# Patient Record
Sex: MALE | Race: WHITE | NOT HISPANIC OR LATINO | Employment: OTHER | URBAN - METROPOLITAN AREA
[De-identification: names, ages, dates, MRNs, and addresses within clinical notes are randomized per-mention and may not be internally consistent; named-entity substitution may affect disease eponyms.]

---

## 2017-01-12 ENCOUNTER — OFFICE VISIT (OUTPATIENT)
Dept: URGENT CARE | Facility: MEDICAL CENTER | Age: 32
End: 2017-01-12
Payer: MEDICARE

## 2017-01-12 PROCEDURE — 99213 OFFICE O/P EST LOW 20 MIN: CPT

## 2017-01-12 PROCEDURE — G0463 HOSPITAL OUTPT CLINIC VISIT: HCPCS

## 2017-01-19 ENCOUNTER — ALLSCRIPTS OFFICE VISIT (OUTPATIENT)
Dept: OTHER | Facility: OTHER | Age: 32
End: 2017-01-19

## 2017-03-17 ENCOUNTER — APPOINTMENT (OUTPATIENT)
Dept: LAB | Facility: HOSPITAL | Age: 32
End: 2017-03-17
Payer: MEDICARE

## 2017-03-17 ENCOUNTER — TRANSCRIBE ORDERS (OUTPATIENT)
Dept: ADMINISTRATIVE | Facility: HOSPITAL | Age: 32
End: 2017-03-17

## 2017-03-17 ENCOUNTER — HOSPITAL ENCOUNTER (OUTPATIENT)
Dept: NON INVASIVE DIAGNOSTICS | Facility: HOSPITAL | Age: 32
Discharge: HOME | End: 2017-03-17
Payer: MEDICARE

## 2017-03-17 DIAGNOSIS — Z79.899 ENCOUNTER FOR LONG-TERM (CURRENT) USE OF OTHER MEDICATIONS: ICD-10-CM

## 2017-03-17 DIAGNOSIS — Z79.899 ENCOUNTER FOR LONG-TERM (CURRENT) USE OF OTHER MEDICATIONS: Primary | ICD-10-CM

## 2017-03-17 LAB
ALBUMIN SERPL BCP-MCNC: 3.4 G/DL (ref 3.5–5)
ALP SERPL-CCNC: 72 U/L (ref 46–116)
ALT SERPL W P-5'-P-CCNC: 23 U/L (ref 12–78)
ANION GAP SERPL CALCULATED.3IONS-SCNC: 6 MMOL/L (ref 4–13)
AST SERPL W P-5'-P-CCNC: 13 U/L (ref 5–45)
ATRIAL RATE: 119 BPM
BASOPHILS # BLD AUTO: 0.03 THOUSANDS/ΜL (ref 0–0.1)
BASOPHILS NFR BLD AUTO: 0 % (ref 0–1)
BILIRUB SERPL-MCNC: 0.35 MG/DL (ref 0.2–1)
BUN SERPL-MCNC: 13 MG/DL (ref 5–25)
CALCIUM SERPL-MCNC: 8.8 MG/DL (ref 8.3–10.1)
CHLORIDE SERPL-SCNC: 103 MMOL/L (ref 100–108)
CHOLEST SERPL-MCNC: 115 MG/DL (ref 50–200)
CO2 SERPL-SCNC: 29 MMOL/L (ref 21–32)
CREAT SERPL-MCNC: 1.17 MG/DL (ref 0.6–1.3)
EOSINOPHIL # BLD AUTO: 0.18 THOUSAND/ΜL (ref 0–0.61)
EOSINOPHIL NFR BLD AUTO: 3 % (ref 0–6)
ERYTHROCYTE [DISTWIDTH] IN BLOOD BY AUTOMATED COUNT: 13.3 % (ref 11.6–15.1)
GFR SERPL CREATININE-BSD FRML MDRD: >60 ML/MIN/1.73SQ M
GLUCOSE P FAST SERPL-MCNC: 93 MG/DL (ref 65–99)
HCT VFR BLD AUTO: 42.3 % (ref 36.5–49.3)
HDLC SERPL-MCNC: 36 MG/DL (ref 40–60)
HGB BLD-MCNC: 14.1 G/DL (ref 12–17)
LDLC SERPL CALC-MCNC: 64 MG/DL (ref 0–100)
LYMPHOCYTES # BLD AUTO: 1.93 THOUSANDS/ΜL (ref 0.6–4.47)
LYMPHOCYTES NFR BLD AUTO: 26 % (ref 14–44)
MCH RBC QN AUTO: 29.4 PG (ref 26.8–34.3)
MCHC RBC AUTO-ENTMCNC: 33.3 G/DL (ref 31.4–37.4)
MCV RBC AUTO: 88 FL (ref 82–98)
MONOCYTES # BLD AUTO: 0.55 THOUSAND/ΜL (ref 0.17–1.22)
MONOCYTES NFR BLD AUTO: 8 % (ref 4–12)
NEUTROPHILS # BLD AUTO: 4.61 THOUSANDS/ΜL (ref 1.85–7.62)
NEUTS SEG NFR BLD AUTO: 63 % (ref 43–75)
P AXIS: 43 DEGREES
PLATELET # BLD AUTO: 267 THOUSANDS/UL (ref 149–390)
PMV BLD AUTO: 9.8 FL (ref 8.9–12.7)
POTASSIUM SERPL-SCNC: 3.6 MMOL/L (ref 3.5–5.3)
PR INTERVAL: 150 MS
PROT SERPL-MCNC: 6.4 G/DL (ref 6.4–8.2)
QRS AXIS: 84 DEGREES
QRSD INTERVAL: 98 MS
QT INTERVAL: 320 MS
QTC INTERVAL: 450 MS
RBC # BLD AUTO: 4.8 MILLION/UL (ref 3.88–5.62)
SODIUM SERPL-SCNC: 138 MMOL/L (ref 136–145)
T WAVE AXIS: 61 DEGREES
TRIGL SERPL-MCNC: 76 MG/DL
TSH SERPL DL<=0.05 MIU/L-ACNC: 1.57 UIU/ML (ref 0.36–3.74)
VENTRICULAR RATE: 119 BPM
WBC # BLD AUTO: 7.3 THOUSAND/UL (ref 4.31–10.16)

## 2017-03-17 PROCEDURE — 93005 ELECTROCARDIOGRAM TRACING: CPT

## 2017-03-17 PROCEDURE — 85025 COMPLETE CBC W/AUTO DIFF WBC: CPT

## 2017-03-17 PROCEDURE — 80061 LIPID PANEL: CPT

## 2017-03-17 PROCEDURE — 36415 COLL VENOUS BLD VENIPUNCTURE: CPT

## 2017-03-17 PROCEDURE — 84443 ASSAY THYROID STIM HORMONE: CPT

## 2017-03-17 PROCEDURE — 80053 COMPREHEN METABOLIC PANEL: CPT

## 2017-09-06 ENCOUNTER — HOSPITAL ENCOUNTER (INPATIENT)
Facility: HOSPITAL | Age: 32
LOS: 1 YEAR days | Discharge: HOME | DRG: 885 | End: 2018-12-31
Attending: PSYCHIATRY & NEUROLOGY
Payer: MEDICARE

## 2018-01-14 VITALS
OXYGEN SATURATION: 99 % | BODY MASS INDEX: 22.55 KG/M2 | HEART RATE: 91 BPM | HEIGHT: 69 IN | SYSTOLIC BLOOD PRESSURE: 110 MMHG | RESPIRATION RATE: 18 BRPM | WEIGHT: 152.25 LBS | DIASTOLIC BLOOD PRESSURE: 68 MMHG

## 2018-03-24 LAB
AMPHETAMINE URINE (HISTORICAL): NEGATIVE
BARBITURATE URINE (HISTORICAL): NEGATIVE
BENZODIAZEPINE URINE (HISTORICAL): NEGATIVE
COCAINE (METAB.), URINE (HISTORICAL): NEGATIVE
DRUG COMMENT (HISTORICAL): NORMAL
MDMA (GC/MS) (HISTORICAL): NEGATIVE
METHADONE URINE (HISTORICAL): NEGATIVE
METHAMPHETAMINE URINE (HISTORICAL): NEGATIVE
OPIATES (HISTORICAL): NEGATIVE
OXYCODONE (HISTORICAL): NEGATIVE
PHENCYCLIDINE URINE (HISTORICAL): NEGATIVE
THC URINE (HISTORICAL): NEGATIVE
TRICYCLICS URINE (HISTORICAL): NEGATIVE

## 2018-04-03 LAB
AMPHETAMINE URINE (HISTORICAL): POSITIVE
BARBITURATE URINE (HISTORICAL): NEGATIVE
BENZODIAZEPINE URINE (HISTORICAL): NEGATIVE
COCAINE (METAB.), URINE (HISTORICAL): NEGATIVE
DRUG COMMENT (HISTORICAL): ABNORMAL
MDMA (GC/MS) (HISTORICAL): NEGATIVE
METHADONE URINE (HISTORICAL): NEGATIVE
METHAMPHETAMINE URINE (HISTORICAL): NEGATIVE
OPIATES (HISTORICAL): NEGATIVE
OXYCODONE (HISTORICAL): NEGATIVE
PHENCYCLIDINE URINE (HISTORICAL): NEGATIVE
THC URINE (HISTORICAL): NEGATIVE
TRICYCLICS URINE (HISTORICAL): NEGATIVE

## 2018-04-17 LAB
ABSOL LYMPHOCYTES (HISTORICAL): 2.6 K/UL (ref 0.5–4)
BASOPHILS # BLD AUTO: 0 K/UL (ref 0–0.1)
BASOPHILS # BLD AUTO: 1 % (ref 0–1)
DEPRECATED RDW RBC AUTO: 13.3 %
EOSINOPHIL # BLD AUTO: 0.4 K/UL (ref 0–0.4)
EOSINOPHIL NFR BLD AUTO: 6 % (ref 0–6)
HCT VFR BLD AUTO: 39.7 % (ref 41–53)
HGB BLD-MCNC: 13.6 G/DL (ref 13.5–17.5)
LYMPHOCYTES NFR BLD AUTO: 41 % (ref 25–45)
MCH RBC QN AUTO: 28.8 PG (ref 26–34)
MCHC RBC AUTO-ENTMCNC: 34.2 % (ref 31–36)
MCV RBC AUTO: 84 FL (ref 80–100)
MONOCYTES # BLD AUTO: 0.6 K/UL (ref 0.2–0.9)
MONOCYTES NFR BLD AUTO: 10 % (ref 1–10)
NEUTROPHILS ABS COUNT (HISTORICAL): 2.7 K/UL (ref 1.8–7.8)
NEUTS SEG NFR BLD AUTO: 42 % (ref 45–65)
PLATELET # BLD AUTO: 197 K/MCL (ref 150–450)
RBC # BLD AUTO: 4.7 M/MCL (ref 4.5–5.9)
WBC # BLD AUTO: 6.2 K/MCL (ref 4.5–11)

## 2018-05-02 LAB
T3FREE SERPL-MCNC: 3.25 PG/ML (ref 2.77–5.27)
T4 FREE SERPL-MCNC: 1.3 NG/DL (ref 0.78–2.19)
TSH SERPL DL<=0.05 MIU/L-ACNC: 2.56 UIU/ML (ref 0.47–4.68)

## 2018-05-11 LAB — LITHIUM LEVEL (HISTORICAL): 0.5 MEQ/L (ref 0.6–1.2)

## 2018-05-15 LAB
ABSOL LYMPHOCYTES (HISTORICAL): 2.8 K/UL (ref 0.5–4)
BASOPHILS # BLD AUTO: 0 K/UL (ref 0–0.1)
BASOPHILS # BLD AUTO: 1 % (ref 0–1)
DEPRECATED RDW RBC AUTO: 13.3 %
EOSINOPHIL # BLD AUTO: 0.5 K/UL (ref 0–0.4)
EOSINOPHIL NFR BLD AUTO: 6 % (ref 0–6)
HCT VFR BLD AUTO: 42.5 % (ref 41–53)
HGB BLD-MCNC: 13.9 G/DL (ref 13.5–17.5)
LYMPHOCYTES NFR BLD AUTO: 35 % (ref 25–45)
MCH RBC QN AUTO: 27.9 PG (ref 26–34)
MCHC RBC AUTO-ENTMCNC: 32.6 % (ref 31–36)
MCV RBC AUTO: 86 FL (ref 80–100)
MONOCYTES # BLD AUTO: 0.6 K/UL (ref 0.2–0.9)
MONOCYTES NFR BLD AUTO: 8 % (ref 1–10)
NEUTROPHILS ABS COUNT (HISTORICAL): 4.1 K/UL (ref 1.8–7.8)
NEUTS SEG NFR BLD AUTO: 50 % (ref 45–65)
PLATELET # BLD AUTO: 230 K/MCL (ref 150–450)
RBC # BLD AUTO: 4.96 M/MCL (ref 4.5–5.9)
WBC # BLD AUTO: 8 K/MCL (ref 4.5–11)

## 2018-05-16 LAB — LITHIUM LEVEL (HISTORICAL): 1 MEQ/L (ref 0.6–1.2)

## 2018-06-01 PROCEDURE — 9990 LEVOTHYROXINE SOD 75 MCG TAB (3108354)

## 2018-06-01 PROCEDURE — 9990 LITHIUM CARBONATE 300 MG CAP (3103215)

## 2018-06-01 PROCEDURE — 9990 CSR EAC (7599997)

## 2018-06-01 PROCEDURE — 9990 DEPAKOTE 500MG ER TABLET (3125069)

## 2018-06-01 PROCEDURE — 9990 WELLBUTRIN XL 300MG TABLET (3127016)

## 2018-06-01 PROCEDURE — 9990 MIRALAX POWDER 17GM DOSE (3125150)

## 2018-06-01 PROCEDURE — 9990 EAC SEMI-PRIVATE (20271)

## 2018-06-01 PROCEDURE — 9990 PROMATINE 2.5MG TAB (3125846)

## 2018-06-01 PROCEDURE — 9990 CLOZAPINE 200MG TABLET (3135589)

## 2018-06-01 PROCEDURE — 9990 FLUPHENAZINE 10MG TABLET (3135373)

## 2018-06-02 PROCEDURE — 9990 FLUPHENAZINE 10MG TABLET (3135373)

## 2018-06-02 PROCEDURE — 9990 EAC SEMI-PRIVATE (20271)

## 2018-06-02 PROCEDURE — 9990 CSR EAC (7599997)

## 2018-06-02 PROCEDURE — 9990 LEVOTHYROXINE SOD 75 MCG TAB (3108354)

## 2018-06-02 PROCEDURE — 9990 MIRALAX POWDER 17GM DOSE (3125150)

## 2018-06-02 PROCEDURE — 9990 WELLBUTRIN XL 300MG TABLET (3127016)

## 2018-06-02 PROCEDURE — 9990 DEPAKOTE 500MG ER TABLET (3125069)

## 2018-06-02 PROCEDURE — 9990 CLOZAPINE 200MG TABLET (3135589)

## 2018-06-02 PROCEDURE — 9990 LITHIUM CARBONATE 300 MG CAP (3103215)

## 2018-06-02 PROCEDURE — 9990 PROMATINE 2.5MG TAB (3125846)

## 2018-06-03 PROCEDURE — 9990 PROMATINE 2.5MG TAB (3125846)

## 2018-06-03 PROCEDURE — 9990 LEVOTHYROXINE SOD 75 MCG TAB (3108354)

## 2018-06-03 PROCEDURE — 9990 WELLBUTRIN XL 300MG TABLET (3127016)

## 2018-06-03 PROCEDURE — 9990 MIRALAX POWDER 17GM DOSE (3125150)

## 2018-06-03 PROCEDURE — 80305 DRUG TEST PRSMV DIR OPT OBS: CPT

## 2018-06-03 PROCEDURE — 9990 DEPAKOTE 500MG ER TABLET (3125069)

## 2018-06-03 PROCEDURE — 9990 CSR EAC (7599997)

## 2018-06-03 PROCEDURE — 9990 LITHIUM CARBONATE 300 MG CAP (3103215)

## 2018-06-03 PROCEDURE — 9990 FLUPHENAZINE 10MG TABLET (3135373)

## 2018-06-03 PROCEDURE — 9990 CLOZAPINE 200MG TABLET (3135589)

## 2018-06-03 PROCEDURE — 9990 EAC SEMI-PRIVATE (20271)

## 2018-06-03 PROCEDURE — 9990 DRUG TEST PRESUMP OPTICAL (229568)

## 2018-06-04 PROCEDURE — 9990 CSR EAC (7599997)

## 2018-06-04 PROCEDURE — 9990 LITHIUM CARBONATE 300 MG CAP (3103215)

## 2018-06-04 PROCEDURE — 9990 FLUPHENAZINE 10MG TABLET (3135373)

## 2018-06-04 PROCEDURE — 9990 LEVOTHYROXINE SOD 75 MCG TAB (3108354)

## 2018-06-04 PROCEDURE — 9990 CLOZAPINE 200MG TABLET (3135589)

## 2018-06-04 PROCEDURE — 9990 DEPAKOTE 500MG ER TABLET (3125069)

## 2018-06-04 PROCEDURE — 9990 PROMATINE 2.5MG TAB (3125846)

## 2018-06-04 PROCEDURE — 9990 MIRALAX POWDER 17GM DOSE (3125150)

## 2018-06-04 PROCEDURE — 9990 WELLBUTRIN XL 300MG TABLET (3127016)

## 2018-06-04 PROCEDURE — 9990 EAC SEMI-PRIVATE (20271)

## 2018-06-05 PROCEDURE — 9990 DEPAKOTE 500MG ER TABLET (3125069)

## 2018-06-05 PROCEDURE — 9990 LITHIUM CARBONATE 300 MG CAP (3103215)

## 2018-06-05 PROCEDURE — 9990 FLUPHENAZINE 10MG TABLET (3135373)

## 2018-06-05 PROCEDURE — 9990 PROMATINE 2.5MG TAB (3125846)

## 2018-06-05 PROCEDURE — 9990 MIRALAX POWDER 17GM DOSE (3125150)

## 2018-06-05 PROCEDURE — 9990 LEVOTHYROXINE SOD 75 MCG TAB (3108354)

## 2018-06-05 PROCEDURE — 9990 CLOZAPINE 200MG TABLET (3135589)

## 2018-06-05 PROCEDURE — 9990 WELLBUTRIN XL 300MG TABLET (3127016)

## 2018-06-05 PROCEDURE — 9990 EAC SEMI-PRIVATE (20271)

## 2018-06-05 PROCEDURE — 9990 CSR EAC (7599997)

## 2018-06-06 PROCEDURE — 9990 LEVOTHYROXINE SOD 75 MCG TAB (3108354)

## 2018-06-06 PROCEDURE — 9990 EAC SEMI-PRIVATE (20271)

## 2018-06-06 PROCEDURE — 9990 LITHIUM CARBONATE 300 MG CAP (3103215)

## 2018-06-06 PROCEDURE — 9990 PROMATINE 2.5MG TAB (3125846)

## 2018-06-06 PROCEDURE — 9990 FLUPHENAZINE 10MG TABLET (3135373)

## 2018-06-06 PROCEDURE — 9990 CLOZAPINE 200MG TABLET (3135589)

## 2018-06-06 PROCEDURE — 9990 CSR EAC (7599997)

## 2018-06-06 PROCEDURE — 9990 MIRALAX POWDER 17GM DOSE (3125150)

## 2018-06-06 PROCEDURE — 9990 WELLBUTRIN XL 300MG TABLET (3127016)

## 2018-06-06 PROCEDURE — 9990 DEPAKOTE 500MG ER TABLET (3125069)

## 2018-06-07 PROCEDURE — 9990 EAC SEMI-PRIVATE (20271)

## 2018-06-07 PROCEDURE — 9990 LITHIUM CARBONATE 300 MG CAP (3103215)

## 2018-06-07 PROCEDURE — 9990 DEPAKOTE 500MG ER TABLET (3125069)

## 2018-06-07 PROCEDURE — 9990 PROMATINE 2.5MG TAB (3125846)

## 2018-06-07 PROCEDURE — 9990 FLUPHENAZINE 10MG TABLET (3135373)

## 2018-06-07 PROCEDURE — 9990 CLOZAPINE 200MG TABLET (3135589)

## 2018-06-07 PROCEDURE — 9990 LEVOTHYROXINE SOD 75 MCG TAB (3108354)

## 2018-06-07 PROCEDURE — 9990 CSR EAC (7599997)

## 2018-06-07 PROCEDURE — 9990 WELLBUTRIN XL 300MG TABLET (3127016)

## 2018-06-07 PROCEDURE — 9990 MIRALAX POWDER 17GM DOSE (3125150)

## 2018-06-08 PROCEDURE — 9990 EAC SEMI-PRIVATE (20271)

## 2018-06-08 PROCEDURE — 9990 DEPAKOTE 500MG ER TABLET (3125069)

## 2018-06-08 PROCEDURE — 9990 FLUPHENAZINE 10MG TABLET (3135373)

## 2018-06-08 PROCEDURE — 9990 MIRALAX POWDER 17GM DOSE (3125150)

## 2018-06-08 PROCEDURE — 9990 PROMATINE 2.5MG TAB (3125846)

## 2018-06-08 PROCEDURE — 9990 CLOZAPINE 200MG TABLET (3135589)

## 2018-06-08 PROCEDURE — 9990 LITHIUM CARBONATE 300 MG CAP (3103215)

## 2018-06-08 PROCEDURE — 9990 CSR EAC (7599997)

## 2018-06-08 PROCEDURE — 9990 WELLBUTRIN XL 300MG TABLET (3127016)

## 2018-06-08 PROCEDURE — 9990 LEVOTHYROXINE SOD 75 MCG TAB (3108354)

## 2018-06-09 PROCEDURE — 9990 FLUPHENAZINE 10MG TABLET (3135373)

## 2018-06-09 PROCEDURE — 9990 CSR EAC (7599997)

## 2018-06-09 PROCEDURE — 9990 EAC SEMI-PRIVATE (20271)

## 2018-06-09 PROCEDURE — 9990 WELLBUTRIN XL 300MG TABLET (3127016)

## 2018-06-09 PROCEDURE — 9990 LEVOTHYROXINE SOD 75 MCG TAB (3108354)

## 2018-06-09 PROCEDURE — 9990 CLOZAPINE 200MG TABLET (3135589)

## 2018-06-09 PROCEDURE — 9990 DEPAKOTE 500MG ER TABLET (3125069)

## 2018-06-09 PROCEDURE — 9990 LITHIUM CARBONATE 300 MG CAP (3103215)

## 2018-06-09 PROCEDURE — 9990 PROMATINE 2.5MG TAB (3125846)

## 2018-06-10 PROCEDURE — 9990 DRUG TEST PRESUMP OPTICAL (229568)

## 2018-06-10 PROCEDURE — 9990 CSR EAC (7599997)

## 2018-06-10 PROCEDURE — 9990 LITHIUM CARBONATE 300 MG CAP (3103215)

## 2018-06-10 PROCEDURE — 9990 WELLBUTRIN XL 300MG TABLET (3127016)

## 2018-06-10 PROCEDURE — 9990 DEPAKOTE 500MG ER TABLET (3125069)

## 2018-06-10 PROCEDURE — 9990 FLUPHENAZINE 10MG TABLET (3135373)

## 2018-06-10 PROCEDURE — 9990 EAC SEMI-PRIVATE (20271)

## 2018-06-10 PROCEDURE — 80305 DRUG TEST PRSMV DIR OPT OBS: CPT

## 2018-06-10 PROCEDURE — 9990 CLOZAPINE 200MG TABLET (3135589)

## 2018-06-10 PROCEDURE — 9990 LEVOTHYROXINE SOD 75 MCG TAB (3108354)

## 2018-06-10 PROCEDURE — 9990 PROMATINE 2.5MG TAB (3125846)

## 2018-06-11 PROCEDURE — 9990 MIRALAX POWDER 17GM DOSE (3125150)

## 2018-06-11 PROCEDURE — 9990 FLUPHENAZINE 10MG TABLET (3135373)

## 2018-06-11 PROCEDURE — 9990 WELLBUTRIN XL 300MG TABLET (3127016)

## 2018-06-11 PROCEDURE — 9990 LEVOTHYROXINE SOD 75 MCG TAB (3108354)

## 2018-06-11 PROCEDURE — 9990 EAC SEMI-PRIVATE (20271)

## 2018-06-11 PROCEDURE — 9990 CLOZAPINE 200MG TABLET (3135589)

## 2018-06-11 PROCEDURE — 9990 DEPAKOTE 500MG ER TABLET (3125069)

## 2018-06-11 PROCEDURE — 9990 CSR EAC (7599997)

## 2018-06-11 PROCEDURE — 9990 LITHIUM CARBONATE 300 MG CAP (3103215)

## 2018-06-11 PROCEDURE — 9990 PROMATINE 2.5MG TAB (3125846)

## 2018-06-12 LAB
ABSOL LYMPHOCYTES (HISTORICAL): 2.3 K/UL (ref 0.5–4)
ALBUMIN SERPL BCP-MCNC: 3.9 G/DL (ref 3–5.2)
ALP SERPL-CCNC: 69 U/L (ref 43–122)
ALT SERPL W P-5'-P-CCNC: 20 U/L (ref 9–52)
ANION GAP SERPL CALCULATED.3IONS-SCNC: 8 MMOL/L (ref 5–14)
AST SERPL W P-5'-P-CCNC: 12 U/L (ref 17–59)
BASOPHILS # BLD AUTO: 0 K/UL (ref 0–0.1)
BASOPHILS # BLD AUTO: 1 % (ref 0–1)
BILIRUB SERPL-MCNC: 0.3 MG/DL
BUN SERPL-MCNC: 11 MG/DL (ref 5–25)
CALCIUM SERPL-MCNC: 9.4 MG/DL (ref 8.4–10.2)
CHLORIDE SERPL-SCNC: 102 MEQ/L (ref 97–108)
CHOLEST SERPL-MCNC: 162 MG/DL
CHOLEST/HDLC SERPL: 3.6 {RATIO}
CO2 SERPL-SCNC: 30 MMOL/L (ref 22–30)
CREATINE, SERUM (HISTORICAL): 1.14 MG/DL (ref 0.7–1.5)
DEPRECATED RDW RBC AUTO: 13.1 %
EGFR (HISTORICAL): >60 ML/MIN/1.73 M2
EOSINOPHIL # BLD AUTO: 0.4 K/UL (ref 0–0.4)
EOSINOPHIL NFR BLD AUTO: 6 % (ref 0–6)
GLUCOSE SERPL-MCNC: 91 MG/DL (ref 70–99)
HCT VFR BLD AUTO: 44.4 % (ref 41–53)
HDLC SERPL-MCNC: 45 MG/DL
HGB BLD-MCNC: 14.7 G/DL (ref 13.5–17.5)
LDL/HDL RATIO (HISTORICAL): 1.9
LDLC SERPL CALC-MCNC: 84 MG/DL
LITHIUM LEVEL (HISTORICAL): 0.8 MEQ/L (ref 0.6–1.2)
LYMPHOCYTES NFR BLD AUTO: 33 % (ref 25–45)
MCH RBC QN AUTO: 29 PG (ref 26–34)
MCHC RBC AUTO-ENTMCNC: 33.1 % (ref 31–36)
MCV RBC AUTO: 88 FL (ref 80–100)
MONOCYTES # BLD AUTO: 0.5 K/UL (ref 0.2–0.9)
MONOCYTES NFR BLD AUTO: 7 % (ref 1–10)
NEUTROPHILS ABS COUNT (HISTORICAL): 3.7 K/UL (ref 1.8–7.8)
NEUTS SEG NFR BLD AUTO: 53 % (ref 45–65)
PLATELET # BLD AUTO: 243 K/MCL (ref 150–450)
POTASSIUM SERPL-SCNC: 4.2 MEQ/L (ref 3.6–5)
RBC # BLD AUTO: 5.07 M/MCL (ref 4.5–5.9)
SODIUM SERPL-SCNC: 140 MEQ/L (ref 137–147)
TOTAL PROTEIN (HISTORICAL): 6.7 G/DL (ref 5.9–8.4)
TRIGL SERPL-MCNC: 166 MG/DL
VALPROIC ACID TOTAL (HISTORICAL): 47.1 UG/ML (ref 50–120)
VLDLC SERPL CALC-MCNC: 33 MG/DL (ref 0–40)
WBC # BLD AUTO: 6.9 K/MCL (ref 4.5–11)

## 2018-06-12 PROCEDURE — 9990

## 2018-06-12 PROCEDURE — 80061 LIPID PANEL: CPT

## 2018-06-12 PROCEDURE — 80178 ASSAY OF LITHIUM: CPT

## 2018-06-12 PROCEDURE — 9990 CLOZAPINE 200MG TABLET (3135589)

## 2018-06-12 PROCEDURE — 9990 CBC WITH AUTOMATED DIF (230011)

## 2018-06-12 PROCEDURE — 9990 LIPID PANEL (227124)

## 2018-06-12 PROCEDURE — 9990 BISACODYL 5 MG ECTAB (3104627)

## 2018-06-12 PROCEDURE — 9990 VALPROIC ACID (223248)

## 2018-06-12 PROCEDURE — 9990 COMPREHENSIVE METABOLIC PANEL (227983)

## 2018-06-12 PROCEDURE — 9990 MIRALAX POWDER 17GM DOSE (3125150)

## 2018-06-12 PROCEDURE — 9990 LEVOTHYROXINE SOD 75 MCG TAB (3108354)

## 2018-06-12 PROCEDURE — 9990 EAC SEMI-PRIVATE (20271)

## 2018-06-12 PROCEDURE — 80053 COMPREHEN METABOLIC PANEL: CPT

## 2018-06-12 PROCEDURE — 9990 FLUPHENAZINE 10MG TABLET (3135373)

## 2018-06-12 PROCEDURE — 9990 PROMATINE 2.5MG TAB (3125846)

## 2018-06-12 PROCEDURE — 80164 ASSAY DIPROPYLACETIC ACD TOT: CPT

## 2018-06-12 PROCEDURE — 9990 WELLBUTRIN XL 300MG TABLET (3127016)

## 2018-06-12 PROCEDURE — 9990 LITHIUM CARBONATE 300 MG CAP (3103215)

## 2018-06-12 PROCEDURE — 9990 DEPAKOTE 500MG ER TABLET (3125069)

## 2018-06-12 PROCEDURE — 85025 COMPLETE CBC W/AUTO DIFF WBC: CPT

## 2018-06-12 PROCEDURE — 9990 CSR EAC (7599997)

## 2018-06-13 PROCEDURE — 9990 WELLBUTRIN XL 300MG TABLET (3127016)

## 2018-06-13 PROCEDURE — 9990 EAC SEMI-PRIVATE (20271)

## 2018-06-13 PROCEDURE — 9990 MIRALAX POWDER 17GM DOSE (3125150)

## 2018-06-13 PROCEDURE — 9990 PROMATINE 2.5MG TAB (3125846)

## 2018-06-13 PROCEDURE — 9990

## 2018-06-13 PROCEDURE — 9990 CLOZAPINE 200MG TABLET (3135589)

## 2018-06-13 PROCEDURE — 9990 FLUPHENAZINE 10MG TABLET (3135373)

## 2018-06-13 PROCEDURE — 9990 DEPAKOTE 500MG ER TABLET (3125069)

## 2018-06-13 PROCEDURE — 9990 LITHIUM CARBONATE 300 MG CAP (3103215)

## 2018-06-13 PROCEDURE — 9990 CSR EAC (7599997)

## 2018-06-13 PROCEDURE — 9990 LEVOTHYROXINE SOD 75 MCG TAB (3108354)

## 2018-06-14 PROCEDURE — 9990 CSR EAC (7599997)

## 2018-06-14 PROCEDURE — 9990 MIRALAX POWDER 17GM DOSE (3125150)

## 2018-06-14 PROCEDURE — 9990 PROMATINE 2.5MG TAB (3125846)

## 2018-06-14 PROCEDURE — 9990 EAC SEMI-PRIVATE (20271)

## 2018-06-14 PROCEDURE — 9990 CLOZAPINE 200MG TABLET (3135589)

## 2018-06-14 PROCEDURE — 9990 LEVOTHYROXINE SOD 75 MCG TAB (3108354)

## 2018-06-14 PROCEDURE — 9990 DEPAKOTE 500MG ER TABLET (3125069)

## 2018-06-14 PROCEDURE — 9990

## 2018-06-14 PROCEDURE — 9990 FLUPHENAZINE 10MG TABLET (3135373)

## 2018-06-14 PROCEDURE — 9990 WELLBUTRIN XL 300MG TABLET (3127016)

## 2018-06-14 PROCEDURE — 9990 LITHIUM CARBONATE 300 MG CAP (3103215)

## 2018-06-15 PROCEDURE — 9990 EAC SEMI-PRIVATE (20271)

## 2018-06-15 PROCEDURE — 9990 LEVOTHYROXINE SOD 75 MCG TAB (3108354)

## 2018-06-15 PROCEDURE — 9990 LITHIUM CARBONATE 300 MG CAP (3103215)

## 2018-06-15 PROCEDURE — 9990 WELLBUTRIN XL 300MG TABLET (3127016)

## 2018-06-15 PROCEDURE — 9990

## 2018-06-15 PROCEDURE — 9990 DEPAKOTE 500MG ER TABLET (3125069)

## 2018-06-15 PROCEDURE — 9990 MIRALAX POWDER 17GM DOSE (3125150)

## 2018-06-15 PROCEDURE — 9990 CLOZAPINE 200MG TABLET (3135589)

## 2018-06-15 PROCEDURE — 9990 CSR EAC (7599997)

## 2018-06-15 PROCEDURE — 9990 FLUPHENAZINE 10MG TABLET (3135373)

## 2018-06-15 PROCEDURE — 9990 PROMATINE 2.5MG TAB (3125846)

## 2018-06-16 PROCEDURE — 9990 LEVOTHYROXINE SOD 75 MCG TAB (3108354)

## 2018-06-16 PROCEDURE — 9990 MIRALAX POWDER 17GM DOSE (3125150)

## 2018-06-16 PROCEDURE — 9990 CLOZAPINE 200MG TABLET (3135589)

## 2018-06-16 PROCEDURE — 9990 EAC SEMI-PRIVATE (20271)

## 2018-06-16 PROCEDURE — 9990 FLUPHENAZINE 10MG TABLET (3135373)

## 2018-06-16 PROCEDURE — 9990 PROMATINE 2.5MG TAB (3125846)

## 2018-06-16 PROCEDURE — 9990

## 2018-06-16 PROCEDURE — 9990 CSR EAC (7599997)

## 2018-06-16 PROCEDURE — 9990 LITHIUM CARBONATE 300 MG CAP (3103215)

## 2018-06-16 PROCEDURE — 9990 DEPAKOTE 500MG ER TABLET (3125069)

## 2018-06-16 PROCEDURE — 9990 WELLBUTRIN XL 300MG TABLET (3127016)

## 2018-06-17 PROCEDURE — 9990 DEPAKOTE 500MG ER TABLET (3125069)

## 2018-06-17 PROCEDURE — 9990 MIRALAX POWDER 17GM DOSE (3125150)

## 2018-06-17 PROCEDURE — 9990 LEVOTHYROXINE SOD 75 MCG TAB (3108354)

## 2018-06-17 PROCEDURE — 9990 EAC SEMI-PRIVATE (20271)

## 2018-06-17 PROCEDURE — 9990 BISACODYL 5 MG ECTAB (3104627)

## 2018-06-17 PROCEDURE — 80305 DRUG TEST PRSMV DIR OPT OBS: CPT

## 2018-06-17 PROCEDURE — 9990

## 2018-06-17 PROCEDURE — 9990 CLOZAPINE 200MG TABLET (3135589)

## 2018-06-17 PROCEDURE — 9990 PROMATINE 2.5MG TAB (3125846)

## 2018-06-17 PROCEDURE — 9990 WELLBUTRIN XL 300MG TABLET (3127016)

## 2018-06-17 PROCEDURE — 9990 FLUPHENAZINE 10MG TABLET (3135373)

## 2018-06-17 PROCEDURE — 9990 DRUG TEST PRESUMP OPTICAL (229568)

## 2018-06-17 PROCEDURE — 9990 CSR EAC (7599997)

## 2018-06-18 PROCEDURE — 9990 FLUPHENAZINE 10MG TABLET (3135373)

## 2018-06-18 PROCEDURE — 9990

## 2018-06-18 PROCEDURE — 9990 WELLBUTRIN XL 300MG TABLET (3127016)

## 2018-06-18 PROCEDURE — 9990 LEVOTHYROXINE SOD 75 MCG TAB (3108354)

## 2018-06-18 PROCEDURE — 9990 MIRALAX POWDER 17GM DOSE (3125150)

## 2018-06-18 PROCEDURE — 9990 DEPAKOTE 500MG ER TABLET (3125069)

## 2018-06-18 PROCEDURE — 9990 CSR EAC (7599997)

## 2018-06-18 PROCEDURE — 9990 PROMATINE 2.5MG TAB (3125846)

## 2018-06-18 PROCEDURE — 9990 CLOZAPINE 200MG TABLET (3135589)

## 2018-06-18 PROCEDURE — 9990 EAC SEMI-PRIVATE (20271)

## 2018-06-19 PROCEDURE — 9990 WELLBUTRIN XL 300MG TABLET (3127016)

## 2018-06-19 PROCEDURE — 9990 CLOZAPINE 200MG TABLET (3135589)

## 2018-06-19 PROCEDURE — 9990 EAC SEMI-PRIVATE (20271)

## 2018-06-19 PROCEDURE — 9990 MIRALAX POWDER 17GM DOSE (3125150)

## 2018-06-19 PROCEDURE — 9990

## 2018-06-19 PROCEDURE — 9990 FLUPHENAZINE 10MG TABLET (3135373)

## 2018-06-19 PROCEDURE — 9990 DEPAKOTE 500MG ER TABLET (3125069)

## 2018-06-19 PROCEDURE — 9990 PROMATINE 2.5MG TAB (3125846)

## 2018-06-19 PROCEDURE — 9990 CSR EAC (7599997)

## 2018-06-19 PROCEDURE — 9990 LEVOTHYROXINE SOD 75 MCG TAB (3108354)

## 2018-06-20 PROCEDURE — 9990

## 2018-06-20 PROCEDURE — 9990 FLUPHENAZINE 10MG TABLET (3135373)

## 2018-06-20 PROCEDURE — 9990 PROMATINE 2.5MG TAB (3125846)

## 2018-06-20 PROCEDURE — 9990 CLOZAPINE 200MG TABLET (3135589)

## 2018-06-20 PROCEDURE — 9990 MIRALAX POWDER 17GM DOSE (3125150)

## 2018-06-20 PROCEDURE — 9990 LEVOTHYROXINE SOD 75 MCG TAB (3108354)

## 2018-06-20 PROCEDURE — 9990 CSR EAC (7599997)

## 2018-06-20 PROCEDURE — 9990 DEPAKOTE 500MG ER TABLET (3125069)

## 2018-06-20 PROCEDURE — 9990 EAC SEMI-PRIVATE (20271)

## 2018-06-20 PROCEDURE — 9990 WELLBUTRIN XL 300MG TABLET (3127016)

## 2018-06-21 PROCEDURE — 9990 CLOZAPINE 200MG TABLET (3135589)

## 2018-06-21 PROCEDURE — 9990

## 2018-06-21 PROCEDURE — 9990 PROMATINE 2.5MG TAB (3125846)

## 2018-06-21 PROCEDURE — 9990 LEVOTHYROXINE SOD 75 MCG TAB (3108354)

## 2018-06-21 PROCEDURE — 9990 DEPAKOTE 500MG ER TABLET (3125069)

## 2018-06-21 PROCEDURE — 9990 WELLBUTRIN XL 300MG TABLET (3127016)

## 2018-06-21 PROCEDURE — 9990 FLUPHENAZINE 10MG TABLET (3135373)

## 2018-06-21 PROCEDURE — 9990 EAC SEMI-PRIVATE (20271)

## 2018-06-21 PROCEDURE — 9990 CSR EAC (7599997)

## 2018-06-21 PROCEDURE — 9990 MIRALAX POWDER 17GM DOSE (3125150)

## 2018-06-22 DIAGNOSIS — F20.0 CHRONIC PARANOID SCHIZOPHRENIA (HCC): Primary | Chronic | ICD-10-CM

## 2018-06-22 PROCEDURE — 9990 DEPAKOTE 500MG ER TABLET (3125069)

## 2018-06-22 PROCEDURE — 9990

## 2018-06-22 PROCEDURE — 9990 LEVOTHYROXINE SOD 75 MCG TAB (3108354)

## 2018-06-22 PROCEDURE — 9990 EAC SEMI-PRIVATE (20271)

## 2018-06-22 PROCEDURE — 9990 PROMATINE 2.5MG TAB (3125846)

## 2018-06-22 PROCEDURE — 9990 CLOZAPINE 200MG TABLET (3135589)

## 2018-06-22 PROCEDURE — 9990 FLUPHENAZINE 10MG TABLET (3135373)

## 2018-06-22 PROCEDURE — 9990 CSR EAC (7599997)

## 2018-06-22 PROCEDURE — 9990 MIRALAX POWDER 17GM DOSE (3125150)

## 2018-06-22 PROCEDURE — 9990 WELLBUTRIN XL 300MG TABLET (3127016)

## 2018-06-22 RX ORDER — BUPROPION HYDROCHLORIDE 150 MG/1
150 TABLET ORAL DAILY
Status: DISCONTINUED | OUTPATIENT
Start: 2018-06-23 | End: 2018-07-05

## 2018-06-22 RX ORDER — POLYETHYLENE GLYCOL 3350 17 G/17G
17 POWDER, FOR SOLUTION ORAL DAILY
Status: DISCONTINUED | OUTPATIENT
Start: 2018-06-23 | End: 2018-12-04

## 2018-06-22 RX ORDER — FLUPHENAZINE HYDROCHLORIDE 10 MG/1
10 TABLET ORAL
Status: DISCONTINUED | OUTPATIENT
Start: 2018-06-22 | End: 2018-07-12

## 2018-06-22 RX ORDER — LEVOTHYROXINE SODIUM 0.07 MG/1
75 TABLET ORAL
Status: DISCONTINUED | OUTPATIENT
Start: 2018-06-23 | End: 2018-12-20 | Stop reason: HOSPADM

## 2018-06-22 RX ORDER — CLOZAPINE 200 MG/1
400 TABLET ORAL
Status: DISCONTINUED | OUTPATIENT
Start: 2018-06-22 | End: 2018-07-05

## 2018-06-22 RX ORDER — DIVALPROEX SODIUM 500 MG/1
1000 TABLET, EXTENDED RELEASE ORAL
Status: DISCONTINUED | OUTPATIENT
Start: 2018-06-22 | End: 2018-07-12

## 2018-06-22 RX ORDER — MINOCYCLINE HYDROCHLORIDE 100 MG/1
100 CAPSULE ORAL DAILY
Status: DISCONTINUED | OUTPATIENT
Start: 2018-06-23 | End: 2018-08-14

## 2018-06-22 RX ORDER — BUPROPION HYDROCHLORIDE 300 MG/1
300 TABLET ORAL DAILY
Status: DISCONTINUED | OUTPATIENT
Start: 2018-06-23 | End: 2018-12-20 | Stop reason: HOSPADM

## 2018-06-22 RX ORDER — MIDODRINE HYDROCHLORIDE 2.5 MG/1
10 TABLET ORAL 3 TIMES DAILY
Status: DISCONTINUED | OUTPATIENT
Start: 2018-06-22 | End: 2018-06-23

## 2018-06-23 PROBLEM — F20.0 CHRONIC PARANOID SCHIZOPHRENIA (HCC): Chronic | Status: ACTIVE | Noted: 2018-06-23

## 2018-06-23 RX ORDER — MIDODRINE HYDROCHLORIDE 2.5 MG/1
10 TABLET ORAL 3 TIMES DAILY
Status: DISCONTINUED | OUTPATIENT
Start: 2018-06-23 | End: 2018-12-20 | Stop reason: HOSPADM

## 2018-06-23 RX ORDER — LITHIUM CARBONATE 450 MG
450 TABLET, EXTENDED RELEASE ORAL
COMMUNITY
End: 2018-12-20 | Stop reason: HOSPADM

## 2018-06-23 RX ORDER — ACETAMINOPHEN 325 MG/1
650 TABLET ORAL EVERY 4 HOURS PRN
COMMUNITY
End: 2018-12-20 | Stop reason: HOSPADM

## 2018-06-23 RX ORDER — DIVALPROEX SODIUM 500 MG/1
250 TABLET, DELAYED RELEASE ORAL EVERY 8 HOURS SCHEDULED
COMMUNITY
End: 2018-12-20 | Stop reason: HOSPADM

## 2018-06-23 RX ORDER — DIVALPROEX SODIUM 500 MG/1
1250 TABLET, DELAYED RELEASE ORAL
COMMUNITY
End: 2018-12-20 | Stop reason: HOSPADM

## 2018-06-23 RX ORDER — PAROXETINE HYDROCHLORIDE 20 MG/1
20 TABLET, FILM COATED ORAL DAILY
COMMUNITY
End: 2018-12-20 | Stop reason: HOSPADM

## 2018-06-23 RX ORDER — CLOZAPINE 200 MG/1
600 TABLET ORAL
Status: ON HOLD | COMMUNITY
End: 2018-12-18

## 2018-06-23 RX ADMIN — FLUPHENAZINE HYDROCHLORIDE 10 MG: 10 TABLET, FILM COATED ORAL at 21:57

## 2018-06-23 RX ADMIN — CLOZAPINE 400 MG: 100 TABLET ORAL at 21:57

## 2018-06-23 RX ADMIN — LEVOTHYROXINE SODIUM 75 MCG: 75 TABLET ORAL at 06:12

## 2018-06-23 RX ADMIN — MIDODRINE HYDROCHLORIDE 10 MG: 2.5 TABLET ORAL at 06:35

## 2018-06-23 RX ADMIN — MIDODRINE HYDROCHLORIDE 10 MG: 2.5 TABLET ORAL at 17:00

## 2018-06-23 RX ADMIN — BUPROPION HYDROCHLORIDE 300 MG: 300 TABLET, FILM COATED, EXTENDED RELEASE ORAL at 09:29

## 2018-06-23 RX ADMIN — BUPROPION HYDROCHLORIDE 150 MG: 300 TABLET, FILM COATED, EXTENDED RELEASE ORAL at 09:25

## 2018-06-23 RX ADMIN — POLYETHYLENE GLYCOL 3350 17 G: 17 POWDER, FOR SOLUTION ORAL at 09:26

## 2018-06-23 RX ADMIN — DIVALPROEX SODIUM 1000 MG: 500 TABLET, EXTENDED RELEASE ORAL at 21:57

## 2018-06-23 RX ADMIN — MINOCYCLINE HYDROCHLORIDE 100 MG: 100 CAPSULE ORAL at 09:28

## 2018-06-24 LAB
AMPHETAMINES SERPL QL SCN: NEGATIVE
BARBITURATES UR QL: NEGATIVE
BENZODIAZ UR QL: NEGATIVE
COCAINE UR QL: NEGATIVE
METHADONE UR QL: NEGATIVE
OPIATES UR QL SCN: NEGATIVE
PCP UR QL: NEGATIVE
THC UR QL: NEGATIVE

## 2018-06-24 PROCEDURE — 80307 DRUG TEST PRSMV CHEM ANLYZR: CPT

## 2018-06-24 RX ADMIN — BUPROPION HYDROCHLORIDE 150 MG: 300 TABLET, FILM COATED, EXTENDED RELEASE ORAL at 08:12

## 2018-06-24 RX ADMIN — POLYETHYLENE GLYCOL 3350 17 G: 17 POWDER, FOR SOLUTION ORAL at 08:52

## 2018-06-24 RX ADMIN — CLOZAPINE 400 MG: 100 TABLET ORAL at 21:21

## 2018-06-24 RX ADMIN — DIVALPROEX SODIUM 1000 MG: 500 TABLET, EXTENDED RELEASE ORAL at 21:21

## 2018-06-24 RX ADMIN — MINOCYCLINE HYDROCHLORIDE 100 MG: 100 CAPSULE ORAL at 08:08

## 2018-06-24 RX ADMIN — FLUPHENAZINE HYDROCHLORIDE 10 MG: 10 TABLET, FILM COATED ORAL at 21:21

## 2018-06-24 RX ADMIN — BUPROPION HYDROCHLORIDE 300 MG: 300 TABLET, FILM COATED, EXTENDED RELEASE ORAL at 08:10

## 2018-06-24 RX ADMIN — LEVOTHYROXINE SODIUM 75 MCG: 75 TABLET ORAL at 06:13

## 2018-06-24 NOTE — NURSING NOTE
The patient slept through the night with no behaviors or issues noted  Fall precautions maintained  Med compliant, midodrine was held per hold parameters  Continue to monitor

## 2018-06-24 NOTE — PROGRESS NOTES
Rin Rubio was compliant with his treatment plan today  He attended goals group and life skills group  He was compliant with his routine medications, fluids were encouraged, remains on Fall precautions  His mother visited today

## 2018-06-24 NOTE — PROGRESS NOTES
Psychiatry Progress Note    Subjective: Interval History     Patient currently on leave of absence  Per the report there are no new issues or behaviors to be reported        Current medications:    Current Facility-Administered Medications:     bisacodyl (DULCOLAX) EC tablet 10 mg, 10 mg, Oral, Daily PRN, Provider Cutover    buPROPion (WELLBUTRIN XL) 24 hr tablet 150 mg, 150 mg, Oral, Daily, Provider Cutover, 150 mg at 06/24/18 7982    buPROPion (WELLBUTRIN XL) 24 hr tablet 300 mg, 300 mg, Oral, Daily, Provider Cutover, 300 mg at 06/24/18 0810    cloZAPine (CLOZARIL) tablet 400 mg, 400 mg, Oral, HS, Provider Cutover, 400 mg at 06/23/18 2157    divalproex sodium (DEPAKOTE ER) 24 hr tablet 1,000 mg, 1,000 mg, Oral, HS, Provider Cutover, 1,000 mg at 06/23/18 2157    fluPHENAZine (PROLIXIN) tablet 10 mg, 10 mg, Oral, HS, Provider Cutover, 10 mg at 06/23/18 2157    levothyroxine tablet 75 mcg, 75 mcg, Oral, Early Morning, Provider Cutover, 75 mcg at 06/24/18 4319    midodrine (PROAMATINE) tablet 10 mg, 10 mg, Oral, TID, Wally Lynn MD, Stopped at 06/24/18 0615    minocycline (MINOCIN) capsule 100 mg, 100 mg, Oral, Daily, Provider Cutover, 100 mg at 06/24/18 0808    polyethylene glycol (MIRALAX) packet 17 g, 17 g, Oral, Daily, Provider Cutover, 17 g at 06/24/18 0852      Objective:     Vital Signs:  Vitals:    06/23/18 1604 06/24/18 0614 06/24/18 0736 06/24/18 0737   BP: 107/84 125/62 118/80 120/78   BP Location: Left arm Left arm Left arm Left arm   Pulse: (!) 112 (!) 112 96 (!) 107   Resp:   19 19   Temp:   (!) 97 1 °F (36 2 °C)    TempSrc:   Temporal    Weight:   88 9 kg (196 lb)    Height:             Appearance:  age appropriate and casually dressed   Behavior:  normal   Speech:  normal volume   Mood:  depressed   Affect:  constricted   Thought Process:  normal   Thought Content:  normal   Perceptual Disturbances: None   Risk Potential: none   Sensorium:  person, place, situation and time   Cognition: intact   Consciousness:  alert and awake    Attention: attention span and concentration were age appropriate   Intellect: average   Insight:  good   Judgment: good      Motor Activity: no abnormal movements         Recent Labs:  Results Reviewed     None          I/O Past 24 hours:  No intake/output data recorded  I/O this shift:  In: -   Out: 1 [Stool:1]        Assessment / Plan:     [facility]    Recommended Treatment:      Medication changes:  1) continue current medications    Non-pharmacological treatments  1) Continue with group therapy, milieu therapy and occupational therapy  Safety  1) Safety/communication plan established targeting dynamic risk factors above  Counseling / Coordination of Care    Total floor / unit time spent today 20 minutes  Greater than 50% of total time was spent with the patient and / or family counseling and / or coordination of care  A description of the counseling / coordination of care  Patient's Rights, confidentiality and exceptions to confidentiality, use of automated medical record, [facility] staff access to medical record, and consent to treatment reviewed      Emani Gomez PA-C

## 2018-06-24 NOTE — NURSING NOTE
The patient was mostly isolative to his room, but did come out to visit with his mother and sister and attended evening group  No behaviors or issues noted  Med compliant without prompting  Fall precautions maintained  Continue to monitor

## 2018-06-24 NOTE — PROGRESS NOTES
Psychiatry Progress Note    Subjective: Interval History     No new issues or behaviors to report in the past 24 hr   Patient has no complaints to offer to me at this time  He has been cooperative and compliant with medications and meals  Patient slept well last night        Current medications:    Current Facility-Administered Medications:     bisacodyl (DULCOLAX) EC tablet 10 mg, 10 mg, Oral, Daily PRN, Provider Cutover    buPROPion (WELLBUTRIN XL) 24 hr tablet 150 mg, 150 mg, Oral, Daily, Provider Cutover, 150 mg at 06/23/18 0925    buPROPion (WELLBUTRIN XL) 24 hr tablet 300 mg, 300 mg, Oral, Daily, Provider Cutover, 300 mg at 06/23/18 9152    cloZAPine (CLOZARIL) tablet 400 mg, 400 mg, Oral, HS, Provider Cutover    divalproex sodium (DEPAKOTE ER) 24 hr tablet 1,000 mg, 1,000 mg, Oral, HS, Provider Cutover    fluPHENAZine (PROLIXIN) tablet 10 mg, 10 mg, Oral, HS, Provider Cutover    levothyroxine tablet 75 mcg, 75 mcg, Oral, Early Morning, Provider Cutover, 75 mcg at 06/23/18 0612    midodrine (PROAMATINE) tablet 10 mg, 10 mg, Oral, TID, Ather MD Shane, 10 mg at 06/23/18 1700    minocycline (MINOCIN) capsule 100 mg, 100 mg, Oral, Daily, Provider Cutover, 100 mg at 06/23/18 4142    polyethylene glycol (MIRALAX) packet 17 g, 17 g, Oral, Daily, Provider Cutover, 17 g at 06/23/18 0926      Objective:     Vital Signs:  Vitals:    06/23/18 0807 06/23/18 1600 06/23/18 1602 06/23/18 1604   BP: 112/70 111/73 120/90 107/84   BP Location: Left arm Left arm Left arm Left arm   Pulse: 105 88 93 (!) 112   Resp:       Temp:       TempSrc:       Weight: 85 3 kg (188 lb 0 1 oz)      Height: 6' 1" (1 854 m)            Appearance:  age appropriate and casually dressed   Behavior:  normal   Speech:  normal volume   Mood:  depressed   Affect:  constricted   Thought Process:  normal   Thought Content:  normal   Perceptual Disturbances: None   Risk Potential: none   Sensorium:  person, place, situation and time Cognition:  intact   Consciousness:  alert and awake    Attention: attention span and concentration were age appropriate   Intellect: average   Insight:  good   Judgment: good      Motor Activity: no abnormal movements         Recent Labs:  Results Reviewed     None          I/O Past 24 hours:  No intake/output data recorded  No intake/output data recorded  Assessment / Plan:     [facility]    Recommended Treatment:      Medication changes:  1) continue current medications    Non-pharmacological treatments  1) Continue with group therapy, milieu therapy and occupational therapy  Safety  1) Safety/communication plan established targeting dynamic risk factors above  Counseling / Coordination of Care    Total floor / unit time spent today 20 minutes  Greater than 50% of total time was spent with the patient and / or family counseling and / or coordination of care  A description of the counseling / coordination of care  Patient's Rights, confidentiality and exceptions to confidentiality, use of automated medical record, [facility] staff access to medical record, and consent to treatment reviewed      Pat Nuñez PA-C

## 2018-06-24 NOTE — PROGRESS NOTES
Christoph Camacho left with his mom for a day pass at this time    He is due to call in at 4pm and return at 8pm

## 2018-06-25 RX ADMIN — CLOZAPINE 400 MG: 100 TABLET ORAL at 21:43

## 2018-06-25 RX ADMIN — MINOCYCLINE HYDROCHLORIDE 100 MG: 100 CAPSULE ORAL at 09:19

## 2018-06-25 RX ADMIN — FLUPHENAZINE HYDROCHLORIDE 10 MG: 10 TABLET, FILM COATED ORAL at 21:43

## 2018-06-25 RX ADMIN — POLYETHYLENE GLYCOL 3350 17 G: 17 POWDER, FOR SOLUTION ORAL at 09:18

## 2018-06-25 RX ADMIN — BUPROPION HYDROCHLORIDE 150 MG: 300 TABLET, FILM COATED, EXTENDED RELEASE ORAL at 09:19

## 2018-06-25 RX ADMIN — DIVALPROEX SODIUM 1000 MG: 500 TABLET, EXTENDED RELEASE ORAL at 21:43

## 2018-06-25 RX ADMIN — MIDODRINE HYDROCHLORIDE 10 MG: 2.5 TABLET ORAL at 06:30

## 2018-06-25 RX ADMIN — LEVOTHYROXINE SODIUM 75 MCG: 75 TABLET ORAL at 06:30

## 2018-06-25 RX ADMIN — BUPROPION HYDROCHLORIDE 300 MG: 300 TABLET, FILM COATED, EXTENDED RELEASE ORAL at 09:20

## 2018-06-25 RX ADMIN — MIDODRINE HYDROCHLORIDE 10 MG: 2.5 TABLET ORAL at 21:44

## 2018-06-25 NOTE — NURSING NOTE
Patient asleep in his bed at shift onset, no distress noted  He has remained asleep thus far without issue  Fall and safety precautions maintained  Continue to monitor

## 2018-06-25 NOTE — PLAN OF CARE
Problem: Alteration in Thoughts and Perception  Goal: Treatment Goal: Gain control of psychotic behaviors/thinking, reduce/eliminate presenting symptoms and demonstrate improved reality functioning upon discharge  Outcome: Progressing    Goal: Verbalize thoughts and feelings  Interventions:  - Promote a nonjudgmental and trusting relationship with the patient through active listening and therapeutic communication  - Assess patient's level of functioning, behavior and potential for risk  - Engage patient in 1 on 1 interactions for a minimum of 15 minutes each session  - Encourage patient to express fears, feelings, frustrations, and discuss symptoms    - [place] patient to reality, help patient recognize reality-based thinking   - Administer medications as ordered and assess for potential side effects  - Provide the patient education related to the signs and symptoms of the illness and desired effects of prescribed medications  Outcome: Not Progressing    Goal: Refrain from acting on delusional thinking/internal stimuli  Interventions:  - Monitor patient closely, per order   - Utilize least restrictive measures   - Set reasonable limits, give positive feedback for acceptable   - Administer medications as ordered and monitor of potential side effects  Outcome: Progressing    Goal: Agree to be compliant with medication regime, as prescribed and report medication side effects  Interventions:  - Offer appropriate PRN medication and supervise ingestion; conduct aims, as needed   Outcome: Progressing    Goal: Attend and participate in unit activities, including therapeutic, recreational, and educational groups  Interventions:  - Provide therapeutic and educational activities daily, encourage attendance and participation, and document same in the medical record   Outcome: Progressing  6/25 Has Behavior plan to help him stay motivated, focused in his Recovery efforts  Goal: Recognize dysfunctional thoughts, communicate reality-based thoughts at the time of discharge  Interventions:  - Provide medication and psycho-education to assist patient in compliance and developing insight into his/her illness   Outcome: Progressing    Goal: Complete daily ADLs, including personal hygiene independently, as able  Interventions:  - Observe, teach, and assist patient with ADLS  - Monitor and promote a balance of rest/activity, with adequate nutrition and elimination   Outcome: Progressing      Problem: Ineffective Coping  Goal: Identifies ineffective coping skills  Outcome: Not Progressing    Goal: Identifies healthy coping skills  Outcome: Not Progressing    Goal: Demonstrates healthy coping skills  Outcome: Not Progressing    Goal: Patient/Family participate in treatment and DC plans  Interventions:  - Provide therapeutic environment  Outcome: Progressing      Problem: Depression  Goal: Treatment Goal: Demonstrate behavioral control of depressive symptoms, verbalize feelings of improved mood/affect, and adopt new coping skills prior to discharge  Outcome: Progressing    Goal: Refrain from isolation  Interventions:  - Develop a trusting relationship   - Encourage socialization   Outcome: Not Progressing    Goal: Attend and participate in unit activities, including therapeutic, recreational, and educational groups  Interventions:  - Provide therapeutic and educational activities daily, encourage attendance and participation, and document same in the medical record   Outcome: Progressing

## 2018-06-25 NOTE — PROGRESS NOTES
Psychiatry Progress Note    Subjective:   Interval History     Uneventful pass home yesterday; quiet and isolated, may be hallucinating but denies same; no new complaints      Current medications:    Current Facility-Administered Medications:     bisacodyl (DULCOLAX) EC tablet 10 mg, 10 mg, Oral, Daily PRN, Provider Cutover    buPROPion (WELLBUTRIN XL) 24 hr tablet 150 mg, 150 mg, Oral, Daily, Provider Cutover, 150 mg at 06/24/18 1404    buPROPion (WELLBUTRIN XL) 24 hr tablet 300 mg, 300 mg, Oral, Daily, Provider Cutover, 300 mg at 06/24/18 0810    cloZAPine (CLOZARIL) tablet 400 mg, 400 mg, Oral, HS, Provider Cutover, 400 mg at 06/24/18 2121    divalproex sodium (DEPAKOTE ER) 24 hr tablet 1,000 mg, 1,000 mg, Oral, HS, Provider Cutover, 1,000 mg at 06/24/18 2121    fluPHENAZine (PROLIXIN) tablet 10 mg, 10 mg, Oral, HS, Provider Cutover, 10 mg at 06/24/18 2121    levothyroxine tablet 75 mcg, 75 mcg, Oral, Early Morning, Provider Cutover, 75 mcg at 06/25/18 0630    midodrine (PROAMATINE) tablet 10 mg, 10 mg, Oral, TID, Wally Lynn MD, 10 mg at 06/25/18 0630    minocycline (MINOCIN) capsule 100 mg, 100 mg, Oral, Daily, Provider Cutover, 100 mg at 06/24/18 0808    polyethylene glycol (MIRALAX) packet 17 g, 17 g, Oral, Daily, Provider Cutover, 17 g at 06/24/18 0852      Objective:     Vital Signs:  Vitals:    06/24/18 0614 06/24/18 0736 06/24/18 0737 06/25/18 0600   BP: 125/62 118/80 120/78 105/68   BP Location: Left arm Left arm Left arm Left arm   Pulse: (!) 112 96 (!) 107 (!) 108   Resp:  19 19    Temp:  (!) 97 1 °F (36 2 °C)     TempSrc:  Temporal     Weight:  88 9 kg (196 lb)     Height:             Appearance:  age appropriate and casually dressed   Behavior:  normal   Speech:  normal volume   Mood:  depressed   Affect:  constricted   Thought Process:  normal   Thought Content:  normal   Perceptual Disturbances: None   Risk Potential: none   Sensorium:  person, place, situation and time   Cognition: intact   Consciousness:  alert and awake    Attention: attention span and concentration were age appropriate   Intellect: average   Insight:  good   Judgment: good      Motor Activity: no abnormal movements           Recent Labs:  Results Reviewed     None          I/O Past 24 hours:  I/O last 3 completed shifts:  In: -   Out: 1 [Stool:1]  No intake/output data recorded  Assessment / Plan:     [facility]    Recommended Treatment:      Medication changes:  1) none    Non-pharmacological treatments  1) Continue with group therapy, milieu therapy and occupational therapy  Safety  1) Safety/communication plan established targeting dynamic risk factors above  Counseling / Coordination of Care    Total floor / unit time spent today 20 minutes  Greater than 50% of total time was spent with the patient and / or family counseling and / or coordination of care  A description of the counseling / coordination of care  Patient's Rights, confidentiality and exceptions to confidentiality, use of automated medical record, [facility] staff access to medical record, and consent to treatment reviewed      Adelso Siegel MD

## 2018-06-25 NOTE — NURSING NOTE
The patient remained asleep through the rest of the night with no behaviors or issues noted  Med compliant  Continue to monitor

## 2018-06-25 NOTE — CASE MANAGEMENT
CM informed by Alejandrina with [facility] that she met with patient this morning to do a level of care assessment and reports she will sign him on for services once a CSP meeting has been scheduled  Patient still currently on the list for [facility]  RAYNA will continue to follow up

## 2018-06-25 NOTE — NURSING NOTE
Judy Zheng called in at [phone] and stated that everything was going well  He returned from pass with his Mom at 5  They both stated that pass went well  Body assessment and UDS were done  UDS was negative  He ate supper while he was out  Did not attend group due to pass  Shower and BM prior to leaving on pass today  Blunted, flat affect  No interaction with peers  Cooperative upon approach by staff  Fall and SAFE precautions maintained without incident

## 2018-06-26 RX ADMIN — DIVALPROEX SODIUM 1000 MG: 500 TABLET, EXTENDED RELEASE ORAL at 21:02

## 2018-06-26 RX ADMIN — POLYETHYLENE GLYCOL 3350 17 G: 17 POWDER, FOR SOLUTION ORAL at 08:19

## 2018-06-26 RX ADMIN — BUPROPION HYDROCHLORIDE 300 MG: 300 TABLET, FILM COATED, EXTENDED RELEASE ORAL at 08:04

## 2018-06-26 RX ADMIN — FLUPHENAZINE HYDROCHLORIDE 10 MG: 10 TABLET, FILM COATED ORAL at 21:01

## 2018-06-26 RX ADMIN — BUPROPION HYDROCHLORIDE 150 MG: 300 TABLET, FILM COATED, EXTENDED RELEASE ORAL at 08:04

## 2018-06-26 RX ADMIN — MIDODRINE HYDROCHLORIDE 10 MG: 2.5 TABLET ORAL at 20:59

## 2018-06-26 RX ADMIN — MINOCYCLINE HYDROCHLORIDE 100 MG: 100 CAPSULE ORAL at 08:06

## 2018-06-26 RX ADMIN — MIDODRINE HYDROCHLORIDE 10 MG: 2.5 TABLET ORAL at 06:21

## 2018-06-26 RX ADMIN — LEVOTHYROXINE SODIUM 75 MCG: 75 TABLET ORAL at 06:21

## 2018-06-26 RX ADMIN — CLOZAPINE 400 MG: 100 TABLET ORAL at 21:01

## 2018-06-26 RX ADMIN — MIDODRINE HYDROCHLORIDE 10 MG: 2.5 TABLET ORAL at 17:16

## 2018-06-26 NOTE — PROGRESS NOTES
Erick Baumann has been awake, alert, and visible minimally out in the milieu  Encouraged fluids tid  Medication and meal compliant without prompting  No behavioral issues  Pt remains isolative to self  Walks around unit at intervals and returns to his room  Pt remains difficult to engage in conversation  Attended and participated in evening groups and had snack  Resting quietly in bed at present, arouses easily  Continue to monitor/assess for any changes

## 2018-06-26 NOTE — PROGRESS NOTES
Liam Jeanna was compliant with medications and meals today  He did stay out of bed for those activities, including vital signs, minimal prompting needed, but he refused to go to groups and was isolative spending most of the day in his room, fluids encouraged, remains on Fall precautions

## 2018-06-26 NOTE — PROGRESS NOTES
Patient sleeping in bed comfortably  His chest is rising and falling, no distress noted  Continue to monitor

## 2018-06-27 RX ADMIN — LEVOTHYROXINE SODIUM 75 MCG: 75 TABLET ORAL at 06:23

## 2018-06-27 RX ADMIN — BUPROPION HYDROCHLORIDE 150 MG: 300 TABLET, FILM COATED, EXTENDED RELEASE ORAL at 08:07

## 2018-06-27 RX ADMIN — FLUPHENAZINE HYDROCHLORIDE 10 MG: 10 TABLET, FILM COATED ORAL at 21:22

## 2018-06-27 RX ADMIN — MIDODRINE HYDROCHLORIDE 10 MG: 2.5 TABLET ORAL at 21:22

## 2018-06-27 RX ADMIN — MINOCYCLINE HYDROCHLORIDE 100 MG: 100 CAPSULE ORAL at 08:07

## 2018-06-27 RX ADMIN — POLYETHYLENE GLYCOL 3350 17 G: 17 POWDER, FOR SOLUTION ORAL at 08:08

## 2018-06-27 RX ADMIN — MIDODRINE HYDROCHLORIDE 10 MG: 2.5 TABLET ORAL at 16:42

## 2018-06-27 RX ADMIN — MIDODRINE HYDROCHLORIDE 10 MG: 2.5 TABLET ORAL at 06:23

## 2018-06-27 RX ADMIN — CLOZAPINE 400 MG: 100 TABLET ORAL at 21:22

## 2018-06-27 RX ADMIN — BUPROPION HYDROCHLORIDE 300 MG: 300 TABLET, FILM COATED, EXTENDED RELEASE ORAL at 08:07

## 2018-06-27 RX ADMIN — DIVALPROEX SODIUM 1000 MG: 500 TABLET, EXTENDED RELEASE ORAL at 21:22

## 2018-06-27 NOTE — NURSING NOTE
The patient slept though the night with no behaviors or issues noted  Fall and safety precautions maintained  Med compliant  Continue to monitor

## 2018-06-27 NOTE — PROGRESS NOTES
Alberto Petit has been awake, alert, and mostly isolative to self in room  Walks around unit at times  No interaction noted with peers  Med and meal compliant without prompting  Attended and participated in evening groups and then had snack  Remains flat, blunted and difficult to engage in conversation  Resting quietly in bed at present, arouses easily  Will continue to monitor/assess for any changes

## 2018-06-27 NOTE — PROGRESS NOTES
Psychiatry Progress Note    Subjective:   Interval History     Pt is cooperating with groups, meds and meals and then retreats into his own isolated world with little or no meaningful contact with others; ? hallucinating      Current medications:    Current Facility-Administered Medications:     bisacodyl (DULCOLAX) EC tablet 10 mg, 10 mg, Oral, Daily PRN, Provider Cutover    buPROPion (WELLBUTRIN XL) 24 hr tablet 150 mg, 150 mg, Oral, Daily, Provider Cutover, 150 mg at 06/27/18 0807    buPROPion (WELLBUTRIN XL) 24 hr tablet 300 mg, 300 mg, Oral, Daily, Provider Cutover, 300 mg at 06/27/18 0807    cloZAPine (CLOZARIL) tablet 400 mg, 400 mg, Oral, HS, Provider Cutover, 400 mg at 06/26/18 2101    divalproex sodium (DEPAKOTE ER) 24 hr tablet 1,000 mg, 1,000 mg, Oral, HS, Provider Cutover, 1,000 mg at 06/26/18 2102    fluPHENAZine (PROLIXIN) tablet 10 mg, 10 mg, Oral, HS, Provider Cutover, 10 mg at 06/26/18 2101    levothyroxine tablet 75 mcg, 75 mcg, Oral, Early Morning, Provider Cutover, 75 mcg at 06/27/18 5949    midodrine (PROAMATINE) tablet 10 mg, 10 mg, Oral, TID, Wally Lynn MD, 10 mg at 06/27/18 3088    minocycline (MINOCIN) capsule 100 mg, 100 mg, Oral, Daily, Provider Cutover, 100 mg at 06/27/18 0807    polyethylene glycol (MIRALAX) packet 17 g, 17 g, Oral, Daily, Provider Cutover, 17 g at 06/27/18 0808      Objective:     Vital Signs:  Vitals:    06/26/18 1900 06/27/18 0550 06/27/18 0730 06/27/18 0735   BP: 113/72 105/76 103/70 102/76   BP Location: Left arm Left arm Left arm Left arm   Pulse: (!) 108 97 97 101   Resp:   20 20   Temp:   (!) 97 3 °F (36 3 °C)    TempSrc:   Temporal    Weight:       Height:             Appearance:  age appropriate and casually dressed   Behavior:  normal   Speech:  normal volume   Mood:  depressed   Affect:  constricted   Thought Process:  normal   Thought Content:  normal   Perceptual Disturbances: None   Risk Potential: none   Sensorium:  person, place, situation and time   Cognition:  intact   Consciousness:  alert and awake    Attention: attention span and concentration were age appropriate   Intellect: average   Insight:  good   Judgment: good      Motor Activity: no abnormal movements           Recent Labs:  Results Reviewed     None          I/O Past 24 hours:  No intake/output data recorded  No intake/output data recorded  Assessment / Plan:     [facility]    Recommended Treatment:      Medication changes:  1) none    Non-pharmacological treatments  1) Continue with group therapy, milieu therapy and occupational therapy  Safety  1) Safety/communication plan established targeting dynamic risk factors above  Counseling / Coordination of Care    Total floor / unit time spent today 20 minutes  Greater than 50% of total time was spent with the patient and / or family counseling and / or coordination of care  A description of the counseling / coordination of care  Patient's Rights, confidentiality and exceptions to confidentiality, use of automated medical record, [facility] staff access to medical record, and consent to treatment reviewed      Yomaira Ocampo MD

## 2018-06-27 NOTE — PROGRESS NOTES
Kiran Reaper remains blunted and isolative to himself  He is compliant with his behavioral plan this shift  Attended 2/2 groups however, he was unable to stay awake during IMR group therefore will not get credit for it  No issues or behaviors noted  Continue to monitor

## 2018-06-28 RX ADMIN — FLUPHENAZINE HYDROCHLORIDE 10 MG: 10 TABLET, FILM COATED ORAL at 21:09

## 2018-06-28 RX ADMIN — BUPROPION HYDROCHLORIDE 300 MG: 300 TABLET, FILM COATED, EXTENDED RELEASE ORAL at 08:32

## 2018-06-28 RX ADMIN — POLYETHYLENE GLYCOL 3350 17 G: 17 POWDER, FOR SOLUTION ORAL at 08:33

## 2018-06-28 RX ADMIN — LEVOTHYROXINE SODIUM 75 MCG: 75 TABLET ORAL at 06:13

## 2018-06-28 RX ADMIN — DIVALPROEX SODIUM 1000 MG: 500 TABLET, EXTENDED RELEASE ORAL at 21:09

## 2018-06-28 RX ADMIN — MIDODRINE HYDROCHLORIDE 10 MG: 2.5 TABLET ORAL at 06:13

## 2018-06-28 RX ADMIN — CLOZAPINE 400 MG: 100 TABLET ORAL at 21:09

## 2018-06-28 RX ADMIN — BUPROPION HYDROCHLORIDE 150 MG: 300 TABLET, FILM COATED, EXTENDED RELEASE ORAL at 08:31

## 2018-06-28 RX ADMIN — MINOCYCLINE HYDROCHLORIDE 100 MG: 100 CAPSULE ORAL at 08:32

## 2018-06-28 NOTE — PROGRESS NOTES
Individual maintained on ongoing SAFE and fall precaution without any incident on this shift  He is laying in bed, eyes closed, breath evenly and unlabored  Checked every 15 minutes during rounds  In no apparent distress    Will continue to monitor behavior and sleep patter

## 2018-06-28 NOTE — PROGRESS NOTES
Individual slept well thru the might  He continues to be compliant with taking morning meds without any issues for midodrine BP99/67   P116 standing  Midodrine given for low BP parameter  Denies any dizziness or blurred vision  Will continue to monitor

## 2018-06-28 NOTE — ESCALATED TEAM TX
Patient attended treatment team this morning, prepared with his self-assessment  Patient was bright in affect with good eye contact and spontaneous conversation  This shows a social improvement for patient  Patient reports that he has seen a worker from [facility] two more times since his initial assessment   to follow-up with group home  He also reports to have met with Alejandrina from [facility] and both he and Alejandrina stated the meeting went well  Patient will be notified when a bed becomes available  Patient granted a pass for Sunday, 7/1/18, 10-8p with mom, pending continuation of morning routine

## 2018-06-28 NOTE — PROGRESS NOTES
Psychiatry Progress Note    Subjective:   Interval History     Limited conversation but is getting himself to groups and meds; no new issues; concerned about getting into group home      Current medications:    Current Facility-Administered Medications:     bisacodyl (DULCOLAX) EC tablet 10 mg, 10 mg, Oral, Daily PRN, Provider Cutover    buPROPion (WELLBUTRIN XL) 24 hr tablet 150 mg, 150 mg, Oral, Daily, Provider Cutover, 150 mg at 06/28/18 0831    buPROPion (WELLBUTRIN XL) 24 hr tablet 300 mg, 300 mg, Oral, Daily, Provider Cutover, 300 mg at 06/28/18 7115    cloZAPine (CLOZARIL) tablet 400 mg, 400 mg, Oral, HS, Provider Cutover, 400 mg at 06/27/18 2122    divalproex sodium (DEPAKOTE ER) 24 hr tablet 1,000 mg, 1,000 mg, Oral, HS, Provider Cutover, 1,000 mg at 06/27/18 2122    fluPHENAZine (PROLIXIN) tablet 10 mg, 10 mg, Oral, HS, Provider Cutover, 10 mg at 06/27/18 2122    levothyroxine tablet 75 mcg, 75 mcg, Oral, Early Morning, Provider Cutover, 75 mcg at 06/28/18 1715    midodrine (PROAMATINE) tablet 10 mg, 10 mg, Oral, TID, Wally Lynn MD, 10 mg at 06/28/18 6126    minocycline (MINOCIN) capsule 100 mg, 100 mg, Oral, Daily, Provider Cutover, 100 mg at 06/28/18 5315    polyethylene glycol (MIRALAX) packet 17 g, 17 g, Oral, Daily, Provider Cutover, 17 g at 06/28/18 0068      Objective:     Vital Signs:  Vitals:    06/27/18 1926 06/28/18 0624 06/28/18 0730 06/28/18 0735   BP: 111/69 99/67 121/78 109/75   BP Location: Left arm Left arm Left arm Left arm   Pulse: (!) 112 (!) 116 (!) 113 (!) 116   Resp:   19    Temp:   (!) 97 3 °F (36 3 °C)    TempSrc:   Temporal    Weight:       Height:             Appearance:  age appropriate and casually dressed   Behavior:  normal   Speech:  normal volume   Mood:  depressed   Affect:  constricted   Thought Process:  normal   Thought Content:  normal   Perceptual Disturbances: None   Risk Potential: none   Sensorium:  person, place, situation and time   Cognition: intact   Consciousness:  alert and awake    Attention: attention span and concentration were age appropriate   Intellect: average   Insight:  good   Judgment: good      Motor Activity: no abnormal movements           Recent Labs:  Results Reviewed     None          I/O Past 24 hours:  No intake/output data recorded  No intake/output data recorded  Assessment / Plan:     [facility]    Recommended Treatment:      Medication changes:  1) no med changes    Non-pharmacological treatments  1) Continue with group therapy, milieu therapy and occupational therapy  Safety  1) Safety/communication plan established targeting dynamic risk factors above  Counseling / Coordination of Care    Total floor / unit time spent today 20 minutes  Greater than 50% of total time was spent with the patient and / or family counseling and / or coordination of care  A description of the counseling / coordination of care  Patient's Rights, confidentiality and exceptions to confidentiality, use of automated medical record, [facility] staff access to medical record, and consent to treatment reviewed      Seymour Serna MD

## 2018-06-28 NOTE — SOCIAL WORK
Psychotherapy note:  Therapist met with patient for individual session  Patient was with blunted affect, but much brighter than during previous sessions  Patient was with good eye contact and spontaneous conversation (minimal conversation, but improvement shown)  Discussed patient's progress and his diagnosis  Spoke about the negative symptoms of schizophrenia which patient acknowledges he has  Therapist will explore different exercises for patient to complete, as he does very well with written assignments

## 2018-06-28 NOTE — PROGRESS NOTES
Priyanka Calhoun has been awake, alert, and mostly isolative to self his room  Compliant with scheduled meds without prompting  No interaction noted with peers  Encouraged fluids TID  Affect flat, blunted and difficult to engage in conversation with minimal eye contact  Pt lacks insight into his illness  MHT reported that pt has not been honest about when he showers  Attended and participated in evening groups and then had snack  Resting quietly in bed at present, arouses easily  Will continue to monitor/assess for any changes

## 2018-06-28 NOTE — PROGRESS NOTES
Pt isolative to self and room majority of shift  Med and meal compliant with one prompt necessary  Compliant with behavioral plan  No interaction with staff or peers noted  Appears very flat and blunted  Noted walking the unit by self for short time in afternoon  Denies any depression, anxiety, or SI at this time  No current medical issues, able to make needs known  Continue to encourage the use of positive and effective coping skills and interaction with others in order to express self in a safe and effective manner  Maintain safe and fall precautions as ordered

## 2018-06-29 RX ADMIN — DIVALPROEX SODIUM 1000 MG: 500 TABLET, EXTENDED RELEASE ORAL at 21:35

## 2018-06-29 RX ADMIN — BUPROPION HYDROCHLORIDE 300 MG: 300 TABLET, FILM COATED, EXTENDED RELEASE ORAL at 08:24

## 2018-06-29 RX ADMIN — FLUPHENAZINE HYDROCHLORIDE 10 MG: 10 TABLET, FILM COATED ORAL at 21:35

## 2018-06-29 RX ADMIN — MIDODRINE HYDROCHLORIDE 10 MG: 2.5 TABLET ORAL at 16:59

## 2018-06-29 RX ADMIN — BUPROPION HYDROCHLORIDE 150 MG: 300 TABLET, FILM COATED, EXTENDED RELEASE ORAL at 08:23

## 2018-06-29 RX ADMIN — MIDODRINE HYDROCHLORIDE 10 MG: 2.5 TABLET ORAL at 21:35

## 2018-06-29 RX ADMIN — MINOCYCLINE HYDROCHLORIDE 100 MG: 100 CAPSULE ORAL at 08:24

## 2018-06-29 RX ADMIN — POLYETHYLENE GLYCOL 3350 17 G: 17 POWDER, FOR SOLUTION ORAL at 08:24

## 2018-06-29 RX ADMIN — CLOZAPINE 400 MG: 100 TABLET ORAL at 21:35

## 2018-06-29 RX ADMIN — LEVOTHYROXINE SODIUM 75 MCG: 75 TABLET ORAL at 06:00

## 2018-06-29 RX ADMIN — MIDODRINE HYDROCHLORIDE 10 MG: 2.5 TABLET ORAL at 06:01

## 2018-06-29 NOTE — PROGRESS NOTES
Stevie Loya has been awake, alert, and visible intermittently out in the milieu  Compliant with scheduled meds as ordered  No interaction noted with peers  Pt remains blunted, flat in affect and offers minimal conversation with staff  Pt ate 50% supper  Mother in to visit and brought food in for pt which pt ate  Pt compliant with Behavioral Plan  Walks out on unit at times and then returns to his room  No behavioral issues  Attended and participated in evening groups and had snack  Resting quietly in bed at present, arouses easily  Will continue to monitor/assess for any changes

## 2018-06-29 NOTE — PROGRESS NOTES
Individual has been in bed majority of the shift  He did not comply with Behavioral plan, no groups were attended  RN reminded him of pass that is scheduled for Sunday and the need to continue complying with the plan to be able to go  He reported understanding of what was be discussed  Compliant with meds without prompting  Affect is blunted and eye contact is poor  Struggles to express needs, availability of staff made known  Will continue to monitor

## 2018-06-29 NOTE — CASE MANAGEMENT
Patient continues to do well with following his behavioral plan  Patient has been more visible in the milieu and appeared brighter in treatment team yesterday  Patient requested pass with his mother for Sunday, which was approved  Referral is in with [facility] for placement at [address] [place] [address]  No beds available at either location yet at this time  CM will continue to follow patient's progress and assist with discharge planning needs

## 2018-06-29 NOTE — PROGRESS NOTES
Psychiatry Progress Note    Subjective: Interval History     Pt is out of his room more but has virtually no interaction with other residents; walks around the unit by himself; has no interest in talking to other residents        Current medications:    Current Facility-Administered Medications:     bisacodyl (DULCOLAX) EC tablet 10 mg, 10 mg, Oral, Daily PRN, Provider Cutover    buPROPion (WELLBUTRIN XL) 24 hr tablet 150 mg, 150 mg, Oral, Daily, Provider Cutover, 150 mg at 06/29/18 0823    buPROPion (WELLBUTRIN XL) 24 hr tablet 300 mg, 300 mg, Oral, Daily, Provider Cutover, 300 mg at 06/29/18 0824    cloZAPine (CLOZARIL) tablet 400 mg, 400 mg, Oral, HS, Provider Cutover, 400 mg at 06/28/18 2109    divalproex sodium (DEPAKOTE ER) 24 hr tablet 1,000 mg, 1,000 mg, Oral, HS, Provider Cutover, 1,000 mg at 06/28/18 2109    fluPHENAZine (PROLIXIN) tablet 10 mg, 10 mg, Oral, HS, Provider Cutover, 10 mg at 06/28/18 2109    levothyroxine tablet 75 mcg, 75 mcg, Oral, Early Morning, Provider Cutover, 75 mcg at 06/29/18 0600    midodrine (PROAMATINE) tablet 10 mg, 10 mg, Oral, TID, Wally Lynn MD, 10 mg at 06/29/18 0601    minocycline (MINOCIN) capsule 100 mg, 100 mg, Oral, Daily, Provider Cutover, 100 mg at 06/29/18 0824    polyethylene glycol (MIRALAX) packet 17 g, 17 g, Oral, Daily, Provider Cutover, 17 g at 06/29/18 0824      Objective:     Vital Signs:  Vitals:    06/28/18 1533 06/28/18 1536 06/28/18 1930 06/29/18 0632   BP: 119/85 121/83 153/75 105/73   BP Location: Left arm Left arm Left arm Left arm   Pulse: 99 103 (!) 138 (!) 114   Resp:    20   Temp:    97 6 °F (36 4 °C)   TempSrc:    Temporal   SpO2:    97%   Weight:       Height:             Appearance:  age appropriate and casually dressed   Behavior:  normal   Speech:  normal volume   Mood:  depressed   Affect:  constricted   Thought Process:  normal   Thought Content:  normal   Perceptual Disturbances: None   Risk Potential: none   Sensorium: person, place, situation and time   Cognition:  intact   Consciousness:  alert and awake    Attention: attention span and concentration were age appropriate   Intellect: average   Insight:  good   Judgment: good      Motor Activity: no abnormal movements           Recent Labs:  Results Reviewed     None          I/O Past 24 hours:  I/O last 3 completed shifts:  In: -   Out: 2 [Urine:1; Stool:1]  No intake/output data recorded  Assessment / Plan:     [facility]    Recommended Treatment:      Medication changes:  1) none    Non-pharmacological treatments  1) Continue with group therapy, milieu therapy and occupational therapy  Safety  1) Safety/communication plan established targeting dynamic risk factors above  Counseling / Coordination of Care    Total floor / unit time spent today 20 minutes  Greater than 50% of total time was spent with the patient and / or family counseling and / or coordination of care  A description of the counseling / coordination of care  Patient's Rights, confidentiality and exceptions to confidentiality, use of automated medical record, [facility] staff access to medical record, and consent to treatment reviewed      Jackie Rogers MD

## 2018-06-29 NOTE — PROGRESS NOTES
Individual maintained on ongoing SAFE and fall precaution without any incident on this shift  He is laying in bed, eyes closed, breath evenly and unlabored  Checked every 15 minutes during rounds  In no apparent distress  Will continue to monitor behavior and sleep pattern

## 2018-06-30 RX ADMIN — MINOCYCLINE HYDROCHLORIDE 100 MG: 100 CAPSULE ORAL at 08:26

## 2018-06-30 RX ADMIN — DIVALPROEX SODIUM 1000 MG: 500 TABLET, EXTENDED RELEASE ORAL at 21:39

## 2018-06-30 RX ADMIN — MIDODRINE HYDROCHLORIDE 10 MG: 2.5 TABLET ORAL at 16:48

## 2018-06-30 RX ADMIN — POLYETHYLENE GLYCOL 3350 17 G: 17 POWDER, FOR SOLUTION ORAL at 08:30

## 2018-06-30 RX ADMIN — FLUPHENAZINE HYDROCHLORIDE 10 MG: 10 TABLET, FILM COATED ORAL at 21:40

## 2018-06-30 RX ADMIN — LEVOTHYROXINE SODIUM 75 MCG: 75 TABLET ORAL at 06:30

## 2018-06-30 RX ADMIN — BUPROPION HYDROCHLORIDE 150 MG: 300 TABLET, FILM COATED, EXTENDED RELEASE ORAL at 08:29

## 2018-06-30 RX ADMIN — MIDODRINE HYDROCHLORIDE 10 MG: 2.5 TABLET ORAL at 21:40

## 2018-06-30 RX ADMIN — CLOZAPINE 400 MG: 100 TABLET ORAL at 21:39

## 2018-06-30 RX ADMIN — BUPROPION HYDROCHLORIDE 450 MG: 300 TABLET, FILM COATED, EXTENDED RELEASE ORAL at 08:26

## 2018-06-30 RX ADMIN — MIDODRINE HYDROCHLORIDE 10 MG: 2.5 TABLET ORAL at 06:30

## 2018-06-30 NOTE — PROGRESS NOTES
Evans Zheng has been visible and ambulating at times throughout the shift  Poor eye contact  Denies anxiety, depression and voices  Isolative to self  No interaction with peers and little with staff  Cooperative with staff upon approach  Only ate 50% of his supper because Mom was bringing food during visitation  Took medication without difficulty  Mouth and beard checks when giving pills  Midodrine given as per parameters  No shower or BM this shift  Mom was here to visit tonight  Attended the movie tonight and participated in wrap up group  Ate snack and took medication prior to going to bed  Fall and safe precautions maintained without incident

## 2018-06-30 NOTE — NURSING NOTE
Received this individual quietly resting eyes closed, chest noted to be rising, audible breath sounds will monitor for change in behavior/assessment q 15 min thru the night

## 2018-06-30 NOTE — PROGRESS NOTES
Individual has been keeping to self in room  He has had limited interactions with others  Struggles to express self to others  Affect is flat and eye contact is poor  He has been selctively compliant with behavioral plan  He has participated selectively in programming, attended 1/2 groups  He did need promtping for both medications and goal group  He had been sitting in the dining room with his head on the table, unaware he was called for meds and that goals group had been announced  He did the comply with meds  Looking forward to pass with mother tomorrow  Aware of expectations to be able to go on pass  Availability of staff made known  Will continue to monitor

## 2018-06-30 NOTE — PROGRESS NOTES
Psychiatry Progress Note    Subjective: Interval History     The patient keeps to himself  The patient denies all symptoms including depression, anxiety, auditory hallucinations, and visual hallucinations  The patient denies suicidal ideation  The patient has a blunted affect  The patient is medication compliant  The patient offers no concerns      Current medications:    Current Facility-Administered Medications:     bisacodyl (DULCOLAX) EC tablet 10 mg, 10 mg, Oral, Daily PRN, Provider Cutover    buPROPion (WELLBUTRIN XL) 24 hr tablet 150 mg, 150 mg, Oral, Daily, Provider Cutover, 150 mg at 06/30/18 0829    buPROPion (WELLBUTRIN XL) 24 hr tablet 300 mg, 300 mg, Oral, Daily, Provider Cutover, 450 mg at 06/30/18 0826    cloZAPine (CLOZARIL) tablet 400 mg, 400 mg, Oral, HS, Provider Cutover, 400 mg at 06/29/18 2135    divalproex sodium (DEPAKOTE ER) 24 hr tablet 1,000 mg, 1,000 mg, Oral, HS, Provider Cutover, 1,000 mg at 06/29/18 2135    fluPHENAZine (PROLIXIN) tablet 10 mg, 10 mg, Oral, HS, Provider Cutover, 10 mg at 06/29/18 2135    levothyroxine tablet 75 mcg, 75 mcg, Oral, Early Morning, Provider Cutover, 75 mcg at 06/30/18 0630    midodrine (PROAMATINE) tablet 10 mg, 10 mg, Oral, TID, Wally Lynn MD, 10 mg at 06/30/18 0630    minocycline (MINOCIN) capsule 100 mg, 100 mg, Oral, Daily, Provider Cutover, 100 mg at 06/30/18 0826    polyethylene glycol (MIRALAX) packet 17 g, 17 g, Oral, Daily, Provider Cutover, 17 g at 06/30/18 0830      Objective:     Vital Signs:  Vitals:    06/29/18 2000 06/30/18 0600 06/30/18 0730 06/30/18 0735   BP: 120/76 99/72 123/76 118/74   BP Location:   Left arm Left arm   Pulse: (!) 129 (!) 113 93 104   Resp:   20    Temp:   (!) 96 7 °F (35 9 °C)    TempSrc:   Temporal    SpO2:       Weight:       Height:             Appearance:  age appropriate and casually dressed   Behavior:  normal   Speech:  soft   Mood:  depressed   Affect:  constricted   Thought Process:  normal Thought Content:  normal   Perceptual Disturbances: None   Risk Potential: none   Sensorium:  person, place, situation and time   Cognition:  intact   Consciousness:  alert and awake    Attention: attention span and concentration were age appropriate   Intellect: average   Insight:  fair   Judgment: fair      Motor Activity: no abnormal movements           Recent Labs:  Results Reviewed     None          I/O Past 24 hours:  I/O last 3 completed shifts:  In: -   Out: 1 [Urine:1]  No intake/output data recorded  Assessment / Plan:     [facility]    Recommended Treatment:      Medication changes:  1) continue current medication regimen  Non-pharmacological treatments  1) Continue with group therapy, milieu therapy and occupational therapy  Safety  1) Safety/communication plan established targeting dynamic risk factors above  Counseling / Coordination of Care    Total floor / unit time spent today 20 minutes  Greater than 50% of total time was spent with the patient and / or family counseling and / or coordination of care  A description of the counseling / coordination of care  Patient's Rights, confidentiality and exceptions to confidentiality, use of automated medical record, [facility] staff access to medical record, and consent to treatment reviewed      Paola Galaviz PA-C

## 2018-07-01 PROCEDURE — 80307 DRUG TEST PRSMV CHEM ANLYZR: CPT | Performed by: PSYCHIATRY & NEUROLOGY

## 2018-07-01 RX ADMIN — BUPROPION HYDROCHLORIDE 300 MG: 300 TABLET, FILM COATED, EXTENDED RELEASE ORAL at 08:15

## 2018-07-01 RX ADMIN — FLUPHENAZINE HYDROCHLORIDE 10 MG: 10 TABLET, FILM COATED ORAL at 21:35

## 2018-07-01 RX ADMIN — DIVALPROEX SODIUM 1000 MG: 500 TABLET, EXTENDED RELEASE ORAL at 21:33

## 2018-07-01 RX ADMIN — MIDODRINE HYDROCHLORIDE 10 MG: 2.5 TABLET ORAL at 21:31

## 2018-07-01 RX ADMIN — POLYETHYLENE GLYCOL 3350 17 G: 17 POWDER, FOR SOLUTION ORAL at 08:16

## 2018-07-01 RX ADMIN — MIDODRINE HYDROCHLORIDE 10 MG: 2.5 TABLET ORAL at 06:22

## 2018-07-01 RX ADMIN — LEVOTHYROXINE SODIUM 75 MCG: 75 TABLET ORAL at 06:22

## 2018-07-01 RX ADMIN — MINOCYCLINE HYDROCHLORIDE 100 MG: 100 CAPSULE ORAL at 08:16

## 2018-07-01 RX ADMIN — CLOZAPINE 400 MG: 100 TABLET ORAL at 21:33

## 2018-07-01 RX ADMIN — BUPROPION HYDROCHLORIDE 150 MG: 300 TABLET, FILM COATED, EXTENDED RELEASE ORAL at 08:15

## 2018-07-01 NOTE — PROGRESS NOTES
Emy Pritchard has been awake, alert, and visible intermittently out in the milieu  Compliant with scheduled 1700 meds without prompting but needed prompting x 1 for 2100 meds  Remains flat, blunted in affect and difficult to engage in conversation  No interaction noted with peers  Behavior is mostly quiet and isolative to self but comes out of room and walks around unit at times  Attended and participated in 3/3 evening groups and then had snack  Pt looking forward to pass tomorrow with mother  Resting quietly in bed at present, arouses easily  Will continue to monitor/assess for any changes

## 2018-07-01 NOTE — PROGRESS NOTES
Psychiatry Progress Note    Subjective: Interval History     The patient is unable to be assessed today because he is out on pass  Staff states he has a blunted affect and does not interact much with peers  He is med and meal compliant  No concerns per staff       Current medications:    Current Facility-Administered Medications:     [MAR Hold - Suspended Admission] bisacodyl (DULCOLAX) EC tablet 10 mg, 10 mg, Oral, Daily PRN, Provider Cutover    [MAR Hold - Suspended Admission] buPROPion (WELLBUTRIN XL) 24 hr tablet 150 mg, 150 mg, Oral, Daily, Provider Cutover, 150 mg at 07/01/18 0815    [MAR Hold - Suspended Admission] buPROPion (WELLBUTRIN XL) 24 hr tablet 300 mg, 300 mg, Oral, Daily, Provider Cutover, 300 mg at 07/01/18 0815    [MAR Hold - Suspended Admission] cloZAPine (CLOZARIL) tablet 400 mg, 400 mg, Oral, HS, Provider Cutover, 400 mg at 06/30/18 2139    [MAR Hold - Suspended Admission] divalproex sodium (DEPAKOTE ER) 24 hr tablet 1,000 mg, 1,000 mg, Oral, HS, Provider Cutover, 1,000 mg at 06/30/18 2139    [MAR Hold - Suspended Admission] fluPHENAZine (PROLIXIN) tablet 10 mg, 10 mg, Oral, HS, Provider Cutover, 10 mg at 06/30/18 2140    [MAR Hold - Suspended Admission] levothyroxine tablet 75 mcg, 75 mcg, Oral, Early Morning, Provider Cutover, 75 mcg at 07/01/18 0622    [MAR Hold - Suspended Admission] midodrine (PROAMATINE) tablet 10 mg, 10 mg, Oral, TID, Wally Lynn MD, 10 mg at 07/01/18 0622    [MAR Hold - Suspended Admission] minocycline (MINOCIN) capsule 100 mg, 100 mg, Oral, Daily, Provider Cutover, 100 mg at 07/01/18 0816    [MAR Hold - Suspended Admission] polyethylene glycol (MIRALAX) packet 17 g, 17 g, Oral, Daily, Provider Cutover, 17 g at 07/01/18 9112    Current Outpatient Prescriptions:     acetaminophen (TYLENOL) 100 mg/mL solution, Take 10 mg/kg by mouth every 4 (four) hours as needed for fever, Disp: , Rfl:     acetaminophen (TYLENOL) 325 mg tablet, Take 650 mg by mouth every 4 (four) hours as needed for mild pain, Disp: , Rfl:     clozapine (CLOZARIL) 200 MG tablet, Take 600 mg by mouth daily at bedtime, Disp: , Rfl:     divalproex sodium (DEPAKOTE) 500 mg EC tablet, Take 250 mg by mouth every 8 (eight) hours, Disp: , Rfl:     divalproex sodium (DEPAKOTE) 500 mg EC tablet, Take 1,250 mg by mouth daily at bedtime, Disp: , Rfl:     lithium carbonate (LITHOBID) 450 mg CR tablet, Take 450 mg by mouth daily at bedtime, Disp: , Rfl:     PARoxetine (PAXIL) 20 mg tablet, Take 20 mg by mouth daily, Disp: , Rfl:       Objective:     Vital Signs:  Vitals:    06/30/18 1612 06/30/18 1614 06/30/18 1926 07/01/18 0600   BP: 117/80 102/74 107/87 91/52   BP Location: Left arm Left arm Left arm Left arm   Pulse: 87 (!) 114 90 (!) 113   Resp:       Temp:       TempSrc:       SpO2:       Weight:       Height:             Appearance:  age appropriate and casually dressed   Behavior:  normal   Speech:  soft   Mood:  depressed   Affect:  constricted   Thought Process:  normal   Thought Content:  normal   Perceptual Disturbances: None   Risk Potential: none   Sensorium:  person, place, situation and time   Cognition:  intact   Consciousness:  alert and awake    Attention: attention span and concentration were age appropriate   Intellect: average   Insight:  fair   Judgment: fair      Motor Activity: no abnormal movements           Recent Labs:  Results Reviewed     None          I/O Past 24 hours:  No intake/output data recorded  No intake/output data recorded  Assessment / Plan:     [facility]    Recommended Treatment:      Medication changes:  1) continue current medication regimen  Non-pharmacological treatments  1) Continue with group therapy, milieu therapy and occupational therapy  Safety  1) Safety/communication plan established targeting dynamic risk factors above  Counseling / Coordination of Care    Total floor / unit time spent today 20 minutes   Greater than 50% of total time was spent with the patient and / or family counseling and / or coordination of care  A description of the counseling / coordination of care  Patient's Rights, confidentiality and exceptions to confidentiality, use of automated medical record, [facility] staff access to medical record, and consent to treatment reviewed      Priscila Curtis PA-C

## 2018-07-01 NOTE — PROGRESS NOTES
Patient in bed, asleep at shift onset  Continued to sleep throughout the night without difficulty  Standing BP 91/52,   Midodrine given  No behavioral issues noted

## 2018-07-01 NOTE — PROGRESS NOTES
RN called related to no one checking in at 1500 as requested  RN spoke to Aury Morrow, individual's mother, who reported that they forgot yo call in but that the pass is going well, no issues or concerns  Due to return at 2000

## 2018-07-01 NOTE — PROGRESS NOTES
Individual has been visible at intervals this morning  He had a good appetite at breakfast   Compliant with morning medications  Hygiene not completed this morning  Attended goal group  Affect remains blunted and eye contact is poor  Minimal interactions with others  Left unit at 1005 on pass, accompanied by mother  No meds were sent along on pass  To check in around 1500, due to return by 2000

## 2018-07-02 RX ADMIN — DIVALPROEX SODIUM 1000 MG: 500 TABLET, EXTENDED RELEASE ORAL at 21:28

## 2018-07-02 RX ADMIN — BUPROPION HYDROCHLORIDE 300 MG: 300 TABLET, FILM COATED, EXTENDED RELEASE ORAL at 08:21

## 2018-07-02 RX ADMIN — LEVOTHYROXINE SODIUM 75 MCG: 75 TABLET ORAL at 06:05

## 2018-07-02 RX ADMIN — BUPROPION HYDROCHLORIDE 450 MG: 300 TABLET, FILM COATED, EXTENDED RELEASE ORAL at 08:20

## 2018-07-02 RX ADMIN — MIDODRINE HYDROCHLORIDE 10 MG: 2.5 TABLET ORAL at 16:45

## 2018-07-02 RX ADMIN — MINOCYCLINE HYDROCHLORIDE 100 MG: 100 CAPSULE ORAL at 08:20

## 2018-07-02 RX ADMIN — FLUPHENAZINE HYDROCHLORIDE 10 MG: 10 TABLET, FILM COATED ORAL at 21:29

## 2018-07-02 RX ADMIN — CLOZAPINE 400 MG: 100 TABLET ORAL at 21:27

## 2018-07-02 RX ADMIN — MIDODRINE HYDROCHLORIDE 10 MG: 2.5 TABLET ORAL at 21:26

## 2018-07-02 RX ADMIN — BISACODYL 10 MG: 5 TABLET, COATED ORAL at 06:58

## 2018-07-02 RX ADMIN — POLYETHYLENE GLYCOL 3350 17 G: 17 POWDER, FOR SOLUTION ORAL at 08:20

## 2018-07-02 NOTE — PROGRESS NOTES
Mayra Garnett returned from pass with his mother  Pt and mother stated that the pass went well  Pt stated that [facility] and then at home and listened to music  Remains flat, blunted in affect  Body assessment completed and UDS obtained and to lab per EAC protocol  Will continue to monitor/assess for any changes

## 2018-07-02 NOTE — PROGRESS NOTES
Liam Meeks has been keeping to self in room  He has had limited interactions with others  Struggles with peer interaction  Affect is flat and eye contact is poor  He has been compliant with behavioral plan but was asleep during IMR group therefore, did not get any credit  He did need promtping for both medications  Availability of staff made known  Will continue to monitor

## 2018-07-02 NOTE — PROGRESS NOTES
Individual spelt well thru the night  Continues to be compliant with taking morning meds without any issues    Midodrine held for /64   [id number]

## 2018-07-02 NOTE — PROGRESS NOTES
Bib Enriquez was compliant with scheduled 2100 meds with prompting x 1  Affect remains flat, blunted  No interaction noted with peers  Pt remains isolative to self  Resting quietly in bed at present, arouses easily  Will continue to monitor/assess for any changes

## 2018-07-03 RX ADMIN — POLYETHYLENE GLYCOL 3350 17 G: 17 POWDER, FOR SOLUTION ORAL at 08:18

## 2018-07-03 RX ADMIN — FLUPHENAZINE HYDROCHLORIDE 10 MG: 10 TABLET, FILM COATED ORAL at 21:32

## 2018-07-03 RX ADMIN — MIDODRINE HYDROCHLORIDE 10 MG: 2.5 TABLET ORAL at 06:29

## 2018-07-03 RX ADMIN — LEVOTHYROXINE SODIUM 75 MCG: 75 TABLET ORAL at 06:29

## 2018-07-03 RX ADMIN — BUPROPION HYDROCHLORIDE 150 MG: 300 TABLET, FILM COATED, EXTENDED RELEASE ORAL at 08:20

## 2018-07-03 RX ADMIN — MIDODRINE HYDROCHLORIDE 10 MG: 2.5 TABLET ORAL at 17:08

## 2018-07-03 RX ADMIN — MINOCYCLINE HYDROCHLORIDE 100 MG: 100 CAPSULE ORAL at 08:19

## 2018-07-03 RX ADMIN — DIVALPROEX SODIUM 1000 MG: 500 TABLET, EXTENDED RELEASE ORAL at 21:32

## 2018-07-03 RX ADMIN — BUPROPION HYDROCHLORIDE 300 MG: 300 TABLET, FILM COATED, EXTENDED RELEASE ORAL at 08:19

## 2018-07-03 RX ADMIN — CLOZAPINE 400 MG: 100 TABLET ORAL at 21:32

## 2018-07-03 NOTE — SOCIAL WORK
Pt attended IMR group and completed exercise on Celaya Depression Inventory assessment and Depression exercise  Pt disclosed his feelings about suicide and Dr Villalobos/nursing/therapist were advised of result  Nursing reviewed indiv with patient to clarify answers

## 2018-07-03 NOTE — PROGRESS NOTES
Individual has been up and about the unit at intervals  He  Did participate in programming, attended 2/2 groups  Appears to be complying with behavioral plan  Following IMR, group facilitator approached RN with concerns with how he responded to a question o the serna scale related to suicidality and thoughts of self harm  Hr answered that he wants to kill himself  Upon further assessment, he did indicated that he has these thoughts, but would not act on same related to not wanting to inflict pain on himself  It appears he has passive SI at times related to frustration of needing to reside in a group home and mother not wanting him at home  He denies thoughts of self harm presently  He agrees to come to staff if not feeling in control of feelings  RN spoke with therapist and made aware of the interaction  He is compliant with meds  No others issues or concerns expressed  Will continue to monitor

## 2018-07-03 NOTE — PROGRESS NOTES
Individual has been up and about the unit, visible  He did not attend goal groups this morning, but he did complete hygiene  Compliant with meds without prompting  No issues or concerns expressed  Left unit around 0930 to attend omi, escorted by [facility] staff  No meds sent on the pass, none scheduled  Due to return around 1430

## 2018-07-03 NOTE — PROGRESS NOTES
Jessica Strickland has been awake, alert, and visible minimally out in the milieu  Pt has been mostly isolative to self in room but comes out to walk around unit at intervals  Compliant with scheduled 1600 meds with prompting x 1  Ate 75% supper  No interaction noted with peers  Pt remains with flat, blunted affect and difficult to engage in conversation  Pt lacks insight into his illness and motivation  Will continue to monitor/assess for any changes

## 2018-07-03 NOTE — PROGRESS NOTES
The patient slept for long intervals through the night awaking once to the sound of the fire alarm, but was able to go back to sleep shortly after without further issue  Safety and fall precautions maintained  Med compliant  Continue to monitor

## 2018-07-03 NOTE — PROGRESS NOTES
Psychiatry Progress Note    Subjective:   Interval History     Same  Clinical presentation: flat affect, avoidant but will engage in brief conversation; cooperative with the proagram; no new issues      Current medications:    Current Facility-Administered Medications:     bisacodyl (DULCOLAX) EC tablet 10 mg, 10 mg, Oral, Daily PRN, Provider Cutover, 10 mg at 07/02/18 0658    buPROPion (WELLBUTRIN XL) 24 hr tablet 150 mg, 150 mg, Oral, Daily, Provider Cutover, 150 mg at 07/03/18 0820    buPROPion (WELLBUTRIN XL) 24 hr tablet 300 mg, 300 mg, Oral, Daily, Provider Cutover, 300 mg at 07/03/18 0819    cloZAPine (CLOZARIL) tablet 400 mg, 400 mg, Oral, HS, Provider Cutover, 400 mg at 07/02/18 2127    divalproex sodium (DEPAKOTE ER) 24 hr tablet 1,000 mg, 1,000 mg, Oral, HS, Provider Cutover, 1,000 mg at 07/02/18 2128    fluPHENAZine (PROLIXIN) tablet 10 mg, 10 mg, Oral, HS, Provider Cutover, 10 mg at 07/02/18 2129    levothyroxine tablet 75 mcg, 75 mcg, Oral, Early Morning, Provider Cutover, 75 mcg at 07/03/18 0629    midodrine (PROAMATINE) tablet 10 mg, 10 mg, Oral, TID, Ather MD Shane, 10 mg at 07/03/18 0629    minocycline (MINOCIN) capsule 100 mg, 100 mg, Oral, Daily, Provider Cutover, 100 mg at 07/03/18 0819    polyethylene glycol (MIRALAX) packet 17 g, 17 g, Oral, Daily, Provider Cutover, 17 g at 07/03/18 0818      Objective:     Vital Signs:  Vitals:    07/02/18 1920 07/03/18 0631 07/03/18 0730 07/03/18 0735   BP: 101/65 105/62 115/81 121/64   BP Location: Left arm  Left arm Left arm   Pulse: (!) 117 (!) 114 104 (!) 114   Resp:   20 20   Temp:   (!) 97 2 °F (36 2 °C)    TempSrc:   Temporal    SpO2:       Weight:       Height:             Appearance:  age appropriate and casually dressed   Behavior:  normal   Speech:  normal volume   Mood:  depressed   Affect:  constricted   Thought Process:  normal   Thought Content:  normal   Perceptual Disturbances: None   Risk Potential: none   Sensorium:  person, place, situation and time   Cognition:  intact   Consciousness:  alert and awake    Attention: attention span and concentration were age appropriate   Intellect: average   Insight:  good   Judgment: good      Motor Activity: no abnormal movements           Recent Labs:  Results Reviewed     None          I/O Past 24 hours:  No intake/output data recorded  No intake/output data recorded  Assessment / Plan:     [facility] ([facility]    Recommended Treatment:      Medication changes:  1) none    Non-pharmacological treatments  1) Continue with group therapy, milieu therapy and occupational therapy  Safety  1) Safety/communication plan established targeting dynamic risk factors above  Counseling / Coordination of Care    Total floor / unit time spent today 20 minutes  Greater than 50% of total time was spent with the patient and / or family counseling and / or coordination of care  A description of the counseling / coordination of care  Patient's Rights, confidentiality and exceptions to confidentiality, use of automated medical record, [facility] staff access to medical record, and consent to treatment reviewed      Mario Barahona MD

## 2018-07-03 NOTE — PROGRESS NOTES
Julia Irwin attended and participated in 2/2 evening groups and then had snack  Compliant with scheduled 2100 meds with prompting x 1  Resting quietly in bed at present, arouses easily  Will continue to monitor/assess for any changes

## 2018-07-04 RX ADMIN — BUPROPION HYDROCHLORIDE 300 MG: 300 TABLET, FILM COATED, EXTENDED RELEASE ORAL at 08:24

## 2018-07-04 RX ADMIN — FLUPHENAZINE HYDROCHLORIDE 10 MG: 10 TABLET, FILM COATED ORAL at 21:00

## 2018-07-04 RX ADMIN — DIVALPROEX SODIUM 1000 MG: 500 TABLET, EXTENDED RELEASE ORAL at 21:00

## 2018-07-04 RX ADMIN — LEVOTHYROXINE SODIUM 75 MCG: 75 TABLET ORAL at 06:32

## 2018-07-04 RX ADMIN — MIDODRINE HYDROCHLORIDE 10 MG: 2.5 TABLET ORAL at 06:33

## 2018-07-04 RX ADMIN — MINOCYCLINE HYDROCHLORIDE 100 MG: 100 CAPSULE ORAL at 08:24

## 2018-07-04 RX ADMIN — CLOZAPINE 400 MG: 100 TABLET ORAL at 21:00

## 2018-07-04 RX ADMIN — POLYETHYLENE GLYCOL 3350 17 G: 17 POWDER, FOR SOLUTION ORAL at 08:24

## 2018-07-04 RX ADMIN — BUPROPION HYDROCHLORIDE 150 MG: 300 TABLET, FILM COATED, EXTENDED RELEASE ORAL at 08:24

## 2018-07-04 NOTE — PROGRESS NOTES
Individual completed hygiene, showered this morning  He has been visible at times throughout the shift, but there is no interactions with others  He appears uncomfortable when staff approaches for interaction  He denies suicidal thoughts, agrees to come to staff if not feeling in control of thoughts  Complaint with meds  He participated in programming, attended Goal group, bingo, and enjoyed a staff supervised walk to the caf for coffee  No issues or concerns expressed  Will continue to monitor

## 2018-07-04 NOTE — PROGRESS NOTES
2130 Mayra Garnett took part in PM Groups, needed prompts to come up for HS medicine  Had HS snack, retired now to bed

## 2018-07-04 NOTE — PROGRESS NOTES
Psychiatry Progress Note    Subjective: Interval History     Patient resting in chair in the back of the unit today  Patient reporting that he decided to just sleep somewhere else  Patient's hygiene remains poor  Patient with flat affect  Patient guarded offers minimal during assessment  Patient continues to minimize all psychiatric symptoms  Attending some groups and continues to require encouragement to comply with his medication regimen        Current medications:    Current Facility-Administered Medications:     bisacodyl (DULCOLAX) EC tablet 10 mg, 10 mg, Oral, Daily PRN, Provider Cutover, 10 mg at 07/02/18 0658    buPROPion (WELLBUTRIN XL) 24 hr tablet 150 mg, 150 mg, Oral, Daily, Provider Cutover, 150 mg at 07/04/18 0824    buPROPion (WELLBUTRIN XL) 24 hr tablet 300 mg, 300 mg, Oral, Daily, Provider Cutover, 300 mg at 07/04/18 0824    cloZAPine (CLOZARIL) tablet 400 mg, 400 mg, Oral, HS, Provider Cutover, 400 mg at 07/03/18 2132    divalproex sodium (DEPAKOTE ER) 24 hr tablet 1,000 mg, 1,000 mg, Oral, HS, Provider Cutover, 1,000 mg at 07/03/18 2132    fluPHENAZine (PROLIXIN) tablet 10 mg, 10 mg, Oral, HS, Provider Cutover, 10 mg at 07/03/18 2132    levothyroxine tablet 75 mcg, 75 mcg, Oral, Early Morning, Provider Cutover, 75 mcg at 07/04/18 2250    midodrine (PROAMATINE) tablet 10 mg, 10 mg, Oral, TID, Wally Lynn MD, 10 mg at 07/04/18 2297    minocycline (MINOCIN) capsule 100 mg, 100 mg, Oral, Daily, Provider Cutover, 100 mg at 07/04/18 0824    polyethylene glycol (MIRALAX) packet 17 g, 17 g, Oral, Daily, Provider Cutover, 17 g at 07/04/18 0824      Objective:     Vital Signs:  Vitals:    07/03/18 2006 07/04/18 0600 07/04/18 0730 07/04/18 0732   BP: 123/71 106/57 114/69 116/57   BP Location: Left arm Left arm Left arm Left arm   Pulse: (!) 115 (!) 106 104 (!) 110   Resp:  18 20 20   Temp:   (!) 97 2 °F (36 2 °C)    TempSrc:   Temporal    SpO2:       Weight:       Height: Appearance:  age appropriate, casually dressed and disheveled   Behavior:  normal   Speech:  soft   Mood:  depressed   Affect:  constricted   Thought Process:  normal   Thought Content:  normal   Perceptual Disturbances: None   Risk Potential: none   Sensorium:  person, place, situation and time   Cognition:  intact   Consciousness:  alert and awake    Attention: attention span and concentration were age appropriate   Intellect: average   Insight:  good   Judgment: good      Motor Activity: no abnormal movements           Recent Labs:  Results Reviewed     None          I/O Past 24 hours:  No intake/output data recorded  No intake/output data recorded  Assessment / Plan:     [facility]    Recommended Treatment:      Medication changes:  1) continue current medication regimen  Patient encouraged to participate in the milieu  Non-pharmacological treatments  1) Continue with group therapy, milieu therapy and occupational therapy  Safety  1) Safety/communication plan established targeting dynamic risk factors above  Counseling / Coordination of Care    Total floor / unit time spent today 20 minutes  Greater than 50% of total time was spent with the patient and / or family counseling and / or coordination of care  A description of the counseling / coordination of care  Patient's Rights, confidentiality and exceptions to confidentiality, use of automated medical record, [facility] will[facility] staff access to medical record, and consent to treatment reviewed      Jt Meyers PA-C

## 2018-07-04 NOTE — PROGRESS NOTES
Rosemary Benz has been visible thus far this shift, though, is silent, socially withdrawn  He used his Electronic game during [place] hour, came up on own for supper medicine, had meal, initiated & carried out on his own doing load of laundry on his appointed day  Took part in Men's Group  Does some laps in [place] in free time

## 2018-07-05 RX ADMIN — MIDODRINE HYDROCHLORIDE 10 MG: 2.5 TABLET ORAL at 21:22

## 2018-07-05 RX ADMIN — CLOZAPINE 350 MG: 100 TABLET ORAL at 21:23

## 2018-07-05 RX ADMIN — FLUPHENAZINE HYDROCHLORIDE 10 MG: 10 TABLET, FILM COATED ORAL at 21:23

## 2018-07-05 RX ADMIN — BUPROPION HYDROCHLORIDE 300 MG: 300 TABLET, FILM COATED, EXTENDED RELEASE ORAL at 08:47

## 2018-07-05 RX ADMIN — MINOCYCLINE HYDROCHLORIDE 100 MG: 100 CAPSULE ORAL at 08:47

## 2018-07-05 RX ADMIN — BUPROPION HYDROCHLORIDE 150 MG: 300 TABLET, FILM COATED, EXTENDED RELEASE ORAL at 08:47

## 2018-07-05 RX ADMIN — LEVOTHYROXINE SODIUM 75 MCG: 75 TABLET ORAL at 06:55

## 2018-07-05 RX ADMIN — MIDODRINE HYDROCHLORIDE 10 MG: 2.5 TABLET ORAL at 16:58

## 2018-07-05 RX ADMIN — DIVALPROEX SODIUM 1000 MG: 500 TABLET, EXTENDED RELEASE ORAL at 21:23

## 2018-07-05 RX ADMIN — MIDODRINE HYDROCHLORIDE 10 MG: 2.5 TABLET ORAL at 06:55

## 2018-07-05 RX ADMIN — POLYETHYLENE GLYCOL 3350 17 G: 17 POWDER, FOR SOLUTION ORAL at 08:47

## 2018-07-05 NOTE — PLAN OF CARE
Problem: DISCHARGE PLANNING - CARE MANAGEMENT  Goal: Discharge to post-acute care or home with appropriate resources  INTERVENTIONS:  - Conduct assessment to determine patient/family and health care team treatment goals, and need for post-acute services based on payer coverage, community resources, and patient preferences, and barriers to discharge  - Address psychosocial, clinical, and financial barriers to discharge as identified in assessment in conjunction with the patient/family and health care team  - Arrange appropriate level of post-acute services according to patients   needs and preference and payer coverage in collaboration with the physician and health care team  - Communicate with and update the patient/family, physician, and health care team regarding progress on the discharge plan  - Arrange appropriate transportation to post-acute venues  Outcome: Progressing  Patient doing well by following his behavioral plan  Still very lethargic throughout the day but making an effort to be visible on the unit  Has been going on passes with his mother on the weekends  Passes go well  Reported he gets tired of being in the hospital or group homes all the time and wishes he could live with his mom  Mom is not able to accept him living at home at this time and encouraged patient to try out a group home  [facility]  Waiting for bed to become available at either location  CM will continue to follow patient's progress and assist with discharge planning needs

## 2018-07-05 NOTE — PROGRESS NOTES
Pt received @ 0700  Remains blunted & disheveled  IN DR early this morning for VS & breakfast  Prompted for meds, poor eye contact  Refused group this morning, isolative to room/self   No behavioral issues, will continue to monitor

## 2018-07-05 NOTE — ESCALATED TEAM TX
Patient attended treatment team this morning with flat affect, but otherwise appropriate  Patient still with improved eye contact  Treatment team addressed patient's group activity answer in which he circled an answer that indicated he wanted to kill himself  Patient does not deny wanting to die, but states that he does not want to kill himself, nor does he have a plan  He reports that his main trigger is not wanting to go to a group home  Patient is in agreement to alert staff if he is experiencing thoughts of death or suicide  Patient to go on pass with his mother on 7/8 from 10-8p  Next team meeting will be 7/12

## 2018-07-05 NOTE — PROGRESS NOTES
Individual attended treatment team meeting  Discussed his reported negative thoughts he identified earlier this week, expressed having passive SI related to not returning to his mother's home  He spoke with Dr Holland Adan, verbalized frustration about being hospitalized and having to go to group homes  Encouraged him to talk to staff ablut feeling, adreed to do same  Will continue to monitor

## 2018-07-05 NOTE — PROGRESS NOTES
Psychiatry Progress Note    Subjective:   Interval History     Team mtg today; pt continues with same behaviors; when filling out one of the forms during a groups session , he reported he had suicidal ideas; when discussed later he says he has  no intent or plans to suicide but is disappointed he cannot return home to live with his mother after discharge    Current medications:    Current Facility-Administered Medications:     bisacodyl (DULCOLAX) EC tablet 10 mg, 10 mg, Oral, Daily PRN, Provider Cutover, 10 mg at 07/02/18 0658    buPROPion (WELLBUTRIN XL) 24 hr tablet 150 mg, 150 mg, Oral, Daily, Provider Cutover, 150 mg at 07/05/18 0847    buPROPion (WELLBUTRIN XL) 24 hr tablet 300 mg, 300 mg, Oral, Daily, Provider Cutover, 300 mg at 07/05/18 0847    cloZAPine (CLOZARIL) tablet 400 mg, 400 mg, Oral, HS, Provider Cutover, 400 mg at 07/04/18 2100    divalproex sodium (DEPAKOTE ER) 24 hr tablet 1,000 mg, 1,000 mg, Oral, HS, Provider Cutover, 1,000 mg at 07/04/18 2100    fluPHENAZine (PROLIXIN) tablet 10 mg, 10 mg, Oral, HS, Provider Cutover, 10 mg at 07/04/18 2100    levothyroxine tablet 75 mcg, 75 mcg, Oral, Early Morning, Provider Cutover, 75 mcg at 07/05/18 0655    midodrine (PROAMATINE) tablet 10 mg, 10 mg, Oral, TID, Wally Lynn MD, 10 mg at 07/05/18 0655    minocycline (MINOCIN) capsule 100 mg, 100 mg, Oral, Daily, Provider Cutover, 100 mg at 07/05/18 0847    polyethylene glycol (MIRALAX) packet 17 g, 17 g, Oral, Daily, Provider Cutover, 17 g at 07/05/18 0847      Objective:     Vital Signs:  Vitals:    07/04/18 1501 07/04/18 1502 07/04/18 1945 07/05/18 0650   BP: 131/67 123/60 128/81 113/58   BP Location: Left arm Left arm  Right arm   Pulse: (!) 106 (!) 114 (!) 115    Resp:       Temp:       TempSrc:       SpO2:       Weight:       Height:             Appearance:  age appropriate and casually dressed   Behavior:  normal   Speech:  normal volume   Mood:  depressed   Affect:  constricted   Thought Process:  normal   Thought Content:  normal   Perceptual Disturbances: None   Risk Potential: Suicidal Ideations without plan and none   Sensorium:  person, place, situation and time   Cognition:  intact   Consciousness:  alert and awake    Attention: attention span and concentration were age appropriate   Intellect: average   Insight:  good   Judgment: good      Motor Activity: no abnormal movements           Recent Labs:  Results Reviewed     None          I/O Past 24 hours:  I/O last 3 completed shifts:  In: -   Out: 1 [Stool:1]  No intake/output data recorded  Assessment / Plan:     [facility]    Recommended Treatment:      Medication changes:  1) reduce clozapine to 350 mg  Non-pharmacological treatments  1) Continue with group therapy, milieu therapy and occupational therapy  Safety  1) Safety/communication plan established targeting dynamic risk factors above  Counseling / Coordination of Care    Total floor / unit time spent today 20 minutes  Greater than 50% of total time was spent with the patient and / or family counseling and / or coordination of care  A description of the counseling / coordination of care  Patient's Rights, confidentiality and exceptions to confidentiality, use of automated medical record, [address] [place] [address] staff access to medical record, and consent to treatment reviewed      Brian Zapata MD

## 2018-07-05 NOTE — CASE MANAGEMENT
Patient reported to a  via worksheet that he would like to kill himself  [id number] [facility] staff was notified immediately and staff sat with him to get a better understanding of why he answered his question that way  In treatment team this morning, patient reported he doesn't really think about actually killing himself, but he does think about dying  He reports he is tired of being in the hospital and group homes and would prefer to live with his mom  However, patient is aware that mom does not want him home with her at this time and would like him to live in a group home  Treatment team provided support to patient and encouraged him to communicate with staff more, especially when feeling suicidal  CM will continue to follow patient's progress and assist with discharge planning needs

## 2018-07-05 NOTE — PROGRESS NOTES
The patient slept through the night with no behaviors or issues noted  Safety and fall precautions maintained  Med compliant  Continue to monitor

## 2018-07-05 NOTE — PROGRESS NOTES
2030 [facility] President is flat of affect, socially withdrawn, isolative to room in free time  No expression of any SI  Came out for meal, then returned to room  Did shower & groom  Was visited by mother & sister who brought fast food treats which he enjoyed  Is present now in [facility] group outside on dec

## 2018-07-06 RX ADMIN — MIDODRINE HYDROCHLORIDE 10 MG: 2.5 TABLET ORAL at 21:05

## 2018-07-06 RX ADMIN — MINOCYCLINE HYDROCHLORIDE 100 MG: 100 CAPSULE ORAL at 09:31

## 2018-07-06 RX ADMIN — MIDODRINE HYDROCHLORIDE 10 MG: 2.5 TABLET ORAL at 06:13

## 2018-07-06 RX ADMIN — POLYETHYLENE GLYCOL 3350 17 G: 17 POWDER, FOR SOLUTION ORAL at 09:31

## 2018-07-06 RX ADMIN — MIDODRINE HYDROCHLORIDE 10 MG: 2.5 TABLET ORAL at 16:44

## 2018-07-06 RX ADMIN — BUPROPION HYDROCHLORIDE 300 MG: 300 TABLET, FILM COATED, EXTENDED RELEASE ORAL at 09:31

## 2018-07-06 RX ADMIN — FLUPHENAZINE HYDROCHLORIDE 10 MG: 10 TABLET, FILM COATED ORAL at 21:06

## 2018-07-06 RX ADMIN — DIVALPROEX SODIUM 1000 MG: 500 TABLET, EXTENDED RELEASE ORAL at 21:06

## 2018-07-06 RX ADMIN — LEVOTHYROXINE SODIUM 75 MCG: 75 TABLET ORAL at 06:13

## 2018-07-06 RX ADMIN — CLOZAPINE 350 MG: 100 TABLET ORAL at 21:06

## 2018-07-06 NOTE — PROGRESS NOTES
Stevie Vincenzo had evening snack  Compliant with scheduled 2100 meds with prompting x 1  Resting quietly in bed at present, arouses easily  Will continue to monitor/assess for any changes

## 2018-07-06 NOTE — PROGRESS NOTES
Patient on fall precautions  Patient refused breakfast, did not go to goals group, attended IMR group  Patient needed to be prompted for meds before coming to the med window  Patient presented with a flat affect, cooperative with nurse  Patient denied pain, no complaints of Sis, His, audio or visual hallucinations, anxiety or depression  Will continue to monitor for changes in current status

## 2018-07-06 NOTE — PROGRESS NOTES
Amol Love has been awake, alert, and visible minimally out in the milieu  Compliant with scheduled 1600 meds with prompting x 1  Affect remains flat, blunted  Pt continues to be difficult to engage in conversation  Pt denies any depression, anxiety, or a/v hallucinations  Pt has not verbalized any passive suicidal thoughts  Behavior has been quiet and mostly isolative to self in room  Pt comes out at intervals and walks around unit  Attended and participated in evening groups out on deck with staff and peers  No interaction noted with peers

## 2018-07-06 NOTE — PROGRESS NOTES
The patient slept through the night with no behaviors or issues noted  Fall and safety precautions maintained  Med compliant  Continue to monitor

## 2018-07-06 NOTE — PROGRESS NOTES
Psychiatry Progress Note    Subjective:   Interval History     Clinical status the same; pt has been noted to be increasingly lethargic in the am and hs clozapine was reduced      Current medications:    Current Facility-Administered Medications:     bisacodyl (DULCOLAX) EC tablet 10 mg, 10 mg, Oral, Daily PRN, Provider Cutover, 10 mg at 07/02/18 0658    buPROPion (WELLBUTRIN XL) 24 hr tablet 300 mg, 300 mg, Oral, Daily, Provider Cutover, 300 mg at 07/05/18 0847    cloZAPine (CLOZARIL) tablet 350 mg, 350 mg, Oral, HS, Mario Barahona MD, 350 mg at 07/05/18 2123    divalproex sodium (DEPAKOTE ER) 24 hr tablet 1,000 mg, 1,000 mg, Oral, HS, Provider Cutover, 1,000 mg at 07/05/18 2123    fluPHENAZine (PROLIXIN) tablet 10 mg, 10 mg, Oral, HS, Provider Cutover, 10 mg at 07/05/18 2123    levothyroxine tablet 75 mcg, 75 mcg, Oral, Early Morning, Provider Cutover, 75 mcg at 07/06/18 7778    midodrine (PROAMATINE) tablet 10 mg, 10 mg, Oral, TID, Wally Lynn MD, 10 mg at 07/06/18 4998    minocycline (MINOCIN) capsule 100 mg, 100 mg, Oral, Daily, Provider Cutover, 100 mg at 07/05/18 0847    polyethylene glycol (MIRALAX) packet 17 g, 17 g, Oral, Daily, Provider Cutover, 17 g at 07/05/18 0847      Objective:     Vital Signs:  Vitals:    07/05/18 1600 07/05/18 1601 07/05/18 1602 07/06/18 0610   BP: 119/78 119/84 116/79 118/85   BP Location: Left arm Left arm Left arm Left arm   Pulse: 99 (!) 116 (!) 122    Resp: 20      Temp: 97 6 °F (36 4 °C)      TempSrc: Temporal      SpO2:       Weight:       Height:             Appearance:  age appropriate and casually dressed   Behavior:  guarded   Speech:  soft   Mood:  depressed   Affect:  constricted   Thought Process:  normal   Thought Content:  normal   Perceptual Disturbances: None   Risk Potential: none   Sensorium:  person, place, situation and time   Cognition:  intact   Consciousness:  alert and awake    Attention: attention span and concentration are reduced   Intellect: average   Insight:  poor   Judgment: good and limited      Motor Activity: no abnormal movements           Recent Labs:  Results Reviewed     None          I/O Past 24 hours:  No intake/output data recorded  No intake/output data recorded  Assessment / Plan:     [facility]    Recommended Treatment:      Medication changes:  1) none, monitoring med changes from yesterday; reduced clozapine dose    Non-pharmacological treatments  1) Continue with group therapy, milieu therapy and occupational therapy  Safety  1) Safety/communication plan established targeting dynamic risk factors above  Counseling / Coordination of Care    Total floor / unit time spent today 20 minutes  Greater than 50% of total time was spent with the patient and / or family counseling and / or coordination of care  A description of the counseling / coordination of care  Patient's Rights, confidentiality and exceptions to confidentiality, use of automated medical record, [facility] to [facility] record, and consent to treatment reviewed      Alan Avila MD

## 2018-07-07 PROCEDURE — 80307 DRUG TEST PRSMV CHEM ANLYZR: CPT | Performed by: PSYCHIATRY & NEUROLOGY

## 2018-07-07 RX ADMIN — BUPROPION HYDROCHLORIDE 300 MG: 300 TABLET, FILM COATED, EXTENDED RELEASE ORAL at 08:50

## 2018-07-07 RX ADMIN — CLOZAPINE 350 MG: 100 TABLET ORAL at 21:20

## 2018-07-07 RX ADMIN — LEVOTHYROXINE SODIUM 75 MCG: 75 TABLET ORAL at 06:08

## 2018-07-07 RX ADMIN — MINOCYCLINE HYDROCHLORIDE 100 MG: 100 CAPSULE ORAL at 08:50

## 2018-07-07 RX ADMIN — DIVALPROEX SODIUM 1000 MG: 500 TABLET, EXTENDED RELEASE ORAL at 21:20

## 2018-07-07 RX ADMIN — FLUPHENAZINE HYDROCHLORIDE 10 MG: 10 TABLET, FILM COATED ORAL at 21:20

## 2018-07-07 RX ADMIN — MIDODRINE HYDROCHLORIDE 10 MG: 2.5 TABLET ORAL at 06:08

## 2018-07-07 RX ADMIN — POLYETHYLENE GLYCOL 3350 17 G: 17 POWDER, FOR SOLUTION ORAL at 08:50

## 2018-07-07 NOTE — PROGRESS NOTES
Stevie Loya attended and participated in evening groups and then had snack  Compliant with scheduled 2100 meds without prompting  Resting quietly in bed at present, arouses easily  Will continue to monitor/assess for any changes

## 2018-07-07 NOTE — PROGRESS NOTES
Individual continues on fall precautions, is laying in bed,  eyes closed, breaths are even and unlabored  Q15 minute check  Patient in not in any apparent distress  Seth Must continue to monitor behavior and sleep pattern

## 2018-07-08 RX ADMIN — MIDODRINE HYDROCHLORIDE 10 MG: 2.5 TABLET ORAL at 06:31

## 2018-07-08 RX ADMIN — MIDODRINE HYDROCHLORIDE 10 MG: 2.5 TABLET ORAL at 21:06

## 2018-07-08 RX ADMIN — MINOCYCLINE HYDROCHLORIDE 100 MG: 100 CAPSULE ORAL at 09:13

## 2018-07-08 RX ADMIN — MIDODRINE HYDROCHLORIDE 10 MG: 2.5 TABLET ORAL at 17:03

## 2018-07-08 RX ADMIN — DIVALPROEX SODIUM 1000 MG: 500 TABLET, EXTENDED RELEASE ORAL at 21:07

## 2018-07-08 RX ADMIN — CLOZAPINE 350 MG: 100 TABLET ORAL at 21:07

## 2018-07-08 RX ADMIN — POLYETHYLENE GLYCOL 3350 17 G: 17 POWDER, FOR SOLUTION ORAL at 09:14

## 2018-07-08 RX ADMIN — LEVOTHYROXINE SODIUM 75 MCG: 75 TABLET ORAL at 06:31

## 2018-07-08 RX ADMIN — FLUPHENAZINE HYDROCHLORIDE 10 MG: 10 TABLET, FILM COATED ORAL at 21:07

## 2018-07-08 RX ADMIN — BUPROPION HYDROCHLORIDE 300 MG: 300 TABLET, FILM COATED, EXTENDED RELEASE ORAL at 09:13

## 2018-07-08 NOTE — PROGRESS NOTES
Anshuljessica President returned to [facility] with his mom  They ate, smoked and [facility], overall they both said the pass went well

## 2018-07-08 NOTE — PROGRESS NOTES
Patient out for VS, attended goals  ate 50% breakfast and 50% lunch  Patient presents with a flat blunted affect, pleasant upon approach, talks soft, and mumbles, difficult to understand sometimes  Patient attended goals and journaling groups  No interactions with peers  Patient spent most of the shift in his room  Patient has no complaints of Sis, His, audio or visual hallucinations, anxiety or depression  Will continue to monitor for changes in current status

## 2018-07-08 NOTE — PROGRESS NOTES
Psychiatry Progress Note    Subjective: Interval History     Patient went on pass yesterday, which he indicates went well  He smoked cigarettes  Went to goals group today  Isolative and withdrawn, minimal interaction with his peers   Denies HI SI     Current medications:    Current Facility-Administered Medications:     bisacodyl (DULCOLAX) EC tablet 10 mg, 10 mg, Oral, Daily PRN, Provider Cutover, 10 mg at 07/02/18 0658    buPROPion (WELLBUTRIN XL) 24 hr tablet 300 mg, 300 mg, Oral, Daily, Provider Cutover, 300 mg at 07/08/18 0913    cloZAPine (CLOZARIL) tablet 350 mg, 350 mg, Oral, HS, Graeme Pepper MD, 350 mg at 07/07/18 2120    divalproex sodium (DEPAKOTE ER) 24 hr tablet 1,000 mg, 1,000 mg, Oral, HS, Provider Cutover, 1,000 mg at 07/07/18 2120    fluPHENAZine (PROLIXIN) tablet 10 mg, 10 mg, Oral, HS, Provider Cutover, 10 mg at 07/07/18 2120    levothyroxine tablet 75 mcg, 75 mcg, Oral, Early Morning, Provider Cutover, 75 mcg at 07/08/18 0631    midodrine (PROAMATINE) tablet 10 mg, 10 mg, Oral, TID, Wally Lynn MD, 10 mg at 07/08/18 1703    minocycline (MINOCIN) capsule 100 mg, 100 mg, Oral, Daily, Provider Cutover, 100 mg at 07/08/18 0913    polyethylene glycol (MIRALAX) packet 17 g, 17 g, Oral, Daily, Provider Cutover, 17 g at 07/08/18 0914      Objective:     Vital Signs:  Vitals:    07/08/18 0734 07/08/18 1500 07/08/18 1501 07/08/18 1502   BP: 100/74 120/75 119/80 111/60   BP Location: Left arm Left arm Left arm Left arm   Pulse: 101 83 91 (!) 111   Resp: 20 18     Temp:  (!) 97 4 °F (36 3 °C)     TempSrc:  Temporal     SpO2:       Weight:       Height:             Appearance:  age appropriate and casually dressed   Behavior:  guarded   Speech:  soft   Mood:  depressed   Affect:  constricted   Thought Process:  normal   Thought Content:  normal   Perceptual Disturbances: None   Risk Potential: none   Sensorium:  person, place, situation and time   Cognition:  intact   Consciousness:  alert and awake    Attention: attention span and concentration are reduced   Intellect: average   Insight:  poor   Judgment: good and limited      Motor Activity: no abnormal movements           Recent Labs:  Results Reviewed     None          I/O Past 24 hours:  No intake/output data recorded  I/O this shift:  In: -   Out: 1 [Stool:1]        Assessment / Plan:     Ch[facility] ([facility]    Recommended Treatment:      Medication changes:  1) none, monitoring med changes from yesterday; reduced clozapine dose    Non-pharmacological treatments  1) Continue with group therapy, milieu therapy and occupational therapy  Safety  1) Safety/communication plan established targeting dynamic risk factors above  Counseling / Coordination of Care    Total floor / unit time spent today 20 minutes  Greater than 50% of total time was spent with the patient and / or family counseling and / or coordination of care  A description of the counseling / coordination of care  Patient's Rights, confidentiality and exceptions to confidentiality, use of automated medical record, [facility] staff access to medical record, and consent to treatment reviewed      LIZETH Berry

## 2018-07-09 RX ADMIN — MINOCYCLINE HYDROCHLORIDE 100 MG: 100 CAPSULE ORAL at 08:08

## 2018-07-09 RX ADMIN — FLUPHENAZINE HYDROCHLORIDE 10 MG: 10 TABLET, FILM COATED ORAL at 21:32

## 2018-07-09 RX ADMIN — MIDODRINE HYDROCHLORIDE 10 MG: 2.5 TABLET ORAL at 06:29

## 2018-07-09 RX ADMIN — POLYETHYLENE GLYCOL 3350 17 G: 17 POWDER, FOR SOLUTION ORAL at 08:08

## 2018-07-09 RX ADMIN — CLOZAPINE 350 MG: 100 TABLET ORAL at 21:33

## 2018-07-09 RX ADMIN — DIVALPROEX SODIUM 1000 MG: 500 TABLET, EXTENDED RELEASE ORAL at 21:33

## 2018-07-09 RX ADMIN — LEVOTHYROXINE SODIUM 75 MCG: 75 TABLET ORAL at 06:29

## 2018-07-09 RX ADMIN — BUPROPION HYDROCHLORIDE 300 MG: 300 TABLET, FILM COATED, EXTENDED RELEASE ORAL at 08:08

## 2018-07-09 RX ADMIN — MIDODRINE HYDROCHLORIDE 10 MG: 2.5 TABLET ORAL at 16:59

## 2018-07-09 RX ADMIN — MIDODRINE HYDROCHLORIDE 10 MG: 2.5 TABLET ORAL at 21:32

## 2018-07-09 NOTE — PROGRESS NOTES
Psychiatry Progress Note    Subjective:   Interval History     Had a good pass with his mother this weekend; mood and behavior unchanged; will answer questions but little spontaneous  Conversations; tolerating med rreductions well      Current medications:    Current Facility-Administered Medications:     bisacodyl (DULCOLAX) EC tablet 10 mg, 10 mg, Oral, Daily PRN, Provider Cutover, 10 mg at 07/02/18 0658    buPROPion (WELLBUTRIN XL) 24 hr tablet 300 mg, 300 mg, Oral, Daily, Provider Cutover, 300 mg at 07/09/18 0808    cloZAPine (CLOZARIL) tablet 350 mg, 350 mg, Oral, HS, Dc Carlos MD, 350 mg at 07/08/18 2107    divalproex sodium (DEPAKOTE ER) 24 hr tablet 1,000 mg, 1,000 mg, Oral, HS, Provider Cutover, 1,000 mg at 07/08/18 2107    fluPHENAZine (PROLIXIN) tablet 10 mg, 10 mg, Oral, HS, Provider Cutover, 10 mg at 07/08/18 2107    levothyroxine tablet 75 mcg, 75 mcg, Oral, Early Morning, Provider Cutover, 75 mcg at 07/09/18 0629    midodrine (PROAMATINE) tablet 10 mg, 10 mg, Oral, TID, Wally Lynn MD, 10 mg at 07/09/18 0629    minocycline (MINOCIN) capsule 100 mg, 100 mg, Oral, Daily, Provider Cutover, 100 mg at 07/09/18 0808    polyethylene glycol (MIRALAX) packet 17 g, 17 g, Oral, Daily, Provider Cutover, 17 g at 07/09/18 0808      Objective:     Vital Signs:  Vitals:    07/08/18 1502 07/09/18 0628 07/09/18 0730 07/09/18 0735   BP: 111/60 98/53 119/76 115/66   BP Location: Left arm Left arm Left arm Left arm   Pulse: (!) 111 (!) 114 103 104   Resp:   18    Temp:   97 7 °F (36 5 °C)    TempSrc:   Temporal    SpO2:       Weight:       Height:             Appearance:  age appropriate and casually dressed   Behavior:  guarded   Speech:  soft   Mood:  depressed   Affect:  constricted   Thought Process:  normal   Thought Content:  normal   Perceptual Disturbances: None   Risk Potential: none   Sensorium:  person, place, situation and time   Cognition:  intact   Consciousness:  alert and awake    Attention: attention span and concentration were impaired   Intellect: average   Insight:  poor   Judgment: poor      Motor Activity: no abnormal movements           Recent Labs:  Results Reviewed     None          I/O Past 24 hours:  I/O last 3 completed shifts:  In: -   Out: 1 [Stool:1]  No intake/output data recorded  Assessment / Plan:     [facility] ([facility]    Recommended Treatment:      Medication changes:  1) none    Non-pharmacological treatments  1) Continue with group therapy, milieu therapy and occupational therapy  Safety  1) Safety/communication plan established targeting dynamic risk factors above  Counseling / Coordination of Care    Total floor / unit time spent today 20 minutes  Greater than 50% of total time was spent with the patient and / or family counseling and / or coordination of care  A description of the counseling / coordination of care  Patient's Rights, confidentiality and exceptions to confidentiality, use of automated medical record, [facility] willa staff access to medical record, and consent to treatment reviewed      All Huffman MD

## 2018-07-09 NOTE — PROGRESS NOTES
Individual has been keeping to self, no interactions with others  Affect is blunted and eye contact is poor  He participated in programming selectively, attended 2/3 groups  He has been compliant with behavioral plan selectively  He has not been completing hygiene  Compliant with meds  Has difficulty expressing needs to staff  Availability of staff made known  Will continue to monitor

## 2018-07-09 NOTE — PROGRESS NOTES
Patient in bed, asleep at shift onset  Continued to sleep throughout the night without difficulty  +drooling noted to linens  No behavioral issues overnight  Standing BP 98/53  Midodrine given

## 2018-07-09 NOTE — PROGRESS NOTES
Patient isolative to room most of the shift, ate 50% of dinner, attended evening group  Will continue to monitor for changes in current status

## 2018-07-10 LAB
BASOPHILS # BLD AUTO: 0 THOUSANDS/ΜL (ref 0–0.1)
BASOPHILS NFR BLD AUTO: 1 % (ref 0–1)
EOSINOPHIL # BLD AUTO: 0.3 THOUSAND/ΜL (ref 0–0.4)
EOSINOPHIL NFR BLD AUTO: 5 % (ref 0–6)
ERYTHROCYTE [DISTWIDTH] IN BLOOD BY AUTOMATED COUNT: 13.4 %
HCT VFR BLD AUTO: 44 % (ref 41–53)
HGB BLD-MCNC: 14.4 G/DL (ref 13.5–17.5)
LYMPHOCYTES # BLD AUTO: 2.6 THOUSANDS/ΜL (ref 0.5–4)
LYMPHOCYTES NFR BLD AUTO: 44 % (ref 20–50)
MCH RBC QN AUTO: 29 PG (ref 26–34)
MCHC RBC AUTO-ENTMCNC: 32.7 G/DL (ref 31–36)
MCV RBC AUTO: 89 FL (ref 80–100)
MONOCYTES # BLD AUTO: 0.5 THOUSAND/ΜL (ref 0.2–0.9)
MONOCYTES NFR BLD AUTO: 9 % (ref 1–10)
NEUTROPHILS # BLD AUTO: 2.4 THOUSANDS/ΜL (ref 1.8–7.8)
NEUTS SEG NFR BLD AUTO: 41 % (ref 45–65)
PLATELET # BLD AUTO: 198 THOUSANDS/UL (ref 150–450)
PMV BLD AUTO: 8 FL (ref 8.9–12.7)
RBC # BLD AUTO: 4.96 MILLION/UL (ref 4.5–5.9)
WBC # BLD AUTO: 5.9 THOUSAND/UL (ref 4.5–11)

## 2018-07-10 PROCEDURE — 85025 COMPLETE CBC W/AUTO DIFF WBC: CPT

## 2018-07-10 RX ADMIN — CLOZAPINE 350 MG: 100 TABLET ORAL at 21:23

## 2018-07-10 RX ADMIN — POLYETHYLENE GLYCOL 3350 17 G: 17 POWDER, FOR SOLUTION ORAL at 08:06

## 2018-07-10 RX ADMIN — MINOCYCLINE HYDROCHLORIDE 100 MG: 100 CAPSULE ORAL at 08:06

## 2018-07-10 RX ADMIN — DIVALPROEX SODIUM 1000 MG: 500 TABLET, EXTENDED RELEASE ORAL at 21:23

## 2018-07-10 RX ADMIN — BUPROPION HYDROCHLORIDE 300 MG: 300 TABLET, FILM COATED, EXTENDED RELEASE ORAL at 08:06

## 2018-07-10 RX ADMIN — MIDODRINE HYDROCHLORIDE 10 MG: 2.5 TABLET ORAL at 16:55

## 2018-07-10 RX ADMIN — FLUPHENAZINE HYDROCHLORIDE 10 MG: 10 TABLET, FILM COATED ORAL at 21:23

## 2018-07-10 RX ADMIN — LEVOTHYROXINE SODIUM 75 MCG: 75 TABLET ORAL at 06:27

## 2018-07-10 RX ADMIN — MIDODRINE HYDROCHLORIDE 10 MG: 2.5 TABLET ORAL at 06:27

## 2018-07-10 NOTE — PROGRESS NOTES
Rene Mendez attended and participated in 2/2 evening groups and then had snack  Compliant with scheduled 2100 meds without prompting  No behavioral issues  Resting quietly in bed at present, arouses easily  Will continue to monitor/assess for any changes

## 2018-07-10 NOTE — PROGRESS NOTES
Stevie Loya has been awake, alert, and visible intermittently out in the milieu  No interaction noted with peers  Affect flat, blunted, with minimal eye contact  Pt remains difficult to engage in conversation and does not initiate any unless to make his needs known  Compliant with scheduled 1600 meds with prompting x 1  Ate 100% supper  Mother in to visit with fair interaction noted and brought food in for pt which pt ate  Will continue to monitor/assess for any changes

## 2018-07-10 NOTE — PROGRESS NOTES
Sleeping at this time, no s/s of distress noted, safe preacautions maintained   Monitoring continues

## 2018-07-10 NOTE — PROGRESS NOTES
Psychiatry Progress Note    Subjective:   Interval History   varied attendance to groups; self hygiene poor; limited conveersation  No new issues      Current medications:    Current Facility-Administered Medications:     bisacodyl (DULCOLAX) EC tablet 10 mg, 10 mg, Oral, Daily PRN, Provider Cutover, 10 mg at 07/02/18 0658    buPROPion (WELLBUTRIN XL) 24 hr tablet 300 mg, 300 mg, Oral, Daily, Provider Cutover, 300 mg at 07/10/18 0806    cloZAPine (CLOZARIL) tablet 350 mg, 350 mg, Oral, HS, Lizbeth Benoit MD, 350 mg at 07/09/18 2133    divalproex sodium (DEPAKOTE ER) 24 hr tablet 1,000 mg, 1,000 mg, Oral, HS, Provider Cutover, 1,000 mg at 07/09/18 2133    fluPHENAZine (PROLIXIN) tablet 10 mg, 10 mg, Oral, HS, Provider Cutover, 10 mg at 07/09/18 2132    levothyroxine tablet 75 mcg, 75 mcg, Oral, Early Morning, Provider Cutover, 75 mcg at 07/10/18 6940    midodrine (PROAMATINE) tablet 10 mg, 10 mg, Oral, TID, Wally Lynn MD, 10 mg at 07/10/18 8959    minocycline (MINOCIN) capsule 100 mg, 100 mg, Oral, Daily, Provider Cutover, 100 mg at 07/10/18 0806    polyethylene glycol (MIRALAX) packet 17 g, 17 g, Oral, Daily, Provider Cutover, 17 g at 07/10/18 0806      Objective:     Vital Signs:  Vitals:    07/09/18 1622 07/09/18 1625 07/09/18 1924 07/10/18 0628   BP: 111/80 114/79 113/75 117/69   BP Location: Left arm Left arm Left arm    Pulse: 87 (!) 109 (!) 115 103   Resp:       Temp:       TempSrc:       SpO2:       Weight:       Height:             Appearance:  age appropriate and casually dressed   Behavior:  guarded   Speech:  soft   Mood:  depressed   Affect:  constricted   Thought Process:  normal   Thought Content:  normal   Perceptual Disturbances: None   Risk Potential: none   Sensorium:  person, place, situation and time   Cognition:  intact   Consciousness:  alert and awake    Attention: attention span and concentration were diminished   Intellect: average   Insight:  poor   Judgment: poor      Motor Activity: no abnormal movements           Recent Labs:  Results Reviewed     None          I/O Past 24 hours:  No intake/output data recorded  No intake/output data recorded  Assessment / Plan:     Chronic paranoid [facility] ([facility] )    Recommended Treatment:      Medication changes:  1) none    Non-pharmacological treatments  1) Continue with group therapy, milieu therapy and occupational therapy  Safety  1) Safety/communication plan established targeting dynamic risk factors above  Counseling / Coordination of Care    Total floor / unit time spent today 20 minutes  Greater than 50% of total time was spent with the patient and / or family counseling and / or coordination of care  A description of the counseling / coordination of care  Patient's Rights, confidentiality and exceptions to confidentiality, use of automated medical record, [address] [place] [address] staff access to medical record, and consent to treatment reviewed      Rico Byrne MD

## 2018-07-10 NOTE — PROGRESS NOTES
Priyanka Calhoun has been keeping to himself, no interactions with others  Affect is blunted and eye contact is poor  He participated in programming selectively, attended 1/2 groups  Not compliant with behavioral plan today  He has not been completing hygiene  Compliant with meds  Has difficulty expressing needs to staff  Will continue to monitor

## 2018-07-11 RX ADMIN — MIDODRINE HYDROCHLORIDE 10 MG: 2.5 TABLET ORAL at 16:44

## 2018-07-11 RX ADMIN — DIVALPROEX SODIUM 1000 MG: 500 TABLET, EXTENDED RELEASE ORAL at 21:23

## 2018-07-11 RX ADMIN — POLYETHYLENE GLYCOL 3350 17 G: 17 POWDER, FOR SOLUTION ORAL at 08:34

## 2018-07-11 RX ADMIN — LEVOTHYROXINE SODIUM 75 MCG: 75 TABLET ORAL at 06:37

## 2018-07-11 RX ADMIN — MIDODRINE HYDROCHLORIDE 10 MG: 2.5 TABLET ORAL at 21:22

## 2018-07-11 RX ADMIN — MINOCYCLINE HYDROCHLORIDE 100 MG: 100 CAPSULE ORAL at 08:34

## 2018-07-11 RX ADMIN — FLUPHENAZINE HYDROCHLORIDE 10 MG: 10 TABLET, FILM COATED ORAL at 21:23

## 2018-07-11 RX ADMIN — BUPROPION HYDROCHLORIDE 300 MG: 300 TABLET, FILM COATED, EXTENDED RELEASE ORAL at 08:34

## 2018-07-11 RX ADMIN — CLOZAPINE 350 MG: 100 TABLET ORAL at 21:23

## 2018-07-11 RX ADMIN — MIDODRINE HYDROCHLORIDE 10 MG: 2.5 TABLET ORAL at 06:35

## 2018-07-11 NOTE — PROGRESS NOTES
Chandrika Liang went to RA with [facility] staff  He left at 1400 and is due to return at 1500

## 2018-07-11 NOTE — PROGRESS NOTES
Patient is isolative to room, out of room for group on deck  Patient presents with a flat affect, pleasant upon approach  Will continue to monitor for changes in current status

## 2018-07-11 NOTE — PROGRESS NOTES
Individual received midodrine for /73 with Pulse 114 this morning with 8 oz water  Denies any vertigo or headache  Will continue to monitor

## 2018-07-11 NOTE — PROGRESS NOTES
Patient was standing in line waiting for medications and was laughing and giggling while waiting for his medication

## 2018-07-11 NOTE — PROGRESS NOTES
Kiran Reaper has been keeping to himself, no interactions with others   Affect is blunted and eye contact is poor   He participated in programming, attended 2/2 groups  More compliant with his behavioral plan today  Mack Hernandez did  complete his hygiene   Compliant with meds  Has difficulty expressing needs to staff  Will continue to monitor

## 2018-07-11 NOTE — PROGRESS NOTES
Psychiatry Progress Note    Subjective: Interval History     Same clinical presentation, isolative non conversational, but cooperative is going to meals and meds and some groups on his own which is a +for him  However he knows that he will not go to passes if he does not accomplish these goals  There are no reports of hallucinations and his thoughts are clear if not brief and not delusional   He is tolerating the medication changes and reductions made last week without incident        Current medications:    Current Facility-Administered Medications:     bisacodyl (DULCOLAX) EC tablet 10 mg, 10 mg, Oral, Daily PRN, Provider Cutover, 10 mg at 07/02/18 0658    buPROPion (WELLBUTRIN XL) 24 hr tablet 300 mg, 300 mg, Oral, Daily, Provider Cutover, 300 mg at 07/11/18 0834    cloZAPine (CLOZARIL) tablet 350 mg, 350 mg, Oral, HS, Graeme Pepper MD, 350 mg at 07/10/18 2123    divalproex sodium (DEPAKOTE ER) 24 hr tablet 1,000 mg, 1,000 mg, Oral, HS, Provider Cutover, 1,000 mg at 07/10/18 2123    fluPHENAZine (PROLIXIN) tablet 10 mg, 10 mg, Oral, HS, Provider Cutover, 10 mg at 07/10/18 2123    levothyroxine tablet 75 mcg, 75 mcg, Oral, Early Morning, Provider Cutover, 75 mcg at 07/11/18 0637    midodrine (PROAMATINE) tablet 10 mg, 10 mg, Oral, TID, Wally Lynn MD, 10 mg at 07/11/18 0635    minocycline (MINOCIN) capsule 100 mg, 100 mg, Oral, Daily, Provider Cutover, 100 mg at 07/11/18 0834    polyethylene glycol (MIRALAX) packet 17 g, 17 g, Oral, Daily, Provider Cutover, 17 g at 07/11/18 0834      Objective:     Vital Signs:  Vitals:    07/10/18 1902 07/11/18 0536 07/11/18 0730 07/11/18 0735   BP: 125/82 103/73 104/69 109/77   BP Location: Right arm Left arm Left arm Left arm   Pulse: (!) 109 (!) 114 104 (!) 111   Resp:   18 18   Temp:   (!) 97 °F (36 1 °C)    TempSrc:   Temporal    SpO2:       Weight:       Height:             Appearance:  age appropriate and casually dressed   Behavior:  normal   Speech:  normal volume   Mood:  depressed   Affect:  constricted   Thought Process:  blocked   Thought Content:  normal   Perceptual Disturbances: None   Risk Potential: none   Sensorium:  person, place, situation and time   Cognition:  intact   Consciousness:  alert and awake    Attention: Attention span and concentration are both impaired  Intellect: average   Insight:  fair   Judgment: fair      Motor Activity: no abnormal movements           Recent Labs:  Results Reviewed     None          I/O Past 24 hours:  No intake/output data recorded  No intake/output data recorded  Assessment / Plan:     [facility]    Recommended Treatment:      Medication changes:  1) no change    Non-pharmacological treatments  1) Continue with group therapy, milieu therapy and occupational therapy  Safety  1) Safety/communication plan established targeting dynamic risk factors above  Counseling / Coordination of Care    Total floor / unit time spent today 20 minutes  Greater than 50% of total time was spent with the patient and / or family counseling and / or coordination of care  A description of the counseling / coordination of care  Patient's Rights, confidentiality and exceptions to confidentiality, use of automated medical record, [facility] staff access to medical record, and consent to treatment reviewed      Ld Warren MD

## 2018-07-12 RX ORDER — CLOZAPINE 200 MG/1
400 TABLET ORAL
Status: DISCONTINUED | OUTPATIENT
Start: 2018-07-12 | End: 2018-07-19

## 2018-07-12 RX ORDER — FLUPHENAZINE HYDROCHLORIDE 10 MG/1
10 TABLET ORAL
Status: DISCONTINUED | OUTPATIENT
Start: 2018-07-12 | End: 2018-09-14

## 2018-07-12 RX ORDER — METHYLPHENIDATE HYDROCHLORIDE 10 MG/1
20 TABLET ORAL
Status: DISCONTINUED | OUTPATIENT
Start: 2018-07-12 | End: 2018-07-22

## 2018-07-12 RX ORDER — DIVALPROEX SODIUM 500 MG/1
1000 TABLET, EXTENDED RELEASE ORAL
Status: DISCONTINUED | OUTPATIENT
Start: 2018-07-12 | End: 2018-12-20 | Stop reason: HOSPADM

## 2018-07-12 RX ORDER — CLOZAPINE 200 MG/1
400 TABLET ORAL
Status: DISCONTINUED | OUTPATIENT
Start: 2018-07-12 | End: 2018-07-12

## 2018-07-12 RX ADMIN — MINOCYCLINE HYDROCHLORIDE 100 MG: 100 CAPSULE ORAL at 08:27

## 2018-07-12 RX ADMIN — METHYLPHENIDATE HYDROCHLORIDE 20 MG: 10 TABLET ORAL at 11:52

## 2018-07-12 RX ADMIN — DIVALPROEX SODIUM 1000 MG: 500 TABLET, FILM COATED, EXTENDED RELEASE ORAL at 21:16

## 2018-07-12 RX ADMIN — CLOZAPINE 400 MG: 200 TABLET ORAL at 21:16

## 2018-07-12 RX ADMIN — MIDODRINE HYDROCHLORIDE 10 MG: 2.5 TABLET ORAL at 06:51

## 2018-07-12 RX ADMIN — POLYETHYLENE GLYCOL 3350 17 G: 17 POWDER, FOR SOLUTION ORAL at 08:27

## 2018-07-12 RX ADMIN — LEVOTHYROXINE SODIUM 75 MCG: 75 TABLET ORAL at 06:51

## 2018-07-12 RX ADMIN — MIDODRINE HYDROCHLORIDE 10 MG: 2.5 TABLET ORAL at 21:15

## 2018-07-12 RX ADMIN — BUPROPION HYDROCHLORIDE 300 MG: 300 TABLET, FILM COATED, EXTENDED RELEASE ORAL at 08:27

## 2018-07-12 RX ADMIN — FLUPHENAZINE HYDROCHLORIDE 10 MG: 10 TABLET, FILM COATED ORAL at 21:16

## 2018-07-12 NOTE — PROGRESS NOTES
Amol Love has been awake, alert, and visible intermittently out in the milieu  Pt used his Gameboy during electronics hour  Compliant with scheduled meds without prompting  Affect remains flat, blunted  Pt remains difficult to engage in conversation and offers minimal information about self  Behavior has been quiet and isolative to self  No interaction noted with peers  Attended and participated in 2/2 evening groups out on the deck with staff and peers and then had snack  No behavioral issues  Resting quietly in bed at present, arouses easily  Will continue to monitor/assess for any changes

## 2018-07-12 NOTE — ESCALATED TEAM TX
Patient attended treatment team this morning prepared with self-assessment  Patient's group attendance was 80%  Patient has been observed to be responding more this week  When asked about his hallucinations, patient did admit to hearing more voices than usual "telling him funny things "  Patient states that he does not enjoy the voices, but is not particularly bothered by them either  Patient agreed to medication changes  Patient had not been compliant with his behavioral plan for two days leading up to his treatment team meeting, and was therefore denied a pass request this weekend  Patient expressed understanding for the denial and handled the bad news appropriately   is awaiting news from [facility] for when bed becomes available  Patient expressed no further questions or concerns at the conclusion of the meeting  Next meeting is scheduled for 7/19

## 2018-07-12 NOTE — PROGRESS NOTES
Psychiatry Progress Note    Subjective: Interval History     Pt and staff report more auditory hallucinations in the last week and has refused select activities; falls asleep during groups      Current medications:    Current Facility-Administered Medications:     bisacodyl (DULCOLAX) EC tablet 10 mg, 10 mg, Oral, Daily PRN, Provider Cutover, 10 mg at 07/02/18 0658    buPROPion (WELLBUTRIN XL) 24 hr tablet 300 mg, 300 mg, Oral, Daily, Provider Cutover, 300 mg at 07/12/18 0827    cloZAPine (CLOZARIL) tablet 350 mg, 350 mg, Oral, HS, Saeid Albarran MD, 350 mg at 07/11/18 2123    divalproex sodium (DEPAKOTE ER) 24 hr tablet 1,000 mg, 1,000 mg, Oral, HS, Provider Cutover, 1,000 mg at 07/11/18 2123    fluPHENAZine (PROLIXIN) tablet 10 mg, 10 mg, Oral, HS, Provider Cutover, 10 mg at 07/11/18 2123    levothyroxine tablet 75 mcg, 75 mcg, Oral, Early Morning, Provider Cutover, 75 mcg at 07/12/18 0651    midodrine (PROAMATINE) tablet 10 mg, 10 mg, Oral, TID, Wally Lynn MD, 10 mg at 07/12/18 0651    minocycline (MINOCIN) capsule 100 mg, 100 mg, Oral, Daily, Provider Cutover, 100 mg at 07/12/18 0827    polyethylene glycol (MIRALAX) packet 17 g, 17 g, Oral, Daily, Provider Cutover, 17 g at 07/12/18 0827      Objective:     Vital Signs:  Vitals:    07/11/18 1923 07/12/18 0600 07/12/18 0730 07/12/18 0732   BP: 109/80 119/75 119/69 121/79   BP Location: Left arm  Left arm Left arm   Pulse: (!) 114 77 99 99   Resp:   20 20   Temp:   97 9 °F (36 6 °C)    TempSrc:   Temporal    SpO2:       Weight:       Height:             Appearance:  age appropriate and casually dressed   Behavior:  normal   Speech:  normal volume   Mood:  depressed   Affect:  constricted   Thought Process:  normal   Thought Content:  normal   Perceptual Disturbances:  Auditory hallucinations without commands   Risk Potential: none   Sensorium:  person, place, situation and time   Cognition:  intact   Consciousness:  alert and awake    Attention: attention span and concentration were age appropriate   Intellect: average   Insight:  poor   Judgment: good      Motor Activity: no abnormal movements           Recent Labs:  Results Reviewed     None          I/O Past 24 hours:  No intake/output data recorded  No intake/output data recorded  Assessment / Plan:     Chronic paranoid schizophrenia ([facility] )    Recommended Treatment:      Medication changes:  1) increase clozapine dose    Non-pharmacological treatments  1) Continue with group therapy, milieu therapy and occupational therapy  Safety  1) Safety/communication plan established targeting dynamic risk factors above  Counseling / Coordination of Care    Total floor / unit time spent today 20 minutes  Greater than 50% of total time was spent with the patient and / or family counseling and / or coordination of care  A description of the counseling / coordination of care  Patient's Rights, confidentiality and exceptions to confidentiality, use of automated medical record, [facility] willa staff access to medical record, and consent to treatment reviewed      All Huffman MD

## 2018-07-12 NOTE — PROGRESS NOTES
Pt isolative to self throughout shift  Visible on unit sitting in [facility] 19 sleeping  Attends groups with minimal participation  Denies any depression, anxiety, or SI at this time  No current medical issues  Med and meal compliant with minimal prompting, compliant with behavioral plan  Will continue to encourage the use of positive and effective coping skills along with interaction with staff and peers in order to express self safely  Maintain safe and fall precautions as ordered

## 2018-07-13 RX ADMIN — MIDODRINE HYDROCHLORIDE 10 MG: 2.5 TABLET ORAL at 17:00

## 2018-07-13 RX ADMIN — MIDODRINE HYDROCHLORIDE 10 MG: 2.5 TABLET ORAL at 20:51

## 2018-07-13 RX ADMIN — CLOZAPINE 400 MG: 200 TABLET ORAL at 20:51

## 2018-07-13 RX ADMIN — METHYLPHENIDATE HYDROCHLORIDE 20 MG: 10 TABLET ORAL at 13:50

## 2018-07-13 RX ADMIN — LEVOTHYROXINE SODIUM 75 MCG: 75 TABLET ORAL at 06:20

## 2018-07-13 RX ADMIN — MINOCYCLINE HYDROCHLORIDE 100 MG: 100 CAPSULE ORAL at 08:46

## 2018-07-13 RX ADMIN — MIDODRINE HYDROCHLORIDE 10 MG: 2.5 TABLET ORAL at 06:20

## 2018-07-13 RX ADMIN — POLYETHYLENE GLYCOL 3350 17 G: 17 POWDER, FOR SOLUTION ORAL at 08:47

## 2018-07-13 RX ADMIN — METHYLPHENIDATE HYDROCHLORIDE 20 MG: 10 TABLET ORAL at 06:20

## 2018-07-13 RX ADMIN — FLUPHENAZINE HYDROCHLORIDE 10 MG: 10 TABLET, FILM COATED ORAL at 20:51

## 2018-07-13 RX ADMIN — BUPROPION HYDROCHLORIDE 300 MG: 300 TABLET, FILM COATED, EXTENDED RELEASE ORAL at 08:46

## 2018-07-13 RX ADMIN — DIVALPROEX SODIUM 1000 MG: 500 TABLET, FILM COATED, EXTENDED RELEASE ORAL at 20:52

## 2018-07-13 NOTE — PROGRESS NOTES
Psychiatry Progress Note    Subjective: Interval History     Patient does well when his family visits in the evening, nurses note that he interacts well with both of them    However on the unit he remained sluggish withdrawn poor eye contact and limited conversation;  in team meeting yesterday he reported that he has been hearing things and that the voices tell me funny things      Current medications:    Current Facility-Administered Medications:     bisacodyl (DULCOLAX) EC tablet 10 mg, 10 mg, Oral, Daily PRN, Provider Cutover, 10 mg at 07/02/18 0658    buPROPion (WELLBUTRIN XL) 24 hr tablet 300 mg, 300 mg, Oral, Daily, Provider Cutover, 300 mg at 07/12/18 0827    clozapine (CLOZARIL) tablet 400 mg, 400 mg, Oral, HS, Geovanna Crisostomo MD, 400 mg at 07/12/18 2116    divalproex sodium (DEPAKOTE ER) 24 hr tablet 1,000 mg, 1,000 mg, Oral, HS, Geovanna Crisostomo MD, 1,000 mg at 07/12/18 2116    fluPHENAZine (PROLIXIN) tablet 10 mg, 10 mg, Oral, HS, Geovanna Crisostomo MD, 10 mg at 07/12/18 2116    levothyroxine tablet 75 mcg, 75 mcg, Oral, Early Morning, Provider Cutover, 75 mcg at 07/13/18 0620    methylphenidate (RITALIN) tablet 20 mg, 20 mg, Oral, BID before breakfast/lunch, Alan Avila MD, 20 mg at 07/13/18 0620    midodrine (PROAMATINE) tablet 10 mg, 10 mg, Oral, TID, Wally Lynn MD, 10 mg at 07/13/18 0620    minocycline (MINOCIN) capsule 100 mg, 100 mg, Oral, Daily, Provider Cutover, 100 mg at 07/12/18 0827    polyethylene glycol (MIRALAX) packet 17 g, 17 g, Oral, Daily, Provider Cutover, 17 g at 07/12/18 0827      Objective:     Vital Signs:  Vitals:    07/12/18 1555 07/12/18 1558 07/12/18 1600 07/12/18 1930   BP: 123/77 123/78 124/78 112/74   BP Location: Left arm Left arm Left arm Left arm   Pulse: 95 98 (!) 116 101   Resp:       Temp:       TempSrc:       SpO2:       Weight:       Height:             Appearance:  age appropriate and casually dressed   Behavior:  normal   Speech:  normal volume   Mood:  depressed Affect:  constricted   Thought Process:  normal   Thought Content:  normal   Perceptual Disturbances: None and Auditory hallucinations without commands   Risk Potential: none   Sensorium:  person, place, situation and time   Cognition:  intact   Consciousness:  alert and awake    Attention: attention span and concentration were poor   Intellect: average   Insight:  good   Judgment: good      Motor Activity: no abnormal movements           Recent Labs:  Results Reviewed     None          I/O Past 24 hours:  No intake/output data recorded  No intake/output data recorded  Assessment / Plan:     [facility]    Recommended Treatment:      Medication changes:  1) monitored increase in clozapine  Ritalin was added to his regimen to help with the lethargy    Non-pharmacological treatments  1) Continue with group therapy, milieu therapy and occupational therapy  Safety  1) Safety/communication plan established targeting dynamic risk factors above  Counseling / Coordination of Care    Total floor / unit time spent today 20 minutes  Greater than 50% of total time was spent with the patient and / or family counseling and / or coordination of care  A description of the counseling / coordination of care  Patient's Rights, confidentiality and exceptions to confidentiality, use of automated medical record, [facility] staff access to medical record, and consent to treatment reviewed      Yudelka Willett MD

## 2018-07-13 NOTE — SOCIAL WORK
Patient attended therapeutic activity "[place] in the [place]" today with staff  Patient was socially and behaviorally appropriate and stated he had a good time  Patient took initiative to look after his belongings and another peers trash which is an improvement from previous trips  Patient was quiet, but no issues

## 2018-07-13 NOTE — PROGRESS NOTES
Pt intermittently visible on unit throughout shift  Requires prompting for medications and breakfast  [place], states it was 'good' appears in no distress upon return and sitting/relaxing in [employer] area  Denies any depression, anxiety, or SI at this time  No current medical issues  Attended three groups with minimal but appropriate participation  Will continue to encourage pt to interact with staff and peers in order to express self safely  Maintain safe and fall precautions as ordered

## 2018-07-13 NOTE — CASE MANAGEMENT
Patient has been noticed on the unit with increased responding to internal stimuli on the unit  Nrsg staff reports they have observed patient laughing to himself at times on the unit  Patient admitted to hearing more voices this past week and reports he would prefer they be gone  Patient has also been struggling to comply with his behavioral plan  Patient reported it was not related to hearing voices, but did state he is still feeling exhausted  However, because he has been reporting feeling tired, medications were decreased and now he is responding to more internal stimuli  Dr Valle Grief reported to patient that he did not feel comfortable decreasing any more medications and that he will need to do his best to push through the sedation  CM will continue to follow patient's progress and assist with discharge planning needs

## 2018-07-13 NOTE — PROGRESS NOTES
Liam Meeks has been awake, alert, and visible intermittently out in the milieu  Pt comes out of room and walks around unit at times  1600 scheduled Midodrine held per MD parameters  Ate 75% supper  No interaction noted with peers  Pt denies any depression, anxiety, or a/v hallucinations  Continue to monitor/assess for any changes

## 2018-07-13 NOTE — PROGRESS NOTES
Stacia Hylton was visited by his mother and sister who brought in food which pt ate  Good interaction noted  Stacia Hylton attended and participated in 2/2 evening groups  Pt declined evening snack  Compliant with scheduled 2100 meds without prompting  Resting quietly in bed at present, arouses easily  Will continue to monitor/assess for any changes

## 2018-07-14 RX ADMIN — MIDODRINE HYDROCHLORIDE 10 MG: 2.5 TABLET ORAL at 16:29

## 2018-07-14 RX ADMIN — METHYLPHENIDATE HYDROCHLORIDE 20 MG: 10 TABLET ORAL at 12:10

## 2018-07-14 RX ADMIN — CLOZAPINE 400 MG: 200 TABLET ORAL at 20:59

## 2018-07-14 RX ADMIN — MIDODRINE HYDROCHLORIDE 10 MG: 2.5 TABLET ORAL at 06:44

## 2018-07-14 RX ADMIN — LEVOTHYROXINE SODIUM 75 MCG: 75 TABLET ORAL at 06:44

## 2018-07-14 RX ADMIN — DIVALPROEX SODIUM 1000 MG: 500 TABLET, FILM COATED, EXTENDED RELEASE ORAL at 20:59

## 2018-07-14 RX ADMIN — POLYETHYLENE GLYCOL 3350 17 G: 17 POWDER, FOR SOLUTION ORAL at 08:32

## 2018-07-14 RX ADMIN — MINOCYCLINE HYDROCHLORIDE 100 MG: 100 CAPSULE ORAL at 08:32

## 2018-07-14 RX ADMIN — METHYLPHENIDATE HYDROCHLORIDE 20 MG: 10 TABLET ORAL at 06:44

## 2018-07-14 RX ADMIN — BUPROPION HYDROCHLORIDE 300 MG: 300 TABLET, FILM COATED, EXTENDED RELEASE ORAL at 08:32

## 2018-07-14 RX ADMIN — FLUPHENAZINE HYDROCHLORIDE 10 MG: 10 TABLET, FILM COATED ORAL at 20:59

## 2018-07-14 RX ADMIN — MIDODRINE HYDROCHLORIDE 10 MG: 2.5 TABLET ORAL at 20:58

## 2018-07-14 NOTE — PROGRESS NOTES
Psychiatry Progress Note    Subjective: Interval History     Patient is still isolative to his room and withdrawn to himself  Patient guarded and evasive during assessment  Patient with minimal interaction during assessment  Patient continues to minimize all psychiatric symptoms  Patient is still minimizing psychosis and unwilling to elaborate on the symptoms he is experiencing  Patient has been compliant with medications and meals  Patient has been partaking in unit activities        Current medications:    Current Facility-Administered Medications:     bisacodyl (DULCOLAX) EC tablet 10 mg, 10 mg, Oral, Daily PRN, Provider Cutover, 10 mg at 07/02/18 0658    buPROPion (WELLBUTRIN XL) 24 hr tablet 300 mg, 300 mg, Oral, Daily, Provider Cutover, 300 mg at 07/14/18 1540    clozapine (CLOZARIL) tablet 400 mg, 400 mg, Oral, HS, Kamlesh Azul MD, 400 mg at 07/13/18 2051    divalproex sodium (DEPAKOTE ER) 24 hr tablet 1,000 mg, 1,000 mg, Oral, , Kamlesh Azul MD, 1,000 mg at 07/13/18 2052    fluPHENAZine (PROLIXIN) tablet 10 mg, 10 mg, Oral, HS, Kamlesh Azul MD, 10 mg at 07/13/18 2051    levothyroxine tablet 75 mcg, 75 mcg, Oral, Early Morning, Provider Cutover, 75 mcg at 07/14/18 0644    methylphenidate (RITALIN) tablet 20 mg, 20 mg, Oral, BID before breakfast/lunch, Re Casillas MD, 20 mg at 07/14/18 0644    midodrine (PROAMATINE) tablet 10 mg, 10 mg, Oral, TID, Wally Lynn MD, 10 mg at 07/14/18 0644    minocycline (MINOCIN) capsule 100 mg, 100 mg, Oral, Daily, Provider Cutover, 100 mg at 07/14/18 0832    polyethylene glycol (MIRALAX) packet 17 g, 17 g, Oral, Daily, Provider Cutover, 17 g at 07/14/18 0832      Objective:     Vital Signs:  Vitals:    07/13/18 1535 07/13/18 1540 07/13/18 1930 07/14/18 0534   BP: 119/78 99/72 115/82 110/76   BP Location: Left arm Left arm Left arm Left arm   Pulse: (!) 113 (!) 127 (!) 137    Resp:       Temp:       TempSrc:       SpO2:       Weight:       Height: Appearance:  age appropriate, casually dressed and disheveled   Behavior:  normal   Speech:  normal volume   Mood:  depressed   Affect:  blunted and flat   Thought Process:  normal   Thought Content:  normal   Perceptual Disturbances: None   Risk Potential: none   Sensorium:  person, place, situation and time   Cognition:  intact   Consciousness:  alert and awake    Attention: attention span and concentration were age appropriate   Intellect: average   Insight:  limited   Judgment: limited      Motor Activity: no abnormal movements           Recent Labs:  Results Reviewed     None          I/O Past 24 hours:  No intake/output data recorded  No intake/output data recorded  Assessment / Plan:     Chronic [facility]    Recommended Treatment:      Medication changes:  1) continue current psychotropic medications  Non-pharmacological treatments  1) Continue with group therapy, milieu therapy and occupational therapy  Safety  1) Safety/communication plan established targeting dynamic risk factors above  2) Risks, benefits, and possible side effects of medications explained to patient and patient verbalizes understanding  Counseling / Coordination of Care    Total floor / unit time spent today 20 minutes  Greater than 50% of total time was spent with the patient and / or family counseling and / or coordination of care  A description of the counseling / coordination of care  Patient's Rights, confidentiality and exceptions to confidentiality, use of automated medical record, [address] [place] [address] staff access to medical record, and consent to treatment reviewed      Chong Lane PA-C

## 2018-07-14 NOTE — PROGRESS NOTES
Veronika Anderson has been isolative to his room and self most of the shift  No interaction with peers  Cooperative upon approach by staff  Intermittent eye contact  Blunted, flat affect  Denies anxiety, depression and hearing voices  Ate 100% of his meal  Took medications at 1700 without prompting  Mom and sister were here for a visit  Visit went well  He did not watch the movie, but did attend/participate in wrap up group  No shower or BM tonight  He did not eat snack  Midodrine was given as ordered  SAFE and fall precautions maintained without incident

## 2018-07-14 NOTE — PROGRESS NOTES
Viktor Hale is intermittently visible on unit throughout shift  Requires prompting for any structured activity this shift  Denies any depression, anxiety, or SI at this time  Continues to be preoccupied, when asking about his hallucinations he states that they are there sometimes but do not bother him  Attended only 1/2 groups with minimal but participation noted  Maintain safe and fall precautions as ordered  Continue to monitor

## 2018-07-15 RX ADMIN — METHYLPHENIDATE HYDROCHLORIDE 20 MG: 10 TABLET ORAL at 07:13

## 2018-07-15 RX ADMIN — LEVOTHYROXINE SODIUM 75 MCG: 75 TABLET ORAL at 06:19

## 2018-07-15 RX ADMIN — MINOCYCLINE HYDROCHLORIDE 100 MG: 100 CAPSULE ORAL at 08:50

## 2018-07-15 RX ADMIN — POLYETHYLENE GLYCOL 3350 17 G: 17 POWDER, FOR SOLUTION ORAL at 08:50

## 2018-07-15 RX ADMIN — MIDODRINE HYDROCHLORIDE 10 MG: 2.5 TABLET ORAL at 21:18

## 2018-07-15 RX ADMIN — MIDODRINE HYDROCHLORIDE 10 MG: 2.5 TABLET ORAL at 16:42

## 2018-07-15 RX ADMIN — FLUPHENAZINE HYDROCHLORIDE 10 MG: 10 TABLET, FILM COATED ORAL at 21:19

## 2018-07-15 RX ADMIN — DIVALPROEX SODIUM 1000 MG: 500 TABLET, FILM COATED, EXTENDED RELEASE ORAL at 21:18

## 2018-07-15 RX ADMIN — BUPROPION HYDROCHLORIDE 300 MG: 300 TABLET, FILM COATED, EXTENDED RELEASE ORAL at 08:50

## 2018-07-15 RX ADMIN — METHYLPHENIDATE HYDROCHLORIDE 20 MG: 10 TABLET ORAL at 12:15

## 2018-07-15 RX ADMIN — CLOZAPINE 400 MG: 200 TABLET ORAL at 21:18

## 2018-07-15 NOTE — PROGRESS NOTES
Psychiatry Progress Note    Subjective: Interval History     Patient isolative to his room  Patient with a flat affect  Patient with disheveled appearance  Patient with intermittent eye contact during assessment  Patient with minimal participation during assessment again today  Patient continues to deny all psychiatric symptoms and offers no complaints or concerns  Patient continues to lack insight into his symptoms  Patient has been compliant with medications        Current medications:    Current Facility-Administered Medications:     bisacodyl (DULCOLAX) EC tablet 10 mg, 10 mg, Oral, Daily PRN, Provider Cutover, 10 mg at 07/02/18 0658    buPROPion (WELLBUTRIN XL) 24 hr tablet 300 mg, 300 mg, Oral, Daily, Provider Cutover, 300 mg at 07/15/18 0850    clozapine (CLOZARIL) tablet 400 mg, 400 mg, Oral, HS, Varsha Croft MD, 400 mg at 07/14/18 2059    divalproex sodium (DEPAKOTE ER) 24 hr tablet 1,000 mg, 1,000 mg, Oral, , Varsha Croft MD, 1,000 mg at 07/14/18 2059    fluPHENAZine (PROLIXIN) tablet 10 mg, 10 mg, Oral, HS, Varsha Croft MD, 10 mg at 07/14/18 2059    levothyroxine tablet 75 mcg, 75 mcg, Oral, Early Morning, Provider Cutover, 75 mcg at 07/15/18 2597    methylphenidate (RITALIN) tablet 20 mg, 20 mg, Oral, BID before breakfast/lunch, Shira Salvador MD, 20 mg at 07/15/18 0713    midodrine (PROAMATINE) tablet 10 mg, 10 mg, Oral, TID, Wally Lynn MD, 10 mg at 07/14/18 2058    minocycline (MINOCIN) capsule 100 mg, 100 mg, Oral, Daily, Provider Cutover, 100 mg at 07/15/18 0850    polyethylene glycol (MIRALAX) packet 17 g, 17 g, Oral, Daily, Provider Cutover, 17 g at 07/15/18 0850      Objective:     Vital Signs:  Vitals:    07/14/18 1604 07/14/18 1610 07/14/18 1956 07/15/18 0633   BP: 112/70 98/68 118/76 122/71   BP Location: Left arm Left arm Left arm Left arm   Pulse: 104 (!) 112 98 (!) 119   Resp:       Temp:       TempSrc:       SpO2:       Weight:       Height:             Appearance:  age appropriate, casually dressed and disheveled   Behavior:  normal   Speech:  normal volume   Mood:  depressed   Affect:  blunted and flat   Thought Process:  normal   Thought Content:  normal   Perceptual Disturbances: None   Risk Potential: none   Sensorium:  person, place, situation and time   Cognition:  intact   Consciousness:  alert and awake    Attention: attention span and concentration were age appropriate   Intellect: average   Insight:  limited   Judgment: limited      Motor Activity: no abnormal movements           Recent Labs:  Results Reviewed     None          I/O Past 24 hours:  No intake/output data recorded  No intake/output data recorded  Assessment / Plan:     Chronic paranoid [facility] ([facility]    Recommended Treatment:      Medication changes:  1) continue current psychotropic medications  Non-pharmacological treatments  1) Continue with group therapy, milieu therapy and occupational therapy  Safety  1) Safety/communication plan established targeting dynamic risk factors above  2) Risks, benefits, and possible side effects of medications explained to patient and patient verbalizes understanding  Counseling / Coordination of Care    Total floor / unit time spent today 20 minutes  Greater than 50% of total time was spent with the patient and / or family counseling and / or coordination of care  A description of the counseling / coordination of care  Patient's Rights, confidentiality and exceptions to confidentiality, use of automated medical record, [facility] staff access to medical record, and consent to treatment reviewed      Yousuf Sweeney PA-C

## 2018-07-15 NOTE — PROGRESS NOTES
Vinnyedmund Petit is mostly isolative to his room throughout the shift  Requires prompting for medications, meals and activities today  Denies any depression, anxiety, or SI at this time  Continues to be preoccupied and noted to be responding to internal stimuli  Attended only goals group today  Maintained on safe and fall precautions as ordered  Continue to monitor

## 2018-07-15 NOTE — PROGRESS NOTES
Liam Meeks has been awake, alert, and visible minimally out in the milieu  Compliant with scheduled 1600 meds without prompting  Pt denies any depression, anxiety, or a/v hallucinations but remains preoccupied  Ate 100% supper  Maintained on Safe/Fall Precautions without incident  No interaction noted with peers  Affect remains flat, blunted  Pt offers minimal conversation with staff but able to make needs known  Isolative to self  Walks around unit at times  Will continue to monitor/assess for any changes

## 2018-07-15 NOTE — PROGRESS NOTES
Vaughn's mother and sister visited this shift with good interaction noted  Attended and participated in evening groups out on deck with staff and peers  Compliant with scheduled 2100 meds without prompting  Resting quietly in bed at present, arouses easily  Will continue to monitor/assess for any changes

## 2018-07-15 NOTE — NURSING NOTE
Received pt in bed at change of shift with eyes closed; chest movement noted  Continues the same thus this far as per q 15 min room checks  Maintained on safe for fall precaution  Will continue to monitor

## 2018-07-16 RX ADMIN — MINOCYCLINE HYDROCHLORIDE 100 MG: 100 CAPSULE ORAL at 08:16

## 2018-07-16 RX ADMIN — BISACODYL 10 MG: 5 TABLET, COATED ORAL at 16:47

## 2018-07-16 RX ADMIN — MIDODRINE HYDROCHLORIDE 10 MG: 2.5 TABLET ORAL at 16:47

## 2018-07-16 RX ADMIN — FLUPHENAZINE HYDROCHLORIDE 10 MG: 10 TABLET, FILM COATED ORAL at 20:47

## 2018-07-16 RX ADMIN — MIDODRINE HYDROCHLORIDE 10 MG: 2.5 TABLET ORAL at 06:05

## 2018-07-16 RX ADMIN — DIVALPROEX SODIUM 1000 MG: 500 TABLET, FILM COATED, EXTENDED RELEASE ORAL at 20:47

## 2018-07-16 RX ADMIN — CLOZAPINE 400 MG: 200 TABLET ORAL at 20:47

## 2018-07-16 RX ADMIN — METHYLPHENIDATE HYDROCHLORIDE 20 MG: 10 TABLET ORAL at 12:09

## 2018-07-16 RX ADMIN — BUPROPION HYDROCHLORIDE 300 MG: 300 TABLET, FILM COATED, EXTENDED RELEASE ORAL at 08:16

## 2018-07-16 RX ADMIN — MIDODRINE HYDROCHLORIDE 10 MG: 2.5 TABLET ORAL at 20:47

## 2018-07-16 RX ADMIN — POLYETHYLENE GLYCOL 3350 17 G: 17 POWDER, FOR SOLUTION ORAL at 08:16

## 2018-07-16 RX ADMIN — LEVOTHYROXINE SODIUM 75 MCG: 75 TABLET ORAL at 06:05

## 2018-07-16 RX ADMIN — METHYLPHENIDATE HYDROCHLORIDE 20 MG: 10 TABLET ORAL at 06:05

## 2018-07-16 NOTE — PROGRESS NOTES
Sleeping at this time, no distress noted, fall and safe precautions maintained   Monitoring continues

## 2018-07-16 NOTE — PROGRESS NOTES
Individual initially was not compliant with behavioral plan  He did not get up for vital signs, and did not get up for breakfast until prompted by RN for medications and reminded that he will not have a pass again this week end if not completing expectations  He did not completed hygiene this shift, last recorded shower 7/14  He did participate in programming, attended 3/3 groups  He required prompting at both medication passes this shift  Appetite is at 25% for both meals, no issues with not feeling well, not happy with the choices, did not fill out menu yesterday  Will continue to monitor

## 2018-07-16 NOTE — PROGRESS NOTES
Keily Lisa has been awake, alert, and visible minimally out in the milieu  Compliant with scheduled 1600 meds without prompting  Ate 100% supper  Affect remains flat, blunted, and difficult to engage in conversation  Pt offers little information about self  Remains mostly isolative to self in room  Pt denies any depression, anxiety, or a/v hallucinations but continues to be preoccupied  Mother in to visit with fair interaction noted and brought food in for pt which pt ate  No interaction noted with peers  Will continue to monitor/assess for any changes

## 2018-07-16 NOTE — PROGRESS NOTES
Psychiatry Progress Note    Subjective:   Interval History     More in bed and little motivation to complete his behavioral plan with meals and hygiene      Current medications:    Current Facility-Administered Medications:     bisacodyl (DULCOLAX) EC tablet 10 mg, 10 mg, Oral, Daily PRN, Provider Cutover, 10 mg at 07/16/18 1647    buPROPion (WELLBUTRIN XL) 24 hr tablet 300 mg, 300 mg, Oral, Daily, Provider Cutover, 300 mg at 07/16/18 0816    clozapine (CLOZARIL) tablet 400 mg, 400 mg, Oral, HS, Ada Phillips MD, 400 mg at 07/15/18 2118    divalproex sodium (DEPAKOTE ER) 24 hr tablet 1,000 mg, 1,000 mg, Oral, , Ada Phillips MD, 1,000 mg at 07/15/18 2118    fluPHENAZine (PROLIXIN) tablet 10 mg, 10 mg, Oral, , Ada Phillips MD, 10 mg at 07/15/18 2119    levothyroxine tablet 75 mcg, 75 mcg, Oral, Early Morning, Provider Cutover, 75 mcg at 07/16/18 0605    methylphenidate (RITALIN) tablet 20 mg, 20 mg, Oral, BID before breakfast/lunch, Rush Huff MD, 20 mg at 07/16/18 1209    midodrine (PROAMATINE) tablet 10 mg, 10 mg, Oral, TID, Wally Lynn MD, 10 mg at 07/16/18 1647    minocycline (MINOCIN) capsule 100 mg, 100 mg, Oral, Daily, Provider Cutover, 100 mg at 07/16/18 0816    polyethylene glycol (MIRALAX) packet 17 g, 17 g, Oral, Daily, Provider Cutover, 17 g at 07/16/18 0816      Objective:     Vital Signs:  Vitals:    07/16/18 0733 07/16/18 1600 07/16/18 1601 07/16/18 1602   BP: 113/82 121/75 123/67 112/77   BP Location: Left arm Left arm Left arm Left arm   Pulse: (!) 109 103 105 99   Resp: 18 18     Temp:  (!) 97 °F (36 1 °C)     TempSrc:  Temporal     SpO2:       Weight:       Height:             Appearance:  age appropriate and casually dressed   Behavior:  evasive   Speech:  soft   Mood:  depressed   Affect:  constricted   Thought Process:  normal   Thought Content:  normal   Perceptual Disturbances: None   Risk Potential: none   Sensorium:  person, place, situation and time   Cognition:  intact Consciousness:  alert and awake    Attention: attention span and concentration were not age appropriate   Intellect: average   Insight:  poor   Judgment: poor      Motor Activity: no abnormal movements           Recent Labs:  Results Reviewed     None          I/O Past 24 hours:  No intake/output data recorded  No intake/output data recorded  Assessment / Plan:     [facility]    Recommended Treatment:      Medication changes:  1) none    Non-pharmacological treatments  1) Continue with group therapy, milieu therapy and occupational therapy  Safety  1) Safety/communication plan established targeting dynamic risk factors above  2) Risks, benefits, and possible side effects of medications explained to patient and patient verbalizes understanding  Counseling / Coordination of Care    Total floor / unit time spent today 20 minutes  Greater than 50% of total time was spent with the patient and / or family counseling and / or coordination of care  A description of the counseling / coordination of care  Patient's Rights, confidentiality and exceptions to confidentiality, use of automated medical record, [address] [place] [address] staff access to medical record, and consent to treatment reviewed      Arnie Contreras MD

## 2018-07-16 NOTE — PROGRESS NOTES
Stevie Loya attended and participated in 2/2 evening groups out on deck with staff and peers  Compliant with scheduled 2100 meds without prompting  Had evening snack  Resting quietly in bed at present, arouses easily  Will continue to monitor/assess for any changes

## 2018-07-17 RX ADMIN — POLYETHYLENE GLYCOL 3350 17 G: 17 POWDER, FOR SOLUTION ORAL at 08:10

## 2018-07-17 RX ADMIN — MIDODRINE HYDROCHLORIDE 10 MG: 2.5 TABLET ORAL at 06:12

## 2018-07-17 RX ADMIN — FLUPHENAZINE HYDROCHLORIDE 10 MG: 10 TABLET, FILM COATED ORAL at 20:37

## 2018-07-17 RX ADMIN — METHYLPHENIDATE HYDROCHLORIDE 20 MG: 10 TABLET ORAL at 12:54

## 2018-07-17 RX ADMIN — MIDODRINE HYDROCHLORIDE 10 MG: 2.5 TABLET ORAL at 20:37

## 2018-07-17 RX ADMIN — METHYLPHENIDATE HYDROCHLORIDE 20 MG: 10 TABLET ORAL at 06:12

## 2018-07-17 RX ADMIN — MIDODRINE HYDROCHLORIDE 10 MG: 2.5 TABLET ORAL at 16:58

## 2018-07-17 RX ADMIN — BUPROPION HYDROCHLORIDE 300 MG: 300 TABLET, FILM COATED, EXTENDED RELEASE ORAL at 08:10

## 2018-07-17 RX ADMIN — MINOCYCLINE HYDROCHLORIDE 100 MG: 100 CAPSULE ORAL at 08:10

## 2018-07-17 RX ADMIN — CLOZAPINE 400 MG: 200 TABLET ORAL at 20:37

## 2018-07-17 RX ADMIN — LEVOTHYROXINE SODIUM 75 MCG: 75 TABLET ORAL at 06:12

## 2018-07-17 RX ADMIN — DIVALPROEX SODIUM 1000 MG: 500 TABLET, FILM COATED, EXTENDED RELEASE ORAL at 20:37

## 2018-07-17 NOTE — PROGRESS NOTES
Amol Love has been awake, alert, and visible intermittently out in the milieu  Pt remains flat, blunted, and isolative to self  Compliant with scheduled 1600 meds without prompting  Dulcolax 10 mg po prn dose given at [phone] for constipation  Ate 100% supper  Remains difficult to engage in conversation  Pt denies depression, anxiety, or a/v hallucinations  No behavioral issues but continues to be preoccupied  Encourage pt to utilize safe/effective coping skills and to work toward achievement of his Recovery Goals

## 2018-07-17 NOTE — PROGRESS NOTES
Liam Meeks attended and participated in 2/2 evening groups  Compliant with scheduled 2100 meds without prompting and then had snack  Resting quietly in bed at present, arouses easily  Will continue to monitor/assess for any changes

## 2018-07-17 NOTE — PROGRESS NOTES
Stacia Hylton is mostly isolative to his room throughout the shift  Requires multiple  Prompts in person for medications today  Denies any depression, anxiety, or SI at this time   Continues to be preoccupied and noted to be responding to internal stimuli  Attended only goals group today  Maintained on safe and fall precautions as ordered  He has not been compliant with his behavioral plan today  Continue to monitor

## 2018-07-17 NOTE — PROGRESS NOTES
Psychiatry Progress Note    Subjective: Interval History     Had a better day yesterday when he needed less prompting due for meals and meds and activities although he does isolate as essentially no contacts with other residents and will engage in a conversation with brief 1 or 2 word sentences  Affect remains flat  No other new issues he tolerates medication well        Current medications:    Current Facility-Administered Medications:     bisacodyl (DULCOLAX) EC tablet 10 mg, 10 mg, Oral, Daily PRN, Provider Cutover, 10 mg at 07/16/18 1647    buPROPion (WELLBUTRIN XL) 24 hr tablet 300 mg, 300 mg, Oral, Daily, Provider Cutover, 300 mg at 07/17/18 0810    clozapine (CLOZARIL) tablet 400 mg, 400 mg, Oral, HS, Trinity Goldsmith MD, 400 mg at 07/16/18 2047    divalproex sodium (DEPAKOTE ER) 24 hr tablet 1,000 mg, 1,000 mg, Oral, HS, Trinity Goldsmith MD, 1,000 mg at 07/16/18 2047    fluPHENAZine (PROLIXIN) tablet 10 mg, 10 mg, Oral, HS, Trinity Goldsmith MD, 10 mg at 07/16/18 2047    levothyroxine tablet 75 mcg, 75 mcg, Oral, Early Morning, Provider Cutover, 75 mcg at 07/17/18 0612    methylphenidate (RITALIN) tablet 20 mg, 20 mg, Oral, BID before breakfast/lunch, Matt Wright MD, 20 mg at 07/17/18 0612    midodrine (PROAMATINE) tablet 10 mg, 10 mg, Oral, TID, Wally Lynn MD, 10 mg at 07/17/18 0612    minocycline (MINOCIN) capsule 100 mg, 100 mg, Oral, Daily, Provider Cutover, 100 mg at 07/17/18 0810    polyethylene glycol (MIRALAX) packet 17 g, 17 g, Oral, Daily, Provider Cutover, 17 g at 07/17/18 0810      Objective:     Vital Signs:  Vitals:    07/16/18 1602 07/16/18 1930 07/17/18 0730 07/17/18 0735   BP: 112/77 102/65 109/71 106/74   BP Location: Left arm Left arm Left arm Left arm   Pulse: 99 98 (!) 110 (!) 113   Resp:   19 19   Temp:   97 8 °F (36 6 °C)    TempSrc:   Temporal    SpO2:       Weight:       Height:             Appearance:  age appropriate and casually dressed   Behavior:  normal   Speech:  normal volume   Mood:  depressed   Affect:  constricted   Thought Process:  normal   Thought Content:  normal   Perceptual Disturbances: None   Risk Potential: none   Sensorium:  person, place, situation and time   Cognition:  intact   Consciousness:  alert and awake    Attention: attention span and concentration were age appropriate   Intellect: average   Insight:  good   Judgment: good      Motor Activity: no abnormal movements           Recent Labs:  Results Reviewed     None          I/O Past 24 hours:  No intake/output data recorded  No intake/output data recorded  Assessment / Plan:     [facility]    Recommended Treatment:      Medication changes:  1) no change    Non-pharmacological treatments  1) Continue with group therapy, milieu therapy and occupational therapy  Safety  1) Safety/communication plan established targeting dynamic risk factors above  2) Risks, benefits, and possible side effects of medications explained to patient and patient verbalizes understanding  Counseling / Coordination of Care    Total floor / unit time spent today 20 minutes  Greater than 50% of total time was spent with the patient and / or family counseling and / or coordination of care  A description of the counseling / coordination of care  Patient's Rights, confidentiality and exceptions to confidentiality, use of automated medical record, [address] [place] [address] staff access to medical record, and consent to treatment reviewed      Audry Nyhan, MD

## 2018-07-18 RX ADMIN — DIVALPROEX SODIUM 1000 MG: 500 TABLET, FILM COATED, EXTENDED RELEASE ORAL at 21:13

## 2018-07-18 RX ADMIN — MIDODRINE HYDROCHLORIDE 10 MG: 2.5 TABLET ORAL at 21:13

## 2018-07-18 RX ADMIN — FLUPHENAZINE HYDROCHLORIDE 10 MG: 10 TABLET, FILM COATED ORAL at 21:14

## 2018-07-18 RX ADMIN — METHYLPHENIDATE HYDROCHLORIDE 20 MG: 10 TABLET ORAL at 06:12

## 2018-07-18 RX ADMIN — METHYLPHENIDATE HYDROCHLORIDE 20 MG: 10 TABLET ORAL at 12:30

## 2018-07-18 RX ADMIN — MIDODRINE HYDROCHLORIDE 10 MG: 2.5 TABLET ORAL at 06:12

## 2018-07-18 RX ADMIN — LEVOTHYROXINE SODIUM 75 MCG: 75 TABLET ORAL at 06:12

## 2018-07-18 RX ADMIN — BUPROPION HYDROCHLORIDE 300 MG: 300 TABLET, FILM COATED, EXTENDED RELEASE ORAL at 08:03

## 2018-07-18 RX ADMIN — MIDODRINE HYDROCHLORIDE 10 MG: 2.5 TABLET ORAL at 16:50

## 2018-07-18 RX ADMIN — CLOZAPINE 400 MG: 200 TABLET ORAL at 21:13

## 2018-07-18 RX ADMIN — POLYETHYLENE GLYCOL 3350 17 G: 17 POWDER, FOR SOLUTION ORAL at 08:04

## 2018-07-18 RX ADMIN — MINOCYCLINE HYDROCHLORIDE 100 MG: 100 CAPSULE ORAL at 08:03

## 2018-07-18 NOTE — PROGRESS NOTES
Patient slept throughout the night with no behaviors or issues noted  Standing /73,   Parameters met, Midodrine given

## 2018-07-18 NOTE — PROGRESS NOTES
Abdias Gamez was isolative to self most of the shift  No interaction with peers and very little with staff  Used his game system during electronic hour  Ate 100% of his supper and took medication without difficulty  He met the stipulations for Midodrine, which was given twice this shift  Denies dizziness, anxiety, depression and hearing voices  Attended and participated in 2/2 evening groups  Took HS medication without prompting  Ate snack prior to going to bed  Fall and SAFE precautions maintained without incident

## 2018-07-19 RX ADMIN — POLYETHYLENE GLYCOL 3350 17 G: 17 POWDER, FOR SOLUTION ORAL at 09:26

## 2018-07-19 RX ADMIN — CLOZAPINE 450 MG: 25 TABLET ORAL at 20:50

## 2018-07-19 RX ADMIN — DIVALPROEX SODIUM 1000 MG: 500 TABLET, FILM COATED, EXTENDED RELEASE ORAL at 20:51

## 2018-07-19 RX ADMIN — MINOCYCLINE HYDROCHLORIDE 100 MG: 100 CAPSULE ORAL at 09:25

## 2018-07-19 RX ADMIN — BUPROPION HYDROCHLORIDE 300 MG: 300 TABLET, FILM COATED, EXTENDED RELEASE ORAL at 09:25

## 2018-07-19 RX ADMIN — LEVOTHYROXINE SODIUM 75 MCG: 75 TABLET ORAL at 06:19

## 2018-07-19 RX ADMIN — METHYLPHENIDATE HYDROCHLORIDE 20 MG: 10 TABLET ORAL at 12:48

## 2018-07-19 RX ADMIN — FLUPHENAZINE HYDROCHLORIDE 10 MG: 10 TABLET, FILM COATED ORAL at 20:50

## 2018-07-19 RX ADMIN — METHYLPHENIDATE HYDROCHLORIDE 20 MG: 10 TABLET ORAL at 06:19

## 2018-07-19 NOTE — PROGRESS NOTES
Individual has been compliant with behavioral plan, attended 3/3 groups and showered last evening  He participated in treatment team meeting  He reported that mother is in [facility]) for a few week so he will not be requesting passes  He admitted to hearing voices, but non descript about same  When team members encouraged him to interact, eye contact worsened and affect was blunt  He struggles to express self and needs  There has been changes to medication regimen, Clozaril has been increased  Will continue to monitor

## 2018-07-19 NOTE — PROGRESS NOTES
Stacia Hylton has been awake, alert, and visible intermittently out in the milieu  Behavior remains quiet and isolative to self and with flat/blunted affect  and little eye contact  Pt offers minimal conversation with staff and difficult to engage in conversation  Writer offered pt prn Dulcolax for constipation but pt refused and stated that he had BM last night  Compliant with scheduled 1600 meds without prompting  Ate 100% supper  Pt lacks insight into illness  No behavioral issues  Will continue to encourage pt to utilize effective/safe coping skill and to work toward achievement of Recovery Goals and be in compliance with Behavioral Plan

## 2018-07-19 NOTE — PROGRESS NOTES
Vaughn's mother visited this shift with good interaction noted  Attended and participated in 2/2 evening groups  Compliant with scheduled 2100 meds without prompting  Had evening snack  Resting quietly in bed at present, arouses easily  Will continue to monitor/assess for any changes

## 2018-07-19 NOTE — ESCALATED TEAM TX
Patient attended treatment team prepared with self-assessment  Patient's group attendance is currently unavailable  Patient reports that his mother will be in [place] for the next couple weeks and therefore he will not be requesting passes  Team asked patient if his inability to go on passes correlates with his following his behavioral plan  Patient stated it does not  Team pointed out patient's tendency to not be as compliant on the weeks that is not requesting a pass and patient expressed understanding  Patient continues to report auditory hallucinations, but states that the voices do not bother him because he is used to them  He states they are not violent or mean and sometimes he talks back to them  Patient's honesty about his symptoms shows overall improvement, as patient is usually very guarded about his symptoms  Patient was encouraged to speak with staff if the voices ever become overwhelming  Patient expressed no further questions or concerns at the conclusion of the meeting  Next meeting is scheduled for 7/26

## 2018-07-19 NOTE — PROGRESS NOTES
Psychiatry Progress Note    Subjective: Interval History     Pt reports voices that tell him to stop what he is doing but says that they don't bother him and he talks to them back      Current medications:    Current Facility-Administered Medications:     bisacodyl (DULCOLAX) EC tablet 10 mg, 10 mg, Oral, Daily PRN, Provider Cutover, 10 mg at 07/16/18 1647    buPROPion (WELLBUTRIN XL) 24 hr tablet 300 mg, 300 mg, Oral, Daily, Provider Cutover, 300 mg at 07/19/18 0925    clozapine (CLOZARIL) tablet 400 mg, 400 mg, Oral, HS, Trinity Goldsmith MD, 400 mg at 07/18/18 2113    divalproex sodium (DEPAKOTE ER) 24 hr tablet 1,000 mg, 1,000 mg, Oral, HS, Trinity Goldsmith MD, 1,000 mg at 07/18/18 2113    fluPHENAZine (PROLIXIN) tablet 10 mg, 10 mg, Oral, HS, Trinity Goldsmith MD, 10 mg at 07/18/18 2114    levothyroxine tablet 75 mcg, 75 mcg, Oral, Early Morning, Provider Cutover, 75 mcg at 07/19/18 5152    methylphenidate (RITALIN) tablet 20 mg, 20 mg, Oral, BID before breakfast/lunch, Matt Wright MD, 20 mg at 07/19/18 0619    midodrine (PROAMATINE) tablet 10 mg, 10 mg, Oral, TID, Wally Lynn MD, Stopped at 07/19/18 0619    minocycline (MINOCIN) capsule 100 mg, 100 mg, Oral, Daily, Provider Cutover, 100 mg at 07/19/18 0925    polyethylene glycol (MIRALAX) packet 17 g, 17 g, Oral, Daily, Provider Cutover, 17 g at 07/19/18 0926      Objective:     Vital Signs:  Vitals:    07/18/18 1930 07/19/18 0600 07/19/18 0730 07/19/18 0733   BP: 102/68 122/86 137/73 110/65   BP Location: Left arm Left arm Left arm Left arm   Pulse: 102 104 104 (!) 113   Resp:   21 21   Temp:   (!) 97 °F (36 1 °C)    TempSrc:   Temporal    SpO2:       Weight:       Height:             Appearance:  age appropriate and casually dressed   Behavior:  normal   Speech:  normal volume   Mood:  depressed   Affect:  constricted   Thought Process:  normal   Thought Content:  normal   Perceptual Disturbances:  Auditory hallucinations without commands and Visual hallucinations   Risk Potential: none   Sensorium:  person, place, situation and time   Cognition:  intact   Consciousness:  alert and awake    Attention: attention span and concentration were age appropriate   Intellect: average   Insight:  good   Judgment: good      Motor Activity: no abnormal movements           Recent Labs:  Results Reviewed     None          I/O Past 24 hours:  No intake/output data recorded  No intake/output data recorded  Assessment / Plan:     Chronic paranoid schizophrenia ([facility] )    Recommended Treatment:      Medication changes:  1) increase clozapine dose    Non-pharmacological treatments  1) Continue with group therapy, milieu therapy and occupational therapy  Safety  1) Safety/communication plan established targeting dynamic risk factors above  2) Risks, benefits, and possible side effects of medications explained to patient and patient verbalizes understanding  Counseling / Coordination of Care    Total floor / unit time spent today 20 minutes  Greater than 50% of total time was spent with the patient and / or family counseling and / or coordination of care  A description of the counseling / coordination of care  Patient's Rights, confidentiality and exceptions to confidentiality, use of automated medical record, [facility] staff access to medical record, and consent to treatment reviewed      Mehnaz Rosales MD

## 2018-07-19 NOTE — PROGRESS NOTES
Patient sleeping in long intervals throughout the night  Standing /86  Midodrine held  Offers no complaints

## 2018-07-20 RX ADMIN — MINOCYCLINE HYDROCHLORIDE 100 MG: 100 CAPSULE ORAL at 08:35

## 2018-07-20 RX ADMIN — CLOZAPINE 450 MG: 25 TABLET ORAL at 20:57

## 2018-07-20 RX ADMIN — MIDODRINE HYDROCHLORIDE 10 MG: 2.5 TABLET ORAL at 06:24

## 2018-07-20 RX ADMIN — METHYLPHENIDATE HYDROCHLORIDE 20 MG: 10 TABLET ORAL at 06:24

## 2018-07-20 RX ADMIN — METHYLPHENIDATE HYDROCHLORIDE 20 MG: 10 TABLET ORAL at 12:26

## 2018-07-20 RX ADMIN — FLUPHENAZINE HYDROCHLORIDE 10 MG: 10 TABLET, FILM COATED ORAL at 20:58

## 2018-07-20 RX ADMIN — BUPROPION HYDROCHLORIDE 300 MG: 300 TABLET, FILM COATED, EXTENDED RELEASE ORAL at 08:35

## 2018-07-20 RX ADMIN — DIVALPROEX SODIUM 1000 MG: 500 TABLET, FILM COATED, EXTENDED RELEASE ORAL at 20:58

## 2018-07-20 RX ADMIN — POLYETHYLENE GLYCOL 3350 17 G: 17 POWDER, FOR SOLUTION ORAL at 08:35

## 2018-07-20 RX ADMIN — LEVOTHYROXINE SODIUM 75 MCG: 75 TABLET ORAL at 06:23

## 2018-07-20 NOTE — PROGRESS NOTES
Patient awake and visible on unit  Med and meal compliant with minimal prompting  Patient presents with a flat, blunted affect, no interaction with peers noted, patient not noted to be responding to internal stimuli  Patient attended goals, journaling and IMR  Will continue to monitor for changes in current status

## 2018-07-20 NOTE — PROGRESS NOTES
Psychiatry Progress Note    Subjective: Interval History     Patient reported in team meeting yesterday that his auditory hallucinations have lessened but not gone  They sometimes tell him what to say or what not to say  He says that he is not bothered too much by the presence of his hallucinations  His behavior is essentially unchanged  He will to speak and answer questions briefly but for the most part he stays to himself has little or no interaction with the other residents  Affect remains flat        Current medications:    Current Facility-Administered Medications:     bisacodyl (DULCOLAX) EC tablet 10 mg, 10 mg, Oral, Daily PRN, Provider Cutover, 10 mg at 07/16/18 1647    buPROPion (WELLBUTRIN XL) 24 hr tablet 300 mg, 300 mg, Oral, Daily, Provider Cutover, 300 mg at 07/20/18 0835    cloZAPine (CLOZARIL) tablet 450 mg, 450 mg, Oral, HS, Alan Avila MD, 450 mg at 07/19/18 2050    divalproex sodium (DEPAKOTE ER) 24 hr tablet 1,000 mg, 1,000 mg, Oral, HS, Geovanna Crisostomo MD, 1,000 mg at 07/19/18 2051    fluPHENAZine (PROLIXIN) tablet 10 mg, 10 mg, Oral, HS, Geovanna Crisostomo MD, 10 mg at 07/19/18 2050    levothyroxine tablet 75 mcg, 75 mcg, Oral, Early Morning, Provider Cutover, 75 mcg at 07/20/18 9695    methylphenidate (RITALIN) tablet 20 mg, 20 mg, Oral, BID before breakfast/lunch, Alan Avila MD, 20 mg at 07/20/18 0624    midodrine (PROAMATINE) tablet 10 mg, 10 mg, Oral, TID, Wally Lynn MD, 10 mg at 07/20/18 0624    minocycline (MINOCIN) capsule 100 mg, 100 mg, Oral, Daily, Provider Cutover, 100 mg at 07/20/18 0835    polyethylene glycol (MIRALAX) packet 17 g, 17 g, Oral, Daily, Provider Cutover, 17 g at 07/20/18 0835      Objective:     Vital Signs:  Vitals:    07/19/18 1900 07/20/18 0620 07/20/18 0730 07/20/18 0735   BP: 123/57  99/68 104/78   BP Location: Left arm  Left arm Left arm   Pulse: (!) 109 (!) 118 (!) 110 (!) 114   Resp:   20 20   Temp:   (!) 96 8 °F (36 °C)    TempSrc:   Temporal SpO2:       Weight:       Height:             Appearance:  age appropriate and casually dressed   Behavior:  normal   Speech:  normal volume   Mood:  depressed   Affect:  constricted   Thought Process:  normal   Thought Content:  normal   Perceptual Disturbances: None   Risk Potential: none   Sensorium:  person, place, situation and time   Cognition:  intact   Consciousness:  alert and awake    Attention: attention span and concentration were age appropriate   Intellect: average   Insight:  good   Judgment: good      Motor Activity: no abnormal movements           Recent Labs:  Results Reviewed     None          I/O Past 24 hours:  No intake/output data recorded  No intake/output data recorded  Assessment / Plan:     [facility]    Recommended Treatment:      Medication changes:  1) no changes today will monitor the increase in clozapine    Non-pharmacological treatments  1) Continue with group therapy, milieu therapy and occupational therapy  Safety  1) Safety/communication plan established targeting dynamic risk factors above  2) Risks, benefits, and possible side effects of medications explained to patient and patient verbalizes understanding  Counseling / Coordination of Care    Total floor / unit time spent today 20 minutes  Greater than 50% of total time was spent with the patient and / or family counseling and / or coordination of care  A description of the counseling / coordination of care  Patient's Rights, confidentiality and exceptions to confidentiality, use of automated medical record, [facility] staff access to medical record, and consent to treatment reviewed      Yudelka Willett MD

## 2018-07-20 NOTE — PROGRESS NOTES
Jaciel Britton has been awake, alert, and visible minimally out in the milieu  1600 scheduled Midodrine held per MD parameters  Affect remains flat, blunted, and difficult to engage in conversation  Pt denies any depression, anxiety, or a/v hallucinations  Poor eye contact  Ate 100% supper  Behavior remains quiet, isolative to self  Pt retreated to his room after supper and comes out at intervals and walks around unit  No behavioral issues  Will continue pt to encourage pt to utilize safe/effective coping skills and to work toward compliance of Behavior Plan and achievement of Recovery Goals

## 2018-07-20 NOTE — PROGRESS NOTES
Gricel Hernandez has been awake, alert, and visible intermittently out in the milieu  Maintained on Safe and Fall Precautions without incident  Affect remains flat, blunted, and difficult to engage in conversation  1600 scheduled Midodrine held per MD BP parameters  Staff reviewed with pt that shower and hygiene must be completed prior to going out on passes, to park, and appts  Pt verbalized understanding  Ate 100% supper  Isolative to self  No interaction noted with peers  Will continue to encourage pt to utilize safe/effective coping skills and to work toward achievement of recovery goals

## 2018-07-20 NOTE — PROGRESS NOTES
Isa Gaming attended and participated in 2/2 evening groups out on deck with staff and peers  Compliant with scheduled 2100 meds without prompting  2100 scheduled dose of Midodrine held per MD parameters  Had evening snack  Resting quietly in bed at present, awake  Will continue to monitor/assess for any changes

## 2018-07-21 RX ADMIN — METHYLPHENIDATE HYDROCHLORIDE 20 MG: 10 TABLET ORAL at 06:33

## 2018-07-21 RX ADMIN — LEVOTHYROXINE SODIUM 75 MCG: 75 TABLET ORAL at 06:33

## 2018-07-21 RX ADMIN — BUPROPION HYDROCHLORIDE 300 MG: 300 TABLET, FILM COATED, EXTENDED RELEASE ORAL at 08:52

## 2018-07-21 RX ADMIN — MINOCYCLINE HYDROCHLORIDE 100 MG: 100 CAPSULE ORAL at 08:52

## 2018-07-21 RX ADMIN — FLUPHENAZINE HYDROCHLORIDE 10 MG: 10 TABLET, FILM COATED ORAL at 20:59

## 2018-07-21 RX ADMIN — MIDODRINE HYDROCHLORIDE 10 MG: 2.5 TABLET ORAL at 16:52

## 2018-07-21 RX ADMIN — METHYLPHENIDATE HYDROCHLORIDE 20 MG: 10 TABLET ORAL at 11:24

## 2018-07-21 RX ADMIN — DIVALPROEX SODIUM 1000 MG: 500 TABLET, FILM COATED, EXTENDED RELEASE ORAL at 20:58

## 2018-07-21 RX ADMIN — CLOZAPINE 450 MG: 25 TABLET ORAL at 20:58

## 2018-07-21 RX ADMIN — POLYETHYLENE GLYCOL 3350 17 G: 17 POWDER, FOR SOLUTION ORAL at 08:52

## 2018-07-21 NOTE — PROGRESS NOTES
Patient in bed, asleep at shift onset  Continued to sleep throughout the night without difficulty  Standing /65  Midodrine held  Ongoing tachycardia

## 2018-07-21 NOTE — PROGRESS NOTES
Fatoumata President attended and participated in 2/2 evening groups out on deck with staff and peers  Compliant with scheduled 2100 meds without prompting  2100 scheduled Midodrine held per MD parameters  Pt stated that he thinks the Ritalin makes him feel "nervousness and anxiety "  Writer asked pt when this started and pt stated "about the past three days "  Writer instructed pt to report this to Dr Natalia Nolasco tomorrow and to report if he has any further complaints or if the "nervousness and anxiety" worsens  Pt had evening snack  Pt resting quietly in bed at present, arouses easily  Will continue to monitor/assess for any changes

## 2018-07-21 NOTE — PROGRESS NOTES
Patient up for vitals today, is alert, and visible intermittently on the unit  Patient is cooperative, presents with flat affect, compliant with meds  Patient ate breakfast and lunch  Patient attended 2-2 groups  Patient Interacts with staff appropriately, interacts with select peers  No behavioral issues  Pt does not have complaints of depression, anxiety, or a/v hallucinations   Will encourage pt to work toward her recovery goals

## 2018-07-21 NOTE — PROGRESS NOTES
Psychiatry Progress Note    Subjective: Interval History     Patient continues to be withdrawn and isolative to his room  He still experiences auditory hallucinations however they are decreased in intensity and frequency  They are still occasionally command in nature  Patient has been isolative to his room today  He is not going on passes his mother is currently in [place] visiting family        Current medications:    Current Facility-Administered Medications:     bisacodyl (DULCOLAX) EC tablet 10 mg, 10 mg, Oral, Daily PRN, Provider Cutover, 10 mg at 07/16/18 1647    buPROPion (WELLBUTRIN XL) 24 hr tablet 300 mg, 300 mg, Oral, Daily, Provider Cutover, 300 mg at 07/21/18 0852    cloZAPine (CLOZARIL) tablet 450 mg, 450 mg, Oral, HS, Jesus West MD, 450 mg at 07/20/18 2057    divalproex sodium (DEPAKOTE ER) 24 hr tablet 1,000 mg, 1,000 mg, Oral, HS, Lobo Henry MD, 1,000 mg at 07/20/18 2058    fluPHENAZine (PROLIXIN) tablet 10 mg, 10 mg, Oral, HS, Lobo Henry MD, 10 mg at 07/20/18 2058    levothyroxine tablet 75 mcg, 75 mcg, Oral, Early Morning, Provider Cutover, 75 mcg at 07/21/18 5035    methylphenidate (RITALIN) tablet 20 mg, 20 mg, Oral, BID before breakfast/lunch, Jesus West MD, 20 mg at 07/21/18 1124    midodrine (PROAMATINE) tablet 10 mg, 10 mg, Oral, TID, Wally Lynn MD, 10 mg at 07/20/18 0624    minocycline (MINOCIN) capsule 100 mg, 100 mg, Oral, Daily, Provider Cutover, 100 mg at 07/21/18 0852    polyethylene glycol (MIRALAX) packet 17 g, 17 g, Oral, Daily, Provider Cutover, 17 g at 07/21/18 0852      Objective:     Vital Signs:  Vitals:    07/20/18 2002 07/21/18 0615 07/21/18 0700 07/21/18 0701   BP: 120/70 126/65 110/71 114/52   BP Location: Left arm Left arm Left arm Left arm   Pulse: 96  (!) 118 102   Resp:   20    Temp:   (!) 97 4 °F (36 3 °C)    TempSrc:   Temporal    SpO2:       Weight:       Height:             Appearance:  age appropriate and casually dressed   Behavior:  normal Speech:  normal volume   Mood:  depressed   Affect:  constricted   Thought Process:  normal   Thought Content:  normal   Perceptual Disturbances: None   Risk Potential: none   Sensorium:  person, place, situation and time   Cognition:  intact   Consciousness:  alert and awake    Attention: attention span and concentration were age appropriate   Intellect: average   Insight:  good   Judgment: good      Motor Activity: no abnormal movements           Recent Labs:  Results Reviewed     None          I/O Past 24 hours:  No intake/output data recorded  I/O this shift:  In: -   Out: 1 [Stool:1]        Assessment / Plan:     [facility]    Recommended Treatment:      Medication changes:  1) no changes today will monitor the increase in clozapine    Non-pharmacological treatments  1) Continue with group therapy, milieu therapy and occupational therapy  Safety  1) Safety/communication plan established targeting dynamic risk factors above  2) Risks, benefits, and possible side effects of medications explained to patient and patient verbalizes understanding  Counseling / Coordination of Care    Total floor / unit time spent today 20 minutes  Greater than 50% of total time was spent with the patient and / or family counseling and / or coordination of care  A description of the counseling / coordination of care  Patient's Rights, confidentiality and exceptions to confidentiality, use of automated medical record, [facility] staff access to medical record, and consent to treatment reviewed      Kelli Arceo PA-C

## 2018-07-22 RX ORDER — METHYLPHENIDATE HYDROCHLORIDE 10 MG/1
10 TABLET ORAL
Status: DISCONTINUED | OUTPATIENT
Start: 2018-07-23 | End: 2018-07-25

## 2018-07-22 RX ADMIN — LEVOTHYROXINE SODIUM 75 MCG: 75 TABLET ORAL at 06:52

## 2018-07-22 RX ADMIN — MINOCYCLINE HYDROCHLORIDE 100 MG: 100 CAPSULE ORAL at 09:31

## 2018-07-22 RX ADMIN — DIVALPROEX SODIUM 1000 MG: 500 TABLET, FILM COATED, EXTENDED RELEASE ORAL at 21:12

## 2018-07-22 RX ADMIN — METHYLPHENIDATE HYDROCHLORIDE 20 MG: 10 TABLET ORAL at 06:52

## 2018-07-22 RX ADMIN — MIDODRINE HYDROCHLORIDE 10 MG: 2.5 TABLET ORAL at 16:39

## 2018-07-22 RX ADMIN — POLYETHYLENE GLYCOL 3350 17 G: 17 POWDER, FOR SOLUTION ORAL at 09:32

## 2018-07-22 RX ADMIN — BUPROPION HYDROCHLORIDE 300 MG: 300 TABLET, FILM COATED, EXTENDED RELEASE ORAL at 09:31

## 2018-07-22 RX ADMIN — CLOZAPINE 450 MG: 25 TABLET ORAL at 21:12

## 2018-07-22 RX ADMIN — MIDODRINE HYDROCHLORIDE 10 MG: 2.5 TABLET ORAL at 06:52

## 2018-07-22 RX ADMIN — FLUPHENAZINE HYDROCHLORIDE 10 MG: 10 TABLET, FILM COATED ORAL at 21:12

## 2018-07-22 NOTE — PROGRESS NOTES
Patient up for vitals today, isolative to room  Pleasant upon approach, cooperative   Compliant with meds with no prompting  Patient ate lunch  Patient attended 2-2 groups  Interacts with staff appropriately, interacts with select peers  No behavioral issues   Pt does not have complaints of depression, anxiety, or a/v hallucinations   Will encourage pt to work toward her recovery goals

## 2018-07-22 NOTE — PROGRESS NOTES
Psychiatry Progress Note    Subjective: Interval History     A stimulant medication has been making him feel slightly more anxious and agitated  Trial him on reduction of dose of Ritalin to 10 mg twice a day before breakfast and lunch  Patient continues to experience auditory hallucinations at times however they are decreasing in intensity and frequency  No other issues or complaints reported to me at this time        Current medications:    Current Facility-Administered Medications:     bisacodyl (DULCOLAX) EC tablet 10 mg, 10 mg, Oral, Daily PRN, Provider Cutover, 10 mg at 07/16/18 1647    buPROPion (WELLBUTRIN XL) 24 hr tablet 300 mg, 300 mg, Oral, Daily, Provider Cutover, 300 mg at 07/22/18 0931    cloZAPine (CLOZARIL) tablet 450 mg, 450 mg, Oral, HS, Yomaira Ocampo MD, 450 mg at 07/21/18 2058    divalproex sodium (DEPAKOTE ER) 24 hr tablet 1,000 mg, 1,000 mg, Oral, HS, Kate Ramirez MD, 1,000 mg at 07/21/18 2058    fluPHENAZine (PROLIXIN) tablet 10 mg, 10 mg, Oral, HS, Kate Ramirez MD, 10 mg at 07/21/18 2059    levothyroxine tablet 75 mcg, 75 mcg, Oral, Early Morning, Provider Cutover, 75 mcg at 07/22/18 7069    methylphenidate (RITALIN) tablet 20 mg, 20 mg, Oral, BID before breakfast/lunch, Yomaira Ocampo MD, 20 mg at 07/22/18 4816    midodrine (PROAMATINE) tablet 10 mg, 10 mg, Oral, TID, Wally Lynn MD, 10 mg at 07/22/18 2308    minocycline (MINOCIN) capsule 100 mg, 100 mg, Oral, Daily, Provider Cutover, 100 mg at 07/22/18 0931    polyethylene glycol (MIRALAX) packet 17 g, 17 g, Oral, Daily, Provider Cutover, 17 g at 07/22/18 0932      Objective:     Vital Signs:  Vitals:    07/21/18 1930 07/22/18 0612 07/22/18 0730 07/22/18 0735   BP: 121/84 119/78 105/80 99/66   BP Location: Right arm Left arm Left arm Left arm   Pulse: (!) 121 (!) 113 (!) 109 (!) 114   Resp:   18 18   Temp:   (!) 96 9 °F (36 1 °C)    TempSrc:   Temporal    SpO2:       Weight:   88 9 kg (196 lb)    Height:             Appearance: age appropriate and casually dressed   Behavior:  normal   Speech:  normal volume   Mood:  depressed   Affect:  constricted   Thought Process:  normal   Thought Content:  normal   Perceptual Disturbances: None   Risk Potential: none   Sensorium:  person, place, situation and time   Cognition:  intact   Consciousness:  alert and awake    Attention: attention span and concentration were age appropriate   Intellect: average   Insight:  good   Judgment: good      Motor Activity: no abnormal movements           Recent Labs:  Results Reviewed     None          I/O Past 24 hours:  I/O last 3 completed shifts:  In: -   Out: 1 [Stool:1]  No intake/output data recorded  Assessment / Plan:     [facility]    Recommended Treatment:      Medication changes:  1) decreased Ritalin to 10 mg twice a day    Non-pharmacological treatments  1) Continue with group therapy, milieu therapy and occupational therapy  Safety  1) Safety/communication plan established targeting dynamic risk factors above  2) Risks, benefits, and possible side effects of medications explained to patient and patient verbalizes understanding  Counseling / Coordination of Care    Total floor / unit time spent today 20 minutes  Greater than 50% of total time was spent with the patient and / or family counseling and / or coordination of care  A description of the counseling / coordination of care  Patient's Rights, confidentiality and exceptions to confidentiality, use of automated medical record, [facility] staff access to medical record, and consent to treatment reviewed      Alexus Richter PA-C

## 2018-07-22 NOTE — PROGRESS NOTES
No change in assessment  Will continue to monitor for changes in current status  Patient attended evening group

## 2018-07-23 RX ADMIN — MIDODRINE HYDROCHLORIDE 10 MG: 2.5 TABLET ORAL at 20:41

## 2018-07-23 RX ADMIN — MINOCYCLINE HYDROCHLORIDE 100 MG: 100 CAPSULE ORAL at 08:05

## 2018-07-23 RX ADMIN — METHYLPHENIDATE HYDROCHLORIDE 10 MG: 10 TABLET ORAL at 06:33

## 2018-07-23 RX ADMIN — DIVALPROEX SODIUM 1000 MG: 500 TABLET, FILM COATED, EXTENDED RELEASE ORAL at 20:41

## 2018-07-23 RX ADMIN — POLYETHYLENE GLYCOL 3350 17 G: 17 POWDER, FOR SOLUTION ORAL at 08:05

## 2018-07-23 RX ADMIN — MIDODRINE HYDROCHLORIDE 10 MG: 2.5 TABLET ORAL at 16:55

## 2018-07-23 RX ADMIN — MIDODRINE HYDROCHLORIDE 10 MG: 2.5 TABLET ORAL at 06:33

## 2018-07-23 RX ADMIN — LEVOTHYROXINE SODIUM 75 MCG: 75 TABLET ORAL at 06:33

## 2018-07-23 RX ADMIN — METHYLPHENIDATE HYDROCHLORIDE 10 MG: 10 TABLET ORAL at 12:35

## 2018-07-23 RX ADMIN — CLOZAPINE 450 MG: 25 TABLET ORAL at 20:41

## 2018-07-23 RX ADMIN — BUPROPION HYDROCHLORIDE 300 MG: 300 TABLET, FILM COATED, EXTENDED RELEASE ORAL at 08:05

## 2018-07-23 RX ADMIN — FLUPHENAZINE HYDROCHLORIDE 10 MG: 10 TABLET, FILM COATED ORAL at 20:41

## 2018-07-23 NOTE — PROGRESS NOTES
Individual visible for structured activities, then retreating to room  He attended 3/3 groups  No interactions with others noted  Compliant with meds without prompting  No issues or concerns expressed  Struggles with expressing needs to staff, availability made known  Will continue to monitor

## 2018-07-23 NOTE — PROGRESS NOTES
Jn Hale was isolative to room and self most of the shift  No interaction with peers and very little with staff  Cooperative upon approach by staff  Blunted, flat affect  Poor eye contact  Ate 75% of his supper and took medications with prompting at supper time  Shower this evening, but no BM  Attended and participated in wrap up group  Watched the movie with peers tonight  Took HS medication and ate snack before going to bed  Fall and SAFE precautions maintained without incident  Continue to monitor

## 2018-07-23 NOTE — PROGRESS NOTES
Psychiatry Progress Note    Subjective: Interval History     Patient visible on the unit sitting in the chairs in the back of the unit  Patient however with his had pulled over his face  Patient with flat affect  Patient with disheveled appearance  Patient is superficial during assessment  Patient with mildly irritated edge when attempts to discuss his symptoms further were made  Patient superficially denying all        Current medications:    Current Facility-Administered Medications:     bisacodyl (DULCOLAX) EC tablet 10 mg, 10 mg, Oral, Daily PRN, Provider Cutover, 10 mg at 07/16/18 1647    buPROPion (WELLBUTRIN XL) 24 hr tablet 300 mg, 300 mg, Oral, Daily, Provider Cutover, 300 mg at 07/23/18 0805    cloZAPine (CLOZARIL) tablet 450 mg, 450 mg, Oral, HS, Nicole Jensen MD, 450 mg at 07/22/18 2112    divalproex sodium (DEPAKOTE ER) 24 hr tablet 1,000 mg, 1,000 mg, Oral, HS, Jp Simeon MD, 1,000 mg at 07/22/18 2112    fluPHENAZine (PROLIXIN) tablet 10 mg, 10 mg, Oral, HS, Jp Simeon MD, 10 mg at 07/22/18 2112    levothyroxine tablet 75 mcg, 75 mcg, Oral, Early Morning, Provider Cutover, 75 mcg at 07/23/18 4226    methylphenidate (RITALIN) tablet 10 mg, 10 mg, Oral, BID before breakfast/lunch, Sebastián Acuna PA-C, 10 mg at 07/23/18 1235    midodrine (PROAMATINE) tablet 10 mg, 10 mg, Oral, TID, Wally Lynn MD, 10 mg at 07/23/18 1655    minocycline (MINOCIN) capsule 100 mg, 100 mg, Oral, Daily, Provider Cutover, 100 mg at 07/23/18 0805    polyethylene glycol (MIRALAX) packet 17 g, 17 g, Oral, Daily, Provider Cutover, 17 g at 07/23/18 0805      Objective:     Vital Signs:  Vitals:    07/23/18 0735 07/23/18 1530 07/23/18 1531 07/23/18 1532   BP: 115/63 114/72 116/82 117/79   BP Location: Left arm Left arm Left arm Left arm   Pulse: (!) 116 92 (!) 110 (!) 113   Resp:  17     Temp:  (!) 97 4 °F (36 3 °C)     TempSrc:  Temporal     SpO2:       Weight:       Height:             Appearance:  age appropriate, casually dressed and disheveled   Behavior:  normal   Speech:  normal volume   Mood:  depressed   Affect:  blunted and flat   Thought Process:  normal   Thought Content:  normal   Perceptual Disturbances: None   Risk Potential: none   Sensorium:  person, place, situation and time   Cognition:  intact   Consciousness:  alert and awake    Attention: attention span and concentration were age appropriate   Intellect: average   Insight:  limited   Judgment: limited      Motor Activity: no abnormal movements           Recent Labs:  Results Reviewed     None          I/O Past 24 hours:  No intake/output data recorded  No intake/output data recorded  Assessment / Plan:     [facility]    Recommended Treatment:      Medication changes:  1) continue current psychotropic medications  Non-pharmacological treatments  1) Continue with group therapy, milieu therapy and occupational therapy  Safety  1) Safety/communication plan established targeting dynamic risk factors above  2) Risks, benefits, and possible side effects of medications explained to patient and patient verbalizes understanding  Counseling / Coordination of Care    Total floor / unit time spent today 20 minutes  Greater than 50% of total time was spent with the patient and / or family counseling and / or coordination of care  A description of the counseling / coordination of care  Patient's Rights, confidentiality and exceptions to confidentiality, use of automated medical record, [facility] staff access to medical record, and consent to treatment reviewed      Daxa James PA-C

## 2018-07-23 NOTE — PROGRESS NOTES
Pt completed goal and motivation written exercise  Pt strengths includes his ability to complete written materials presented and will comply with information presented  Pleasant and cooperative  Pt does not self initiate group discussion and needs prompting  Pt needed prompting to follow directions on tasks in group  Pt has the ability to follow direction presented  Pt able to identify how the strengths could be applied to enhancing his goals and being motivated in his mental health recovery

## 2018-07-24 RX ADMIN — FLUPHENAZINE HYDROCHLORIDE 10 MG: 10 TABLET, FILM COATED ORAL at 20:51

## 2018-07-24 RX ADMIN — METHYLPHENIDATE HYDROCHLORIDE 10 MG: 10 TABLET ORAL at 12:05

## 2018-07-24 RX ADMIN — METHYLPHENIDATE HYDROCHLORIDE 10 MG: 10 TABLET ORAL at 06:13

## 2018-07-24 RX ADMIN — DIVALPROEX SODIUM 1000 MG: 500 TABLET, FILM COATED, EXTENDED RELEASE ORAL at 20:52

## 2018-07-24 RX ADMIN — LEVOTHYROXINE SODIUM 75 MCG: 75 TABLET ORAL at 06:14

## 2018-07-24 RX ADMIN — CLOZAPINE 450 MG: 25 TABLET ORAL at 20:52

## 2018-07-24 RX ADMIN — POLYETHYLENE GLYCOL 3350 17 G: 17 POWDER, FOR SOLUTION ORAL at 08:02

## 2018-07-24 RX ADMIN — BUPROPION HYDROCHLORIDE 300 MG: 300 TABLET, FILM COATED, EXTENDED RELEASE ORAL at 08:02

## 2018-07-24 RX ADMIN — MIDODRINE HYDROCHLORIDE 10 MG: 2.5 TABLET ORAL at 20:51

## 2018-07-24 RX ADMIN — MIDODRINE HYDROCHLORIDE 10 MG: 2.5 TABLET ORAL at 16:53

## 2018-07-24 RX ADMIN — MINOCYCLINE HYDROCHLORIDE 100 MG: 100 CAPSULE ORAL at 08:02

## 2018-07-24 NOTE — PROGRESS NOTES
Psychiatry Progress Note    Subjective: Interval History     Pt visible on the unit again laying in a chair at the back of the unit  Pt guarded and evasive during assessment  Pt continues to superficially deny all psychiatric symptoms  Pt offers no complaints or concerns      Current medications:    Current Facility-Administered Medications:     bisacodyl (DULCOLAX) EC tablet 10 mg, 10 mg, Oral, Daily PRN, Provider Cutover, 10 mg at 07/16/18 1647    buPROPion (WELLBUTRIN XL) 24 hr tablet 300 mg, 300 mg, Oral, Daily, Provider Cutover, 300 mg at 07/24/18 0802    cloZAPine (CLOZARIL) tablet 450 mg, 450 mg, Oral, HS, Mitch Mckoy MD, 450 mg at 07/23/18 2041    divalproex sodium (DEPAKOTE ER) 24 hr tablet 1,000 mg, 1,000 mg, Oral, HS, Samantha Faria MD, 1,000 mg at 07/23/18 2041    fluPHENAZine (PROLIXIN) tablet 10 mg, 10 mg, Oral, HS, Samantha Faria MD, 10 mg at 07/23/18 2041    levothyroxine tablet 75 mcg, 75 mcg, Oral, Early Morning, Provider Cutover, 75 mcg at 07/24/18 4873    methylphenidate (RITALIN) tablet 10 mg, 10 mg, Oral, BID before breakfast/lunch, Renato Olson PA-C, 10 mg at 07/24/18 1205    midodrine (PROAMATINE) tablet 10 mg, 10 mg, Oral, TID, Wally Lynn MD, 10 mg at 07/23/18 2041    minocycline (MINOCIN) capsule 100 mg, 100 mg, Oral, Daily, Provider Cutover, 100 mg at 07/24/18 0802    polyethylene glycol (MIRALAX) packet 17 g, 17 g, Oral, Daily, Provider Cutover, 17 g at 07/24/18 0802      Objective:     Vital Signs:  Vitals:    07/23/18 1930 07/24/18 0518 07/24/18 0730 07/24/18 0735   BP: 113/74 140/77 132/81 135/84   BP Location: Left arm Left arm Left arm Left arm   Pulse: (!) 117 80 (!) 113 (!) 121   Resp:   20    Temp:   97 8 °F (36 6 °C)    TempSrc:   Temporal    SpO2:       Weight:       Height:             Appearance:  age appropriate, casually dressed and disheveled   Behavior:  normal   Speech:  normal volume   Mood:  depressed   Affect:  blunted and flat   Thought Process: normal   Thought Content:  normal   Perceptual Disturbances: None   Risk Potential: none   Sensorium:  person, place, situation and time   Cognition:  intact   Consciousness:  alert and awake    Attention: attention span and concentration were age appropriate   Intellect: average   Insight:  limited   Judgment: limited      Motor Activity: no abnormal movements           Recent Labs:  Results Reviewed     None          I/O Past 24 hours:  No intake/output data recorded  No intake/output data recorded  Assessment / Plan:     [facility]    Recommended Treatment:      Medication changes:  1) continue current psychotropic medications  Non-pharmacological treatments  1) Continue with group therapy, milieu therapy and occupational therapy  Safety  1) Safety/communication plan established targeting dynamic risk factors above  2) Risks, benefits, and possible side effects of medications explained to patient and patient verbalizes understanding  Counseling / Coordination of Care    Total floor / unit time spent today 20 minutes  Greater than 50% of total time was spent with the patient and / or family counseling and / or coordination of care  A description of the counseling / coordination of care  Patient's Rights, confidentiality and exceptions to confidentiality, use of automated medical record, [address] [place] [address] staff access to medical record, and consent to treatment reviewed      Amira Luciano PA-C

## 2018-07-24 NOTE — PROGRESS NOTES
Pt was able to discuss his needs in IMR group with prompting and address his understanding of how his mental health recovery is impacted by her motivational factors  Pt was able to identify what motivates himself and what hurts his motivation  Pt completed exercise in step by step probelm solving and goal achievement  Pt expressed her needs appropriately  Pt had positive feedback from peers and expressed positive motivation  Pt understood his needs and working on moving to a group home  Pt did express strengths and likes of enjoying motorbikes  Another pt was able to engage in positive social conversation and pt mood and affect brightened with the conversation  Another peer reflected and noted pt progress in being more visible and social on unit    Continue to offer therapeutic group support and intervention

## 2018-07-24 NOTE — PROGRESS NOTES
Individual has been keeping to self, visible for structured activities, then retreats to room  Difficult to engage, struggles to express self  Minimal interactions with others  Compliant with meds  Compliant with Behavioral Plan  No issues or concerns expressed  No overt responding to internal stimuli noted  Availability of staff made known  Will continue to monitor

## 2018-07-24 NOTE — PROGRESS NOTES
Patient asleep at shift onset  Appeared to sleep throughout the night without difficulty  +drooling noted to joann  Standing /77, Midodrine held

## 2018-07-24 NOTE — PROGRESS NOTES
Mayra Garnett has been awake, alert, and visible intermittently out in the milieu  Pt used his [facility] EHR video game player during electronics hour  Affect remains flat, blunted and offers minimal conversation with staff  Pt denies any anxiety or nervousness from the Ritalin as pt had previously reported on 7/20/18  Pt denies any depression or a/v hallucinations but is preoccupied  Compliant with scheduled 1600 meds without prompting  Ate 100% supper  No interaction noted with peers  Will continue to encourage pt to utilize safe/effective coping skills and to work toward achievement of his recovery goals

## 2018-07-24 NOTE — PROGRESS NOTES
Liam Meeks attended and participated in 2/2 evening groups  Compliant with scheduled 2100 meds without prompting  Had evening snack  Resting quietly in bed at present, arouses easily  Will continue to monitor/assess for any changes

## 2018-07-25 RX ADMIN — MIDODRINE HYDROCHLORIDE 10 MG: 2.5 TABLET ORAL at 06:04

## 2018-07-25 RX ADMIN — METHYLPHENIDATE HYDROCHLORIDE 10 MG: 10 TABLET ORAL at 06:04

## 2018-07-25 RX ADMIN — MINOCYCLINE HYDROCHLORIDE 100 MG: 100 CAPSULE ORAL at 08:18

## 2018-07-25 RX ADMIN — MIDODRINE HYDROCHLORIDE 10 MG: 2.5 TABLET ORAL at 16:52

## 2018-07-25 RX ADMIN — BUPROPION HYDROCHLORIDE 300 MG: 300 TABLET, FILM COATED, EXTENDED RELEASE ORAL at 08:18

## 2018-07-25 RX ADMIN — FLUPHENAZINE HYDROCHLORIDE 10 MG: 10 TABLET, FILM COATED ORAL at 20:50

## 2018-07-25 RX ADMIN — DIVALPROEX SODIUM 1000 MG: 500 TABLET, FILM COATED, EXTENDED RELEASE ORAL at 20:50

## 2018-07-25 RX ADMIN — POLYETHYLENE GLYCOL 3350 17 G: 17 POWDER, FOR SOLUTION ORAL at 08:18

## 2018-07-25 RX ADMIN — LEVOTHYROXINE SODIUM 75 MCG: 75 TABLET ORAL at 06:04

## 2018-07-25 RX ADMIN — CLOZAPINE 450 MG: 25 TABLET ORAL at 20:50

## 2018-07-25 NOTE — PROGRESS NOTES
Pt received @ 2300  Sleeping since beginning of shift w/out any signs of distress   Will continue to monitor

## 2018-07-25 NOTE — PROGRESS NOTES
Judy Zheng attended and participated in 2/2 evening groups  Compliant with scheduled 2100 meds without prompting  Had evening snack  Resting quietly in bed at present, arouses easily  Will continue to monitor/assess for any changes

## 2018-07-25 NOTE — PROGRESS NOTES
Psychiatry Progress Note    Subjective: Interval History       Pt expressed to staff that he feels ritalin has been causing him to feel jittery and irritable  Pt isolative to his room with poor hygiene  Pt does confirm during assessment that he is feeling restless on the medication and irritable since it was started  Pt with no other complaints today  Still guarded and evasive        Current medications:    Current Facility-Administered Medications:     bisacodyl (DULCOLAX) EC tablet 10 mg, 10 mg, Oral, Daily PRN, Provider Cutover, 10 mg at 07/16/18 1647    buPROPion (WELLBUTRIN XL) 24 hr tablet 300 mg, 300 mg, Oral, Daily, Provider Cutover, 300 mg at 07/25/18 0818    cloZAPine (CLOZARIL) tablet 450 mg, 450 mg, Oral, HS, Page Zazueta MD, 450 mg at 07/24/18 2052    divalproex sodium (DEPAKOTE ER) 24 hr tablet 1,000 mg, 1,000 mg, Oral, HS, Dell Kaufman MD, 1,000 mg at 07/24/18 2052    fluPHENAZine (PROLIXIN) tablet 10 mg, 10 mg, Oral, HS, Dell Kaufman MD, 10 mg at 07/24/18 2051    levothyroxine tablet 75 mcg, 75 mcg, Oral, Early Morning, Provider Cutover, 75 mcg at 07/25/18 0604    midodrine (PROAMATINE) tablet 10 mg, 10 mg, Oral, TID, Wally Lynn MD, 10 mg at 07/25/18 0604    minocycline (MINOCIN) capsule 100 mg, 100 mg, Oral, Daily, Provider Cutover, 100 mg at 07/25/18 0818    polyethylene glycol (MIRALAX) packet 17 g, 17 g, Oral, Daily, Provider Cutover, 17 g at 07/25/18 0818      Objective:     Vital Signs:  Vitals:    07/24/18 1546 07/24/18 1548 07/24/18 1900 07/25/18 0600   BP: 112/74 103/64 117/75 (!) 84/52   BP Location: Left arm Left arm Left arm Left arm   Pulse: (!) 118 (!) 117 (!) 110 (!) 120   Resp:       Temp:       TempSrc:       SpO2:       Weight:       Height:             Appearance:  age appropriate, casually dressed and disheveled   Behavior:  normal   Speech:  normal volume   Mood:  depressed   Affect:  blunted and flat   Thought Process:  normal   Thought Content:  normal Perceptual Disturbances: None   Risk Potential: none   Sensorium:  person, place, situation and time   Cognition:  intact   Consciousness:  alert and awake    Attention: attention span and concentration were age appropriate   Intellect: average   Insight:  limited   Judgment: limited      Motor Activity: no abnormal movements           Recent Labs:  Results Reviewed     None          I/O Past 24 hours:  No intake/output data recorded  No intake/output data recorded  Assessment / Plan:     Chronic paranoid schizophrenia ([facility] )    Recommended Treatment:      Medication changes:  1) continue current psychotropic medications  D/C Ritalin    Non-pharmacological treatments  1) Continue with group therapy, milieu therapy and occupational therapy  Safety  1) Safety/communication plan established targeting dynamic risk factors above  2) Risks, benefits, and possible side effects of medications explained to patient and patient verbalizes understanding  Counseling / Coordination of Care    Total floor / unit time spent today 20 minutes  Greater than 50% of total time was spent with the patient and / or family counseling and / or coordination of care  A description of the counseling / coordination of care  Patient's Rights, confidentiality and exceptions to confidentiality, use of automated medical record, [facility] to medical record, and consent to treatment reviewed      Segundo Griffin PA-C

## 2018-07-25 NOTE — PROGRESS NOTES
Mayra Garnett has been keeping to self, visible for structured activities, then retreats to room  Attended all groups this shift with minimal participation  Difficult to engage, struggles to express self  Minimal interactions with others  Compliant with meds  Compliant with Behavioral Plan  No issues or concerns expressed  No overt responding to internal stimuli noted  No issues voiced this shift  Ritalin discontinued due to patient reporting it makes him restless and agitated  Mayra Garnett will be going off the unit this afternoon to attend recovery anonymous  Will continue to monitor

## 2018-07-25 NOTE — PROGRESS NOTES
Aashish Kilpatrick has been awake, alert, and visible intermittently out in the milieu  Behavior continues to quiet and isolative to self  Compliant with scheduled 1600 meds with prompting x 1  Pt remains flat in affect with little eye contact and difficult to engage in conversation  Ate 100% supper  No interaction noted with peers  Will continue to encourage pt to express self and utilize safe/effective coping skills and to work toward achievement of recovery goals

## 2018-07-26 RX ADMIN — FLUPHENAZINE HYDROCHLORIDE 10 MG: 10 TABLET, FILM COATED ORAL at 20:56

## 2018-07-26 RX ADMIN — LEVOTHYROXINE SODIUM 75 MCG: 75 TABLET ORAL at 06:01

## 2018-07-26 RX ADMIN — POLYETHYLENE GLYCOL 3350 17 G: 17 POWDER, FOR SOLUTION ORAL at 08:14

## 2018-07-26 RX ADMIN — BUPROPION HYDROCHLORIDE 300 MG: 300 TABLET, FILM COATED, EXTENDED RELEASE ORAL at 08:14

## 2018-07-26 RX ADMIN — BISACODYL 10 MG: 5 TABLET, COATED ORAL at 14:11

## 2018-07-26 RX ADMIN — MINOCYCLINE HYDROCHLORIDE 100 MG: 100 CAPSULE ORAL at 08:14

## 2018-07-26 RX ADMIN — DIVALPROEX SODIUM 1000 MG: 500 TABLET, FILM COATED, EXTENDED RELEASE ORAL at 20:56

## 2018-07-26 RX ADMIN — CLOZAPINE 450 MG: 25 TABLET ORAL at 20:55

## 2018-07-26 RX ADMIN — MIDODRINE HYDROCHLORIDE 10 MG: 2.5 TABLET ORAL at 20:56

## 2018-07-26 NOTE — SOCIAL WORK
Psychotherapy note:  Therapist met with patient for individual session  The main purpose of this session was to update patient's Recovery Plan  Patient was agreeable and presented with better social skills than usual   Patient is progressing in all areas  He signed off in agreement of all updates  Medication noncompliance:  Patient is 100% compliant with medications and reports no issues  Poor hygiene:  Patient still requires some prompting for hygiene, as well as a targeted behavioral plan  Motivation:  Patient attends at least 50% of all groups each week  He is an active participant in these groups  Paranoia/Hallucinations:  Patient reports that he still experiences these symptoms, but at a much lesser extreme than before  Depression:  Patient's goal was to decrease his depression by 10%  Since his last Recovery Plan review, patient reports his depression has decreased by 20%, which is not a great amount  Therapist will continue to work with patient about his triggers for depression  Recovery:  Patient is an active participant in psychoeducational groups  Community Integration:  Patient has full therapeutic pass privileges  Patient's clinical focus should remain on his paranoia, hallucinations, and depression, as these are the areas that he self-reports the least progress  Therapist will follow-up accordingly

## 2018-07-26 NOTE — CASE MANAGEMENT
Patient has been showing much improvement since the discontinuation of Ritalin  Patient reported to staff that he felt the Ritalin was making him angry and was stopped  This morning in treatment team patient was pleasant, made good eye contact with staff and answered questions with more than just one word answers  Patient was also observed having conversations with other peers on the unit  CM will continue to follow patient's progress and assist with discharge planning needs

## 2018-07-26 NOTE — PROGRESS NOTES
Jn Hale has been keeping to self, visible for structured activities, then retreats to room  Attended his treatment team this morning as well as all groups this shift  Minimal participation noted during groups  More easily engaged today  Compliant with meds   Compliant with Behavioral Plan   No issues or concerns expressed   No overt responding to internal stimuli noted   No issues voiced this shift   Will continue to monitor

## 2018-07-26 NOTE — PROGRESS NOTES
Aashish Kilpatrick attended and participated in 2/2 evening groups  Compliant with scheduled 2100 meds without prompting  2100 scheduled Midodrine held per MD BP parameters  Had evening snack  Resting quietly in bed at present, arouses easily  Will continue to monitor/assess for any changes

## 2018-07-26 NOTE — PROGRESS NOTES
Mendel Majestic has been awake, alert, and visible minimally out in the milieu  Compliant with scheduled 1600 meds with prompting x 1  Affect remains flat, blunted, and difficult to engage in conversation but able to make needs known  Minimal eye contact noted  Ate 100% supper  No interaction noted with peers  Pt retreated back to his room after supper  Will continue to encourage socialization with peers, utilization of safe/effective coping skills, and to work toward achievement of daily/recovery goals

## 2018-07-26 NOTE — PROGRESS NOTES
Psychiatry Progress Note    Subjective: Interval History     Pt visible on the unit attending groups this morning  Still evasive during assessment  Continues to try and abruptly end evaluation and encouragement to participate continues to be required  Pt reports that he is still having anxiety today, but reports that he is happy he is not taking ritalin any more  Denies feeling irritable today  Denies SI and HI  Denying psychosis        Current medications:    Current Facility-Administered Medications:     bisacodyl (DULCOLAX) EC tablet 10 mg, 10 mg, Oral, Daily PRN, Provider Cutover, 10 mg at 07/26/18 1411    buPROPion (WELLBUTRIN XL) 24 hr tablet 300 mg, 300 mg, Oral, Daily, Provider Cutover, 300 mg at 07/26/18 0814    cloZAPine (CLOZARIL) tablet 450 mg, 450 mg, Oral, HS, Brian Zapata MD, 450 mg at 07/25/18 2050    divalproex sodium (DEPAKOTE ER) 24 hr tablet 1,000 mg, 1,000 mg, Oral, HS, Vivek Correia MD, 1,000 mg at 07/25/18 2050    fluPHENAZine (PROLIXIN) tablet 10 mg, 10 mg, Oral, HS, Vivek Correia MD, 10 mg at 07/25/18 2050    levothyroxine tablet 75 mcg, 75 mcg, Oral, Early Morning, Provider Cutover, 75 mcg at 07/26/18 0601    midodrine (PROAMATINE) tablet 10 mg, 10 mg, Oral, TID, Wally Lynn MD, 10 mg at 07/25/18 1652    minocycline (MINOCIN) capsule 100 mg, 100 mg, Oral, Daily, Provider Cutover, 100 mg at 07/26/18 0814    polyethylene glycol (MIRALAX) packet 17 g, 17 g, Oral, Daily, Provider Cutover, 17 g at 07/26/18 0814      Objective:     Vital Signs:  Vitals:    07/25/18 1927 07/26/18 0600 07/26/18 0730 07/26/18 0735   BP: 131/81 120/87 107/75 99/56   BP Location: Left arm Left arm Left arm Left arm   Pulse: (!) 116 (!) 110 (!) 111 (!) 118   Resp:   20    Temp:   (!) 97 2 °F (36 2 °C)    TempSrc:   Temporal    SpO2:       Weight:       Height:             Appearance:  age appropriate, casually dressed and disheveled   Behavior:  normal   Speech:  normal volume   Mood:  depressed Affect:  blunted and flat   Thought Process:  normal   Thought Content:  normal   Perceptual Disturbances: None   Risk Potential: none   Sensorium:  person, place, situation and time   Cognition:  intact   Consciousness:  alert and awake    Attention: attention span and concentration were age appropriate   Intellect: average   Insight:  limited   Judgment: limited      Motor Activity: no abnormal movements           Recent Labs:  Results Reviewed     None          I/O Past 24 hours:  No intake/output data recorded  No intake/output data recorded  Assessment / Plan:     Chronic [facility] ([facility]    Recommended Treatment:      Medication changes:  1) continue current psychotropic medications  D/C Ritalin    Non-pharmacological treatments  1) Continue with group therapy, milieu therapy and occupational therapy  Safety  1) Safety/communication plan established targeting dynamic risk factors above  2) Risks, benefits, and possible side effects of medications explained to patient and patient verbalizes understanding  Counseling / Coordination of Care    Total floor / unit time spent today 20 minutes  Greater than 50% of total time was spent with the patient and / or family counseling and / or coordination of care  A description of the counseling / coordination of care  Patient's Rights, confidentiality and exceptions to confidentiality, use of automated medical record, [facility] staff access to medical record, and consent to treatment reviewed      Chino Summers PA-C

## 2018-07-26 NOTE — ESCALATED TEAM TX
Patient attended treatment team meeting this morning, prepared with self-assessment  Patient's group attendance for last week was 80%  Patient reports he is glad that he is not on the Ritalin anymore  He was acknowledged by the team for his continued group attendance and bright affect  He was granted a pass for Sunday, 7/29 from 10-8p  Patient was sociable and conversational in meeting, but did admit to being nervous when he has to speak in front of everyone  Improved social functioning overall  Patient expressed no further questions or concerns at the conclusion of the meeting  Next meeting scheduled for 8/2

## 2018-07-26 NOTE — PROGRESS NOTES
Jessica Strickland has been awake, alert, and visible intermittently  out in the milieu  Maintained on Safe and Fall Precautions without incident  Pt used his [employer] during electronics hour  1600 scheduled Midodrine held per MD BP parameters  Ate 100% supper  Affect remains flat, blunted, and preoccupied  Pt denies any depression, anxiety, or a/v hallucinations  Pt continues to be isolative to self  Will continue to encourage socialization, utilization of safe/effective coping skills and to work toward achievement of daily/recovery goals

## 2018-07-26 NOTE — PROGRESS NOTES
Stevie Loya has not had a BM since 7/21  Dulcolox 10mg given at 1420  Abd  assessment benign  +BS x 4 quadrants abd soft, non tender and non distended  Prune juice ordered for his dinner tray

## 2018-07-27 RX ADMIN — BUPROPION HYDROCHLORIDE 300 MG: 300 TABLET, FILM COATED, EXTENDED RELEASE ORAL at 08:32

## 2018-07-27 RX ADMIN — MINOCYCLINE HYDROCHLORIDE 100 MG: 100 CAPSULE ORAL at 08:32

## 2018-07-27 RX ADMIN — FLUPHENAZINE HYDROCHLORIDE 10 MG: 10 TABLET, FILM COATED ORAL at 21:07

## 2018-07-27 RX ADMIN — LEVOTHYROXINE SODIUM 75 MCG: 75 TABLET ORAL at 06:15

## 2018-07-27 RX ADMIN — POLYETHYLENE GLYCOL 3350 17 G: 17 POWDER, FOR SOLUTION ORAL at 08:32

## 2018-07-27 RX ADMIN — CLOZAPINE 450 MG: 25 TABLET ORAL at 21:07

## 2018-07-27 RX ADMIN — MIDODRINE HYDROCHLORIDE 10 MG: 2.5 TABLET ORAL at 06:15

## 2018-07-27 RX ADMIN — DIVALPROEX SODIUM 1000 MG: 500 TABLET, FILM COATED, EXTENDED RELEASE ORAL at 21:07

## 2018-07-27 RX ADMIN — MIDODRINE HYDROCHLORIDE 10 MG: 2.5 TABLET ORAL at 21:07

## 2018-07-27 NOTE — PROGRESS NOTES
Tiffany Smith been keeping to self, visible for structured activities, then retreats to room  Minimal participation noted during groups but did attend 3/3 groups this shift  Compliant with meds  Compliant with Behavioral Plan   No issues or concerns expressed   No overt responding to internal stimuli noted   No issues voiced this shift   Will continue to monitor

## 2018-07-27 NOTE — PROGRESS NOTES
Fatoumata President attended and participated in 2/2 evening groups  Pt reports Dulcolax effective and had BM this shift  Compliant with scheduled 2100 meds without prompting  Resting quietly in bed at present, arouses easily  Will continue to monitor/assess for any changes

## 2018-07-27 NOTE — PROGRESS NOTES
Psychiatry Progress Note    Subjective: Interval History     Pt continues to attend groups  Then retreating back to his room or to sit in chair in back of unit  Pt still guarded and evasive; pt reports that he is doing well without ritalin  States that he is not feeling as anxious or irritable today  Denies all psychiatric symptoms  Med and meal compliant       Current medications:    Current Facility-Administered Medications:     bisacodyl (DULCOLAX) EC tablet 10 mg, 10 mg, Oral, Daily PRN, Provider Cutover, 10 mg at 07/26/18 1411    buPROPion (WELLBUTRIN XL) 24 hr tablet 300 mg, 300 mg, Oral, Daily, Provider Cutover, 300 mg at 07/27/18 8912    cloZAPine (CLOZARIL) tablet 450 mg, 450 mg, Oral, HS, Rush Huff MD, 450 mg at 07/26/18 2055    divalproex sodium (DEPAKOTE ER) 24 hr tablet 1,000 mg, 1,000 mg, Oral, HS, Ada Phillips MD, 1,000 mg at 07/26/18 2056    fluPHENAZine (PROLIXIN) tablet 10 mg, 10 mg, Oral, HS, Ada Phillips MD, 10 mg at 07/26/18 2056    levothyroxine tablet 75 mcg, 75 mcg, Oral, Early Morning, Provider Cutover, 75 mcg at 07/27/18 0615    midodrine (PROAMATINE) tablet 10 mg, 10 mg, Oral, TID, Wally Lynn MD, 10 mg at 07/27/18 0615    minocycline (MINOCIN) capsule 100 mg, 100 mg, Oral, Daily, Provider Cutover, 100 mg at 07/27/18 0832    polyethylene glycol (MIRALAX) packet 17 g, 17 g, Oral, Daily, Provider Cutover, 17 g at 07/27/18 0832      Objective:     Vital Signs:  Vitals:    07/26/18 1532 07/26/18 1915 07/27/18 0600 07/27/18 0854   BP: 121/82 107/69 111/75 123/73   BP Location: Left arm Left arm Right arm Right arm   Pulse: (!) 113 (!) 116 (!) 113 105   Resp:    20   Temp:    (!) 97 4 °F (36 3 °C)   TempSrc:       SpO2:       Weight:       Height:             Appearance:  age appropriate, casually dressed and disheveled   Behavior:  normal   Speech:  normal volume   Mood:  depressed   Affect:  blunted and flat   Thought Process:  normal   Thought Content:  normal   Perceptual Disturbances: None   Risk Potential: none   Sensorium:  person, place, situation and time   Cognition:  intact   Consciousness:  alert and awake    Attention: attention span and concentration were age appropriate   Intellect: average   Insight:  limited   Judgment: limited      Motor Activity: no abnormal movements           Recent Labs:  Results Reviewed     None          I/O Past 24 hours:  No intake/output data recorded  No intake/output data recorded  Assessment / Plan:     Chronic paranoid schizophrenia ([facility] )    Recommended Treatment:      Medication changes:  1) continue current psychotropic medications  D/C Ritalin    Non-pharmacological treatments  1) Continue with group therapy, milieu therapy and occupational therapy  Safety  1) Safety/communication plan established targeting dynamic risk factors above  2) Risks, benefits, and possible side effects of medications explained to patient and patient verbalizes understanding  Counseling / Coordination of Care    Total floor / unit time spent today 20 minutes  Greater than 50% of total time was spent with the patient and / or family counseling and / or coordination of care  A description of the counseling / coordination of care  Patient's Rights, confidentiality and exceptions to confidentiality, use of automated medical record, [facility] staff access to medical record, and consent to treatment reviewed      Thang Sloan PA-C

## 2018-07-27 NOTE — PROGRESS NOTES
Pt attended [facility] group was attentive and alert  Pt needed prompting to participate in group topic  Reviewed motivation and finding motivational factors and what hinders motivation  Discussed the ability to receive constructive criticism or feedback in [facility] and how to understand and deal with information to assist in the recovery aspect  Discussed problem solving strategies and implementing IMR recovery goals in the future  Pt listened appropriately  Continue to provide therapeutic group support

## 2018-07-28 RX ADMIN — MIDODRINE HYDROCHLORIDE 10 MG: 2.5 TABLET ORAL at 20:43

## 2018-07-28 RX ADMIN — POLYETHYLENE GLYCOL 3350 17 G: 17 POWDER, FOR SOLUTION ORAL at 08:34

## 2018-07-28 RX ADMIN — LEVOTHYROXINE SODIUM 75 MCG: 75 TABLET ORAL at 06:15

## 2018-07-28 RX ADMIN — FLUPHENAZINE HYDROCHLORIDE 10 MG: 10 TABLET, FILM COATED ORAL at 20:43

## 2018-07-28 RX ADMIN — MIDODRINE HYDROCHLORIDE 10 MG: 2.5 TABLET ORAL at 06:15

## 2018-07-28 RX ADMIN — MINOCYCLINE HYDROCHLORIDE 100 MG: 100 CAPSULE ORAL at 08:34

## 2018-07-28 RX ADMIN — CLOZAPINE 450 MG: 25 TABLET ORAL at 20:43

## 2018-07-28 RX ADMIN — DIVALPROEX SODIUM 1000 MG: 500 TABLET, FILM COATED, EXTENDED RELEASE ORAL at 20:43

## 2018-07-28 RX ADMIN — BUPROPION HYDROCHLORIDE 300 MG: 300 TABLET, FILM COATED, EXTENDED RELEASE ORAL at 08:34

## 2018-07-28 NOTE — NURSING NOTE
Patient isolative to self on unit in own room  Patient withdrawn, blunted, appears guarded, cooperative upon approach however minimal verbal communication with staff and poor eye contact, behaviors controlled, offers no complaints  Patient  med and meal compliant, required med prompting this evening  Patient did attend evening groups this evening and showered, will continue to monitor

## 2018-07-28 NOTE — PROGRESS NOTES
Evans Zheng was compliant with his treatment plan today  He attended goals and cooking group  He was compliant with his routine medications, fluids were encouraged, remains on Fall precautions  He was isolative today napping most of the day, out only for structured activities

## 2018-07-28 NOTE — PROGRESS NOTES
Psychiatry Progress Note    Subjective: Interval History     The patient is lying in bed this morning  He has a flat affect  He is guarded on approach  Patient has poor hygiene  Patient denies all symptoms  He is medication and meal compliant and tolerating his medications well  He states he slept  He offers no concerns        Current medications:    Current Facility-Administered Medications:     bisacodyl (DULCOLAX) EC tablet 10 mg, 10 mg, Oral, Daily PRN, Provider Cutover, 10 mg at 07/26/18 1411    buPROPion (WELLBUTRIN XL) 24 hr tablet 300 mg, 300 mg, Oral, Daily, Provider Cutover, 300 mg at 07/28/18 0834    cloZAPine (CLOZARIL) tablet 450 mg, 450 mg, Oral, HS, Sadaf Jean-Baptiste MD, 450 mg at 07/27/18 2107    divalproex sodium (DEPAKOTE ER) 24 hr tablet 1,000 mg, 1,000 mg, Oral, , Gaby Sapp MD, 1,000 mg at 07/27/18 2107    fluPHENAZine (PROLIXIN) tablet 10 mg, 10 mg, Oral, HS, Gaby Sapp MD, 10 mg at 07/27/18 2107    levothyroxine tablet 75 mcg, 75 mcg, Oral, Early Morning, Provider Cutover, 75 mcg at 07/28/18 0615    midodrine (PROAMATINE) tablet 10 mg, 10 mg, Oral, TID, Wally Lynn MD, 10 mg at 07/28/18 0615    minocycline (MINOCIN) capsule 100 mg, 100 mg, Oral, Daily, Provider Cutover, 100 mg at 07/28/18 0834    polyethylene glycol (MIRALAX) packet 17 g, 17 g, Oral, Daily, Provider Cutover, 17 g at 07/28/18 0834      Objective:     Vital Signs:  Vitals:    07/27/18 1534 07/27/18 2000 07/28/18 0740 07/28/18 0745   BP: 129/59 118/71 123/87 142/90   BP Location: Right arm Right arm Right arm Right arm   Pulse: (!) 131 (!) 108 (!) 109 (!) 116   Resp:   18 18   Temp:   (!) 97 4 °F (36 3 °C)    TempSrc:   Temporal    SpO2:       Weight:       Height:             Appearance:  age appropriate and casually dressed   Behavior:  normal   Speech:  soft   Mood:  depressed   Affect:  flat   Thought Process:  normal   Thought Content:  normal   Perceptual Disturbances: None   Risk Potential: none Sensorium:  person, place, situation and time   Cognition:  intact   Consciousness:  alert and awake    Attention: attention span and concentration were age appropriate   Intellect: average   Insight:  limited   Judgment: limited      Motor Activity: no abnormal movements           Recent Labs:  Results Reviewed     None          I/O Past 24 hours:  No intake/output data recorded  No intake/output data recorded  Assessment / Plan:     Chronic paranoid schizophrenia ([facility] )    Recommended Treatment:      Medication changes:  1) Continue current medication regimen  Non-pharmacological treatments  1) Continue with group therapy, milieu therapy and occupational therapy  Safety  1) Safety/communication plan established targeting dynamic risk factors above  2) Risks, benefits, and possible side effects of medications explained to patient and patient verbalizes understanding  Counseling / Coordination of Care    Total floor / unit time spent today 20 minutes  Greater than 50% of total time was spent with the patient and / or family counseling and / or coordination of care  A description of the counseling / coordination of care  Patient's Rights, confidentiality and exceptions to confidentiality, use of automated medical record, [facility] staff access to medical record, and consent to treatment reviewed      Nadia Hutchison PA-C

## 2018-07-28 NOTE — NURSING NOTE
Received pt in bed at change of shift with eyes closed; chest movement noted  Continues the same thus this far as per q 15 min room checks  Will continue to monitor

## 2018-07-29 PROCEDURE — 80307 DRUG TEST PRSMV CHEM ANLYZR: CPT | Performed by: PSYCHIATRY & NEUROLOGY

## 2018-07-29 RX ADMIN — POLYETHYLENE GLYCOL 3350 17 G: 17 POWDER, FOR SOLUTION ORAL at 08:22

## 2018-07-29 RX ADMIN — MIDODRINE HYDROCHLORIDE 10 MG: 2.5 TABLET ORAL at 21:05

## 2018-07-29 RX ADMIN — CLOZAPINE 450 MG: 25 TABLET ORAL at 21:06

## 2018-07-29 RX ADMIN — MIDODRINE HYDROCHLORIDE 10 MG: 2.5 TABLET ORAL at 06:23

## 2018-07-29 RX ADMIN — FLUPHENAZINE HYDROCHLORIDE 10 MG: 10 TABLET, FILM COATED ORAL at 21:06

## 2018-07-29 RX ADMIN — MINOCYCLINE HYDROCHLORIDE 100 MG: 100 CAPSULE ORAL at 08:22

## 2018-07-29 RX ADMIN — BUPROPION HYDROCHLORIDE 300 MG: 300 TABLET, FILM COATED, EXTENDED RELEASE ORAL at 08:22

## 2018-07-29 RX ADMIN — DIVALPROEX SODIUM 1000 MG: 500 TABLET, FILM COATED, EXTENDED RELEASE ORAL at 21:05

## 2018-07-29 RX ADMIN — LEVOTHYROXINE SODIUM 75 MCG: 75 TABLET ORAL at 06:23

## 2018-07-29 NOTE — PROGRESS NOTES
Jn Hale has been awake, alert, and visible intermittently out int the milieu  No interaction noted with peers  Behavior isolative to self  Affect remains flat, blunted, and difficult to engage in conversation  Scheduled 1600 Midodrine held per MD BP parameters  Ate 100% supper  Attended and participated in 2/2 evening groups out on deck with staff and peers  No behavioral issues  Maintained on Safe/Fall Precautions without incident  Compliant with scheduled 2100 meds without prompting  Had evening snack  Will continue to encourage pt to utilize safe/effective coping skills and to work toward achievement of daily/recovery goals

## 2018-07-29 NOTE — PROGRESS NOTES
Psychiatry Progress Note    Subjective: Interval History     The patient is on a pass today with his mother  Staff states the patient is visible at times on the unit  He does not interact much with others  He has a flat affect  He is medication compliant and needs prompting        Current medications:    Current Facility-Administered Medications:     [MAR Hold - Suspended Admission] bisacodyl (DULCOLAX) EC tablet 10 mg, 10 mg, Oral, Daily PRN, Provider Cutover, 10 mg at 07/26/18 1411    [MAR Hold - Suspended Admission] buPROPion (WELLBUTRIN XL) 24 hr tablet 300 mg, 300 mg, Oral, Daily, Provider Cutover, 300 mg at 07/29/18 0822    [MAR Hold - Suspended Admission] cloZAPine (CLOZARIL) tablet 450 mg, 450 mg, Oral, HS, All Huffman MD, 450 mg at 07/28/18 2043    [MAR Hold - Suspended Admission] divalproex sodium (DEPAKOTE ER) 24 hr tablet 1,000 mg, 1,000 mg, Oral, HS, Doreen Olmstead MD, 1,000 mg at 07/28/18 2043    [MAR Hold - Suspended Admission] fluPHENAZine (PROLIXIN) tablet 10 mg, 10 mg, Oral, HS, Doreen Olmstead MD, 10 mg at 07/28/18 2043    [MAR Hold - Suspended Admission] levothyroxine tablet 75 mcg, 75 mcg, Oral, Early Morning, Provider Cutover, 75 mcg at 07/29/18 0623    [MAR Hold - Suspended Admission] midodrine (PROAMATINE) tablet 10 mg, 10 mg, Oral, TID, Wally Lynn MD, 10 mg at 07/29/18 0623    [MAR Hold - Suspended Admission] minocycline (MINOCIN) capsule 100 mg, 100 mg, Oral, Daily, Provider Cutover, 100 mg at 07/29/18 0822    [MAR Hold - Suspended Admission] polyethylene glycol (MIRALAX) packet 17 g, 17 g, Oral, Daily, Provider Cutover, 17 g at 07/29/18 0945    Current Outpatient Prescriptions:     acetaminophen (TYLENOL) 100 mg/mL solution, Take 10 mg/kg by mouth every 4 (four) hours as needed for fever, Disp: , Rfl:     acetaminophen (TYLENOL) 325 mg tablet, Take 650 mg by mouth every 4 (four) hours as needed for mild pain, Disp: , Rfl:     clozapine (CLOZARIL) 200 MG tablet, Take 600 mg by mouth daily at bedtime, Disp: , Rfl:     divalproex sodium (DEPAKOTE) 500 mg EC tablet, Take 250 mg by mouth every 8 (eight) hours, Disp: , Rfl:     divalproex sodium (DEPAKOTE) 500 mg EC tablet, Take 1,250 mg by mouth daily at bedtime, Disp: , Rfl:     lithium carbonate (LITHOBID) 450 mg CR tablet, Take 450 mg by mouth daily at bedtime, Disp: , Rfl:     PARoxetine (PAXIL) 20 mg tablet, Take 20 mg by mouth daily, Disp: , Rfl:       Objective:     Vital Signs:  Vitals:    07/28/18 1934 07/29/18 0611 07/29/18 0730 07/29/18 0745   BP: 100/59 111/67 117/86    BP Location: Left arm Left arm Right arm    Pulse: (!) 110 (!) 117 (!) 115    Resp:   18    Temp:   (!) 97 4 °F (36 3 °C)    TempSrc:   Temporal    SpO2:       Weight:    88 5 kg (195 lb)   Height:             Appearance:  age appropriate and casually dressed   Behavior:  normal   Speech:  soft   Mood:  depressed   Affect:  flat   Thought Process:  normal   Thought Content:  normal   Perceptual Disturbances: None   Risk Potential: none   Sensorium:  person, place, situation and time   Cognition:  intact   Consciousness:  alert and awake    Attention: attention span and concentration were age appropriate   Intellect: average   Insight:  limited   Judgment: limited      Motor Activity: no abnormal movements           Recent Labs:  Results Reviewed     None          I/O Past 24 hours:  No intake/output data recorded  No intake/output data recorded  Assessment / Plan:     Ch[facility] ([facility]    Recommended Treatment:      Medication changes:  1) Continue current medication regimen  Non-pharmacological treatments  1) Continue with group therapy, milieu therapy and occupational therapy  Safety  1) Safety/communication plan established targeting dynamic risk factors above  2) Risks, benefits, and possible side effects of medications explained to patient and patient verbalizes understanding        Counseling / Coordination of Care    Total floor / unit time spent today 20 minutes  Greater than 50% of total time was spent with the patient and / or family counseling and / or coordination of care  A description of the counseling / coordination of care  Patient's Rights, confidentiality and exceptions to confidentiality, use of automated medical record, [facility] staff access to medical record, and consent to treatment reviewed      Beba Ryan PA-C

## 2018-07-29 NOTE — PROGRESS NOTES
Scott Davidson been keeping to self, visible for structured activities, then retreats to room  Keila Mourning participation noted during groups but did attend goals group  Compliant with meds  Compliant with Behavioral Plan   No issues or concerns expressed   No overt responding to internal stimuli noted   No issues voiced this shift  Veronikamckenzie Anderson left with his Mother on pass from [id number]    Awaiting his return

## 2018-07-30 RX ADMIN — MIDODRINE HYDROCHLORIDE 10 MG: 2.5 TABLET ORAL at 06:06

## 2018-07-30 RX ADMIN — MINOCYCLINE HYDROCHLORIDE 100 MG: 100 CAPSULE ORAL at 08:33

## 2018-07-30 RX ADMIN — DIVALPROEX SODIUM 1000 MG: 500 TABLET, FILM COATED, EXTENDED RELEASE ORAL at 20:58

## 2018-07-30 RX ADMIN — POLYETHYLENE GLYCOL 3350 17 G: 17 POWDER, FOR SOLUTION ORAL at 08:33

## 2018-07-30 RX ADMIN — LEVOTHYROXINE SODIUM 75 MCG: 75 TABLET ORAL at 06:06

## 2018-07-30 RX ADMIN — BUPROPION HYDROCHLORIDE 300 MG: 300 TABLET, FILM COATED, EXTENDED RELEASE ORAL at 08:33

## 2018-07-30 RX ADMIN — CLOZAPINE 450 MG: 25 TABLET ORAL at 20:59

## 2018-07-30 RX ADMIN — BISACODYL 10 MG: 5 TABLET, COATED ORAL at 06:06

## 2018-07-30 RX ADMIN — FLUPHENAZINE HYDROCHLORIDE 10 MG: 10 TABLET, FILM COATED ORAL at 20:58

## 2018-07-30 RX ADMIN — MIDODRINE HYDROCHLORIDE 10 MG: 2.5 TABLET ORAL at 16:48

## 2018-07-30 RX ADMIN — MIDODRINE HYDROCHLORIDE 10 MG: 2.5 TABLET ORAL at 20:58

## 2018-07-30 NOTE — PROGRESS NOTES
Jayashree Cruz been keeping to self, visible for structured activities, then retreats to room  Lio Hebert went to [facility] however, did not get credit because he was sleeping through the group  Compliant with meds  Mostly compliant with Behavioral Plan   No issues or concerns expressed   No overt responding to internal stimuli noted   No issues voiced this shift   Will continue to monitor

## 2018-07-30 NOTE — PROGRESS NOTES
Emy Pritchard has been awake, alert, and visible minimally out in the milieu  Affect remains flat, blunted, and difficult to engage in conversation  Prune juice given with supper as last BM recorded 7/26/18  Encouraged fluids  No interaction noted with peers  Compliant with scheduled 1600 meds without prompting  Ate 50% supper  No behavioral issues  Behavior mostly isolative to self in room  Will continue to encourage pt to utilize safe/effective coping skills and to work toward achievement of daily/recovery goals

## 2018-07-30 NOTE — PROGRESS NOTES
Kiran Rousseau returned from pass with mother at 5  Both mother and Kiran Nguyener stated that the pass went well  Pt stated that they went to the park and out to eat  Affect flat, blunted but was appropriate upon return from pass  Body assessment and UDS obtained by [facility]  and to lab per [facility] protocol

## 2018-07-30 NOTE — PROGRESS NOTES
Individual had his schedule midodrine this morning for BP 99/60   P115  Individual received PRN dulcolax 10mg PO for s/s constipation, last BM 7/26/18, with pending result

## 2018-07-30 NOTE — PROGRESS NOTES
Psychiatry Progress Note    Subjective:   Interval History     Pt is essentially unchanged even after his ritalin DC'd last week; superficially cooperative, but keeps to himaslef; hallucinatins present but less intrusive and conversation is minimal; tolerating current meds well      Current medications:    Current Facility-Administered Medications:     bisacodyl (DULCOLAX) EC tablet 10 mg, 10 mg, Oral, Daily PRN, Provider Cutover, 10 mg at 07/30/18 0606    buPROPion (WELLBUTRIN XL) 24 hr tablet 300 mg, 300 mg, Oral, Daily, Provider Cutover, 300 mg at 07/29/18 6979    cloZAPine (CLOZARIL) tablet 450 mg, 450 mg, Oral, HS, Re Casillas MD, 450 mg at 07/29/18 2106    divalproex sodium (DEPAKOTE ER) 24 hr tablet 1,000 mg, 1,000 mg, Oral, HS, Kamlesh Azul MD, 1,000 mg at 07/29/18 2105    fluPHENAZine (PROLIXIN) tablet 10 mg, 10 mg, Oral, HS, Kamlesh Azul MD, 10 mg at 07/29/18 2106    levothyroxine tablet 75 mcg, 75 mcg, Oral, Early Morning, Provider Cutover, 75 mcg at 07/30/18 0606    midodrine (PROAMATINE) tablet 10 mg, 10 mg, Oral, TID, Wally Lynn MD, 10 mg at 07/30/18 0606    minocycline (MINOCIN) capsule 100 mg, 100 mg, Oral, Daily, Provider Cutover, 100 mg at 07/29/18 8829    polyethylene glycol (MIRALAX) packet 17 g, 17 g, Oral, Daily, Provider Cutover, 17 g at 07/29/18 0822      Objective:     Vital Signs:  Vitals:    07/29/18 0730 07/29/18 0745 07/29/18 2000 07/30/18 0500   BP: 117/86  112/80 99/60   BP Location: Right arm  Left arm Right arm   Pulse: (!) 115  (!) 112 (!) 115   Resp: 18 18   Temp: (!) 97 4 °F (36 3 °C)      TempSrc: Temporal      SpO2:       Weight:  88 5 kg (195 lb)     Height:             Appearance:  age appropriate and casually dressed   Behavior:  normal   Speech:  normal volume   Mood:  depressed   Affect:  constricted   Thought Process:  normal   Thought Content:  normal   Perceptual Disturbances: None   Risk Potential: none   Sensorium:  person, place, situation and time Cognition:  intact   Consciousness:  alert and awake    Attention: attention span and concentration were age appropriate   Intellect: average   Insight:  good   Judgment: good      Motor Activity: no abnormal movements           Recent Labs:  Results Reviewed     None          I/O Past 24 hours:  No intake/output data recorded  No intake/output data recorded  Assessment / Plan:     [facility]    Recommended Treatment:      Medication changes:  1) none    Non-pharmacological treatments  1) Continue with group therapy, milieu therapy and occupational therapy  Safety  1) Safety/communication plan established targeting dynamic risk factors above  2) Risks, benefits, and possible side effects of medications explained to patient and patient verbalizes understanding  Counseling / Coordination of Care    Total floor / unit time spent today 20 minutes  Greater than 50% of total time was spent with the patient and / or family counseling and / or coordination of care  A description of the counseling / coordination of care  Patient's Rights, confidentiality and exceptions to confidentiality, use of automated medical record, [facility] staff access to medical record, and consent to treatment reviewed      Lizbeth Benoit MD

## 2018-07-30 NOTE — PROGRESS NOTES
Mary attended and participated in 2/2 evening groups out on deck with staff and peers  Compliant with scheduled 2100 meds without prompting  No behavioral issues  Having snack in dining room at present  Will continue to monitor/assess for any changes

## 2018-07-31 RX ORDER — BISACODYL 10 MG
10 SUPPOSITORY, RECTAL RECTAL DAILY PRN
Status: DISCONTINUED | OUTPATIENT
Start: 2018-07-31 | End: 2018-12-20 | Stop reason: HOSPADM

## 2018-07-31 RX ORDER — SENNOSIDES 8.6 MG
1 TABLET ORAL 2 TIMES DAILY
Status: DISCONTINUED | OUTPATIENT
Start: 2018-07-31 | End: 2018-12-20 | Stop reason: HOSPADM

## 2018-07-31 RX ADMIN — BUPROPION HYDROCHLORIDE 300 MG: 300 TABLET, FILM COATED, EXTENDED RELEASE ORAL at 08:04

## 2018-07-31 RX ADMIN — POLYETHYLENE GLYCOL 3350 17 G: 17 POWDER, FOR SOLUTION ORAL at 08:04

## 2018-07-31 RX ADMIN — MIDODRINE HYDROCHLORIDE 10 MG: 2.5 TABLET ORAL at 20:44

## 2018-07-31 RX ADMIN — FLUPHENAZINE HYDROCHLORIDE 10 MG: 10 TABLET, FILM COATED ORAL at 20:43

## 2018-07-31 RX ADMIN — DIVALPROEX SODIUM 1000 MG: 500 TABLET, FILM COATED, EXTENDED RELEASE ORAL at 20:43

## 2018-07-31 RX ADMIN — CLOZAPINE 450 MG: 25 TABLET ORAL at 20:43

## 2018-07-31 RX ADMIN — MIDODRINE HYDROCHLORIDE 10 MG: 2.5 TABLET ORAL at 16:59

## 2018-07-31 RX ADMIN — LEVOTHYROXINE SODIUM 75 MCG: 75 TABLET ORAL at 06:10

## 2018-07-31 RX ADMIN — MIDODRINE HYDROCHLORIDE 10 MG: 2.5 TABLET ORAL at 06:10

## 2018-07-31 RX ADMIN — SENNOSIDES 8.6 MG: 8.6 TABLET, FILM COATED ORAL at 17:01

## 2018-07-31 RX ADMIN — MINOCYCLINE HYDROCHLORIDE 100 MG: 100 CAPSULE ORAL at 08:04

## 2018-07-31 NOTE — PROGRESS NOTES
Jessica Strickland attended and participated in 2/2 evening groups out on deck with staff and peers  Compliant with scheduled 2100 meds without prompting  Had evening snack  Resting quietly in bed at present, arouses easily  Will continue to monitor/assess for any changes

## 2018-07-31 NOTE — PROGRESS NOTES
Pt pleasant and cooperative in [address] group  Pt benefits from group in dining room with written material sitting upright  Attention span improved with the environment and process  Pt not as alert when group held in living room  Pt able to identify experiences of symptoms of schizophrenia  (hallucinations,delusions, thought disorder, cognitive difficulties decline in social functioning, disorganized and negative symptoms)  When prompted,  Pt able to reflect and verbalize needs in exercise  Pt express confidence with support and prompting to verbalize needs  Pt identified hallucinations, thought disorder cognitive difficulties and explain what happened personally  Pt respectful in group process  Continue to provide therapeutic group support

## 2018-07-31 NOTE — PROGRESS NOTES
In bed eyes closed appears to be sleeping will continue to monitor body shifting and breath sounds noted, Fall/SAFE precautions maintained, will encourage fluids with routine am medications

## 2018-07-31 NOTE — PROGRESS NOTES
Psychiatry Progress Note    Subjective: Interval History     Pa   but does not elaborate question with 1 or 2 words patient will answer direct No new complaints    raven reports no change in his behavior or mood and is seen to be isolative and non communicative as usual      Current medications:    Current Facility-Administered Medications:     bisacodyl (DULCOLAX) EC tablet 10 mg, 10 mg, Oral, Daily PRN, Provider Cutover, 10 mg at 07/30/18 0606    buPROPion (WELLBUTRIN XL) 24 hr tablet 300 mg, 300 mg, Oral, Daily, Provider Cutover, 300 mg at 07/31/18 0804    cloZAPine (CLOZARIL) tablet 450 mg, 450 mg, Oral, HS, Ld Warren MD, 450 mg at 07/30/18 2059    divalproex sodium (DEPAKOTE ER) 24 hr tablet 1,000 mg, 1,000 mg, Oral, HS, Bakari Velasquez MD, 1,000 mg at 07/30/18 2058    fluPHENAZine (PROLIXIN) tablet 10 mg, 10 mg, Oral, HS, Bakari Velasquez MD, 10 mg at 07/30/18 2058    levothyroxine tablet 75 mcg, 75 mcg, Oral, Early Morning, Provider Cutover, 75 mcg at 07/31/18 0610    midodrine (PROAMATINE) tablet 10 mg, 10 mg, Oral, TID, Wally Lynn MD, 10 mg at 07/31/18 0610    minocycline (MINOCIN) capsule 100 mg, 100 mg, Oral, Daily, Provider Cutover, 100 mg at 07/31/18 0804    polyethylene glycol (MIRALAX) packet 17 g, 17 g, Oral, Daily, Provider Cutover, 17 g at 07/31/18 0804      Objective:     Vital Signs:  Vitals:    07/30/18 1930 07/31/18 0602 07/31/18 0730 07/31/18 0735   BP: 111/80 103/71 107/70 101/63   BP Location: Right arm Right arm Left arm Left arm   Pulse: (!) 113 (!) 116 (!) 116 (!) 120   Resp:   20 20   Temp:   97 5 °F (36 4 °C)    TempSrc:   Temporal    SpO2:       Weight:       Height:             Appearance:  age appropriate and casually dressed   Behavior:  normal   Speech:  normal volume   Mood:  depressed   Affect:  constricted   Thought Process:  normal   Thought Content:  normal   Perceptual Disturbances: None   Risk Potential: none   Sensorium:  person, place, situation and time Cognition:  intact   Consciousness:  alert and awake    Attention: attention span and concentration were age appropriate   Intellect: average   Insight:  good   Judgment: good      Motor Activity: no abnormal movements           Recent Labs:  Results Reviewed     None          I/O Past 24 hours:  I/O last 3 completed shifts:  In: -   Out: 1 [Stool:1]  No intake/output data recorded  Assessment / Plan:     Chronic paranoid schizophrenia ([facility] )    Recommended Treatment:      Medication changes:  1) none    Non-pharmacological treatments  1) Continue with group therapy, milieu therapy and occupational therapy  Safety  1) Safety/communication plan established targeting dynamic risk factors above  2) Risks, benefits, and possible side effects of medications explained to patient and patient verbalizes understanding  Counseling / Coordination of Care    Total floor / unit time spent today 20 minutes  Greater than 50% of total time was spent with the patient and / or family counseling and / or coordination of care  A description of the counseling / coordination of care  Patient's Rights, confidentiality and exceptions to confidentiality, use of automated medical record, [facility] to [facility] record, and consent to treatment reviewed      Lizbeth Benoit MD

## 2018-07-31 NOTE — PROGRESS NOTES
Patient isolative to self and room throughout shift  No interaction with peers noted, appropriate interaction with staff  Required minimal prompting for meds and meals, compliant with good appetite  Pt attends groups with appropriate participation  Denies any depression, anxiety, or SI at this time  No current medical issues  Pt has no reported BM since the 27th, PRN dulcolax administered yesterday and no results, this nurse gave pt warm prune juice at 1400 and assessed bowels  Bowel sounds active in all four quads, no c/o nausea or vomiting, abdomen non-tender and flat, no discomfort with palpation  Will follow up within the hour to determine if pt has results from warm prune juice  Continue to encourage pt to get OOB and interact with peers in order to express self safely  Maintain safe, fall, and mouth and beard check as ordered

## 2018-08-01 RX ADMIN — CLOZAPINE 450 MG: 25 TABLET ORAL at 21:11

## 2018-08-01 RX ADMIN — LEVOTHYROXINE SODIUM 75 MCG: 75 TABLET ORAL at 06:34

## 2018-08-01 RX ADMIN — SENNOSIDES 8.6 MG: 8.6 TABLET, FILM COATED ORAL at 19:36

## 2018-08-01 RX ADMIN — BUPROPION HYDROCHLORIDE 300 MG: 300 TABLET, FILM COATED, EXTENDED RELEASE ORAL at 08:26

## 2018-08-01 RX ADMIN — MIDODRINE HYDROCHLORIDE 10 MG: 2.5 TABLET ORAL at 16:58

## 2018-08-01 RX ADMIN — DIVALPROEX SODIUM 1000 MG: 500 TABLET, FILM COATED, EXTENDED RELEASE ORAL at 21:11

## 2018-08-01 RX ADMIN — FLUPHENAZINE HYDROCHLORIDE 10 MG: 10 TABLET, FILM COATED ORAL at 21:12

## 2018-08-01 RX ADMIN — POLYETHYLENE GLYCOL 3350 17 G: 17 POWDER, FOR SOLUTION ORAL at 08:26

## 2018-08-01 RX ADMIN — MINOCYCLINE HYDROCHLORIDE 100 MG: 100 CAPSULE ORAL at 08:26

## 2018-08-01 RX ADMIN — SENNOSIDES 8.6 MG: 8.6 TABLET, FILM COATED ORAL at 08:26

## 2018-08-01 RX ADMIN — MIDODRINE HYDROCHLORIDE 10 MG: 2.5 TABLET ORAL at 21:10

## 2018-08-01 NOTE — PROGRESS NOTES
Jaciel Britton has been isolative to his room and self most of the shift  Cooperative upon approach by staff  No interaction with peers  Intermittent eye contact  Responding to internal stimuli while ambulating in hallway  He was smiling to self  Denies anxiety and depression  No shower but he did have a BM this shift  Luke was used during electronic hour  Prompted for medications at supper time  Took pills without difficulty  Mouth and beard checks as ordered  Attended and participated in evening groups on the deck  He took HS medications without prompting and ate snack before going to bed  SAFE and fall precautions maintained without incident

## 2018-08-01 NOTE — PROGRESS NOTES
Aramis Abdul has been awake, alert, and visible minimally out in the milieu  Behavior has been isolative to self  Affect remains flat, blunted, and difficult to engage in conversation  Pt denies any depression, anxiety,or a/v hallucinations  No interaction noted with peers  Ate 100% supper and retreated back to his room  Pt observed watching tv in living room at beginning of shift  Compliant with scheduled 1600 meds with prompting x 1  Will continue to encourage pt to utilize safe/effective coping skills and to work toward achievement of daily/recovery goals

## 2018-08-01 NOTE — PROGRESS NOTES
Psychiatry Progress Note    Subjective: Interval History     Patient was observed smiling to himself walking in the hallways yesterday but upon questioning he says he is not hearing loosen Asians  His behaviors about the same keeps to himself little communication affect is flat seems to avoid interactions Florinda Tomas is add Last by staff to participate    No new issues reported      Current medications:    Current Facility-Administered Medications:     bisacodyl (DULCOLAX) EC tablet 10 mg, 10 mg, Oral, Daily PRN, Provider Cutover, 10 mg at 07/30/18 0606    bisacodyl (DULCOLAX) rectal suppository 10 mg, 10 mg, Rectal, Daily PRN, LIZETH Valentino    buPROPion (WELLBUTRIN XL) 24 hr tablet 300 mg, 300 mg, Oral, Daily, Provider Cutover, 300 mg at 07/31/18 0804    cloZAPine (CLOZARIL) tablet 450 mg, 450 mg, Oral, HS, Shira Salvador MD, 450 mg at 07/31/18 2043    divalproex sodium (DEPAKOTE ER) 24 hr tablet 1,000 mg, 1,000 mg, Oral, HS, Varsha Croft MD, 1,000 mg at 07/31/18 2043    fluPHENAZine (PROLIXIN) tablet 10 mg, 10 mg, Oral, HS, Varsha Croft MD, 10 mg at 07/31/18 2043    levothyroxine tablet 75 mcg, 75 mcg, Oral, Early Morning, Provider Cutover, 75 mcg at 08/01/18 0634    midodrine (PROAMATINE) tablet 10 mg, 10 mg, Oral, TID, Wally Lynn MD, Stopped at 08/01/18 3086    minocycline (MINOCIN) capsule 100 mg, 100 mg, Oral, Daily, Provider Cutover, 100 mg at 07/31/18 0804    polyethylene glycol (MIRALAX) packet 17 g, 17 g, Oral, Daily, Provider Cutover, 17 g at 07/31/18 0804    senna (SENOKOT) tablet 8 6 mg, 1 tablet, Oral, BID, LIZETH Meyer, 8 6 mg at 07/31/18 1701      Objective:     Vital Signs:  Vitals:    07/31/18 1603 07/31/18 1606 07/31/18 1951 08/01/18 0635   BP: 118/78 102/60 116/81 123/69   BP Location: Left arm Left arm Left arm Left arm   Pulse: (!) 118 (!) 120 (!) 129 (!) 115   Resp:       Temp:       TempSrc:       SpO2:       Weight:       Height:             Appearance:  age appropriate and casually dressed   Behavior:  psychomotor retardation   Speech:  soft   Mood:  depressed   Affect:  constricted   Thought Process:  concrete   Thought Content:  normal   Perceptual Disturbances: None and Auditory hallucinations without commands   Risk Potential: none   Sensorium:  person, place, situation and time   Cognition:  intact   Consciousness:  alert and awake    Attention: attention span and concentration were poor   Intellect: average   Insight:  poor   Judgment: poor      Motor Activity: no abnormal movements           Recent Labs:  Results Reviewed     None          I/O Past 24 hours:  No intake/output data recorded  No intake/output data recorded  Assessment / Plan:     Chronic paranoid schizophrenia ([facility]    Recommended Treatment:      Medication changes:  1) none    Non-pharmacological treatments  1) Continue with group therapy, milieu therapy and occupational therapy  Safety  1) Safety/communication plan established targeting dynamic risk factors above  2) Risks, benefits, and possible side effects of medications explained to patient and patient verbalizes understanding  Counseling / Coordination of Care    Total floor / unit time spent today 20 minutes  Greater than 50% of total time was spent with the patient and / or family counseling and / or coordination of care  A description of the counseling / coordination of care  Patient's Rights, confidentiality and exceptions to confidentiality, use of automated medical record, [address] [place] [address] staff access to medical record, and consent to treatment reviewed      Luke David MD

## 2018-08-01 NOTE — PROGRESS NOTES
Pt attended [address] [facility] group  Flat and blunt affect  When pt sits in living room, pt is less participatory and needs increased prompting to participate when goup is held in dining room  Pt sleepy and needed redirection to follow group discussion  Group focused on stratgies for responding to stigma, problem solving, understanding group rules, and being respectful to peers and not judging others  Continue to offer group therapeutic support

## 2018-08-01 NOTE — PROGRESS NOTES
The patient slept through the night with no behaviors or issues noted  Fall and safety precautions maintained  Med compliant  Midodrine held for standing BP of 123/69 Continue to monitor

## 2018-08-01 NOTE — NURSING NOTE
Pt isolative to room  Out for meals, takes medications  Attended IMR group only  Affect remains blunted and flat, did not follow behavioral plan this am   Appears to be responding to internal stimuli

## 2018-08-02 RX ADMIN — SENNOSIDES 8.6 MG: 8.6 TABLET, FILM COATED ORAL at 08:39

## 2018-08-02 RX ADMIN — MINOCYCLINE HYDROCHLORIDE 100 MG: 100 CAPSULE ORAL at 08:39

## 2018-08-02 RX ADMIN — CLOZAPINE 450 MG: 25 TABLET ORAL at 20:55

## 2018-08-02 RX ADMIN — POLYETHYLENE GLYCOL 3350 17 G: 17 POWDER, FOR SOLUTION ORAL at 08:39

## 2018-08-02 RX ADMIN — FLUPHENAZINE HYDROCHLORIDE 10 MG: 10 TABLET, FILM COATED ORAL at 20:55

## 2018-08-02 RX ADMIN — MIDODRINE HYDROCHLORIDE 10 MG: 2.5 TABLET ORAL at 06:25

## 2018-08-02 RX ADMIN — DIVALPROEX SODIUM 1000 MG: 500 TABLET, FILM COATED, EXTENDED RELEASE ORAL at 20:55

## 2018-08-02 RX ADMIN — MIDODRINE HYDROCHLORIDE 10 MG: 2.5 TABLET ORAL at 16:35

## 2018-08-02 RX ADMIN — MIDODRINE HYDROCHLORIDE 10 MG: 2.5 TABLET ORAL at 20:55

## 2018-08-02 RX ADMIN — SENNOSIDES 8.6 MG: 8.6 TABLET, FILM COATED ORAL at 17:04

## 2018-08-02 RX ADMIN — BUPROPION HYDROCHLORIDE 300 MG: 300 TABLET, FILM COATED, EXTENDED RELEASE ORAL at 08:39

## 2018-08-02 RX ADMIN — LEVOTHYROXINE SODIUM 75 MCG: 75 TABLET ORAL at 05:42

## 2018-08-02 NOTE — ESCALATED TEAM TX
Patient attended treatment team meeting this morning, prepared with self-assessment  Patient's group attendance for last week was 86%  Patient reports that his biggest barrier is homesickness  Team attempted to redirect patient to choose a mental health barrier  Patient denies that his auditory hallucinations impede his daily living  He reports that he enjoys the voices and would miss them if they went away completely  Patient was granted a pass for Sunday, 8/5 from 10-8p with his mother  Patient expressed no additional questions or concerns at the conclusion of the meeting  Next meeting scheduled for 8/9

## 2018-08-02 NOTE — NURSING NOTE
addedum   Nursing note, pt continues on safe, fall, mouth and beard precaution, pt stated had a bm yesterday, shower on wendsday, not today  Pt did attend life skills group  Pt did eat 100 % breakfast and 75% lunch  Pt is flat , blunt, soft verbalization  continue plan of care

## 2018-08-02 NOTE — PROGRESS NOTES
Psychiatry Progress Note    Subjective: Interval History     Team mtg today: struggling with voices and remains seclusive; reports his voices are now more "positive" and "they're happy if I'm doing good"  Refusing to go to [facility] but is comfortable with going out on passes with mother        Current medications:    Current Facility-Administered Medications:     bisacodyl (DULCOLAX) EC tablet 10 mg, 10 mg, Oral, Daily PRN, Provider Cutover, 10 mg at 07/30/18 0606    bisacodyl (DULCOLAX) rectal suppository 10 mg, 10 mg, Rectal, Daily PRN, LIZETH Blackwell    buPROPion (WELLBUTRIN XL) 24 hr tablet 300 mg, 300 mg, Oral, Daily, Provider Cutover, 300 mg at 08/02/18 0839    cloZAPine (CLOZARIL) tablet 450 mg, 450 mg, Oral, HS, Mehnaz Rosales MD, 450 mg at 08/01/18 2111    divalproex sodium (DEPAKOTE ER) 24 hr tablet 1,000 mg, 1,000 mg, Oral, HS, Meldon Curling, MD, 1,000 mg at 08/01/18 2111    fluPHENAZine (PROLIXIN) tablet 10 mg, 10 mg, Oral, HS, Meldon Curling, MD, 10 mg at 08/01/18 2112    levothyroxine tablet 75 mcg, 75 mcg, Oral, Early Morning, Provider Cutover, 75 mcg at 08/02/18 0542    midodrine (PROAMATINE) tablet 10 mg, 10 mg, Oral, TID, Wally Lynn MD, 10 mg at 08/02/18 0625    minocycline (MINOCIN) capsule 100 mg, 100 mg, Oral, Daily, Provider Cutover, 100 mg at 08/02/18 0839    polyethylene glycol (MIRALAX) packet 17 g, 17 g, Oral, Daily, Provider Cutover, 17 g at 08/02/18 0839    senna (SENOKOT) tablet 8 6 mg, 1 tablet, Oral, BID, Tess Seeds, CRNP, 8 6 mg at 08/02/18 0839      Objective:     Vital Signs:  Vitals:    08/01/18 1529 08/01/18 1923 08/02/18 0730 08/02/18 0735   BP: 100/66 99/58 110/72 111/71   BP Location: Left arm Left arm Left arm Left arm   Pulse: (!) 120 (!) 125 104 (!) 111   Resp:   20    Temp:   97 6 °F (36 4 °C)    TempSrc:   Temporal    SpO2:       Weight:       Height:             Appearance:  age appropriate and casually dressed   Behavior:  Psychiatry Progress Note    Subjective:   Interval History     none      Current medications:    Current Facility-Administered Medications:     bisacodyl (DULCOLAX) EC tablet 10 mg, 10 mg, Oral, Daily PRN, Provider Cutover, 10 mg at 07/30/18 0606    bisacodyl (DULCOLAX) rectal suppository 10 mg, 10 mg, Rectal, Daily PRN, LIZETH Taylor    buPROPion (WELLBUTRIN XL) 24 hr tablet 300 mg, 300 mg, Oral, Daily, Provider Cutover, 300 mg at 08/02/18 0839    cloZAPine (CLOZARIL) tablet 450 mg, 450 mg, Oral, HS, Nicole Jensen MD, 450 mg at 08/01/18 2111    divalproex sodium (DEPAKOTE ER) 24 hr tablet 1,000 mg, 1,000 mg, Oral, HS, Jp Simeon MD, 1,000 mg at 08/01/18 2111    fluPHENAZine (PROLIXIN) tablet 10 mg, 10 mg, Oral, HS, Jp Simeon MD, 10 mg at 08/01/18 2112    levothyroxine tablet 75 mcg, 75 mcg, Oral, Early Morning, Provider Cutover, 75 mcg at 08/02/18 0542    midodrine (PROAMATINE) tablet 10 mg, 10 mg, Oral, TID, Wally Lynn MD, 10 mg at 08/02/18 9587    minocycline (MINOCIN) capsule 100 mg, 100 mg, Oral, Daily, Provider Cutover, 100 mg at 08/02/18 0839    polyethylene glycol (MIRALAX) packet 17 g, 17 g, Oral, Daily, Provider Cutover, 17 g at 08/02/18 0780    senna (SENOKOT) tablet 8 6 mg, 1 tablet, Oral, BID, LIZETH Henriquez, 8 6 mg at 08/02/18 0839      Objective:     Vital Signs:  Vitals:    08/01/18 1529 08/01/18 1923 08/02/18 0730 08/02/18 0735   BP: 100/66 99/58 110/72 111/71   BP Location: Left arm Left arm Left arm Left arm   Pulse: (!) 120 (!) 125 104 (!) 111   Resp:   20    Temp:   97 6 °F (36 4 °C)    TempSrc:   Temporal    SpO2:       Weight:       Height:             Appearance:  age appropriate and casually dressed   Behavior:  normal   Speech:  loud   Mood:  euphoric   Affect:  labile   Thought Process:  tangential   Thought Content:  normal   Perceptual Disturbances: None   Risk Potential: none   Sensorium:  person, place, situation and time   Cognition:  intact   Consciousness:  alert and awake    Attention: attention span and concentration were age appropriate   Intellect: average   Insight:  good   Judgment: good      Motor Activity: no abnormal movements           Recent Labs:  Results Reviewed     None          I/O Past 24 hours:  I/O last 3 completed shifts:  In: -   Out: 1 [Stool:1]  No intake/output data recorded  Assessment / Plan:     [facility]    Recommended Treatment:      Medication changes:  1) no change    Non-pharmacological treatments  1) Continue with group therapy, milieu therapy and occupational therapy  Safety  1) Safety/communication plan established targeting dynamic risk factors above  2) Risks, benefits, and possible side effects of medications explained to patient and patient verbalizes understanding  Counseling / Coordination of Care    Total floor / unit time spent today 20 minutes  Greater than 50% of total time was spent with the patient and / or family counseling and / or coordination of care  A description of the counseling / coordination of care  Patient's Rights, confidentiality and exceptions to confidentiality, use of automated medical record, [facility] staff access to medical record, and consent to treatment reviewed      Hafsa Wall MD

## 2018-08-02 NOTE — PROGRESS NOTES
Veronika Anderson attended and participated in 2/2 evening groups out on deck with staff and peers  Compliant with scheduled 2100 meds without prompting  Had evening snack  Resting quietly in bed at present, arouses easily  Will continue to monitor/assess for any changes

## 2018-08-02 NOTE — CASE MANAGEMENT
CM confirmed with Milli Romo with [facility] that patient is added to both [facility] and [address] [place] [address] waiting lists  She reported that when a bed becomes available to him, updated records would need to be completed and a MA-51 will need to be submitted  CM will continue to follow patient's progress and assist with discharge planning needs

## 2018-08-02 NOTE — PROGRESS NOTES
Pt cooperative and attended IMR group on change and learning about your ability to change  Eye contact increased but needed prompting to particpate  Limited in responses  Discussed  motivational factors of change and identifying self esteem issues  Discussed problem solving strategies and having awareness of the difficulties and set backs  Continue to offer therapeutic group support

## 2018-08-03 RX ADMIN — LEVOTHYROXINE SODIUM 75 MCG: 75 TABLET ORAL at 06:48

## 2018-08-03 RX ADMIN — BUPROPION HYDROCHLORIDE 300 MG: 300 TABLET, FILM COATED, EXTENDED RELEASE ORAL at 08:15

## 2018-08-03 RX ADMIN — SENNOSIDES 8.6 MG: 8.6 TABLET, FILM COATED ORAL at 08:15

## 2018-08-03 RX ADMIN — SENNOSIDES 8.6 MG: 8.6 TABLET, FILM COATED ORAL at 19:23

## 2018-08-03 RX ADMIN — FLUPHENAZINE HYDROCHLORIDE 10 MG: 10 TABLET, FILM COATED ORAL at 20:54

## 2018-08-03 RX ADMIN — DIVALPROEX SODIUM 1000 MG: 500 TABLET, FILM COATED, EXTENDED RELEASE ORAL at 20:54

## 2018-08-03 RX ADMIN — MIDODRINE HYDROCHLORIDE 10 MG: 2.5 TABLET ORAL at 20:54

## 2018-08-03 RX ADMIN — POLYETHYLENE GLYCOL 3350 17 G: 17 POWDER, FOR SOLUTION ORAL at 08:15

## 2018-08-03 RX ADMIN — MINOCYCLINE HYDROCHLORIDE 100 MG: 100 CAPSULE ORAL at 08:15

## 2018-08-03 RX ADMIN — MIDODRINE HYDROCHLORIDE 10 MG: 2.5 TABLET ORAL at 16:57

## 2018-08-03 RX ADMIN — MIDODRINE HYDROCHLORIDE 10 MG: 2.5 TABLET ORAL at 06:48

## 2018-08-03 RX ADMIN — CLOZAPINE 450 MG: 25 TABLET ORAL at 20:54

## 2018-08-03 NOTE — PROGRESS NOTES
Pt engaging, positive eye contact and focused on group  Pt attended IMR session on the deck  Pt able to idenitfy "frustration" and describe how he is frustrated when he does not attend groups  Focused on emotional card a game promoting awareness of emotions and problem solving techniques concerning emotions  Providing self esteem and employing cooperative and self confidence skills in describing emotions  Beginning of the group focused on stress reduction techniques including stretching and deep breathing techniques  Pt was able to identify emotions, engaged in deep breathing techniques and social with peers  Continue to provide group therapeutic support

## 2018-08-03 NOTE — PROGRESS NOTES
Psychiatry Progress Note    Subjective: Interval History     Is more attentive to groups, meds and can have brief conversations when prompted by others; hallucinations present but not distracting        Current medications:    Current Facility-Administered Medications:     bisacodyl (DULCOLAX) EC tablet 10 mg, 10 mg, Oral, Daily PRN, Provider Cutover, 10 mg at 07/30/18 0606    bisacodyl (DULCOLAX) rectal suppository 10 mg, 10 mg, Rectal, Daily PRN, LIZETH Owen    buPROPion (WELLBUTRIN XL) 24 hr tablet 300 mg, 300 mg, Oral, Daily, Provider Cutover, 300 mg at 08/03/18 0815    cloZAPine (CLOZARIL) tablet 450 mg, 450 mg, Oral, HS, All Huffman MD, 450 mg at 08/02/18 2055    divalproex sodium (DEPAKOTE ER) 24 hr tablet 1,000 mg, 1,000 mg, Oral, HS, Doreen Olmstead MD, 1,000 mg at 08/02/18 2055    fluPHENAZine (PROLIXIN) tablet 10 mg, 10 mg, Oral, HS, Doreen Olmstead MD, 10 mg at 08/02/18 2055    levothyroxine tablet 75 mcg, 75 mcg, Oral, Early Morning, Provider Cutover, 75 mcg at 08/03/18 0648    midodrine (PROAMATINE) tablet 10 mg, 10 mg, Oral, TID, Wally Lynn MD, 10 mg at 08/03/18 0648    minocycline (MINOCIN) capsule 100 mg, 100 mg, Oral, Daily, Provider Cutover, 100 mg at 08/03/18 0815    polyethylene glycol (MIRALAX) packet 17 g, 17 g, Oral, Daily, Provider Cutover, 17 g at 08/03/18 0815    senna (SENOKOT) tablet 8 6 mg, 1 tablet, Oral, BID, LIZETH Meyer, 8 6 mg at 08/03/18 0815      Objective:     Vital Signs:  Vitals:    08/02/18 1930 08/03/18 0554 08/03/18 0730 08/03/18 0735   BP: 119/83 (!) 80/56 115/70 121/71   BP Location: Left arm Left arm Left arm Left arm   Pulse: (!) 131 (!) 125 100 103   Resp:   20    Temp:   (!) 96 8 °F (36 °C)    TempSrc:   Temporal    SpO2:       Weight:       Height:             Appearance:  age appropriate and casually dressed   Behavior:  normal   Speech:  normal volume   Mood:  depressed   Affect:  constricted   Thought Process:  blocked   Thought Content:  normal   Perceptual Disturbances: None and Auditory hallucinations without commands   Risk Potential: none   Sensorium:  person, place, situation and time   Cognition:  intact   Consciousness:  alert and awake    Attention: attention span and concentrion are impaired   Intellect: average   Insight:  poor   Judgment: limited      Motor Activity: no abnormal movements           Recent Labs:  Results Reviewed     None          I/O Past 24 hours:  No intake/output data recorded  No intake/output data recorded  Assessment / Plan:     [facility]ronic paranoid [facility]    Recommended Treatment:      Medication changes:  1) no change    Non-pharmacological treatments  1) Continue with group therapy, milieu therapy and occupational therapy  Safety  1) Safety/communication plan established targeting dynamic risk factors above  2) Risks, benefits, and possible side effects of medications explained to patient and patient verbalizes understanding  Counseling / Coordination of Care    Total floor / unit time spent today 20 minutes  Greater than 50% of total time was spent with the patient and / or family counseling and / or coordination of care  A description of the counseling / coordination of care  Patient's Rights, confidentiality and exceptions to confidentiality, use of automated medical record, [address] [place] [address] staff access to medical record, and consent to treatment reviewed      Sadaf Jean-Baptiste MD

## 2018-08-03 NOTE — PROGRESS NOTES
Julia Irwin has been awake, alert, and visible intermittently out in the milieu  Affect remains flat, blunted, and difficult to engage in conversation  Compliant with scheduled meds with prompting x 1  Behavior remains quiet and isolative to self  No interaction noted with peers  Pt denies any depression, anxiety, or a/v hallucinations but noted pt laughing and smiling to self as responding to internal stimuli  Ate 100% supper  Attended and participated in 2/2 evening groups  Had evening snack  Will continue to encourage pt to utilize safe/effective coping skills and to work toward achievement of daily/recovery goals  Resting quietly in bed at present, arouses easily  Will continue to monitor/assess for any changes

## 2018-08-03 NOTE — PROGRESS NOTES
Ap Render been keeping to self, visible for structured activities, then retreats to room  Kian Signs participation noted during groups but did attend goals and IMR group  Compliant with meds  Compliant with Behavioral Plan  Stephanie Goodwin completed his hygiene this morning   No issues or concerns expressed   No overt responding to internal stimuli noted  Stephanie Goodwin left with staff at 1200 to have [place] in the park    Awaiting his return

## 2018-08-04 RX ADMIN — MIDODRINE HYDROCHLORIDE 10 MG: 2.5 TABLET ORAL at 17:04

## 2018-08-04 RX ADMIN — FLUPHENAZINE HYDROCHLORIDE 10 MG: 10 TABLET, FILM COATED ORAL at 20:52

## 2018-08-04 RX ADMIN — SENNOSIDES 8.6 MG: 8.6 TABLET, FILM COATED ORAL at 17:04

## 2018-08-04 RX ADMIN — BUPROPION HYDROCHLORIDE 300 MG: 300 TABLET, FILM COATED, EXTENDED RELEASE ORAL at 08:11

## 2018-08-04 RX ADMIN — LEVOTHYROXINE SODIUM 75 MCG: 75 TABLET ORAL at 06:27

## 2018-08-04 RX ADMIN — SENNOSIDES 8.6 MG: 8.6 TABLET, FILM COATED ORAL at 08:11

## 2018-08-04 RX ADMIN — MINOCYCLINE HYDROCHLORIDE 100 MG: 100 CAPSULE ORAL at 08:11

## 2018-08-04 RX ADMIN — CLOZAPINE 450 MG: 25 TABLET ORAL at 20:52

## 2018-08-04 RX ADMIN — POLYETHYLENE GLYCOL 3350 17 G: 17 POWDER, FOR SOLUTION ORAL at 08:11

## 2018-08-04 RX ADMIN — DIVALPROEX SODIUM 1000 MG: 500 TABLET, FILM COATED, EXTENDED RELEASE ORAL at 20:52

## 2018-08-04 RX ADMIN — MIDODRINE HYDROCHLORIDE 10 MG: 2.5 TABLET ORAL at 06:25

## 2018-08-04 NOTE — PROGRESS NOTES
Mendel Majestic attended and participated in evening groups  Compliant with scheduled 2100 meds without prompting  Had evening snack  Resting quietly in bed at present, arouses easily  Will continue to monitor/assess for any changes

## 2018-08-04 NOTE — PROGRESS NOTES
Berna Stroud been keeping to self, visible for structured activities, then retreats to room   Continues to be minimally participratory in groups and only attended cooking group today  Compliant with meds  Selectively compliant with Behavioral Plan   No issues or concerns expressed  No overt responding to internal stimuli noted  Will continue to monitor

## 2018-08-04 NOTE — PROGRESS NOTES
Jaciel Britton has been awake, alert, and visible intermittently out in the milieu  Pt used his [employer] video player during electronics group  Compliant with scheduled 1600 meds with prompting x 1  Affect remains flat, blunted, and difficult to engage in conversation  No interaction noted with peers and is isolative to self  Ate 100% supper  No behavioral issues  Pt denies any depression, anxiety, or a/v hallucinations  No overt responding to internal stimuli but is preoccupied  Will continue to encourage pt to utilize safe/effective coping skills and to work toward achievement of daily/recovery goals to help facilitate discharge

## 2018-08-05 PROCEDURE — 80307 DRUG TEST PRSMV CHEM ANLYZR: CPT | Performed by: PSYCHIATRY & NEUROLOGY

## 2018-08-05 RX ADMIN — DIVALPROEX SODIUM 1000 MG: 500 TABLET, FILM COATED, EXTENDED RELEASE ORAL at 20:52

## 2018-08-05 RX ADMIN — MINOCYCLINE HYDROCHLORIDE 100 MG: 100 CAPSULE ORAL at 09:04

## 2018-08-05 RX ADMIN — MIDODRINE HYDROCHLORIDE 10 MG: 2.5 TABLET ORAL at 20:51

## 2018-08-05 RX ADMIN — MIDODRINE HYDROCHLORIDE 10 MG: 2.5 TABLET ORAL at 06:58

## 2018-08-05 RX ADMIN — SENNOSIDES 8.6 MG: 8.6 TABLET, FILM COATED ORAL at 09:04

## 2018-08-05 RX ADMIN — FLUPHENAZINE HYDROCHLORIDE 10 MG: 10 TABLET, FILM COATED ORAL at 20:52

## 2018-08-05 RX ADMIN — CLOZAPINE 450 MG: 25 TABLET ORAL at 20:52

## 2018-08-05 RX ADMIN — LEVOTHYROXINE SODIUM 75 MCG: 75 TABLET ORAL at 06:58

## 2018-08-05 RX ADMIN — BUPROPION HYDROCHLORIDE 300 MG: 300 TABLET, FILM COATED, EXTENDED RELEASE ORAL at 09:04

## 2018-08-05 RX ADMIN — POLYETHYLENE GLYCOL 3350 17 G: 17 POWDER, FOR SOLUTION ORAL at 09:03

## 2018-08-05 NOTE — PROGRESS NOTES
Patient has been isolative to room, presents with a flat affect, interacts with staff and select peers appropriately, no behavioral issues  Compliant with 1700 meds without prompting, minimal prompting for 2100 meds   Pt has not verbalized any complaints  Pt has not verbalized any delusional thoughts on this shift  Will continue to encourage pt to utilize safe/effective coping skills and to work on achievement of daily/recovery goals to facilitate discharge

## 2018-08-05 NOTE — PROGRESS NOTES
Abdias Gamez left with his mother, Tayla Davis at this time for a day pass  He was given his routine medications to take at dinner time (midodrine 10mg and senokot 8 6mg)  Verg[place] Vera has agreed to call to check in at pm and the return time has been adjusted to between 2000 and 2030, since they are leaving 15 minutes late today

## 2018-08-05 NOTE — PROGRESS NOTES
Psychiatry Progress Note    Subjective: Interval History     Patient was noted to be more visible in the milieu, walking in the hallway  Minimal interaction with peers  Responds to questions during interview but appeared eager to terminate the session  Arn Karie for pass tomorrow, hopes to go out to eat with his mother   Denies HI SI       Current medications:    Current Facility-Administered Medications:     bisacodyl (DULCOLAX) EC tablet 10 mg, 10 mg, Oral, Daily PRN, Provider Cutover, 10 mg at 07/30/18 0606    bisacodyl (DULCOLAX) rectal suppository 10 mg, 10 mg, Rectal, Daily PRN, LIZETH Mendez    buPROPion (WELLBUTRIN XL) 24 hr tablet 300 mg, 300 mg, Oral, Daily, Provider Cutover, 300 mg at 08/04/18 0811    cloZAPine (CLOZARIL) tablet 450 mg, 450 mg, Oral, HS, Pema Ferrara MD, 450 mg at 08/04/18 2052    divalproex sodium (DEPAKOTE ER) 24 hr tablet 1,000 mg, 1,000 mg, Oral, HS, Alysha Carrillo MD, 1,000 mg at 08/04/18 2052    fluPHENAZine (PROLIXIN) tablet 10 mg, 10 mg, Oral, HS, Alysha Carrillo MD, 10 mg at 08/04/18 2052    levothyroxine tablet 75 mcg, 75 mcg, Oral, Early Morning, Provider Cutover, 75 mcg at 08/04/18 0627    midodrine (PROAMATINE) tablet 10 mg, 10 mg, Oral, TID, Wally Lynn MD, 10 mg at 08/04/18 1704    minocycline (MINOCIN) capsule 100 mg, 100 mg, Oral, Daily, Provider Cutover, 100 mg at 08/04/18 0811    polyethylene glycol (MIRALAX) packet 17 g, 17 g, Oral, Daily, Provider Cutover, 17 g at 08/04/18 9669    senna (SENOKOT) tablet 8 6 mg, 1 tablet, Oral, BID, NEO MeyerNP, 8 6 mg at 08/04/18 1704      Objective:     Vital Signs:  Vitals:    08/04/18 1530 08/04/18 1532 08/04/18 1534 08/04/18 2000   BP: 114/65 114/77 100/65 121/84   BP Location: Right arm Right arm Right arm Right arm   Pulse: (!) 107 (!) 123 (!) 127 (!) 123   Resp:       Temp:       TempSrc:       SpO2:       Weight:       Height:             Appearance:  age appropriate and casually dressed   Behavior: normal   Speech:  normal volume   Mood:  depressed   Affect:  constricted   Thought Process:  blocked   Thought Content:  normal   Perceptual Disturbances: None and Auditory hallucinations without commands   Risk Potential: none   Sensorium:  person, place, situation and time   Cognition:  intact   Consciousness:  alert and awake    Attention: attention span and concentrion are impaired   Intellect: average   Insight:  poor   Judgment: limited      Motor Activity: no abnormal movements           Recent Labs:  Results Reviewed     None          I/O Past 24 hours:  No intake/output data recorded  No intake/output data recorded  Assessment / Plan:     [facility]    Recommended Treatment:      Medication changes:  1) no change    Non-pharmacological treatments  1) Continue with group therapy, milieu therapy and occupational therapy  Safety  1) Safety/communication plan established targeting dynamic risk factors above  2) Risks, benefits, and possible side effects of medications explained to patient and patient verbalizes understanding  Counseling / Coordination of Care    Total floor / unit time spent today 20 minutes  Greater than 50% of total time was spent with the patient and / or family counseling and / or coordination of care  A description of the counseling / coordination of care  Patient's Rights, confidentiality and exceptions to confidentiality, use of automated medical record, [address] [place] [address] staff access to medical record, and consent to treatment reviewed      LIZETH Hernandez

## 2018-08-06 RX ADMIN — LEVOTHYROXINE SODIUM 75 MCG: 75 TABLET ORAL at 06:28

## 2018-08-06 RX ADMIN — FLUPHENAZINE HYDROCHLORIDE 10 MG: 10 TABLET, FILM COATED ORAL at 21:06

## 2018-08-06 RX ADMIN — MIDODRINE HYDROCHLORIDE 10 MG: 2.5 TABLET ORAL at 06:28

## 2018-08-06 RX ADMIN — SENNOSIDES 8.6 MG: 8.6 TABLET, FILM COATED ORAL at 17:04

## 2018-08-06 RX ADMIN — DIVALPROEX SODIUM 1000 MG: 500 TABLET, FILM COATED, EXTENDED RELEASE ORAL at 21:06

## 2018-08-06 RX ADMIN — BUPROPION HYDROCHLORIDE 300 MG: 300 TABLET, FILM COATED, EXTENDED RELEASE ORAL at 08:03

## 2018-08-06 RX ADMIN — CLOZAPINE 450 MG: 25 TABLET ORAL at 21:06

## 2018-08-06 RX ADMIN — MINOCYCLINE HYDROCHLORIDE 100 MG: 100 CAPSULE ORAL at 08:03

## 2018-08-06 RX ADMIN — MIDODRINE HYDROCHLORIDE 10 MG: 2.5 TABLET ORAL at 21:06

## 2018-08-06 NOTE — PROGRESS NOTES
Returned to the unit on time with his mom both stated that the pass went well  Priyanka Calhoun took his midodrine but not his senna due to complaint of loose stools    His mom that they, "went shopping, out to eat, smoked and listened to music, the usual stuff"  Priyanka Calhoun is a man of few words but he does respond appropriately to questions stating that he like "all kinds of music" and he was happy with his new clothing that was signed in by staff, UDS pending

## 2018-08-06 NOTE — PROGRESS NOTES
The individual has been visible for structured activities, then retreats to room and isolates self, is in bed  He attended 2/3 groups  Compliant with behavioral plan  Complaint with meds with minimal prompting  Held Renee and Na related to report of loose stool  No issues or concerns expressed  Will continue to monitor

## 2018-08-06 NOTE — PROGRESS NOTES
Psychiatry Progress Note    Subjective: Interval History     Pt seems to be enjoying his passes home and engages in brief conversations with the staff;   In groups thee is little social or verbal interaction  No medication side effects      Current medications:    Current Facility-Administered Medications:     bisacodyl (DULCOLAX) EC tablet 10 mg, 10 mg, Oral, Daily PRN, Provider Cutover, 10 mg at 07/30/18 0606    bisacodyl (DULCOLAX) rectal suppository 10 mg, 10 mg, Rectal, Daily PRN, LIZETH Sutherland    buPROPion (WELLBUTRIN XL) 24 hr tablet 300 mg, 300 mg, Oral, Daily, Provider Cutover, 300 mg at 08/06/18 0803    cloZAPine (CLOZARIL) tablet 450 mg, 450 mg, Oral, HS, Mario Barahona MD, 450 mg at 08/05/18 2052    divalproex sodium (DEPAKOTE ER) 24 hr tablet 1,000 mg, 1,000 mg, Oral, HS, Quynh Stephen MD, 1,000 mg at 08/05/18 2052    fluPHENAZine (PROLIXIN) tablet 10 mg, 10 mg, Oral, HS, Quynh Stephen MD, 10 mg at 08/05/18 2052    levothyroxine tablet 75 mcg, 75 mcg, Oral, Early Morning, Provider Cutover, 75 mcg at 08/06/18 0628    midodrine (PROAMATINE) tablet 10 mg, 10 mg, Oral, TID, Wally Lynn MD, 10 mg at 08/06/18 0628    minocycline (MINOCIN) capsule 100 mg, 100 mg, Oral, Daily, Provider Cutover, 100 mg at 08/06/18 0803    polyethylene glycol (MIRALAX) packet 17 g, 17 g, Oral, Daily, Provider Cutover, 17 g at 08/05/18 0903    senna (SENOKOT) tablet 8 6 mg, 1 tablet, Oral, BID, LIZETH Meyer, 8 6 mg at 08/05/18 0904      Objective:     Vital Signs:  Vitals:    08/06/18 0730 08/06/18 1545 08/06/18 1550 08/06/18 1551   BP: 108/65 139/69 122/85 120/70   BP Location: Right arm Right arm Right arm Right arm   Pulse: 103 (!) 114 89 93   Resp: 19      Temp: 98 5 °F (36 9 °C)      TempSrc:       SpO2:       Weight:       Height:             Appearance:  age appropriate and casually dressed   Behavior:  guarded   Speech:  soft   Mood:  depressed   Affect:  constricted   Thought Process:  blocked Thought Content:  normal   Perceptual Disturbances: Auditory hallucinations without commands   Risk Potential: none   Sensorium:  person, place, situation and time   Cognition:  intact   Consciousness:  alert and awake    Attention: attention span and concentration were poor   Intellect: average   Insight:  poor   Judgment: good and poor      Motor Activity: no abnormal movements           Recent Labs:  Results Reviewed     None          I/O Past 24 hours:  No intake/output data recorded  No intake/output data recorded  Assessment / Plan:     Chronic paranoid [facility]    Recommended Treatment:      Medication changes:  1) no change    Non-pharmacological treatments  1) Continue with group therapy, milieu therapy and occupational therapy  Safety  1) Safety/communication plan established targeting dynamic risk factors above  2) Risks, benefits, and possible side effects of medications explained to patient and patient verbalizes understanding  Counseling / Coordination of Care    Total floor / unit time spent today 20 minutes  Greater than 50% of total time was spent with the patient and / or family counseling and / or coordination of care  A description of the counseling / coordination of care  Patient's Rights, confidentiality and exceptions to confidentiality, use of automated medical record, [address] [place] [address] staff access to medical record, and consent to treatment reviewed      Jackie Rogers MD

## 2018-08-06 NOTE — PROGRESS NOTES
Christoph Camacho went right to bed after his pass but he did get up for routine medications and was compliant, Gatorade was given with his hs snack, he remains on Fall/SAFE, precautions, he was encouraged to keep his personal belonging off the floor so he does not trip or fall getting up out of bed to void in the middle of the night  He was reminded to refuse his miralax in the am due to loose stools  His heart rate was 133, /82 when he returned from pass  He was encouraged to smoke less, reduce his caffeine intake and drink more water  His UDS was negative

## 2018-08-07 LAB
BASOPHILS # BLD AUTO: 0 THOUSANDS/ΜL (ref 0–0.1)
BASOPHILS NFR BLD AUTO: 1 % (ref 0–1)
EOSINOPHIL # BLD AUTO: 0.3 THOUSAND/ΜL (ref 0–0.4)
EOSINOPHIL NFR BLD AUTO: 4 % (ref 0–6)
ERYTHROCYTE [DISTWIDTH] IN BLOOD BY AUTOMATED COUNT: 12.7 %
HCT VFR BLD AUTO: 47.3 % (ref 41–53)
HGB BLD-MCNC: 15.6 G/DL (ref 13.5–17.5)
LYMPHOCYTES # BLD AUTO: 1.9 THOUSANDS/ΜL (ref 0.5–4)
LYMPHOCYTES NFR BLD AUTO: 23 % (ref 20–50)
MCH RBC QN AUTO: 28.8 PG (ref 26–34)
MCHC RBC AUTO-ENTMCNC: 33 G/DL (ref 31–36)
MCV RBC AUTO: 87 FL (ref 80–100)
MONOCYTES # BLD AUTO: 0.5 THOUSAND/ΜL (ref 0.2–0.9)
MONOCYTES NFR BLD AUTO: 6 % (ref 1–10)
NEUTROPHILS # BLD AUTO: 5.4 THOUSANDS/ΜL (ref 1.8–7.8)
NEUTS SEG NFR BLD AUTO: 66 % (ref 45–65)
PLATELET # BLD AUTO: 216 THOUSANDS/UL (ref 150–450)
PMV BLD AUTO: 8.1 FL (ref 8.9–12.7)
RBC # BLD AUTO: 5.43 MILLION/UL (ref 4.5–5.9)
WBC # BLD AUTO: 8.1 THOUSAND/UL (ref 4.5–11)

## 2018-08-07 PROCEDURE — 85025 COMPLETE CBC W/AUTO DIFF WBC: CPT | Performed by: PSYCHIATRY & NEUROLOGY

## 2018-08-07 RX ADMIN — MIDODRINE HYDROCHLORIDE 10 MG: 2.5 TABLET ORAL at 16:38

## 2018-08-07 RX ADMIN — SENNOSIDES 8.6 MG: 8.6 TABLET, FILM COATED ORAL at 17:48

## 2018-08-07 RX ADMIN — SENNOSIDES 8.6 MG: 8.6 TABLET, FILM COATED ORAL at 08:32

## 2018-08-07 RX ADMIN — BUPROPION HYDROCHLORIDE 300 MG: 300 TABLET, FILM COATED, EXTENDED RELEASE ORAL at 08:32

## 2018-08-07 RX ADMIN — FLUPHENAZINE HYDROCHLORIDE 10 MG: 10 TABLET, FILM COATED ORAL at 20:57

## 2018-08-07 RX ADMIN — MINOCYCLINE HYDROCHLORIDE 100 MG: 100 CAPSULE ORAL at 08:32

## 2018-08-07 RX ADMIN — MIDODRINE HYDROCHLORIDE 10 MG: 2.5 TABLET ORAL at 20:56

## 2018-08-07 RX ADMIN — LEVOTHYROXINE SODIUM 75 MCG: 75 TABLET ORAL at 06:02

## 2018-08-07 RX ADMIN — POLYETHYLENE GLYCOL 3350 17 G: 17 POWDER, FOR SOLUTION ORAL at 12:11

## 2018-08-07 RX ADMIN — CLOZAPINE 450 MG: 25 TABLET ORAL at 20:57

## 2018-08-07 RX ADMIN — DIVALPROEX SODIUM 1000 MG: 500 TABLET, FILM COATED, EXTENDED RELEASE ORAL at 20:57

## 2018-08-07 NOTE — NURSING NOTE
Visible on unit at intervals  Medication and meal compliant  Denies suicidal ideations  Remains on fall precautions for safety  Offers no complaints this shift  Guarded during conversation with this RN  Denies any auditory hallucinations at this time  Appetite good, consumed 100% of breakfast  Encouraged to focus on his treatment plan goals and expectations as part of his recovery process

## 2018-08-07 NOTE — PROGRESS NOTES
Clozapine registry entry was completed for patient taking clozapine 450 mg per day  Lab Results   Component Value Date    WBC 8 10 08/07/2018       Lab Results   Component Value Date    NEUTROABS 5 40 08/07/2018       The next labs are due on 9/4/18 and have been ordered for patient  Patient requires every fourth week monitoring per the clozapine registry data base

## 2018-08-07 NOTE — PROGRESS NOTES
Jose Luis Panchal has been awake, alert, and visible intermittently out in the milieu  Compliant with scheduled meds with prompting x 1  Ate 50% supper  No interaction noted with peers  Maintained on Safe/Fall Precautions without incident  Affect remains flat, blunted, and difficult to engage in conversation  Pt denies any depression, anxiety, or a/v hallucinations  Remains preoccupied  Will continue to encourage pt to utilize safe/effective coping skills, encourage socialization with peers, and to work toward achievement of daily/recovery goals

## 2018-08-07 NOTE — PROGRESS NOTES
Pt attended [address] group  Engaged in  activity when prompted  Pt increased verbal responses and eye contact improved with head directed toward people talking  Pt did complete exercise on Wheel of Life indicating and making an assessment of 8 domains listed  (health, personal growth, physical environment, finances  family and friends, other areas/recreation, spirituality and volunteer/activities  Continue to provide therapeutic group support

## 2018-08-07 NOTE — PROGRESS NOTES
Psychiatry Progress Note    Subjective: Interval History     Pt is in bed this am, minimal, one word answers to questions, affect flat; continue hallucinations      Current medications:    Current Facility-Administered Medications:     bisacodyl (DULCOLAX) EC tablet 10 mg, 10 mg, Oral, Daily PRN, Provider Cutover, 10 mg at 07/30/18 0606    bisacodyl (DULCOLAX) rectal suppository 10 mg, 10 mg, Rectal, Daily PRN, LIZETH Clayton    buPROPion (WELLBUTRIN XL) 24 hr tablet 300 mg, 300 mg, Oral, Daily, Provider Cutover, 300 mg at 08/07/18 0901    cloZAPine (CLOZARIL) tablet 450 mg, 450 mg, Oral, HS, Joy Wright MD, 450 mg at 08/06/18 2106    divalproex sodium (DEPAKOTE ER) 24 hr tablet 1,000 mg, 1,000 mg, Oral, , Ken Pineda MD, 1,000 mg at 08/06/18 2106    fluPHENAZine (PROLIXIN) tablet 10 mg, 10 mg, Oral, HS, Ken Pineda MD, 10 mg at 08/06/18 2106    levothyroxine tablet 75 mcg, 75 mcg, Oral, Early Morning, Provider Cutover, 75 mcg at 08/07/18 0602    midodrine (PROAMATINE) tablet 10 mg, 10 mg, Oral, TID, Wally Lynn MD, 10 mg at 08/06/18 2106    minocycline (MINOCIN) capsule 100 mg, 100 mg, Oral, Daily, Provider Cutover, 100 mg at 08/07/18 4203    polyethylene glycol (MIRALAX) packet 17 g, 17 g, Oral, Daily, Provider Cutover, 17 g at 08/07/18 1211    senna (SENOKOT) tablet 8 6 mg, 1 tablet, Oral, BID, LIZETH Brian, 8 6 mg at 08/07/18 0832      Objective:     Vital Signs:  Vitals:    08/06/18 1925 08/07/18 0531 08/07/18 0800 08/07/18 0805   BP: 109/58 138/85 120/79 136/73   BP Location: Right arm Left arm Left arm Left arm   Pulse: (!) 111 91 (!) 107 (!) 117   Resp:   20    Temp:   97 9 °F (36 6 °C)    TempSrc:   Temporal    SpO2:       Weight:       Height:             Appearance:  age appropriate and casually dressed   Behavior:  guarded   Speech:  soft   Mood:  depressed   Affect:  constricted   Thought Process:  concrete   Thought Content:  normal   Perceptual Disturbances:  Auditory hallucinations without commands   Risk Potential: none   Sensorium:  person, place, situation and time   Cognition:  intact   Consciousness:  alert and awake    Attention: attention span and concentration not appropriate   Intellect: average   Insight:  poor   Judgment: poor      Motor Activity: no abnormal movements           Recent Labs:  Results Reviewed     None          I/O Past 24 hours:  No intake/output data recorded  No intake/output data recorded  Assessment / Plan:     [facility]    Recommended Treatment:      Medication changes:  1) no change    Non-pharmacological treatments  1) Continue with group therapy, milieu therapy and occupational therapy  Safety  1) Safety/communication plan established targeting dynamic risk factors above  2) Risks, benefits, and possible side effects of medications explained to patient and patient verbalizes understanding  Counseling / Coordination of Care    Total floor / unit time spent today 20 minutes  Greater than 50% of total time was spent with the patient and / or family counseling and / or coordination of care  A description of the counseling / coordination of care  Patient's Rights, confidentiality and exceptions to confidentiality, use of automated medical record, [facility] staff access to medical record, and consent to treatment reviewed      Lizbeth Benoit MD

## 2018-08-07 NOTE — PROGRESS NOTES
Tawnya Harada attended and participated in 2/2 evening groups out on deck with staff and peers  Compliant with scheduled 2100 meds without prompting  Had evening snack  Resting quietly in bed at present, arouses easily  Will continue to monitor/assess for any changes

## 2018-08-07 NOTE — PROGRESS NOTES
Sleeping at this time, no s/s of distress noted  Safe and fall precautions maintained   Monitoring continues

## 2018-08-08 RX ADMIN — DIVALPROEX SODIUM 1000 MG: 500 TABLET, FILM COATED, EXTENDED RELEASE ORAL at 20:55

## 2018-08-08 RX ADMIN — MINOCYCLINE HYDROCHLORIDE 100 MG: 100 CAPSULE ORAL at 09:01

## 2018-08-08 RX ADMIN — POLYETHYLENE GLYCOL 3350 17 G: 17 POWDER, FOR SOLUTION ORAL at 09:02

## 2018-08-08 RX ADMIN — SENNOSIDES 8.6 MG: 8.6 TABLET, FILM COATED ORAL at 17:13

## 2018-08-08 RX ADMIN — CLOZAPINE 450 MG: 25 TABLET ORAL at 20:55

## 2018-08-08 RX ADMIN — SENNOSIDES 8.6 MG: 8.6 TABLET, FILM COATED ORAL at 09:01

## 2018-08-08 RX ADMIN — MIDODRINE HYDROCHLORIDE 10 MG: 2.5 TABLET ORAL at 20:54

## 2018-08-08 RX ADMIN — MIDODRINE HYDROCHLORIDE 10 MG: 2.5 TABLET ORAL at 06:34

## 2018-08-08 RX ADMIN — LEVOTHYROXINE SODIUM 75 MCG: 75 TABLET ORAL at 06:34

## 2018-08-08 RX ADMIN — BUPROPION HYDROCHLORIDE 300 MG: 300 TABLET, FILM COATED, EXTENDED RELEASE ORAL at 09:00

## 2018-08-08 RX ADMIN — FLUPHENAZINE HYDROCHLORIDE 10 MG: 10 TABLET, FILM COATED ORAL at 20:55

## 2018-08-08 RX ADMIN — MIDODRINE HYDROCHLORIDE 10 MG: 2.5 TABLET ORAL at 16:49

## 2018-08-08 NOTE — PROGRESS NOTES
Pt improved alert and awareness  Pt makes eye contact and speaks when prompted  Able to share thoughts in group and  engaged in discission  Group focused on IMR workbook  Understanding recovery aspects, (pg [phone]) defining social support and why is it important, strategies for connecting with people , reducing stress, and benefits  Continue to provide therapeutic group support

## 2018-08-08 NOTE — NURSING NOTE
Patient visible on unit  Isolative to self  Pleasant upon approach  Flat affect  One word answers  Medication compliant  Denies suicidal ideations  Guarded  Showered independently  Denies any auditory hallucinations  No responding to internal stimuli observed this shift  Encouraged to drink water to prevent hypotension episodes  Attending groups

## 2018-08-08 NOTE — PROGRESS NOTES
Jn Hale has been awake, alert, and mostly isolative to self  No behavioral issues  Compliant with scheduled meds without prompting  Ate 100% supper  Affect remains flat, blunted, and difficult to engage in conversation  Pt denies any depression, anxiety, or a/v hallucinations  Remains preoccupied  Will continue to encourage pt to utilize safe/effective coping skills and to work toward achievement of daily/recovery goals  Attended and participated in Men's Group and 2/2 evening groups  Had evening snack  Resting quietly in bed at present, arouses easily  Will continue to monitor/assess for any changes

## 2018-08-08 NOTE — PROGRESS NOTES
Psychiatry Progress Note    Subjective: Interval History     This patient continues with the same mental status and behavior  Vertebral change from day-to-day in terms of his isolative guarded withdrawn behavior hallucinations are present but less frequent and intense when he will answer questions very briefly for the most part no new medication issues        Current medications:    Current Facility-Administered Medications:     bisacodyl (DULCOLAX) EC tablet 10 mg, 10 mg, Oral, Daily PRN, Provider Cutover, 10 mg at 07/30/18 0606    bisacodyl (DULCOLAX) rectal suppository 10 mg, 10 mg, Rectal, Daily PRN, LIZETH Bond    buPROPion (WELLBUTRIN XL) 24 hr tablet 300 mg, 300 mg, Oral, Daily, Provider Cutover, 300 mg at 08/08/18 0900    cloZAPine (CLOZARIL) tablet 450 mg, 450 mg, Oral, HS, Jorge Davis MD, 450 mg at 08/07/18 2057    divalproex sodium (DEPAKOTE ER) 24 hr tablet 1,000 mg, 1,000 mg, Oral, HS, Domenica Amato MD, 1,000 mg at 08/07/18 2057    fluPHENAZine (PROLIXIN) tablet 10 mg, 10 mg, Oral, HS, Domenica Amato MD, 10 mg at 08/07/18 2057    levothyroxine tablet 75 mcg, 75 mcg, Oral, Early Morning, Provider Cutover, 75 mcg at 08/08/18 0634    midodrine (PROAMATINE) tablet 10 mg, 10 mg, Oral, TID, Wally Lynn MD, 10 mg at 08/08/18 0634    minocycline (MINOCIN) capsule 100 mg, 100 mg, Oral, Daily, Provider Cutover, 100 mg at 08/08/18 0901    polyethylene glycol (MIRALAX) packet 17 g, 17 g, Oral, Daily, Provider Cutover, 17 g at 08/08/18 0902    senna (SENOKOT) tablet 8 6 mg, 1 tablet, Oral, BID, LIZETH Meyer, 8 6 mg at 08/08/18 0901      Objective:     Vital Signs:  Vitals:    08/07/18 1930 08/08/18 0530 08/08/18 0730 08/08/18 0735   BP: 111/65 111/80 120/74 112/56   BP Location: Left arm Right arm Left arm Left arm   Pulse: (!) 124 (!) 116 (!) 110 (!) 119   Resp:   19 19   Temp:   (!) 97 1 °F (36 2 °C)    TempSrc:   Temporal    SpO2:       Weight:       Height:             Appearance: age appropriate and casually dressed   Behavior:  guarded   Speech:  soft   Mood:  depressed   Affect:  constricted   Thought Process:  normal   Thought Content:  normal   Perceptual Disturbances: None and Auditory hallucinations without commands   Risk Potential: none   Sensorium:  person, place, situation and time   Cognition:  intact   Consciousness:  alert and awake    Attention: attention span and concentration were not appropriate   Intellect: average   Insight:  poor   Judgment: poor      Motor Activity: no abnormal movements           Recent Labs:  Results Reviewed     None          I/O Past 24 hours:  No intake/output data recorded  No intake/output data recorded  Assessment / Plan:     [facility] ([facility]    Recommended Treatment:      Medication changes:  1) none    Non-pharmacological treatments  1) Continue with group therapy, milieu therapy and occupational therapy  Safety  1) Safety/communication plan established targeting dynamic risk factors above  2) Risks, benefits, and possible side effects of medications explained to patient and patient verbalizes understanding  Counseling / Coordination of Care    Total floor / unit time spent today 20 minutes  Greater than 50% of total time was spent with the patient and / or family counseling and / or coordination of care  A description of the counseling / coordination of care  Patient's Rights, confidentiality and exceptions to confidentiality, use of automated medical record, [address] [place] [address] staff access to medical record, and consent to treatment reviewed      Yang Rivers MD

## 2018-08-09 RX ADMIN — MINOCYCLINE HYDROCHLORIDE 100 MG: 100 CAPSULE ORAL at 08:41

## 2018-08-09 RX ADMIN — CLOZAPINE 450 MG: 25 TABLET ORAL at 21:00

## 2018-08-09 RX ADMIN — DIVALPROEX SODIUM 1000 MG: 500 TABLET, FILM COATED, EXTENDED RELEASE ORAL at 21:00

## 2018-08-09 RX ADMIN — POLYETHYLENE GLYCOL 3350 17 G: 17 POWDER, FOR SOLUTION ORAL at 08:42

## 2018-08-09 RX ADMIN — LEVOTHYROXINE SODIUM 75 MCG: 75 TABLET ORAL at 06:38

## 2018-08-09 RX ADMIN — MIDODRINE HYDROCHLORIDE 10 MG: 2.5 TABLET ORAL at 21:00

## 2018-08-09 RX ADMIN — FLUPHENAZINE HYDROCHLORIDE 10 MG: 10 TABLET, FILM COATED ORAL at 21:00

## 2018-08-09 RX ADMIN — SENNOSIDES 8.6 MG: 8.6 TABLET, FILM COATED ORAL at 17:01

## 2018-08-09 RX ADMIN — SENNOSIDES 8.6 MG: 8.6 TABLET, FILM COATED ORAL at 08:41

## 2018-08-09 RX ADMIN — BUPROPION HYDROCHLORIDE 300 MG: 300 TABLET, FILM COATED, EXTENDED RELEASE ORAL at 08:41

## 2018-08-09 NOTE — PROGRESS NOTES
Individual maintained on ongoing SAFE and fall precaution without any incident on this shift    He is laying in bed, eyes closed, breath evenly and unlabored   Checked every 15 minutes during rounds   In no apparent distress   Will continue to monitor behavior and sleep pattern  Individual midodrine 10mg held this morning for /57  P122  Will continue to monitor

## 2018-08-09 NOTE — PROGRESS NOTES
Pt attended [address] group in dining room  Pt alert and able to speak about needs and positive eye contact  Pt engaged in dialogue and competed worksheet on goal setting  reviewed SMART goals and willingness and action plan  Pt able to complete worksheet  Pt working on treatment plan goals and was able to identify feelings and thoughts  Pt able to identify short term, medium term and long term goals  Praised pt for progress  Provide therapeutic group support

## 2018-08-09 NOTE — PROGRESS NOTES
Alberto Petit has been awake, alert, and visible intermittently out in the milieu  No interaction noted with peers  Pt isolative to self and walks around unit at times  Affect remains flat, blunted, and offers little conversation with staff  Compliant with scheduled meds with prompting x 1  Scheduled 1600 dose of Midodrine held per MD BP parameters  No behavioral issues  Mother and sister in at present and brought food in for pt which pt is eating  Will continue to encourage pt to utilize safe/effective coping skills and to work toward daily/recovery goals

## 2018-08-09 NOTE — PROGRESS NOTES
Psychiatry Progress Note    Subjective:   Interval History     Team mtg:  Pt is doing about the same; superficially cooperative with his program; isolates and is quiet, non-communicative; tolerates meds      Current medications:    Current Facility-Administered Medications:     bisacodyl (DULCOLAX) EC tablet 10 mg, 10 mg, Oral, Daily PRN, Provider Cutover, 10 mg at 07/30/18 0606    bisacodyl (DULCOLAX) rectal suppository 10 mg, 10 mg, Rectal, Daily PRN, LIZETH Owen    buPROPion (WELLBUTRIN XL) 24 hr tablet 300 mg, 300 mg, Oral, Daily, Provider Cutover, 300 mg at 08/09/18 0841    cloZAPine (CLOZARIL) tablet 450 mg, 450 mg, Oral, HS, All Huffman MD, 450 mg at 08/08/18 2055    divalproex sodium (DEPAKOTE ER) 24 hr tablet 1,000 mg, 1,000 mg, Oral, HS, Doreen Olmstead MD, 1,000 mg at 08/08/18 2055    fluPHENAZine (PROLIXIN) tablet 10 mg, 10 mg, Oral, HS, Doreen Olmstead MD, 10 mg at 08/08/18 2055    levothyroxine tablet 75 mcg, 75 mcg, Oral, Early Morning, Provider Cutover, 75 mcg at 08/09/18 0884    midodrine (PROAMATINE) tablet 10 mg, 10 mg, Oral, TID, Wally Lynn MD, 10 mg at 08/08/18 2054    minocycline (MINOCIN) capsule 100 mg, 100 mg, Oral, Daily, Provider Cutover, 100 mg at 08/09/18 0841    polyethylene glycol (MIRALAX) packet 17 g, 17 g, Oral, Daily, Provider Cutover, 17 g at 08/09/18 0842    senna (SENOKOT) tablet 8 6 mg, 1 tablet, Oral, BID, LIZETH Simpson, 8 6 mg at 08/09/18 0841      Objective:     Vital Signs:  Vitals:    08/08/18 1532 08/08/18 1930 08/09/18 0530 08/09/18 0745   BP: 101/67 109/68 123/57 132/69   BP Location: Left arm Left arm Left arm Left arm   Pulse: (!) 119 (!) 121 (!) 122 104   Resp:    18   Temp:    (!) 96 7 °F (35 9 °C)   TempSrc:    Temporal   SpO2:       Weight:       Height:             Appearance:  age appropriate and casually dressed   Behavior:  normal   Speech:  normal volume   Mood:  depressed   Affect:  constricted   Thought Process:  normal   Thought Content:  normal   Perceptual Disturbances: None   Risk Potential: none   Sensorium:  person, place, situation and time   Cognition:  intact   Consciousness:  alert and awake    Attention: attention span and concentration were age appropriate   Intellect: average   Insight:  good   Judgment: good      Motor Activity: no abnormal movements           Recent Labs:  Results Reviewed     None          I/O Past 24 hours:  I/O last 3 completed shifts:  In: -   Out: 1 [Stool:1]  No intake/output data recorded  Assessment / Plan:     Chronic [facility] ([facility]    Recommended Treatment:      Medication changes:  1) no change    Non-pharmacological treatments  1) Continue with group therapy, milieu therapy and occupational therapy  Safety  1) Safety/communication plan established targeting dynamic risk factors above  2) Risks, benefits, and possible side effects of medications explained to patient and patient verbalizes understanding  Counseling / Coordination of Care    Total floor / unit time spent today 20 minutes  Greater than 50% of total time was spent with the patient and / or family counseling and / or coordination of care  A description of the counseling / coordination of care  Patient's Rights, confidentiality and exceptions to confidentiality, use of automated medical record, [address] [place] [place] [address] staff access to medical record, and consent to treatment reviewed      Page Zazueta MD

## 2018-08-09 NOTE — ESCALATED TEAM TX
Patient attended treatment team meeting this morning prepared with self-assessment  Patient's group attendance for last week was 68%  Patient's social skills appear to be improving, as he is with better eye contact and conversation every week  Patient reports that he believes his passes are therapeutic because he talks to his mother about maintaining positivity  Patient was granted a pass for Sunday, 8/12 from 10-8p with his mother  Patient verbalized no further questions or concerns at the conclusion of the meeting  Next team meeting scheduled for 8/16

## 2018-08-09 NOTE — PROGRESS NOTES
Tawnya Harada attended all of the group activities today  He was compliant with medications and meals, no behavioral issues noted will continue to monitor

## 2018-08-09 NOTE — CASE MANAGEMENT
Patient continues to do well on the unit  Patient is compliant with his behavioral plan and has been more participatory in group settings  CM inquired with [facility] how soon a bed may become available at [facility] or [facility] to which this CM was told that there is no known timeline available for when beds may be available and it may be a good idea to look for alternative placements in the meantime  CM made some calls and discovered that [facility] in [place], [place] is looking at having some bed available soon  CM met with patient asked how he would feel about going to [facility] but he reported he would like to discuss this with his mother first and let this CM know  Patient reported he would be speaking with her tonight  CM will follow up with patient tomorrow  CM will continue to follow patient's progress and assist with discharge planning needs

## 2018-08-09 NOTE — PROGRESS NOTES
Emy Pritchard has been awake, alert, and visible intermittently out in the milieu  Compliant with scheduled meds with little prompting  No interaction noted with peers  Isolative to self  Affect remains flat, blunted, and offers minimal conversation with staff  Eye contact slightly improved  Ate 50% supper  Attended and participated in 2/2 evening groups  Had evening snack  Will continue to encourage pt to utilize safe/effective coping skills and to work toward achievement of daily/recovery goals  Resting quietly in bed at present, arouses easily  Will continue to monitor/assess for any changes

## 2018-08-10 LAB — VALPROATE SERPL-MCNC: 35.5 UG/ML (ref 50–120)

## 2018-08-10 PROCEDURE — 80164 ASSAY DIPROPYLACETIC ACD TOT: CPT | Performed by: PSYCHIATRY & NEUROLOGY

## 2018-08-10 RX ADMIN — SENNOSIDES 8.6 MG: 8.6 TABLET, FILM COATED ORAL at 17:00

## 2018-08-10 RX ADMIN — POLYETHYLENE GLYCOL 3350 17 G: 17 POWDER, FOR SOLUTION ORAL at 08:11

## 2018-08-10 RX ADMIN — MIDODRINE HYDROCHLORIDE 10 MG: 2.5 TABLET ORAL at 16:18

## 2018-08-10 RX ADMIN — MIDODRINE HYDROCHLORIDE 10 MG: 2.5 TABLET ORAL at 06:13

## 2018-08-10 RX ADMIN — CLOZAPINE 450 MG: 25 TABLET ORAL at 20:47

## 2018-08-10 RX ADMIN — BUPROPION HYDROCHLORIDE 300 MG: 300 TABLET, FILM COATED, EXTENDED RELEASE ORAL at 08:11

## 2018-08-10 RX ADMIN — SENNOSIDES 8.6 MG: 8.6 TABLET, FILM COATED ORAL at 08:11

## 2018-08-10 RX ADMIN — MINOCYCLINE HYDROCHLORIDE 100 MG: 100 CAPSULE ORAL at 08:11

## 2018-08-10 RX ADMIN — DIVALPROEX SODIUM 1000 MG: 500 TABLET, FILM COATED, EXTENDED RELEASE ORAL at 20:47

## 2018-08-10 RX ADMIN — FLUPHENAZINE HYDROCHLORIDE 10 MG: 10 TABLET, FILM COATED ORAL at 20:47

## 2018-08-10 RX ADMIN — LEVOTHYROXINE SODIUM 75 MCG: 75 TABLET ORAL at 06:13

## 2018-08-10 NOTE — PROGRESS NOTES
Individual maintained on ongoing SAFE and fall precaution without any incident on this shift    He is laying in bed, eyes closed, breath evenly and unlabored   Checked every 15 minutes during rounds   In no apparent distress   Will continue to monitor behavior and sleep pattern    Individual has a scheduled lab: Valproic Acid level

## 2018-08-10 NOTE — PROGRESS NOTES
Individual has been visible for structured activities then retreats to room  He interacts to have needs met, but no socialization with peers  Compliant with meds without prompting  Participated in programming, attended 2/3 groups  Went on [employer] with peers, supervised by [facility]  Appears to have enjoyed himself  Availability of staff made known  Will continue to monitor

## 2018-08-10 NOTE — PROGRESS NOTES
Psychiatry Progress Note    Subjective:   Interval History     Pt is the same clinically, withdrawn, brief conversations; discussing the possibility of going into a personal care out of the area because there still is a long wait list for the crc's in the immediate area      Current medications:    Current Facility-Administered Medications:     bisacodyl (DULCOLAX) EC tablet 10 mg, 10 mg, Oral, Daily PRN, Provider Cutover, 10 mg at 07/30/18 0606    bisacodyl (DULCOLAX) rectal suppository 10 mg, 10 mg, Rectal, Daily PRN, LIZETH Mesa    buPROPion (WELLBUTRIN XL) 24 hr tablet 300 mg, 300 mg, Oral, Daily, Provider Cutover, 300 mg at 08/10/18 0811    cloZAPine (CLOZARIL) tablet 450 mg, 450 mg, Oral, HS, Lino Recinos MD, 450 mg at 08/09/18 2100    divalproex sodium (DEPAKOTE ER) 24 hr tablet 1,000 mg, 1,000 mg, Oral, HS, Leah Forman MD, 1,000 mg at 08/09/18 2100    fluPHENAZine (PROLIXIN) tablet 10 mg, 10 mg, Oral, HS, Leah Forman MD, 10 mg at 08/09/18 2100    levothyroxine tablet 75 mcg, 75 mcg, Oral, Early Morning, Provider Cutover, 75 mcg at 08/10/18 0613    midodrine (PROAMATINE) tablet 10 mg, 10 mg, Oral, TID, Wally Lynn MD, 10 mg at 08/10/18 5582    minocycline (MINOCIN) capsule 100 mg, 100 mg, Oral, Daily, Provider Cutover, 100 mg at 08/10/18 0811    polyethylene glycol (MIRALAX) packet 17 g, 17 g, Oral, Daily, Provider Cutover, 17 g at 08/10/18 0811    senna (SENOKOT) tablet 8 6 mg, 1 tablet, Oral, BID, LIZETH Meyer, 8 6 mg at 08/10/18 0811      Objective:     Vital Signs:  Vitals:    08/09/18 1945 08/10/18 0527 08/10/18 0736 08/10/18 0739   BP: 112/85 113/72 124/85 110/73   BP Location: Left arm Right arm Left arm Left arm   Pulse: (!) 120 (!) 110 97 (!) 119   Resp:   20 20   Temp:   97 7 °F (36 5 °C)    TempSrc:   Temporal    SpO2:       Weight:       Height:             Appearance:  age appropriate and casually dressed   Behavior:  normal   Speech:  normal volume   Mood: depressed   Affect:  constricted   Thought Process:  blocked   Thought Content:  normal   Perceptual Disturbances: Auditory hallucinations without commands   Risk Potential: none   Sensorium:  person, place, situation and time   Cognition:  intact   Consciousness:  alert and awake    Attention: attention span and concentration were not appropriate   Intellect: average   Insight:  good   Judgment: good      Motor Activity: no abnormal movements           Recent Labs:  Results Reviewed     None          I/O Past 24 hours:  I/O last 3 completed shifts:  In: -   Out: 2 [Stool:2]  No intake/output data recorded  Assessment / Plan:     [facility]    Recommended Treatment:      Medication changes:  1) none    Non-pharmacological treatments  1) Continue with group therapy, milieu therapy and occupational therapy  Safety  1) Safety/communication plan established targeting dynamic risk factors above  2) Risks, benefits, and possible side effects of medications explained to patient and patient verbalizes understanding  Counseling / Coordination of Care    Total floor / unit time spent today 20 minutes  Greater than 50% of total time was spent with the patient and / or family counseling and / or coordination of care  A description of the counseling / coordination of care  Patient's Rights, confidentiality and exceptions to confidentiality, use of automated medical record, [address] [place] [address] staff access to medical record, and consent to treatment reviewed      Saeid Albarran MD

## 2018-08-11 RX ADMIN — MIDODRINE HYDROCHLORIDE 10 MG: 2.5 TABLET ORAL at 06:02

## 2018-08-11 RX ADMIN — POLYETHYLENE GLYCOL 3350 17 G: 17 POWDER, FOR SOLUTION ORAL at 08:59

## 2018-08-11 RX ADMIN — FLUPHENAZINE HYDROCHLORIDE 10 MG: 10 TABLET, FILM COATED ORAL at 20:51

## 2018-08-11 RX ADMIN — SENNOSIDES 8.6 MG: 8.6 TABLET, FILM COATED ORAL at 17:08

## 2018-08-11 RX ADMIN — SENNOSIDES 8.6 MG: 8.6 TABLET, FILM COATED ORAL at 08:59

## 2018-08-11 RX ADMIN — MIDODRINE HYDROCHLORIDE 10 MG: 2.5 TABLET ORAL at 20:50

## 2018-08-11 RX ADMIN — LEVOTHYROXINE SODIUM 75 MCG: 75 TABLET ORAL at 06:02

## 2018-08-11 RX ADMIN — CLOZAPINE 450 MG: 25 TABLET ORAL at 20:51

## 2018-08-11 RX ADMIN — MINOCYCLINE HYDROCHLORIDE 100 MG: 100 CAPSULE ORAL at 08:59

## 2018-08-11 RX ADMIN — BUPROPION HYDROCHLORIDE 300 MG: 300 TABLET, FILM COATED, EXTENDED RELEASE ORAL at 08:59

## 2018-08-11 RX ADMIN — DIVALPROEX SODIUM 1000 MG: 500 TABLET, FILM COATED, EXTENDED RELEASE ORAL at 20:51

## 2018-08-11 NOTE — PROGRESS NOTES
In bed eyes closed appears to be sleeping will continue to monitor body shifting and breath sounds noted, SAFE/Fall precautions maintained, will encourage fluids with routine am medications

## 2018-08-11 NOTE — PROGRESS NOTES
Jaime Dorsey attended and participated in 2/2 evening groups  He was compliant with routine medications and ate 100% of her dinner  His mom visited this evening  He has been visible but not social with staff or peers, no somatic complaints, fluids encouraged, maintained on Fall/SAFE precautions

## 2018-08-11 NOTE — PROGRESS NOTES
Psychiatry Progress Note    Subjective: Interval History     Patient isolative to his room this morning  Patient reports that he has been trying to attend as many groups as he can  Patient reports that he is feeling well  Patient continues to minimize any psychiatric symptoms  Patient reports that he does have a past with his mother scheduled for tomorrow  Patient has been medication and meal compliant        Current medications:    Current Facility-Administered Medications:     bisacodyl (DULCOLAX) EC tablet 10 mg, 10 mg, Oral, Daily PRN, Provider Cutover, 10 mg at 07/30/18 0606    bisacodyl (DULCOLAX) rectal suppository 10 mg, 10 mg, Rectal, Daily PRN, LIZETH Menendez    buPROPion (WELLBUTRIN XL) 24 hr tablet 300 mg, 300 mg, Oral, Daily, Provider Cutover, 300 mg at 08/11/18 0859    cloZAPine (CLOZARIL) tablet 450 mg, 450 mg, Oral, HS, Rico Byrne MD, 450 mg at 08/10/18 2047    divalproex sodium (DEPAKOTE ER) 24 hr tablet 1,000 mg, 1,000 mg, Oral, HS, Arnel Bauer MD, 1,000 mg at 08/10/18 2047    fluPHENAZine (PROLIXIN) tablet 10 mg, 10 mg, Oral, HS, Arnel Bauer MD, 10 mg at 08/10/18 2047    levothyroxine tablet 75 mcg, 75 mcg, Oral, Early Morning, Provider Cutover, 75 mcg at 08/11/18 0602    midodrine (PROAMATINE) tablet 10 mg, 10 mg, Oral, TID, Wally Lynn MD, 10 mg at 08/11/18 0602    minocycline (MINOCIN) capsule 100 mg, 100 mg, Oral, Daily, Provider Cutover, 100 mg at 08/11/18 0859    polyethylene glycol (MIRALAX) packet 17 g, 17 g, Oral, Daily, Provider Cutover, 17 g at 08/11/18 0859    senna (SENOKOT) tablet 8 6 mg, 1 tablet, Oral, BID, LIZETH Kelly, 8 6 mg at 08/11/18 0859      Objective:     Vital Signs:  Vitals:    08/10/18 1924 08/11/18 0621 08/11/18 0730 08/11/18 0735   BP: 127/87 118/64 118/84 111/74   BP Location: Left arm Left arm Left arm Left arm   Pulse: (!) 120 (!) 126 102 (!) 112   Resp:   18 18   Temp:   98 1 °F (36 7 °C)    TempSrc:   Temporal    SpO2: Weight:       Height:             Appearance:  age appropriate, casually dressed and disheveled   Behavior:  normal   Speech:  soft   Mood:  depressed   Affect:  flat   Thought Process:  normal   Thought Content:  normal   Perceptual Disturbances: None   Risk Potential: none   Sensorium:  person, place and time   Cognition:  intact   Consciousness:  alert and awake    Attention: attention span and concentration were age diminished   Intellect: average   Insight:  limited   Judgment: limited      Motor Activity: no abnormal movements           Recent Labs:  Results Reviewed     None          I/O Past 24 hours:  No intake/output data recorded  No intake/output data recorded  Assessment / Plan:     [facility] ([facility]    Recommended Treatment:      Medication changes:  1) continue current medication regimen  Non-pharmacological treatments  1) Continue with group therapy, milieu therapy and occupational therapy  Safety  1) Safety/communication plan established targeting dynamic risk factors above  2) Risks, benefits, and possible side effects of medications explained to patient and patient verbalizes understanding  Counseling / Coordination of Care    Total floor / unit time spent today 20 minutes  Greater than 50% of total time was spent with the patient and / or family counseling and / or coordination of care  A description of the counseling / coordination of care  Patient's Rights, confidentiality and exceptions to confidentiality, use of automated medical record, [facility] to [facility] record, and consent to treatment reviewed      Wai Colorado PA-C

## 2018-08-11 NOTE — PROGRESS NOTES
Med and meal compliant  Isolative to room/self  Soft quiet response to staff questions, poor eye contact   Offers no complaints

## 2018-08-12 PROCEDURE — 80307 DRUG TEST PRSMV CHEM ANLYZR: CPT | Performed by: PSYCHIATRY & NEUROLOGY

## 2018-08-12 RX ADMIN — MIDODRINE HYDROCHLORIDE 10 MG: 2.5 TABLET ORAL at 06:30

## 2018-08-12 RX ADMIN — MINOCYCLINE HYDROCHLORIDE 100 MG: 100 CAPSULE ORAL at 09:15

## 2018-08-12 RX ADMIN — FLUPHENAZINE HYDROCHLORIDE 10 MG: 10 TABLET, FILM COATED ORAL at 21:01

## 2018-08-12 RX ADMIN — SENNOSIDES 8.6 MG: 8.6 TABLET, FILM COATED ORAL at 09:13

## 2018-08-12 RX ADMIN — CLOZAPINE 450 MG: 25 TABLET ORAL at 21:01

## 2018-08-12 RX ADMIN — MIDODRINE HYDROCHLORIDE 10 MG: 2.5 TABLET ORAL at 21:01

## 2018-08-12 RX ADMIN — BUPROPION HYDROCHLORIDE 300 MG: 300 TABLET, FILM COATED, EXTENDED RELEASE ORAL at 09:13

## 2018-08-12 RX ADMIN — DIVALPROEX SODIUM 1000 MG: 500 TABLET, FILM COATED, EXTENDED RELEASE ORAL at 21:01

## 2018-08-12 RX ADMIN — POLYETHYLENE GLYCOL 3350 17 G: 17 POWDER, FOR SOLUTION ORAL at 09:17

## 2018-08-12 RX ADMIN — LEVOTHYROXINE SODIUM 75 MCG: 75 TABLET ORAL at 06:30

## 2018-08-12 NOTE — PROGRESS NOTES
Reports for meal, required multiple prompts for meds  Attends goals group-reports wants to have a nice pass  Cooperative soft spoken, mumbling at times  Intermittent eye contact when discussing leaving today  1015-review meds wh mother and pt   Aware f/u phone call at 4pm

## 2018-08-12 NOTE — PROGRESS NOTES
Veronika Anderson remains out on pass with his mother  Pt called unit to check in at 1607  Pt stated that pass is going well  Await [facility]'s return to [facility] at 2000

## 2018-08-12 NOTE — PROGRESS NOTES
Polloanjum Liang has been awake, alert, and visible intermittently out in the milieu  Compliant with scheduled meds without prompting  Isolative to self  No interaction noted with peers  Affect remains flat, blunted, and offers minimal conversation with staff  Ate 100% supper  No behavioral issues  Attended and participated in 2/2 evening groups  Will continue to encourage utilization of safe/effective coping skills and to work toward achievement of daily/recovery goals

## 2018-08-12 NOTE — PROGRESS NOTES
Individual maintained on ongoing SAFE and fall precaution without any incident on this shift    He is laying in bed, eyes closed, breath evenly and unlabored   Checked every 15 minutes during rounds   In no apparent distress   Will continue to monitor behavior and sleep pattern  Individual has a pass with mom from [id number] to the community for therapeutic reintegration, socialization,  And building relationship

## 2018-08-12 NOTE — PROGRESS NOTES
Willie Colón declined evening snack  Resting quietly in bed at present, arouses easily  Will continue to monitor/assess for any changes

## 2018-08-12 NOTE — PROGRESS NOTES
Patient not on unit  Patient out on pass with mother  Patient scheduled to have follow-up phone call to the unit at 4:00 p m  Blair Abrams

## 2018-08-13 RX ADMIN — MIDODRINE HYDROCHLORIDE 10 MG: 2.5 TABLET ORAL at 21:14

## 2018-08-13 RX ADMIN — CLOZAPINE 450 MG: 25 TABLET ORAL at 21:15

## 2018-08-13 RX ADMIN — MIDODRINE HYDROCHLORIDE 10 MG: 2.5 TABLET ORAL at 16:47

## 2018-08-13 RX ADMIN — FLUPHENAZINE HYDROCHLORIDE 10 MG: 10 TABLET, FILM COATED ORAL at 21:14

## 2018-08-13 RX ADMIN — SENNOSIDES 8.6 MG: 8.6 TABLET, FILM COATED ORAL at 08:24

## 2018-08-13 RX ADMIN — SENNOSIDES 8.6 MG: 8.6 TABLET, FILM COATED ORAL at 17:10

## 2018-08-13 RX ADMIN — MINOCYCLINE HYDROCHLORIDE 100 MG: 100 CAPSULE ORAL at 08:24

## 2018-08-13 RX ADMIN — MIDODRINE HYDROCHLORIDE 10 MG: 2.5 TABLET ORAL at 06:09

## 2018-08-13 RX ADMIN — BUPROPION HYDROCHLORIDE 300 MG: 300 TABLET, FILM COATED, EXTENDED RELEASE ORAL at 08:24

## 2018-08-13 RX ADMIN — POLYETHYLENE GLYCOL 3350 17 G: 17 POWDER, FOR SOLUTION ORAL at 08:24

## 2018-08-13 RX ADMIN — DIVALPROEX SODIUM 1000 MG: 500 TABLET, FILM COATED, EXTENDED RELEASE ORAL at 21:14

## 2018-08-13 RX ADMIN — LEVOTHYROXINE SODIUM 75 MCG: 75 TABLET ORAL at 06:09

## 2018-08-13 NOTE — PROGRESS NOTES
Yvette Javier been keeping to self, intermittantly visible for structured activities, then retreats to room  Mohsen Joseph participation noted during groups, only attended goals group  Compliant with meds  Non-compliant with Behavioral Plan  Varshathompson Willian completed his hygiene this morning   No issues or concerns expressed   No overt responding to internal stimuli noted  Continue to monitor

## 2018-08-13 NOTE — PROGRESS NOTES
Psychiatry Progress Note    Subjective:   Interval History     Pt is keeping to himself and it is vey hard for him to engage with staff or residents      Current medications:    Current Facility-Administered Medications:     bisacodyl (DULCOLAX) EC tablet 10 mg, 10 mg, Oral, Daily PRN, Provider Cutover, 10 mg at 07/30/18 0606    bisacodyl (DULCOLAX) rectal suppository 10 mg, 10 mg, Rectal, Daily PRN, LIZETH Mendez    buPROPion (WELLBUTRIN XL) 24 hr tablet 300 mg, 300 mg, Oral, Daily, Provider Cutover, 300 mg at 08/13/18 0824    cloZAPine (CLOZARIL) tablet 450 mg, 450 mg, Oral, HS, Dc Carlos MD, 450 mg at 08/12/18 2101    divalproex sodium (DEPAKOTE ER) 24 hr tablet 1,000 mg, 1,000 mg, Oral, HS, Jack Kuhn MD, 1,000 mg at 08/12/18 2101    fluPHENAZine (PROLIXIN) tablet 10 mg, 10 mg, Oral, HS, Jack Kuhn MD, 10 mg at 08/12/18 2101    levothyroxine tablet 75 mcg, 75 mcg, Oral, Early Morning, Provider Cutover, 75 mcg at 08/13/18 0609    midodrine (PROAMATINE) tablet 10 mg, 10 mg, Oral, TID, Wally Lynn MD, 10 mg at 08/13/18 1647    minocycline (MINOCIN) capsule 100 mg, 100 mg, Oral, Daily, Provider Cutover, 100 mg at 08/13/18 0824    polyethylene glycol (MIRALAX) packet 17 g, 17 g, Oral, Daily, Provider Cutover, 17 g at 08/13/18 0824    senna (SENOKOT) tablet 8 6 mg, 1 tablet, Oral, BID, LIZETH Estevez, 8 6 mg at 08/13/18 0824      Objective:     Vital Signs:  Vitals:    08/13/18 0735 08/13/18 1530 08/13/18 1532 08/13/18 1534   BP: 113/89 117/75 125/83 119/82   BP Location: Left arm Right arm Right arm Right arm   Pulse: 95 81 99 (!) 110   Resp: 19      Temp:       TempSrc:       SpO2:       Weight:       Height:             Appearance:  age appropriate and casually dressed   Behavior:  normal   Speech:  soft   Mood:  depressed   Affect:  constricted   Thought Process:  normal   Thought Content:  normal   Perceptual Disturbances: None   Risk Potential: none   Sensorium:  person, place, situation and time   Cognition:  intact   Consciousness:  alert and awake    Attention: attention span and concentration were not age appropriate   Intellect: average   Insight:  poor   Judgment: poor      Motor Activity: no abnormal movements           Recent Labs:  Results Reviewed     None          I/O Past 24 hours:  No intake/output data recorded  No intake/output data recorded  Assessment / Plan:     [facility]    Recommended Treatment:      Medication changes:  1) none    Non-pharmacological treatments  1) Continue with group therapy, milieu therapy and occupational therapy  Safety  1) Safety/communication plan established targeting dynamic risk factors above  2) Risks, benefits, and possible side effects of medications explained to patient and patient verbalizes understanding  Counseling / Coordination of Care    Total floor / unit time spent today 20 minutes  Greater than 50% of total time was spent with the patient and / or family counseling and / or coordination of care  A description of the counseling / coordination of care  Patient's Rights, confidentiality and exceptions to confidentiality, use of automated medical record, [facility] staff access to medical record, and consent to treatment reviewed      Saeid Albarran MD

## 2018-08-13 NOTE — PROGRESS NOTES
Veronika Anderson returned from pass with his mother at 63  Both pt and mother stated that the pass went well  Mother stated that they took a ride by the group home where pt might be going in [address] to check it out and relaxed and ate  Body assessment completed and UDS obtained and to lab by [facility] per [facility] protocol  Compliant with scheduled 2100 meds with prompting x 1  Pt declined evening snack  No behavioral issues  Remains with flat, blunted affect, and offers minimal conversation with staff  Will continue to encourage pt to utilize safe/effective coping skills and to work toward achievement of daily/recovery goals  Resting quietly in bed at present, arouses easily  Will continue to monitor/assess for any changes

## 2018-08-14 RX ADMIN — MINOCYCLINE HYDROCHLORIDE 100 MG: 100 CAPSULE ORAL at 08:15

## 2018-08-14 RX ADMIN — CLOZAPINE 450 MG: 25 TABLET ORAL at 20:49

## 2018-08-14 RX ADMIN — LEVOTHYROXINE SODIUM 75 MCG: 75 TABLET ORAL at 06:09

## 2018-08-14 RX ADMIN — SENNOSIDES 8.6 MG: 8.6 TABLET, FILM COATED ORAL at 17:02

## 2018-08-14 RX ADMIN — BUPROPION HYDROCHLORIDE 300 MG: 300 TABLET, FILM COATED, EXTENDED RELEASE ORAL at 08:15

## 2018-08-14 RX ADMIN — MIDODRINE HYDROCHLORIDE 10 MG: 2.5 TABLET ORAL at 06:09

## 2018-08-14 RX ADMIN — MIDODRINE HYDROCHLORIDE 10 MG: 2.5 TABLET ORAL at 16:59

## 2018-08-14 RX ADMIN — DIVALPROEX SODIUM 1000 MG: 500 TABLET, FILM COATED, EXTENDED RELEASE ORAL at 20:49

## 2018-08-14 RX ADMIN — SENNOSIDES 8.6 MG: 8.6 TABLET, FILM COATED ORAL at 08:15

## 2018-08-14 RX ADMIN — POLYETHYLENE GLYCOL 3350 17 G: 17 POWDER, FOR SOLUTION ORAL at 08:15

## 2018-08-14 RX ADMIN — FLUPHENAZINE HYDROCHLORIDE 10 MG: 10 TABLET, FILM COATED ORAL at 20:49

## 2018-08-14 NOTE — PROGRESS NOTES
Bertram Gagnon been keeping to self, intermittantly visible for structured activities, then retreats to room  Jovana Benton participation noted during groups, but did go to both groups today  Compliant with meds  He is more compliant with his Behavioral Plan today   No issues or concerns expressed   No overt responding to internal stimuli noted   Continue to monitor

## 2018-08-14 NOTE — PROGRESS NOTES
Emy Pritchard attended and participated in 2/2 evening groups  Compliant with scheduled 2100 meds without prompting  Had evening snack  Resting quietly in bed at present, arouses easily  Will continue to monitor/assess for any changes

## 2018-08-14 NOTE — PROGRESS NOTES
Arianna De Souza has been awake, alert, and mostly isolative to self in room  Compliant with scheduled 1600 med with prompting x 1  Pt remains flat, blunted in affect and still difficult to engage in conversation  Pt continues to deny any depression, anxiety, or a/v hallucinations but is preoccupied in his thoughts  No overt responding to internal stimuli noted  No interaction noted with peers  Ate 50% supper  Will continue to encourage pt to utilize safe/effective coping skills and to work toward achievement of daily/recovery goals

## 2018-08-14 NOTE — PROGRESS NOTES
Psychiatry Progress Note    Subjective: Interval History     Patient's behavior and mental status continues to be about the same  Although he does not really talk about,  he still hears voices which may explain some of his withdrawn and guarded  conversation and behavior  He is cooperative with the the program he attends the groups on his own and also goes to meals and meds without much prompting he will answer questions briefly but does not expand and usually does not ask any questions for the most part        Current medications:    Current Facility-Administered Medications:     bisacodyl (DULCOLAX) EC tablet 10 mg, 10 mg, Oral, Daily PRN, Provider Cutover, 10 mg at 07/30/18 0606    bisacodyl (DULCOLAX) rectal suppository 10 mg, 10 mg, Rectal, Daily PRN, LIZETH Valentino    buPROPion (WELLBUTRIN XL) 24 hr tablet 300 mg, 300 mg, Oral, Daily, Provider Cutover, 300 mg at 08/14/18 0815    cloZAPine (CLOZARIL) tablet 450 mg, 450 mg, Oral, HS, Shira Salvador MD, 450 mg at 08/13/18 2115    divalproex sodium (DEPAKOTE ER) 24 hr tablet 1,000 mg, 1,000 mg, Oral, HS, Varsha Croft MD, 1,000 mg at 08/13/18 2114    fluPHENAZine (PROLIXIN) tablet 10 mg, 10 mg, Oral, HS, Varsha Croft MD, 10 mg at 08/13/18 2114    levothyroxine tablet 75 mcg, 75 mcg, Oral, Early Morning, Provider Cutover, 75 mcg at 08/14/18 0609    midodrine (PROAMATINE) tablet 10 mg, 10 mg, Oral, TID, Wally Lynn MD, 10 mg at 08/14/18 0609    polyethylene glycol (MIRALAX) packet 17 g, 17 g, Oral, Daily, Provider Cutover, 17 g at 08/14/18 0815    senna (SENOKOT) tablet 8 6 mg, 1 tablet, Oral, BID, LIZETH Meyer, 8 6 mg at 08/14/18 0815      Objective:     Vital Signs:  Vitals:    08/13/18 1534 08/13/18 1930 08/14/18 0612 08/14/18 0700   BP: 119/82 108/71 (!) 82/40 100/77   BP Location: Right arm Left arm Left arm Right arm   Pulse: (!) 110 (!) 115 (!) 125 (!) 110   Resp:   20 20   Temp:    (!) 96 5 °F (35 8 °C)   TempSrc:       SpO2: Weight:       Height:             Appearance:  age appropriate and casually dressed   Behavior:  guarded   Speech:  soft   Mood:  depressed   Affect:  constricted   Thought Process:  normal   Thought Content:  normal   Perceptual Disturbances: None and Auditory hallucinations without commands   Risk Potential: none   Sensorium:  person, place, situation and time   Cognition:  intact   Consciousness:  alert and awake    Attention: attention span and concentration were not appropriate   Intellect: average   Insight:  fair   Judgment: fair      Motor Activity: no abnormal movements           Recent Labs:  Results Reviewed     None          I/O Past 24 hours:  No intake/output data recorded  No intake/output data recorded  Assessment / Plan:     Chronic paranoid schizophrenia ([facility] )    Recommended Treatment:      Medication changes:  1) DC minocycline which apparently was not effective in reducing his negative symptoms  Non-pharmacological treatments  1) Continue with group therapy, milieu therapy and occupational therapy  Safety  1) Safety/communication plan established targeting dynamic risk factors above  2) Risks, benefits, and possible side effects of medications explained to patient and patient verbalizes understanding  Counseling / Coordination of Care    Total floor / unit time spent today 20 minutes  Greater than 50% of total time was spent with the patient and / or family counseling and / or coordination of care  A description of the counseling / coordination of care  Patient's Rights, confidentiality and exceptions to confidentiality, use of automated medical record, [facility] staff access to medical record, and consent to treatment reviewed      Rigo Perry MD

## 2018-08-15 RX ADMIN — SENNOSIDES 8.6 MG: 8.6 TABLET, FILM COATED ORAL at 17:00

## 2018-08-15 RX ADMIN — SENNOSIDES 8.6 MG: 8.6 TABLET, FILM COATED ORAL at 08:21

## 2018-08-15 RX ADMIN — FLUPHENAZINE HYDROCHLORIDE 10 MG: 10 TABLET, FILM COATED ORAL at 21:14

## 2018-08-15 RX ADMIN — POLYETHYLENE GLYCOL 3350 17 G: 17 POWDER, FOR SOLUTION ORAL at 08:20

## 2018-08-15 RX ADMIN — MIDODRINE HYDROCHLORIDE 10 MG: 2.5 TABLET ORAL at 21:13

## 2018-08-15 RX ADMIN — LEVOTHYROXINE SODIUM 75 MCG: 75 TABLET ORAL at 06:35

## 2018-08-15 RX ADMIN — MIDODRINE HYDROCHLORIDE 10 MG: 2.5 TABLET ORAL at 06:35

## 2018-08-15 RX ADMIN — MIDODRINE HYDROCHLORIDE 10 MG: 2.5 TABLET ORAL at 16:46

## 2018-08-15 RX ADMIN — BUPROPION HYDROCHLORIDE 300 MG: 300 TABLET, FILM COATED, EXTENDED RELEASE ORAL at 08:20

## 2018-08-15 RX ADMIN — CLOZAPINE 450 MG: 25 TABLET ORAL at 21:14

## 2018-08-15 RX ADMIN — DIVALPROEX SODIUM 1000 MG: 500 TABLET, FILM COATED, EXTENDED RELEASE ORAL at 21:14

## 2018-08-15 NOTE — PROGRESS NOTES
Jessica Strickland has been awake, alert, and visible intermittently out in the milieu  Maintained on Safe/Fall Precautions without incident  Pt noted walking around unit at intervals and then retreats back to his room  Isolative to self  No interaction noted with peers  Affect remains flat, blunted, and difficult to engage in conversation  Eye contact remains poor  Compliant with scheduled 1600/1800 meds with prompting x 1  No behavioral issues  Ate 100% supper  Will continue to encourage utilization of safe/effective coping skills, socialization in milieu, and to work toward achievement of daily/recovery goals

## 2018-08-15 NOTE — PROGRESS NOTES
Korey Herrera been keeping to self, visible for structured activities, then retreats to room  Marco Conroe participation noted during groups, but did go to both groups today  Compliant with meds  Compliant with his Behavioral Plan today   No issues or concerns expressed   No overt responding to internal stimuli noted   Continue to monitor

## 2018-08-15 NOTE — PROGRESS NOTES
Psychiatry Progress Note         Patient was seen yesterday walking down the hallway laughing and talking to himself although when he is approached and asked specifically about hearing voices and he said no that he does not hear voices    In my opinion he patient appears to be preoccupied and often enjoys hearing voices which I think contributes to his isolation and stated withdrawal       Current medications:    Current Facility-Administered Medications:     bisacodyl (DULCOLAX) EC tablet 10 mg, 10 mg, Oral, Daily PRN, Provider Cutover, 10 mg at 07/30/18 0606    bisacodyl (DULCOLAX) rectal suppository 10 mg, 10 mg, Rectal, Daily PRN, Syliva LIZETH Jacobs    buPROPion (WELLBUTRIN XL) 24 hr tablet 300 mg, 300 mg, Oral, Daily, Provider Cutover, 300 mg at 08/15/18 0820    cloZAPine (CLOZARIL) tablet 450 mg, 450 mg, Oral, HS, Coy Ramey MD, 450 mg at 08/14/18 2049    divalproex sodium (DEPAKOTE ER) 24 hr tablet 1,000 mg, 1,000 mg, Oral, HS, Tamar Back MD, 1,000 mg at 08/14/18 2049    fluPHENAZine (PROLIXIN) tablet 10 mg, 10 mg, Oral, HS, Tamar Back MD, 10 mg at 08/14/18 2049    levothyroxine tablet 75 mcg, 75 mcg, Oral, Early Morning, Provider Cutover, 75 mcg at 08/15/18 0635    midodrine (PROAMATINE) tablet 10 mg, 10 mg, Oral, TID, Wally Lynn MD, 10 mg at 08/15/18 0635    polyethylene glycol (MIRALAX) packet 17 g, 17 g, Oral, Daily, Provider Cutover, 17 g at 08/15/18 0820    senna (SENOKOT) tablet 8 6 mg, 1 tablet, Oral, BID, LIZETH Meyer, 8 6 mg at 08/15/18 0821      Objective:     Vital Signs:  Vitals:    08/14/18 1929 08/15/18 0545 08/15/18 0730 08/15/18 0735   BP: 120/87 92/58 115/74 120/89   BP Location: Left arm  Left arm Left arm   Pulse: (!) 123 (!) 116 (!) 107 99   Resp:   18 18   Temp:   (!) 97 1 °F (36 2 °C)    TempSrc:   Temporal    SpO2:       Weight:       Height:             Appearance:  age appropriate and casually dressed   Behavior:  normal   Speech:  normal volume   Mood: depressed   Affect:  constricted   Thought Process:  normal   Thought Content:  normal   Perceptual Disturbances: None   Risk Potential: none   Sensorium:  person, place, situation and time   Cognition:  intact   Consciousness:  alert and awake    Attention: attention span and concentration were age appropriate   Intellect: average   Insight:  fair   Judgment: fair      Motor Activity: no abnormal movements           Recent Labs:  Results Reviewed     None          I/O Past 24 hours:  No intake/output data recorded  No intake/output data recorded  Assessment / Plan:     [facility]    Recommended Treatment:      Medication changes:  1) none    Non-pharmacological treatments  1) Continue with group therapy, milieu therapy and occupational therapy  Safety  1) Safety/communication plan established targeting dynamic risk factors above  2) Risks, benefits, and possible side effects of medications explained to patient and patient verbalizes understanding  Counseling / Coordination of Care    Total floor / unit time spent today 20 minutes  Greater than 50% of total time was spent with the patient and / or family counseling and / or coordination of care  A description of the counseling / coordination of care  Patient's Rights, confidentiality and exceptions to confidentiality, use of automated medical record, [address] [place] [address] staff access to medical record, and consent to treatment reviewed      Yang Rivers MD

## 2018-08-15 NOTE — PROGRESS NOTES
Mayra Garnett was visible intermittently throughout the shift  No interaction with peers  Keeps to self when ambulating in hallway  Intermittent eye contact  He was smiling, almost laughing when walking in [place] this evening  Responding to internal stimuli, but denies voices  Denies anxiety and depression  Used his electronic game this shift  Ate 100% of his meal and took medication with prompting  Attended and participated in 3/3 groups, plus mens group  He did receive Midodrine at 1700, but did not meet the parameters at 2100  Denies dizziness when blood pressure is low  No shower today  When I was going to give him Dulcolax tabs tonight he stated that he did have a BM this shift  Dulcolax was not given  He came for HS meds without prompting and ate snack before going to bed  SAFE and fall precautions maintained without incident

## 2018-08-16 RX ADMIN — LEVOTHYROXINE SODIUM 75 MCG: 75 TABLET ORAL at 06:33

## 2018-08-16 RX ADMIN — SENNOSIDES 8.6 MG: 8.6 TABLET, FILM COATED ORAL at 17:00

## 2018-08-16 RX ADMIN — POLYETHYLENE GLYCOL 3350 17 G: 17 POWDER, FOR SOLUTION ORAL at 08:16

## 2018-08-16 RX ADMIN — FLUPHENAZINE HYDROCHLORIDE 10 MG: 10 TABLET, FILM COATED ORAL at 20:48

## 2018-08-16 RX ADMIN — MIDODRINE HYDROCHLORIDE 10 MG: 2.5 TABLET ORAL at 16:59

## 2018-08-16 RX ADMIN — DIVALPROEX SODIUM 1000 MG: 500 TABLET, FILM COATED, EXTENDED RELEASE ORAL at 20:48

## 2018-08-16 RX ADMIN — CLOZAPINE 450 MG: 25 TABLET ORAL at 20:49

## 2018-08-16 RX ADMIN — BUPROPION HYDROCHLORIDE 300 MG: 300 TABLET, FILM COATED, EXTENDED RELEASE ORAL at 08:16

## 2018-08-16 RX ADMIN — SENNOSIDES 8.6 MG: 8.6 TABLET, FILM COATED ORAL at 08:16

## 2018-08-16 RX ADMIN — MIDODRINE HYDROCHLORIDE 10 MG: 2.5 TABLET ORAL at 06:33

## 2018-08-16 NOTE — PROGRESS NOTES
Pt received @ 0700  Remains flat, blunted & isolative to room/self  Woke up for VS, prompted for meds  Ate 100% of both meals  Pt attended 3/3 groups   No behavioral issues, will continue to monitor

## 2018-08-16 NOTE — PROGRESS NOTES
Pt was asked to get bin @ 1545 d/t body odor  Pt refused to take a shower because he claims he does not smell  Benetta Bruce spoke to pt & he denied it was his body odor, clothes or sheets  Agreed to change sheets   Will continue to monitor

## 2018-08-16 NOTE — PROGRESS NOTES
Pt attended IMR group and flat affect and needed prompting to engage in dialogue  Pt alert and completed informational expectations  Group focused on identifying pt level of stress, explanations of symptoms of stress and frequency of stress, coping skills of stress and how to seek assitance, monioiring therapeutic interventions and having an awareness if pt asks for PRN and utilizes coping skills prior, problem solving methodologies,  Working on perceived stress scale as a tool to identify stress levels and group ended with fresh air on the deck discussing balance in [address] recovery  Pt in group were provided a group assessment to complete and evaluate programming aspects  Continue to provide therapeutic group support

## 2018-08-16 NOTE — ESCALATED TEAM TX
Patient attended treatment team meeting this morning prepared with self-assessment  Patient's group attendance for last week was 82% and he was congratulated for same  Patient was granted a pass for Sunday, 8/19 from 10-8 with his mother   is working with LEIGHANN and Kelsey to set up discharge plan  Patient continues to complain of homesickness  Of his "drive-by" of [facility] last weekend he said, "it was nice, the town has a lot of cool shops and restaurants "  Patient's social engagement continues to improve  He states that he continues to hear voices while laying in his bed, but they do not scare or bother him  Patient verbalized no further questions or concerns at the conclusion of the meeting  Next team meeting scheduled for 8/23

## 2018-08-16 NOTE — PROGRESS NOTES
Jose Luis Panchal attended and participated in 2/3 evening groups  Compliant with scheduled 2100 meds without prompting  Had evening snack  Resting quietly in bed at present, arouses easily  Will continue to monitor/assess for any changes

## 2018-08-16 NOTE — PROGRESS NOTES
Psychiatry Progress Note    Subjective:   Interval History     Team mtg today and pt has been stable; hallucinations appear to be intermittent and not frequent and pt reports he is not bothered by them; tolerates the meds      Current medications:    Current Facility-Administered Medications:     bisacodyl (DULCOLAX) EC tablet 10 mg, 10 mg, Oral, Daily PRN, Provider Cutover, 10 mg at 07/30/18 0606    bisacodyl (DULCOLAX) rectal suppository 10 mg, 10 mg, Rectal, Daily PRN, David Giana Gin, NEONP    buPROPion (WELLBUTRIN XL) 24 hr tablet 300 mg, 300 mg, Oral, Daily, Provider Cutover, 300 mg at 08/16/18 0816    cloZAPine (CLOZARIL) tablet 450 mg, 450 mg, Oral, , Savannah Cee MD, 450 mg at 08/15/18 2114    divalproex sodium (DEPAKOTE ER) 24 hr tablet 1,000 mg, 1,000 mg, Oral, , Maite Humphries MD, 1,000 mg at 08/15/18 2114    fluPHENAZine (PROLIXIN) tablet 10 mg, 10 mg, Oral, , Maite Humphries MD, 10 mg at 08/15/18 2114    levothyroxine tablet 75 mcg, 75 mcg, Oral, Early Morning, Provider Cutover, 75 mcg at 08/16/18 0883    midodrine (PROAMATINE) tablet 10 mg, 10 mg, Oral, TID, Wally Lynn MD, 10 mg at 08/16/18 4782    polyethylene glycol (MIRALAX) packet 17 g, 17 g, Oral, Daily, Provider Cutover, 17 g at 08/16/18 0816    senna (SENOKOT) tablet 8 6 mg, 1 tablet, Oral, BID, Francisca Gin, CRNP, 8 6 mg at 08/16/18 0816      Objective:     Vital Signs:  Vitals:    08/15/18 1554 08/15/18 1556 08/15/18 1900 08/16/18 0528   BP: 123/84 115/74 112/78 109/71   BP Location: Right arm Right arm Left arm    Pulse: 98 (!) 110 (!) 114 (!) 110   Resp: 18 19     Temp: (!) 96 2 °F (35 7 °C) (!) 96 2 °F (35 7 °C)     TempSrc:       SpO2:       Weight:       Height:             Appearance:  age appropriate and casually dressed   Behavior:  normal   Speech:  soft   Mood:  constricted   Affect:  blunted   Thought Process:  normal   Thought Content:  normal   Perceptual Disturbances: None   Risk Potential: none   Sensorium: person, place, situation and time   Cognition:  intact   Consciousness:  alert and awake    Attention: attention span and concentration were age appropriate   Intellect: average   Insight:  poor   Judgment: poor      Motor Activity: no abnormal movements           Recent Labs:  Results Reviewed     None          I/O Past 24 hours:  No intake/output data recorded  No intake/output data recorded  Assessment / Plan:     Chronic paranoid [facility] ([facility]    Recommended Treatment:      Medication changes:  1) none    Non-pharmacological treatments  1) Continue with group therapy, milieu therapy and occupational therapy  Safety  1) Safety/communication plan established targeting dynamic risk factors above  2) Risks, benefits, and possible side effects of medications explained to patient and patient verbalizes understanding  Counseling / Coordination of Care    Total floor / unit time spent today 20 minutes  Greater than 50% of total time was spent with the patient and / or family counseling and / or coordination of care  A description of the counseling / coordination of care  Patient's Rights, confidentiality and exceptions to confidentiality, use of automated medical record, [facility] staff access to medical record, and consent to treatment reviewed      Adelso Siegel MD

## 2018-08-17 RX ADMIN — MIDODRINE HYDROCHLORIDE 10 MG: 2.5 TABLET ORAL at 20:47

## 2018-08-17 RX ADMIN — SENNOSIDES 8.6 MG: 8.6 TABLET, FILM COATED ORAL at 08:03

## 2018-08-17 RX ADMIN — SENNOSIDES 8.6 MG: 8.6 TABLET, FILM COATED ORAL at 17:02

## 2018-08-17 RX ADMIN — CLOZAPINE 450 MG: 25 TABLET ORAL at 20:47

## 2018-08-17 RX ADMIN — POLYETHYLENE GLYCOL 3350 17 G: 17 POWDER, FOR SOLUTION ORAL at 08:02

## 2018-08-17 RX ADMIN — DIVALPROEX SODIUM 1000 MG: 500 TABLET, FILM COATED, EXTENDED RELEASE ORAL at 20:47

## 2018-08-17 RX ADMIN — LEVOTHYROXINE SODIUM 75 MCG: 75 TABLET ORAL at 06:28

## 2018-08-17 RX ADMIN — BUPROPION HYDROCHLORIDE 300 MG: 300 TABLET, FILM COATED, EXTENDED RELEASE ORAL at 08:03

## 2018-08-17 RX ADMIN — FLUPHENAZINE HYDROCHLORIDE 10 MG: 10 TABLET, FILM COATED ORAL at 20:48

## 2018-08-17 NOTE — PROGRESS NOTES
Pt attended IMR and able to engage in discussion  Pt needed prompting to remain alert and attentive but did engage when prompted  Pt was pleasant and cooperative  Group focused on interpersonal skills and positive communication skills with peers  Beginning of group reinforced group rules, following hygiene, respecting others on the  unit and code of conduct  Pt were prompted in social topics for community reintegration, maintaining conversation, having eye contact, providing peer support and conversation starters  Continue to provide therapeutic group support

## 2018-08-17 NOTE — PROGRESS NOTES
Individual has been visible for structured activities, then retreats back to his room  He attended 2/3 groups  There is  minimal interactions with staff and peers  Affect is blunted and eye contact is poor  Compliant with meds without prompting  Struggles to express needs, availability of staff made known  Will continue to monitor

## 2018-08-17 NOTE — NURSING NOTE
Gricel Hernandez has been isolative to his self and room  No interaction with peers when he does come out in the hallway  At times he smiles to himself when ambulating  Intermittent eye contact  Blunted, flat affect  Ate 100% of his meal and took pills without difficulty  Mouth and beard checks when taking medication  Mom and sister were here to visit  Visit went well  No BM or shower this evening  Attended and participated in evening groups on the deck  He came for  medication without prompting  Eating snack at this time  Midodrine held at HS due to /66

## 2018-08-17 NOTE — PROGRESS NOTES
Alberto Petit has been awake, alert, and visible minimally out in the milieu  No interaction noted with peers  Scheduled 1600 Midodrine dose held per MD BP parameters  Compliant with scheduled 1800 med without prompting  Ate 100 % supper  Eye contact slightly improved  Affect still flat, blunted and offers minimal conversation with staff  Pt continues to deny any depression, anxiety, or a/v hallucinations and is preoccupied  Mother and sister in to visit and brought food in for pt which pt ate and pt had slight smile when writer asked if he enjoyed the food they brought in for him  Will continue to encourage pt to utilize safe/effective coping skills and to work toward achievement of daily/recovery goals

## 2018-08-17 NOTE — PROGRESS NOTES
Psychiatry Progress Note    Subjective: Interval History     Patient is doing reasonably well although he has psychotic symptoms in the form of hallucinations they do not seem to be disruptive to his daily functioning  He still remains isolated has a difficult time engaging in a conversation or interaction with others  He will answer questions briefly    He is not keeping up with his ADLs such as showering frequently and reports to the staff I do not smell      Current medications:    Current Facility-Administered Medications:     bisacodyl (DULCOLAX) EC tablet 10 mg, 10 mg, Oral, Daily PRN, Provider Cutover, 10 mg at 07/30/18 0606    bisacodyl (DULCOLAX) rectal suppository 10 mg, 10 mg, Rectal, Daily PRN, LIZETH Bautista    buPROPion (WELLBUTRIN XL) 24 hr tablet 300 mg, 300 mg, Oral, Daily, Provider Cutover, 300 mg at 08/17/18 0803    cloZAPine (CLOZARIL) tablet 450 mg, 450 mg, Oral, HS, Lizbeth Benoit MD, 450 mg at 08/16/18 2049    divalproex sodium (DEPAKOTE ER) 24 hr tablet 1,000 mg, 1,000 mg, Oral, HS, Marco A Blum MD, 1,000 mg at 08/16/18 2048    fluPHENAZine (PROLIXIN) tablet 10 mg, 10 mg, Oral, HS, Marco A Blum MD, 10 mg at 08/16/18 2048    levothyroxine tablet 75 mcg, 75 mcg, Oral, Early Morning, Provider Cutover, 75 mcg at 08/17/18 0628    midodrine (PROAMATINE) tablet 10 mg, 10 mg, Oral, TID, Wally Lynn MD, 10 mg at 08/16/18 1659    polyethylene glycol (MIRALAX) packet 17 g, 17 g, Oral, Daily, Provider Cutover, 17 g at 08/17/18 0802    senna (SENOKOT) tablet 8 6 mg, 1 tablet, Oral, BID, LIZETH Meyer, 8 6 mg at 08/17/18 0803      Objective:     Vital Signs:  Vitals:    08/16/18 1605 08/16/18 1610 08/16/18 1900 08/17/18 0545   BP: 123/82 108/80 130/66 135/78   BP Location: Right arm Right arm Left arm Left arm   Pulse: 100 (!) 110 (!) 118 (!) 122   Resp: 18 18     Temp:       TempSrc:       SpO2:       Weight:       Height:             Appearance:  age appropriate and casually dressed   Behavior:  evasive   Speech:  soft   Mood:  depressed   Affect:  constricted   Thought Process:  blocked   Thought Content:  normal   Perceptual Disturbances: None and Auditory hallucinations without commands   Risk Potential: none   Sensorium:  person, place, situation and time   Cognition:  intact   Consciousness:  alert and awake    Attention: attention span and concentration were age appropriate   Intellect: average   Insight:  fair   Judgment: poor      Motor Activity: no abnormal movements           Recent Labs:  Results Reviewed     None          I/O Past 24 hours:  I/O last 3 completed shifts:  In: -   Out: 1 [Stool:1]  No intake/output data recorded  Assessment / Plan:     Chronic paranoid [facility] ([facility]    Recommended Treatment:      Medication changes:  1) no medication changes today    Non-pharmacological treatments  1) Continue with group therapy, milieu therapy and occupational therapy  Safety  1) Safety/communication plan established targeting dynamic risk factors above  2) Risks, benefits, and possible side effects of medications explained to patient and patient verbalizes understanding  Counseling / Coordination of Care    Total floor / unit time spent today 20 minutes  Greater than 50% of total time was spent with the patient and / or family counseling and / or coordination of care  A description of the counseling / coordination of care  Patient's Rights, confidentiality and exceptions to confidentiality, use of automated medical record, [facility] staff access to medical record, and consent to treatment reviewed      Arnie Contreras MD

## 2018-08-17 NOTE — CASE MANAGEMENT
Patient continues to do well with following his behavioral plan and attended 82% of the groups from last week  Patient did discuss with his mother to possibility of going to [facility] as his discharge disposition and the two of them drove by the [facility] during their pass last weekend  Patient and his mother are in agreement with him going to this facility  [facility] referral form was completed and faxed over on Wednesday, August 15, 2018  CM also notified Marquis Dailey with [facility] of this new discharge plan and she reported to notify her when a CSP meeting is arranged so that she can sign him on for services  CM will continue to follow patient's progress and assist with discharge planning needs

## 2018-08-17 NOTE — PROGRESS NOTES
Gricel Hernandez has been awake, alert, and visible minimally out in the milieu  Behavior is quiet and isolative to self in room  Compliant with scheduled 1600/1800 meds without prompting  Ate 100% supper  Remains flat, blunted in affect and offers minimal conversation  No interaction noted with peers  Pt denies any depression, anxiety, or a/v hallucinations but still preoccupied at times  Mother in to visit with fair interaction noted but ate hoagie that she brought in  No behavioral issues  Will continue to encourage pt to utilize safe/effective coping skills and to work toward achievement of daily/recovery goals

## 2018-08-18 RX ADMIN — MIDODRINE HYDROCHLORIDE 10 MG: 2.5 TABLET ORAL at 21:04

## 2018-08-18 RX ADMIN — SENNOSIDES 8.6 MG: 8.6 TABLET, FILM COATED ORAL at 17:15

## 2018-08-18 RX ADMIN — POLYETHYLENE GLYCOL 3350 17 G: 17 POWDER, FOR SOLUTION ORAL at 08:01

## 2018-08-18 RX ADMIN — BUPROPION HYDROCHLORIDE 300 MG: 300 TABLET, FILM COATED, EXTENDED RELEASE ORAL at 08:01

## 2018-08-18 RX ADMIN — DIVALPROEX SODIUM 1000 MG: 500 TABLET, FILM COATED, EXTENDED RELEASE ORAL at 21:04

## 2018-08-18 RX ADMIN — MIDODRINE HYDROCHLORIDE 10 MG: 2.5 TABLET ORAL at 06:19

## 2018-08-18 RX ADMIN — FLUPHENAZINE HYDROCHLORIDE 10 MG: 10 TABLET, FILM COATED ORAL at 21:04

## 2018-08-18 RX ADMIN — CLOZAPINE 450 MG: 25 TABLET ORAL at 21:03

## 2018-08-18 RX ADMIN — LEVOTHYROXINE SODIUM 75 MCG: 75 TABLET ORAL at 06:19

## 2018-08-18 RX ADMIN — SENNOSIDES 8.6 MG: 8.6 TABLET, FILM COATED ORAL at 08:01

## 2018-08-18 RX ADMIN — MIDODRINE HYDROCHLORIDE 10 MG: 2.5 TABLET ORAL at 17:15

## 2018-08-18 NOTE — PROGRESS NOTES
Juanita Ochoa been keeping to self, visible for structured activities, then retreats to room  Vitaly Jennifer participation noted during groups, but did go to both groups today  Compliant with meds  Compliant with his Behavioral Plan today   No issues or concerns expressed   No overt responding to internal stimuli noted   Continue to monitor

## 2018-08-18 NOTE — PROGRESS NOTES
Psychiatry Progress Note    Subjective: Interval History     Patient continues to keep to himself most of the time  He is withdrawn to his room  Minimal interactions with peers  Minimal participation in groups  Continues to be somewhat disheveled and malodorous      Current medications:    Current Facility-Administered Medications:     bisacodyl (DULCOLAX) EC tablet 10 mg, 10 mg, Oral, Daily PRN, Provider Cutover, 10 mg at 07/30/18 0606    bisacodyl (DULCOLAX) rectal suppository 10 mg, 10 mg, Rectal, Daily PRN, [place] RedLIZETH Hdz    buPROPion (WELLBUTRIN XL) 24 hr tablet 300 mg, 300 mg, Oral, Daily, Provider Cutover, 300 mg at 08/18/18 0801    cloZAPine (CLOZARIL) tablet 450 mg, 450 mg, Oral, HS, Arnie Contreras MD, 450 mg at 08/17/18 2047    divalproex sodium (DEPAKOTE ER) 24 hr tablet 1,000 mg, 1,000 mg, Oral, HS, Scooter Loera MD, 1,000 mg at 08/17/18 2047    fluPHENAZine (PROLIXIN) tablet 10 mg, 10 mg, Oral, HS, Scooter Loera MD, 10 mg at 08/17/18 2048    levothyroxine tablet 75 mcg, 75 mcg, Oral, Early Morning, Provider Cutover, 75 mcg at 08/18/18 0619    midodrine (PROAMATINE) tablet 10 mg, 10 mg, Oral, TID, Wally Lynn MD, 10 mg at 08/18/18 0619    polyethylene glycol (MIRALAX) packet 17 g, 17 g, Oral, Daily, Provider Cutover, 17 g at 08/18/18 0801    senna (SENOKOT) tablet 8 6 mg, 1 tablet, Oral, BID, LIZETH Meyer, 8 6 mg at 08/18/18 0801      Objective:     Vital Signs:  Vitals:    08/17/18 1930 08/18/18 0500 08/18/18 0730 08/18/18 0735   BP: 102/73 109/65 114/75 103/76   BP Location: Left arm Left arm Left arm Left arm   Pulse: (!) 115 (!) 114 100 (!) 112   Resp:   20 20   Temp:   (!) 97 2 °F (36 2 °C)    TempSrc:   Temporal    SpO2:       Weight:       Height:             Appearance:  age appropriate and casually dressed   Behavior:  normal   Speech:  normal volume   Mood:  depressed   Affect:  constricted   Thought Process:  normal   Thought Content:  normal   Perceptual Disturbances: None   Risk Potential: none   Sensorium:  person, place, situation and time   Cognition:  intact   Consciousness:  alert and awake    Attention: attention span and concentration were age appropriate   Intellect: average   Insight:  good   Judgment: good      Motor Activity: no abnormal movements           Recent Labs:  Results Reviewed     None          I/O Past 24 hours:  I/O last 3 completed shifts:  In: -   Out: 1 [Stool:1]  No intake/output data recorded  Assessment / Plan:     [facility]    Recommended Treatment:      Medication changes:  1) continue current medications    Non-pharmacological treatments  1) Continue with group therapy, milieu therapy and occupational therapy  Safety  1) Safety/communication plan established targeting dynamic risk factors above  2) Risks, benefits, and possible side effects of medications explained to patient and patient verbalizes understanding  Counseling / Coordination of Care    Total floor / unit time spent today 20 minutes  Greater than 50% of total time was spent with the patient and / or family counseling and / or coordination of care  A description of the counseling / coordination of care  Patient's Rights, confidentiality and exceptions to confidentiality, use of automated medical record, [facility] staff access to medical record, and consent to treatment reviewed      Momo Del Cid PA-C

## 2018-08-19 PROCEDURE — 80307 DRUG TEST PRSMV CHEM ANLYZR: CPT | Performed by: PSYCHIATRY & NEUROLOGY

## 2018-08-19 RX ADMIN — DIVALPROEX SODIUM 1000 MG: 500 TABLET, FILM COATED, EXTENDED RELEASE ORAL at 20:52

## 2018-08-19 RX ADMIN — BUPROPION HYDROCHLORIDE 300 MG: 300 TABLET, FILM COATED, EXTENDED RELEASE ORAL at 09:03

## 2018-08-19 RX ADMIN — MIDODRINE HYDROCHLORIDE 10 MG: 2.5 TABLET ORAL at 06:10

## 2018-08-19 RX ADMIN — POLYETHYLENE GLYCOL 3350 17 G: 17 POWDER, FOR SOLUTION ORAL at 09:03

## 2018-08-19 RX ADMIN — SENNOSIDES 8.6 MG: 8.6 TABLET, FILM COATED ORAL at 09:03

## 2018-08-19 RX ADMIN — CLOZAPINE 450 MG: 25 TABLET ORAL at 20:52

## 2018-08-19 RX ADMIN — LEVOTHYROXINE SODIUM 75 MCG: 75 TABLET ORAL at 06:10

## 2018-08-19 RX ADMIN — FLUPHENAZINE HYDROCHLORIDE 10 MG: 10 TABLET, FILM COATED ORAL at 20:52

## 2018-08-19 NOTE — PROGRESS NOTES
Kiran Reaper was blunted, visible and pleasant upon approach this AM  Fair eye contact  No interaction with peers and little with staff  Ate only 25% of breakfast due to pass with Mom today  Took AM medication prior to leaving with Mom  Midodrine was not sent with him  Senekot was sent and to be given at 1800 per order  Mom and Kiran Reaper aware  Disheveled before getting shower this AM  He attended Goals group before going on pass at 1000  He is to return at [employer]  Fall and SAFE precautions maintained when here  He is to call in at 1500

## 2018-08-19 NOTE — PROGRESS NOTES
In bed eyes closed appears to be sleeping will continue to monitor body shifting and breath sounds noted, maintained on Fall precautions, will encourage fluids with routine medications

## 2018-08-19 NOTE — PROGRESS NOTES
Psychiatry Progress Note    Subjective: Interval History     Patient currently on a therapeutic leave of absence  Per the report he continues to be withdrawn and minimally interactive with peers  Somewhat disheveled and malodorous      Current medications:    Current Facility-Administered Medications:     [MAR Hold - Suspended Admission] bisacodyl (DULCOLAX) EC tablet 10 mg, 10 mg, Oral, Daily PRN, Provider Cutover, 10 mg at 07/30/18 0606    [MAR Hold - Suspended Admission] bisacodyl (DULCOLAX) rectal suppository 10 mg, 10 mg, Rectal, Daily PRN, LIZETH Quiñones s Hold - Suspended Admission] buPROPion (WELLBUTRIN XL) 24 hr tablet 300 mg, 300 mg, Oral, Daily, Provider Cutover, 300 mg at 08/19/18 0903    [MAR Hold - Suspended Admission] cloZAPine (CLOZARIL) tablet 450 mg, 450 mg, Oral, HS, Rush Huff MD, 450 mg at 08/18/18 2103    [MAR Hold - Suspended Admission] divalproex sodium (DEPAKOTE ER) 24 hr tablet 1,000 mg, 1,000 mg, Oral, HS, Ada Phillips MD, 1,000 mg at 08/18/18 2104    [MAR Hold - Suspended Admission] fluPHENAZine (PROLIXIN) tablet 10 mg, 10 mg, Oral, HS, Ada Phillips MD, 10 mg at 08/18/18 2104    [MAR Hold - Suspended Admission] levothyroxine tablet 75 mcg, 75 mcg, Oral, Early Morning, Provider Cutover, 75 mcg at 08/19/18 0610    [MAR Hold - Suspended Admission] midodrine (PROAMATINE) tablet 10 mg, 10 mg, Oral, TID, Wally Lynn MD, 10 mg at 08/19/18 0610    [MAR Hold - Suspended Admission] polyethylene glycol (MIRALAX) packet 17 g, 17 g, Oral, Daily, Provider Cutover, 17 g at 08/19/18 0903    [MAR Hold - Suspended Admission] senna (SENOKOT) tablet 8 6 mg, 1 tablet, Oral, BID,  LIZETH Mccray, 8 6 mg at 08/19/18 5621    Current Outpatient Prescriptions:     acetaminophen (TYLENOL) 100 mg/mL solution, Take 10 mg/kg by mouth every 4 (four) hours as needed for fever, Disp: , Rfl:     acetaminophen (TYLENOL) 325 mg tablet, Take 650 mg by mouth every 4 (four) hours as needed for mild pain, Disp: , Rfl:     clozapine (CLOZARIL) 200 MG tablet, Take 600 mg by mouth daily at bedtime, Disp: , Rfl:     divalproex sodium (DEPAKOTE) 500 mg EC tablet, Take 250 mg by mouth every 8 (eight) hours, Disp: , Rfl:     divalproex sodium (DEPAKOTE) 500 mg EC tablet, Take 1,250 mg by mouth daily at bedtime, Disp: , Rfl:     lithium carbonate (LITHOBID) 450 mg CR tablet, Take 450 mg by mouth daily at bedtime, Disp: , Rfl:     PARoxetine (PAXIL) 20 mg tablet, Take 20 mg by mouth daily, Disp: , Rfl:       Objective:     Vital Signs:  Vitals:    08/18/18 2000 08/19/18 0500 08/19/18 0700 08/19/18 0830   BP: 113/79 111/65 119/74 94/51   BP Location: Left arm Left arm Right arm Right arm   Pulse: (!) 143 (!) 116 101 (!) 128   Resp:   18    Temp:   (!) 96 5 °F (35 8 °C)    TempSrc:   Temporal    SpO2:       Weight:   89 4 kg (197 lb)    Height:             Appearance:  age appropriate and casually dressed   Behavior:  normal   Speech:  normal volume   Mood:  depressed   Affect:  constricted   Thought Process:  normal   Thought Content:  normal   Perceptual Disturbances: None   Risk Potential: none   Sensorium:  person, place, situation and time   Cognition:  intact   Consciousness:  alert and awake    Attention: attention span and concentration were age appropriate   Intellect: average   Insight:  good   Judgment: good      Motor Activity: no abnormal movements           Recent Labs:  Results Reviewed     None          I/O Past 24 hours:  No intake/output data recorded  No intake/output data recorded  Assessment / Plan:     [facility]    Recommended Treatment:      Medication changes:  1) continue current medications    Non-pharmacological treatments  1) Continue with group therapy, milieu therapy and occupational therapy  Safety  1) Safety/communication plan established targeting dynamic risk factors above      2) Risks, benefits, and possible side effects of medications explained to patient and patient verbalizes understanding  Counseling / Coordination of Care    Total floor / unit time spent today 20 minutes  Greater than 50% of total time was spent with the patient and / or family counseling and / or coordination of care  A description of the counseling / coordination of care  Patient's Rights, confidentiality and exceptions to confidentiality, use of automated medical record, [facility] staff access to medical record, and consent to treatment reviewed      Sue Mathews PA-C

## 2018-08-19 NOTE — PROGRESS NOTES
Shae Rahman attended and participated in 2/2 evening groups  He was compliant with routine medications without prompting and had evening snack with his peers, no behavioral issues this evening, maintained on SAFE/Fall precautions, fluids encouraged, bathroom light on for safety, no personal belongings on the floor

## 2018-08-20 RX ADMIN — DIVALPROEX SODIUM 1000 MG: 500 TABLET, FILM COATED, EXTENDED RELEASE ORAL at 20:48

## 2018-08-20 RX ADMIN — POLYETHYLENE GLYCOL 3350 17 G: 17 POWDER, FOR SOLUTION ORAL at 08:19

## 2018-08-20 RX ADMIN — SENNOSIDES 8.6 MG: 8.6 TABLET, FILM COATED ORAL at 08:19

## 2018-08-20 RX ADMIN — SENNOSIDES 8.6 MG: 8.6 TABLET, FILM COATED ORAL at 17:09

## 2018-08-20 RX ADMIN — CLOZAPINE 450 MG: 25 TABLET ORAL at 20:48

## 2018-08-20 RX ADMIN — MIDODRINE HYDROCHLORIDE 10 MG: 2.5 TABLET ORAL at 16:55

## 2018-08-20 RX ADMIN — FLUPHENAZINE HYDROCHLORIDE 10 MG: 10 TABLET, FILM COATED ORAL at 20:48

## 2018-08-20 RX ADMIN — BUPROPION HYDROCHLORIDE 300 MG: 300 TABLET, FILM COATED, EXTENDED RELEASE ORAL at 08:19

## 2018-08-20 RX ADMIN — MIDODRINE HYDROCHLORIDE 10 MG: 2.5 TABLET ORAL at 20:47

## 2018-08-20 RX ADMIN — LEVOTHYROXINE SODIUM 75 MCG: 75 TABLET ORAL at 06:43

## 2018-08-20 NOTE — PROGRESS NOTES
2115 Shae Skill returned from pass @ 1955 accompanied by mother  She states his time was spent "eating, smoking, listening to music"  Both agree pass went well  He was cooperative w/body search & in providing UDS  He joined peers & took part in PM Groups, came up for HS medicine when prompted, had HS snack before retiring to bed  He remains flat to blunted of affect, socially withdrawn  No overt responding

## 2018-08-20 NOTE — PROGRESS NOTES
The patient slept through the night with no behaviors or issues noted  Fall and safety precautions maintained  Med compliant  Midodrine held per hold parameters  Continue to monitor

## 2018-08-20 NOTE — PROGRESS NOTES
Julia Irwin has been awake, alert, and visible intermittently out in the milieu  Compliant with scheduled 1600/1800 meds without prompting  Affect remains flat, blunted, and offers minimal conversation with staff  Ate 100% supper  No interaction noted with peers  Behavior has been quiet and mostly isolative to self in room  Will continue to encourage pt to utilize safe/effective coping skills and to work toward achievement of daily/recovery goals

## 2018-08-20 NOTE — PROGRESS NOTES
Psychiatry Progress Note    Subjective: Interval History     Patient when on past home this week in which was uneventful he returned home without incident  His mental status is essentially unchanged flat withdrawn and continuing although lessened auditory hallucinations        Current medications:    Current Facility-Administered Medications:     bisacodyl (DULCOLAX) EC tablet 10 mg, 10 mg, Oral, Daily PRN, Provider Cutover, 10 mg at 07/30/18 0606    bisacodyl (DULCOLAX) rectal suppository 10 mg, 10 mg, Rectal, Daily PRN, LIZETH Owen    buPROPion (WELLBUTRIN XL) 24 hr tablet 300 mg, 300 mg, Oral, Daily, Provider Cutover, 300 mg at 08/20/18 0819    cloZAPine (CLOZARIL) tablet 450 mg, 450 mg, Oral, HS, All Huffman MD, 450 mg at 08/19/18 2052    divalproex sodium (DEPAKOTE ER) 24 hr tablet 1,000 mg, 1,000 mg, Oral, HS, Doreen Olmstead MD, 1,000 mg at 08/19/18 2052    fluPHENAZine (PROLIXIN) tablet 10 mg, 10 mg, Oral, HS, Doreen Olmstead MD, 10 mg at 08/19/18 2052    levothyroxine tablet 75 mcg, 75 mcg, Oral, Early Morning, Provider Cutover, 75 mcg at 08/20/18 0643    midodrine (PROAMATINE) tablet 10 mg, 10 mg, Oral, TID, Wally Lynn MD, 10 mg at 08/19/18 0610    polyethylene glycol (MIRALAX) packet 17 g, 17 g, Oral, Daily, Provider Cutover, 17 g at 08/20/18 0819    senna (SENOKOT) tablet 8 6 mg, 1 tablet, Oral, BID, LIZETH Simpson, 8 6 mg at 08/20/18 0819      Objective:     Vital Signs:  Vitals:    08/19/18 2100 08/20/18 0548 08/20/18 0745 08/20/18 0750   BP:  134/72 117/81 120/87   BP Location: Left arm Left arm Left arm Left arm   Pulse:  (!) 111 95 104   Resp:   20 20   Temp:   (!) 96 5 °F (35 8 °C)    TempSrc:   Temporal    SpO2:       Weight:       Height:             Appearance:  age appropriate and casually dressed   Behavior:  normal   Speech:  normal volume   Mood:  depressed   Affect:  constricted   Thought Process:  normal   Thought Content:  normal   Perceptual Disturbances: Auditory hallucinations without commands   Risk Potential: none   Sensorium:  person, place, situation and time   Cognition:  intact   Consciousness:  alert and awake    Attention: attention span and concentration were age appropriate   Intellect: average   Insight:  poor   Judgment: fair      Motor Activity: no abnormal movements           Recent Labs:  Results Reviewed     None          I/O Past 24 hours:  No intake/output data recorded  No intake/output data recorded  Assessment / Plan:     Ch[facility]    Recommended Treatment:      Medication changes:  1) none    Non-pharmacological treatments  1) Continue with group therapy, milieu therapy and occupational therapy  Safety  1) Safety/communication plan established targeting dynamic risk factors above  2) Risks, benefits, and possible side effects of medications explained to patient and patient verbalizes understanding  Counseling / Coordination of Care    Total floor / unit time spent today 20 minutes  Greater than 50% of total time was spent with the patient and / or family counseling and / or coordination of care  A description of the counseling / coordination of care  Patient's Rights, confidentiality and exceptions to confidentiality, use of automated medical record, [facility] access to medical record, and consent to treatment reviewed      Brian Zapata MD

## 2018-08-20 NOTE — PROGRESS NOTES
Mendel Majestic remains isolative to self with blunted affect  He is in his room most of the shift except for structured activities and meals  Mendel Majestic attended all groups today but only got credit for goals and life skills because he slept through [address]  He is compliant with his behavioral plan this shift  Continue to monitor

## 2018-08-20 NOTE — PLAN OF CARE
Problem: Depression  Goal: Refrain from isolation  Interventions:  - Develop a trusting relationship   - Encourage socialization    Outcome: Progressing    Psychotherapy note     Recovery Areas Addressed:  Emotional and Behavioral   Recovery Obstacles Addressed: Motivation, Depression, Recovery    Therapist met with patient for individual session  Patient was with poor self-awareness during this meeting, as he had drool on his sweatshirt and his beard, but did not opt to change before meeting with therapist   Due to patient's difficulty engaging in a therapeutic conversation, therapist used an individualized intervention for patient  Therapist and patient reviewed some of patient's group work that had been passed on by group therapist   Therapist attempted to engage patient in a conversation about himself, but patient's eye contact was poor and his answers were short  Patient reported on a survey that he feels anxiety at least 2-3 times per week  He states that this is due to homesickness  Patient also reports that he would like to swim more  Therapist attempted to engage patient in a discussion about the beach, but patient gave minimal responses  Patient also self-reports that he would like to pray more  Therapist provided support and encouraged L[employer] as a way for patient to socialize more  Patient also expressed interest in becoming physically strong, but did not elaborate  Patient continues to do well in group therapy, or with any therapy that requires him to write his responses rather than speak them  His motivation remains improved, as does his knowledge of his illness  Patient is waiting for a bed to open at his potential placement, [facility]  Therapist will continue to support patient in achieving his recovery goals          Goal: Attend and participate in unit activities, including therapeutic, recreational, and educational groups  Interventions:  - Provide therapeutic and educational activities daily, encourage attendance and participation, and document same in the medical record    Outcome: Progressing

## 2018-08-21 RX ADMIN — LEVOTHYROXINE SODIUM 75 MCG: 75 TABLET ORAL at 05:49

## 2018-08-21 RX ADMIN — MIDODRINE HYDROCHLORIDE 10 MG: 2.5 TABLET ORAL at 21:08

## 2018-08-21 RX ADMIN — DIVALPROEX SODIUM 1000 MG: 500 TABLET, FILM COATED, EXTENDED RELEASE ORAL at 21:09

## 2018-08-21 RX ADMIN — POLYETHYLENE GLYCOL 3350 17 G: 17 POWDER, FOR SOLUTION ORAL at 08:06

## 2018-08-21 RX ADMIN — SENNOSIDES 8.6 MG: 8.6 TABLET, FILM COATED ORAL at 17:15

## 2018-08-21 RX ADMIN — BUPROPION HYDROCHLORIDE 300 MG: 300 TABLET, FILM COATED, EXTENDED RELEASE ORAL at 08:06

## 2018-08-21 RX ADMIN — SENNOSIDES 8.6 MG: 8.6 TABLET, FILM COATED ORAL at 08:06

## 2018-08-21 RX ADMIN — FLUPHENAZINE HYDROCHLORIDE 10 MG: 10 TABLET, FILM COATED ORAL at 21:09

## 2018-08-21 RX ADMIN — CLOZAPINE 450 MG: 25 TABLET ORAL at 21:09

## 2018-08-21 NOTE — PROGRESS NOTES
Pt attended [facility] group  Cooperative and able to listen appropriately with peers  Pt mildly participated  Flat affect  Pt did work on self report scale  Pt "strongly agree" on: voice is powerful and voice knows everything about me"  Pt had insight into MH needs    Group focused in the dining room on 5 ways to quiet auditory hallucinations, coping skills the BAVQ-R self assessment scale (long and short version), and self report of symptoms in ADHD self report scale adult checklist   Discussed self awareness and group  Ended with participation, fresh air on the deck with socialization and memory recall of positive reflection of childhood memories and activties

## 2018-08-21 NOTE — PROGRESS NOTES
Individual has been keeping to self in room, visible for select structured activities  He only attended 1/3 groups  There is no interactions with staff unless staff initiates and then it is one word answers  Compliant with meds without prompting  No issues or concerns expressed  Struggles to express needs to staff  Will continue to monitor

## 2018-08-21 NOTE — PROGRESS NOTES
Aashish Kilpatrick attended and participated in 2/2 evening groups  Compliant with scheduled 2100 meds without prompting  Writer offered pt prn Dulcolax as last BM noted was on 8/17/18 but pt refused  Had evening snack  Resting quietly in bed at present, arouses easily  Will continue to monitor/assess for any changes

## 2018-08-21 NOTE — PROGRESS NOTES
Midodrine held this am standing /66, Pulse 109, fluids encouraged, remains on Fall/Safe precautions, denies pain, refused offer of dulcolax, denies constipation, says his last BM was, "2 days ago"  This writer asked him, "So around Saturday sometime?"  He answered   "No, Sunday "  He but he is is alert and oriented X4

## 2018-08-21 NOTE — PROGRESS NOTES
Psychiatry Progress Note    Subjective: Interval History     Patient still is difficult to engage on a one-to-one conversation and give short answers does not expand any of his answers  Affect is blunt he seems to have little appreciation for his grooming and hygiene which show are inadequate  Hallucinations do not seem to be a major issue at this point in his date day-to-day functioning  He seems to report hallucinations in the evening        Current medications:    Current Facility-Administered Medications:     bisacodyl (DULCOLAX) EC tablet 10 mg, 10 mg, Oral, Daily PRN, Provider Cutover, 10 mg at 07/30/18 0606    bisacodyl (DULCOLAX) rectal suppository 10 mg, 10 mg, Rectal, Daily PRN, LIZETH Mancini    buPROPion (WELLBUTRIN XL) 24 hr tablet 300 mg, 300 mg, Oral, Daily, Provider Cutover, 300 mg at 08/21/18 0806    cloZAPine (CLOZARIL) tablet 450 mg, 450 mg, Oral, HS, Audry Nyhan, MD, 450 mg at 08/20/18 2048    divalproex sodium (DEPAKOTE ER) 24 hr tablet 1,000 mg, 1,000 mg, Oral, HS, Eduard Wilkinson MD, 1,000 mg at 08/20/18 2048    fluPHENAZine (PROLIXIN) tablet 10 mg, 10 mg, Oral, HS, Eduard Wilkinson MD, 10 mg at 08/20/18 2048    levothyroxine tablet 75 mcg, 75 mcg, Oral, Early Morning, Provider Cutover, 75 mcg at 08/21/18 0549    midodrine (PROAMATINE) tablet 10 mg, 10 mg, Oral, TID, Wally Lynn MD, 10 mg at 08/20/18 2047    polyethylene glycol (MIRALAX) packet 17 g, 17 g, Oral, Daily, Provider Cutover, 17 g at 08/21/18 0806    senna (SENOKOT) tablet 8 6 mg, 1 tablet, Oral, BID, LIZETH Meyer, 8 6 mg at 08/21/18 0806      Objective:     Vital Signs:  Vitals:    08/20/18 1900 08/21/18 0523 08/21/18 0730 08/21/18 0735   BP: 119/74 126/66 106/83 111/85   BP Location: Left arm Left arm Left arm Left arm   Pulse: (!) 115 (!) 109 (!) 115 (!) 116   Resp:   20 20   Temp:   (!) 97 °F (36 1 °C)    TempSrc:   Temporal    SpO2:       Weight:       Height:             Appearance:  age appropriate and casually dressed   Behavior:  evasive   Speech:  normal volume   Mood:  depressed   Affect:  constricted   Thought Process:  normal   Thought Content:  normal   Perceptual Disturbances: Auditory hallucinations without commands   Risk Potential: none   Sensorium:  person, place, situation and time   Cognition:  intact   Consciousness:  alert and awake    Attention: attention span and concentration were not age appropriate   Intellect: average   Insight:  limited   Judgment: limited      Motor Activity: no abnormal movements           Recent Labs:  Results Reviewed     None          I/O Past 24 hours:  No intake/output data recorded  No intake/output data recorded  Assessment / Plan:     [facility]    Recommended Treatment:      Medication changes:  1) none    Non-pharmacological treatments  1) Continue with group therapy, milieu therapy and occupational therapy  Safety  1) Safety/communication plan established targeting dynamic risk factors above  2) Risks, benefits, and possible side effects of medications explained to patient and patient verbalizes understanding        Yamila Duenas MD

## 2018-08-22 RX ADMIN — SENNOSIDES 8.6 MG: 8.6 TABLET, FILM COATED ORAL at 17:20

## 2018-08-22 RX ADMIN — MIDODRINE HYDROCHLORIDE 10 MG: 2.5 TABLET ORAL at 20:51

## 2018-08-22 RX ADMIN — BUPROPION HYDROCHLORIDE 300 MG: 300 TABLET, FILM COATED, EXTENDED RELEASE ORAL at 08:08

## 2018-08-22 RX ADMIN — MIDODRINE HYDROCHLORIDE 10 MG: 2.5 TABLET ORAL at 06:17

## 2018-08-22 RX ADMIN — FLUPHENAZINE HYDROCHLORIDE 10 MG: 10 TABLET, FILM COATED ORAL at 20:51

## 2018-08-22 RX ADMIN — CLOZAPINE 450 MG: 25 TABLET ORAL at 20:51

## 2018-08-22 RX ADMIN — DIVALPROEX SODIUM 1000 MG: 500 TABLET, FILM COATED, EXTENDED RELEASE ORAL at 20:51

## 2018-08-22 RX ADMIN — POLYETHYLENE GLYCOL 3350 17 G: 17 POWDER, FOR SOLUTION ORAL at 08:08

## 2018-08-22 RX ADMIN — SENNOSIDES 8.6 MG: 8.6 TABLET, FILM COATED ORAL at 08:08

## 2018-08-22 RX ADMIN — LEVOTHYROXINE SODIUM 75 MCG: 75 TABLET ORAL at 06:17

## 2018-08-22 NOTE — PROGRESS NOTES
Jenniferjeni Colón has been awake, alert, and visible intermittently out in the milieu  Walks around unit at intervals  Compliant with scheduled meds at 1800 and 2100 without prompting  Ate 100% supper  Affect remains flat, blunted, and difficult to engage in conversation  No interaction noted with peers  Attended and participated in Men's Group and 2/2 evening groups  Had evening snack  Will continue to encourage pt to utilize safe/effective coping skills and to work toward achievement of daily/recovery goals  Resting quietly in bed at present, arouses easily  Will continue to monitor/assess for any changes

## 2018-08-22 NOTE — PROGRESS NOTES
Individual has been visible during structured activities then retreats to room  Affect is blunted and eye contact is poor  RN attempted to follow up with mother's report that he frequently has loose stools  When RN discussed this with him he denies same  Complaint with meds without prompting  Left unit around 1400 to attend recovery anonymous which is held in conference area of the hospital   Due to return around 1500

## 2018-08-22 NOTE — PROGRESS NOTES
Psychiatry Progress Note    Subjective: Interval History     Patient presents the same mental status; patient is withdrawn quiet to himself answers questions with one-word seems to be having a continued auditory hallucinations which apparently to not interrupt his level of functioning that much  He is compliant with meals and meds and on his own without much prompting        Current medications:    Current Facility-Administered Medications:     bisacodyl (DULCOLAX) EC tablet 10 mg, 10 mg, Oral, Daily PRN, Provider Cutover, 10 mg at 07/30/18 0606    bisacodyl (DULCOLAX) rectal suppository 10 mg, 10 mg, Rectal, Daily PRN, LIZETH Jenkins    buPROPion (WELLBUTRIN XL) 24 hr tablet 300 mg, 300 mg, Oral, Daily, Provider Cutover, 300 mg at 08/22/18 0808    cloZAPine (CLOZARIL) tablet 450 mg, 450 mg, Oral, HS, Yomaira Ocampo MD, 450 mg at 08/21/18 2109    divalproex sodium (DEPAKOTE ER) 24 hr tablet 1,000 mg, 1,000 mg, Oral, HS, Kate Ramirez MD, 1,000 mg at 08/21/18 2109    fluPHENAZine (PROLIXIN) tablet 10 mg, 10 mg, Oral, HS, Kate Ramirez MD, 10 mg at 08/21/18 2109    levothyroxine tablet 75 mcg, 75 mcg, Oral, Early Morning, Provider Cutover, 75 mcg at 08/22/18 0617    midodrine (PROAMATINE) tablet 10 mg, 10 mg, Oral, TID, Wally Lynn MD, 10 mg at 08/22/18 0617    polyethylene glycol (MIRALAX) packet 17 g, 17 g, Oral, Daily, Provider Cutover, 17 g at 08/22/18 0808    senna (SENOKOT) tablet 8 6 mg, 1 tablet, Oral, BID, LIZETH Meyer, 8 6 mg at 08/22/18 0808      Objective:     Vital Signs:  Vitals:    08/21/18 1555 08/21/18 1936 08/22/18 0600 08/22/18 0700   BP: 123/64 103/78 102/63 121/73   BP Location: Right arm Left arm Left arm Right arm   Pulse: (!) 122 (!) 120 (!) 125 102   Resp:    20   Temp:    (!) 96 7 °F (35 9 °C)   TempSrc:       SpO2:       Weight:       Height:             Appearance:  age appropriate and casually dressed   Behavior:  Withdrawn   Speech:  normal volume   Mood: depressed   Affect:  constricted   Thought Process:  normal   Thought Content:  normal   Perceptual Disturbances: Auditory hallucinations without commands   Risk Potential: none   Sensorium:  person, place, situation and time   Cognition:  intact   Consciousness:  alert and awake    Attention: attention span and concentration were  Not age appropriate   Intellect: average   Insight:  poor   Judgment: poor      Motor Activity: no abnormal movements           Recent Labs:  Results Reviewed     None          I/O Past 24 hours:  I/O last 3 completed shifts:  In: -   Out: 1 [Stool:1]  No intake/output data recorded  Assessment / Plan:     [facility]    Recommended Treatment:      Medication changes:  1) none    Non-pharmacological treatments  1) Continue with group therapy, milieu therapy and occupational therapy  Safety  1) Safety/communication plan established targeting dynamic risk factors above  2) Risks, benefits, and possible side effects of medications explained to patient and patient verbalizes understanding  Counseling / Coordination of Care    Total floor / unit time spent today 20 minutes  Greater than 50% of total time was spent with the patient and / or family counseling and / or coordination of care  A description of the counseling / coordination of care  Patient's Rights, confidentiality and exceptions to confidentiality, use of automated medical record, [address] [place] [address] staff access to medical record, and consent to treatment reviewed      iMtch Mckoy MD

## 2018-08-23 RX ADMIN — SENNOSIDES 8.6 MG: 8.6 TABLET, FILM COATED ORAL at 08:25

## 2018-08-23 RX ADMIN — CLOZAPINE 450 MG: 25 TABLET ORAL at 20:54

## 2018-08-23 RX ADMIN — SENNOSIDES 8.6 MG: 8.6 TABLET, FILM COATED ORAL at 17:09

## 2018-08-23 RX ADMIN — BUPROPION HYDROCHLORIDE 300 MG: 300 TABLET, FILM COATED, EXTENDED RELEASE ORAL at 08:25

## 2018-08-23 RX ADMIN — MIDODRINE HYDROCHLORIDE 10 MG: 2.5 TABLET ORAL at 20:54

## 2018-08-23 RX ADMIN — DIVALPROEX SODIUM 1000 MG: 500 TABLET, FILM COATED, EXTENDED RELEASE ORAL at 20:54

## 2018-08-23 RX ADMIN — LEVOTHYROXINE SODIUM 75 MCG: 75 TABLET ORAL at 06:35

## 2018-08-23 RX ADMIN — FLUPHENAZINE HYDROCHLORIDE 10 MG: 10 TABLET, FILM COATED ORAL at 20:54

## 2018-08-23 RX ADMIN — POLYETHYLENE GLYCOL 3350 17 G: 17 POWDER, FOR SOLUTION ORAL at 08:25

## 2018-08-23 NOTE — PROGRESS NOTES
Stevie Loya has been isolative to self and room  No interaction with peers and very little with staff  Poor eye contact  Denies anxiety and depression  Blunt, flat affect  Ate 100% of supper and took pills without difficulty  Mouth and beard checks with medication  BM this shift, but no shower  Currently attending and participating in evening groups on the deck  SAFE and fall precautions maintained

## 2018-08-23 NOTE — PROGRESS NOTES
Psychiatry Progress Note    Subjective: Interval History     Team mtg today: pt still struggle to be engaged with residents and staff; withdrawn into his own private world which he has not given up; superficially cooperative ; good attendance to groups on his own        Current medications:    Current Facility-Administered Medications:     bisacodyl (DULCOLAX) EC tablet 10 mg, 10 mg, Oral, Daily PRN, Provider Cutover, 10 mg at 07/30/18 0606    bisacodyl (DULCOLAX) rectal suppository 10 mg, 10 mg, Rectal, Daily PRN, LIZETH Owen    buPROPion (WELLBUTRIN XL) 24 hr tablet 300 mg, 300 mg, Oral, Daily, Provider Cutover, 300 mg at 08/23/18 0825    cloZAPine (CLOZARIL) tablet 450 mg, 450 mg, Oral, HS, All Huffman MD, 450 mg at 08/22/18 2051    divalproex sodium (DEPAKOTE ER) 24 hr tablet 1,000 mg, 1,000 mg, Oral, HS, Doreen Olmstead MD, 1,000 mg at 08/22/18 2051    fluPHENAZine (PROLIXIN) tablet 10 mg, 10 mg, Oral, HS, Doreen Olmstead MD, 10 mg at 08/22/18 2051    levothyroxine tablet 75 mcg, 75 mcg, Oral, Early Morning, Provider Cutover, 75 mcg at 08/23/18 0635    midodrine (PROAMATINE) tablet 10 mg, 10 mg, Oral, TID, Wally Lynn MD, 10 mg at 08/22/18 2051    polyethylene glycol (MIRALAX) packet 17 g, 17 g, Oral, Daily, Provider Cutover, 17 g at 08/23/18 0825    senna (SENOKOT) tablet 8 6 mg, 1 tablet, Oral, BID, LIZETH Meyer, 8 6 mg at 08/23/18 0825      Objective:     Vital Signs:  Vitals:    08/22/18 1930 08/23/18 0542 08/23/18 0730 08/23/18 0735   BP: 113/81 130/84 121/72 107/68   BP Location: Left arm Left arm Left arm Left arm   Pulse: (!) 117 (!) 108 101 (!) 118   Resp:   16 16   Temp:   (!) 97 4 °F (36 3 °C)    TempSrc:   Temporal    SpO2:       Weight:       Height:             Appearance:  age appropriate and casually dressed   Behavior:  guarded   Speech:  soft   Mood:  depressed   Affect:  constricted   Thought Process:  normal   Thought Content:  normal   Perceptual Disturbances: Hallucinations, auditory, no commands   Risk Potential: none   Sensorium:  person, place, situation and time   Cognition:  intact   Consciousness:  alert and awake    Attention: attention span and concentration were age appropriate   Intellect: average   Insight:  poor   Judgment: fair      Motor Activity: no abnormal movements            Recent Labs:  Results Reviewed     None          I/O Past 24 hours:  I/O last 3 completed shifts:  In: -   Out: 1 [Stool:1]  No intake/output data recorded  Assessment / Plan:     [facility]    Recommended Treatment:      Medication changes:  1) none    Non-pharmacological treatments  1) Continue with group therapy, milieu therapy and occupational therapy  Safety  1) Safety/communication plan established targeting dynamic risk factors above  2) Risks, benefits, and possible side effects of medications explained to patient and patient verbalizes understanding  Counseling / Coordination of Care    Total floor / unit time spent today 20 minutes  Greater than 50% of total time was spent with the patient and / or family counseling and / or coordination of care  A description of the counseling / coordination of care  Patient's Rights, confidentiality and exceptions to confidentiality, use of automated medical record, [address] [place] [address] staff access to medical record, and consent to treatment reviewed      Jorge Davis MD

## 2018-08-23 NOTE — CASE MANAGEMENT
Patient has been a little less engaged and isolative this past week, but still following his behavioral plan and attending groups  Patient requested another pass with his mom this weekend  Treatment team asked him to set a goal for the pass and patient reported he would like to drive the car with his mom  Dr Jayden Nava reported he would be ok with this as long as it is a less busy area such as a development  CM will continue to follow patient's progress and assist with discharge planning needs

## 2018-08-23 NOTE — PROGRESS NOTES
Pt attended [facility] group  Pt alert and responsive  Pt completed writing exercise  Pt able to reflect and work on [address] recovery aspects  Reviewed "What I am like when I am feeling better, things I need to do for myself everyday to keep myself feeling well, Pt wrote personal narrative about current progress and present goals  Pt was able to identify feelings and future needs  Pt was respectful  of peers  Continue to provide therapeutic group support

## 2018-08-23 NOTE — PROGRESS NOTES
The patient slept through the night with no behaviors or issues noted  Fall and safety precautions maintained  Took am Synthroid  Midodrine held for BP of 130/84  Continue to monitor

## 2018-08-23 NOTE — CASE MANAGEMENT
CM called and left a VM for [facility] regarding the referral form that was completed and sent to them last week  CM asked for them to call back to discuss if they have been able to review the application and determine if he would be able to live at their facility  CM will await their phone call back  CM will continue to monitor patient's progress and assist with discharge planning needs

## 2018-08-23 NOTE — PROGRESS NOTES
Pt received @ 0700  Remains blunted, disheveled & isolative to room/self  Med/meal compliant  Ate 100% & 50% of meals    Attended 3/3 groups, no interaction w/peers or staff   No behavioral issues, will continue to monitor

## 2018-08-23 NOTE — ESCALATED TEAM TX
Patient attended treatment team meeting this morning prepared with self-assessment  Patient's group attendance for last week was 76%  Patient was granted a pass with his mother for Sunday, 8/26 from 10-8p  Patient's therapeutic goal for this pass is to start driving again  He is agreeable to stay in developments and unpopulated roads  Patient reports that his long-term goal is buy his own car, as he used to own his own car  Patient verbalized no further questions or concerns at the conclusion of the meeting  Next team meeting scheduled for 8/30

## 2018-08-23 NOTE — PROGRESS NOTES
Rin Rubio has been isolative to his room and self  No interaction with peers and very little with staff  Denies anxiety, depression and voices  Intermittent eye contact  He did not use his electronic tonight  Ate 100% of his meal and took medication without difficulty  Mom and sister here now visiting  SAFE and fall precautions maintained without incident

## 2018-08-24 RX ADMIN — MIDODRINE HYDROCHLORIDE 10 MG: 2.5 TABLET ORAL at 21:05

## 2018-08-24 RX ADMIN — CLOZAPINE 450 MG: 25 TABLET ORAL at 21:05

## 2018-08-24 RX ADMIN — FLUPHENAZINE HYDROCHLORIDE 10 MG: 10 TABLET, FILM COATED ORAL at 21:06

## 2018-08-24 RX ADMIN — SENNOSIDES 8.6 MG: 8.6 TABLET, FILM COATED ORAL at 08:12

## 2018-08-24 RX ADMIN — SENNOSIDES 8.6 MG: 8.6 TABLET, FILM COATED ORAL at 17:01

## 2018-08-24 RX ADMIN — MIDODRINE HYDROCHLORIDE 10 MG: 2.5 TABLET ORAL at 06:04

## 2018-08-24 RX ADMIN — POLYETHYLENE GLYCOL 3350 17 G: 17 POWDER, FOR SOLUTION ORAL at 08:12

## 2018-08-24 RX ADMIN — BUPROPION HYDROCHLORIDE 300 MG: 300 TABLET, FILM COATED, EXTENDED RELEASE ORAL at 08:12

## 2018-08-24 RX ADMIN — LEVOTHYROXINE SODIUM 75 MCG: 75 TABLET ORAL at 06:04

## 2018-08-24 RX ADMIN — DIVALPROEX SODIUM 1000 MG: 500 TABLET, FILM COATED, EXTENDED RELEASE ORAL at 21:06

## 2018-08-24 NOTE — NURSING NOTE
Judy Zheng has been isolative to his self and room  No interaction with peers and little with staff  Poor eye contact  Blunted, flat affect  Ate 100% of his meal and took medication without difficulty  Midodrine held due to parameters  Mouth and beard checks continue with medication  Denies anxiety, depression and voices  Did not see him responding so far this shift  Continue to monitor  SAFE and fall precautions maintained without incident

## 2018-08-24 NOTE — PROGRESS NOTES
Individual has been up and about the unit, visible for structured activities the retreats to his room  He actively participated in programming, attended  3/3 groups  Compliant with meds without prompting  Mother called and changed pass to Saturday, planning to go to [facility]  Struggles to express needs, but no concerns verbalized  Availability of staff made known  Will continue to monitor

## 2018-08-24 NOTE — PROGRESS NOTES
Individual maintained on ongoing SAFE and fall precaution without incident on this shift  He is alert and responsive  Pleasant and cooperative  Isolated to self and room  Attended and participated in 2/2 groups this evening  Continues to be meds and meals compliant without promoting  Midodrine given for /76   P131  No behavioral noted  Denies any depression or anxiety  No delusion or a/t/v hallucination noted  Will continue to monitor behavioral pattern

## 2018-08-24 NOTE — PROGRESS NOTES
Psychiatry Progress Note    Subjective: Interval History     In team meeting yesterday, patient expressed his interested in driving a car again  He has not driven a car in over a year but would like to practice driving again while on pass with his mother  His mother will sit next to him in the passenger seat to monitor him  This would be a big step forward for for [facility] and would help him with his recovery I, n my opinion  No other new issues she tolerated medicines he still remains withdrawn quiet blunted with little conversation especially with spontaneous ones        Current medications:    Current Facility-Administered Medications:     bisacodyl (DULCOLAX) EC tablet 10 mg, 10 mg, Oral, Daily PRN, Provider Cutover, 10 mg at 07/30/18 0606    bisacodyl (DULCOLAX) rectal suppository 10 mg, 10 mg, Rectal, Daily PRN, LIZETH Odom    buPROPion (WELLBUTRIN XL) 24 hr tablet 300 mg, 300 mg, Oral, Daily, Provider Cutover, 300 mg at 08/24/18 3481    cloZAPine (CLOZARIL) tablet 450 mg, 450 mg, Oral, HS, Yang Rivers MD, 450 mg at 08/23/18 2054    divalproex sodium (DEPAKOTE ER) 24 hr tablet 1,000 mg, 1,000 mg, Oral, HS, Dione Talavera MD, 1,000 mg at 08/23/18 2054    fluPHENAZine (PROLIXIN) tablet 10 mg, 10 mg, Oral, HS, Dione Talavera MD, 10 mg at 08/23/18 2054    levothyroxine tablet 75 mcg, 75 mcg, Oral, Early Morning, Provider Cutover, 75 mcg at 08/24/18 0604    midodrine (PROAMATINE) tablet 10 mg, 10 mg, Oral, TID, Wally Lynn MD, 10 mg at 08/24/18 0604    polyethylene glycol (MIRALAX) packet 17 g, 17 g, Oral, Daily, Provider Cutover, 17 g at 08/24/18 9822    senna (SENOKOT) tablet 8 6 mg, 1 tablet, Oral, BID, LIZETH Marsh, 8 6 mg at 08/24/18 0812      Objective:     Vital Signs:  Vitals:    08/24/18 0603 08/24/18 0740 08/24/18 0742 08/24/18 0743   BP: 97/69 126/79 126/79 127/76   BP Location: Left arm Left arm Left arm Left arm   Pulse: (!) 123 102 102 (!) 109   Resp: 20 20 20 20   Temp: Nascimento Rides ) 97 2 °F (36 2 °C) (!) 97 2 °F (36 2 °C)    TempSrc:  Temporal Temporal    SpO2:       Weight:       Height:             Appearance:  age appropriate and casually dressed   Behavior:  normal   Speech:  normal volume   Mood:  depressed   Affect:  constricted   Thought Process:  normal   Thought Content:  normal   Perceptual Disturbances: Auditory hallucinations without commands   Risk Potential: none   Sensorium:  person, place, situation and time   Cognition:  intact   Consciousness:  alert and awake    Attention: attention span and concentration were not age appropriate   Intellect: average   Insight:  poor   Judgment: poor      Motor Activity: no abnormal movements           Recent Labs:  Results Reviewed     None          I/O Past 24 hours:  No intake/output data recorded  No intake/output data recorded  Assessment / Plan:     [facility]    Recommended Treatment:      Medication changes:  1) none    Non-pharmacological treatments  1) Continue with group therapy, milieu therapy and occupational therapy  Safety  1) Safety/communication plan established targeting dynamic risk factors above  2) Risks, benefits, and possible side effects of medications explained to patient and patient verbalizes understanding  Counseling / Coordination of Care    Total floor / unit time spent today 20 minutes  Greater than 50% of total time was spent with the patient and / or family counseling and / or coordination of care  A description of the counseling / coor none dination of care  Patient's Rights, confidentiality and exceptions to confidentiality, use of automated medical record, [facility] staff access to medical record, and consent to treatment reviewed      Hafsa Wall MD

## 2018-08-24 NOTE — PROGRESS NOTES
Pt attended Illness, Management and Recovery group  Pt was engage in dialogue and interacted with peers  Pt identified goals from the (Go for the Goal discussion therapeutic cards) Pt improvong with self expression  Pt spoke about long term goal of living with his Mom in 2 year  Pt had positive eye contact and blunt affect  Pt was able to reflect and verbalize thoughts appropriately  Continue to provide therapeutic group support  Reinforced self care, hygiene, group participation, group rules and increasing participation  08/24/18 1045   Activity/Group Checklist   Group Other (Comment)  (IMR)   Attendance Attended   Attendance Duration (min) 46-60   Interactions Interacted appropriately   Affect/Mood Appropriate   Goals Achieved Identified feelings; Able to listen to others; Able to engage in interactions; Able to reflect/comment on own behavior;Able to manage/cope with feelings;Verbalized increased hopefulness; Able to self-disclose; Able to recieve feedback; Able to give feedback to another;Able to experience relief/decrease in symptoms

## 2018-08-25 PROCEDURE — 80307 DRUG TEST PRSMV CHEM ANLYZR: CPT | Performed by: PSYCHIATRY & NEUROLOGY

## 2018-08-25 RX ADMIN — MIDODRINE HYDROCHLORIDE 10 MG: 2.5 TABLET ORAL at 20:46

## 2018-08-25 RX ADMIN — FLUPHENAZINE HYDROCHLORIDE 10 MG: 10 TABLET, FILM COATED ORAL at 20:46

## 2018-08-25 RX ADMIN — SENNOSIDES 8.6 MG: 8.6 TABLET, FILM COATED ORAL at 08:31

## 2018-08-25 RX ADMIN — DIVALPROEX SODIUM 1000 MG: 500 TABLET, FILM COATED, EXTENDED RELEASE ORAL at 20:45

## 2018-08-25 RX ADMIN — POLYETHYLENE GLYCOL 3350 17 G: 17 POWDER, FOR SOLUTION ORAL at 08:30

## 2018-08-25 RX ADMIN — LEVOTHYROXINE SODIUM 75 MCG: 75 TABLET ORAL at 06:20

## 2018-08-25 RX ADMIN — BUPROPION HYDROCHLORIDE 300 MG: 300 TABLET, FILM COATED, EXTENDED RELEASE ORAL at 08:30

## 2018-08-25 RX ADMIN — CLOZAPINE 450 MG: 25 TABLET ORAL at 20:45

## 2018-08-25 NOTE — PROGRESS NOTES
)547 Pt in bed , eyes closed , chest noted to be rising, audible breath sounds will moitnor for ,change in pt's behavior/assessment, closely observed thru the night with q 15 min  safety checks,

## 2018-08-25 NOTE — PROGRESS NOTES
Mother came to get patient for his pass to [facility] today from 9am-7pm   Emy Pritchard needed to be reminded to fill out pass form and no meds sent

## 2018-08-25 NOTE — PROGRESS NOTES
Chandrika Liang returned from pass with mother at 200  Pt and mother stated that pass went well  Pt stated that they went to [place]  Body assessment completed and UDS obtained by [facility] and to lab per [facility] policy  Will continue to monitor/assess for any changes

## 2018-08-25 NOTE — PROGRESS NOTES
Psychiatry Progress Note    Subjective: Interval History     The patient is out on pass today  The patient has been medication and meal compliant  He is tolerating his medications well  Staff states he is not interacting much with others and stays isolative to his room  No concerns offered by staff today        Current medications:    Current Facility-Administered Medications:     [MAR Hold - Suspended Admission] bisacodyl (DULCOLAX) EC tablet 10 mg, 10 mg, Oral, Daily PRN, Provider Cutover, 10 mg at 07/30/18 0606    [MAR Hold - Suspended Admission] bisacodyl (DULCOLAX) rectal suppository 10 mg, 10 mg, Rectal, Daily PRN, Shiramg Moreira, CRNP    [facility] Hold - Suspended Admission] buPROPion (WELLBUTRIN XL) 24 hr tablet 300 mg, 300 mg, Oral, Daily, Provider Cutover, 300 mg at 08/25/18 0830    [MAR Hold - Suspended Admission] cloZAPine (CLOZARIL) tablet 450 mg, 450 mg, Oral, HS, Mario Barahona MD, 450 mg at 08/24/18 2105    [MAR Hold - Suspended Admission] divalproex sodium (DEPAKOTE ER) 24 hr tablet 1,000 mg, 1,000 mg, Oral, HS, Quynh Stephen MD, 1,000 mg at 08/24/18 2106    [MAR Hold - Suspended Admission] fluPHENAZine (PROLIXIN) tablet 10 mg, 10 mg, Oral, HS, Quynh Stephen MD, 10 mg at 08/24/18 2106    [MAR Hold - Suspended Admission] levothyroxine tablet 75 mcg, 75 mcg, Oral, Early Morning, Provider Cutover, 75 mcg at 08/25/18 0620    [MAR Hold - Suspended Admission] midodrine (PROAMATINE) tablet 10 mg, 10 mg, Oral, TID, Wally Lynn MD, 10 mg at 08/24/18 2105    [MAR Hold - Suspended Admission] polyethylene glycol (MIRALAX) packet 17 g, 17 g, Oral, Daily, Provider Cutover, 17 g at 08/25/18 0830    [MAR Hold - Suspended Admission] senna (SENOKOT) tablet 8 6 mg, 1 tablet, Oral, BID, Shira Moreira CRNP, 8 6 mg at 08/25/18 6348    Current Outpatient Prescriptions:     acetaminophen (TYLENOL) 100 mg/mL solution, Take 10 mg/kg by mouth every 4 (four) hours as needed for fever, Disp: , Rfl:    acetaminophen (TYLENOL) 325 mg tablet, Take 650 mg by mouth every 4 (four) hours as needed for mild pain, Disp: , Rfl:     clozapine (CLOZARIL) 200 MG tablet, Take 600 mg by mouth daily at bedtime, Disp: , Rfl:     divalproex sodium (DEPAKOTE) 500 mg EC tablet, Take 250 mg by mouth every 8 (eight) hours, Disp: , Rfl:     divalproex sodium (DEPAKOTE) 500 mg EC tablet, Take 1,250 mg by mouth daily at bedtime, Disp: , Rfl:     lithium carbonate (LITHOBID) 450 mg CR tablet, Take 450 mg by mouth daily at bedtime, Disp: , Rfl:     PARoxetine (PAXIL) 20 mg tablet, Take 20 mg by mouth daily, Disp: , Rfl:       Objective:     Vital Signs:  Vitals:    08/24/18 1535 08/24/18 1540 08/24/18 1930 08/25/18 0624   BP: 122/84 123/74 119/73 142/68   BP Location: Right arm Right arm  Left arm   Pulse: 99 (!) 110 (!) 131 (!) 122   Resp:       Temp:       TempSrc:       SpO2:       Weight:       Height:             Appearance:  age appropriate and casually dressed   Behavior:  normal   Speech:  normal volume   Mood:  depressed   Affect:  constricted   Thought Process:  normal   Thought Content:  normal   Perceptual Disturbances: Auditory hallucinations without commands   Risk Potential: none   Sensorium:  person, place, situation and time   Cognition:  intact   Consciousness:  alert and awake    Attention: attention span and concentration were age appropriate   Intellect: average   Insight:  poor   Judgment: poor      Motor Activity: no abnormal movements           Recent Labs:  Results Reviewed     None          I/O Past 24 hours:  No intake/output data recorded  No intake/output data recorded  Assessment / Plan:     [facility]    Recommended Treatment:      Medication changes:  1) continue current medication regimen  Non-pharmacological treatments  1) Continue with group therapy, milieu therapy and occupational therapy      Safety  1) Safety/communication plan established targeting dynamic risk factors above  2) Risks, benefits, and possible side effects of medications explained to patient and patient verbalizes understanding  Counseling / Coordination of Care    Total floor / unit time spent today 20 minutes  Greater than 50% of total time was spent with the patient and / or family counseling and / or coordination of care  A description of the counseling / coordination of care  Patient's Rights, confidentiality and exceptions to confidentiality, use of automated medical record, [facility] staff access to medical record, and consent to treatment reviewed      Juan Antonio Sampson PA-C

## 2018-08-26 RX ADMIN — DIVALPROEX SODIUM 1000 MG: 500 TABLET, FILM COATED, EXTENDED RELEASE ORAL at 21:10

## 2018-08-26 RX ADMIN — MIDODRINE HYDROCHLORIDE 10 MG: 2.5 TABLET ORAL at 16:47

## 2018-08-26 RX ADMIN — SENNOSIDES 8.6 MG: 8.6 TABLET, FILM COATED ORAL at 17:03

## 2018-08-26 RX ADMIN — MIDODRINE HYDROCHLORIDE 10 MG: 2.5 TABLET ORAL at 06:12

## 2018-08-26 RX ADMIN — LEVOTHYROXINE SODIUM 75 MCG: 75 TABLET ORAL at 06:11

## 2018-08-26 RX ADMIN — SENNOSIDES 8.6 MG: 8.6 TABLET, FILM COATED ORAL at 08:37

## 2018-08-26 RX ADMIN — FLUPHENAZINE HYDROCHLORIDE 10 MG: 10 TABLET, FILM COATED ORAL at 21:09

## 2018-08-26 RX ADMIN — BUPROPION HYDROCHLORIDE 300 MG: 300 TABLET, FILM COATED, EXTENDED RELEASE ORAL at 08:37

## 2018-08-26 RX ADMIN — POLYETHYLENE GLYCOL 3350 17 G: 17 POWDER, FOR SOLUTION ORAL at 08:37

## 2018-08-26 RX ADMIN — CLOZAPINE 450 MG: 25 TABLET ORAL at 21:09

## 2018-08-26 NOTE — PROGRESS NOTES
Psychiatry Progress Note    Subjective: Interval History     The patient states he had a good pass yesterday  Patient went to during the park  Patient is isolative to his room  He has a blunted affect  He does not engage in conversation  The patient did go to groups last night  Patient is medication and meal compliant  He is tolerating his medications well  He denies any hallucinations or suicidal or homicidal ideations  He offers no concerns today        Current medications:    Current Facility-Administered Medications:     bisacodyl (DULCOLAX) EC tablet 10 mg, 10 mg, Oral, Daily PRN, Provider Cutover, 10 mg at 07/30/18 0606    bisacodyl (DULCOLAX) rectal suppository 10 mg, 10 mg, Rectal, Daily PRN, LIZETH Acevedo    buPROPion (WELLBUTRIN XL) 24 hr tablet 300 mg, 300 mg, Oral, Daily, Provider Cutover, 300 mg at 08/26/18 0837    cloZAPine (CLOZARIL) tablet 450 mg, 450 mg, Oral, HS, Mitch Mckoy MD, 450 mg at 08/25/18 2045    divalproex sodium (DEPAKOTE ER) 24 hr tablet 1,000 mg, 1,000 mg, Oral, HS, Samantha Faria MD, 1,000 mg at 08/25/18 2045    fluPHENAZine (PROLIXIN) tablet 10 mg, 10 mg, Oral, HS, Samantha Faria MD, 10 mg at 08/25/18 2046    levothyroxine tablet 75 mcg, 75 mcg, Oral, Early Morning, Provider Cutover, 75 mcg at 08/26/18 0611    midodrine (PROAMATINE) tablet 10 mg, 10 mg, Oral, TID, Wally Lynn MD, 10 mg at 08/26/18 0612    polyethylene glycol (MIRALAX) packet 17 g, 17 g, Oral, Daily, Provider Cutover, 17 g at 08/26/18 0837    senna (SENOKOT) tablet 8 6 mg, 1 tablet, Oral, BID, LIZETH Meyer, 8 6 mg at 08/26/18 0837      Objective:     Vital Signs:  Vitals:    08/25/18 0624 08/25/18 1930 08/26/18 0730 08/26/18 0800   BP: 142/68 112/74 119/85    BP Location: Left arm Left arm Right arm    Pulse: (!) 122 (!) 144 89    Resp:   19    Temp:   (!) 96 2 °F (35 7 °C)    TempSrc:       SpO2:       Weight:    89 4 kg (197 lb)   Height:             Appearance:  age appropriate and casually dressed   Behavior:  normal   Speech:  normal volume   Mood:  depressed   Affect:  constricted   Thought Process:  normal   Thought Content:  normal   Perceptual Disturbances: Auditory hallucinations without commands   Risk Potential: none   Sensorium:  person, place, situation and time   Cognition:  intact   Consciousness:  alert and awake    Attention: attention span and concentration were age appropriate   Intellect: average   Insight:  poor   Judgment: poor      Motor Activity: no abnormal movements           Recent Labs:  Results Reviewed     None          I/O Past 24 hours:  No intake/output data recorded  No intake/output data recorded  Assessment / Plan:     Chronic paranoid schizophrenia [facility]    Recommended Treatment:      Medication changes:  1) continue current medication regimen  Non-pharmacological treatments  1) Continue with group therapy, milieu therapy and occupational therapy  Safety  1) Safety/communication plan established targeting dynamic risk factors above  2) Risks, benefits, and possible side effects of medications explained to patient and patient verbalizes understanding  Counseling / Coordination of Care    Total floor / unit time spent today 20 minutes  Greater than 50% of total time was spent with the patient and / or family counseling and / or coordination of care  A description of the counseling / coordination of care  Patient's Rights, confidentiality and exceptions to confidentiality, use of automated medical record, [facility] staff access to medical record, and consent to treatment reviewed      Balbir Pillai PA-C

## 2018-08-26 NOTE — PROGRESS NOTES
Evans Zheng has been isolative to room and self for most of the shift  No interaction with peers and very little with staff  Fair eye contact  Denies anxiety or depression  Took medication without prompting this Am  Ate 100% of both meals  Attended only 1 group this shift  No BM or shower today  SAFE and fall precautions maintained without incident

## 2018-08-26 NOTE — PROGRESS NOTES
Jessica Strickland attended and participated in 2/2 evening groups  Compliant with scheduled 2100 meds without prompting  Had evening snack  Remains flat, blunted, and difficult to engage in conversation  Resting quietly in bed at present, arouses easily  Will continue to monitor/assess for any changes

## 2018-08-27 RX ADMIN — DIVALPROEX SODIUM 1000 MG: 500 TABLET, FILM COATED, EXTENDED RELEASE ORAL at 20:47

## 2018-08-27 RX ADMIN — LEVOTHYROXINE SODIUM 75 MCG: 75 TABLET ORAL at 06:15

## 2018-08-27 RX ADMIN — BUPROPION HYDROCHLORIDE 300 MG: 300 TABLET, FILM COATED, EXTENDED RELEASE ORAL at 08:23

## 2018-08-27 RX ADMIN — MIDODRINE HYDROCHLORIDE 10 MG: 2.5 TABLET ORAL at 16:49

## 2018-08-27 RX ADMIN — SENNOSIDES 8.6 MG: 8.6 TABLET, FILM COATED ORAL at 17:34

## 2018-08-27 RX ADMIN — MIDODRINE HYDROCHLORIDE 10 MG: 2.5 TABLET ORAL at 06:15

## 2018-08-27 RX ADMIN — SENNOSIDES 8.6 MG: 8.6 TABLET, FILM COATED ORAL at 08:23

## 2018-08-27 RX ADMIN — CLOZAPINE 450 MG: 25 TABLET ORAL at 20:47

## 2018-08-27 RX ADMIN — POLYETHYLENE GLYCOL 3350 17 G: 17 POWDER, FOR SOLUTION ORAL at 08:23

## 2018-08-27 RX ADMIN — FLUPHENAZINE HYDROCHLORIDE 10 MG: 10 TABLET, FILM COATED ORAL at 20:47

## 2018-08-27 NOTE — PROGRESS NOTES
Judy Zheng has been awake, alert, and mostly isolative to self in room  Pt showered this shift  Compliant with scheduled 1600/1800 meds without prompting  Ate 50% supper  Mother in to visit with fair interaction noted and brought food in for pt which pt ate  Affect remains flat, blunted, and offers minimal conversation  No socialization noted with peers  Pt continues to deny any depression, anxiety, or a/v hallucinations  Will continue to encourage pt to utilize safe/effective coping skills and to work toward achievement of daily/recovery goals

## 2018-08-27 NOTE — PROGRESS NOTES
Individual  has been visible for structured activities, then retreats to his room  There is minimal interactions with others  He did reported to RN about weekend pass when approached about same  He did not drive at all but did enjoying going on rides at [employer] [facility]  He participated in programming, attended 3/3 groups  Compliant with meds without prompting  Will continue to monitor

## 2018-08-27 NOTE — PROGRESS NOTES
Psychiatry Progress Note    Subjective: Interval History     Patient had an uneventful but could pass this weekend with his mother  When back on unit the mode and that is isolative and withdrawn,not much conversation, affect is flat; he does however attend groups and is med and meal compliant        Current medications:    Current Facility-Administered Medications:     bisacodyl (DULCOLAX) EC tablet 10 mg, 10 mg, Oral, Daily PRN, Provider Cutover, 10 mg at 07/30/18 0606    bisacodyl (DULCOLAX) rectal suppository 10 mg, 10 mg, Rectal, Daily PRN, LIZETH Pickett    buPROPion (WELLBUTRIN XL) 24 hr tablet 300 mg, 300 mg, Oral, Daily, Provider Cutover, 300 mg at 08/27/18 6277    cloZAPine (CLOZARIL) tablet 450 mg, 450 mg, Oral, HS, Brian Zapata MD, 450 mg at 08/26/18 2109    divalproex sodium (DEPAKOTE ER) 24 hr tablet 1,000 mg, 1,000 mg, Oral, HS, Vivek Correia MD, 1,000 mg at 08/26/18 2110    fluPHENAZine (PROLIXIN) tablet 10 mg, 10 mg, Oral, HS, Vivek Correia MD, 10 mg at 08/26/18 2109    levothyroxine tablet 75 mcg, 75 mcg, Oral, Early Morning, Provider Cutover, 75 mcg at 08/27/18 0615    midodrine (PROAMATINE) tablet 10 mg, 10 mg, Oral, TID, Wally Lynn MD, 10 mg at 08/27/18 0615    polyethylene glycol (MIRALAX) packet 17 g, 17 g, Oral, Daily, Provider Cutover, 17 g at 08/27/18 4971    senna (SENOKOT) tablet 8 6 mg, 1 tablet, Oral, BID, LIZETH Meyer, 8 6 mg at 08/27/18 0823      Objective:     Vital Signs:  Vitals:    08/26/18 1915 08/27/18 0615 08/27/18 0730 08/27/18 0733   BP: 126/89 116/67 116/84 109/84   BP Location: Left arm  Right arm Right arm   Pulse: (!) 124 (!) 117 (!) 107 (!) 117   Resp:   20    Temp:   (!) 97 3 °F (36 3 °C)    TempSrc:   Temporal    SpO2:       Weight:       Height:             Appearance:  age appropriate and casually dressed   Behavior:  normal   Speech:  normal volume   Mood:  depressed   Affect:  constricted   Thought Process:  normal   Thought Content: normal   Perceptual Disturbances: None   Risk Potential: none   Sensorium:  person, place, situation and time   Cognition:  intact   Consciousness:  alert and awake    Attention: attention span and concentration were age appropriate   Intellect: average   Insight:  good   Judgment: good      Motor Activity: no abnormal movements           Recent Labs:  Results Reviewed     None          I/O Past 24 hours:  No intake/output data recorded  No intake/output data recorded  Assessment / Plan:     [facility]    Recommended Treatment:      Medication changes:  1) none    Non-pharmacological treatments  1) Continue with group therapy, milieu therapy and occupational therapy  Safety  1) Safety/communication plan established targeting dynamic risk factors above  2) Risks, benefits, and possible side effects of medications explained to patient and patient verbalizes understanding  Counseling / Coordination of Care    Total floor / unit time spent today 20 minutes  Greater than 50% of total time was spent with the patient and / or family counseling and / or coordination of care  A description of the counseling / coordination of care  Patient's Rights, confidentiality and exceptions to confidentiality, use of automated medical record, [facility] access to medical record, and consent to treatment reviewed      Nicole Jensen MD

## 2018-08-27 NOTE — PROGRESS NOTES
Pt attended [address] group  Pt positive eye contact and shared his experience over the weekend about his pass  Pt mentioned he enjoyed [employer] and looks forward to goals of living in [facility]  Pt was pleasant and cooperative  Pt was able to reflect on topic and engage in discussion  Group focused on facts about Stigma and Mental Illness (handout from [facility])  Discussion on discrimination, stereotype, prejudice and dealing with factors and understanding rights  Pt was able to answer the question, "Who am I?"  Continue to provide therapeutic group support

## 2018-08-27 NOTE — PROGRESS NOTES
Abdias Gamez has been awake, alert, and visible minimally out in the milieu  Compliant with scheduled 1600/1800 meds without prompting  Ate 100% supper  Pt remains flat, blunted in affect, and offers minimal conversation with staff  Mother in to visit and brought in hoagie for pt which pt ate with fair interaction noted  Behavior has been quiet and mostly isolative to self in room  Attended and participated in 2/2 evening groups  Declined evening snack  Resting quietly in bed at present, arouses easily  Will continue to monitor/assess for any changes

## 2018-08-28 RX ADMIN — FLUPHENAZINE HYDROCHLORIDE 10 MG: 10 TABLET, FILM COATED ORAL at 20:57

## 2018-08-28 RX ADMIN — BISACODYL 10 MG: 5 TABLET, COATED ORAL at 06:07

## 2018-08-28 RX ADMIN — MIDODRINE HYDROCHLORIDE 10 MG: 2.5 TABLET ORAL at 06:05

## 2018-08-28 RX ADMIN — POLYETHYLENE GLYCOL 3350 17 G: 17 POWDER, FOR SOLUTION ORAL at 08:01

## 2018-08-28 RX ADMIN — MIDODRINE HYDROCHLORIDE 10 MG: 2.5 TABLET ORAL at 16:59

## 2018-08-28 RX ADMIN — BUPROPION HYDROCHLORIDE 300 MG: 300 TABLET, FILM COATED, EXTENDED RELEASE ORAL at 08:01

## 2018-08-28 RX ADMIN — CLOZAPINE 450 MG: 25 TABLET ORAL at 20:57

## 2018-08-28 RX ADMIN — LEVOTHYROXINE SODIUM 75 MCG: 75 TABLET ORAL at 05:54

## 2018-08-28 RX ADMIN — SENNOSIDES 8.6 MG: 8.6 TABLET, FILM COATED ORAL at 08:01

## 2018-08-28 RX ADMIN — MIDODRINE HYDROCHLORIDE 10 MG: 2.5 TABLET ORAL at 20:59

## 2018-08-28 RX ADMIN — DIVALPROEX SODIUM 1000 MG: 500 TABLET, FILM COATED, EXTENDED RELEASE ORAL at 20:57

## 2018-08-28 RX ADMIN — SENNOSIDES 8.6 MG: 8.6 TABLET, FILM COATED ORAL at 17:01

## 2018-08-28 NOTE — PROGRESS NOTES
Shae Rahman attended and participated in 3/3 evening groups  Compliant with scheduled 2100 meds without prompting  Midodrine held per MD BP parameters  Had evening snack  Resting quietly in bed at present, arouses easily  Will continue to monitor/assess for any changes

## 2018-08-28 NOTE — PROGRESS NOTES
Individual has been keeping to self in room, minimal interactions with others  He participated in programming selectively, only attended goal groups this morning  He entered milieu while IMR groups was occurring, had a load of wash in his hands, heading for dining room  When redirected that he was not able to do laundry while group was happening, he appeared irritated  Complaint with meds without prompting  No issues or concerns expressed  Will continue to monitor

## 2018-08-28 NOTE — PROGRESS NOTES
Patient's last recorded bm was on 8/24  Upon assesment pt did not have any c/o pain; positive BS X4, abdomen NT and ND  PRN dulcolax given with am routine meds

## 2018-08-29 RX ADMIN — POLYETHYLENE GLYCOL 3350 17 G: 17 POWDER, FOR SOLUTION ORAL at 08:09

## 2018-08-29 RX ADMIN — DIVALPROEX SODIUM 1000 MG: 500 TABLET, FILM COATED, EXTENDED RELEASE ORAL at 21:13

## 2018-08-29 RX ADMIN — MIDODRINE HYDROCHLORIDE 10 MG: 2.5 TABLET ORAL at 16:49

## 2018-08-29 RX ADMIN — LEVOTHYROXINE SODIUM 75 MCG: 75 TABLET ORAL at 06:51

## 2018-08-29 RX ADMIN — MIDODRINE HYDROCHLORIDE 10 MG: 2.5 TABLET ORAL at 21:14

## 2018-08-29 RX ADMIN — FLUPHENAZINE HYDROCHLORIDE 10 MG: 10 TABLET, FILM COATED ORAL at 21:14

## 2018-08-29 RX ADMIN — BUPROPION HYDROCHLORIDE 300 MG: 300 TABLET, FILM COATED, EXTENDED RELEASE ORAL at 08:09

## 2018-08-29 RX ADMIN — SENNOSIDES 8.6 MG: 8.6 TABLET, FILM COATED ORAL at 08:09

## 2018-08-29 RX ADMIN — CLOZAPINE 450 MG: 25 TABLET ORAL at 21:14

## 2018-08-29 RX ADMIN — SENNOSIDES 8.6 MG: 8.6 TABLET, FILM COATED ORAL at 17:03

## 2018-08-29 NOTE — PROGRESS NOTES
Aramis Abdul has been isolative to his room and self throughout the shift  No interaction with peers and little with staff  Fair eye contact  Blunted, flat affect  Denies anxiety, depression and hearing voices  Appears to be responding to internal stimuli  He used his Nintendo during electronic hour  Ate 100% of supper and took pills without difficulty  Midodrine given as ordered  Denies dizziness  No shower, but had a BM this shift  Attended and participated in groups on the deck this evening  SAFE and fall precautions maintained without incident

## 2018-08-29 NOTE — PROGRESS NOTES
The patient slept through the night with no behaviors or issues noted  Fall and safety precautions maintained  Took am synthroid, midodrine was held for bp of 131/68  Continue to monitor

## 2018-08-29 NOTE — PROGRESS NOTES
Psychiatry Progress Note    Subjective: Interval History     Patient isolative to his room this morning  Hygiene continues to be poor  Patient still guarded during assessment as he continues to deny all psychiatric symptoms  Patient not truly engaged in conversation and continues to minimize assessment  Staff reports patient has been attending select groups on the unit  Has been medication compliant  Still continues to need redirection to adhere to the unit protocols        Current medications:    Current Facility-Administered Medications:     bisacodyl (DULCOLAX) EC tablet 10 mg, 10 mg, Oral, Daily PRN, Provider Cutover, 10 mg at 08/28/18 0607    bisacodyl (DULCOLAX) rectal suppository 10 mg, 10 mg, Rectal, Daily PRN, LIZETH Mancini    buPROPion (WELLBUTRIN XL) 24 hr tablet 300 mg, 300 mg, Oral, Daily, Provider Cutover, 300 mg at 08/29/18 0809    cloZAPine (CLOZARIL) tablet 450 mg, 450 mg, Oral, HS, Audry Nyhan, MD, 450 mg at 08/28/18 2057    divalproex sodium (DEPAKOTE ER) 24 hr tablet 1,000 mg, 1,000 mg, Oral, HS, Eduard Wilkinson MD, 1,000 mg at 08/28/18 2057    fluPHENAZine (PROLIXIN) tablet 10 mg, 10 mg, Oral, HS, Eduard Wilkinson MD, 10 mg at 08/28/18 2057    levothyroxine tablet 75 mcg, 75 mcg, Oral, Early Morning, Provider Cutover, 75 mcg at 08/29/18 0651    midodrine (PROAMATINE) tablet 10 mg, 10 mg, Oral, TID, Wally Lynn MD, 10 mg at 08/28/18 2059    polyethylene glycol (MIRALAX) packet 17 g, 17 g, Oral, Daily, Provider Cutover, 17 g at 08/29/18 0809    senna (SENOKOT) tablet 8 6 mg, 1 tablet, Oral, BID, LIZETH James, 8 6 mg at 08/29/18 0809      Objective:     Vital Signs:  Vitals:    08/28/18 1900 08/29/18 0602 08/29/18 0730 08/29/18 0735   BP: 150/93 131/68 106/73 112/78   BP Location: Right arm Left arm Left arm    Pulse: (!) 130 (!) 114 (!) 112 (!) 114   Resp:   20    Temp:   97 8 °F (36 6 °C)    TempSrc:   Tympanic    SpO2:       Weight:       Height: Appearance:  age appropriate, casually dressed and disheveled   Behavior:  normal   Speech:  soft   Mood:  depressed   Affect:  flat   Thought Process:  normal   Thought Content:  normal   Perceptual Disturbances: None   Risk Potential: none   Sensorium:  person, place and time   Cognition:  intact   Consciousness:  alert and awake    Attention: attention span and concentration were age diminished   Intellect: average   Insight:  limited   Judgment: limited      Motor Activity: no abnormal movements           Recent Labs:  Results Reviewed     None          I/O Past 24 hours:  I/O last 3 completed shifts:  In: -   Out: 1 [Stool:1]  No intake/output data recorded  Assessment / Plan:     Chronic paranoid schizophrenia ([facility] )    Recommended Treatment:      Medication changes:  1) continue current medication regimen  Non-pharmacological treatments  1) Continue with group therapy, milieu therapy and occupational therapy  Safety  1) Safety/communication plan established targeting dynamic risk factors above  2) Risks, benefits, and possible side effects of medications explained to patient and patient verbalizes understanding  Counseling / Coordination of Care    Total floor / unit time spent today 20 minutes  Greater than 50% of total time was spent with the patient and / or family counseling and / or coordination of care  A description of the counseling / coordination of care  Patient's Rights, confidentiality and exceptions to confidentiality, use of automated medical record, [facility] staff access to medical record, and consent to treatment reviewed      Jt Meyers PA-C

## 2018-08-29 NOTE — PROGRESS NOTES
Individual has been visible for structured activities, then retreats to his room  He participated in programming, attended 2/2 groups  He completed hygiene this shift  He did need multiple prompts for getting up for vital signs this morning  Appetite has been good  Left unit around 1400 to attend Recovery Anonymous accompanied by [facility] staff, which is held in conference area of the hospital   He is due to return to the unit around 1500

## 2018-08-29 NOTE — PROGRESS NOTES
Mendel Majestic has been awake, alert, and visible intermittently out in the milieu  Maintained on Safe and Fall Precautions without incident  Compliant with scheduled 1600/1800 meds without prompting  Ate 100% supper  Pt remains with flat, blunted affect  Pt offers minimal conversation except to deny any depression, anxiety, or a/v hallucinations  Behavior remains quiet and isolative to self  Comes out of room and walks around unit at times  No interaction noted with peers  Will continue to encourage pt to utilize safe/effective coping skills and to work toward daily/recovery goals

## 2018-08-29 NOTE — PROGRESS NOTES
Pt attended [facility] group  Pt needed prompting but able to particpate  Pt mentioned he likes a change of scenery  Group focused on self esteem  What contributes to self esteem, how is self esteem related to mental health, factors to low self esteem, tips to increase self esteem, and deep breathing techniques and awareness  Focused on utilizing coping skills instead of relying on PRN's  Pt able to identify a coping skill that is used  Continue to provide therapeutic group support

## 2018-08-30 RX ADMIN — SENNOSIDES 8.6 MG: 8.6 TABLET, FILM COATED ORAL at 08:24

## 2018-08-30 RX ADMIN — CLOZAPINE 450 MG: 25 TABLET ORAL at 20:54

## 2018-08-30 RX ADMIN — BUPROPION HYDROCHLORIDE 300 MG: 300 TABLET, FILM COATED, EXTENDED RELEASE ORAL at 08:24

## 2018-08-30 RX ADMIN — LEVOTHYROXINE SODIUM 75 MCG: 75 TABLET ORAL at 06:37

## 2018-08-30 RX ADMIN — POLYETHYLENE GLYCOL 3350 17 G: 17 POWDER, FOR SOLUTION ORAL at 08:25

## 2018-08-30 RX ADMIN — MIDODRINE HYDROCHLORIDE 10 MG: 2.5 TABLET ORAL at 06:37

## 2018-08-30 RX ADMIN — SENNOSIDES 8.6 MG: 8.6 TABLET, FILM COATED ORAL at 17:05

## 2018-08-30 RX ADMIN — FLUPHENAZINE HYDROCHLORIDE 10 MG: 10 TABLET, FILM COATED ORAL at 20:53

## 2018-08-30 RX ADMIN — DIVALPROEX SODIUM 1000 MG: 500 TABLET, FILM COATED, EXTENDED RELEASE ORAL at 20:53

## 2018-08-30 RX ADMIN — MIDODRINE HYDROCHLORIDE 10 MG: 2.5 TABLET ORAL at 20:53

## 2018-08-30 RX ADMIN — MIDODRINE HYDROCHLORIDE 10 MG: 2.5 TABLET ORAL at 16:37

## 2018-08-30 NOTE — PROGRESS NOTES
Psychiatry Progress Note    Subjective: Interval History     Treatment team meeting this morning  Pt continues to be guarded and denies all psychiatric symptoms  Pt setting goals for pass with weekend with help from the team   Goals to work on life skills such as shaving to improve ADLs and helping to do dishes  Pt has been attending groups  Med and meal compliant      Current medications:    Current Facility-Administered Medications:     bisacodyl (DULCOLAX) EC tablet 10 mg, 10 mg, Oral, Daily PRN, Provider Cutover, 10 mg at 08/28/18 0607    bisacodyl (DULCOLAX) rectal suppository 10 mg, 10 mg, Rectal, Daily PRN, LIZETH Blackwell    buPROPion (WELLBUTRIN XL) 24 hr tablet 300 mg, 300 mg, Oral, Daily, Provider Cutover, 300 mg at 08/30/18 0824    cloZAPine (CLOZARIL) tablet 450 mg, 450 mg, Oral, HS, Mehnaz Rosales MD, 450 mg at 08/29/18 2114    divalproex sodium (DEPAKOTE ER) 24 hr tablet 1,000 mg, 1,000 mg, Oral, HS, Meldon Curling, MD, 1,000 mg at 08/29/18 2113    fluPHENAZine (PROLIXIN) tablet 10 mg, 10 mg, Oral, HS, Meldon Curling, MD, 10 mg at 08/29/18 2114    levothyroxine tablet 75 mcg, 75 mcg, Oral, Early Morning, Provider Cutover, 75 mcg at 08/30/18 0637    midodrine (PROAMATINE) tablet 10 mg, 10 mg, Oral, TID, Wally Lynn MD, 10 mg at 08/30/18 8218    polyethylene glycol (MIRALAX) packet 17 g, 17 g, Oral, Daily, Provider Cutover, 17 g at 08/30/18 0825    senna (SENOKOT) tablet 8 6 mg, 1 tablet, Oral, BID, LIZETH Meyer, 8 6 mg at 08/30/18 0824      Objective:     Vital Signs:  Vitals:    08/29/18 1930 08/30/18 0500 08/30/18 0700 08/30/18 0830   BP: 114/72 95/56 117/82 96/65   BP Location: Left arm Left arm Left arm Left arm   Pulse: (!) 133 (!) 113 105 (!) 116   Resp:   21    Temp:   98 °F (36 7 °C)    TempSrc:   Temporal    SpO2:       Weight:       Height:             Appearance:  age appropriate, casually dressed and disheveled   Behavior:  normal   Speech:  soft   Mood:  depressed Affect:  flat   Thought Process:  normal   Thought Content:  normal   Perceptual Disturbances: None   Risk Potential: none   Sensorium:  person, place and time   Cognition:  intact   Consciousness:  alert and awake    Attention: attention span and concentration were age diminished   Intellect: average   Insight:  limited   Judgment: limited      Motor Activity: no abnormal movements           Recent Labs:  Results Reviewed     None          I/O Past 24 hours:  No intake/output data recorded  I/O this shift:  In: -   Out: 1 [Stool:1]        Assessment / Plan:     [facility]    Recommended Treatment:      Medication changes:  1) continue current medication regimen  Non-pharmacological treatments  1) Continue with group therapy, milieu therapy and occupational therapy  Safety  1) Safety/communication plan established targeting dynamic risk factors above  2) Risks, benefits, and possible side effects of medications explained to patient and patient verbalizes understanding  Counseling / Coordination of Care    Total floor / unit time spent today 20 minutes  Greater than 50% of total time was spent with the patient and / or family counseling and / or coordination of care  A description of the counseling / coordination of care  Patient's Rights, confidentiality and exceptions to confidentiality, use of automated medical record, [facility] staff access to medical record, and consent to treatment reviewed      Chino Summers PA-C

## 2018-08-30 NOTE — NURSING NOTE
Visible on unit at intervals  Medication and meal compliant  Denies suicidal ideations  Attends groups  Offers no complaints  Brief during conversation  Mouth checks done during medication pass  Remains on safe and fall precautions for safety  Appetite good

## 2018-08-30 NOTE — PROGRESS NOTES
Pt attended [facility] group  Pt was cooperative and pleasant with positive eye contact  Pt expressive in thoughts and ideas and respectful with peers  Pt identified that he is afraid of planes when he had the word "afraid" to describe  Group focused self identification of characteristics, self esteem, self descriptive words  Self expression and identity in describing self, and feelings cards and examples  Continue to provide therapeutic group support

## 2018-08-31 RX ADMIN — DIVALPROEX SODIUM 1000 MG: 500 TABLET, FILM COATED, EXTENDED RELEASE ORAL at 20:54

## 2018-08-31 RX ADMIN — SENNOSIDES 8.6 MG: 8.6 TABLET, FILM COATED ORAL at 08:32

## 2018-08-31 RX ADMIN — BUPROPION HYDROCHLORIDE 300 MG: 300 TABLET, FILM COATED, EXTENDED RELEASE ORAL at 08:32

## 2018-08-31 RX ADMIN — MIDODRINE HYDROCHLORIDE 10 MG: 2.5 TABLET ORAL at 16:50

## 2018-08-31 RX ADMIN — FLUPHENAZINE HYDROCHLORIDE 10 MG: 10 TABLET, FILM COATED ORAL at 20:54

## 2018-08-31 RX ADMIN — MIDODRINE HYDROCHLORIDE 10 MG: 2.5 TABLET ORAL at 06:38

## 2018-08-31 RX ADMIN — LEVOTHYROXINE SODIUM 75 MCG: 75 TABLET ORAL at 06:38

## 2018-08-31 RX ADMIN — CLOZAPINE 450 MG: 25 TABLET ORAL at 20:54

## 2018-08-31 RX ADMIN — SENNOSIDES 8.6 MG: 8.6 TABLET, FILM COATED ORAL at 17:21

## 2018-08-31 RX ADMIN — MIDODRINE HYDROCHLORIDE 10 MG: 2.5 TABLET ORAL at 20:54

## 2018-08-31 RX ADMIN — POLYETHYLENE GLYCOL 3350 17 G: 17 POWDER, FOR SOLUTION ORAL at 08:32

## 2018-08-31 NOTE — PROGRESS NOTES
Lodema Marquette has been awake, alert, and visible intermittently out in the milieu  Compliant with scheduled meds with little prompting  Ate 100% supper  Mother and sister in to visit with fair interaction noted and brought food in for pt which pt ate  Maintained on Safe and Fall Precautions without incident  Attended and participated in 2/2 evening groups  Affect remains flat, blunted and offers minimal conversation with staff  Isolative to self  Resting quietly in bed at present  Will continue to encourage utilization of safe/effective coping skills and to work toward achievement of daily/recovery goals

## 2018-08-31 NOTE — PROGRESS NOTES
Pt received @ 0700  Remains blunted & isolative to self/room  After  AM VS, pt stayed in DR with head down  Appeared to be sleeping  Prompted multiple times for meds  Ate 100% breakfast, refused lunch  Attended 1/3 groups   No behavioral issues, will continue to monitor

## 2018-08-31 NOTE — ESCALATED TEAM TX
Patient attended treatment team meeting this morning prepared with self-assessment  Patient's group attendance for last week was 92%  Patient was granted a pass with his mother for 9/2 from 10-8p  Patient's therapeutic goal for this pass is to shave and wash the dishes  He did not complete his goal of driving last week, ut did ride roller coasters at [facility]  Patient verbalized no further questions or concerns at the conclusion of the meeting  Next team meeting scheduled for 9/6

## 2018-08-31 NOTE — PROGRESS NOTES
Psychiatry Progress Note    Subjective: Interval History     Patient isolative to his room this morning  Patient continues to have disheveled appearance  Patient is still evasive during assessment  Patient reporting that he is feeling fine and continues to deny all psychiatric symptoms  Even upon discussion of going on pass on Sunday with his mother no brightening of his affect or further engagement conversation  Patient continues to attend groups with staff encouragement  Patient has been medication and meal compliant  Continues to be encouraged to improve his ADLs      Current medications:    Current Facility-Administered Medications:     bisacodyl (DULCOLAX) EC tablet 10 mg, 10 mg, Oral, Daily PRN, Provider Cutover, 10 mg at 08/28/18 0607    bisacodyl (DULCOLAX) rectal suppository 10 mg, 10 mg, Rectal, Daily PRN, LIZETH Bond    buPROPion (WELLBUTRIN XL) 24 hr tablet 300 mg, 300 mg, Oral, Daily, Provider Cutover, 300 mg at 08/31/18 5155    cloZAPine (CLOZARIL) tablet 450 mg, 450 mg, Oral, HS, Jorge Davis MD, 450 mg at 08/30/18 2054    divalproex sodium (DEPAKOTE ER) 24 hr tablet 1,000 mg, 1,000 mg, Oral, HS, Domenica Amaot MD, 1,000 mg at 08/30/18 2053    fluPHENAZine (PROLIXIN) tablet 10 mg, 10 mg, Oral, HS, Domenica Amato MD, 10 mg at 08/30/18 2053    levothyroxine tablet 75 mcg, 75 mcg, Oral, Early Morning, Provider Cutover, 75 mcg at 08/31/18 7110    midodrine (PROAMATINE) tablet 10 mg, 10 mg, Oral, TID, Wally Lynn MD, 10 mg at 08/31/18 7275    polyethylene glycol (MIRALAX) packet 17 g, 17 g, Oral, Daily, Provider Cutover, 17 g at 08/31/18 6975    senna (SENOKOT) tablet 8 6 mg, 1 tablet, Oral, BID, LIZETH Crocker, 8 6 mg at 08/31/18 0832      Objective:     Vital Signs:  Vitals:    08/30/18 1930 08/31/18 0527 08/31/18 0730 08/31/18 0735   BP: 115/74 110/64 119/80 113/80   BP Location: Left arm Left arm Left arm Left arm   Pulse: (!) 118 (!) 114 101 (!) 110   Resp:   20 20 Temp:   (!) 97 1 °F (36 2 °C)    TempSrc:   Temporal    SpO2:       Weight:       Height:             Appearance:  age appropriate, casually dressed and disheveled   Behavior:  normal   Speech:  soft   Mood:  depressed   Affect:  flat   Thought Process:  normal   Thought Content:  normal   Perceptual Disturbances: None   Risk Potential: none   Sensorium:  person, place and time   Cognition:  intact   Consciousness:  alert and awake    Attention: attention span and concentration were age diminished   Intellect: average   Insight:  limited   Judgment: limited      Motor Activity: no abnormal movements           Recent Labs:  Results Reviewed     None          I/O Past 24 hours:  I/O last 3 completed shifts:  In: -   Out: 2 [Stool:2]  No intake/output data recorded  Assessment / Plan:     [facility]    Recommended Treatment:      Medication changes:  1) continue current medication regimen  Non-pharmacological treatments  1) Continue with group therapy, milieu therapy and occupational therapy  Safety  1) Safety/communication plan established targeting dynamic risk factors above  2) Risks, benefits, and possible side effects of medications explained to patient and patient verbalizes understanding  Counseling / Coordination of Care    Total floor / unit time spent today 20 minutes  Greater than 50% of total time was spent with the patient and / or family counseling and / or coordination of care  A description of the counseling / coordination of care  Patient's Rights, confidentiality and exceptions to confidentiality, use of automated medical record, [facility] staff access to medical record, and consent to treatment reviewed      Tereza Torres PA-C

## 2018-09-01 RX ADMIN — LEVOTHYROXINE SODIUM 75 MCG: 75 TABLET ORAL at 06:33

## 2018-09-01 RX ADMIN — DIVALPROEX SODIUM 1000 MG: 500 TABLET, FILM COATED, EXTENDED RELEASE ORAL at 20:48

## 2018-09-01 RX ADMIN — CLOZAPINE 450 MG: 25 TABLET ORAL at 20:48

## 2018-09-01 RX ADMIN — FLUPHENAZINE HYDROCHLORIDE 10 MG: 10 TABLET, FILM COATED ORAL at 20:49

## 2018-09-01 RX ADMIN — POLYETHYLENE GLYCOL 3350 17 G: 17 POWDER, FOR SOLUTION ORAL at 08:15

## 2018-09-01 RX ADMIN — SENNOSIDES 8.6 MG: 8.6 TABLET, FILM COATED ORAL at 08:15

## 2018-09-01 RX ADMIN — MIDODRINE HYDROCHLORIDE 10 MG: 2.5 TABLET ORAL at 20:48

## 2018-09-01 RX ADMIN — SENNOSIDES 8.6 MG: 8.6 TABLET, FILM COATED ORAL at 17:01

## 2018-09-01 RX ADMIN — MIDODRINE HYDROCHLORIDE 10 MG: 2.5 TABLET ORAL at 06:33

## 2018-09-01 RX ADMIN — BUPROPION HYDROCHLORIDE 300 MG: 300 TABLET, FILM COATED, EXTENDED RELEASE ORAL at 08:15

## 2018-09-01 RX ADMIN — MIDODRINE HYDROCHLORIDE 10 MG: 2.5 TABLET ORAL at 17:01

## 2018-09-01 NOTE — PROGRESS NOTES
Liam Meeks has been awake, alert, and mostly isolative to self in room  Compliant with scheduled meds with some prompting  Ate 100% supper  Attended and participated in 2/3 evening groups  Affect remains flat, blunted, and offers minimal conversation with staff  No behavioral issues  No interaction noted with peers  Had evening snack  Pt denies any depression, anxiety,or a/v hallucinations but is preoccupied at times  Will continue to encourage utilization of safe/effective coping skills and to work toward achievement of daily/recovery goals  Resting quietly in bed at present, arouses easily  Will continue to monitor/assess for any changes

## 2018-09-01 NOTE — PROGRESS NOTES
Pt received @ 0700  Remains blunted & isolative  After VS, pt remained in DR but had to be prompted for meds  Lacks motivation & sleeping in room all shift  Came out for structured activities only   No behavioral issues, will continue to monitor

## 2018-09-01 NOTE — PROGRESS NOTES
No changes from previous assessment  Remains isolative to room/self  Ate 75% of dinner   No behavioral issues, will continue to monitor

## 2018-09-01 NOTE — PROGRESS NOTES
Psychiatry Progress Note    Subjective: Interval History     Patient remains withdrawn and isolative  Minimal engagement with staff or peers, although he answers questions upon interview  He denies homicidal or suicidal ideations  He has been going to select groups but not actively participating  Med and meal adherent        Current medications:    Current Facility-Administered Medications:     bisacodyl (DULCOLAX) EC tablet 10 mg, 10 mg, Oral, Daily PRN, Provider Cutover, 10 mg at 08/28/18 0607    bisacodyl (DULCOLAX) rectal suppository 10 mg, 10 mg, Rectal, Daily PRN, Dorgladys Greenfield, CRNP    buPROPion (WELLBUTRIN XL) 24 hr tablet 300 mg, 300 mg, Oral, Daily, Provider Cutover, 300 mg at 09/01/18 0815    cloZAPine (CLOZARIL) tablet 450 mg, 450 mg, Oral, HS, Rigo Perry MD, 450 mg at 08/31/18 2054    divalproex sodium (DEPAKOTE ER) 24 hr tablet 1,000 mg, 1,000 mg, Oral, HS, Nova Collins MD, 1,000 mg at 08/31/18 2054    fluPHENAZine (PROLIXIN) tablet 10 mg, 10 mg, Oral, HS, Nova Collins MD, 10 mg at 08/31/18 2054    levothyroxine tablet 75 mcg, 75 mcg, Oral, Early Morning, Provider Cutover, 75 mcg at 09/01/18 2859    midodrine (PROAMATINE) tablet 10 mg, 10 mg, Oral, TID, Wally Lynn MD, 10 mg at 09/01/18 9564    polyethylene glycol (MIRALAX) packet 17 g, 17 g, Oral, Daily, Provider Cutover, 17 g at 09/01/18 0815    senna (SENOKOT) tablet 8 6 mg, 1 tablet, Oral, BID, Francisca NEO GreenfieldNP, 8 6 mg at 09/01/18 0815      Objective:     Vital Signs:  Vitals:    08/31/18 1602 08/31/18 1900 09/01/18 0626 09/01/18 0730   BP: 105/68 100/66 101/68 109/79   BP Location: Left arm Left arm Left arm Left arm   Pulse: (!) 117 (!) 122 (!) 123 104   Resp:    20   Temp:    (!) 97 1 °F (36 2 °C)   TempSrc:    Temporal   SpO2:       Weight:       Height:             Appearance:  age appropriate, casually dressed and disheveled   Behavior:  normal   Speech:  soft   Mood:  depressed   Affect:  flat   Thought Process:  normal Thought Content:  normal   Perceptual Disturbances: None   Risk Potential: none   Sensorium:  person, place and time   Cognition:  intact   Consciousness:  alert and awake    Attention: attention span and concentration were age diminished   Intellect: average   Insight:  limited   Judgment: limited      Motor Activity: no abnormal movements           Recent Labs:  Results Reviewed     None          I/O Past 24 hours:  I/O last 3 completed shifts:  In: -   Out: 1 [Stool:1]  No intake/output data recorded  Assessment / Plan:     Chronic paranoid [facility] ([facility] )    Recommended Treatment:      Medication changes:  1) continue current medication regimen  Non-pharmacological treatments  1) Continue with group therapy, milieu therapy and occupational therapy  Safety  1) Safety/communication plan established targeting dynamic risk factors above  2) Risks, benefits, and possible side effects of medications explained to patient and patient verbalizes understanding  Counseling / Coordination of Care    Total floor / unit time spent today 20 minutes  Greater than 50% of total time was spent with the patient and / or family counseling and / or coordination of care  A description of the counseling / coordination of care  Patient's Rights, confidentiality and exceptions to confidentiality, use of automated medical record, [address] [place] [address] staff access to medical record, and consent to treatment reviewed      LIZETH Hernandez

## 2018-09-02 PROCEDURE — 80307 DRUG TEST PRSMV CHEM ANLYZR: CPT | Performed by: PSYCHIATRY & NEUROLOGY

## 2018-09-02 RX ADMIN — BUPROPION HYDROCHLORIDE 300 MG: 300 TABLET, FILM COATED, EXTENDED RELEASE ORAL at 08:32

## 2018-09-02 RX ADMIN — DIVALPROEX SODIUM 1000 MG: 500 TABLET, FILM COATED, EXTENDED RELEASE ORAL at 20:58

## 2018-09-02 RX ADMIN — MIDODRINE HYDROCHLORIDE 10 MG: 2.5 TABLET ORAL at 20:57

## 2018-09-02 RX ADMIN — SENNOSIDES 8.6 MG: 8.6 TABLET, FILM COATED ORAL at 08:32

## 2018-09-02 RX ADMIN — FLUPHENAZINE HYDROCHLORIDE 10 MG: 10 TABLET, FILM COATED ORAL at 20:58

## 2018-09-02 RX ADMIN — LEVOTHYROXINE SODIUM 75 MCG: 75 TABLET ORAL at 06:21

## 2018-09-02 RX ADMIN — CLOZAPINE 450 MG: 25 TABLET ORAL at 20:57

## 2018-09-02 RX ADMIN — POLYETHYLENE GLYCOL 3350 17 G: 17 POWDER, FOR SOLUTION ORAL at 08:32

## 2018-09-02 RX ADMIN — MIDODRINE HYDROCHLORIDE 10 MG: 2.5 TABLET ORAL at 06:21

## 2018-09-02 NOTE — PROGRESS NOTES
Rene Mendez attended and participated in 2/2 evening groups  He was compliant with routine medications without prompting and had evening snack with his peers, no behavioral issues this evening, maintained on SAFE/Fall precautions, fluids encouraged, bathroom light on for safety, no personal belongings on the floor

## 2018-09-02 NOTE — PROGRESS NOTES
Willie Colón called in at [phone]  Stated that pass is going well  Due to return at 2000 with Mom

## 2018-09-03 RX ADMIN — FLUPHENAZINE HYDROCHLORIDE 10 MG: 10 TABLET, FILM COATED ORAL at 21:05

## 2018-09-03 RX ADMIN — SENNOSIDES 8.6 MG: 8.6 TABLET, FILM COATED ORAL at 08:27

## 2018-09-03 RX ADMIN — DIVALPROEX SODIUM 1000 MG: 500 TABLET, FILM COATED, EXTENDED RELEASE ORAL at 21:05

## 2018-09-03 RX ADMIN — BUPROPION HYDROCHLORIDE 300 MG: 300 TABLET, FILM COATED, EXTENDED RELEASE ORAL at 08:27

## 2018-09-03 RX ADMIN — POLYETHYLENE GLYCOL 3350 17 G: 17 POWDER, FOR SOLUTION ORAL at 08:27

## 2018-09-03 RX ADMIN — SENNOSIDES 8.6 MG: 8.6 TABLET, FILM COATED ORAL at 17:04

## 2018-09-03 RX ADMIN — LEVOTHYROXINE SODIUM 75 MCG: 75 TABLET ORAL at 06:21

## 2018-09-03 RX ADMIN — CLOZAPINE 450 MG: 25 TABLET ORAL at 21:05

## 2018-09-03 RX ADMIN — MIDODRINE HYDROCHLORIDE 10 MG: 2.5 TABLET ORAL at 06:21

## 2018-09-03 NOTE — PROGRESS NOTES
Alberto Petit returned from pass with his mom on time  They both said the pass went well  They had "a busy day, running around"  Both Alberto Petit and his mom stared at me blankly when I asked Alberto Petit if he completed the following assigned tasks for today's pass; cleaning up the dishes, loading the  and beard trimming as specified in the order  These expectations may not have been passed on to Alberto Petit and his mom by the treatment team and/or reinforced by nursing staff  Vaughn's mom stated, "The beard eddie is here" will follow up with beard trimming in the am if we are unable to help him tonight before he retires to bed

## 2018-09-03 NOTE — PROGRESS NOTES
Psychiatry Progress Note    Subjective: Interval History     Patient has been isolative to himself  Indicates he had a nice pass yesterday  He went to see a movie  He was reminded by staff that he has his tasks to complete; however, the patient responded as if he had no idea what they were talking about   He denies HI SI   Current medications:    Current Facility-Administered Medications:     bisacodyl (DULCOLAX) EC tablet 10 mg, 10 mg, Oral, Daily PRN, Provider Cutover, 10 mg at 08/28/18 0607    bisacodyl (DULCOLAX) rectal suppository 10 mg, 10 mg, Rectal, Daily PRN, KatLIZETH Fung    buPROPion (WELLBUTRIN XL) 24 hr tablet 300 mg, 300 mg, Oral, Daily, Provider Cutover, 300 mg at 09/03/18 0827    cloZAPine (CLOZARIL) tablet 450 mg, 450 mg, Oral, HS, Adelso Siegel MD, 450 mg at 09/02/18 2057    divalproex sodium (DEPAKOTE ER) 24 hr tablet 1,000 mg, 1,000 mg, Oral, HS, Krystina Sanders MD, 1,000 mg at 09/02/18 2058    fluPHENAZine (PROLIXIN) tablet 10 mg, 10 mg, Oral, HS, Krystina Sanders MD, 10 mg at 09/02/18 2058    levothyroxine tablet 75 mcg, 75 mcg, Oral, Early Morning, Provider Cutover, 75 mcg at 09/03/18 0621    midodrine (PROAMATINE) tablet 10 mg, 10 mg, Oral, TID, Wally Lynn MD, 10 mg at 09/03/18 0621    polyethylene glycol (MIRALAX) packet 17 g, 17 g, Oral, Daily, Provider Cutover, 17 g at 09/03/18 0827    senna (SENOKOT) tablet 8 6 mg, 1 tablet, Oral, BID, LIZETH Meyer, 8 6 mg at 09/03/18 0827      Objective:     Vital Signs:  Vitals:    09/02/18 2030 09/03/18 0617 09/03/18 0700 09/03/18 0705   BP: 110/84 112/72 123/74 112/71   BP Location: Right arm Left arm Right arm Right arm   Pulse: (!) 136 (!) 113 104 (!) 107   Resp:   20    Temp:   97 6 °F (36 4 °C)    TempSrc:   Temporal    SpO2:       Weight:       Height:             Appearance:  age appropriate, casually dressed and disheveled   Behavior:  normal   Speech:  soft   Mood:  depressed   Affect:  flat   Thought Process:  normal Thought Content:  normal   Perceptual Disturbances: None   Risk Potential: none   Sensorium:  person, place and time   Cognition:  intact   Consciousness:  alert and awake    Attention: attention span and concentration were age diminished   Intellect: average   Insight:  limited   Judgment: limited      Motor Activity: no abnormal movements           Recent Labs:  Results Reviewed     None          I/O Past 24 hours:  No intake/output data recorded  No intake/output data recorded  Assessment / Plan:     Chronic paranoid schizophrenia ([facility]    Recommended Treatment:      Medication changes:  1) continue current medication regimen  Non-pharmacological treatments  1) Continue with group therapy, milieu therapy and occupational therapy  Safety  1) Safety/communication plan established targeting dynamic risk factors above  2) Risks, benefits, and possible side effects of medications explained to patient and patient verbalizes understanding  Counseling / Coordination of Care    Total floor / unit time spent today 20 minutes  Greater than 50% of total time was spent with the patient and / or family counseling and / or coordination of care  A description of the counseling / coordination of care  Patient's Rights, confidentiality and exceptions to confidentiality, use of automated medical record, [facility] staff access to medical record, and consent to treatment reviewed      LIZETH Bradley

## 2018-09-03 NOTE — PROGRESS NOTES
Stevie Loya has been awake, alert, and visible minimally out in the milieu  Compliant with scheduled 1800 med  1600 Midodrine held per MD BP parameters  Affect remains flat, blunted, and offers minimal conversation with staff  Ate 25 supper  Pt denies any depression, anxiety,or a/v hallucinations but isolative to room and preoccupied at times  No interaction noted with peers  Pt lacks insight into his illness  Mother in to visit at present with fair interaction noted  Mother brought food in for pt which pt ate  No behavioral issues  Will continue to encourage utilization of safe/effective coping skills and to work toward achievement of daily/recovery goals

## 2018-09-03 NOTE — PROGRESS NOTES
Individual has been keeping to self in room most of the shift, visible only for meals and needed prompting for same  He was select in program participation, attended only goal group  He was compliant with meds without prompting  Struggles to express self, availability of staff made known  Will continue to monitor

## 2018-09-04 LAB
ALBUMIN SERPL BCP-MCNC: 3.9 G/DL (ref 3–5.2)
ALP SERPL-CCNC: 72 U/L (ref 43–122)
ALT SERPL W P-5'-P-CCNC: 18 U/L (ref 9–52)
ANION GAP SERPL CALCULATED.3IONS-SCNC: 9 MMOL/L (ref 5–14)
AST SERPL W P-5'-P-CCNC: 12 U/L (ref 17–59)
BASOPHILS # BLD AUTO: 0 THOUSANDS/ΜL (ref 0–0.1)
BASOPHILS NFR BLD AUTO: 1 % (ref 0–1)
BILIRUB SERPL-MCNC: 0.2 MG/DL
BUN SERPL-MCNC: 16 MG/DL (ref 5–25)
CALCIUM SERPL-MCNC: 8.9 MG/DL (ref 8.4–10.2)
CHLORIDE SERPL-SCNC: 103 MMOL/L (ref 97–108)
CHOLEST SERPL-MCNC: 173 MG/DL
CO2 SERPL-SCNC: 33 MMOL/L (ref 22–30)
CREAT SERPL-MCNC: 1.28 MG/DL (ref 0.7–1.5)
EOSINOPHIL # BLD AUTO: 0.5 THOUSAND/ΜL (ref 0–0.4)
EOSINOPHIL NFR BLD AUTO: 8 % (ref 0–6)
ERYTHROCYTE [DISTWIDTH] IN BLOOD BY AUTOMATED COUNT: 12.9 %
GFR SERPL CREATININE-BSD FRML MDRD: 72 ML/MIN/1.73SQ M
GLUCOSE P FAST SERPL-MCNC: 97 MG/DL (ref 70–99)
GLUCOSE SERPL-MCNC: 97 MG/DL (ref 70–99)
HCT VFR BLD AUTO: 45.4 % (ref 41–53)
HDLC SERPL-MCNC: 47 MG/DL (ref 40–59)
HGB BLD-MCNC: 15.2 G/DL (ref 13.5–17.5)
LDLC SERPL CALC-MCNC: 103 MG/DL
LYMPHOCYTES # BLD AUTO: 2.8 THOUSANDS/ΜL (ref 0.5–4)
LYMPHOCYTES NFR BLD AUTO: 44 % (ref 20–50)
MCH RBC QN AUTO: 28.7 PG (ref 26–34)
MCHC RBC AUTO-ENTMCNC: 33.4 G/DL (ref 31–36)
MCV RBC AUTO: 86 FL (ref 80–100)
MONOCYTES # BLD AUTO: 0.4 THOUSAND/ΜL (ref 0.2–0.9)
MONOCYTES NFR BLD AUTO: 7 % (ref 1–10)
NEUTROPHILS # BLD AUTO: 2.7 THOUSANDS/ΜL (ref 1.8–7.8)
NEUTS SEG NFR BLD AUTO: 42 % (ref 45–65)
NONHDLC SERPL-MCNC: 126 MG/DL
PLATELET # BLD AUTO: 229 THOUSANDS/UL (ref 150–450)
PMV BLD AUTO: 7.8 FL (ref 8.9–12.7)
POTASSIUM SERPL-SCNC: 4.2 MMOL/L (ref 3.6–5)
PROT SERPL-MCNC: 6.9 G/DL (ref 5.9–8.4)
RBC # BLD AUTO: 5.28 MILLION/UL (ref 4.5–5.9)
SODIUM SERPL-SCNC: 145 MMOL/L (ref 137–147)
TRIGL SERPL-MCNC: 113 MG/DL
WBC # BLD AUTO: 6.5 THOUSAND/UL (ref 4.5–11)

## 2018-09-04 PROCEDURE — 80061 LIPID PANEL: CPT | Performed by: PSYCHIATRY & NEUROLOGY

## 2018-09-04 PROCEDURE — 80053 COMPREHEN METABOLIC PANEL: CPT | Performed by: PSYCHIATRY & NEUROLOGY

## 2018-09-04 PROCEDURE — 85025 COMPLETE CBC W/AUTO DIFF WBC: CPT | Performed by: PSYCHIATRY & NEUROLOGY

## 2018-09-04 RX ADMIN — MIDODRINE HYDROCHLORIDE 10 MG: 2.5 TABLET ORAL at 20:52

## 2018-09-04 RX ADMIN — CLOZAPINE 450 MG: 25 TABLET ORAL at 20:53

## 2018-09-04 RX ADMIN — FLUPHENAZINE HYDROCHLORIDE 10 MG: 10 TABLET, FILM COATED ORAL at 20:53

## 2018-09-04 RX ADMIN — POLYETHYLENE GLYCOL 3350 17 G: 17 POWDER, FOR SOLUTION ORAL at 08:25

## 2018-09-04 RX ADMIN — DIVALPROEX SODIUM 1000 MG: 500 TABLET, FILM COATED, EXTENDED RELEASE ORAL at 20:53

## 2018-09-04 RX ADMIN — SENNOSIDES 8.6 MG: 8.6 TABLET, FILM COATED ORAL at 19:00

## 2018-09-04 RX ADMIN — SENNOSIDES 8.6 MG: 8.6 TABLET, FILM COATED ORAL at 08:25

## 2018-09-04 RX ADMIN — MIDODRINE HYDROCHLORIDE 10 MG: 2.5 TABLET ORAL at 06:03

## 2018-09-04 RX ADMIN — BUPROPION HYDROCHLORIDE 300 MG: 300 TABLET, FILM COATED, EXTENDED RELEASE ORAL at 08:25

## 2018-09-04 RX ADMIN — LEVOTHYROXINE SODIUM 75 MCG: 75 TABLET ORAL at 05:52

## 2018-09-04 NOTE — PROGRESS NOTES
Pt attended [address] [place] [facility] group in dining room  Pt was cooperative and with prompting volunteered and provided feedback within group  Group focused on strategies for recovery, self reflection paper on what recovery means, what helps people in the process of recovery  Pt was able to verbalize needs and identified progress  Continue to provide therapeutic group support

## 2018-09-04 NOTE — PROGRESS NOTES
Standing BP 97/58, pulse 119 fluids encouraged, BMP, lipid panel and CBC collected results pending, remains on Fall/SAFE precautions will continue to monitor

## 2018-09-04 NOTE — PROGRESS NOTES
Alberto Petit attended and participated in 2/2 evening groups  Compliant with scheduled 2100 meds with prompting x 1   2100 Midodrine held per MD BP parameters  Pt declined evening snack  Maintained on Safe and Fall Precautions without incident  Resting quietly in bed at present, arouses easily  Will continue to monitor/assess for any changes

## 2018-09-04 NOTE — PROGRESS NOTES
Psychiatry Progress Note    Subjective: Interval History     Patient continues to present and behavior as if he is in his own world and is most likely hallucinating although he rarely discusses it and is not overtly observable to staff  He will answer questions with 1 or 2 words he is med noncompliant  There are no new issues 1 passes he does much better with his family as mother continues to say that he is much improved overall        Current medications:    Current Facility-Administered Medications:     bisacodyl (DULCOLAX) EC tablet 10 mg, 10 mg, Oral, Daily PRN, Provider Cutover, 10 mg at 08/28/18 0607    bisacodyl (DULCOLAX) rectal suppository 10 mg, 10 mg, Rectal, Daily PRN, LIZETH Hernandez    buPROPion (WELLBUTRIN XL) 24 hr tablet 300 mg, 300 mg, Oral, Daily, Provider Cutover, 300 mg at 09/04/18 0825    cloZAPine (CLOZARIL) tablet 450 mg, 450 mg, Oral, HS, Arnie Contreras MD, 450 mg at 09/03/18 2105    divalproex sodium (DEPAKOTE ER) 24 hr tablet 1,000 mg, 1,000 mg, Oral, HS, Scooter Loera MD, 1,000 mg at 09/03/18 2105    fluPHENAZine (PROLIXIN) tablet 10 mg, 10 mg, Oral, HS, Scooter Loera MD, 10 mg at 09/03/18 2105    levothyroxine tablet 75 mcg, 75 mcg, Oral, Early Morning, Provider Cutover, 75 mcg at 09/04/18 0552    midodrine (PROAMATINE) tablet 10 mg, 10 mg, Oral, TID, Wally Lynn MD, 10 mg at 09/04/18 0603    polyethylene glycol (MIRALAX) packet 17 g, 17 g, Oral, Daily, Provider Cutover, 17 g at 09/04/18 0825    senna (SENOKOT) tablet 8 6 mg, 1 tablet, Oral, BID, LIZETH Meyer, 8 6 mg at 09/04/18 0825      Objective:     Vital Signs:  Vitals:    09/03/18 1948 09/04/18 0600 09/04/18 0732 09/04/18 0735   BP: 128/80 97/58 110/69 111/60   BP Location: Left arm Left arm Right arm Right arm   Pulse: (!) 118 (!) 119 105 (!) 112   Resp:   18    Temp:  97 5 °F (36 4 °C) 98 3 °F (36 8 °C)    TempSrc:  Temporal Temporal    SpO2:       Weight:       Height:             Appearance:  age appropriate and casually dressed   Behavior:  evasive   Speech:  soft   Mood:  depressed   Affect:  constricted   Thought Process:  normal   Thought Content:  normal   Perceptual Disturbances: Auditory hallucinations without commands   Risk Potential: none   Sensorium:  person, place, situation and time   Cognition:  intact   Consciousness:  alert and awake    Attention: attention span and concentration were age appropriate   Intellect: average   Insight:  poor   Judgment: good and poor      Motor Activity: no abnormal movements           Recent Labs:  Results Reviewed     None          I/O Past 24 hours:  No intake/output data recorded  No intake/output data recorded  Assessment / Plan:     [facility]    Recommended Treatment:      Medication changes:  1) none    Non-pharmacological treatments  1) Continue with group therapy, milieu therapy and occupational therapy  Safety  1) Safety/communication plan established targeting dynamic risk factors above  2) Risks, benefits, and possible side effects of medications explained to patient and patient verbalizes understanding  Counseling / Coordination of Care    Total floor / unit time spent today 20 minutes  Greater than 50% of total time was spent with the patient and / or family counseling and / or coordination of care  A description of the counseling / coordination of care  Patient's Rights, confidentiality and exceptions to confidentiality, use of automated medical record, [facility], and consent to treatment reviewed      Shira Salvador MD

## 2018-09-04 NOTE — PROGRESS NOTES
Pt received @ 0700  Remains blunted & isolative to room/self  Prompted for meds, ate 100% of meals  Attended 2/2 groups & mindfulness   No behavioral issues, will continue to monitor

## 2018-09-05 RX ADMIN — SENNOSIDES 8.6 MG: 8.6 TABLET, FILM COATED ORAL at 08:19

## 2018-09-05 RX ADMIN — MIDODRINE HYDROCHLORIDE 10 MG: 2.5 TABLET ORAL at 20:53

## 2018-09-05 RX ADMIN — CLOZAPINE 450 MG: 25 TABLET ORAL at 20:54

## 2018-09-05 RX ADMIN — SENNOSIDES 8.6 MG: 8.6 TABLET, FILM COATED ORAL at 17:01

## 2018-09-05 RX ADMIN — BUPROPION HYDROCHLORIDE 300 MG: 300 TABLET, FILM COATED, EXTENDED RELEASE ORAL at 08:19

## 2018-09-05 RX ADMIN — DIVALPROEX SODIUM 1000 MG: 500 TABLET, FILM COATED, EXTENDED RELEASE ORAL at 20:54

## 2018-09-05 RX ADMIN — POLYETHYLENE GLYCOL 3350 17 G: 17 POWDER, FOR SOLUTION ORAL at 08:19

## 2018-09-05 RX ADMIN — FLUPHENAZINE HYDROCHLORIDE 10 MG: 10 TABLET, FILM COATED ORAL at 20:54

## 2018-09-05 RX ADMIN — MIDODRINE HYDROCHLORIDE 10 MG: 2.5 TABLET ORAL at 06:08

## 2018-09-05 RX ADMIN — LEVOTHYROXINE SODIUM 75 MCG: 75 TABLET ORAL at 06:08

## 2018-09-05 RX ADMIN — MIDODRINE HYDROCHLORIDE 10 MG: 2.5 TABLET ORAL at 16:59

## 2018-09-05 NOTE — PROGRESS NOTES
Individual has been keeping to self, visible for structured activities then retreats to his room  He participated in programming, attended 2/2 groups  He does not interact with others, RN needs to approach him to assess if needs are being met  He had a good appetite for breakfast but only ate 10% of lunch  Compliant with meds without prompting  Availability of staff made known, will continue to monitor

## 2018-09-05 NOTE — PROGRESS NOTES
Veronika Anderson has been awake, alert, and mostly isolative to self in room  Lacks insight into his illness and motivation  Pt needed much encouragement to brush teeth but did so  Compliant with scheduled 1600/1800 meds with little prompting  Affect remains flat, blunted, and offers minimal conversation with staff  No interaction noted with peers  Ate 100% supper  Pt retreated back to his room after supper and awake, resting quietly  No behavioral issues  Will continue to encourage pt to utilize safe/effective coping skills, socialization with peers, and to work toward achievement of daily/recovery goals

## 2018-09-05 NOTE — PROGRESS NOTES
Psychiatry Progress Note    Subjective: Interval History     Patient continues with his isolative behavior  Conversation is minimal   Seems to be hallucinating at times although he will not discuss that openly  No changes clinically  Tolerates the medication no delusions expressed        Current medications:    Current Facility-Administered Medications:     bisacodyl (DULCOLAX) EC tablet 10 mg, 10 mg, Oral, Daily PRN, Provider Cutover, 10 mg at 08/28/18 0607    bisacodyl (DULCOLAX) rectal suppository 10 mg, 10 mg, Rectal, Daily PRN, LIZETH Pickett    buPROPion (WELLBUTRIN XL) 24 hr tablet 300 mg, 300 mg, Oral, Daily, Provider Cutover, 300 mg at 09/05/18 0819    cloZAPine (CLOZARIL) tablet 450 mg, 450 mg, Oral, HS, Brian Zapata MD, 450 mg at 09/04/18 2053    divalproex sodium (DEPAKOTE ER) 24 hr tablet 1,000 mg, 1,000 mg, Oral, HS, Vivek Correia MD, 1,000 mg at 09/04/18 2053    fluPHENAZine (PROLIXIN) tablet 10 mg, 10 mg, Oral, HS, Vivek Correia MD, 10 mg at 09/04/18 2053    levothyroxine tablet 75 mcg, 75 mcg, Oral, Early Morning, Provider Cutover, 75 mcg at 09/05/18 0608    midodrine (PROAMATINE) tablet 10 mg, 10 mg, Oral, TID, Wally Lynn MD, 10 mg at 09/05/18 0608    polyethylene glycol (MIRALAX) packet 17 g, 17 g, Oral, Daily, Provider Cutover, 17 g at 09/05/18 0819    senna (SENOKOT) tablet 8 6 mg, 1 tablet, Oral, BID, LIZETH Meyer, 8 6 mg at 09/05/18 0819      Objective:     Vital Signs:  Vitals:    09/04/18 1930 09/05/18 0556 09/05/18 0730 09/05/18 0735   BP: 114/79 99/67 117/78 113/74   BP Location: Left arm Left arm Left arm Left arm   Pulse: (!) 127 (!) 118 103 (!) 114   Resp:   18 18   Temp:   (!) 97 3 °F (36 3 °C)    TempSrc:   Temporal    SpO2:       Weight:       Height:             Appearance:  age appropriate and casually dressed   Behavior:  evasive   Speech:  soft   Mood:  depressed   Affect:  constricted   Thought Process:  normal   Thought Content:  normal Perceptual Disturbances: None and Auditory hallucinations without commands   Risk Potential: none   Sensorium:  person, place, situation and time   Cognition:  intact   Consciousness:  alert and awake    Attention: attention span and concentration were not age appropriate   Intellect: average   Insight:  poor   Judgment: poor      Motor Activity: no abnormal movements           Recent Labs:  Results Reviewed     None          I/O Past 24 hours:  No intake/output data recorded  No intake/output data recorded  Assessment / Plan:     [facility]    Recommended Treatment:      Medication changes:  1) none    Non-pharmacological treatments  1) Continue with group therapy, milieu therapy and occupational therapy  Safety  1) Safety/communication plan established targeting dynamic risk factors above  2) Risks, benefits, and possible side effects of medications explained to patient and patient verbalizes understanding  Counseling / Coordination of Care    Total floor / unit time spent today 20 minutes  Greater than 50% of total time was spent with the patient and / or family counseling and / or coordination of care  A description of the counseling / coordination of care  Patient's Rights, confidentiality and exceptions to confidentiality, use of automated medical record, [facility] staff access to medical record, and consent to treatment reviewed      All Huffman MD

## 2018-09-05 NOTE — PROGRESS NOTES
Liam Larkinsher has been awake, alert, and visible minimally out in the milieu  Ate 75% supper  Scheduled 1600 dose of   Midodrine held per MD BP parameters  Compliant with scheduled meds  No interaction noted with peers  Remains flat, blunted in affect, and difficult to engage in conversation  Attended and participated in 2/2 evening groups  Had evening snack  Has been isolative most of shift in room  Will continue to monitor/assess for any changes  Continue to encourage utilization of safe/effective coping skills and to work toward achievement of daily/recovery goals  Resting quietly in bed at present, arouses easily

## 2018-09-05 NOTE — PROGRESS NOTES
Pt attended [facility] group  Group focused on defining recovery, moving forward in the recovery process, preparing for treatment team, strategies to develop for recovery and assessment  Pt needed prompting to participate  Pt sleepy but able to verbalize and prioritize needs  Pt mentioned he enjoys fishing and roller coasters  Continue to provide therapeutic group support

## 2018-09-06 RX ADMIN — POLYETHYLENE GLYCOL 3350 17 G: 17 POWDER, FOR SOLUTION ORAL at 08:18

## 2018-09-06 RX ADMIN — CLOZAPINE 450 MG: 25 TABLET ORAL at 20:46

## 2018-09-06 RX ADMIN — BUPROPION HYDROCHLORIDE 300 MG: 300 TABLET, FILM COATED, EXTENDED RELEASE ORAL at 08:18

## 2018-09-06 RX ADMIN — DIVALPROEX SODIUM 1000 MG: 500 TABLET, FILM COATED, EXTENDED RELEASE ORAL at 20:46

## 2018-09-06 RX ADMIN — FLUPHENAZINE HYDROCHLORIDE 10 MG: 10 TABLET, FILM COATED ORAL at 20:46

## 2018-09-06 RX ADMIN — SENNOSIDES 8.6 MG: 8.6 TABLET, FILM COATED ORAL at 08:18

## 2018-09-06 RX ADMIN — SENNOSIDES 8.6 MG: 8.6 TABLET, FILM COATED ORAL at 17:01

## 2018-09-06 RX ADMIN — LEVOTHYROXINE SODIUM 75 MCG: 75 TABLET ORAL at 06:29

## 2018-09-06 NOTE — PROGRESS NOTES
Psychiatry Progress Note    Subjective:   Interval History     Team mtg today; pt's behavior same; difficult to engage in one on one discussions except superficial ;       Current medications:    Current Facility-Administered Medications:     bisacodyl (DULCOLAX) EC tablet 10 mg, 10 mg, Oral, Daily PRN, Provider Cutover, 10 mg at 08/28/18 0607    bisacodyl (DULCOLAX) rectal suppository 10 mg, 10 mg, Rectal, Daily PRN, LIZETH Odom    buPROPion (WELLBUTRIN XL) 24 hr tablet 300 mg, 300 mg, Oral, Daily, Provider Cutover, 300 mg at 09/06/18 0818    cloZAPine (CLOZARIL) tablet 450 mg, 450 mg, Oral, HS, Yang Rivers MD, 450 mg at 09/05/18 2054    divalproex sodium (DEPAKOTE ER) 24 hr tablet 1,000 mg, 1,000 mg, Oral, HS, Dione Talavera MD, 1,000 mg at 09/05/18 2054    fluPHENAZine (PROLIXIN) tablet 10 mg, 10 mg, Oral, HS, Dione Talavera MD, 10 mg at 09/05/18 2054    levothyroxine tablet 75 mcg, 75 mcg, Oral, Early Morning, Provider Cutover, 75 mcg at 09/06/18 0629    midodrine (PROAMATINE) tablet 10 mg, 10 mg, Oral, TID, Wally Lynn MD, 10 mg at 09/05/18 2053    polyethylene glycol (MIRALAX) packet 17 g, 17 g, Oral, Daily, Provider Cutover, 17 g at 09/06/18 0818    senna (SENOKOT) tablet 8 6 mg, 1 tablet, Oral, BID, LIZETH Altman, 8 6 mg at 09/06/18 0818      Objective:     Vital Signs:  Vitals:    09/05/18 1934 09/06/18 0600 09/06/18 0700 09/06/18 0702   BP: 116/82 124/80 107/81 141/58   BP Location: Left arm Left arm Left arm Left arm   Pulse: (!) 121 (!) 113 (!) 113 (!) 119   Resp:  18 20 20   Temp:   (!) 97 °F (36 1 °C)    TempSrc:   Temporal    SpO2:       Weight:       Height:             Appearance:  age appropriate and casually dressed   Behavior:  normal   Speech:  normal volume   Mood:  depressed   Affect:  constricted   Thought Process:  normal   Thought Content:  normal   Perceptual Disturbances: None   Risk Potential: none   Sensorium:  person, place, situation and time   Cognition: intact   Consciousness:  alert and awake    Attention: attention span and concentration were age appropriate   Intellect: average   Insight:  good   Judgment: good      Motor Activity: no abnormal movements           Recent Labs:  Results Reviewed     None          I/O Past 24 hours:  I/O last 3 completed shifts:  In: -   Out: 1 [Stool:1]  No intake/output data recorded  Assessment / Plan:     [facility]    Recommended Treatment:      Medication changes:  1) none    Non-pharmacological treatments  1) Continue with group therapy, milieu therapy and occupational therapy  Safety  1) Safety/communication plan established targeting dynamic risk factors above  2) Risks, benefits, and possible side effects of medications explained to patient and patient verbalizes understanding n      Counseling / Coordination of Care    Total floor / unit time spent today 20 minutes  Greater than 50% of total time was spent with the patient and / or family counseling and / or coordination of care  A description of the counseling / coordination of care  Patient's Rights, confidentiality and exceptions to confidentiality, use of automated medical record, [facility] access to medical record, and consent to treatment reviewed      Lizbeth Benoit MD

## 2018-09-06 NOTE — PROGRESS NOTES
Stevie Loya attended and participated in 2/2 evening groups  Compliant with scheduled 2100 meds without prompting  Had evening snack  Resting quietly in bed at present, arouses easily  Will continue to monitor/assess for any changes

## 2018-09-06 NOTE — ESCALATED TEAM TX
Patient attended treatment team meeting this morning prepared with self-assessment  Patient's group attendance for last week was 81%  Patient was granted a pass for Sunday from 10-8p  His goal for the pass will be to try driving again   is coordinating with Kelsey to set up an interview for patient assessment  Patient verbalized no further questions or concerns at the conclusion of the meeting  Next team meeting scheduled for 9/7

## 2018-09-06 NOTE — CASE MANAGEMENT
Ck with [facility] came out today to meet patient on the unit to interview him for potential admission to their facility  CM met with Ck to address any other questions/concerns he may have  Vinay Tony reported he would be taking his information back to their physician and get back to this CM  CM will continue to follow patient's progress and assist with discharge planning needs

## 2018-09-06 NOTE — PROGRESS NOTES
Liam Meeks has been awake, alert, and visible minimally out in the milieu  Compliant with scheduled 1800 med  1600 scheduled Midodrine held per MD BP parameters  Ate 50% supper  Affect remains flat, blunted, and offers little conversation with staff  No interaction noted with peers  Pt continues to lack motivation and insight into his illness  Pt denies any depression, anxiety, or a/v hallucinations  Mother in to visit with fair interaction and brought food in for pt which pt ate  Will continue to encourage utilization of safe/effective coping skills and to work toward achievement of daily/recovery goals

## 2018-09-06 NOTE — NURSING NOTE
Pt isolates in room, but does attend groups  Takes medications with prompting  Out for meals, eats well

## 2018-09-07 RX ADMIN — SENNOSIDES 8.6 MG: 8.6 TABLET, FILM COATED ORAL at 17:01

## 2018-09-07 RX ADMIN — MIDODRINE HYDROCHLORIDE 10 MG: 2.5 TABLET ORAL at 06:29

## 2018-09-07 RX ADMIN — SENNOSIDES 8.6 MG: 8.6 TABLET, FILM COATED ORAL at 08:57

## 2018-09-07 RX ADMIN — CLOZAPINE 450 MG: 25 TABLET ORAL at 20:41

## 2018-09-07 RX ADMIN — BUPROPION HYDROCHLORIDE 300 MG: 300 TABLET, FILM COATED, EXTENDED RELEASE ORAL at 08:56

## 2018-09-07 RX ADMIN — LEVOTHYROXINE SODIUM 75 MCG: 75 TABLET ORAL at 06:29

## 2018-09-07 RX ADMIN — FLUPHENAZINE HYDROCHLORIDE 10 MG: 10 TABLET, FILM COATED ORAL at 20:41

## 2018-09-07 RX ADMIN — MIDODRINE HYDROCHLORIDE 10 MG: 2.5 TABLET ORAL at 20:40

## 2018-09-07 RX ADMIN — DIVALPROEX SODIUM 1000 MG: 500 TABLET, FILM COATED, EXTENDED RELEASE ORAL at 20:40

## 2018-09-07 NOTE — PROGRESS NOTES
Abdias Gamez has been isolative to his room and self  No interaction with peers and little with staff  Poor eye contact  Blunted, flat affect  Ate 100% of his meal and took medication without prompting  Midodrine held due to parameters  Continue to monitor  SAFE and fall precautions maintained without incident

## 2018-09-07 NOTE — PROGRESS NOTES
Psychiatry Progress Note    Subjective: Interval History     Patient is planning to go out on extended pass tomorrow with his mother and the plan is for him to practice his driving skills he has not driven and in long  At time and would benefit him to be able to drive and have some measure of independence  Should improve his self-confidence as well  He is still rather reticent does not bring up issues for the most part and answers questions with 1 or 2 words  Affect remains flat eye contact poor stays in his room except when he has it at a group        Current medications:    Current Facility-Administered Medications:     bisacodyl (DULCOLAX) EC tablet 10 mg, 10 mg, Oral, Daily PRN, Provider Cutover, 10 mg at 08/28/18 0607    bisacodyl (DULCOLAX) rectal suppository 10 mg, 10 mg, Rectal, Daily PRN, LIZETH Hernandez    buPROPion (WELLBUTRIN XL) 24 hr tablet 300 mg, 300 mg, Oral, Daily, Provider Cutover, 300 mg at 09/07/18 0856    cloZAPine (CLOZARIL) tablet 450 mg, 450 mg, Oral, HS, Arnie Contreras MD, 450 mg at 09/06/18 2046    divalproex sodium (DEPAKOTE ER) 24 hr tablet 1,000 mg, 1,000 mg, Oral, HS, Scooter Loera MD, 1,000 mg at 09/06/18 2046    fluPHENAZine (PROLIXIN) tablet 10 mg, 10 mg, Oral, HS, Scooter Loera MD, 10 mg at 09/06/18 2046    levothyroxine tablet 75 mcg, 75 mcg, Oral, Early Morning, Provider Cutover, 75 mcg at 09/07/18 0629    midodrine (PROAMATINE) tablet 10 mg, 10 mg, Oral, TID, Wally Lynn MD, 10 mg at 09/07/18 0629    polyethylene glycol (MIRALAX) packet 17 g, 17 g, Oral, Daily, Provider Cutover, 17 g at 09/06/18 0818    senna (SENOKOT) tablet 8 6 mg, 1 tablet, Oral, BID, Frazier Gosselin, CRNP, 8 6 mg at 09/07/18 0857      Objective:     Vital Signs:  Vitals:    09/06/18 1532 09/06/18 1534 09/06/18 1930 09/07/18 0500   BP: 122/77 128/75 136/85 96/59   BP Location: Right arm Right arm Right arm Left arm   Pulse: (!) 110 (!) 123 (!) 132 (!) 115   Resp:       Temp:       TempSrc: SpO2:       Weight:       Height:             Appearance:  age appropriate and casually dressed   Behavior:  normal   Speech:  normal volume   Mood:  depressed   Affect:  constricted   Thought Process:  normal   Thought Content:  normal   Perceptual Disturbances: None and Auditory hallucinations without commands   Risk Potential: none   Sensorium:  person, place, situation and time   Cognition:  intact   Consciousness:  alert and awake    Attention: attention span and concentration were age appropriate   Intellect: average   Insight:  good   Judgment: fair      Motor Activity: no abnormal movements           Recent Labs:  Results Reviewed     None          I/O Past 24 hours:  I/O last 3 completed shifts:  In: -   Out: 1 [Stool:1]  No intake/output data recorded  Assessment / Plan:     Chronic paranoid schizophrenia ([facility] )    Recommended Treatment:      Medication changes:  1) no med change    Non-pharmacological treatments  1) Continue with group therapy, milieu therapy and occupational therapy  Safety  1) Safety/communication plan established targeting dynamic risk factors above  2) Risks, benefits, and possible side effects of medications explained to patient and patient verbalizes understanding  Counseling / Coordination of Care    Total floor / unit time spent today 20 minutes  Greater than 50% of total time was spent with the patient and / or family counseling and / or coordination of care  A description of the counseling / coordination of care  Patient's Rights, confidentiality and exceptions to confidentiality, use of automated medical record, [address] [place] [address] staff access to medical record, and consent to treatment reviewed      Rush Huff MD

## 2018-09-07 NOTE — PROGRESS NOTES
Rin Rubio attended and participated in 2/2 evening groups  Compliant with scheduled 2100 meds with prompting x 1   2100 scheduled Midodrine held per MD BP parameters  Pt declined evening snack  Resting quietly in bed at present, arouses easily  Will continue to monitor/assess for any changes

## 2018-09-07 NOTE — NURSING NOTE
Patient med and meal compliant, requires med prompting, isolative to self in room , withdrawn, guarded, no peer interaction, behaviors controlled, blunted affect, quiet,  offers no complaints, lacks motivation, attended one out of three groups  Will continue to monitor

## 2018-09-08 RX ADMIN — MIDODRINE HYDROCHLORIDE 10 MG: 2.5 TABLET ORAL at 20:31

## 2018-09-08 RX ADMIN — CLOZAPINE 450 MG: 25 TABLET ORAL at 20:31

## 2018-09-08 RX ADMIN — POLYETHYLENE GLYCOL 3350 17 G: 17 POWDER, FOR SOLUTION ORAL at 08:22

## 2018-09-08 RX ADMIN — MIDODRINE HYDROCHLORIDE 10 MG: 2.5 TABLET ORAL at 06:48

## 2018-09-08 RX ADMIN — FLUPHENAZINE HYDROCHLORIDE 10 MG: 10 TABLET, FILM COATED ORAL at 20:31

## 2018-09-08 RX ADMIN — MIDODRINE HYDROCHLORIDE 10 MG: 2.5 TABLET ORAL at 16:34

## 2018-09-08 RX ADMIN — LEVOTHYROXINE SODIUM 75 MCG: 75 TABLET ORAL at 06:48

## 2018-09-08 RX ADMIN — BUPROPION HYDROCHLORIDE 300 MG: 300 TABLET, FILM COATED, EXTENDED RELEASE ORAL at 08:22

## 2018-09-08 RX ADMIN — DIVALPROEX SODIUM 1000 MG: 500 TABLET, FILM COATED, EXTENDED RELEASE ORAL at 20:31

## 2018-09-08 RX ADMIN — SENNOSIDES 8.6 MG: 8.6 TABLET, FILM COATED ORAL at 08:22

## 2018-09-08 RX ADMIN — SENNOSIDES 8.6 MG: 8.6 TABLET, FILM COATED ORAL at 17:26

## 2018-09-08 NOTE — PROGRESS NOTES
Resting in bed eyes closed, chest noted to be rising, audible breath sounds, will monitor for change in behavior/assessment  Q 15 min checks maintained thru the night

## 2018-09-08 NOTE — PROGRESS NOTES
Pt isolative to self and room throughout shift  Med and meal compliant with prompting required  Remains blunted and withdrawn  No interaction with staff or peers  Denies any depression, anxiety, or SI at this time  No current medical issues  Attended two groups this shift with appropriate participation  Will continue to encourage pt to get oob and interact with peers on milieu in order to express self safely and effectively and work towards discharge  Maintain safe, fall, and mouth and beard check precautions as ordered

## 2018-09-08 NOTE — PROGRESS NOTES
Psychiatry Progress Note    Subjective: Interval History     Patient isolative to his room  Patient continues to have disheveled appearance  Patient remains with a blunted affect  Patient continues to be evasive during assessment as he denies all psychiatric symptoms  Patient offers no complaints or concerns  Has been medication and meal compliant      Current medications:    Current Facility-Administered Medications:     bisacodyl (DULCOLAX) EC tablet 10 mg, 10 mg, Oral, Daily PRN, Provider Cutover, 10 mg at 08/28/18 0607    bisacodyl (DULCOLAX) rectal suppository 10 mg, 10 mg, Rectal, Daily PRN, Mary Reddish Yeimyinimaribeth, CRNP    buPROPion (WELLBUTRIN XL) 24 hr tablet 300 mg, 300 mg, Oral, Daily, Provider Cutover, 300 mg at 09/08/18 7838    cloZAPine (CLOZARIL) tablet 450 mg, 450 mg, Oral, HS, Arnie Contreras MD, 450 mg at 09/07/18 2041    divalproex sodium (DEPAKOTE ER) 24 hr tablet 1,000 mg, 1,000 mg, Oral, HS, Scooter Loera MD, 1,000 mg at 09/07/18 2040    fluPHENAZine (PROLIXIN) tablet 10 mg, 10 mg, Oral, HS, Scooter Loera MD, 10 mg at 09/07/18 2041    levothyroxine tablet 75 mcg, 75 mcg, Oral, Early Morning, Provider Cutover, 75 mcg at 09/08/18 0648    midodrine (PROAMATINE) tablet 10 mg, 10 mg, Oral, TID, Wally Lynn MD, 10 mg at 09/08/18 0648    polyethylene glycol (MIRALAX) packet 17 g, 17 g, Oral, Daily, Provider Cutover, 17 g at 09/08/18 1323    senna (SENOKOT) tablet 8 6 mg, 1 tablet, Oral, BID, Francisca Gin, CRNP, 8 6 mg at 09/08/18 0822      Objective:     Vital Signs:  Vitals:    09/07/18 1936 09/08/18 0654 09/08/18 0730 09/08/18 0732   BP: 119/87 113/84 117/71 116/70   BP Location: Left arm Left arm Right arm Right arm   Pulse: (!) 126 (!) 116 (!) 112 (!) 118   Resp:   20    Temp:   98 4 °F (36 9 °C)    TempSrc:   Temporal    SpO2:       Weight:       Height:             Appearance:  age appropriate, casually dressed and disheveled   Behavior:  normal   Speech:  soft   Mood:  depressed Affect:  flat   Thought Process:  normal   Thought Content:  normal   Perceptual Disturbances: None   Risk Potential: none   Sensorium:  person, place and time   Cognition:  intact   Consciousness:  alert and awake    Attention: attention span and concentration were age diminished   Intellect: average   Insight:  limited   Judgment: limited      Motor Activity: no abnormal movements           Recent Labs:  Results Reviewed     None          I/O Past 24 hours:  I/O last 3 completed shifts:  In: -   Out: 2 [Stool:2]  No intake/output data recorded  Assessment / Plan:     [facility]    Recommended Treatment:      Medication changes:  1) continue current medication regimen  Non-pharmacological treatments  1) Continue with group therapy, milieu therapy and occupational therapy  Safety  1) Safety/communication plan established targeting dynamic risk factors above  2) Risks, benefits, and possible side effects of medications explained to patient and patient verbalizes understanding  Counseling / Coordination of Care    Total floor / unit time spent today 20 minutes  Greater than 50% of total time was spent with the patient and / or family counseling and / or coordination of care  A description of the counseling / coordination of care  Patient's Rights, confidentiality and exceptions to confidentiality, use of automated medical record, [facility] staff access to medical record, and consent to treatment reviewed      Nasrin Wray PA-C

## 2018-09-09 PROCEDURE — 80307 DRUG TEST PRSMV CHEM ANLYZR: CPT | Performed by: PSYCHIATRY & NEUROLOGY

## 2018-09-09 RX ADMIN — LEVOTHYROXINE SODIUM 75 MCG: 75 TABLET ORAL at 06:26

## 2018-09-09 RX ADMIN — BUPROPION HYDROCHLORIDE 300 MG: 300 TABLET, FILM COATED, EXTENDED RELEASE ORAL at 08:35

## 2018-09-09 RX ADMIN — FLUPHENAZINE HYDROCHLORIDE 10 MG: 10 TABLET, FILM COATED ORAL at 21:19

## 2018-09-09 RX ADMIN — DIVALPROEX SODIUM 1000 MG: 500 TABLET, FILM COATED, EXTENDED RELEASE ORAL at 21:19

## 2018-09-09 RX ADMIN — CLOZAPINE 450 MG: 25 TABLET ORAL at 21:19

## 2018-09-09 RX ADMIN — SENNOSIDES 8.6 MG: 8.6 TABLET, FILM COATED ORAL at 08:35

## 2018-09-09 NOTE — PROGRESS NOTES
Patient not on unit  Patient out on pass with mother  Is scheduled to be on pass with mother until 2000

## 2018-09-09 NOTE — NURSING NOTE
Patient med and meal compliant this morning, offers no complaints, behaviors controlled, calm, quiet, no peer interaction, isolative to self/visible on unit  Patient cooperative upon approach, attended goals group this morning, to go on pass with mother til 8pm this evening, will continue to monitor till leaves for pass

## 2018-09-09 NOTE — PROGRESS NOTES
Abdias Gamez has been awake, alert, and visible intermittently out in the milieu  Isolative to self, no socialization noted with peers  Compliant with all scheduled meds with little prompting  Affect remains flat, blunted, and offers little conversation with staff  Denies any depression, anxiety, or a/v hallucinations but preoccupied at times  Mother in to visit and brought pizza in for pt which pt ate  Fair interaction noted  Attended and participated in 2/2 evening groups  Declined snack  Will continue to encourage utilization of safe/effective coping skills and to work toward achievement of daily/recovery goals  Resting quietly in bed at present, arouses easily  Will continue to monitor/assess for any changes

## 2018-09-10 RX ADMIN — LEVOTHYROXINE SODIUM 75 MCG: 75 TABLET ORAL at 06:05

## 2018-09-10 RX ADMIN — MIDODRINE HYDROCHLORIDE 10 MG: 2.5 TABLET ORAL at 20:49

## 2018-09-10 RX ADMIN — MIDODRINE HYDROCHLORIDE 10 MG: 2.5 TABLET ORAL at 06:05

## 2018-09-10 RX ADMIN — DIVALPROEX SODIUM 1000 MG: 500 TABLET, FILM COATED, EXTENDED RELEASE ORAL at 20:49

## 2018-09-10 RX ADMIN — FLUPHENAZINE HYDROCHLORIDE 10 MG: 10 TABLET, FILM COATED ORAL at 20:49

## 2018-09-10 RX ADMIN — SENNOSIDES 8.6 MG: 8.6 TABLET, FILM COATED ORAL at 09:45

## 2018-09-10 RX ADMIN — SENNOSIDES 8.6 MG: 8.6 TABLET, FILM COATED ORAL at 17:18

## 2018-09-10 RX ADMIN — CLOZAPINE 450 MG: 25 TABLET ORAL at 20:50

## 2018-09-10 RX ADMIN — BUPROPION HYDROCHLORIDE 300 MG: 300 TABLET, FILM COATED, EXTENDED RELEASE ORAL at 09:45

## 2018-09-10 RX ADMIN — POLYETHYLENE GLYCOL 3350 17 G: 17 POWDER, FOR SOLUTION ORAL at 09:45

## 2018-09-10 NOTE — PROGRESS NOTES
Keily Lisa has been isolative to self and room throughout shift, med and meal compliant with prompting required, ate only 25% of breakfast and 50% of lunch, remains blunted and withdrawn, limited interaction with staff and peers, only attended 1 group this morning, Fall/SAFE precautions continue, fluids encouraged

## 2018-09-10 NOTE — PROGRESS NOTES
Isa Gaming attended and participated in 2/2 evening groups  Declined snack but had soda  Compliant with scheduled 2100 meds without prompting  2100 dose of Midodrine held per MD BP parameters  Resting quietly in bed at present, arouses easily  Will continue to monitor/assess for any changes  l

## 2018-09-10 NOTE — PROGRESS NOTES
Kiran Rousseau called [facility] to check in at [phone] and stated that pass going well with his mother  Pt returned from pass with mother at 26  Pt stated that pass went well and that they went to the movies and had dinner at home  Body assessment completed and UDS obtained and to lab per [facility] protocol

## 2018-09-10 NOTE — PROGRESS NOTES
Psychiatry Progress Note    Subjective: Interval History     Patient continues to be isolative withdrawn at times  Still with disheveled appearance  Patient did go on a pass yesterday that went well  Upon returning the patient did participate in evening groups  He has been compliant with medications    No other issues reported in the past 24 hours    Current medications:    Current Facility-Administered Medications:     bisacodyl (DULCOLAX) EC tablet 10 mg, 10 mg, Oral, Daily PRN, Provider Cutover, 10 mg at 08/28/18 0607    bisacodyl (DULCOLAX) rectal suppository 10 mg, 10 mg, Rectal, Daily PRN, [place] ReddisLIZETH Shabazz    buPROPion (WELLBUTRIN XL) 24 hr tablet 300 mg, 300 mg, Oral, Daily, Provider Cutover, 300 mg at 09/09/18 0835    cloZAPine (CLOZARIL) tablet 450 mg, 450 mg, Oral, HS, Arnie Contreras MD, 450 mg at 09/09/18 2119    divalproex sodium (DEPAKOTE ER) 24 hr tablet 1,000 mg, 1,000 mg, Oral, HS, Scooter Loera MD, 1,000 mg at 09/09/18 2119    fluPHENAZine (PROLIXIN) tablet 10 mg, 10 mg, Oral, HS, Scooter Loera MD, 10 mg at 09/09/18 2119    levothyroxine tablet 75 mcg, 75 mcg, Oral, Early Morning, Provider Cutover, 75 mcg at 09/10/18 0605    midodrine (PROAMATINE) tablet 10 mg, 10 mg, Oral, TID, Wally Lynn MD, 10 mg at 09/10/18 0605    polyethylene glycol (MIRALAX) packet 17 g, 17 g, Oral, Daily, Provider Cutover, 17 g at 09/08/18 5238    senna (SENOKOT) tablet 8 6 mg, 1 tablet, Oral, BID, LIZETH Meyer, 8 6 mg at 09/09/18 0835      Objective:     Vital Signs:  Vitals:    09/09/18 0624 09/09/18 0730 09/09/18 2100 09/10/18 0606   BP: 142/84 126/86 150/90 103/61   BP Location: Left arm Right arm Left arm Left arm   Pulse: (!) 123 (!) 113 (!) 122 (!) 120   Resp:  21     Temp:  98 4 °F (36 9 °C)     TempSrc:       SpO2:       Weight:  89 4 kg (197 lb)     Height:             Appearance:  age appropriate, casually dressed and disheveled   Behavior:  normal   Speech:  soft   Mood:  depressed Affect:  flat   Thought Process:  normal   Thought Content:  normal   Perceptual Disturbances: None   Risk Potential: none   Sensorium:  person, place and time   Cognition:  intact   Consciousness:  alert and awake    Attention: attention span and concentration were age diminished   Intellect: average   Insight:  limited   Judgment: limited      Motor Activity: no abnormal movements           Recent Labs:  Results Reviewed     None          I/O Past 24 hours:  I/O last 3 completed shifts:  In: -   Out: 1 [Stool:1]  No intake/output data recorded  Assessment / Plan:     [facility]    Recommended Treatment:      Medication changes:  1) continue current medication regimen  Non-pharmacological treatments  1) Continue with group therapy, milieu therapy and occupational therapy  Safety  1) Safety/communication plan established targeting dynamic risk factors above  2) Risks, benefits, and possible side effects of medications explained to patient and patient verbalizes understanding  Counseling / Coordination of Care    Total floor / unit time spent today 20 minutes  Greater than 50% of total time was spent with the patient and / or family counseling and / or coordination of care  A description of the counseling / coordination of care  Patient's Rights, confidentiality and exceptions to confidentiality, use of automated medical record, [facility] staff access to medical record, and consent to treatment reviewed      Ellis Lantigua PA-C

## 2018-09-11 RX ADMIN — MIDODRINE HYDROCHLORIDE 10 MG: 2.5 TABLET ORAL at 20:48

## 2018-09-11 RX ADMIN — FLUPHENAZINE HYDROCHLORIDE 10 MG: 10 TABLET, FILM COATED ORAL at 20:49

## 2018-09-11 RX ADMIN — DIVALPROEX SODIUM 1000 MG: 500 TABLET, FILM COATED, EXTENDED RELEASE ORAL at 20:49

## 2018-09-11 RX ADMIN — MIDODRINE HYDROCHLORIDE 10 MG: 2.5 TABLET ORAL at 16:49

## 2018-09-11 RX ADMIN — CLOZAPINE 450 MG: 25 TABLET ORAL at 20:48

## 2018-09-11 RX ADMIN — BUPROPION HYDROCHLORIDE 300 MG: 300 TABLET, FILM COATED, EXTENDED RELEASE ORAL at 08:27

## 2018-09-11 RX ADMIN — LEVOTHYROXINE SODIUM 75 MCG: 75 TABLET ORAL at 06:19

## 2018-09-11 RX ADMIN — POLYETHYLENE GLYCOL 3350 17 G: 17 POWDER, FOR SOLUTION ORAL at 08:27

## 2018-09-11 RX ADMIN — SENNOSIDES 8.6 MG: 8.6 TABLET, FILM COATED ORAL at 08:27

## 2018-09-11 RX ADMIN — SENNOSIDES 8.6 MG: 8.6 TABLET, FILM COATED ORAL at 17:01

## 2018-09-11 NOTE — PROGRESS NOTES
The patient slept through the night with no behaviors or issues noted  Fall and safety precautions maintained  Med compliant  Midodrine held for BP of 138/65  Continue to monitor

## 2018-09-11 NOTE — PROGRESS NOTES
Aashish Kilpatrick has been awake, alert, and visible minimally out in the milieu  Affect remains flat, blunted, and offers minimal conversation with staff  Behavior continues to be quiet and isolative to self  Ate 100% supper  Mother and sister in to visit and brought food in for pt which pt ate  No interaction noted with peers  Compliant with scheduled meds without prompting  No behavioral issues  Attended and participated in 2/2 evening groups  Declined evening snack  Resting quietly in bed at present, arouses easily  Will continue to monitor/assess for any changes

## 2018-09-11 NOTE — PROGRESS NOTES
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

## 2018-09-11 NOTE — PROGRESS NOTES
Pt requires multiple prompts for meals and meds  Return to bed immed after meal  Staff aroused pt for meds  Affect depressed soft garbled verbal response  Reports Fine today   Attends goals group 1100 remains in bed most of am

## 2018-09-11 NOTE — PROGRESS NOTES
Markus Sabillon is isolative to room & bed in free time, flat of affect, socially withdrawn  Did shower & groom only after MHT repeatedly prodded him to follow through w/this task   Came up for supper medicine when this was announced, had meal

## 2018-09-11 NOTE — PROGRESS NOTES
Psychiatry Progress Note    Subjective: Interval History     Same issues which are still present  He is withdrawn not very talkative eye contact poor and continues with intermittent auditory hallucinations but he denies that they are present or that they really bother him  He does interact somewhat better when his mother comes to visit or when he goes home on a pass        Current medications:    Current Facility-Administered Medications:     bisacodyl (DULCOLAX) EC tablet 10 mg, 10 mg, Oral, Daily PRN, Provider Cutover, 10 mg at 08/28/18 0607    bisacodyl (DULCOLAX) rectal suppository 10 mg, 10 mg, Rectal, Daily PRN, LIZETH Jenkins    buPROPion (WELLBUTRIN XL) 24 hr tablet 300 mg, 300 mg, Oral, Daily, Provider Cutover, 300 mg at 09/11/18 0827    cloZAPine (CLOZARIL) tablet 450 mg, 450 mg, Oral, HS, Yomaira Ocampo MD, 450 mg at 09/10/18 2050    divalproex sodium (DEPAKOTE ER) 24 hr tablet 1,000 mg, 1,000 mg, Oral, HS, Kate Ramirez MD, 1,000 mg at 09/10/18 2049    fluPHENAZine (PROLIXIN) tablet 10 mg, 10 mg, Oral, HS, Kate Ramirez MD, 10 mg at 09/10/18 2049    levothyroxine tablet 75 mcg, 75 mcg, Oral, Early Morning, Provider Cutover, 75 mcg at 09/11/18 0619    midodrine (PROAMATINE) tablet 10 mg, 10 mg, Oral, TID, Wally Lynn MD, Stopped at 09/11/18 0621    polyethylene glycol (MIRALAX) packet 17 g, 17 g, Oral, Daily, Provider Cutover, 17 g at 09/11/18 0827    senna (SENOKOT) tablet 8 6 mg, 1 tablet, Oral, BID, LIZETH Meyer, 8 6 mg at 09/11/18 0827      Objective:     Vital Signs:  Vitals:    09/10/18 1930 09/11/18 0602 09/11/18 0730 09/11/18 0735   BP: 115/71 138/65 114/79 102/72   BP Location: Left arm Left arm Left arm Left arm   Pulse: (!) 131 (!) 148 (!) 113 (!) 119   Resp:   18 18   Temp:   97 5 °F (36 4 °C)    TempSrc:   Temporal    SpO2:       Weight:       Height:             Appearance:  age appropriate and casually dressed   Behavior:  normal   Speech:  normal volume   Mood: depressed   Affect:  constricted   Thought Process:  normal   Thought Content:  normal   Perceptual Disturbances: None and Auditory hallucinations without commands   Risk Potential: none   Sensorium:  person, place, situation and time   Cognition:  intact   Consciousness:  alert and awake    Attention: attention span and concentration were not age appropriate   Intellect: average   Insight:  fair   Judgment: fair      Motor Activity: no abnormal movements           Recent Labs:  Results Reviewed     None          I/O Past 24 hours:  No intake/output data recorded  No intake/output data recorded  Assessment / Plan:     Chronic paranoid schizophrenia ([facility]    Recommended Treatment:      Medication changes:  1) none    Non-pharmacological treatments  1) Continue with group therapy, milieu therapy and occupational therapy  Safety  1) Safety/communication plan established targeting dynamic risk factors above  2) Risks, benefits, and possible side effects of medications explained to patient and patient verbalizes understanding  Counseling / Coordination of Care    Total floor / unit time spent today 20 minutes  Greater than 50% of total time was spent with the patient and / or family counseling and / or coordination of care  A description of the counseling / coordination of care  Patient's Rights, confidentiality and exceptions to confidentiality, use of automated medical record, [facility] staff access to medical record, and consent to treatment reviewed      Ld Warren MD

## 2018-09-12 RX ADMIN — MIDODRINE HYDROCHLORIDE 10 MG: 2.5 TABLET ORAL at 06:33

## 2018-09-12 RX ADMIN — SENNOSIDES 8.6 MG: 8.6 TABLET, FILM COATED ORAL at 08:34

## 2018-09-12 RX ADMIN — POLYETHYLENE GLYCOL 3350 17 G: 17 POWDER, FOR SOLUTION ORAL at 08:34

## 2018-09-12 RX ADMIN — BUPROPION HYDROCHLORIDE 300 MG: 300 TABLET, FILM COATED, EXTENDED RELEASE ORAL at 08:34

## 2018-09-12 RX ADMIN — SENNOSIDES 8.6 MG: 8.6 TABLET, FILM COATED ORAL at 17:07

## 2018-09-12 RX ADMIN — LEVOTHYROXINE SODIUM 75 MCG: 75 TABLET ORAL at 06:33

## 2018-09-12 RX ADMIN — MIDODRINE HYDROCHLORIDE 10 MG: 2.5 TABLET ORAL at 20:51

## 2018-09-12 RX ADMIN — FLUPHENAZINE HYDROCHLORIDE 10 MG: 10 TABLET, FILM COATED ORAL at 20:51

## 2018-09-12 RX ADMIN — DIVALPROEX SODIUM 1000 MG: 500 TABLET, FILM COATED, EXTENDED RELEASE ORAL at 20:51

## 2018-09-12 RX ADMIN — MIDODRINE HYDROCHLORIDE 10 MG: 2.5 TABLET ORAL at 16:46

## 2018-09-12 RX ADMIN — CLOZAPINE 450 MG: 25 TABLET ORAL at 20:51

## 2018-09-12 NOTE — PROGRESS NOTES
Pt attended [address] [facility] group  Pt was pleasant and listening appropriately  Pt more alert and improved eye contact  Pt identified his feeling about going to a group home and feeling frustrated at where he was in life  Pt engaged in conversation and  and able to reflect upon needs when prompted  Group focused on EAC programming group rules and following expectations , responsibility for recovery and acceptance, discharge readiness aspects, refecting on self expression and having insight into 8 dimensions of wellness from [employer] ( emotional, social, spiritual, occupational, financial, environmental, intellectual, and physical)  Pt participated and provided respect with peers  Continue to provide therapeutic group support

## 2018-09-12 NOTE — PROGRESS NOTES
Stephanie Goodwin has been med and meal compliant with prompting required, ate 50% of breakfast and 100% of lunch, remains blunted and withdrawn, limited interaction with staff but he attended all of the structured activities both groups and RA, Fall/SAFE precautions continue, fluids encouraged

## 2018-09-12 NOTE — PROGRESS NOTES
Psychiatry Progress Note    Subjective: Interval History     Patient needed a great deal of repeated prompting for him to take a shower  When asked about he says I do not think I need 1 has little interest in his appearance during the and hygiene    Continues to be withdrawn but will answer specific questions and then retreats into himself hallucinations are present but not discussed affect remains very flat      Current medications:    Current Facility-Administered Medications:     bisacodyl (DULCOLAX) EC tablet 10 mg, 10 mg, Oral, Daily PRN, Provider Cutover, 10 mg at 08/28/18 0607    bisacodyl (DULCOLAX) rectal suppository 10 mg, 10 mg, Rectal, Daily PRN, LIZETH Clayton    buPROPion (WELLBUTRIN XL) 24 hr tablet 300 mg, 300 mg, Oral, Daily, Provider Cutover, 300 mg at 09/11/18 0827    cloZAPine (CLOZARIL) tablet 450 mg, 450 mg, Oral, HS, Joy Wright MD, 450 mg at 09/11/18 2048    divalproex sodium (DEPAKOTE ER) 24 hr tablet 1,000 mg, 1,000 mg, Oral, HS, Ken Pineda MD, 1,000 mg at 09/11/18 2049    fluPHENAZine (PROLIXIN) tablet 10 mg, 10 mg, Oral, HS, Ken Pineda MD, 10 mg at 09/11/18 2049    levothyroxine tablet 75 mcg, 75 mcg, Oral, Early Morning, Provider Cutover, 75 mcg at 09/12/18 7368    midodrine (PROAMATINE) tablet 10 mg, 10 mg, Oral, TID, Wally Lynn MD, 10 mg at 09/12/18 3821    polyethylene glycol (MIRALAX) packet 17 g, 17 g, Oral, Daily, Provider Cutover, 17 g at 09/11/18 0827    senna (SENOKOT) tablet 8 6 mg, 1 tablet, Oral, BID, DoneLIZETH Reynolds, 8 6 mg at 09/11/18 1701      Objective:     Vital Signs:  Vitals:    09/11/18 1532 09/11/18 1534 09/11/18 1930 09/12/18 0536   BP: 112/88 112/87 111/74 115/65   BP Location: Right arm Right arm Left arm Left arm   Pulse: (!) 110 (!) 118 (!) 135 (!) 114   Resp:       Temp:       TempSrc:       SpO2:       Weight:       Height:             Appearance:  age appropriate and casually dressed   Behavior:  Withdrawn   Speech:  soft Mood:  depressed   Affect:  constricted   Thought Process:  blocked   Thought Content:  normal   Perceptual Disturbances: None and Auditory hallucinations without commands   Risk Potential: none   Sensorium:  person, place, situation and time   Cognition:  intact   Consciousness:  alert and awake    Attention: attention span and concentration are not age appropriate   Intellect: average   Insight:  fair   Judgment: fair      Motor Activity: no abnormal movements           Recent Labs:  Results Reviewed     None          I/O Past 24 hours:  No intake/output data recorded  No intake/output data recorded  Assessment / Plan:     [facility]    Recommended Treatment:      Medication changes:  1) no change    Non-pharmacological treatments  1) Continue with group therapy, milieu therapy and occupational therapy  Safety  1) Safety/communication plan established targeting dynamic risk factors above  2) Risks, benefits, and possible side effects of medications explained to patient and patient verbalizes understanding  Counseling / Coordination of Care    Total floor / unit time spent today 20 minutes  Greater than 50% of total time was spent with the patient and / or family counseling and / or coordination of care  A description of the counseling / coordination of care  Patient's Rights, confidentiality and exceptions to confidentiality, use of automated medical record, [address] [place] [address] staff access to medical record, and consent to treatment reviewed      Audry Nyhan, MD

## 2018-09-12 NOTE — PROGRESS NOTES
2[address] Katherine[address] Abdul did take part in PM Groups  Came up for HS medicine when it was announced on overhead  Was offered Dulcolax (po) prn as no BM in three days, but, declined; "Don't think I need it " Had HS snack & retired now to bed

## 2018-09-12 NOTE — PROGRESS NOTES
Stephanie Goodwin has been awake, alert, and visible intermittently out in the milieu  Behavior continues to be quiet, isolative to self  Walks around unit at times and reads the activities board  Affect remains flat, blunted, and offers minimal conversation with staff  Compliant with scheduled 1600/1800 meds without prompting  Lacks insight into his illness and motivation  No interaction noted with peers  Ate 100% supper  Will continue to encourage utilization of safe/effective coping skills, socialization with peers, and to work toward achievement of daily/recovery goals

## 2018-09-13 RX ADMIN — LEVOTHYROXINE SODIUM 75 MCG: 75 TABLET ORAL at 06:23

## 2018-09-13 RX ADMIN — SENNOSIDES 8.6 MG: 8.6 TABLET, FILM COATED ORAL at 17:37

## 2018-09-13 RX ADMIN — FLUPHENAZINE HYDROCHLORIDE 10 MG: 10 TABLET, FILM COATED ORAL at 20:49

## 2018-09-13 RX ADMIN — MIDODRINE HYDROCHLORIDE 10 MG: 2.5 TABLET ORAL at 06:23

## 2018-09-13 RX ADMIN — SENNOSIDES 8.6 MG: 8.6 TABLET, FILM COATED ORAL at 09:00

## 2018-09-13 RX ADMIN — BISACODYL 10 MG: 5 TABLET, COATED ORAL at 06:23

## 2018-09-13 RX ADMIN — POLYETHYLENE GLYCOL 3350 17 G: 17 POWDER, FOR SOLUTION ORAL at 09:00

## 2018-09-13 RX ADMIN — CLOZAPINE 450 MG: 25 TABLET ORAL at 20:49

## 2018-09-13 RX ADMIN — BUPROPION HYDROCHLORIDE 300 MG: 300 TABLET, FILM COATED, EXTENDED RELEASE ORAL at 09:00

## 2018-09-13 RX ADMIN — DIVALPROEX SODIUM 1000 MG: 500 TABLET, FILM COATED, EXTENDED RELEASE ORAL at 20:49

## 2018-09-13 NOTE — PROGRESS NOTES
Gricel Hernandez attended and participated in 2/2 evening groups  Compliant with scheduled 2100 meds without prompting  Had evening snack  Resting quietly in bed at present, arouses easily  Will continue to monitor/assess for any changes

## 2018-09-13 NOTE — PROGRESS NOTES
Pt attended [facility] group  Pt cooperative and pleasant  Pt answered questions when prompted  Pt discussed that he enjoys horror movies and current movies on passes  Pt able to identify feelings and respectful with peers  Pt completed exercise dimensions of wellness (focusing on 8 dimensions of wellness, mental wellness, physical, emotional, spiritual, financial, social, occupational, environmental)  Continue to provided therapeutic group support

## 2018-09-13 NOTE — PROGRESS NOTES
Meds and labs reviewed on 9/13/18  Recommend current TSH level with next CBC due on 10/2/18  Pt receiving Synthroid therapy

## 2018-09-13 NOTE — TREATMENT PLAN
Patient attend treatment team this am along with his team members  He was prepared with his personal assessment  He continues to show signs of depression, flat affect, poor motivation, but he does cooperate with meds and attends groups  He has requested a pass for this weekend

## 2018-09-13 NOTE — PROGRESS NOTES
Abdias Gamez has been med and meal compliant with prompting required, ate 50% of breakfast and 100% of lunch, remains blunted and withdrawn, limited interaction with staff, attended 2 of the three groups today, Fall/SAFE precautions continue, fluids encouraged

## 2018-09-13 NOTE — PROGRESS NOTES
Psychiatry Progress Note    Subjective:   Interval History     Patient has been superficially cooperative but continues to spend large amount of time in his bed or away from people does not interact at all with other residents and is passive withdrawn shows low motivation and little interest       Current medications:    Current Facility-Administered Medications:     bisacodyl (DULCOLAX) EC tablet 10 mg, 10 mg, Oral, Daily PRN, Provider Cutover, 10 mg at 09/13/18 0044    bisacodyl (DULCOLAX) rectal suppository 10 mg, 10 mg, Rectal, Daily PRN, LIZETH Lin    buPROPion (WELLBUTRIN XL) 24 hr tablet 300 mg, 300 mg, Oral, Daily, Provider Cutover, 300 mg at 09/13/18 0900    cloZAPine (CLOZARIL) tablet 450 mg, 450 mg, Oral, HS, Savannah Cee MD, 450 mg at 09/12/18 2051    divalproex sodium (DEPAKOTE ER) 24 hr tablet 1,000 mg, 1,000 mg, Oral, HS, Maite Humphries MD, 1,000 mg at 09/12/18 2051    fluPHENAZine (PROLIXIN) tablet 10 mg, 10 mg, Oral, HS, Maite Humphries MD, 10 mg at 09/12/18 2051    levothyroxine tablet 75 mcg, 75 mcg, Oral, Early Morning, Provider Cutover, 75 mcg at 09/13/18 2235    midodrine (PROAMATINE) tablet 10 mg, 10 mg, Oral, TID, Wally Lynn MD, 10 mg at 09/13/18 7512    polyethylene glycol (MIRALAX) packet 17 g, 17 g, Oral, Daily, Provider Cutover, 17 g at 09/13/18 0900    senna (SENOKOT) tablet 8 6 mg, 1 tablet, Oral, BID, LIZETH Serna, 8 6 mg at 09/13/18 0900      Objective:     Vital Signs:  Vitals:    09/12/18 1541 09/12/18 1543 09/12/18 1916 09/13/18 0500   BP: 119/85 100/75 119/85 117/56   BP Location: Right arm Right arm Left arm Left arm   Pulse: (!) 109 (!) 125 (!) 130 (!) 121   Resp:       Temp:       TempSrc:       SpO2:       Weight:       Height:             Appearance:  age appropriate and casually dressed   Behavior:  normal   Speech:  normal volume   Mood:  depressed   Affect:  constricted   Thought Process:  normal   Thought Content:  normal   Perceptual Disturbances: None and Auditory hallucinations without commands   Risk Potential: none   Sensorium:  person, place, situation and time   Cognition:  intact   Consciousness:  alert and awake    Attention: attention span and concentration were age appropriate   Intellect: average   Insight:  fair   Judgment: fair      Motor Activity: no abnormal movements           Recent Labs:  Results Reviewed     None          I/O Past 24 hours:  No intake/output data recorded  No intake/output data recorded  Assessment / Plan:     [facility]    Recommended Treatment:      Medication changes:  1) no change    Non-pharmacological treatments  1) Continue with group therapy, milieu therapy and occupational therapy  Safety  1) Safety/communication plan established targeting dynamic risk factors above  2) Risks, benefits, and possible side effects of medications explained to patient and patient verbalizes understanding  Counseling / Coordination of Care    Total floor / unit time spent today 20 minutes  Greater than 50% of total time was spent with the patient and / or family counseling and / or coordination of care  A description of the counseling / coordination of care  Patient's Rights, confidentiality and exceptions to confidentiality, use of automated medical record, [facility] staff access to medical record, and consent to treatment reviewed      Yudelka Willett MD

## 2018-09-14 LAB — TSH SERPL DL<=0.05 MIU/L-ACNC: 2.34 UIU/ML (ref 0.47–4.68)

## 2018-09-14 PROCEDURE — 84443 ASSAY THYROID STIM HORMONE: CPT | Performed by: PSYCHIATRY & NEUROLOGY

## 2018-09-14 RX ADMIN — FLUPHENAZINE HYDROCHLORIDE 15 MG: 10 TABLET, FILM COATED ORAL at 20:51

## 2018-09-14 RX ADMIN — LEVOTHYROXINE SODIUM 75 MCG: 75 TABLET ORAL at 06:05

## 2018-09-14 RX ADMIN — POLYETHYLENE GLYCOL 3350 17 G: 17 POWDER, FOR SOLUTION ORAL at 08:50

## 2018-09-14 RX ADMIN — MIDODRINE HYDROCHLORIDE 10 MG: 2.5 TABLET ORAL at 06:05

## 2018-09-14 RX ADMIN — DIVALPROEX SODIUM 1000 MG: 500 TABLET, FILM COATED, EXTENDED RELEASE ORAL at 20:51

## 2018-09-14 RX ADMIN — MIDODRINE HYDROCHLORIDE 10 MG: 2.5 TABLET ORAL at 16:52

## 2018-09-14 RX ADMIN — CLOZAPINE 450 MG: 25 TABLET ORAL at 20:51

## 2018-09-14 RX ADMIN — SENNOSIDES 8.6 MG: 8.6 TABLET, FILM COATED ORAL at 17:23

## 2018-09-14 RX ADMIN — SENNOSIDES 8.6 MG: 8.6 TABLET, FILM COATED ORAL at 08:50

## 2018-09-14 RX ADMIN — BUPROPION HYDROCHLORIDE 300 MG: 300 TABLET, FILM COATED, EXTENDED RELEASE ORAL at 08:50

## 2018-09-14 NOTE — PROGRESS NOTES
Isa Gaming attended and participated in 2/2 evening groups  Compliant with scheduled 2100 meds without prompting  Midodrine scheduled doses held at 1600 and 2100 per MD BP parameters  Pt observed smiling and laughing while in line for meds as noted responding to internal stimuli  No behavioral issues  Had evening snack  Resting quietly in bed at present, arouses easily  Will continue to monitor/assess for any changes

## 2018-09-14 NOTE — PROGRESS NOTES
Individual maintained on ongoing SAFE and fall precaution without any incident on this shift    He is laying in bed, eyes closed, breath evenly and unlabored   Checked every 15 minutes during rounds   In no apparent distress   Will continue to monitor behavior and sleep pattern    Individual has a scheduled lab: TSH

## 2018-09-14 NOTE — PROGRESS NOTES
Jaime Dorsey has been awake, alert, and mostly isolative to self in room  Compliant with scheduled 1800 med without prompting  Ate 100% supper  Affect remains flat, blunted, and offers minimal conversation with staff  No interaction noted with peers  No behavioral issues  Pt denies any depression, anxiety, or a/v hallucinations but noted to be preoccupied at times  Maintained on Safe/Fall precautions without incident  Will continue to encourage pt to utilize safe/effective coping skills and to work toward achievement of daily/recovery goals

## 2018-09-14 NOTE — PROGRESS NOTES
Pt attended [address] [facility] group  Pt needs prompting to engage in dialogue  Pt does not interact with peers directly  Blunt affect  Pre and Post survey to measure group assessment was completed by patient  Group also focused on monitoring lifestyle stressors in dining room and end with self reflection, fresh air, observations  and expression in questions on deck  Continue to provide therapeutic group support

## 2018-09-14 NOTE — PROGRESS NOTES
Psychiatry Progress Note    Subjective: Interval History     Patient was observed laughing to himself and  clearly responding to auditory hallucinations  His behavior on the unit is about the same, he is superficially cooperative but shows little insight into his illness and his ability to engage in one-to-one conversations is very limited      Current medications:    Current Facility-Administered Medications:     bisacodyl (DULCOLAX) EC tablet 10 mg, 10 mg, Oral, Daily PRN, Provider Cutover, 10 mg at 09/13/18 9124    bisacodyl (DULCOLAX) rectal suppository 10 mg, 10 mg, Rectal, Daily PRN, LIZETH Cruz    buPROPion (WELLBUTRIN XL) 24 hr tablet 300 mg, 300 mg, Oral, Daily, Provider Cutover, 300 mg at 09/14/18 0850    cloZAPine (CLOZARIL) tablet 450 mg, 450 mg, Oral, HS, Luke David MD, 450 mg at 09/13/18 2049    divalproex sodium (DEPAKOTE ER) 24 hr tablet 1,000 mg, 1,000 mg, Oral, HS, Nadira Sandoval MD, 1,000 mg at 09/13/18 2049    fluPHENAZine (PROLIXIN) tablet 10 mg, 10 mg, Oral, HS, Nadira Sandoval MD, 10 mg at 09/13/18 2049    levothyroxine tablet 75 mcg, 75 mcg, Oral, Early Morning, Provider Cutover, 75 mcg at 09/14/18 0605    midodrine (PROAMATINE) tablet 10 mg, 10 mg, Oral, TID, Wally Lynn MD, 10 mg at 09/14/18 0605    polyethylene glycol (MIRALAX) packet 17 g, 17 g, Oral, Daily, Provider Cutover, 17 g at 09/14/18 0850    senna (SENOKOT) tablet 8 6 mg, 1 tablet, Oral, BID, LIZETH Suarez, 8 6 mg at 09/14/18 0850      Objective:     Vital Signs:  Vitals:    09/13/18 1503 09/13/18 1506 09/13/18 1917 09/14/18 0500   BP: 116/76 124/70 124/75 116/61   BP Location: Left arm Left arm Left arm Left arm   Pulse: (!) 119 (!) 120 95 (!) 122   Resp:       Temp:       TempSrc:       SpO2:       Weight:       Height:             Appearance:  age appropriate and casually dressed   Behavior:  Withdrawn   Speech:  normal volume   Mood:  depressed   Affect:  constricted   Thought Process:  blocked Thought Content:  normal   Perceptual Disturbances: Auditory hallucinations without commands   Risk Potential: none   Sensorium:  person, place, situation and time   Cognition:  intact   Consciousness:  alert and awake    Attention: attention span and concentration were not  age appropriate   Intellect: average   Insight:  fair   Judgment: fair      Motor Activity: no abnormal movements           Recent Labs:  Results Reviewed     None          I/O Past 24 hours:  I/O last 3 completed shifts:  In: -   Out: 2 [Stool:2]  No intake/output data recorded  Assessment / Plan:     [facility]    Recommended Treatment:      Medication changes:  1) will increase his Prolixin from 10-15 mg HS due to his continued auditory hallucinations    Non-pharmacological treatments  1) Continue with group therapy, milieu therapy and occupational therapy  Safety  1) Safety/communication plan established targeting dynamic risk factors above  2) Risks, benefits, and possible side effects of medications explained to patient and patient verbalizes understanding  Counseling / Coordination of Care    Total floor / unit time spent today 20 minutes  Greater than 50% of total time was spent with the patient and / or family counseling and / or coordination of care  A description of the counseling / coordination of care  Patient's Rights, confidentiality and exceptions to confidentiality, use of automated medical record, [address] [place] [address] staff access to medical record, and consent to treatment reviewed      Alan Avila MD

## 2018-09-14 NOTE — PROGRESS NOTES
Veronika Anderson has been med and meal compliant with prompting required, remains blunted and withdrawn, limited interaction with staff, attended both groups today, Fall/SAFE precautions continue, fluids encouraged, TSH 2 340, within normal range taking synthroid 75mcg daily

## 2018-09-15 PROCEDURE — 80307 DRUG TEST PRSMV CHEM ANLYZR: CPT | Performed by: PSYCHIATRY & NEUROLOGY

## 2018-09-15 RX ADMIN — BUPROPION HYDROCHLORIDE 300 MG: 300 TABLET, FILM COATED, EXTENDED RELEASE ORAL at 08:25

## 2018-09-15 RX ADMIN — CLOZAPINE 450 MG: 25 TABLET ORAL at 20:31

## 2018-09-15 RX ADMIN — MIDODRINE HYDROCHLORIDE 10 MG: 2.5 TABLET ORAL at 20:32

## 2018-09-15 RX ADMIN — FLUPHENAZINE HYDROCHLORIDE 15 MG: 10 TABLET, FILM COATED ORAL at 20:32

## 2018-09-15 RX ADMIN — DIVALPROEX SODIUM 1000 MG: 500 TABLET, FILM COATED, EXTENDED RELEASE ORAL at 20:31

## 2018-09-15 RX ADMIN — LEVOTHYROXINE SODIUM 75 MCG: 75 TABLET ORAL at 06:29

## 2018-09-15 RX ADMIN — SENNOSIDES 8.6 MG: 8.6 TABLET, FILM COATED ORAL at 08:25

## 2018-09-15 RX ADMIN — MIDODRINE HYDROCHLORIDE 10 MG: 2.5 TABLET ORAL at 06:29

## 2018-09-15 RX ADMIN — POLYETHYLENE GLYCOL 3350 17 G: 17 POWDER, FOR SOLUTION ORAL at 08:26

## 2018-09-15 NOTE — PROGRESS NOTES
Individual maintained on ongoing SAFE and fall precaution without any incident on this shift    He is laying in bed, eyes closed, breath evenly and unlabored   Checked every 15 minutes during rounds   In no apparent distress   Will continue to monitor behavior and sleep pattern  Individual has a pass with mom from [id number] to home , to assist in the community for therapeutic reintegration, socialization and building relationship

## 2018-09-15 NOTE — PROGRESS NOTES
Patient is awake and alert, patient showered and was dressed, appropriate for pass  Patient presented with a pleasant affect, looking forward to going on pass  Patient was interacting with staff appropriately  Patient attended goals group and went on pass with his mother  RN reviewed pass, meds, and goals to be completed on pass with patient and patient's mother  Patient was reminded of goal set to complete on pass  Patient informed RN that he was going to take care of the litter box when he got home  Patient will call unit at 1600 to inform staff how pass is going

## 2018-09-15 NOTE — PROGRESS NOTES
Mendel Majestic has been awake, alert, and mostly isolative to self  Compliant with scheduled meds with little prompting  Pt denies any depression, anxiety, or a/v hallucinations but continues to be preoccupied  Ate 100% supper  No interaction noted with peers  Pt remains flat, blunted, in affect and offers minimal conversation with staff  Mother in to visit and brought food in which pt ate  Fair interaction noted during visit  Attended and participated in 3/3 groups  Watched movie briefly and then retreated back to his room  Will continue to encourage pt to utilize safe/effective coping skills, socialization with peers, and to work toward achievement of daily/recovery goals

## 2018-09-16 RX ADMIN — CLOZAPINE 450 MG: 25 TABLET ORAL at 20:48

## 2018-09-16 RX ADMIN — SENNOSIDES 8.6 MG: 8.6 TABLET, FILM COATED ORAL at 09:08

## 2018-09-16 RX ADMIN — FLUPHENAZINE HYDROCHLORIDE 15 MG: 10 TABLET, FILM COATED ORAL at 20:49

## 2018-09-16 RX ADMIN — SENNOSIDES 8.6 MG: 8.6 TABLET, FILM COATED ORAL at 17:11

## 2018-09-16 RX ADMIN — MIDODRINE HYDROCHLORIDE 10 MG: 2.5 TABLET ORAL at 06:25

## 2018-09-16 RX ADMIN — BUPROPION HYDROCHLORIDE 300 MG: 300 TABLET, FILM COATED, EXTENDED RELEASE ORAL at 09:08

## 2018-09-16 RX ADMIN — LEVOTHYROXINE SODIUM 75 MCG: 75 TABLET ORAL at 06:27

## 2018-09-16 RX ADMIN — POLYETHYLENE GLYCOL 3350 17 G: 17 POWDER, FOR SOLUTION ORAL at 09:09

## 2018-09-16 RX ADMIN — DIVALPROEX SODIUM 1000 MG: 500 TABLET, FILM COATED, EXTENDED RELEASE ORAL at 20:49

## 2018-09-16 NOTE — PROGRESS NOTES
Patient is awake and alert, Patient presented with an angry affect  Patient was questioned if he was upset or angry  Patient denied being angry  Cooperated with staff, med and meal compliant  Patient attended goals group, remained isolated until lunch arrived on the unit  Patient had no complaints of Sis, His, audio or visual hallucinations, anxiety or depression  Will continue to monitor for changes in current status

## 2018-09-16 NOTE — PROGRESS NOTES
In bed eyes closed, chest rising, Q15 minutes rounds being done  No signs of distress or discomfort  Will continue to monitor

## 2018-09-16 NOTE — PROGRESS NOTES
Psychiatry Progress Note    Subjective: Interval History     Patient went on pass yesterday, indicates that he "relaxed" as his sister was packing for college and his mother was occupied with that  He was isolative to his room upon his return  Did not attend groups  Slept well last night, remains isolative and withdrawn throughout the day   Denies HI SI     Current medications:    Current Facility-Administered Medications:     bisacodyl (DULCOLAX) EC tablet 10 mg, 10 mg, Oral, Daily PRN, Provider Cutover, 10 mg at 09/13/18 6595    bisacodyl (DULCOLAX) rectal suppository 10 mg, 10 mg, Rectal, Daily PRN, LIZETH Mendez    buPROPion (WELLBUTRIN XL) 24 hr tablet 300 mg, 300 mg, Oral, Daily, Provider Cutover, 300 mg at 09/16/18 0908    cloZAPine (CLOZARIL) tablet 450 mg, 450 mg, Oral, HS, Pema Ferrara MD, 450 mg at 09/15/18 2031    divalproex sodium (DEPAKOTE ER) 24 hr tablet 1,000 mg, 1,000 mg, Oral, HS, Alysha Carrillo MD, 1,000 mg at 09/15/18 2031    fluPHENAZine (PROLIXIN) tablet 15 mg, 15 mg, Oral, HS, Pema Ferrara MD, 15 mg at 09/15/18 2032    levothyroxine tablet 75 mcg, 75 mcg, Oral, Early Morning, Provider Cutover, 75 mcg at 09/16/18 8381    midodrine (PROAMATINE) tablet 10 mg, 10 mg, Oral, TID, Wally Lynn MD, 10 mg at 09/16/18 0625    polyethylene glycol (MIRALAX) packet 17 g, 17 g, Oral, Daily, Provider Cutover, 17 g at 09/16/18 0909    senna (SENOKOT) tablet 8 6 mg, 1 tablet, Oral, BID, LIZETH Brunson, 8 6 mg at 09/16/18 0908      Objective:     Vital Signs:  Vitals:    09/16/18 0735 09/16/18 1603 09/16/18 1604 09/16/18 1605   BP: 116/88 103/71 117/78 120/67   BP Location: Left arm Left arm Left arm Left arm   Pulse: (!) 113 100 105 (!) 118   Resp: 18      Temp:       TempSrc:       SpO2:       Weight:       Height:             Appearance:  age appropriate and casually dressed   Behavior:  Withdrawn   Speech:  normal volume   Mood:  depressed   Affect:  constricted   Thought Process: blocked   Thought Content:  normal   Perceptual Disturbances: Auditory hallucinations without commands   Risk Potential: none   Sensorium:  person, place, situation and time   Cognition:  intact   Consciousness:  alert and awake    Attention: attention span and concentration were not  age appropriate   Intellect: average   Insight:  fair   Judgment: fair      Motor Activity: no abnormal movements           Recent Labs:  Results Reviewed     None          I/O Past 24 hours:  No intake/output data recorded  No intake/output data recorded  Assessment / Plan:     Chronic paranoid schizophrenia ([facility] )    Recommended Treatment:      Medication changes:  1) will increase his Prolixin from 10-15 mg HS due to his continued auditory hallucinations    Non-pharmacological treatments  1) Continue with group therapy, milieu therapy and occupational therapy  Safety  1) Safety/communication plan established targeting dynamic risk factors above  2) Risks, benefits, and possible side effects of medications explained to patient and patient verbalizes understanding  Counseling / Coordination of Care    Total floor / unit time spent today 20 minutes  Greater than 50% of total time was spent with the patient and / or family counseling and / or coordination of care  A description of the counseling / coordination of care  Patient's Rights, confidentiality and exceptions to confidentiality, use of automated medical record, [facility] staff access to medical record, and consent to treatment reviewed      LIZETH Penny

## 2018-09-16 NOTE — PROGRESS NOTES
Pt arrived from pass @ 2002 w/mom  Blunted, flat affect & poor eye contact  Stated he didn't do much on pass  Mom stated his sister was getting ready for college & they were helping her pack  Mom stated pt did not help but stayed out of the way  Pt retreated to room & was prompted for meds  Did not attend movie night  In room @ present, no behavioral issues   Will continue to monitor

## 2018-09-17 RX ADMIN — BUPROPION HYDROCHLORIDE 300 MG: 300 TABLET, FILM COATED, EXTENDED RELEASE ORAL at 08:25

## 2018-09-17 RX ADMIN — CLOZAPINE 450 MG: 25 TABLET ORAL at 20:55

## 2018-09-17 RX ADMIN — SENNOSIDES 8.6 MG: 8.6 TABLET, FILM COATED ORAL at 08:25

## 2018-09-17 RX ADMIN — MIDODRINE HYDROCHLORIDE 10 MG: 2.5 TABLET ORAL at 20:54

## 2018-09-17 RX ADMIN — POLYETHYLENE GLYCOL 3350 17 G: 17 POWDER, FOR SOLUTION ORAL at 08:25

## 2018-09-17 RX ADMIN — LEVOTHYROXINE SODIUM 75 MCG: 75 TABLET ORAL at 06:13

## 2018-09-17 RX ADMIN — DIVALPROEX SODIUM 1000 MG: 500 TABLET, FILM COATED, EXTENDED RELEASE ORAL at 20:55

## 2018-09-17 RX ADMIN — MIDODRINE HYDROCHLORIDE 10 MG: 2.5 TABLET ORAL at 06:13

## 2018-09-17 RX ADMIN — FLUPHENAZINE HYDROCHLORIDE 15 MG: 10 TABLET, FILM COATED ORAL at 20:55

## 2018-09-17 RX ADMIN — SENNOSIDES 8.6 MG: 8.6 TABLET, FILM COATED ORAL at 17:01

## 2018-09-17 NOTE — PROGRESS NOTES
Pt attended [address] group  Pt needed prompting to participate  Pt completed exercise and increased self confidence  Group was provided with group assessment pre and post session scales  Reviewed positive affirmation card exercise, coping skill techniques, recovery dialogue, one minute stress break exercise, and box breathing techniques  Reviewed trying positive coping skills as an option than asking for PRN's and addressing stress and anxiety  Continue to provide therapeutic group support  Ann Irene

## 2018-09-17 NOTE — PROGRESS NOTES
Bib Enriquez has been isolative to his room and self most of the shift  No interaction with peers when he does come out and ambulate in the hallway  Cooperative upon approach by staff  Blunted, flat affect  Ate well for supper and took medication without difficulty  Midodrine held due to parameters  When questioned about his last BM he said that he went this morning  No shower this shift  His Mom came to visit and brought him food  Good appetite  He attended and participated in evening groups  Took HS medications without prompting  Ate snack before going to bed  Fall and SAFE precautions maintained without incident

## 2018-09-17 NOTE — PROGRESS NOTES
Psychiatry Progress Note    Subjective: Interval History     Patient had a pass this weekend as usual with family which went well  He continues to be quiet withdrawn and has ongoing hallucinations    He is tolerating the increase in his Prolixin dose and will need to monitor if it does reduce the frequency of his hallucinations      Current medications:    Current Facility-Administered Medications:     bisacodyl (DULCOLAX) EC tablet 10 mg, 10 mg, Oral, Daily PRN, Provider Cutover, 10 mg at 09/13/18 0109    bisacodyl (DULCOLAX) rectal suppository 10 mg, 10 mg, Rectal, Daily PRN, LIZETH Clayton    buPROPion (WELLBUTRIN XL) 24 hr tablet 300 mg, 300 mg, Oral, Daily, Provider Cutover, 300 mg at 09/17/18 0825    cloZAPine (CLOZARIL) tablet 450 mg, 450 mg, Oral, HS, Joy Wright MD, 450 mg at 09/16/18 2048    divalproex sodium (DEPAKOTE ER) 24 hr tablet 1,000 mg, 1,000 mg, Oral, HS, Ken Pineda MD, 1,000 mg at 09/16/18 2049    fluPHENAZine (PROLIXIN) tablet 15 mg, 15 mg, Oral, HS, Joy Wright MD, 15 mg at 09/16/18 2049    levothyroxine tablet 75 mcg, 75 mcg, Oral, Early Morning, Provider Cutover, 75 mcg at 09/17/18 0613    midodrine (PROAMATINE) tablet 10 mg, 10 mg, Oral, TID, Wally Lynn MD, 10 mg at 09/17/18 0140    polyethylene glycol (MIRALAX) packet 17 g, 17 g, Oral, Daily, Provider Cutover, 17 g at 09/17/18 0825    senna (SENOKOT) tablet 8 6 mg, 1 tablet, Oral, BID, LIZETH Meyer, 8 6 mg at 09/17/18 0825      Objective:     Vital Signs:  Vitals:    09/16/18 1605 09/16/18 1900 09/17/18 0612 09/17/18 0700   BP: 120/67 136/86 90/53 111/73   BP Location: Left arm Left arm Left arm Right arm   Pulse: (!) 118 (!) 119 (!) 122 104   Resp:    20   Temp:    97 5 °F (36 4 °C)   TempSrc:       SpO2:       Weight:       Height:             Appearance:  age appropriate and casually dressed   Behavior:  normal   Speech:  normal volume   Mood:  depressed   Affect:  constricted   Thought Process:  normal Thought Content:  normal   Perceptual Disturbances: Auditory hallucinations without commands   Risk Potential: none   Sensorium:  person, place, situation and time   Cognition:  intact   Consciousness:  alert and awake    Attention: attention span and concentration were not age appropriate   Intellect: average   Insight:  fair   Judgment: fair      Motor Activity: no abnormal movements           Recent Labs:  Results Reviewed     None          I/O Past 24 hours:  I/O last 3 completed shifts:  In: -   Out: 1 [Stool:1]  No intake/output data recorded  Assessment / Plan:     [facility]    Recommended Treatment:      Medication changes:  1) none    Non-pharmacological treatments  1) Continue with group therapy, milieu therapy and occupational therapy  Safety  1) Safety/communication plan established targeting dynamic risk factors above  2) Risks, benefits, and possible side effects of medications explained to patient and patient verbalizes understanding  Counseling / Coordination of Care    Total floor / unit time spent today 20 minutes  Greater than 50% of total time was spent with the patient and / or family counseling and / or coordination of care  A description of the counseling / coordination of care  Patient's Rights, confidentiality and exceptions to confidentiality, use of automated medical record, [address] [place] [address] staff access to medical record, and consent to treatment reviewed      Jesus West MD

## 2018-09-17 NOTE — PROGRESS NOTES
Willie Colón has been med and meal compliant without prompting required, remains blunted and withdrawn, limited interaction with staff, attended all of the groups today, Fall/SAFE precautions continue, fluids encouraged

## 2018-09-18 RX ADMIN — DIVALPROEX SODIUM 1000 MG: 500 TABLET, FILM COATED, EXTENDED RELEASE ORAL at 21:07

## 2018-09-18 RX ADMIN — MIDODRINE HYDROCHLORIDE 10 MG: 2.5 TABLET ORAL at 21:06

## 2018-09-18 RX ADMIN — LEVOTHYROXINE SODIUM 75 MCG: 75 TABLET ORAL at 06:11

## 2018-09-18 RX ADMIN — SENNOSIDES 8.6 MG: 8.6 TABLET, FILM COATED ORAL at 17:12

## 2018-09-18 RX ADMIN — SENNOSIDES 8.6 MG: 8.6 TABLET, FILM COATED ORAL at 09:23

## 2018-09-18 RX ADMIN — BUPROPION HYDROCHLORIDE 300 MG: 300 TABLET, FILM COATED, EXTENDED RELEASE ORAL at 09:23

## 2018-09-18 RX ADMIN — FLUPHENAZINE HYDROCHLORIDE 15 MG: 10 TABLET, FILM COATED ORAL at 21:07

## 2018-09-18 RX ADMIN — MIDODRINE HYDROCHLORIDE 10 MG: 2.5 TABLET ORAL at 16:59

## 2018-09-18 RX ADMIN — CLOZAPINE 450 MG: 25 TABLET ORAL at 21:07

## 2018-09-18 RX ADMIN — MIDODRINE HYDROCHLORIDE 10 MG: 2.5 TABLET ORAL at 06:11

## 2018-09-18 RX ADMIN — POLYETHYLENE GLYCOL 3350 17 G: 17 POWDER, FOR SOLUTION ORAL at 09:23

## 2018-09-18 NOTE — PROGRESS NOTES
Jn Hale has been med and meal compliant without prompting required, remains blunted and withdrawn, limited interaction with staff, attended all of the groups today, Fall/SAFE precautions continue, fluids encouraged

## 2018-09-18 NOTE — PROGRESS NOTES
Jaime Dorsey attended and participated in 3/3 evening groups  Compliant with scheduled 2100 meds without prompting  Had evening snack  Resting quietly in bed at present, arouses easily  Will continue to monitor/assess for any changes

## 2018-09-18 NOTE — PROGRESS NOTES
Psychiatry Progress Note    Subjective: Interval History   No  significant change clinically with this patient  He continues to have auditory hallucinations; he has been seen laughing and smiling to himself  Does not take attention to his hygiene and grooming conversation is minimal although he has been seen on the unit and does not seem as sedated or sleepy during the day  Grisel Mcgovern He is tolerating the extra dose of Prolixin up to 15 mg but has not reduced his auditory hallucinations   Will continue to monitor        Current medications:    Current Facility-Administered Medications:     bisacodyl (DULCOLAX) EC tablet 10 mg, 10 mg, Oral, Daily PRN, Provider Cutover, 10 mg at 09/13/18 9784    bisacodyl (DULCOLAX) rectal suppository 10 mg, 10 mg, Rectal, Daily PRN, LIZETH Mancini    buPROPion (WELLBUTRIN XL) 24 hr tablet 300 mg, 300 mg, Oral, Daily, Provider Cutover, 300 mg at 09/17/18 0825    cloZAPine (CLOZARIL) tablet 450 mg, 450 mg, Oral, HS, Audry Nyhan, MD, 450 mg at 09/17/18 2055    divalproex sodium (DEPAKOTE ER) 24 hr tablet 1,000 mg, 1,000 mg, Oral, HS, Eduard Wilkinson MD, 1,000 mg at 09/17/18 2055    fluPHENAZine (PROLIXIN) tablet 15 mg, 15 mg, Oral, HS, Audry Nyhan, MD, 15 mg at 09/17/18 2055    levothyroxine tablet 75 mcg, 75 mcg, Oral, Early Morning, Provider Cutover, 75 mcg at 09/18/18 0611    midodrine (PROAMATINE) tablet 10 mg, 10 mg, Oral, TID, Wally Lynn MD, 10 mg at 09/18/18 0611    polyethylene glycol (MIRALAX) packet 17 g, 17 g, Oral, Daily, Provider Cutover, 17 g at 09/17/18 0825    senna (SENOKOT) tablet 8 6 mg, 1 tablet, Oral, BID, LIZETH Meyer, 8 6 mg at 09/17/18 1701      Objective:     Vital Signs:  Vitals:    09/17/18 1506 09/17/18 2100 09/18/18 0554 09/18/18 0700   BP: 121/77 110/82 (!) 95/48 117/91   BP Location: Left arm Left arm Left arm Left arm   Pulse: (!) 121 (!) 125 (!) 119 (!) 108   Resp:    21   Temp:    (!) 97 3 °F (36 3 °C)   TempSrc:    Temporal   SpO2: Weight:       Height:             Appearance:  age appropriate and casually dressed   Behavior:  normal   Speech:  normal volume   Mood:  depressed   Affect:  constricted   Thought Process:  blocked   Thought Content:  normal   Perceptual Disturbances: Auditory hallucinations without commands   Risk Potential: none   Sensorium:  person, place, situation and time   Cognition:  intact   Consciousness:  alert and awake    Attention: attention span and concentration were age appropriate   Intellect: average   Insight:  fair   Judgment: fair      Motor Activity: no abnormal movements           Recent Labs:  Results Reviewed     None          I/O Past 24 hours:  No intake/output data recorded  No intake/output data recorded  Assessment / Plan:     [facility]    Recommended Treatment:      Medication changes:  1) no change    Non-pharmacological treatments  1) Continue with group therapy, milieu therapy and occupational therapy  Safety  1) Safety/communication plan established targeting dynamic risk factors above  2) Risks, benefits, and possible side effects of medications explained to patient and patient verbalizes understanding  Counseling / Coordination of Care    Total floor / unit time spent today 20 minutes  Greater than 50% of total time was spent with the patient and / or family counseling and / or coordination of care  A description of the counseling / coordination of care  Patient's Rights, confidentiality and exceptions to confidentiality, use of automated medical record, [address] [place] [address] staff access to medical record, and consent to treatment reviewed      Brian Zapata MD

## 2018-09-18 NOTE — CASE MANAGEMENT
CM called and left message for [facility] regarding bed availability and if he was chosen for the bed that is currently available at [facility]  CM will await call back

## 2018-09-18 NOTE — PROGRESS NOTES
Amol Love has been awake, alert, and mostly isolative to self in room  Behavior remains quiet with flat, blunted affect  Pt offers minimal conversation  Pt denies any depression, anxiety, or a/v hallucinations but noted smiling at times as responding to internal stimuli  Compliant with scheduled meds with little prompting  No behavioral issues  No interaction noted with peers  Ate 100% supper  Maintained on Safe and Fall Precautions without incident  Will continue to encourage pt to utilize safe/effective coping skills and to work toward achievement of daily/recovery goals

## 2018-09-18 NOTE — PROGRESS NOTES
Liam Meeks has been awake, alert, and mostly isolative to self in room  Compliant with scheduled 1600/1800 meds with little prompting  Affect remains flat, blunted, and offers minimal conversation with staff  No interaction noted with peers  Ate 100% supper  Pt observed laughing/smiling to self at times as responding to internal stimuli but denies any a/v hallucinations  Attended and participated in Men's Group  Will continue to encourage pt to utilize safe/effective coping skills and to work toward achievement of daily/recovery goals

## 2018-09-19 RX ADMIN — POLYETHYLENE GLYCOL 3350 17 G: 17 POWDER, FOR SOLUTION ORAL at 09:03

## 2018-09-19 RX ADMIN — MIDODRINE HYDROCHLORIDE 10 MG: 2.5 TABLET ORAL at 16:37

## 2018-09-19 RX ADMIN — MIDODRINE HYDROCHLORIDE 10 MG: 2.5 TABLET ORAL at 20:46

## 2018-09-19 RX ADMIN — SENNOSIDES 8.6 MG: 8.6 TABLET, FILM COATED ORAL at 08:31

## 2018-09-19 RX ADMIN — DIVALPROEX SODIUM 1000 MG: 500 TABLET, FILM COATED, EXTENDED RELEASE ORAL at 20:47

## 2018-09-19 RX ADMIN — MIDODRINE HYDROCHLORIDE 10 MG: 2.5 TABLET ORAL at 06:43

## 2018-09-19 RX ADMIN — CLOZAPINE 450 MG: 25 TABLET ORAL at 20:47

## 2018-09-19 RX ADMIN — LEVOTHYROXINE SODIUM 75 MCG: 75 TABLET ORAL at 06:43

## 2018-09-19 RX ADMIN — FLUPHENAZINE HYDROCHLORIDE 15 MG: 10 TABLET, FILM COATED ORAL at 20:47

## 2018-09-19 RX ADMIN — BUPROPION HYDROCHLORIDE 300 MG: 300 TABLET, FILM COATED, EXTENDED RELEASE ORAL at 08:31

## 2018-09-19 RX ADMIN — SENNOSIDES 8.6 MG: 8.6 TABLET, FILM COATED ORAL at 17:04

## 2018-09-19 NOTE — PROGRESS NOTES
Pt attended [address] group  Pt needed prompting to participate  Fair eye contact and followed topic  Pt reviewed topic with self reflection to provide further insight  Group topic focused on guiding principles of recovery [facility]  Continue to provide therapeutic group support

## 2018-09-19 NOTE — PROGRESS NOTES
Stacia Hylton attended and participated in Men's Group and 2/2 evening groups  Compliant with scheduled 2100 meds without prompting  Had evening snack  Resting quietly in bed at present, arouses easily  Will continue to monitor/assess for any changes

## 2018-09-19 NOTE — PROGRESS NOTES
Psychiatry Progress Note    Subjective: Interval History     Patient continues to tolerate medications well without side effect  He still is responding to internal stimuli  He has been witness to be laughing and smiling inappropriate times  He still is with poor hygiene and malodorous  He has been tolerating the increased Prolixin dose without side effect        Current medications:    Current Facility-Administered Medications:     bisacodyl (DULCOLAX) EC tablet 10 mg, 10 mg, Oral, Daily PRN, Provider Cutover, 10 mg at 09/13/18 1288    bisacodyl (DULCOLAX) rectal suppository 10 mg, 10 mg, Rectal, Daily PRN, LIZETH Polo    buPROPion (WELLBUTRIN XL) 24 hr tablet 300 mg, 300 mg, Oral, Daily, Provider Cutover, 300 mg at 09/19/18 0831    cloZAPine (CLOZARIL) tablet 450 mg, 450 mg, Oral, HS, Sadaf Jean-Baptiste MD, 450 mg at 09/18/18 2107    divalproex sodium (DEPAKOTE ER) 24 hr tablet 1,000 mg, 1,000 mg, Oral, HS, Gaby Sapp MD, 1,000 mg at 09/18/18 2107    fluPHENAZine (PROLIXIN) tablet 15 mg, 15 mg, Oral, HS, Sadaf Jean-Baptiste MD, 15 mg at 09/18/18 2107    levothyroxine tablet 75 mcg, 75 mcg, Oral, Early Morning, Provider Cutover, 75 mcg at 09/19/18 0643    midodrine (PROAMATINE) tablet 10 mg, 10 mg, Oral, TID, Wally Lynn MD, 10 mg at 09/19/18 0643    polyethylene glycol (MIRALAX) packet 17 g, 17 g, Oral, Daily, Provider Cutover, 17 g at 09/19/18 0903    senna (SENOKOT) tablet 8 6 mg, 1 tablet, Oral, BID, LIZETH Villafuerte, 8 6 mg at 09/19/18 0831      Objective:     Vital Signs:  Vitals:    09/18/18 1943 09/19/18 0530 09/19/18 0730 09/19/18 0735   BP: 115/67 105/65 99/60 91/55   BP Location: Left arm Left arm Left arm Left arm   Pulse: (!) 116 (!) 116 87 (!) 117   Resp:   19 19   Temp:   (!) 97 °F (36 1 °C)    TempSrc:   Temporal    SpO2:       Weight:       Height:             Appearance:  age appropriate and casually dressed   Behavior:  normal   Speech:  normal volume   Mood:  depressed Affect:  constricted   Thought Process:  blocked   Thought Content:  normal   Perceptual Disturbances: Auditory hallucinations without commands   Risk Potential: none   Sensorium:  person, place, situation and time   Cognition:  intact   Consciousness:  alert and awake    Attention: attention span and concentration were age appropriate   Intellect: average   Insight:  fair   Judgment: fair      Motor Activity: no abnormal movements           Recent Labs:  Results Reviewed     None          I/O Past 24 hours:  I/O last 3 completed shifts:  In: -   Out: 2 [Stool:2]  No intake/output data recorded  Assessment / Plan:     [facility]    Recommended Treatment:      Medication changes:  1) continue current medications    Non-pharmacological treatments  1) Continue with group therapy, milieu therapy and occupational therapy  Safety  1) Safety/communication plan established targeting dynamic risk factors above  2) Risks, benefits, and possible side effects of medications explained to patient and patient verbalizes understanding  Counseling / Coordination of Care    Total floor / unit time spent today 20 minutes  Greater than 50% of total time was spent with the patient and / or family counseling and / or coordination of care  A description of the counseling / coordination of care  Patient's Rights, confidentiality and exceptions to confidentiality, use of automated medical record, [facility] to [facility] record, and consent to treatment reviewed      Kandace Figueredo PA-C

## 2018-09-19 NOTE — NURSING NOTE
Pt isolative to room during the day  Out for meals, takes medications un-prompted  Not social with peers, but attended two groups  Observed by tech staff responding

## 2018-09-20 RX ORDER — FLUPHENAZINE HYDROCHLORIDE 10 MG/1
20 TABLET ORAL
Status: DISCONTINUED | OUTPATIENT
Start: 2018-09-20 | End: 2018-12-20 | Stop reason: HOSPADM

## 2018-09-20 RX ADMIN — BUPROPION HYDROCHLORIDE 300 MG: 300 TABLET, FILM COATED, EXTENDED RELEASE ORAL at 09:00

## 2018-09-20 RX ADMIN — SENNOSIDES 8.6 MG: 8.6 TABLET, FILM COATED ORAL at 09:00

## 2018-09-20 RX ADMIN — MIDODRINE HYDROCHLORIDE 10 MG: 2.5 TABLET ORAL at 21:06

## 2018-09-20 RX ADMIN — MIDODRINE HYDROCHLORIDE 10 MG: 2.5 TABLET ORAL at 06:06

## 2018-09-20 RX ADMIN — POLYETHYLENE GLYCOL 3350 17 G: 17 POWDER, FOR SOLUTION ORAL at 09:00

## 2018-09-20 RX ADMIN — DIVALPROEX SODIUM 1000 MG: 500 TABLET, FILM COATED, EXTENDED RELEASE ORAL at 21:07

## 2018-09-20 RX ADMIN — FLUPHENAZINE HYDROCHLORIDE 20 MG: 10 TABLET, FILM COATED ORAL at 21:07

## 2018-09-20 RX ADMIN — CLOZAPINE 450 MG: 25 TABLET ORAL at 21:07

## 2018-09-20 RX ADMIN — SENNOSIDES 8.6 MG: 8.6 TABLET, FILM COATED ORAL at 17:15

## 2018-09-20 RX ADMIN — LEVOTHYROXINE SODIUM 75 MCG: 75 TABLET ORAL at 06:06

## 2018-09-20 RX ADMIN — MIDODRINE HYDROCHLORIDE 10 MG: 2.5 TABLET ORAL at 17:00

## 2018-09-20 NOTE — ESCALATED TEAM TX
Patient still remains isolative to himself  He reports he is reading book sin his room  Patient does come out for meds, meals and groups  [facility] that [facility] feel patient is not a good fit for their [facility]  Patient is still on the [facility] lists and discussed with Zach with [facility] about adding him to the enhanced [facility] ([facility]) list as well  Patient next treatment team is scheduled 09/27/18

## 2018-09-20 NOTE — PROGRESS NOTES
Erick Repress has been awake, alert, and visible intermittently out in the milieu  Isolative to self  No interaction noted with peers  Maintained on Safe and Fall Precautions without incident  Affect flat, blunted, and offers minimal conversation with staff  Still noted smiling to self as responding to internal stimuli but denies any a/v hallucinations, depression, or anxiety  Compliant with scheduled meds with little prompting  Ate 100% supper  Mother in to visit with fair interaction noted  Attended and participated in 2/2 evening groups  Declined snack  Resting quietly in bed at present, awake  Will continue to monitor/assess for any changes

## 2018-09-20 NOTE — PROGRESS NOTES
Psychiatry Progress Note    Subjective: Interval History     Team meeting today  Patient was a little more conversational answered questions appropriately  He was told that on and unfortunately the [facility] will not be taking him because they do not think he is a good fit  for their program   Patient continues to loosen 8 and is not discussing any delusions with the staff        Current medications:    Current Facility-Administered Medications:     bisacodyl (DULCOLAX) EC tablet 10 mg, 10 mg, Oral, Daily PRN, Provider Cutover, 10 mg at 09/13/18 0537    bisacodyl (DULCOLAX) rectal suppository 10 mg, 10 mg, Rectal, Daily PRN, Jason Leyden Ciminieri, CRNP    buPROPion (WELLBUTRIN XL) 24 hr tablet 300 mg, 300 mg, Oral, Daily, Provider Cutover, 300 mg at 09/20/18 0900    cloZAPine (CLOZARIL) tablet 450 mg, 450 mg, Oral, HS, Seymour Serna MD, 450 mg at 09/19/18 2047    divalproex sodium (DEPAKOTE ER) 24 hr tablet 1,000 mg, 1,000 mg, Oral, HS, Brianna Woodard MD, 1,000 mg at 09/19/18 2047    fluPHENAZine (PROLIXIN) tablet 15 mg, 15 mg, Oral, HS, Seymour Serna MD, 15 mg at 09/19/18 2047    levothyroxine tablet 75 mcg, 75 mcg, Oral, Early Morning, Provider Cutover, 75 mcg at 09/20/18 0606    midodrine (PROAMATINE) tablet 10 mg, 10 mg, Oral, TID, Wally Lynn MD, 10 mg at 09/20/18 0606    polyethylene glycol (MIRALAX) packet 17 g, 17 g, Oral, Daily, Provider Cutover, 17 g at 09/20/18 0900    senna (SENOKOT) tablet 8 6 mg, 1 tablet, Oral, BID, LIZETH Meyer, 8 6 mg at 09/20/18 0900      Objective:     Vital Signs:  Vitals:    09/19/18 1502 09/19/18 1900 09/20/18 0550 09/20/18 0730   BP: 115/84 115/78 112/59 159/76   BP Location: Left arm Left arm Left arm Left arm   Pulse: (!) 124 (!) 133 (!) 110 (!) 116   Resp:       Temp:    97 9 °F (36 6 °C)   TempSrc:    Temporal   SpO2:       Weight:       Height:             Appearance:  age appropriate and casually dressed   Behavior:  normal   Speech:  normal volume   Mood:  depressed   Affect:  constricted   Thought Process:  normal   Thought Content:  normal   Perceptual Disturbances: Auditory hallucinations without commands   Risk Potential: none   Sensorium:  person, place, situation and time   Cognition:  intact   Consciousness:  alert and awake    Attention: attention span and concentration were not age appropriate   Intellect: average   Insight:  fair   Judgment: limited      Motor Activity: no abnormal movements           Recent Labs:  Results Reviewed     None          I/O Past 24 hours:  I/O last 3 completed shifts:  In: -   Out: 2 [Stool:2]  No intake/output data recorded  Assessment / Plan:     Chronic paranoid schizophrenia ([facility] )    Recommended Treatment:      Medication changes:  1) will be increasing his Prolixin dose from 15-20 mg hs because of his ongoing psychotic symptoms  Non-pharmacological treatments  1) Continue with group therapy, milieu therapy and occupational therapy  Safety  1) Safety/communication plan established targeting dynamic risk factors above  2) Risks, benefits, and possible side effects of medications explained to patient and patient verbalizes understanding  Counseling / Coordination of Care    Total floor / unit time spent today 20 minutes  Greater than 50% of total time was spent with the patient and / or family counseling and / or coordination of care  A description of the counseling / coordination of care  Patient's Rights, confidentiality and exceptions to confidentiality, use of automated medical record, [facility] staff access to medical record, and consent to treatment reviewed      Simi Barbour MD

## 2018-09-20 NOTE — PROGRESS NOTES
Pt attended [facility] group  Pt needed prompting to focus  Praised pt for positive response and being engaged in group  Group focused on self esteem making changes, assertiveness skills, setting realistic goals, gratitude, following self care, self reflection, visualization coping skill technique and social question cards  Continue to provide therapeutic group support

## 2018-09-20 NOTE — PROGRESS NOTES
Liam Meeks has been med and meal compliant with prompting required, remains blunted and withdrawn, limited interaction with staff, attended all of the groups today and participated during treatment team Fall/SAFE precautions continue, fluids encouraged

## 2018-09-21 RX ADMIN — FLUPHENAZINE HYDROCHLORIDE 20 MG: 10 TABLET, FILM COATED ORAL at 20:55

## 2018-09-21 RX ADMIN — MIDODRINE HYDROCHLORIDE 10 MG: 2.5 TABLET ORAL at 06:17

## 2018-09-21 RX ADMIN — DIVALPROEX SODIUM 1000 MG: 500 TABLET, FILM COATED, EXTENDED RELEASE ORAL at 20:55

## 2018-09-21 RX ADMIN — CLOZAPINE 450 MG: 25 TABLET ORAL at 20:55

## 2018-09-21 RX ADMIN — POLYETHYLENE GLYCOL 3350 17 G: 17 POWDER, FOR SOLUTION ORAL at 08:31

## 2018-09-21 RX ADMIN — LEVOTHYROXINE SODIUM 75 MCG: 75 TABLET ORAL at 06:17

## 2018-09-21 RX ADMIN — SENNOSIDES 8.6 MG: 8.6 TABLET, FILM COATED ORAL at 17:04

## 2018-09-21 RX ADMIN — MIDODRINE HYDROCHLORIDE 10 MG: 2.5 TABLET ORAL at 17:04

## 2018-09-21 RX ADMIN — SENNOSIDES 8.6 MG: 8.6 TABLET, FILM COATED ORAL at 08:31

## 2018-09-21 RX ADMIN — BUPROPION HYDROCHLORIDE 300 MG: 300 TABLET, FILM COATED, EXTENDED RELEASE ORAL at 08:31

## 2018-09-21 NOTE — NURSING NOTE
Pt isolative to room, attended IMR group only  Out for meals, takes prescribed medications  No socialization with peers

## 2018-09-21 NOTE — PROGRESS NOTES
Abdias Gamez has been awake, alert, and visible minimally out in the milieu  Behavior has been quiet and mostly isolative to self in room  No overt responding to internal stimuli noted but preoccupied at times  Compliant with all scheduled meds as ordered  Ate 100% supper  Attended 2/2 evening groups  Remains flat, blunted in affect and offers little conversation with staff  No socialization with peers  Attended and participated in 2/2 evening groups  Had snack  Resting quietly in bed at present, arouses easily  Will continue to monitor/assess for any changes

## 2018-09-21 NOTE — PROGRESS NOTES
Psychiatry Progress Note    Subjective: Interval History     It is difficult for Uriel to engage in conversations and activities although he is present blood remains emotionally cognitively distant  Hallucinations are present but not over powering it seems  Does not talk about any delusions  But he remains withdrawn into his own world for the most part        Current medications:    Current Facility-Administered Medications:     bisacodyl (DULCOLAX) EC tablet 10 mg, 10 mg, Oral, Daily PRN, Provider Cutover, 10 mg at 09/13/18 9151    bisacodyl (DULCOLAX) rectal suppository 10 mg, 10 mg, Rectal, Daily PRN, LIZETH Polo    buPROPion (WELLBUTRIN XL) 24 hr tablet 300 mg, 300 mg, Oral, Daily, Provider Cutover, 300 mg at 09/21/18 0831    cloZAPine (CLOZARIL) tablet 450 mg, 450 mg, Oral, HS, Sadaf Jean-Baptiste MD, 450 mg at 09/20/18 2107    divalproex sodium (DEPAKOTE ER) 24 hr tablet 1,000 mg, 1,000 mg, Oral, HS, Gaby Sapp MD, 1,000 mg at 09/20/18 2107    fluPHENAZine (PROLIXIN) tablet 20 mg, 20 mg, Oral, HS, Sadaf Jean-Baptiste MD, 20 mg at 09/20/18 2107    levothyroxine tablet 75 mcg, 75 mcg, Oral, Early Morning, Provider Cutover, 75 mcg at 09/21/18 0617    midodrine (PROAMATINE) tablet 10 mg, 10 mg, Oral, TID, Wally Lynn MD, 10 mg at 09/21/18 0617    polyethylene glycol (MIRALAX) packet 17 g, 17 g, Oral, Daily, Provider Cutover, 17 g at 09/21/18 0831    senna (SENOKOT) tablet 8 6 mg, 1 tablet, Oral, BID, LIZETH Villafuerte, 8 6 mg at 09/21/18 0831      Objective:     Vital Signs:  Vitals:    09/20/18 1532 09/20/18 1534 09/20/18 1930 09/21/18 0627   BP: 117/79 118/68 115/78 97/71   BP Location: Left arm Left arm Left arm Left arm   Pulse: (!) 111 (!) 118 (!) 118 (!) 127   Resp:       Temp:       TempSrc:       SpO2:       Weight:       Height:             Appearance:  age appropriate and casually dressed   Behavior:  normal   Speech:  normal volume   Mood:  depressed   Affect:  constricted   Thought Process:  normal   Thought Content:  normal   Perceptual Disturbances: Auditory hallucinations without commands   Risk Potential: none   Sensorium:  person, place, situation and time   Cognition:  intact   Consciousness:  alert and awake    Attention: attention span and concentration were not age appropriate   Intellect: average   Insight:  poor   Judgment: poor      Motor Activity: no abnormal movements           Recent Labs:  Results Reviewed     None          I/O Past 24 hours:  I/O last 3 completed shifts:  In: -   Out: 2 [Stool:2]  No intake/output data recorded  Assessment / Plan:     Chronic paranoid schizophrenia ([facility]  ,  Recommended Treatment:      Medication changes:  1)  no change, monitoring the recent increased in Prolixin dose to 20 mg    Non-pharmacological treatments  1) Continue with group therapy, milieu therapy and occupational therapy  Safety  1) Safety/communication plan established targeting dynamic risk factors above  2) Risks, benefits, and possible side effects of medications explained to patient and patient verbalizes understanding  Counseling / Coordination of Care    Total floor / unit time spent today 20 minutes  Greater than 50% of total time was spent with the patient and / or family counseling and / or coordination of care  A description of the counseling / coordination of care  Patient's Rights, confidentiality and exceptions to confidentiality, use of automated medical record, [address] [place] [address] staff access to medical record, and consent to treatment reviewed      Zenon Lay MD

## 2018-09-22 RX ADMIN — MIDODRINE HYDROCHLORIDE 10 MG: 2.5 TABLET ORAL at 21:10

## 2018-09-22 RX ADMIN — MIDODRINE HYDROCHLORIDE 10 MG: 2.5 TABLET ORAL at 16:36

## 2018-09-22 RX ADMIN — DIVALPROEX SODIUM 1000 MG: 500 TABLET, FILM COATED, EXTENDED RELEASE ORAL at 21:10

## 2018-09-22 RX ADMIN — BUPROPION HYDROCHLORIDE 300 MG: 300 TABLET, FILM COATED, EXTENDED RELEASE ORAL at 09:35

## 2018-09-22 RX ADMIN — FLUPHENAZINE HYDROCHLORIDE 20 MG: 10 TABLET, FILM COATED ORAL at 21:10

## 2018-09-22 RX ADMIN — SENNOSIDES 8.6 MG: 8.6 TABLET, FILM COATED ORAL at 09:35

## 2018-09-22 RX ADMIN — POLYETHYLENE GLYCOL 3350 17 G: 17 POWDER, FOR SOLUTION ORAL at 09:35

## 2018-09-22 RX ADMIN — MIDODRINE HYDROCHLORIDE 10 MG: 2.5 TABLET ORAL at 06:33

## 2018-09-22 RX ADMIN — LEVOTHYROXINE SODIUM 75 MCG: 75 TABLET ORAL at 06:33

## 2018-09-22 RX ADMIN — CLOZAPINE 450 MG: 25 TABLET ORAL at 21:10

## 2018-09-22 NOTE — PROGRESS NOTES
Individual maintained on ongoing SAFE and fall precaution without incident on this shift  Beard and mouth check implemented  He is awake, alert and partially visible on the unit  Pleasant and cooperative upon approach  Appetite intact  Mom visited with good interaction  Attended and participated in evening groups, except movie night  Continues to be meds and meals compliant without prompting  Midodrine held this evening due to parameters for /96  P145  Denies any depression or anxiety  No overt delusion or a/t/v hallucination noted  Will continue to monitor behavioral pattern

## 2018-09-23 PROCEDURE — 80307 DRUG TEST PRSMV CHEM ANLYZR: CPT | Performed by: PSYCHIATRY & NEUROLOGY

## 2018-09-23 RX ADMIN — MIDODRINE HYDROCHLORIDE 10 MG: 2.5 TABLET ORAL at 06:22

## 2018-09-23 RX ADMIN — MIDODRINE HYDROCHLORIDE 10 MG: 2.5 TABLET ORAL at 21:03

## 2018-09-23 RX ADMIN — DIVALPROEX SODIUM 1000 MG: 500 TABLET, FILM COATED, EXTENDED RELEASE ORAL at 21:04

## 2018-09-23 RX ADMIN — BUPROPION HYDROCHLORIDE 300 MG: 300 TABLET, FILM COATED, EXTENDED RELEASE ORAL at 08:31

## 2018-09-23 RX ADMIN — LEVOTHYROXINE SODIUM 75 MCG: 75 TABLET ORAL at 06:22

## 2018-09-23 RX ADMIN — SENNOSIDES 8.6 MG: 8.6 TABLET, FILM COATED ORAL at 08:31

## 2018-09-23 RX ADMIN — POLYETHYLENE GLYCOL 3350 17 G: 17 POWDER, FOR SOLUTION ORAL at 08:31

## 2018-09-23 RX ADMIN — CLOZAPINE 450 MG: 25 TABLET ORAL at 21:04

## 2018-09-23 RX ADMIN — FLUPHENAZINE HYDROCHLORIDE 20 MG: 10 TABLET, FILM COATED ORAL at 21:04

## 2018-09-23 NOTE — PROGRESS NOTES
Individual maintained on ongoing SAFE and fall precaution without any incident on this shift    He is laying in bed, eyes closed, breath evenly and unlabored   Checked every 15 minutes during rounds   In no apparent distress   Will continue to monitor behavior and sleep pattern      Individual has a pass with mom from [id number] to assist in the community for therapeutic reintegration, socialization and building relationship

## 2018-09-23 NOTE — PROGRESS NOTES
Psychiatry Progress Note    Subjective: Interval History     Patient is currently on leave of absence      Current medications:    Current Facility-Administered Medications:     [MAR Hold - Suspended Admission] bisacodyl (DULCOLAX) EC tablet 10 mg, 10 mg, Oral, Daily PRN, Provider Cutover, 10 mg at 09/13/18 0623    [MAR Hold - Suspended Admission] bisacodyl (DULCOLAX) rectal suppository 10 mg, 10 mg, Rectal, Daily PRN, LIZETH Correa    [facility] Hold - Suspended Admission] buPROPion (WELLBUTRIN XL) 24 hr tablet 300 mg, 300 mg, Oral, Daily, Provider Cutover, 300 mg at 09/23/18 0831    [MAR Hold - Suspended Admission] cloZAPine (CLOZARIL) tablet 450 mg, 450 mg, Oral, HS, Jesus West MD, 450 mg at 09/22/18 2110    [MAR Hold - Suspended Admission] divalproex sodium (DEPAKOTE ER) 24 hr tablet 1,000 mg, 1,000 mg, Oral, HS, Lobo Henry MD, 1,000 mg at 09/22/18 2110    [MAR Hold - Suspended Admission] fluPHENAZine (PROLIXIN) tablet 20 mg, 20 mg, Oral, HS, Jesus West MD, 20 mg at 09/22/18 2110    [MAR Hold - Suspended Admission] levothyroxine tablet 75 mcg, 75 mcg, Oral, Early Morning, Provider Cutover, 75 mcg at 09/23/18 0622    [MAR Hold - Suspended Admission] midodrine (PROAMATINE) tablet 10 mg, 10 mg, Oral, TID, Wally Lynn MD, 10 mg at 09/23/18 0622    [MAR Hold - Suspended Admission] polyethylene glycol (MIRALAX) packet 17 g, 17 g, Oral, Daily, Provider Cutover, 17 g at 09/23/18 0831    [MAR Hold - Suspended Admission] senna (SENOKOT) tablet 8 6 mg, 1 tablet, Oral, BID, LIZETH Meyer, 8 6 mg at 09/23/18 7858    Current Outpatient Prescriptions:     acetaminophen (TYLENOL) 100 mg/mL solution, Take 10 mg/kg by mouth every 4 (four) hours as needed for fever, Disp: , Rfl:     acetaminophen (TYLENOL) 325 mg tablet, Take 650 mg by mouth every 4 (four) hours as needed for mild pain, Disp: , Rfl:     clozapine (CLOZARIL) 200 MG tablet, Take 600 mg by mouth daily at bedtime, Disp: , Rfl:    divalproex sodium (DEPAKOTE) 500 mg EC tablet, Take 250 mg by mouth every 8 (eight) hours, Disp: , Rfl:     divalproex sodium (DEPAKOTE) 500 mg EC tablet, Take 1,250 mg by mouth daily at bedtime, Disp: , Rfl:     lithium carbonate (LITHOBID) 450 mg CR tablet, Take 450 mg by mouth daily at bedtime, Disp: , Rfl:     PARoxetine (PAXIL) 20 mg tablet, Take 20 mg by mouth daily, Disp: , Rfl:       Objective:     Vital Signs:  Vitals:    09/22/18 1545 09/22/18 1900 09/23/18 0900 09/23/18 0902   BP: 106/73 113/78 124/88 124/71   BP Location: Left arm Left arm Left arm Left arm   Pulse: (!) 127 (!) 127 (!) 109 (!) 129   Resp:   19    Temp:   98 7 °F (37 1 °C)    TempSrc:   Temporal    SpO2:       Weight:   89 8 kg (198 lb)    Height:             Appearance:  age appropriate and casually dressed   Behavior:  normal   Speech:  normal volume   Mood:  depressed   Affect:  constricted   Thought Process:  normal   Thought Content:  normal   Perceptual Disturbances: Auditory hallucinations without commands   Risk Potential: none   Sensorium:  person, place, situation and time   Cognition:  intact   Consciousness:  alert and awake    Attention: attention span and concentration were not age appropriate   Intellect: average   Insight:  poor   Judgment: poor      Motor Activity: no abnormal movements           Recent Labs:  Results Reviewed     None          I/O Past 24 hours:  I/O last 3 completed shifts:  In: -   Out: 2 [Stool:2]  No intake/output data recorded  Assessment / Plan:     [facility]  ,  Recommended Treatment:      Medication changes:  1)   continue current medications    Non-pharmacological treatments  1) Continue with group therapy, milieu therapy and occupational therapy  Safety  1) Safety/communication plan established targeting dynamic risk factors above      2) Risks, benefits, and possible side effects of medications explained to patient and patient verbalizes understanding  Counseling / Coordination of Care    Total floor / unit time spent today 20 minutes  Greater than 50% of total time was spent with the patient and / or family counseling and / or coordination of care  A description of the counseling / coordination of care  Patient's Rights, confidentiality and exceptions to confidentiality, use of automated medical record, [facility] staff access to medical record, and consent to treatment reviewed      Amina Vail PA-C

## 2018-09-23 NOTE — PROGRESS NOTES
Patient needed some prompting to get up this morning  Patient got up at 0900, got vitals at that time, did not eat breakfast  Patient was given bin to shower and brush teeth and get dressed to go on pass  After patient completed hygiene he took meds  Pass and meds were reviewed with both mother and patient  Patient and mother verbalized understanding  Patient left the unit with mother to go on pass

## 2018-09-23 NOTE — PROGRESS NOTES
Patient isolative to room  Med and meal compliant  Patient did not attend groups  Patient has no complaints of Sis, His, audio or visual hallucinations, anxiety or depression

## 2018-09-24 RX ADMIN — SENNOSIDES 8.6 MG: 8.6 TABLET, FILM COATED ORAL at 08:20

## 2018-09-24 RX ADMIN — CLOZAPINE 450 MG: 25 TABLET ORAL at 21:09

## 2018-09-24 RX ADMIN — BUPROPION HYDROCHLORIDE 300 MG: 300 TABLET, FILM COATED, EXTENDED RELEASE ORAL at 08:20

## 2018-09-24 RX ADMIN — FLUPHENAZINE HYDROCHLORIDE 20 MG: 10 TABLET, FILM COATED ORAL at 21:08

## 2018-09-24 RX ADMIN — LEVOTHYROXINE SODIUM 75 MCG: 75 TABLET ORAL at 06:10

## 2018-09-24 RX ADMIN — DIVALPROEX SODIUM 1000 MG: 500 TABLET, FILM COATED, EXTENDED RELEASE ORAL at 21:08

## 2018-09-24 RX ADMIN — MIDODRINE HYDROCHLORIDE 10 MG: 2.5 TABLET ORAL at 21:09

## 2018-09-24 RX ADMIN — POLYETHYLENE GLYCOL 3350 17 G: 17 POWDER, FOR SOLUTION ORAL at 08:21

## 2018-09-24 RX ADMIN — SENNOSIDES 8.6 MG: 8.6 TABLET, FILM COATED ORAL at 17:03

## 2018-09-24 RX ADMIN — MIDODRINE HYDROCHLORIDE 10 MG: 2.5 TABLET ORAL at 06:10

## 2018-09-24 RX ADMIN — MIDODRINE HYDROCHLORIDE 10 MG: 2.5 TABLET ORAL at 16:54

## 2018-09-24 NOTE — PROGRESS NOTES
Patient slept throughout the night with no behaviors or issues noted  Midodrine given this am, /77,  standing

## 2018-09-24 NOTE — PROGRESS NOTES
Psychiatry Progress Note    Subjective: Interval History     The patient has been doing well with the an issue with 1 of the other male peers who makes derogatory comments to him often  He has reported this that he was involved in an dealing with the and hopefully that will take care of the issue  Psychiatrically he seems to be about the same        Current medications:    Current Facility-Administered Medications:     bisacodyl (DULCOLAX) EC tablet 10 mg, 10 mg, Oral, Daily PRN, Provider Cutover, 10 mg at 09/1983    bisacodyl (DULCOLAX) rectal suppository 10 mg, 10 mg, Rectal, Daily PRN, LIZETH Cruz    buPROPion (WELLBUTRIN XL) 24 hr tablet 300 mg, 300 mg, Oral, Daily, Provider Cutover, 300 mg at 09/24/18 0820    cloZAPine (CLOZARIL) tablet 450 mg, 450 mg, Oral, HS, Luke David MD, 450 mg at 09/23/18 2104    divalproex sodium (DEPAKOTE ER) 24 hr tablet 1,000 mg, 1,000 mg, Oral, HS, Nadira Sandoval MD, 1,000 mg at 09/23/18 2104    fluPHENAZine (PROLIXIN) tablet 20 mg, 20 mg, Oral, HS, Luke David MD, 20 mg at 09/23/18 2104    levothyroxine tablet 75 mcg, 75 mcg, Oral, Early Morning, Provider Cutover, 75 mcg at 09/24/18 0610    midodrine (PROAMATINE) tablet 10 mg, 10 mg, Oral, TID, Wally Lynn MD, 10 mg at 09/24/18 0610    polyethylene glycol (MIRALAX) packet 17 g, 17 g, Oral, Daily, Provider Cutover, 17 g at 09/24/18 9215    senna (SENOKOT) tablet 8 6 mg, 1 tablet, Oral, BID, LIZETH Suarez, 8 6 mg at 09/24/18 0820      Objective:     Vital Signs:  Vitals:    09/23/18 0902 09/23/18 1900 09/24/18 0500 09/24/18 0730   BP: 124/71 112/81 110/77 117/85   BP Location: Left arm Left arm Left arm Left arm   Pulse: (!) 129 (!) 138 (!) 115 (!) 111   Resp:    18   Temp:    98 1 °F (36 7 °C)   TempSrc:    Temporal   SpO2:       Weight:       Height:             Appearance:  age appropriate and casually dressed   Behavior:  normal   Speech:  normal volume   Mood:  depressed   Affect: constricted   Thought Process:  normal   Thought Content:  normal   Perceptual Disturbances: Auditory hallucinations without commands   Risk Potential: none   Sensorium:  person, place, situation and time   Cognition:  intact   Consciousness:  alert and awake    Attention: attention span and concentration were age appropriate   Intellect: average   Insight:  fair   Judgment: fair      Motor Activity: no abnormal movements           Recent Labs:  Results Reviewed     None          I/O Past 24 hours:  I/O last 3 completed shifts:  In: -   Out: 1 [Stool:1]  No intake/output data recorded  Assessment / Plan:     [facility]    Recommended Treatment:      Medication changes:  1) no change    Non-pharmacological treatments  1) Continue with group therapy, milieu therapy and occupational therapy  Safety  1) Safety/communication plan established targeting dynamic risk factors above  2) Risks, benefits, and possible side effects of medications explained to patient and patient verbalizes understanding  Counseling / Coordination of Care    Total floor / unit time spent today 20 minutes  Greater than 50% of total time was spent with the patient and / or family counseling and / or coordination of care  A description of the counseling / coordination of care  Patient's Rights, confidentiality and exceptions to confidentiality, use of automated medical record, [address] [place] [address] staff access to medical record, and consent to treatment reviewed      Yomaira Ocampo MD

## 2018-09-24 NOTE — NURSING NOTE
Pt isolative to his room  Attends groups (2/3 for AM groups)  Out for meals, refused breakfast but ate 100% of lunch tray  Takes prescribed medications without prompting  No social interaction

## 2018-09-24 NOTE — PROGRESS NOTES
Erick Baumann returned from pass with mother at 34  Both mother and pt stated that the pass went well  Pt stated that he did his laundry while out on pass  Affect still flat, blunted  Eye contact slightly improved but offers minimal conversation with staff  Body assessment completed and UDS obtained and to lab per [facility] protocol  Compliant with scheduled 2100 meds as ordered and without prompting  Did not attend any evening groups  Pt's mother phoned unit shortly after returning with pt from pass  Mother stated that a male peer walked by her and Erick Baumann when she brought him back from pass on unit and said under his breath to Erick Baumann and her when he walked by them "You're pathetic "  Erick Baumann told mother that this is upsetting to him  Mother stated she does not know how to talk to Erick Baumann about this and stated that this is not the first time that male peer has made derogatory comments to Erick Baumann  Writer told mother that this will be documented and also referred to the team   Writer spoke to pt and asked pt what happened  Pt stated that male peer walked by him and his mother and "made a nasty comment "  Writer instructed pt to report to staff if male peer makes derogatory comments to him again and to walk away from him  Will continue to encourage pt to utilize safe/effective coping skills and to work toward achievement of daily/recovery goals  Resting quietly in bed at present, arouses easily  Will continue to monitor/assess for any changes

## 2018-09-25 RX ADMIN — SENNOSIDES 8.6 MG: 8.6 TABLET, FILM COATED ORAL at 17:03

## 2018-09-25 RX ADMIN — DIVALPROEX SODIUM 1000 MG: 500 TABLET, FILM COATED, EXTENDED RELEASE ORAL at 20:46

## 2018-09-25 RX ADMIN — FLUPHENAZINE HYDROCHLORIDE 20 MG: 10 TABLET, FILM COATED ORAL at 20:45

## 2018-09-25 RX ADMIN — MIDODRINE HYDROCHLORIDE 10 MG: 2.5 TABLET ORAL at 20:45

## 2018-09-25 RX ADMIN — CLOZAPINE 450 MG: 25 TABLET ORAL at 20:45

## 2018-09-25 RX ADMIN — POLYETHYLENE GLYCOL 3350 17 G: 17 POWDER, FOR SOLUTION ORAL at 08:25

## 2018-09-25 RX ADMIN — BUPROPION HYDROCHLORIDE 300 MG: 300 TABLET, FILM COATED, EXTENDED RELEASE ORAL at 08:25

## 2018-09-25 RX ADMIN — SENNOSIDES 8.6 MG: 8.6 TABLET, FILM COATED ORAL at 08:25

## 2018-09-25 RX ADMIN — LEVOTHYROXINE SODIUM 75 MCG: 75 TABLET ORAL at 06:05

## 2018-09-25 NOTE — PROGRESS NOTES
Jn Hale has been awake, alert, and visible intermittently out in the milieu  Isolates to self in room at times  Compliant with scheduled meds with some prompting  Pt remains flat in affect, blunted, and offers minimal conversation with staff  Ate 25% supper  No interaction noted with peers  Attended and participating in evening groups (3/3 groups this shift)  No issues reported with male peer so far this shift  Will continue to encourage pt to utilize safe/effective coping skills and to work toward achievement of daily/recovery goals

## 2018-09-25 NOTE — PROGRESS NOTES
Pt attended [address] group  Pt needed prompting to participate  Pt demonstated self confidence and self expression  Complete pre and post patient group assessment  Pt answered self reflection questions addressing her barriers and coping skills in preparation of treatment team   Continue to provide therepeutic intervention

## 2018-09-25 NOTE — PROGRESS NOTES
Jaciel Britton was compliant with scheduled 2100 meds without prompting  Had evening snack  Resting quietly in bed at present, arouses easily  Will continue to monitor/assess for any changes

## 2018-09-25 NOTE — PROGRESS NOTES
Psychiatry Progress Note    Subjective: Interval History     Not noticing any change in behavior mental status with the recent increase in his Prolixin dose  He remains quiet buttock attentive and cooperative when asked to do something  Has been seen laughing to himself walking in the hallways  However his hallucinations did not seem to impact him in a big way day-to-day functioning  He is tolerating the recent increase in his medication no side effects of observed or reported  He did tell his mother and staff he got upset with 1 of the other male residents who come say is say derogatory things to him        Current medications:    Current Facility-Administered Medications:     bisacodyl (DULCOLAX) EC tablet 10 mg, 10 mg, Oral, Daily PRN, Provider Cutover, 10 mg at 09/13/18 2996    bisacodyl (DULCOLAX) rectal suppository 10 mg, 10 mg, Rectal, Daily PRN, LIZETH Blackwell    buPROPion (WELLBUTRIN XL) 24 hr tablet 300 mg, 300 mg, Oral, Daily, Provider Cutover, 300 mg at 09/25/18 0825    cloZAPine (CLOZARIL) tablet 450 mg, 450 mg, Oral, HS, Mehnaz Rosales MD, 450 mg at 09/24/18 2109    divalproex sodium (DEPAKOTE ER) 24 hr tablet 1,000 mg, 1,000 mg, Oral, HS, Meldon Curling, MD, 1,000 mg at 09/24/18 2108    fluPHENAZine (PROLIXIN) tablet 20 mg, 20 mg, Oral, HS, Mehnaz Rosales MD, 20 mg at 09/24/18 2108    levothyroxine tablet 75 mcg, 75 mcg, Oral, Early Morning, Provider Cutover, 75 mcg at 09/25/18 0605    midodrine (PROAMATINE) tablet 10 mg, 10 mg, Oral, TID, Wally Lynn MD, 10 mg at 09/24/18 2109    polyethylene glycol (MIRALAX) packet 17 g, 17 g, Oral, Daily, Provider Cutover, 17 g at 09/25/18 0825    senna (SENOKOT) tablet 8 6 mg, 1 tablet, Oral, BID, LIZETH Meyer, 8 6 mg at 09/25/18 0825      Objective:     Vital Signs:  Vitals:    09/24/18 1930 09/25/18 0526 09/25/18 0732 09/25/18 0735   BP: 109/79 125/62 124/82 124/67   BP Location: Left arm Left arm Right arm Right arm   Pulse: (!) 127 (!) 111 (!) 110 (!) 117   Resp:   21    Temp:   (!) 97 2 °F (36 2 °C)    TempSrc:   Temporal    SpO2:       Weight:       Height:             Appearance:  age appropriate and casually dressed   Behavior:  normal   Speech:  normal volume   Mood:  depressed   Affect:  constricted   Thought Process:  normal   Thought Content:  normal   Perceptual Disturbances: Auditory hallucinations without commands   Risk Potential: none   Sensorium:  person, place, situation and time   Cognition:  intact   Consciousness:  alert and awake    Attention: attention span and concentration were not age appropriate   Intellect: average   Insight:  fair   Judgment: fair      Motor Activity: no abnormal movements           Recent Labs:  Results Reviewed     None          I/O Past 24 hours:  No intake/output data recorded  No intake/output data recorded  Assessment / Plan:     [facility]    Recommended Treatment:      Medication changes:  1) no changes    Non-pharmacological treatments  1) Continue with group therapy, milieu therapy and occupational therapy  Safety  1) Safety/communication plan established targeting dynamic risk factors above  2) Risks, benefits, and possible side effects of medications explained to patient and patient verbalizes understanding  Counseling / Coordination of Care    Total floor / unit time spent today 20 minutes  Greater than 50% of total time was spent with the patient and / or family counseling and / or coordination of care  A description of the counseling / coordination of care  Patient's Rights, confidentiality and exceptions to confidentiality, use of automated medical record, [facility] staff access to medical record, and consent to treatment reviewed      Joy Wright MD

## 2018-09-25 NOTE — PROGRESS NOTES
Pt received @ 0700 Remains blunted, poor eye contact  Prompted for meds  Ate 100% & 50% of meals today  States everything is OK, denies depression & anxiety  Attended 3/3 groups  Isolative to self/room all day  No peer interaction  Only comes out for structured activities   No behavioral issues, will continue to monitor

## 2018-09-26 RX ADMIN — SENNOSIDES 8.6 MG: 8.6 TABLET, FILM COATED ORAL at 17:05

## 2018-09-26 RX ADMIN — CLOZAPINE 450 MG: 25 TABLET ORAL at 20:42

## 2018-09-26 RX ADMIN — SENNOSIDES 8.6 MG: 8.6 TABLET, FILM COATED ORAL at 08:38

## 2018-09-26 RX ADMIN — BUPROPION HYDROCHLORIDE 300 MG: 300 TABLET, FILM COATED, EXTENDED RELEASE ORAL at 08:38

## 2018-09-26 RX ADMIN — MIDODRINE HYDROCHLORIDE 10 MG: 2.5 TABLET ORAL at 16:51

## 2018-09-26 RX ADMIN — MIDODRINE HYDROCHLORIDE 10 MG: 2.5 TABLET ORAL at 06:20

## 2018-09-26 RX ADMIN — FLUPHENAZINE HYDROCHLORIDE 20 MG: 10 TABLET, FILM COATED ORAL at 20:42

## 2018-09-26 RX ADMIN — LEVOTHYROXINE SODIUM 75 MCG: 75 TABLET ORAL at 06:20

## 2018-09-26 RX ADMIN — POLYETHYLENE GLYCOL 3350 17 G: 17 POWDER, FOR SOLUTION ORAL at 08:38

## 2018-09-26 RX ADMIN — MIDODRINE HYDROCHLORIDE 10 MG: 2.5 TABLET ORAL at 20:42

## 2018-09-26 RX ADMIN — DIVALPROEX SODIUM 1000 MG: 500 TABLET, FILM COATED, EXTENDED RELEASE ORAL at 20:42

## 2018-09-26 NOTE — PROGRESS NOTES
Isa Gaming attended all of the group activities today    He was compliant with medications and meals and denied pain, fluids encouraged, gait steady, remains on Fall/SAFE precautions

## 2018-09-26 NOTE — PROGRESS NOTES
Amol Love has been awake, alert, and visible minimally out in the milieu  Compliant with scheduled 1600/1800 meds without prompting  Ate 100% supper  Affect remains flat, blunted, and offers little conversation with staff and difficult to engage  Pt noted smiling, laughing when standing in line for meds as was responding to internal stimuli  Pt denies any depression, anxiety, or a/v hallucinations  Mother in to visit with fair interaction and brought food in for pt which pt ate  Will continue to encourage utilization of safe/effective coping skills and to work toward achievement of daily/recovery goals

## 2018-09-26 NOTE — PROGRESS NOTES
Psychiatry Progress Note    Subjective: Interval History     Will attend activities sometimes needs prompting  Remains isolative in his room when he is not engaged in an activity and has no interaction with peers  He will talk to staff on a limited basis affect remains totally flat without emotion        Current medications:    Current Facility-Administered Medications:     bisacodyl (DULCOLAX) EC tablet 10 mg, 10 mg, Oral, Daily PRN, Provider Cutover, 10 mg at 09/13/18 1968    bisacodyl (DULCOLAX) rectal suppository 10 mg, 10 mg, Rectal, Daily PRN, LIZETH Correa    buPROPion (WELLBUTRIN XL) 24 hr tablet 300 mg, 300 mg, Oral, Daily, Provider Cutover, 300 mg at 09/26/18 0838    cloZAPine (CLOZARIL) tablet 450 mg, 450 mg, Oral, HS, Matt Wright MD, 450 mg at 09/25/18 2045    divalproex sodium (DEPAKOTE ER) 24 hr tablet 1,000 mg, 1,000 mg, Oral, HS, Trinity Goldsmith MD, 1,000 mg at 09/25/18 2046    fluPHENAZine (PROLIXIN) tablet 20 mg, 20 mg, Oral, HS, Matt Wright MD, 20 mg at 09/25/18 2045    levothyroxine tablet 75 mcg, 75 mcg, Oral, Early Morning, Provider Cutover, 75 mcg at 09/26/18 0620    midodrine (PROAMATINE) tablet 10 mg, 10 mg, Oral, TID, Wally Lynn MD, 10 mg at 09/26/18 0620    polyethylene glycol (MIRALAX) packet 17 g, 17 g, Oral, Daily, Provider Cutover, 17 g at 09/26/18 0838    senna (SENOKOT) tablet 8 6 mg, 1 tablet, Oral, BID, LIZETH Castle, 8 6 mg at 09/26/18 0838      Objective:     Vital Signs:  Vitals:    09/25/18 1506 09/25/18 1935 09/26/18 0730 09/26/18 0735   BP: 120/78 119/85 112/71 109/82   BP Location: Left arm Left arm Right arm Right arm   Pulse: (!) 110 (!) 121 104 (!) 113   Resp:   18    Temp:   98 1 °F (36 7 °C)    TempSrc:   Temporal    SpO2:       Weight:       Height:             Appearance:  age appropriate and casually dressed   Behavior:  normal   Speech:  normal volume   Mood:  depressed   Affect:  constricted   Thought Process:  normal   Thought Content: normal   Perceptual Disturbances: Auditory hallucinations without commands   Risk Potential: none   Sensorium:  person, place, situation and time   Cognition:  intact   Consciousness:  alert and awake    Attention: attention span and concentration were not age appropriate   Intellect: average   Insight:  fair   Judgment: fair      Motor Activity: no abnormal movements           Recent Labs:  Results Reviewed     None          I/O Past 24 hours:  No intake/output data recorded  No intake/output data recorded  Assessment / Plan:     [facility] ([facility]    Recommended Treatment:      Medication changes:  1) none    Non-pharmacological treatments  1) Continue with group therapy, milieu therapy and occupational therapy  Safety  1) Safety/communication plan established targeting dynamic risk factors above  2) Risks, benefits, and possible side effects of medications explained to patient and patient verbalizes understanding  Counseling / Coordination of Care    Total floor / unit time spent today 20 minutes  Greater than 50% of total time was spent with the patient and / or family counseling and / or coordination of care  A description of the counseling / coordination of care  Patient's Rights, confidentiality and exceptions to confidentiality, use of automated medical record, [facility] staff access to medical record, and consent to treatment reviewed      Rigo Perry MD

## 2018-09-26 NOTE — PROGRESS NOTES
Abdias Gamez was isolative to his room and self most of the shift  He came out for structured activities with prompting  Poor eye contact  No interaction with peers and little with staff  Blunted, flat affect  Denies anxiety and depression  He was smiling to himself in the medication line, but denies hearing voices  Mouth and beard checks with pills  Ate only 50% of his supper  Attended and participated in evening groups  BM, but no shower  SAFE and fall precautions maintained without incident

## 2018-09-27 RX ADMIN — BUPROPION HYDROCHLORIDE 300 MG: 300 TABLET, FILM COATED, EXTENDED RELEASE ORAL at 08:34

## 2018-09-27 RX ADMIN — SENNOSIDES 8.6 MG: 8.6 TABLET, FILM COATED ORAL at 08:34

## 2018-09-27 RX ADMIN — DIVALPROEX SODIUM 1000 MG: 500 TABLET, FILM COATED, EXTENDED RELEASE ORAL at 20:58

## 2018-09-27 RX ADMIN — FLUPHENAZINE HYDROCHLORIDE 20 MG: 10 TABLET, FILM COATED ORAL at 20:58

## 2018-09-27 RX ADMIN — POLYETHYLENE GLYCOL 3350 17 G: 17 POWDER, FOR SOLUTION ORAL at 08:34

## 2018-09-27 RX ADMIN — MIDODRINE HYDROCHLORIDE 10 MG: 2.5 TABLET ORAL at 06:10

## 2018-09-27 RX ADMIN — SENNOSIDES 8.6 MG: 8.6 TABLET, FILM COATED ORAL at 17:07

## 2018-09-27 RX ADMIN — LEVOTHYROXINE SODIUM 75 MCG: 75 TABLET ORAL at 06:10

## 2018-09-27 RX ADMIN — CLOZAPINE 450 MG: 25 TABLET ORAL at 20:58

## 2018-09-27 NOTE — ESCALATED TEAM TX
Patient attended treatment team meeting this morning prepared with self-assessment  Patient's group attendance for last week was 75%  Team encouraged patient to be more creative with recovery barriers/coping skills  Patient reports he is depressed because he does not know if his new roommates will like him  Patient is still waiting for a bed at [facility]  Patient did not request a pass this week  Patient verbalized no further questions or concerns at the conclusion of the meeting  Next team meeting scheduled for 10/4

## 2018-09-27 NOTE — PROGRESS NOTES
Psychiatry Progress Note    Subjective: Interval History     Team mtg today: pt has been in groups, sometimes needs prodding by staff; MSE unchanged, hallucinates, feels depressed at times and still has trouble communicating his feelings      Current medications:    Current Facility-Administered Medications:     bisacodyl (DULCOLAX) EC tablet 10 mg, 10 mg, Oral, Daily PRN, Provider Cutover, 10 mg at 09/13/18 9107    bisacodyl (DULCOLAX) rectal suppository 10 mg, 10 mg, Rectal, Daily PRN, LIZETH Dunham    buPROPion (WELLBUTRIN XL) 24 hr tablet 300 mg, 300 mg, Oral, Daily, Provider Cutover, 300 mg at 09/27/18 0834    cloZAPine (CLOZARIL) tablet 450 mg, 450 mg, Oral, HS, Simi Barbour MD, 450 mg at 09/26/18 2042    divalproex sodium (DEPAKOTE ER) 24 hr tablet 1,000 mg, 1,000 mg, Oral, HS, Adriana Casillas MD, 1,000 mg at 09/26/18 2042    fluPHENAZine (PROLIXIN) tablet 20 mg, 20 mg, Oral, HS, Simi Barbour MD, 20 mg at 09/26/18 2042    levothyroxine tablet 75 mcg, 75 mcg, Oral, Early Morning, Provider Cutover, 75 mcg at 09/27/18 0610    midodrine (PROAMATINE) tablet 10 mg, 10 mg, Oral, TID, Wally Lynn MD, 10 mg at 09/27/18 0610    polyethylene glycol (MIRALAX) packet 17 g, 17 g, Oral, Daily, Provider Cutover, 17 g at 09/27/18 0834    senna (SENOKOT) tablet 8 6 mg, 1 tablet, Oral, BID, LIZETH Kumar, 8 6 mg at 09/27/18 0834      Objective:     Vital Signs:  Vitals:    09/26/18 1534 09/26/18 1948 09/27/18 0605 09/27/18 0700   BP: 115/84 113/81 97/65 125/92   BP Location: Left arm Left arm Right arm Right arm   Pulse: (!) 127 (!) 135 (!) 120 94   Resp:    18   Temp:    (!) 97 1 °F (36 2 °C)   TempSrc:       SpO2:       Weight:       Height:             Appearance:  age appropriate and casually dressed   Behavior:  normal   Speech:  normal volume   Mood:  depressed   Affect:  constricted   Thought Process:  normal   Thought Content:  normal   Perceptual Disturbances:  Auditory hallucinations without commands   Risk Potential: none   Sensorium:  person, place, situation and time   Cognition:  intact   Consciousness:  alert and awake    Attention: attention span and concentration were age appropriate   Intellect: average   Insight:  good   Judgment: good      Motor Activity: no abnormal movements           Recent Labs:  Results Reviewed     None          I/O Past 24 hours:  I/O last 3 completed shifts:  In: -   Out: 2 [Stool:2]  No intake/output data recorded  Assessment / Plan:     Ch[facility] ([facility]    Recommended Treatment:      Medication changes:  1) no change    Non-pharmacological treatments  1) Continue with group therapy, milieu therapy and occupational therapy  Safety  1) Safety/communication plan established targeting dynamic risk factors above  2) Risks, benefits, and possible side effects of medications explained to patient and patient verbalizes understanding  Counseling / Coordination of Care    Total floor / unit time spent today 20 minutes  Greater than 50% of total time was spent with the patient and / or family counseling and / or coordination of care  A description of the counseling / coordination of care  Patient's Rights, confidentiality and exceptions to confidentiality, use of automated medical record, [address] [place] [address] staff access to medical record, and consent to treatment reviewed      Sadaf Jean-Baptiste MD

## 2018-09-27 NOTE — PROGRESS NOTES
Shae Rahman attended all of the group activities today    He was compliant with medications and meals and denied pain, fluids encouraged, gait steady, remains on Fall/SAFE precautions

## 2018-09-27 NOTE — PROGRESS NOTES
Aramis Abdul attended and participated in 2/2 evening groups  Compliant with scheduled 2100 meds with little prompting  Pt declined evening snack  Resting quietly in bed at present, arouses easily  Will continue to monitor/assess for any changes

## 2018-09-28 RX ADMIN — MIDODRINE HYDROCHLORIDE 10 MG: 2.5 TABLET ORAL at 20:40

## 2018-09-28 RX ADMIN — FLUPHENAZINE HYDROCHLORIDE 20 MG: 10 TABLET, FILM COATED ORAL at 20:40

## 2018-09-28 RX ADMIN — POLYETHYLENE GLYCOL 3350 17 G: 17 POWDER, FOR SOLUTION ORAL at 09:37

## 2018-09-28 RX ADMIN — SENNOSIDES 8.6 MG: 8.6 TABLET, FILM COATED ORAL at 09:37

## 2018-09-28 RX ADMIN — BUPROPION HYDROCHLORIDE 300 MG: 300 TABLET, FILM COATED, EXTENDED RELEASE ORAL at 09:37

## 2018-09-28 RX ADMIN — CLOZAPINE 450 MG: 25 TABLET ORAL at 20:40

## 2018-09-28 RX ADMIN — MIDODRINE HYDROCHLORIDE 10 MG: 2.5 TABLET ORAL at 16:38

## 2018-09-28 RX ADMIN — MIDODRINE HYDROCHLORIDE 10 MG: 2.5 TABLET ORAL at 06:48

## 2018-09-28 RX ADMIN — LEVOTHYROXINE SODIUM 75 MCG: 75 TABLET ORAL at 06:48

## 2018-09-28 RX ADMIN — DIVALPROEX SODIUM 1000 MG: 500 TABLET, FILM COATED, EXTENDED RELEASE ORAL at 20:40

## 2018-09-28 RX ADMIN — SENNOSIDES 8.6 MG: 8.6 TABLET, FILM COATED ORAL at 17:09

## 2018-09-28 NOTE — PROGRESS NOTES
Jessica Strickland attended one of the three group activities today  Bronson Osei was compliant with medications and meals and denied pain, fluids encouraged, gait steady, remains on Fall/SAFE precautions, isolative, spent most of the day alone in his room, polite and cooperative on approach, limited interaction with staff and peers will continue to monitor

## 2018-09-28 NOTE — CASE MANAGEMENT
Patient did well in treatment team yesterday  Patient's thoughts were clear and he was able to articulate much better  He was also more verbal and social with the team  Patient is still on the waiting list for [facility] and [facility], however, treatment team also requested he be placed on wait list for their [address] [place] [address] Enhanced CRR program as well  CM will continue to assist with discharge planning needs

## 2018-09-28 NOTE — PROGRESS NOTES
Evans Zheng has been awake, alert, and visible intermittently out in the milieu  Pt used his [employer] during electronics hour  Ate 100% supper  Affect remains flat, blunted, and offers minimal conversation with staff  Pt denies any depression, anxiety, or a/v hallucinations  Preoccupied at times  No overt responding noted  No interaction noted with peers  Attended and participated in 2/2 evening groups  Compliant with  scheduled meds as ordered  Had evening snack  Resting quietly in bed at present, arouses easily  Will continue to monitor/assess for any changes

## 2018-09-28 NOTE — PROGRESS NOTES
Psychiatry Progress Note    Subjective: Interval History     Off found lying in his hospital room  Patient continues to have disheveled appearance  Patient malodorous this morning  Patient remained superficial during assessment  Patient continues to be evasive when discussion of his symptoms is attempted  Patient reports that he will not be going on pass this weekend as his mother is working  Patient offers no complaints or concerns  Patient has been medication and meal compliant  No behavioral outburst   Patient continues to be encouraged to attend and participate in groups as well as during individual assessment    Current medications:    Current Facility-Administered Medications:     bisacodyl (DULCOLAX) EC tablet 10 mg, 10 mg, Oral, Daily PRN, Provider Cutover, 10 mg at 09/13/18 2757    bisacodyl (DULCOLAX) rectal suppository 10 mg, 10 mg, Rectal, Daily PRN, Jason Leyden Ciminieri, CRNP    buPROPion (WELLBUTRIN XL) 24 hr tablet 300 mg, 300 mg, Oral, Daily, Provider Cutover, 300 mg at 09/28/18 0937    cloZAPine (CLOZARIL) tablet 450 mg, 450 mg, Oral, HS, Seymour Serna MD, 450 mg at 09/27/18 2058    divalproex sodium (DEPAKOTE ER) 24 hr tablet 1,000 mg, 1,000 mg, Oral, HS, Brianna Woodard MD, 1,000 mg at 09/27/18 2058    fluPHENAZine (PROLIXIN) tablet 20 mg, 20 mg, Oral, HS, Seymour Serna MD, 20 mg at 09/27/18 2058    levothyroxine tablet 75 mcg, 75 mcg, Oral, Early Morning, Provider Cutover, 75 mcg at 09/28/18 0648    midodrine (PROAMATINE) tablet 10 mg, 10 mg, Oral, TID, Wally Lynn MD, 10 mg at 09/28/18 0648    polyethylene glycol (MIRALAX) packet 17 g, 17 g, Oral, Daily, Provider Cutover, 17 g at 09/28/18 3183    senna (SENOKOT) tablet 8 6 mg, 1 tablet, Oral, BID, LIZETH Dyer, 8 6 mg at 09/28/18 0937      Objective:     Vital Signs:  Vitals:    09/27/18 1553 09/27/18 1916 09/28/18 0525 09/28/18 0730   BP: 125/79 127/79 113/64 121/83   BP Location: Left arm Left arm Left arm Right arm   Pulse: (!) 115 (!) 123 (!) 118 97   Resp:    18   Temp:    (!) 97 1 °F (36 2 °C)   TempSrc:       SpO2:       Weight:       Height:             Appearance:  age appropriate, casually dressed and disheveled   Behavior:  normal   Speech:  soft   Mood:  depressed   Affect:  flat   Thought Process:  normal   Thought Content:  normal   Perceptual Disturbances: None   Risk Potential: none   Sensorium:  person, place and time   Cognition:  intact   Consciousness:  alert and awake    Attention: attention span and concentration were age diminished   Intellect: average   Insight:  limited   Judgment: limited      Motor Activity: no abnormal movements           Recent Labs:  Results Reviewed     None          I/O Past 24 hours:  No intake/output data recorded  No intake/output data recorded  Assessment / Plan:     [facility]    Recommended Treatment:      Medication changes:  1) continue current medication regimen  Non-pharmacological treatments  1) Continue with group therapy, milieu therapy and occupational therapy  Safety  1) Safety/communication plan established targeting dynamic risk factors above  2) Risks, benefits, and possible side effects of medications explained to patient and patient verbalizes understanding  Counseling / Coordination of Care    Total floor / unit time spent today 20 minutes  Greater than 50% of total time was spent with the patient and / or family counseling and / or coordination of care  A description of the counseling / coordination of care  Patient's Rights, confidentiality and exceptions to confidentiality, use of automated medical record, [facility] staff access to medical record, and consent to treatment reviewed      Estefany Sanchez PA-C

## 2018-09-29 RX ADMIN — DIVALPROEX SODIUM 1000 MG: 500 TABLET, FILM COATED, EXTENDED RELEASE ORAL at 20:42

## 2018-09-29 RX ADMIN — BUPROPION HYDROCHLORIDE 300 MG: 300 TABLET, FILM COATED, EXTENDED RELEASE ORAL at 09:01

## 2018-09-29 RX ADMIN — CLOZAPINE 450 MG: 25 TABLET ORAL at 20:42

## 2018-09-29 RX ADMIN — SENNOSIDES 8.6 MG: 8.6 TABLET, FILM COATED ORAL at 17:10

## 2018-09-29 RX ADMIN — MIDODRINE HYDROCHLORIDE 10 MG: 2.5 TABLET ORAL at 06:10

## 2018-09-29 RX ADMIN — SENNOSIDES 8.6 MG: 8.6 TABLET, FILM COATED ORAL at 09:01

## 2018-09-29 RX ADMIN — FLUPHENAZINE HYDROCHLORIDE 20 MG: 10 TABLET, FILM COATED ORAL at 20:42

## 2018-09-29 RX ADMIN — LEVOTHYROXINE SODIUM 75 MCG: 75 TABLET ORAL at 06:10

## 2018-09-29 RX ADMIN — MIDODRINE HYDROCHLORIDE 10 MG: 2.5 TABLET ORAL at 20:43

## 2018-09-29 RX ADMIN — POLYETHYLENE GLYCOL 3350 17 G: 17 POWDER, FOR SOLUTION ORAL at 09:01

## 2018-09-29 NOTE — PROGRESS NOTES
Willie Suzieannwilla has been awake, alert, and visible intermittently out in the milieu  Behavior has been quiet and isolative to self  Affect flat, blunted, and offers minimal conversation with staff  No interaction  noted with peers  Attended and participated in 3/3 evening groups  No behavioral issues  No overt responding to internal stimuli but preoccupied at times  Pt denies and depression, anxiety, or a/v hallucinations  Compliant with all scheduled meds with little prompting  Had evening snack  Resting quietly in bed at present, arouses easily  Will continue to monitor/assess for any changes

## 2018-09-29 NOTE — PROGRESS NOTES
Abdias Gamez remains blunted and with a flat affect  He is mostly isolative to his room today  Only coming out for meds, meals and goals group  Abdias Gamez attended 1/2 groups today  No issues or concerns noted  Continue to monitor

## 2018-09-29 NOTE — PROGRESS NOTES
Patient is isolative to room, med and meal compliant  Patient is pleasant upon approach  Patient used game boy during electronics and enjoyed playing games  No complaints of Sis, His, audio or visual hallucinations, anxiety or depression  Will continue to monitor for changes in current status

## 2018-09-29 NOTE — PROGRESS NOTES
Psychiatry Progress Note    Subjective: Interval History     Patient is lying in his bed this morning  He has poor hygiene  He has very soft speech and is guarded on assessment  Patient denies all psychiatric symptoms including depression, anxiety, hallucinations, suicidal ideations  He has poor insight  Patient keeps to himself and needs encouragement to come out for groups  Patient is medication and meal compliant        Current medications:    Current Facility-Administered Medications:     bisacodyl (DULCOLAX) EC tablet 10 mg, 10 mg, Oral, Daily PRN, Provider Cutover, 10 mg at 09/13/18 7852    bisacodyl (DULCOLAX) rectal suppository 10 mg, 10 mg, Rectal, Daily PRN, LIZETH Weinberg    buPROPion (WELLBUTRIN XL) 24 hr tablet 300 mg, 300 mg, Oral, Daily, Provider Cutover, 300 mg at 09/29/18 0901    cloZAPine (CLOZARIL) tablet 450 mg, 450 mg, Oral, HS, Adelso Siegel MD, 450 mg at 09/28/18 2040    divalproex sodium (DEPAKOTE ER) 24 hr tablet 1,000 mg, 1,000 mg, Oral, HS, Krystina Sanders MD, 1,000 mg at 09/28/18 2040    fluPHENAZine (PROLIXIN) tablet 20 mg, 20 mg, Oral, HS, Adelso Siegel MD, 20 mg at 09/28/18 2040    levothyroxine tablet 75 mcg, 75 mcg, Oral, Early Morning, Provider Cutover, 75 mcg at 09/29/18 0610    midodrine (PROAMATINE) tablet 10 mg, 10 mg, Oral, TID, Wally Lynn MD, 10 mg at 09/29/18 0610    polyethylene glycol (MIRALAX) packet 17 g, 17 g, Oral, Daily, Provider Cutover, 17 g at 09/29/18 0901    senna (SENOKOT) tablet 8 6 mg, 1 tablet, Oral, BID, LIZETH Catalan, 8 6 mg at 09/29/18 0901      Objective:     Vital Signs:  Vitals:    09/28/18 2000 09/29/18 0633 09/29/18 0730 09/29/18 0731   BP: 110/81 (!) 85/55 124/89 131/79   BP Location: Left arm Left arm Left arm Left arm   Pulse: (!) 122  95 (!) 113   Resp:       Temp:   (!) 96 8 °F (36 °C)    TempSrc:   Temporal    SpO2:       Weight:       Height:             Appearance:  age appropriate and disheveled   Behavior:  guarded Speech:  soft   Mood:  depressed   Affect:  flat   Thought Process:  normal   Thought Content:  normal   Perceptual Disturbances: None   Risk Potential: none   Sensorium:  person, place and time   Cognition:  intact   Consciousness:  alert and awake    Attention: attention span and concentration were age appropriate   Intellect: average   Insight:  limited   Judgment: limited      Motor Activity: no abnormal movements           Recent Labs:  Results Reviewed     None          I/O Past 24 hours:  No intake/output data recorded  No intake/output data recorded  Assessment / Plan:     [facility]    Recommended Treatment:      Medication changes:  1)  continue current medication regimen  Non-pharmacological treatments  1) Continue with group therapy, milieu therapy and occupational therapy  Safety  1) Safety/communication plan established targeting dynamic risk factors above  2) Risks, benefits, and possible side effects of medications explained to patient and patient verbalizes understanding  Counseling / Coordination of Care    Total floor / unit time spent today 20 minutes  Greater than 50% of total time was spent with the patient and / or family counseling and / or coordination of care  A description of the counseling / coordination of care  Patient's Rights, confidentiality and exceptions to confidentiality, use of automated medical record, [facility], and consent to treatment reviewed      Beba Ryan PA-C

## 2018-09-30 RX ADMIN — DIVALPROEX SODIUM 1000 MG: 500 TABLET, FILM COATED, EXTENDED RELEASE ORAL at 20:46

## 2018-09-30 RX ADMIN — MIDODRINE HYDROCHLORIDE 10 MG: 2.5 TABLET ORAL at 16:51

## 2018-09-30 RX ADMIN — SENNOSIDES 8.6 MG: 8.6 TABLET, FILM COATED ORAL at 17:03

## 2018-09-30 RX ADMIN — FLUPHENAZINE HYDROCHLORIDE 20 MG: 10 TABLET, FILM COATED ORAL at 20:46

## 2018-09-30 RX ADMIN — BUPROPION HYDROCHLORIDE 300 MG: 300 TABLET, FILM COATED, EXTENDED RELEASE ORAL at 08:23

## 2018-09-30 RX ADMIN — SENNOSIDES 8.6 MG: 8.6 TABLET, FILM COATED ORAL at 08:23

## 2018-09-30 RX ADMIN — MIDODRINE HYDROCHLORIDE 10 MG: 2.5 TABLET ORAL at 06:17

## 2018-09-30 RX ADMIN — CLOZAPINE 450 MG: 25 TABLET ORAL at 20:45

## 2018-09-30 RX ADMIN — MIDODRINE HYDROCHLORIDE 10 MG: 2.5 TABLET ORAL at 20:46

## 2018-09-30 RX ADMIN — POLYETHYLENE GLYCOL 3350 17 G: 17 POWDER, FOR SOLUTION ORAL at 08:23

## 2018-09-30 RX ADMIN — LEVOTHYROXINE SODIUM 75 MCG: 75 TABLET ORAL at 06:17

## 2018-09-30 NOTE — PROGRESS NOTES
Psychiatry Progress Note    Subjective: Interval History     Patient is isolative to his room and lying in bed this morning  He offers no concerns  He has soft speech and poor eye contact  He is withdrawn and does not engage in conversation with others  He is medication and meal compliant  He denies any hallucinations or suicidal ideations  He denies feeling depressed or anxious        Current medications:    Current Facility-Administered Medications:     bisacodyl (DULCOLAX) EC tablet 10 mg, 10 mg, Oral, Daily PRN, Provider Cutover, 10 mg at 09/13/18 5117    bisacodyl (DULCOLAX) rectal suppository 10 mg, 10 mg, Rectal, Daily PRN, Magdaline Muck LIZETH Greenfield    buPROPion (WELLBUTRIN XL) 24 hr tablet 300 mg, 300 mg, Oral, Daily, Provider Cutover, 300 mg at 09/30/18 9312    cloZAPine (CLOZARIL) tablet 450 mg, 450 mg, Oral, HS, Page Zazueta MD, 450 mg at 09/29/18 2042    divalproex sodium (DEPAKOTE ER) 24 hr tablet 1,000 mg, 1,000 mg, Oral, HS, Dell Kaufman MD, 1,000 mg at 09/29/18 2042    fluPHENAZine (PROLIXIN) tablet 20 mg, 20 mg, Oral, HS, Page Zazueta MD, 20 mg at 09/29/18 2042    levothyroxine tablet 75 mcg, 75 mcg, Oral, Early Morning, Provider Cutover, 75 mcg at 09/30/18 0617    midodrine (PROAMATINE) tablet 10 mg, 10 mg, Oral, TID, Wally Lynn MD, 10 mg at 09/30/18 0617    polyethylene glycol (MIRALAX) packet 17 g, 17 g, Oral, Daily, Provider Cutover, 17 g at 09/30/18 0823    senna (SENOKOT) tablet 8 6 mg, 1 tablet, Oral, BID, LIZETH Meyer, 8 6 mg at 09/30/18 0823      Objective:     Vital Signs:  Vitals:    09/29/18 1539 09/30/18 0624 09/30/18 0730 09/30/18 0735   BP: 120/74 105/55 114/83 105/59   BP Location: Left arm Right arm Left arm Left arm   Pulse: (!) 119 (!) 120 (!) 106 (!) 120   Resp:   19 19   Temp:   (!) 96 8 °F (36 °C)    TempSrc:   Temporal    SpO2:       Weight:   89 8 kg (198 lb)    Height:             Appearance:  age appropriate and disheveled   Behavior:  guarded Speech:  soft   Mood:  depressed   Affect:  flat   Thought Process:  normal   Thought Content:  normal   Perceptual Disturbances: None   Risk Potential: none   Sensorium:  person, place and time   Cognition:  intact   Consciousness:  alert and awake    Attention: attention span and concentration were age appropriate   Intellect: average   Insight:  limited   Judgment: limited      Motor Activity: no abnormal movements           Recent Labs:  Results Reviewed     None          I/O Past 24 hours:  No intake/output data recorded  No intake/output data recorded  Assessment / Plan:     Chronic paranoid [facility]    Recommended Treatment:      Medication changes:  1)  continue current medication regimen  Non-pharmacological treatments  1) Continue with group therapy, milieu therapy and occupational therapy  Safety  1) Safety/communication plan established targeting dynamic risk factors above  2) Risks, benefits, and possible side effects of medications explained to patient and patient verbalizes understanding  Counseling / Coordination of Care    Total floor / unit time spent today 20 minutes  Greater than 50% of total time was spent with the patient and / or family counseling and / or coordination of care  A description of the counseling / coordination of care  Patient's Rights, confidentiality and exceptions to confidentiality, use of automated medical record, [facility] staff access to medical record, and consent to treatment reviewed      Renetta Burleson PA-C

## 2018-09-30 NOTE — NURSING NOTE
Received pt in bed at change of shift with eyes closed; chest movement noted  Continues the same thus this far as per q 15 min room checks  Will continue to monitor  Maintained on  Safe for hx of fall

## 2018-09-30 NOTE — PROGRESS NOTES
Pt received @ 0700  Blunted & isolative to self/room  Prompted for meds  Ate 100% of meals  Attend 2/3 groups  No interaction w/peers, no behavioral issues   Will continue to monitor

## 2018-10-01 RX ADMIN — CLOZAPINE 450 MG: 25 TABLET ORAL at 20:56

## 2018-10-01 RX ADMIN — DIVALPROEX SODIUM 1000 MG: 500 TABLET, FILM COATED, EXTENDED RELEASE ORAL at 20:56

## 2018-10-01 RX ADMIN — POLYETHYLENE GLYCOL 3350 17 G: 17 POWDER, FOR SOLUTION ORAL at 08:11

## 2018-10-01 RX ADMIN — LEVOTHYROXINE SODIUM 75 MCG: 75 TABLET ORAL at 06:27

## 2018-10-01 RX ADMIN — FLUPHENAZINE HYDROCHLORIDE 20 MG: 10 TABLET, FILM COATED ORAL at 20:56

## 2018-10-01 RX ADMIN — MIDODRINE HYDROCHLORIDE 10 MG: 2.5 TABLET ORAL at 06:27

## 2018-10-01 RX ADMIN — SENNOSIDES 8.6 MG: 8.6 TABLET, FILM COATED ORAL at 08:11

## 2018-10-01 RX ADMIN — SENNOSIDES 8.6 MG: 8.6 TABLET, FILM COATED ORAL at 17:05

## 2018-10-01 RX ADMIN — BUPROPION HYDROCHLORIDE 300 MG: 300 TABLET, FILM COATED, EXTENDED RELEASE ORAL at 08:10

## 2018-10-01 NOTE — NURSING NOTE
Pt remains isolative to room  Sleeps intermittently throughout day  Comes out for meals, eats well  Takes medications without prompting  Attends group activities  No peer interaction

## 2018-10-01 NOTE — PROGRESS NOTES
Progress Note - Behavioral Health   Orly Cross 26 y o  male MRN: [id number]  Unit/Bed#: [facility] 110-01 Encounter: [id number]    Assessment/Plan   Principal Problem:    Chronic paranoid schizophrenia ([address] )      Behavior over the last 24 hours:  unchanged  Sleep: normal  Appetite: normal  Medication side effects: No  ROS: no complaints    Mental Status Evaluation:  Appearance:  disheveled   Behavior:  evasive   Speech:  delayed   Mood:  constricted   Affect:  constricted   Thought Process:  blocked   Thought Content:  normal   Perceptual Disturbances: Auditory hallucinations without commands   Risk Potential: Potential for Aggression No and No risk potential   Sensorium:  person, place, time/date and situation   Cognition:  grossly intact   Consciousness:  alert    Attention: attention span appeared shorter than expected for age   Insight:  fair   Judgment: fair   Gait/Station: normal gait/station   Motor Activity: no abnormal movements     Progress Toward Goals:   Patient is about the same clinically  He is withdrawn guarded continues to hallucinate, but his cooperative with meals meds and groups    Recommended Treatment: Continue with group therapy, milieu therapy and occupational therapy  Risks, benefits and possible side effects of Medications:   Risks, benefits, and possible side effects of medications explained to patient and patient verbalizes understanding  Medications: all current active meds have been reviewed    Labs:   No new labs    Counseling / Coordination of Care  Total floor / unit time spent today 10 minutes  Greater than 50% of total time was spent with the patient and / or family counseling and / or coordination of care

## 2018-10-01 NOTE — PROGRESS NOTES
Pt attended [facility] group  Pt needed prompting to engage and self reflect on topic  Pt able to identify coping skills used to avoid difficulty and addressing self care (as indicated in preparation of the self assessment for treatment team) Group focused on schizophrenia/schizoaffective disorder: recognizing symptoms (concentration, expressing thoughts, overstimulated senses, having delusions, hallucinations, negative symptoms, paranoid/suspisious)  Continue to provide therapeutic group support

## 2018-10-01 NOTE — PROGRESS NOTES
I started caring for Erick Baumann at 1900 tonight  He was isolative to room and self  No interaction with peers  Pleasant and cooperative upon approach by staff  Denies anxiety, depression, voices and dizziness  No shower or BM this shift  Attended and participated in evening groups  Took HS medication without prompting and ate snack before going to bed  Fall and SAFE precautions maintained without incident

## 2018-10-02 LAB
BASOPHILS # BLD AUTO: 0 THOUSANDS/ΜL (ref 0–0.1)
BASOPHILS NFR BLD AUTO: 1 % (ref 0–1)
EOSINOPHIL # BLD AUTO: 0.4 THOUSAND/ΜL (ref 0–0.4)
EOSINOPHIL NFR BLD AUTO: 5 % (ref 0–6)
ERYTHROCYTE [DISTWIDTH] IN BLOOD BY AUTOMATED COUNT: 13 %
HCT VFR BLD AUTO: 48.1 % (ref 41–53)
HGB BLD-MCNC: 15.7 G/DL (ref 13.5–17.5)
LYMPHOCYTES # BLD AUTO: 2.3 THOUSANDS/ΜL (ref 0.5–4)
LYMPHOCYTES NFR BLD AUTO: 29 % (ref 20–50)
MCH RBC QN AUTO: 28 PG (ref 26–34)
MCHC RBC AUTO-ENTMCNC: 32.7 G/DL (ref 31–36)
MCV RBC AUTO: 86 FL (ref 80–100)
MONOCYTES # BLD AUTO: 0.6 THOUSAND/ΜL (ref 0.2–0.9)
MONOCYTES NFR BLD AUTO: 8 % (ref 1–10)
NEUTROPHILS # BLD AUTO: 4.7 THOUSANDS/ΜL (ref 1.8–7.8)
NEUTS SEG NFR BLD AUTO: 59 % (ref 45–65)
PLATELET # BLD AUTO: 263 THOUSANDS/UL (ref 150–450)
PMV BLD AUTO: 8.3 FL (ref 8.9–12.7)
RBC # BLD AUTO: 5.6 MILLION/UL (ref 4.5–5.9)
WBC # BLD AUTO: 8 THOUSAND/UL (ref 4.5–11)

## 2018-10-02 PROCEDURE — 85025 COMPLETE CBC W/AUTO DIFF WBC: CPT | Performed by: PSYCHIATRY & NEUROLOGY

## 2018-10-02 RX ADMIN — BUPROPION HYDROCHLORIDE 300 MG: 300 TABLET, FILM COATED, EXTENDED RELEASE ORAL at 08:19

## 2018-10-02 RX ADMIN — SENNOSIDES 8.6 MG: 8.6 TABLET, FILM COATED ORAL at 08:19

## 2018-10-02 RX ADMIN — DIVALPROEX SODIUM 1000 MG: 500 TABLET, FILM COATED, EXTENDED RELEASE ORAL at 20:36

## 2018-10-02 RX ADMIN — SENNOSIDES 8.6 MG: 8.6 TABLET, FILM COATED ORAL at 17:12

## 2018-10-02 RX ADMIN — MIDODRINE HYDROCHLORIDE 10 MG: 2.5 TABLET ORAL at 06:15

## 2018-10-02 RX ADMIN — MIDODRINE HYDROCHLORIDE 10 MG: 2.5 TABLET ORAL at 16:54

## 2018-10-02 RX ADMIN — POLYETHYLENE GLYCOL 3350 17 G: 17 POWDER, FOR SOLUTION ORAL at 08:19

## 2018-10-02 RX ADMIN — CLOZAPINE 450 MG: 25 TABLET ORAL at 20:36

## 2018-10-02 RX ADMIN — FLUPHENAZINE HYDROCHLORIDE 20 MG: 10 TABLET, FILM COATED ORAL at 20:36

## 2018-10-02 RX ADMIN — LEVOTHYROXINE SODIUM 75 MCG: 75 TABLET ORAL at 06:15

## 2018-10-02 RX ADMIN — MIDODRINE HYDROCHLORIDE 10 MG: 2.5 TABLET ORAL at 20:36

## 2018-10-02 NOTE — PROGRESS NOTES
Shae Rahman attended and participated in 3/3 evening groups  Had evening snack  Resting quietly in bed at present, arouses easily  Will continue to monitor/assess for any changes

## 2018-10-02 NOTE — PROGRESS NOTES
Amol Love has been awake, alert, and visible intermittently out in the milieu  Behavior has been mostly isolative to self  Compliant with scheduled meds as ordered  1600 and 2100 scheduled Midodrine held per BP parameters  No overt responding to internal stimuli noted  Affect remains flat, blunted, and offers minimal conversation with staff  Pt refused supper as mother in to visit and brought pizza in for pt which pt ate  Pt denies any depression, anxiety, or a/v hallucinations  No behavioral issues  Will continue to encourage utilization of safe/effective coping skills and to work toward achievement of daily/recovery goals

## 2018-10-02 NOTE — PROGRESS NOTES
Progress Note - Behavioral Health   Gume Gordon 26 y o  male MRN: [id number]  Unit/Bed#: [facility] 110-01 Encounter: [id number]    Assessment/Plan   Principal Problem:    Chronic paranoid schizophrenia ([address] )      Behavior over the last 24 hours:  unchanged  Sleep: normal  Appetite: normal  Medication side effects: No  ROS: no complaints    Mental Status Evaluation:  Appearance:  age appropriate   Behavior:  evasive   Speech:  soft   Mood:  depressed   Affect:  constricted   Thought Process:  normal   Thought Content:  normal   Perceptual Disturbances: Auditory hallucinations without commands   Risk Potential: None   Sensorium:  person, place and situation   Cognition:  grossly intact   Consciousness:  alert    Attention: attention span appeared shorter than expected for age   Insight:  fair   Judgment: fair   Gait/Station: normal gait/station   Motor Activity: no abnormal movements     Progress Toward Goals:   Patient's behavior and mental status are unchanged  He continues to have auditory hallucinations  Recommended Treatment: Continue with group therapy, milieu therapy and occupational therapy  Risks, benefits and possible side effects of Medications:   Risks, benefits, and possible side effects of medications explained to patient and patient verbalizes understanding  Medications: all current active meds have been reviewed  Labs:   Reviewed    Counseling / Coordination of Care  Total floor / unit time spent today 10 minutes  Greater than 50% of total time was spent with the patient and / or family counseling and / or coordination of care

## 2018-10-02 NOTE — PROGRESS NOTES
Tawnya Harada remains blunted and with a flat affect  He is mostly isolative to his room today  Only coming out for meds, meals and goals group  Tawnya Harada attended 1/2 groups today  Tawnya Harada was asked to clean up his room of all his dirty laundry, wipe down his bed and put new sheets on his bed  Tawnya Harada did do all of these things in a timely manner and without any issue or protest   Continue to monitor

## 2018-10-03 PROBLEM — Z00.8 ENCOUNTER FOR MEDICAL ASSESSMENT: Status: ACTIVE | Noted: 2018-10-03

## 2018-10-03 PROCEDURE — 99231 SBSQ HOSP IP/OBS SF/LOW 25: CPT | Performed by: NURSE PRACTITIONER

## 2018-10-03 RX ADMIN — MIDODRINE HYDROCHLORIDE 10 MG: 2.5 TABLET ORAL at 16:49

## 2018-10-03 RX ADMIN — BUPROPION HYDROCHLORIDE 300 MG: 300 TABLET, FILM COATED, EXTENDED RELEASE ORAL at 08:43

## 2018-10-03 RX ADMIN — SENNOSIDES 8.6 MG: 8.6 TABLET, FILM COATED ORAL at 08:43

## 2018-10-03 RX ADMIN — POLYETHYLENE GLYCOL 3350 17 G: 17 POWDER, FOR SOLUTION ORAL at 08:43

## 2018-10-03 RX ADMIN — DIVALPROEX SODIUM 1000 MG: 500 TABLET, FILM COATED, EXTENDED RELEASE ORAL at 20:44

## 2018-10-03 RX ADMIN — MIDODRINE HYDROCHLORIDE 10 MG: 2.5 TABLET ORAL at 06:28

## 2018-10-03 RX ADMIN — CLOZAPINE 450 MG: 25 TABLET ORAL at 20:44

## 2018-10-03 RX ADMIN — FLUPHENAZINE HYDROCHLORIDE 20 MG: 10 TABLET, FILM COATED ORAL at 20:44

## 2018-10-03 RX ADMIN — SENNOSIDES 8.6 MG: 8.6 TABLET, FILM COATED ORAL at 17:12

## 2018-10-03 RX ADMIN — LEVOTHYROXINE SODIUM 75 MCG: 75 TABLET ORAL at 06:28

## 2018-10-03 NOTE — PROGRESS NOTES
Lawton Goldmann has been awake, alert, and mostly isolative to self in room  No interaction noted with peers  Compliant with all scheduled meds with little prompting  Affect remains flat, blunted, and offers minimal conversation with staff  Lacks motivation and insight in his illness  Attended and participated in 2/2 evening groups and then had snack  Resting quietly in bed at present, arouses easily  Will continue to monitor/assess for any changes

## 2018-10-03 NOTE — ASSESSMENT & PLAN NOTE
Patient is medically stable with no significant past medical history  He currently denies medical complaints at this time

## 2018-10-03 NOTE — PROGRESS NOTES
Progress Note - Behavioral Health   Remonia Grippe 26 y o  male MRN: [id number]  Unit/Bed#: [facility] 110-01 Encounter: [id number]    Assessment/Plan   Principal Problem:    Chronic paranoid schizophrenia ([address] )      Behavior over the last 24 hours:  unchanged  Sleep: hypersomnia  Appetite: normal  Medication side effects: No  ROS: no complaints    Mental Status Evaluation:  Appearance:  age appropriate   Behavior:  evasive   Speech:  delayed   Mood:  depressed   Affect:  constricted   Thought Process:  blocked   Thought Content:  normal   Perceptual Disturbances: Auditory hallucinations without commands   Risk Potential: Little risk   Sensorium:  person, place and time/date   Cognition:  grossly intact   Consciousness:  alert    Attention: attention span appeared shorter than expected for age   Insight:  fair   Judgment: fair   Gait/Station: normal gait/station   Motor Activity: no abnormal movements     Progress Toward Goals:   Patient is relatively unchanged  His positive and negative symptoms of schizophrenia are still present    Recommended Treatment: Continue with group therapy, milieu therapy and occupational therapy  Risks, benefits and possible side effects of Medications:   Risks, benefits, and possible side effects of medications explained to patient and patient verbalizes understanding  Medications: all current active meds have been reviewed  Labs:   None    Counseling / Coordination of Care  Total floor / unit time spent today 10 minutes  Greater than 50% of total time was spent with the patient and / or family counseling and / or coordination of care

## 2018-10-03 NOTE — PROGRESS NOTES
Progress Note - Polina Pang 1985, 26 y o  male MRN: [id number]    Unit/Bed#: [facility] 110-01 Encounter: [id number]    Primary Care Provider: Sloan Rosario DO   Date and time admitted to hospital: 2017 12:15 PM      Encounter for medical assessment   Assessment & Plan    Patient is medically stable with no significant past medical history  He currently denies medical complaints at this time  * Chronic paranoid schizophrenia [facility])   Assessment & Plan    Management per psych  VTE Pharmacologic Prophylaxis:   Pharmacologic: Pt is ambulatory on psych unit  Mechanical VTE Prophylaxis in Place: No    Patient Centered Rounds: I have performed bedside rounds with nursing staff today  Discussions with Specialists or Other Care Team Provider:      Education and Discussions with Family / Patient:   Attempted to elicit any medical complaints but denies at this time    Time Spent for Care: 15 minutes  More than 50% of total time spent on counseling and coordination of care as described above  Current Length of Stay: 392 day(s)    Current Patient Status: Psych - Long Term   Certification Statement: The patient will continue to require additional inpatient hospital stay due to Psychiatric illness  Discharge Plan:   Per primary team when stable  Code Status: Prior      Subjective:   Patient currently denies all medical complaints including chest pain, shortness of breath, nausea, vomiting, diarrhea, dizziness, and lightheadedness  Objective:     Vitals:   Temp (24hrs), Av 7 °F (36 5 °C), Min:96 9 °F (36 1 °C), Max:98 8 °F (37 1 °C)    HR:  [104-118] 114  Resp:  [16-20] 20  BP: (100-114)/(72-80) 100/74  Body mass index is 26 12 kg/m²  Input and Output Summary (last 24 hours):        Intake/Output Summary (Last 24 hours) at 10/03/18 1255  Last data filed at 10/02/18 2045   Gross per 24 hour   Intake                0 ml   Output                0 ml   Net                0 ml Physical Exam:     Physical Exam   Constitutional: He is oriented to person, place, and time  He appears well-developed and well-nourished  No distress  HENT:   Head: Normocephalic and atraumatic  Eyes: Right eye exhibits no discharge  Left eye exhibits no discharge  Neck: No JVD present  Cardiovascular: Normal rate, regular rhythm, normal heart sounds and intact distal pulses  Exam reveals no gallop and no friction rub  No murmur heard  Pulmonary/Chest: Effort normal and breath sounds normal  No stridor  No respiratory distress  He has no wheezes  He has no rales  He exhibits no tenderness  Abdominal: Soft  Bowel sounds are normal  He exhibits no distension  There is no tenderness  There is no rebound and no guarding  Musculoskeletal: He exhibits no edema  Neurological: He is alert and oriented to person, place, and time  Skin: Skin is warm and dry  He is not diaphoretic  Psychiatric: He has a normal mood and affect  Additional Data:     Labs:      Results from last 7 days  Lab Units 10/02/18  0818   WBC Thousand/uL 8 00   HEMOGLOBIN g/dL 15 7   HEMATOCRIT % 48 1   PLATELETS Thousands/uL 263   NEUTROS PCT % 59   LYMPHS PCT % 29   MONOS PCT % 8   EOS PCT % 5                               * I Have Reviewed All Lab Data Listed Above    * Additional Pertinent Lab Tests Reviewed: Recent cbc reviewed    Imaging:    Imaging Reports Reviewed Today Include: none  Imaging Personally Reviewed by Myself Includes:  none    Recent Cultures (last 7 days):           Last 24 Hours Medication List:     Current Facility-Administered Medications:  bisacodyl 10 mg Oral Daily PRN Provider Cutover   bisacodyl 10 mg Rectal Daily PRN LIZETH Meyer   buPROPion 300 mg Oral Daily Provider Cutover   cloZAPine 450 mg Oral HS Rigo Perry MD   divalproex sodium 1,000 mg Oral HS Nova Collins MD   fluPHENAZine 20 mg Oral HS Rigo Perry MD   levothyroxine 75 mcg Oral Early Morning Provider Cutover midodrine 10 mg Oral TID Wally Lynn MD   polyethylene glycol 17 g Oral Daily Provider Elaina   senna 1 tablet Oral BID LIZETH Melo        Today, Patient Was Seen By: LIZETH Melo

## 2018-10-03 NOTE — PROGRESS NOTES
Pt attended [facility] group  Pt participated when prompted  Pt able to identify when he was first diagnosed  Pt mentioned that he feels that he is currently at his best in terms of his [address] wellness  Pt noted that his mother has made positive comments about his current needs  Pt identified that he feels calmer and more at peace  Pt more visible on the unit and able to engage in dialogue when prompted  Continue to provide therapeutic group support

## 2018-10-03 NOTE — PROGRESS NOTES
Bib Enriquez remains blunted and with a flat affect   He is mostly isolative to his room today  Jake Parrish coming out for meds, meals and groups   Vaughn attended 2/2 groups today  No issues or concerns noted today   Continue to monitor

## 2018-10-04 RX ADMIN — SENNOSIDES 8.6 MG: 8.6 TABLET, FILM COATED ORAL at 17:06

## 2018-10-04 RX ADMIN — DIVALPROEX SODIUM 1000 MG: 500 TABLET, FILM COATED, EXTENDED RELEASE ORAL at 20:53

## 2018-10-04 RX ADMIN — SENNOSIDES 8.6 MG: 8.6 TABLET, FILM COATED ORAL at 09:45

## 2018-10-04 RX ADMIN — LEVOTHYROXINE SODIUM 75 MCG: 75 TABLET ORAL at 06:20

## 2018-10-04 RX ADMIN — BUPROPION HYDROCHLORIDE 300 MG: 300 TABLET, FILM COATED, EXTENDED RELEASE ORAL at 09:45

## 2018-10-04 RX ADMIN — FLUPHENAZINE HYDROCHLORIDE 20 MG: 10 TABLET, FILM COATED ORAL at 20:53

## 2018-10-04 RX ADMIN — POLYETHYLENE GLYCOL 3350 17 G: 17 POWDER, FOR SOLUTION ORAL at 09:45

## 2018-10-04 RX ADMIN — CLOZAPINE 450 MG: 25 TABLET ORAL at 20:53

## 2018-10-04 NOTE — PROGRESS NOTES
Psychiatry Progress Note    Subjective: Interval History     Team mtg : pt is about the same; no behavior problems but is passive, withdrawn, quiet and will have limited conversation when prompted      Current medications:    Current Facility-Administered Medications:     bisacodyl (DULCOLAX) EC tablet 10 mg, 10 mg, Oral, Daily PRN, Provider Cutover, 10 mg at 09/13/18 0052    bisacodyl (DULCOLAX) rectal suppository 10 mg, 10 mg, Rectal, Daily PRN, LIZETH Sutherland    buPROPion (WELLBUTRIN XL) 24 hr tablet 300 mg, 300 mg, Oral, Daily, Provider Cutover, 300 mg at 10/03/18 0843    cloZAPine (CLOZARIL) tablet 450 mg, 450 mg, Oral, HS, Mario Barahona MD, 450 mg at 10/03/18 2044    divalproex sodium (DEPAKOTE ER) 24 hr tablet 1,000 mg, 1,000 mg, Oral, HS, Quynh Stephen MD, 1,000 mg at 10/03/18 2044    fluPHENAZine (PROLIXIN) tablet 20 mg, 20 mg, Oral, HS, Mario Barahona MD, 20 mg at 10/03/18 2044    levothyroxine tablet 75 mcg, 75 mcg, Oral, Early Morning, Provider Cutover, 75 mcg at 10/04/18 0620    midodrine (PROAMATINE) tablet 10 mg, 10 mg, Oral, TID, Wally Lynn MD, 10 mg at 10/03/18 1649    polyethylene glycol (MIRALAX) packet 17 g, 17 g, Oral, Daily, Provider Cutover, 17 g at 10/03/18 0843    senna (SENOKOT) tablet 8 6 mg, 1 tablet, Oral, BID, LIZETH Meyer, 8 6 mg at 10/03/18 1712      Objective:     Vital Signs:  Vitals:    10/03/18 0730 10/03/18 0735 10/03/18 1956 10/04/18 0600   BP: 109/80 100/74 122/79 132/71   BP Location: Left arm Left arm Left arm    Pulse: 104 (!) 114 (!) 121 104   Resp: 20 20     Temp: (!) 96 9 °F (36 1 °C)      TempSrc: Temporal      SpO2:       Weight:       Height:             Appearance:  age appropriate and casually dressed   Behavior:  normal   Speech:  normal volume   Mood:  depressed   Affect:  constricted   Thought Process:  normal   Thought Content:  normal   Perceptual Disturbances:  Auditory hallucinations without commands   Risk Potential: none2   Sensorium: person, place, situation and time   Cognition:  intact   Consciousness:  alert and awake    Attention: attention span and concentration were age appropriate   Intellect: average   Insight:  good   Judgment: good      Motor Activity: no abnormal movements           Recent Labs:  Results Reviewed     None          I/O Past 24 hours:  No intake/output data recorded  No intake/output data recorded  Assessment / Plan:     [facility]    Recommended Treatment:      Medication changes:  1) no change    Non-pharmacological treatments  1) Continue with group therapy, milieu therapy and occupational therapy  Safety  1) Safety/communication plan established targeting dynamic risk factors above  2) Risks, benefits, and possible side effects of medications explained to patient and patient verbalizes understanding  Counseling / Coordination of Care    Total floor / unit time spent today 20 minutes  Greater than 50% of total time was spent with the patient and / or family counseling and / or coordination of care  A description of the counseling / coordination of care  Patient's Rights, confidentiality and exceptions to confidentiality, use of automated medical record, [facility] staff access to medical record, and consent to treatment reviewed      Brian Zapata MD

## 2018-10-04 NOTE — ESCALATED TEAM TX
Patient attended treatment team meeting this morning prepared with self-assessment  Patient's group attendance for last week was 86%  Patient completed the team's request of thinking deeper about his barriers  This week he reported that his main barrier to recovery is depression  He was also acknowledged for trying praying as a new coping skill  He was granted a pass for Sunday, 10/7, from 10-8p  Treatment team also acknowledged patient for tolerating his roommate who requires frequent staff intervention/interaction  Patient reports that his current roommate does not bother him as much as another peer on the unit who is psychotic and verbally aggressive  Treatment team encouraged patient to seek the assistance of staff if he ever feels uncomfortable  He was assured his concerns would be kept anonymous  Patient advocated for his own needs by asking about the status of his group home referral   Per [facility], there are no beds available yet  Patient verbalized no further questions or concerns at the conclusion of the meeting  Next team meeting scheduled for 10/11

## 2018-10-04 NOTE — NURSING NOTE
Patient out of room at intervals for medications, needs, and medications  Upon approach is presents as  Guarded with one word answers  Offers no complaints at this time  Denies auditory hallucinations  Mouth check done during medication pass  Denies suicidal ideations  Need prompting to take his medications  Poor eye contact  Isolative to himself and withdrawn  Encouraged to focus on his treatment plan goals and expectations as part of his recovery process

## 2018-10-04 NOTE — PROGRESS NOTES
Priyanka Calhoun has been awake, alert, and mostly isolative to self in room  No interaction noted with peers  Compliant with all scheduled meds with little prompting  Affect remains flat, blunted and offers minimal conversation with staff  2100 dose of Midodrine held per MD BP parameters  Ate 50% supper as mother in to visit and brought food in for pt which pt ate  Attended and participated in 2/2 evening groups  Pt declined evening snack  Resting quietly in bed at present, arouses easily  Will continue to monitor/assess for any changes

## 2018-10-05 RX ADMIN — LEVOTHYROXINE SODIUM 75 MCG: 75 TABLET ORAL at 06:01

## 2018-10-05 RX ADMIN — SENNOSIDES 8.6 MG: 8.6 TABLET, FILM COATED ORAL at 17:20

## 2018-10-05 RX ADMIN — DIVALPROEX SODIUM 1000 MG: 500 TABLET, FILM COATED, EXTENDED RELEASE ORAL at 20:46

## 2018-10-05 RX ADMIN — MIDODRINE HYDROCHLORIDE 10 MG: 2.5 TABLET ORAL at 06:01

## 2018-10-05 RX ADMIN — POLYETHYLENE GLYCOL 3350 17 G: 17 POWDER, FOR SOLUTION ORAL at 08:20

## 2018-10-05 RX ADMIN — SENNOSIDES 8.6 MG: 8.6 TABLET, FILM COATED ORAL at 08:20

## 2018-10-05 RX ADMIN — CLOZAPINE 450 MG: 25 TABLET ORAL at 20:46

## 2018-10-05 RX ADMIN — BUPROPION HYDROCHLORIDE 300 MG: 300 TABLET, FILM COATED, EXTENDED RELEASE ORAL at 08:20

## 2018-10-05 RX ADMIN — MIDODRINE HYDROCHLORIDE 10 MG: 2.5 TABLET ORAL at 17:21

## 2018-10-05 RX ADMIN — MIDODRINE HYDROCHLORIDE 10 MG: 2.5 TABLET ORAL at 20:46

## 2018-10-05 RX ADMIN — FLUPHENAZINE HYDROCHLORIDE 20 MG: 10 TABLET, FILM COATED ORAL at 20:46

## 2018-10-05 NOTE — PROGRESS NOTES
Christoph Camacho has been awake, alert, and visible intermittently out in the milieu  Behavior has been quiet and isolative to self  No interaction noted with peers  Pt denies any depression, anxiety, or a/v hallucinations but noted smiling, laughing tto self while waiting in line for meds  Ate 100% supper  Affect remains flat, blunted, and offers minimal conversation with staff  Mother in to visit and brought food in for pt which pt ate  Attended and participated in 2/2 evening groups  Compliant with scheduled meds with some prompting  2100 Midodrine held per MD BP parameters  Pt declined evening snack  Resting quietly in bed at present, arouses easily  Will continue to monitor/assess for any changes

## 2018-10-05 NOTE — CASE MANAGEMENT
Patient continues to be isolative to himself and his room but does come out for groups and has been med/meal compliant  Patient has been having good visits with his mom on the unit and plans to go on pass with her this weekend  Still anxious and eager about getting discharged to a bed at either [facility] or [facility] once a bed becomes available  Per Bonnie Medina with [facility], there are no beds currently available and still has no timeline as to when a bed may become available  CM will continue to follow patient's progress

## 2018-10-05 NOTE — PROGRESS NOTES
Pt received @ 0700  Remains blunted & isolative to room/self  Attended 1/3 groups  No BM or shower this shift, no behavioral issues   Will continue to monitor

## 2018-10-05 NOTE — PROGRESS NOTES
Progress Note - Behavioral Health   Lj Rojas 26 y o  male MRN: [id number]  [facility] Encounter: [id number]    Assessment/Plan   Principal Problem:    Chronic paranoid schizophrenia ([address] )  Active Problems:    Encounter for medical assessment      Behavior over the last 24 hours:  unchanged  Sleep: hypersomnia  Appetite: normal  Medication side effects: No  ROS: no complaints    Mental Status Evaluation:  Appearance:  age appropriate   Behavior:  evasive   Speech:  delayed   Mood:  depressed   Affect:  constricted   Thought Process:  blocked   Thought Content:  normal   Perceptual Disturbances: Auditory hallucinations without commands   Risk Potential: none   Sensorium:  person, place, time/date and situation   Cognition:  grossly intact   Consciousness:  alert    Attention: attention span appeared shorter than expected for age   Insight:  fair   Judgment: fair   Gait/Station: normal gait/station   Motor Activity: no abnormal movements     Progress Toward Goals:   Patient was observed laughing to himself and clearly hearing voices yesterday    Recommended Treatment: Continue with group therapy, milieu therapy and occupational therapy  Risks, benefits and possible side effects of Medications:   Risks, benefits, and possible side effects of medications explained to patient and patient verbalizes understanding  Medications: all current active meds have been reviewed  Labs:   Reviewed    Counseling / Coordination of Care  Total floor / unit time spent today 10 minutes  Greater than 50% of total time was spent with the patient and / or family counseling and / or coordination of care

## 2018-10-06 RX ADMIN — POLYETHYLENE GLYCOL 3350 17 G: 17 POWDER, FOR SOLUTION ORAL at 08:50

## 2018-10-06 RX ADMIN — FLUPHENAZINE HYDROCHLORIDE 20 MG: 10 TABLET, FILM COATED ORAL at 20:46

## 2018-10-06 RX ADMIN — SENNOSIDES 8.6 MG: 8.6 TABLET, FILM COATED ORAL at 08:50

## 2018-10-06 RX ADMIN — CLOZAPINE 450 MG: 25 TABLET ORAL at 20:46

## 2018-10-06 RX ADMIN — LEVOTHYROXINE SODIUM 75 MCG: 75 TABLET ORAL at 06:51

## 2018-10-06 RX ADMIN — BUPROPION HYDROCHLORIDE 300 MG: 300 TABLET, FILM COATED, EXTENDED RELEASE ORAL at 08:50

## 2018-10-06 RX ADMIN — DIVALPROEX SODIUM 1000 MG: 500 TABLET, FILM COATED, EXTENDED RELEASE ORAL at 20:46

## 2018-10-06 RX ADMIN — SENNOSIDES 8.6 MG: 8.6 TABLET, FILM COATED ORAL at 17:35

## 2018-10-06 RX ADMIN — MIDODRINE HYDROCHLORIDE 10 MG: 2.5 TABLET ORAL at 16:42

## 2018-10-06 NOTE — NURSING NOTE
Patient visible on unit at times, poor eye contact, quiet, no peer interaction  Patient attended wrap up group, med and meal compliant, cooperative upon approach , currently in own room sleeping, will continue to monitor

## 2018-10-06 NOTE — PROGRESS NOTES
Julia Irwin attended both of the group activities today  Linda Torres was compliant with medications and meals and denied pain, fluids encouraged, gait steady, remains on Fall precautions, isolative, spent most of the day alone in his room, polite and cooperative on approach, limited interaction with staff and peers will continue to monitor

## 2018-10-06 NOTE — PROGRESS NOTES
In bed quietly resting eyes closed,chest noted to be rising, audible breath sounds, will monitor for change in behavior/assessment

## 2018-10-07 PROCEDURE — 80307 DRUG TEST PRSMV CHEM ANLYZR: CPT | Performed by: PSYCHIATRY & NEUROLOGY

## 2018-10-07 RX ADMIN — FLUPHENAZINE HYDROCHLORIDE 20 MG: 10 TABLET, FILM COATED ORAL at 21:01

## 2018-10-07 RX ADMIN — MIDODRINE HYDROCHLORIDE 10 MG: 2.5 TABLET ORAL at 06:07

## 2018-10-07 RX ADMIN — MIDODRINE HYDROCHLORIDE 10 MG: 2.5 TABLET ORAL at 21:00

## 2018-10-07 RX ADMIN — POLYETHYLENE GLYCOL 3350 17 G: 17 POWDER, FOR SOLUTION ORAL at 08:31

## 2018-10-07 RX ADMIN — CLOZAPINE 450 MG: 25 TABLET ORAL at 21:01

## 2018-10-07 RX ADMIN — SENNOSIDES 8.6 MG: 8.6 TABLET, FILM COATED ORAL at 08:32

## 2018-10-07 RX ADMIN — BUPROPION HYDROCHLORIDE 300 MG: 300 TABLET, FILM COATED, EXTENDED RELEASE ORAL at 08:32

## 2018-10-07 RX ADMIN — DIVALPROEX SODIUM 1000 MG: 500 TABLET, FILM COATED, EXTENDED RELEASE ORAL at 21:01

## 2018-10-07 RX ADMIN — LEVOTHYROXINE SODIUM 75 MCG: 75 TABLET ORAL at 06:06

## 2018-10-07 NOTE — PROGRESS NOTES
Julia Irwin left with his mom at about 10am   He will call into the unit at about 4pm and return at 8pm   Midodrine 10mg and senokot 8 6mg given with instrustions to take with dinner  Julia Irwin stated, "I'm going to clean the liter box "  He has a cat at home and that is one of the household chores that he will be working on during this pass

## 2018-10-07 NOTE — PROGRESS NOTES
Abdias Gamez remains out on pass with mother  Pt called unit to check in at 1600  Pt stated that pass is going well  Pt due to return to [facility] at 2000

## 2018-10-07 NOTE — PROGRESS NOTES
Progress Note - Behavioral Health   Van Cai 26 y o  male MRN: [id number]  Unit/Bed#: [facility] 112-01 Encounter: [id number]    Assessment/Plan   Principal Problem:    Chronic paranoid schizophrenia [facility])  Active Problems:    Encounter for medical assessment    Judy Zheng was observed in the dining room, laying with his head on the table  Cooperative but guarded upon interview  States he is going on pass tomorrow and has no particular plans  He continues to not engage in conversation with peers  He denies homicidal or suicidal ideations  Behavior over the last 24 hours:  unchanged  Sleep: hypersomnia  Appetite: normal  Medication side effects: No  ROS: no complaints    Mental Status Evaluation:  Appearance:  age appropriate   Behavior:  evasive   Speech:  delayed   Mood:  depressed   Affect:  constricted   Thought Process:  blocked   Thought Content:  normal   Perceptual Disturbances: Auditory hallucinations without commands   Risk Potential: none   Sensorium:  person, place, time/date and situation   Cognition:  grossly intact   Consciousness:  alert    Attention: attention span appeared shorter than expected for age   Insight:  fair   Judgment: fair   Gait/Station: normal gait/station   Motor Activity: no abnormal movements     Progress Toward Goals:   Patient was observed laughing to himself and clearly hearing voices yesterday    Recommended Treatment: Continue with group therapy, milieu therapy and occupational therapy  Risks, benefits and possible side effects of Medications:   Risks, benefits, and possible side effects of medications explained to patient and patient verbalizes understanding  Medications: all current active meds have been reviewed  Labs:   Reviewed    Counseling / Coordination of Care  Total floor / unit time spent today 10 minutes  Greater than 50% of total time was spent with the patient and / or family counseling and / or coordination of care

## 2018-10-07 NOTE — PROGRESS NOTES
Willie Colón has been awake, alert, and visible intermittently out in the milieu  Maintained on Fall Precautions without incident  Compliant with scheduled meds without prompting  Refused supper as mother in to visit and brought pt food which he ate  Behavior remains quiet and isolative to self  Affect flat, blunted, and offers minimal conversation with staff  No interaction noted with peers  No overt responding to internal stimuli noted  Pt denies any depression, anxiety, or a/v hallucinations  Resting quietly in bed at present, arouses easily  Will continue to monitor/assess for any changes

## 2018-10-08 RX ADMIN — SENNOSIDES 8.6 MG: 8.6 TABLET, FILM COATED ORAL at 17:22

## 2018-10-08 RX ADMIN — DIVALPROEX SODIUM 1000 MG: 500 TABLET, FILM COATED, EXTENDED RELEASE ORAL at 20:51

## 2018-10-08 RX ADMIN — LEVOTHYROXINE SODIUM 75 MCG: 75 TABLET ORAL at 06:36

## 2018-10-08 RX ADMIN — MIDODRINE HYDROCHLORIDE 10 MG: 2.5 TABLET ORAL at 16:40

## 2018-10-08 RX ADMIN — SENNOSIDES 8.6 MG: 8.6 TABLET, FILM COATED ORAL at 08:28

## 2018-10-08 RX ADMIN — FLUPHENAZINE HYDROCHLORIDE 20 MG: 10 TABLET, FILM COATED ORAL at 20:51

## 2018-10-08 RX ADMIN — CLOZAPINE 450 MG: 25 TABLET ORAL at 20:51

## 2018-10-08 RX ADMIN — POLYETHYLENE GLYCOL 3350 17 G: 17 POWDER, FOR SOLUTION ORAL at 08:28

## 2018-10-08 RX ADMIN — BUPROPION HYDROCHLORIDE 300 MG: 300 TABLET, FILM COATED, EXTENDED RELEASE ORAL at 08:28

## 2018-10-08 RX ADMIN — MIDODRINE HYDROCHLORIDE 10 MG: 2.5 TABLET ORAL at 06:36

## 2018-10-08 RX ADMIN — MIDODRINE HYDROCHLORIDE 10 MG: 2.5 TABLET ORAL at 20:51

## 2018-10-08 NOTE — PROGRESS NOTES
Progress Note - Behavioral Health   Polina Pang 26 y o  male MRN: [id number]  [facility] Encounter: [id number]    Assessment/Plan   Principal Problem:    Chronic paranoid schizophrenia ([facility] )  Active Problems:    Encounter for medical assessment      Behavior over the last 24 hours:  unchanged  Sleep: hypersomnia  Appetite: normal  Medication side effects: No  ROS: no complaints    Mental Status Evaluation:  Appearance:  age appropriate   Behavior:  evasive   Speech:  soft   Mood:  decreased range   Affect:  constricted   Thought Process:  blocked   Thought Content:  normal   Perceptual Disturbances: Auditory hallucinations without commands   Risk Potential: none   Sensorium:  person, place and time/date   Cognition:  grossly intact   Consciousness:  alert    Attention: attention span appeared shorter than expected for age   Insight:  fair   Judgment: fair   Gait/Station: normal gait/station   Motor Activity: no abnormal movements     Progress Toward Goals:   Patient's behavior and mental status exam remain the same  He keeps to himself he will speak when spoken to and offer short sentences  He understands his treatment and is cooperative  He continues to hallucinate but will not discuss  Recommended Treatment: Continue with group therapy, milieu therapy and occupational therapy  Risks, benefits and possible side effects of Medications:   Risks, benefits, and possible side effects of medications explained to patient and patient verbalizes understanding  Medications: all current active meds have been reviewed  Labs:   None    Counseling / Coordination of Care  Total floor / unit time spent today 10 minutes  Greater than 50% of total time was spent with the patient and / or family counseling and / or coordination of care

## 2018-10-08 NOTE — PROGRESS NOTES
Aramis Abdul returned from pass with his his mother at 36  Mother and patient stated that the pass went well  Mother stated that pt took meds that were sent with him as ordered  Pt stated that they went out to eat, listened to music, cleaned the litter box, and helped with the dishes  Body assessment completed and UDS obtained and to lab per [facility] protocol  Will continue to monitor/assess for any changes

## 2018-10-08 NOTE — PROGRESS NOTES
[place] remains blunted and with a flat affect   He is mostly isolative to his room today  Vinay Weaver coming out for meds, meals and goals group   Vaughn attended 1/3 groups today  Bronson Osei is med and meal complaint with no prompting needed  No complaints noted   Continue to monitor

## 2018-10-09 RX ADMIN — LEVOTHYROXINE SODIUM 75 MCG: 75 TABLET ORAL at 06:03

## 2018-10-09 RX ADMIN — MIDODRINE HYDROCHLORIDE 10 MG: 2.5 TABLET ORAL at 16:49

## 2018-10-09 RX ADMIN — CLOZAPINE 450 MG: 25 TABLET ORAL at 20:59

## 2018-10-09 RX ADMIN — SENNOSIDES 8.6 MG: 8.6 TABLET, FILM COATED ORAL at 08:00

## 2018-10-09 RX ADMIN — POLYETHYLENE GLYCOL 3350 17 G: 17 POWDER, FOR SOLUTION ORAL at 08:00

## 2018-10-09 RX ADMIN — BUPROPION HYDROCHLORIDE 300 MG: 300 TABLET, FILM COATED, EXTENDED RELEASE ORAL at 08:00

## 2018-10-09 RX ADMIN — FLUPHENAZINE HYDROCHLORIDE 20 MG: 10 TABLET, FILM COATED ORAL at 20:59

## 2018-10-09 RX ADMIN — MIDODRINE HYDROCHLORIDE 10 MG: 2.5 TABLET ORAL at 06:03

## 2018-10-09 RX ADMIN — DIVALPROEX SODIUM 1000 MG: 500 TABLET, FILM COATED, EXTENDED RELEASE ORAL at 20:59

## 2018-10-09 NOTE — PROGRESS NOTES
Individual has been visible for structured activities, then retreats to room  There is no peer interactions noted  Able to express needs to staff  Attended 3/3 groups  Compliant with meds without prompting  No issues or concerns expressed to staff  Will continue to monitor

## 2018-10-09 NOTE — PROGRESS NOTES
Patient sleeping upon this RN's arrival   Chest rising and falling, no distress noted  Continue to monitor

## 2018-10-09 NOTE — PROGRESS NOTES
Progress Note - Behavioral Health   Orly Cross 26 y o  male MRN: [id number]  Unit/Bed#: [facility] 112-01 Encounter: [id number]    Assessment/Plan   Principal Problem:    Chronic paranoid schizophrenia ([address] )  Active Problems:    Encounter for medical assessment      Behavior over the last 24 hours:  unchanged  Sleep: hypersomnia  Appetite: normal  Medication side effects: No  ROS: no complaints    Mental Status Evaluation:  Appearance:  age appropriate   Behavior:  evasive   Speech:  soft   Mood:  constricted   Affect:  constricted   Thought Process:  blocked   Thought Content:  normal   Perceptual Disturbances: Auditory hallucinations without commands   Risk Potential: none   Sensorium:  person, place and situation   Cognition:  grossly intact   Consciousness:  alert    Attention: attention span appeared shorter than expected for age   Insight:  fair   Judgment: fair   Gait/Station: normal gait/station   Motor Activity: no abnormal movements     Progress Toward Goals:   Patient remains about the same, isolative, reduced communication, affect flat  He is generally cooperative; [place] He continues to have auditory hallucinations which do not appear to interfere with his daily functioning  Patient is tolerating his psych meds  Recommended Treatment: Continue with group therapy, milieu therapy and occupational therapy  Risks, benefits and possible side effects of Medications:   Risks, benefits, and possible side effects of medications explained to patient and patient verbalizes understanding  Medications: all current active meds have been reviewed  Labs:   Reviewed    Counseling / Coordination of Care  Total floor / unit time spent today 10 minutes  Greater than 50% of total time was spent with the patient and / or family counseling and / or coordination of care

## 2018-10-09 NOTE — PROGRESS NOTES
Stevie Loya has been awake, alert, and visible minimally out in the milieu  Compliant with all scheduled meds without prompting  Affect remains flat, blunted, and offers little conversation with staff  Ate 100% supper  No interaction noted with peers  Mother in to visit and brought food in which pt ate  Fair interaction noted  Attended and participated in 2/2 evening groups  Had evening snack  Resting quietly in bed at present, arouses easily  Will continue to monitor/assess for any changes

## 2018-10-10 RX ADMIN — DIVALPROEX SODIUM 1000 MG: 500 TABLET, FILM COATED, EXTENDED RELEASE ORAL at 20:42

## 2018-10-10 RX ADMIN — BUPROPION HYDROCHLORIDE 300 MG: 300 TABLET, FILM COATED, EXTENDED RELEASE ORAL at 08:20

## 2018-10-10 RX ADMIN — SENNOSIDES 8.6 MG: 8.6 TABLET, FILM COATED ORAL at 17:06

## 2018-10-10 RX ADMIN — FLUPHENAZINE HYDROCHLORIDE 20 MG: 10 TABLET, FILM COATED ORAL at 20:42

## 2018-10-10 RX ADMIN — SENNOSIDES 8.6 MG: 8.6 TABLET, FILM COATED ORAL at 08:21

## 2018-10-10 RX ADMIN — POLYETHYLENE GLYCOL 3350 17 G: 17 POWDER, FOR SOLUTION ORAL at 08:21

## 2018-10-10 RX ADMIN — MIDODRINE HYDROCHLORIDE 10 MG: 2.5 TABLET ORAL at 20:42

## 2018-10-10 RX ADMIN — LEVOTHYROXINE SODIUM 75 MCG: 75 TABLET ORAL at 05:57

## 2018-10-10 RX ADMIN — CLOZAPINE 450 MG: 25 TABLET ORAL at 20:42

## 2018-10-10 RX ADMIN — MIDODRINE HYDROCHLORIDE 10 MG: 2.5 TABLET ORAL at 06:02

## 2018-10-10 RX ADMIN — MIDODRINE HYDROCHLORIDE 10 MG: 2.5 TABLET ORAL at 17:06

## 2018-10-10 NOTE — PROGRESS NOTES
Patient is awake and alert, visible intermittently on the unit  Patient presents with a flat affect, brightens up with approach  Patient approached med window without prompting  Minimal interaction with staff, no interaction with peers  Patient attended evening group, ate snack and retreated to room  Will continue to monitor for changes in current status

## 2018-10-10 NOTE — PROGRESS NOTES
Pleasant and cooperative, but quiet  Delayed in responding to staff interactions  He did participate in programming, attended 2/2 groups  Completed hygiene, showered this morning  Complaint with medications  Left unit at 1400 to attend Recovery anonymous which is held in conference area of the hospital, due to return around 1500

## 2018-10-10 NOTE — PROGRESS NOTES
Patient sleeping upon this RN's arrival   Chest rising and falling  No distress noted  Continue to monitor

## 2018-10-10 NOTE — PROGRESS NOTES
Progress Note - Behavioral Health   Bridget Warner 26 y o  male MRN: [id number]  Unit/Bed#: [facility] 112-01 Encounter: [id number]    Assessment/Plan   Principal Problem:    Chronic paranoid schizophrenia ([address] )  Active Problems:    Encounter for medical assessment      Behavior over the last 24 hours:  unchanged  Sleep: hypersomnia  Appetite: normal  Medication side effects: No  ROS: no complaints    Mental Status Evaluation:  Appearance:  disheveled   Behavior:  evasive   Speech:  delayed   Mood:  constricted   Affect:  constricted   Thought Process:  blocked   Thought Content:  normal   Perceptual Disturbances: Auditory hallucinations without commands   Risk Potential: None   Sensorium:  person and place   Cognition:  grossly intact   Consciousness:  alert    Attention: attention span appeared shorter than expected for age   Insight:  fair   Judgment: fair   Gait/Station: normal gait/station   Motor Activity: no abnormal movements     Progress Toward Goals:   Continues to stabilize  Auditory hallucinations do not appear bothersome to patient  He can ask when he needs something to staff but does not engage in long conversations  Recommended Treatment: Continue with group therapy, milieu therapy and occupational therapy  Risks, benefits and possible side effects of Medications:       Medications: all current active meds have been reviewed  Labs:   Reviewed    Counseling / Coordination of Care  Total floor / unit time spent today 10 minutes  Greater than 50% of total time was spent with the patient and / or family counseling and / or coordination of care

## 2018-10-10 NOTE — PROGRESS NOTES
Pt attended [facility] group  Pt needed prompting to participate  Pt self identified he would like a puppy as things he would like to change  Pt not focused on MH needs in response  Disussed developing sense of purpose and interests within self discovery  Pt did express that he might engage in dialogue about volunteer activities in animal shelters for the future  Encouraged pt to discuss needs in treatment team   Group focused on change, the expectations for discharge and resiliency factors  Pt was verbal and engaged in group format  Continue to provide therapeutic group support

## 2018-10-11 RX ADMIN — CLOZAPINE 450 MG: 25 TABLET ORAL at 21:09

## 2018-10-11 RX ADMIN — FLUPHENAZINE HYDROCHLORIDE 20 MG: 10 TABLET, FILM COATED ORAL at 21:09

## 2018-10-11 RX ADMIN — SENNOSIDES 8.6 MG: 8.6 TABLET, FILM COATED ORAL at 17:18

## 2018-10-11 RX ADMIN — SENNOSIDES 8.6 MG: 8.6 TABLET, FILM COATED ORAL at 08:39

## 2018-10-11 RX ADMIN — POLYETHYLENE GLYCOL 3350 17 G: 17 POWDER, FOR SOLUTION ORAL at 08:39

## 2018-10-11 RX ADMIN — MIDODRINE HYDROCHLORIDE 10 MG: 2.5 TABLET ORAL at 21:10

## 2018-10-11 RX ADMIN — BUPROPION HYDROCHLORIDE 300 MG: 300 TABLET, FILM COATED, EXTENDED RELEASE ORAL at 08:39

## 2018-10-11 RX ADMIN — LEVOTHYROXINE SODIUM 75 MCG: 75 TABLET ORAL at 06:08

## 2018-10-11 RX ADMIN — DIVALPROEX SODIUM 1000 MG: 500 TABLET, FILM COATED, EXTENDED RELEASE ORAL at 21:09

## 2018-10-11 RX ADMIN — MIDODRINE HYDROCHLORIDE 10 MG: 2.5 TABLET ORAL at 06:08

## 2018-10-11 NOTE — ESCALATED TEAM TX
Patient attended treatment team meeting this morning prepared with self-assessment  Patient's group attendance for last week was 81%  Patient reports he is struggling with depression and his goals are to find happiness  He has been using a coping skill of reading inspirational books  Patient is in agreement to go to [employer], for vocational/community engagement  Patient verbalized no further questions or concerns at the conclusion of the meeting  Next team meeting scheduled for 10/18

## 2018-10-11 NOTE — PROGRESS NOTES
Psychiatry Progress Note    Subjective: Interval History     No change in presentation  Pt continues to have disheveled appearance  Withdrawn to himself  Superficial during assessment  Continues to minimize assessment  Minimizing psychiatric symptoms and unwilling to further discuss his depressive symptoms today  Pt denies SI and HI; Denies psychosis  Pt has been compliant with medications  Will attend groups and then retreat back to his room      Current medications:    Current Facility-Administered Medications:     bisacodyl (DULCOLAX) EC tablet 10 mg, 10 mg, Oral, Daily PRN, Provider Cutover, 10 mg at 09/13/18 2728    bisacodyl (DULCOLAX) rectal suppository 10 mg, 10 mg, Rectal, Daily PRN, LIZETH Mesa    buPROPion (WELLBUTRIN XL) 24 hr tablet 300 mg, 300 mg, Oral, Daily, Provider Cutover, 300 mg at 10/11/18 0839    cloZAPine (CLOZARIL) tablet 450 mg, 450 mg, Oral, HS, Lino Recinos MD, 450 mg at 10/10/18 2042    divalproex sodium (DEPAKOTE ER) 24 hr tablet 1,000 mg, 1,000 mg, Oral, HS, Leah Forman MD, 1,000 mg at 10/10/18 2042    fluPHENAZine (PROLIXIN) tablet 20 mg, 20 mg, Oral, HS, Lino Recinos MD, 20 mg at 10/10/18 2042    [START ON 10/17/2018] influenza vaccine, quadrivalent (FLULAVAL) IM injection 0 5 mL, 0 5 mL, Intramuscular, Prior to discharge, David Preciado MD    levothyroxine tablet 75 mcg, 75 mcg, Oral, Early Morning, Provider Cutover, 75 mcg at 10/11/18 0608    midodrine (PROAMATINE) tablet 10 mg, 10 mg, Oral, TID, Wally Lynn MD, 10 mg at 10/11/18 0608    polyethylene glycol (MIRALAX) packet 17 g, 17 g, Oral, Daily, Provider Cutover, 17 g at 10/11/18 0839    senna (SENOKOT) tablet 8 6 mg, 1 tablet, Oral, BID, CallawayLIZETH Gorman, 8 6 mg at 10/11/18 1718      Objective:     Vital Signs:  Vitals:    10/11/18 0735 10/11/18 1500 10/11/18 1501 10/11/18 1502   BP: 106/60 112/74 125/84 114/67   BP Location: Left arm Left arm Left arm Left arm   Pulse: (!) 121 92 105 (!) 120   Resp:  17     Temp:  98 3 °F (36 8 °C)     TempSrc:  Temporal     SpO2:       Weight:       Height:             Appearance:  age appropriate, casually dressed and disheveled   Behavior:  normal   Speech:  soft   Mood:  depressed   Affect:  flat   Thought Process:  normal   Thought Content:  normal   Perceptual Disturbances: None   Risk Potential: none   Sensorium:  person, place and time   Cognition:  intact   Consciousness:  alert and awake    Attention: attention span and concentration were age diminished   Intellect: average   Insight:  limited   Judgment: limited      Motor Activity: no abnormal movements           Recent Labs:  Results Reviewed     None          I/O Past 24 hours:  I/O last 3 completed shifts:  In: -   Out: 2 [Stool:2]  I/O this shift:  In: -   Out: 1 [Stool:1]        Assessment / Plan:     [facility]    Recommended Treatment:      Medication changes:  1) continue current medication regimen  Non-pharmacological treatments  1) Continue with group therapy, milieu therapy and occupational therapy  Safety  1) Safety/communication plan established targeting dynamic risk factors above  2) Risks, benefits, and possible side effects of medications explained to patient and patient verbalizes understanding  Counseling / Coordination of Care    Total floor / unit time spent today 20 minutes  Greater than 50% of total time was spent with the patient and / or family counseling and / or coordination of care  A description of the counseling / coordination of care  Patient's Rights, confidentiality and exceptions to confidentiality, use of automated medical record, [facility] staff access to medical record, and consent to treatment reviewed      Amira Luciano PA-C

## 2018-10-11 NOTE — PROGRESS NOTES
Rene Andrea remains blunted and with a flat affect   He is mostly isolative to his room   Vaughn attended 2/2 groups today  Aubrey Waldo is med and meal complaint with no prompting needed  His mother visited with good interaction  No complaints noted   Continue to monitor

## 2018-10-11 NOTE — NURSING NOTE
Pt remains isolative to room  Out for meals, eats well  Takes medications unprompted  Attended 3 out of 3 groups this shift

## 2018-10-12 RX ADMIN — BUPROPION HYDROCHLORIDE 300 MG: 300 TABLET, FILM COATED, EXTENDED RELEASE ORAL at 09:25

## 2018-10-12 RX ADMIN — SENNOSIDES 8.6 MG: 8.6 TABLET, FILM COATED ORAL at 17:13

## 2018-10-12 RX ADMIN — MIDODRINE HYDROCHLORIDE 10 MG: 2.5 TABLET ORAL at 06:42

## 2018-10-12 RX ADMIN — MIDODRINE HYDROCHLORIDE 10 MG: 2.5 TABLET ORAL at 16:48

## 2018-10-12 RX ADMIN — LEVOTHYROXINE SODIUM 75 MCG: 75 TABLET ORAL at 05:55

## 2018-10-12 RX ADMIN — FLUPHENAZINE HYDROCHLORIDE 20 MG: 10 TABLET, FILM COATED ORAL at 21:00

## 2018-10-12 RX ADMIN — DIVALPROEX SODIUM 1000 MG: 500 TABLET, FILM COATED, EXTENDED RELEASE ORAL at 21:02

## 2018-10-12 RX ADMIN — CLOZAPINE 450 MG: 25 TABLET ORAL at 21:00

## 2018-10-12 RX ADMIN — MIDODRINE HYDROCHLORIDE 10 MG: 2.5 TABLET ORAL at 21:01

## 2018-10-12 RX ADMIN — SENNOSIDES 8.6 MG: 8.6 TABLET, FILM COATED ORAL at 09:25

## 2018-10-12 NOTE — PROGRESS NOTES
Met with Pt and went over documents in his file folder  Pt previously completed WRAP PLAN and Crisis Plan however was missing Life Domain sheet  Staff supplied Pt with Domains sheet and also a blank D/C CSP to prepare for his discharge meeting  Staff will also keep Pt up to date on any information about a volunteer position at [employer]  Staff will meet with Pt face to face next week to go over his completed work  Pt was attentive but displayed poor eye contact  Conversation was casual and blunted

## 2018-10-12 NOTE — PROGRESS NOTES
Psychiatry Progress Note    Subjective: Interval History     Pt isolative to his room  Was asleep and had to be awoken for assessment  States he feels tired and just wants to get some rest   Offers no complaints or concerns today  Still attending groups so he can be awarded passes  Pt however unable to go on pass this weekend due to mothers work schedule      Current medications:    Current Facility-Administered Medications:     bisacodyl (DULCOLAX) EC tablet 10 mg, 10 mg, Oral, Daily PRN, Provider Cutover, 10 mg at 09/13/18 4934    bisacodyl (DULCOLAX) rectal suppository 10 mg, 10 mg, Rectal, Daily PRN, Syliva LIZETH Jacobs    buPROPion (WELLBUTRIN XL) 24 hr tablet 300 mg, 300 mg, Oral, Daily, Provider Cutover, 300 mg at 10/12/18 0925    cloZAPine (CLOZARIL) tablet 450 mg, 450 mg, Oral, HS, Coy Ramey MD, 450 mg at 10/11/18 2109    divalproex sodium (DEPAKOTE ER) 24 hr tablet 1,000 mg, 1,000 mg, Oral, HS, Tamar Back MD, 1,000 mg at 10/11/18 2109    fluPHENAZine (PROLIXIN) tablet 20 mg, 20 mg, Oral, HS, Coy Ramey MD, 20 mg at 10/11/18 2109    [START ON 10/17/2018] influenza vaccine, quadrivalent (FLULAVAL) IM injection 0 5 mL, 0 5 mL, Intramuscular, Prior to discharge, Rosa Parrish MD    levothyroxine tablet 75 mcg, 75 mcg, Oral, Early Morning, Provider Cutover, 75 mcg at 10/12/18 0555    midodrine (PROAMATINE) tablet 10 mg, 10 mg, Oral, TID, Wally Lynn MD, 10 mg at 10/12/18 0858    polyethylene glycol (MIRALAX) packet 17 g, 17 g, Oral, Daily, Provider Cutover, 17 g at 10/11/18 0839    senna (SENOKOT) tablet 8 6 mg, 1 tablet, Oral, BID, LIZETH Meyer, 8 6 mg at 10/12/18 0925      Objective:     Vital Signs:  Vitals:    10/11/18 1501 10/11/18 1502 10/11/18 1930 10/12/18 0730   BP: 125/84 114/67 113/76 102/72   BP Location: Left arm Left arm Left arm Right arm   Pulse: 105 (!) 120 (!) 119 (!) 109   Resp:    18   Temp:    (!) 97 2 °F (36 2 °C)   TempSrc:       SpO2:       Weight: Height:             Appearance:  age appropriate, casually dressed and disheveled   Behavior:  normal   Speech:  soft   Mood:  depressed   Affect:  flat   Thought Process:  normal   Thought Content:  normal   Perceptual Disturbances: None   Risk Potential: none   Sensorium:  person, place and time   Cognition:  intact   Consciousness:  alert and awake    Attention: attention span and concentration were age diminished   Intellect: average   Insight:  limited   Judgment: limited      Motor Activity: no abnormal movements           Recent Labs:  Results Reviewed     None          I/O Past 24 hours:  I/O last 3 completed shifts:  In: -   Out: 2 [Stool:2]  No intake/output data recorded  Assessment / Plan:     [facility]    Recommended Treatment:      Medication changes:  1) continue current medication regimen  Non-pharmacological treatments  1) Continue with group therapy, milieu therapy and occupational therapy  Safety  1) Safety/communication plan established targeting dynamic risk factors above  2) Risks, benefits, and possible side effects of medications explained to patient and patient verbalizes understanding  Counseling / Coordination of Care    Total floor / unit time spent today 20 minutes  Greater than 50% of total time was spent with the patient and / or family counseling and / or coordination of care  A description of the counseling / coordination of care  Patient's Rights, confidentiality and exceptions to confidentiality, use of automated medical record, [facility] staff access to medical record, and consent to treatment reviewed      Jorge Rai PA-C

## 2018-10-12 NOTE — NURSING NOTE
Patient on safe and fall precautions, isolative to self on unit, blunted, calm, poor eye contact and minimal verbal communication with nurse when approached  Patient behaviors controlled, withdrawn, appears guarded  Patient med and meal compliant, attended goals group this morning  Currently in own room , will continue to monitor

## 2018-10-12 NOTE — CASE MANAGEMENT
Patient reported in treatment team yesterday that he is dealing with increased depression and sadness and is working on trying to find happiness  He reports he reads inspirational/motivational books in his room to help cope with this  Patient reported his mom is visiting family in [place] again this weekend and therefore will not be having a pass with her  CM will continue to follow patient's progress and assist with discharge planning needs

## 2018-10-12 NOTE — PLAN OF CARE
Problem: Depression  Goal: Refrain from isolation  Interventions:  - Develop a trusting relationship   - Encourage socialization    Outcome: Progressing    Psychotherapy note     Recovery Areas Addressed:  Emotional and Behavioral; Social and Community Integration    Recovery Obstacles Addressed: Motivation, Paranoia, Depression, Recovery    Therapist and patient met for individual session  Therapist asked patient to bring his Recovery Timeline to session, which was reviewed  Patient reported he was happy that he completed all of his goals  He agreed that it felt good to look back and see the progress he has made  Patient has been reporting increased depression, which therapist also addressed  Therapist encouraged patient to stay out of his room in-between group times  Therapist also ensured that patient does not believe that he is still in the hospital because he did something wrong  Patient expressed understanding  He brought up [place], stating that he would prefer to live in a group home with a lot of people so that "no one will pay attention to him "   Therapist and patient spoke about "quality of life" and doing more than the bare minimum  When listing his passions, patient lit up when discussing dirtbikes  Owning a dirtbike has been an ongoing goal for patient  Patient also discussed how he has not been able to live with his mother because his stepfather did not want him living in the house with his younger sister  Patient denies knowing why his stepfather made this rule  He denies ever being violent at home  Therapist will address self-acceptance at next session

## 2018-10-12 NOTE — PROGRESS NOTES
Patient is isolative on unit, med compliant with minimal prompting, attended group, pleasant upon approach

## 2018-10-12 NOTE — PROGRESS NOTES
Patient eyes closed, appeared to be sleeping  for the remainder of the night, chest rising and falling, no distress noted  No issues or concerns expressed  Will continue to monitor, maintained on routine q 15min checks  Am dose of Midodrine given, BP in parameters  BP 84/55 and pulse 124

## 2018-10-12 NOTE — PROGRESS NOTES
Patient is sleeping, chest rising and falling, will continue to monitor for changes in current status

## 2018-10-13 RX ADMIN — MIDODRINE HYDROCHLORIDE 10 MG: 2.5 TABLET ORAL at 21:10

## 2018-10-13 RX ADMIN — MIDODRINE HYDROCHLORIDE 10 MG: 2.5 TABLET ORAL at 17:20

## 2018-10-13 RX ADMIN — BUPROPION HYDROCHLORIDE 300 MG: 300 TABLET, FILM COATED, EXTENDED RELEASE ORAL at 09:01

## 2018-10-13 RX ADMIN — DIVALPROEX SODIUM 1000 MG: 500 TABLET, FILM COATED, EXTENDED RELEASE ORAL at 21:10

## 2018-10-13 RX ADMIN — SENNOSIDES 8.6 MG: 8.6 TABLET, FILM COATED ORAL at 09:01

## 2018-10-13 RX ADMIN — POLYETHYLENE GLYCOL 3350 17 G: 17 POWDER, FOR SOLUTION ORAL at 09:01

## 2018-10-13 RX ADMIN — CLOZAPINE 450 MG: 25 TABLET ORAL at 21:10

## 2018-10-13 RX ADMIN — MIDODRINE HYDROCHLORIDE 10 MG: 2.5 TABLET ORAL at 06:33

## 2018-10-13 RX ADMIN — LEVOTHYROXINE SODIUM 75 MCG: 75 TABLET ORAL at 06:33

## 2018-10-13 RX ADMIN — SENNOSIDES 8.6 MG: 8.6 TABLET, FILM COATED ORAL at 17:19

## 2018-10-13 RX ADMIN — FLUPHENAZINE HYDROCHLORIDE 20 MG: 10 TABLET, FILM COATED ORAL at 21:10

## 2018-10-13 NOTE — PROGRESS NOTES
Patient sleeping throughout the night with no behaviors or issues noted  BP 82/52,  standing  Asymptomatic  Midodrine given

## 2018-10-13 NOTE — PROGRESS NOTES
Isa Gaming has been awake, alert, and visible intermittently out in the milieu  Quiet and stays mostly to self  No interaction noted with peers  Compliant with scheduled meds with little prompting  Remains blunted with flat affect and offers minimal conversation with staff  Ate 100% supper  Pt did not attend any evening groups ut came out for snack after  Resting quietly in bed at present, arouses easily  Will continue to monitor/assess for any changes

## 2018-10-13 NOTE — PROGRESS NOTES
Psychiatry Progress Note    Subjective: Interval History     Patient continues to be isolative to his room this morning  Patient continues to be disheveled  Patient is superficial during assessment no changes noted  Patient continues to be evasive and minimizing all symptoms  Patient unwilling to further discuss any of his symptoms or his treatment  Patient has been medication and meal compliant  Patient attending groups on the unit, however then withdrawn back to his room       Current medications:    Current Facility-Administered Medications:     bisacodyl (DULCOLAX) EC tablet 10 mg, 10 mg, Oral, Daily PRN, Provider Cutover, 10 mg at 09/13/18 2955    bisacodyl (DULCOLAX) rectal suppository 10 mg, 10 mg, Rectal, Daily PRN, LIZETH Mancini    buPROPion (WELLBUTRIN XL) 24 hr tablet 300 mg, 300 mg, Oral, Daily, Provider Cutover, 300 mg at 10/13/18 0901    cloZAPine (CLOZARIL) tablet 450 mg, 450 mg, Oral, HS, Audry Nyhan, MD, 450 mg at 10/12/18 2100    divalproex sodium (DEPAKOTE ER) 24 hr tablet 1,000 mg, 1,000 mg, Oral, HS, Eduard Wilkinson MD, 1,000 mg at 10/12/18 2102    fluPHENAZine (PROLIXIN) tablet 20 mg, 20 mg, Oral, HS, Audry Nyhan, MD, 20 mg at 10/12/18 2100    [START ON 10/17/2018] influenza vaccine, quadrivalent (FLULAVAL) IM injection 0 5 mL, 0 5 mL, Intramuscular, Prior to discharge, Emi Guzman MD    levothyroxine tablet 75 mcg, 75 mcg, Oral, Early Morning, Provider Cutover, 75 mcg at 10/13/18 0633    midodrine (PROAMATINE) tablet 10 mg, 10 mg, Oral, TID, Wally Lynn MD, 10 mg at 10/13/18 4874    polyethylene glycol (MIRALAX) packet 17 g, 17 g, Oral, Daily, Provider Cutover, 17 g at 10/13/18 0901    senna (SENOKOT) tablet 8 6 mg, 1 tablet, Oral, BID, LIZETH Meyer, 8 6 mg at 10/13/18 0901      Objective:     Vital Signs:  Vitals:    10/12/18 1913 10/13/18 0615 10/13/18 0730 10/13/18 0732   BP: 108/80 (!) 82/52 150/89 102/66   BP Location: Left arm  Left arm Left arm Pulse: (!) 127 (!) 120 105 (!) 120   Resp:   20 20   Temp:   97 6 °F (36 4 °C)    TempSrc:   Temporal    SpO2:       Weight:       Height:             Appearance:  age appropriate, casually dressed and disheveled   Behavior:  normal   Speech:  soft   Mood:  depressed   Affect:  flat   Thought Process:  normal   Thought Content:  normal   Perceptual Disturbances: None   Risk Potential: none   Sensorium:  person, place and time   Cognition:  intact   Consciousness:  alert and awake    Attention: attention span and concentration were age diminished   Intellect: average   Insight:  limited   Judgment: limited      Motor Activity: no abnormal movements           Recent Labs:  Results Reviewed     None          I/O Past 24 hours:  I/O last 3 completed shifts:  In: -   Out: 1 [Other:1]  No intake/output data recorded  Assessment / Plan:     [facility]    Recommended Treatment:      Medication changes:  1) continue current medication regimen  Non-pharmacological treatments  1) Continue with group therapy, milieu therapy and occupational therapy  Safety  1) Safety/communication plan established targeting dynamic risk factors above  2) Risks, benefits, and possible side effects of medications explained to patient and patient verbalizes understanding  Counseling / Coordination of Care    Total floor / unit time spent today 20 minutes  Greater than 50% of total time was spent with the patient and / or family counseling and / or coordination of care  A description of the counseling / coordination of care  Patient's Rights, confidentiality and exceptions to confidentiality, use of automated medical record, [facility] staff access to medical record, and consent to treatment reviewed      Jorge Jaimes PA-C

## 2018-10-13 NOTE — PROGRESS NOTES
Patient is visible on unit  Med and meal compliant  Patient presents with a flat affect, disheveled, minimal interaction with staff  Patient has intermittent to poor eye contact  Patient has no complaints of SI, HI, audio or visual hallucination, anxiety or depression  Patient attended goals and cooking group  Will continue to monitor for changes in current status

## 2018-10-14 RX ADMIN — CLOZAPINE 450 MG: 25 TABLET ORAL at 21:03

## 2018-10-14 RX ADMIN — POLYETHYLENE GLYCOL 3350 17 G: 17 POWDER, FOR SOLUTION ORAL at 08:25

## 2018-10-14 RX ADMIN — LEVOTHYROXINE SODIUM 75 MCG: 75 TABLET ORAL at 06:55

## 2018-10-14 RX ADMIN — SENNOSIDES 8.6 MG: 8.6 TABLET, FILM COATED ORAL at 08:25

## 2018-10-14 RX ADMIN — MIDODRINE HYDROCHLORIDE 10 MG: 2.5 TABLET ORAL at 21:03

## 2018-10-14 RX ADMIN — MIDODRINE HYDROCHLORIDE 10 MG: 2.5 TABLET ORAL at 06:55

## 2018-10-14 RX ADMIN — BUPROPION HYDROCHLORIDE 300 MG: 300 TABLET, FILM COATED, EXTENDED RELEASE ORAL at 08:25

## 2018-10-14 RX ADMIN — DIVALPROEX SODIUM 1000 MG: 500 TABLET, FILM COATED, EXTENDED RELEASE ORAL at 21:03

## 2018-10-14 RX ADMIN — SENNOSIDES 8.6 MG: 8.6 TABLET, FILM COATED ORAL at 17:06

## 2018-10-14 RX ADMIN — FLUPHENAZINE HYDROCHLORIDE 20 MG: 10 TABLET, FILM COATED ORAL at 21:03

## 2018-10-14 RX ADMIN — MIDODRINE HYDROCHLORIDE 10 MG: 2.5 TABLET ORAL at 17:05

## 2018-10-14 NOTE — PROGRESS NOTES
Patient is isolative to room most of the shift, out of room ambulating on unit a few times, presents with a flat affect, pleasant upon approach  Patient stated he was "ok" when asked   Patient has no complaints of SI, HI, audio or visual hallucinations, anxiety or depression

## 2018-10-14 NOTE — PROGRESS NOTES
Patient continues to be isolative to room  Med and meal compliant  No change in assessment  Will continue to monitor for changes in current status

## 2018-10-14 NOTE — PROGRESS NOTES
Psychiatry Progress Note    Subjective: Interval History     Patient again isolative to his room  Patient continues to be difficult to engage in assessment  Patient remains with poor hygiene and is disheveled  Patient continues to be evasive during assessment  Patient continues to be unwilling to discuss his underlying psychiatric symptoms  Patient denies any somatic complaints  Patient has been compliant with his medications and does present for meals  Patient will attend groups however once groups or completed will retreat back to his room      Current medications:    Current Facility-Administered Medications:     bisacodyl (DULCOLAX) EC tablet 10 mg, 10 mg, Oral, Daily PRN, Provider Cutover, 10 mg at 09/13/18 5775    bisacodyl (DULCOLAX) rectal suppository 10 mg, 10 mg, Rectal, Daily PRN, LIZETH Bautista    buPROPion (WELLBUTRIN XL) 24 hr tablet 300 mg, 300 mg, Oral, Daily, Provider Cutover, 300 mg at 10/14/18 0825    cloZAPine (CLOZARIL) tablet 450 mg, 450 mg, Oral, HS, Lizbeth Benoit MD, 450 mg at 10/13/18 2110    divalproex sodium (DEPAKOTE ER) 24 hr tablet 1,000 mg, 1,000 mg, Oral, HS, Marco A Blum MD, 1,000 mg at 10/13/18 2110    fluPHENAZine (PROLIXIN) tablet 20 mg, 20 mg, Oral, HS, Lizbeth Benoit MD, 20 mg at 10/13/18 2110    [START ON 10/17/2018] influenza vaccine, quadrivalent (FLULAVAL) IM injection 0 5 mL, 0 5 mL, Intramuscular, Prior to discharge, Avery Scruggs MD    levothyroxine tablet 75 mcg, 75 mcg, Oral, Early Morning, Provider Cutover, 75 mcg at 10/14/18 0655    midodrine (PROAMATINE) tablet 10 mg, 10 mg, Oral, TID, Wally Lynn MD, 10 mg at 10/14/18 0655    polyethylene glycol (MIRALAX) packet 17 g, 17 g, Oral, Daily, Provider Cutover, 17 g at 10/14/18 0825    senna (SENOKOT) tablet 8 6 mg, 1 tablet, Oral, BID, LIZETH Meyer, 8 6 mg at 10/14/18 0825      Objective:     Vital Signs:  Vitals:    10/13/18 0730 10/13/18 0732 10/14/18 0730 10/14/18 0735   BP: 150/89 102/66 122/83 118/71   BP Location: Left arm Left arm Left arm Left arm   Pulse: 105 (!) 120 (!) 111 (!) 121   Resp: 20 20 20 20   Temp: 97 6 °F (36 4 °C)  (!) 97 3 °F (36 3 °C)    TempSrc: Temporal  Temporal    SpO2:       Weight:   88 9 kg (196 lb)    Height:             Appearance:  age appropriate, casually dressed and disheveled   Behavior:  normal   Speech:  soft   Mood:  depressed   Affect:  flat   Thought Process:  normal   Thought Content:  normal   Perceptual Disturbances: None   Risk Potential: none   Sensorium:  person, place and time   Cognition:  intact   Consciousness:  alert and awake    Attention: attention span and concentration were age diminished   Intellect: average   Insight:  limited   Judgment: limited      Motor Activity: no abnormal movements           Recent Labs:  Results Reviewed     None          I/O Past 24 hours:  I/O last 3 completed shifts:  In: -   Out: 2 [Other:1; Stool:1]  No intake/output data recorded  Assessment / Plan:     [facility]    Recommended Treatment:      Medication changes:  1) continue current medication regimen  Non-pharmacological treatments  1) Continue with group therapy, milieu therapy and occupational therapy  Safety  1) Safety/communication plan established targeting dynamic risk factors above  2) Risks, benefits, and possible side effects of medications explained to patient and patient verbalizes understanding  Counseling / Coordination of Care    Total floor / unit time spent today 20 minutes  Greater than 50% of total time was spent with the patient and / or family counseling and / or coordination of care  A description of the counseling / coordination of care  Patient's Rights, confidentiality and exceptions to confidentiality, use of automated medical record, [facility] staff access to medical record, and consent to treatment reviewed      Les Aguirre PA-C

## 2018-10-14 NOTE — PROGRESS NOTES
The patient slept through the night with no behaviors or issues noted  Fall precautions maintained  Med compliant  Continue to monitor

## 2018-10-15 RX ADMIN — SENNOSIDES 8.6 MG: 8.6 TABLET, FILM COATED ORAL at 08:11

## 2018-10-15 RX ADMIN — POLYETHYLENE GLYCOL 3350 17 G: 17 POWDER, FOR SOLUTION ORAL at 08:11

## 2018-10-15 RX ADMIN — LEVOTHYROXINE SODIUM 75 MCG: 75 TABLET ORAL at 06:38

## 2018-10-15 RX ADMIN — SENNOSIDES 8.6 MG: 8.6 TABLET, FILM COATED ORAL at 18:00

## 2018-10-15 RX ADMIN — MIDODRINE HYDROCHLORIDE 10 MG: 2.5 TABLET ORAL at 06:38

## 2018-10-15 RX ADMIN — MIDODRINE HYDROCHLORIDE 10 MG: 2.5 TABLET ORAL at 20:58

## 2018-10-15 RX ADMIN — BUPROPION HYDROCHLORIDE 300 MG: 300 TABLET, FILM COATED, EXTENDED RELEASE ORAL at 08:11

## 2018-10-15 RX ADMIN — FLUPHENAZINE HYDROCHLORIDE 20 MG: 10 TABLET, FILM COATED ORAL at 20:57

## 2018-10-15 RX ADMIN — CLOZAPINE 450 MG: 25 TABLET ORAL at 20:58

## 2018-10-15 RX ADMIN — DIVALPROEX SODIUM 1000 MG: 500 TABLET, FILM COATED, EXTENDED RELEASE ORAL at 20:57

## 2018-10-15 NOTE — PROGRESS NOTES
Individual has been visible for structured activities, the retreated to room  At times he will walk in the halls, but he will not interact/ socialize with his peers  Affect is blunted, eye contact is poor  He attended 2/3 groups  RN attempted to interact but he was difficult to engage  Compliant with meds without prompting  No issues or concerns expressed  Will continue to monitor

## 2018-10-15 NOTE — CASE MANAGEMENT
Patient reported he would like to start volunteering at the [facility] while on the [facility]  CM met and spoke with patient to complete the volunteer application  CM then called and left  for [address] [address], the  at [facility], requesting call back to discuss how to submit the application and when she can be oriented to start volunteering  CM will await her call back and continue to follow patient's progress and assist with reintegration back into the community as well as discharge planning needs

## 2018-10-15 NOTE — PROGRESS NOTES
Progress Note - Behavioral Health   Xu Saul 26 y o  male MRN: [id number]  [facility] Encounter: [id number]    Assessment/Plan   Principal Problem:    Chronic paranoid schizophrenia ([facility] )  Active Problems:    Encounter for medical assessment      Behavior over the last 24 hours:  unchanged  Sleep: hypersomnia  Appetite: normal  Medication side effects: No  ROS: no complaints    Mental Status Evaluation:  Appearance:  disheveled   Behavior:  normal   Speech:  delayed   Mood:  constricted   Affect:  constricted   Thought Process:  concrete   Thought Content:  normal   Perceptual Disturbances: Auditory hallucinations without commands   Risk Potential: none   Sensorium:  person and place   Cognition:  grossly intact   Consciousness:  alert    Attention: attention span appeared shorter than expected for age   Insight:  limited   Judgment: limited   Gait/Station: normal gait/station   Motor Activity: no abnormal movements     Progress Toward Goals:   Patient continues to be the same  He keeps to himself there is no interaction between patient and other residents  He will answer questions when asked but with brief answers  Will not elaborate about his psychopathology and it is difficult to assess whether he understands it at his treatment because of his guardedness  Recommended Treatment: Continue with group therapy, milieu therapy and occupational therapy  Risks, benefits and possible side effects of Medications:   Risks, benefits, and possible side effects of medications explained to patient and patient verbalizes understanding  Medications: all current active meds have been reviewed  Labs:   None    Counseling / Coordination of Care  Total floor / unit time spent today 10 minutes   Greater than 50% of total time was spent with the patient and / or family counseling

## 2018-10-16 RX ADMIN — SENNOSIDES 8.6 MG: 8.6 TABLET, FILM COATED ORAL at 08:49

## 2018-10-16 RX ADMIN — BUPROPION HYDROCHLORIDE 300 MG: 300 TABLET, FILM COATED, EXTENDED RELEASE ORAL at 08:49

## 2018-10-16 RX ADMIN — FLUPHENAZINE HYDROCHLORIDE 20 MG: 10 TABLET, FILM COATED ORAL at 21:02

## 2018-10-16 RX ADMIN — MIDODRINE HYDROCHLORIDE 10 MG: 2.5 TABLET ORAL at 16:42

## 2018-10-16 RX ADMIN — CLOZAPINE 450 MG: 25 TABLET ORAL at 21:02

## 2018-10-16 RX ADMIN — DIVALPROEX SODIUM 1000 MG: 500 TABLET, FILM COATED, EXTENDED RELEASE ORAL at 21:02

## 2018-10-16 RX ADMIN — MIDODRINE HYDROCHLORIDE 10 MG: 2.5 TABLET ORAL at 06:08

## 2018-10-16 RX ADMIN — POLYETHYLENE GLYCOL 3350 17 G: 17 POWDER, FOR SOLUTION ORAL at 08:49

## 2018-10-16 RX ADMIN — SENNOSIDES 8.6 MG: 8.6 TABLET, FILM COATED ORAL at 17:11

## 2018-10-16 RX ADMIN — LEVOTHYROXINE SODIUM 75 MCG: 75 TABLET ORAL at 06:09

## 2018-10-16 NOTE — PROGRESS NOTES
Progress Note - Behavioral Health   Aggie Amaro 26 y o  male MRN: [id number]  Unit/Bed#: [facility] 112-01 Encounter: [id number]    Assessment/Plan   Principal Problem:    Chronic paranoid schizophrenia ([address] )  Active Problems:    Encounter for medical assessment      Behavior over the last 24 hours:  unchanged  Sleep: hypersomnia  Appetite: normal  Medication side effects: No  ROS: no complaints    Mental Status Evaluation:  Appearance:  disheveled   Behavior:  evasive   Speech:  soft   Mood:  constricted   Affect:  constricted   Thought Process:  blocked   Thought Content:  normal   Perceptual Disturbances: Auditory hallucinations without commands   Risk Potential: none   Sensorium:  person, place, time/date and situation   Cognition:  grossly intact   Consciousness:  alert    Attention: attention span appeared shorter than expected for age   Insight:  limited   Judgment: limited   Gait/Station: normal gait/station   Motor Activity: no abnormal movements     Progress Toward Goals:   Patient's behavior and mental status exam about the same  The 1 positive is that he S to volunteer for the [facility]  Arrangements are being made for that to happen  No new issues regarding his psychosis  He continued to have intermittent auditory hallucinations  Recommended Treatment: Continue with group therapy, milieu therapy and occupational therapy  Risks, benefits and possible side effects of Medications:   Risks, benefits, and possible side effects of medications explained to patient and patient verbalizes understanding  Medications: all current active meds have been reviewed  Labs:   None    Counseling / Coordination of Care  Total floor / unit time spent today 10 minutes  Greater than 50% of total time was spent with the patient and / or family counseling and / or coordination of care

## 2018-10-16 NOTE — PROGRESS NOTES
Jessica Strickland has been awake, alert, and visible intermittently out in the milieu  No interaction noted with peers  Compliant with scheduled meds with some prompting  Ate 100% supper  Affect remains flat, blunted, and difficult to engage in conversation  Preoccupied at times  Attended and participated in 2/2 evening groups  No behavioral issues  Resting quietly in bed at present, arouses easily  Will continue to monitor/assess for any changes

## 2018-10-16 NOTE — PROGRESS NOTES
Jose Luis Panchal attended both of the group activities today  Zilaurahia Holiday was compliant with medications and meals and denied pain, fluids encouraged, gait steady, remains on Fall precautions, isolative, spent most of the day alone in his room, polite and cooperative on approach, limited interaction with staff and peers will continue to monitor

## 2018-10-17 RX ADMIN — POLYETHYLENE GLYCOL 3350 17 G: 17 POWDER, FOR SOLUTION ORAL at 09:07

## 2018-10-17 RX ADMIN — FLUPHENAZINE HYDROCHLORIDE 20 MG: 10 TABLET, FILM COATED ORAL at 21:06

## 2018-10-17 RX ADMIN — CLOZAPINE 450 MG: 25 TABLET ORAL at 21:06

## 2018-10-17 RX ADMIN — LEVOTHYROXINE SODIUM 75 MCG: 75 TABLET ORAL at 06:42

## 2018-10-17 RX ADMIN — SENNOSIDES 8.6 MG: 8.6 TABLET, FILM COATED ORAL at 17:03

## 2018-10-17 RX ADMIN — DIVALPROEX SODIUM 1000 MG: 500 TABLET, FILM COATED, EXTENDED RELEASE ORAL at 21:07

## 2018-10-17 RX ADMIN — SENNOSIDES 8.6 MG: 8.6 TABLET, FILM COATED ORAL at 09:08

## 2018-10-17 RX ADMIN — MIDODRINE HYDROCHLORIDE 10 MG: 2.5 TABLET ORAL at 21:06

## 2018-10-17 RX ADMIN — MIDODRINE HYDROCHLORIDE 10 MG: 2.5 TABLET ORAL at 06:42

## 2018-10-17 RX ADMIN — MIDODRINE HYDROCHLORIDE 10 MG: 2.5 TABLET ORAL at 17:03

## 2018-10-17 RX ADMIN — BUPROPION HYDROCHLORIDE 300 MG: 300 TABLET, FILM COATED, EXTENDED RELEASE ORAL at 09:08

## 2018-10-17 RX ADMIN — INFLUENZA VIRUS VACCINE 0.5 ML: 15; 15; 15; 15 SUSPENSION INTRAMUSCULAR at 11:32

## 2018-10-17 NOTE — PROGRESS NOTES
Patient is visible on unit intermittently, presents with a flat affect, he is cooperative with treatment

## 2018-10-17 NOTE — PROGRESS NOTES
Progress Note - Behavioral Health   Meka Hill 26 y o  male MRN: [id number]  Unit/Bed#: [facility] 112-01 Encounter: [id number]    Assessment/Plan   Principal Problem:    Chronic paranoid schizophrenia ([address] )  Active Problems:    Encounter for medical assessment      Behavior over the last 24 hours:  unchanged  Sleep: hypersomnia  Appetite: normal  Medication side effects: No  ROS: no complaints    Mental Status Evaluation:  Appearance:  disheveled   Behavior:  evasive   Speech:  delayed   Mood:  constricted   Affect:  constricted   Thought Process:  logical   Thought Content:  normal   Perceptual Disturbances: Auditory hallucinations without commands   Risk Potential: none   Sensorium:  person, place, time/date and situation   Cognition:  grossly intact   Consciousness:  alert    Attention: attention span appeared shorter than expected for age   Insight:  fair   Judgment: fair   Gait/Station: normal gait/station   Motor Activity: no abnormal movements     Progress Toward Goals:   About the same  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

## 2018-10-17 NOTE — PROGRESS NOTES
Chandrika Liang attended both of the group activities today  Hudson Gracia was compliant with medications and meals and denied pain, fluids encouraged, gait steady, remains on Fall precautions, isolative, spent most of the day alone in his room, polite and cooperative on approach, limited interaction with staff and peers will continue to monitor

## 2018-10-17 NOTE — CASE MANAGEMENT
[facility] [place], [place] again and discovered they still do not have any male beds available at this time  RAYNA then called and spoke with Tonie with [facility] in [address] [place] [address] and she stated they do have some openings and asked this CM to fax over clinical information including his SSI income ($800) for her to review  CM encouraged her to call this CM back once she has reviewed to see about scheduling an interview with patient on the unit  CM also informed patient of this referral to which he was agreeable to  CM will continue to follow patient's progress and assist with discharge planning needs

## 2018-10-18 RX ADMIN — BUPROPION HYDROCHLORIDE 300 MG: 300 TABLET, FILM COATED, EXTENDED RELEASE ORAL at 08:56

## 2018-10-18 RX ADMIN — MIDODRINE HYDROCHLORIDE 10 MG: 2.5 TABLET ORAL at 06:04

## 2018-10-18 RX ADMIN — MIDODRINE HYDROCHLORIDE 10 MG: 2.5 TABLET ORAL at 20:56

## 2018-10-18 RX ADMIN — FLUPHENAZINE HYDROCHLORIDE 20 MG: 10 TABLET, FILM COATED ORAL at 20:56

## 2018-10-18 RX ADMIN — LEVOTHYROXINE SODIUM 75 MCG: 75 TABLET ORAL at 06:04

## 2018-10-18 RX ADMIN — DIVALPROEX SODIUM 1000 MG: 500 TABLET, FILM COATED, EXTENDED RELEASE ORAL at 20:56

## 2018-10-18 RX ADMIN — CLOZAPINE 450 MG: 25 TABLET ORAL at 20:56

## 2018-10-18 RX ADMIN — POLYETHYLENE GLYCOL 3350 17 G: 17 POWDER, FOR SOLUTION ORAL at 08:56

## 2018-10-18 RX ADMIN — SENNOSIDES 8.6 MG: 8.6 TABLET, FILM COATED ORAL at 17:01

## 2018-10-18 RX ADMIN — SENNOSIDES 8.6 MG: 8.6 TABLET, FILM COATED ORAL at 08:56

## 2018-10-18 NOTE — ESCALATED TEAM TX
Patient attended treatment team meeting this morning prepared with self-assessment  Patient's group attendance for last week was 86%  Patient was talkative and relatable in team meeting today, showing improvement in social skills  He is still reporting depression related to not having a bed at a group home  Team tested patient's understanding that he is not to blame for the wait list and his still being in the hospital has nothing to do with him  Patient verbalized understanding   is submitting referral for [facility] in [place]  Patient was granted a pass for Sunday, 10/21 with his mother from 10-8p  Patient's goal for the pass is to get a haircut and see a movie  He is excited to see "Halloween "  Patient also completed application for "[employer]"  Patient verbalized no further questions or concerns at the conclusion of the meeting  Next team meeting scheduled for 10/25

## 2018-10-18 NOTE — PROGRESS NOTES
Individual maintained on ongoing SAFE and fall precaution without any incident on this shift    He is laying in bed, eyes closed, breath evenly and unlabored   Checked every 15 minutes during rounds   In no apparent distress   Will continue to monitor behavior and sleep pattern    Midodrine given for BP 96/80  P92

## 2018-10-18 NOTE — PROGRESS NOTES
Keily Lisa attended all of the group activities today  Jose E Nathanr was compliant with medications and meals and denied pain, fluids encouraged, gait steady, remains on Fall precautions, isolative except for structured activities, spent most of the day alone in his room, polite and cooperative on approach, limited interaction with staff and peers will continue to monitor

## 2018-10-18 NOTE — PROGRESS NOTES
Progress Note - Behavioral Health   Nika Navarro 26 y o  male MRN: [id number]  Unit/Bed#: [facility] 112-01 Encounter: [id number]    Assessment/Plan   Principal Problem:    Chronic paranoid schizophrenia ([facility] [address] )  Active Problems:    Encounter for medical assessment      Behavior over the last 24 hours:  unchanged  Sleep: hypersomnia  Appetite: normal  Medication side effects: No  ROS: no complaints    Mental Status Evaluation:  Appearance:  disheveled   Behavior:  guarded   Speech:  delayed   Mood:  constricted   Affect:  flat   Thought Process:  blocked   Thought Content:  normal   Perceptual Disturbances: Auditory hallucinations without commands   Risk Potential: Potential for Aggression No and none   Sensorium:  place   Cognition:  grossly intact   Consciousness:  alert    Attention: attention span appeared shorter than expected for age   Insight:  fair   Judgment: fair   Gait/Station: normal gait/station   Motor Activity: no abnormal movements     Progress Toward Goals:   Team meeting today  Patient presents about the same although he can be articulate when asked a question or he can after a brief questions for the most part he is withdrawn and quiet has little or no interaction with any other residents  He continues to hallucinate and could be seen laughing at times ;no agitation ;he is cooperative with the program   Pt tolerates  his current meds without any issues   working on a new disposition     Recommended Treatment: Continue with group therapy, milieu therapy and occupational therapy  Risks, benefits and possible side effects of Medications:   Risks, benefits, and possible side effects of medications explained to patient and patient verbalizes understanding  Medications: all current active meds have been reviewed  Labs:   Reviewed    Counseling / Coordination of Care  Total floor / unit time spent today 15 minutes   Greater than 50% of total time was spent with the patient and / or family counseling and / or coordination of care

## 2018-10-19 LAB — VALPROATE SERPL-MCNC: 39.9 UG/ML (ref 50–120)

## 2018-10-19 PROCEDURE — 80164 ASSAY DIPROPYLACETIC ACD TOT: CPT | Performed by: PSYCHIATRY & NEUROLOGY

## 2018-10-19 RX ADMIN — LEVOTHYROXINE SODIUM 75 MCG: 75 TABLET ORAL at 05:36

## 2018-10-19 RX ADMIN — BISACODYL 10 MG: 5 TABLET, COATED ORAL at 06:47

## 2018-10-19 RX ADMIN — MIDODRINE HYDROCHLORIDE 10 MG: 2.5 TABLET ORAL at 16:59

## 2018-10-19 RX ADMIN — SENNOSIDES 8.6 MG: 8.6 TABLET, FILM COATED ORAL at 17:01

## 2018-10-19 RX ADMIN — CLOZAPINE 450 MG: 25 TABLET ORAL at 20:55

## 2018-10-19 RX ADMIN — MIDODRINE HYDROCHLORIDE 10 MG: 2.5 TABLET ORAL at 06:46

## 2018-10-19 RX ADMIN — MIDODRINE HYDROCHLORIDE 10 MG: 2.5 TABLET ORAL at 20:55

## 2018-10-19 RX ADMIN — SENNOSIDES 8.6 MG: 8.6 TABLET, FILM COATED ORAL at 08:37

## 2018-10-19 RX ADMIN — BUPROPION HYDROCHLORIDE 300 MG: 300 TABLET, FILM COATED, EXTENDED RELEASE ORAL at 08:37

## 2018-10-19 RX ADMIN — POLYETHYLENE GLYCOL 3350 17 G: 17 POWDER, FOR SOLUTION ORAL at 08:37

## 2018-10-19 RX ADMIN — FLUPHENAZINE HYDROCHLORIDE 20 MG: 10 TABLET, FILM COATED ORAL at 20:55

## 2018-10-19 RX ADMIN — DIVALPROEX SODIUM 1000 MG: 500 TABLET, FILM COATED, EXTENDED RELEASE ORAL at 20:55

## 2018-10-19 NOTE — PROGRESS NOTES
Individual maintained on ongoing SAFE and fall precaution without any incident on this shift    He is laying in bed, eyes closed, breath evenly and unlabored   Checked every 15 minutes during rounds   In no apparent distress   Will continue to monitor behavior and sleep pattern  Midodrine given for /70   P118

## 2018-10-19 NOTE — PROGRESS NOTES
Progress Note - Behavioral Health   Wendy Jama 26 y o  male MRN: [id number]  Unit/Bed#: [facility] 112-01 Encounter: [id number]    Assessment/Plan   Principal Problem:    Chronic paranoid schizophrenia ([address] )  Active Problems:    Encounter for medical assessment      Behavior over the last 24 hours:  unchanged  Sleep: normal  Appetite: normal  Medication side effects: No  ROS: no complaints    Mental Status Evaluation:  Appearance:  disheveled   Behavior:  evasive   Speech:  soft   Mood:  constricted   Affect:  constricted   Thought Process:  blocked   Thought Content:  normal   Perceptual Disturbances: Auditory hallucinations without commands   Risk Potential: none   Sensorium:  person, place, time/date and situation   Cognition:  grossly intact   Consciousness:  alert    Attention: attention span appeared shorter than expected for age   Insight:  fair   Judgment: fair   Gait/Station: normal gait/station   Motor Activity: no abnormal movements     Progress Toward Goals:   Patient remains same  However he had a good team meeting and see more verbal and articulate  Was able to think conduct conversation    Recommended Treatment: Continue with group therapy, milieu therapy and occupational therapy  Risks, benefits and possible side effects of Medications:   Risks, benefits, and possible side effects of medications explained to patient and patient verbalizes understanding  Medications: all current active meds have been reviewed  Labs:   Reviewed    Counseling / Coordination of Care  Total floor / unit time spent today 10 minutes  Greater than 50% of total time was spent with the patient and / or family counseling and / or coordination of care

## 2018-10-19 NOTE — PROGRESS NOTES
Fatoumata President has been isolative to his room and self most of the shift  No interaction with peers and very little with staff  Poor eye contact  Denies anxiety, depression and hearing voices  Ate 100% of his meal  Took medications without difficulty  Blunted, flat affect  Mom is here visiting and brought him food  Continue to monitor  Patient safety maintained without incident

## 2018-10-19 NOTE — PROGRESS NOTES
Amol Love was isolative to his room and self most of the shift  He was out a few times to ambulate, but no interaction with peers  Pleasant and cooperative when approached by staff  Denies anxiety, depression and voices  Blunted, flat affect  Ate 100% of his supper and took pills without difficulty  Mouth checks with medication  Attended and participated in 2/2 evening groups  Took HS medication with prompting and ate snack before going to bed  Patient safety and fall precautions maintained without incident

## 2018-10-19 NOTE — CASE MANAGEMENT
[place] attempted to call the [place] [phone] again to discuss handing in the completed applications for volunteering  RAYNA was unable to get anyone on the phone and could not leave a voicemail on any of the voicemail lines because they were all full  RAYNA has scheduled this patient to hand in his application to the [employer] in person on Monday morning  Staff will drive him there to drop off with instructions to call this CM about arranging the volunteer times  CM will continue to assist with any other discharge planning needs

## 2018-10-20 RX ADMIN — FLUPHENAZINE HYDROCHLORIDE 20 MG: 10 TABLET, FILM COATED ORAL at 20:52

## 2018-10-20 RX ADMIN — LEVOTHYROXINE SODIUM 75 MCG: 75 TABLET ORAL at 06:27

## 2018-10-20 RX ADMIN — SENNOSIDES 8.6 MG: 8.6 TABLET, FILM COATED ORAL at 09:30

## 2018-10-20 RX ADMIN — BUPROPION HYDROCHLORIDE 300 MG: 300 TABLET, FILM COATED, EXTENDED RELEASE ORAL at 09:30

## 2018-10-20 RX ADMIN — CLOZAPINE 450 MG: 25 TABLET ORAL at 20:52

## 2018-10-20 RX ADMIN — MIDODRINE HYDROCHLORIDE 10 MG: 2.5 TABLET ORAL at 16:56

## 2018-10-20 RX ADMIN — MIDODRINE HYDROCHLORIDE 10 MG: 2.5 TABLET ORAL at 20:52

## 2018-10-20 RX ADMIN — DIVALPROEX SODIUM 1000 MG: 500 TABLET, FILM COATED, EXTENDED RELEASE ORAL at 20:52

## 2018-10-20 RX ADMIN — POLYETHYLENE GLYCOL 3350 17 G: 17 POWDER, FOR SOLUTION ORAL at 09:30

## 2018-10-20 RX ADMIN — MIDODRINE HYDROCHLORIDE 10 MG: 2.5 TABLET ORAL at 06:27

## 2018-10-20 RX ADMIN — SENNOSIDES 8.6 MG: 8.6 TABLET, FILM COATED ORAL at 17:09

## 2018-10-20 NOTE — PROGRESS NOTES
Progress Note - Behavioral Health   Nika Navarro 26 y o  male MRN: [id number]  Unit/Bed#: [facility] 112-01 Encounter: [id number]    Assessment/Plan   Principal Problem:    Chronic paranoid schizophrenia ([facility] [address] )  Active Problems:    Encounter for medical assessment      Behavior over the last 24 hours:  unchanged  Sleep: normal  Appetite: normal  Medication side effects: No  ROS: no complaints    Mental Status Evaluation:  Appearance:  disheveled   Behavior:  evasive   Speech:  soft   Mood:  constricted   Affect:  constricted   Thought Process:  blocked   Thought Content:  normal   Perceptual Disturbances: Auditory hallucinations without commands   Risk Potential: none   Sensorium:  person, place, time/date and situation   Cognition:  grossly intact   Consciousness:  alert    Attention: attention span appeared shorter than expected for age   Insight:  fair   Judgment: fair   Gait/Station: normal gait/station   Motor Activity: no abnormal movements     Progress Toward Goals:     Patient continues to make slow and steady progress  He is pleasant upon approach  He has no new issues or complaints to report to me  Slept well last night  He is tolerating all his medications well without side effect  Recommended Treatment: Continue with group therapy, milieu therapy and occupational therapy  Risks, benefits and possible side effects of Medications:   Risks, benefits, and possible side effects of medications explained to patient and patient verbalizes understanding  Medications: all current active meds have been reviewed  Labs:   Reviewed    Counseling / Coordination of Care  Total floor / unit time spent today 10 minutes  Greater than 50% of total time was spent with the patient and / or family counseling and / or coordination of care

## 2018-10-20 NOTE — PROGRESS NOTES
Neida Fam attended all of the group activities today  [facility] was compliant with medications and meals and denied pain, fluids encouraged, gait steady, remains on Fall precautions, isolative except for structured activities, spent most of the day alone in his room, polite and cooperative on approach, limited interaction with staff and peers will continue to monitor

## 2018-10-20 NOTE — PROGRESS NOTES
Patient is isolative to room  Patient presents with a flat affect, poor eye contact  Patient does respond when questions are asked  Patient attended wrap up group, did not attend movie group

## 2018-10-21 PROCEDURE — 80307 DRUG TEST PRSMV CHEM ANLYZR: CPT | Performed by: PSYCHIATRY & NEUROLOGY

## 2018-10-21 RX ADMIN — POLYETHYLENE GLYCOL 3350 17 G: 17 POWDER, FOR SOLUTION ORAL at 08:23

## 2018-10-21 RX ADMIN — BUPROPION HYDROCHLORIDE 300 MG: 300 TABLET, FILM COATED, EXTENDED RELEASE ORAL at 08:23

## 2018-10-21 RX ADMIN — CLOZAPINE 450 MG: 25 TABLET ORAL at 20:55

## 2018-10-21 RX ADMIN — FLUPHENAZINE HYDROCHLORIDE 20 MG: 10 TABLET, FILM COATED ORAL at 20:55

## 2018-10-21 RX ADMIN — SENNOSIDES 8.6 MG: 8.6 TABLET, FILM COATED ORAL at 08:23

## 2018-10-21 RX ADMIN — MIDODRINE HYDROCHLORIDE 10 MG: 2.5 TABLET ORAL at 06:13

## 2018-10-21 RX ADMIN — DIVALPROEX SODIUM 1000 MG: 500 TABLET, FILM COATED, EXTENDED RELEASE ORAL at 20:55

## 2018-10-21 RX ADMIN — LEVOTHYROXINE SODIUM 75 MCG: 75 TABLET ORAL at 06:12

## 2018-10-21 NOTE — PROGRESS NOTES
Progress Note - Behavioral Health   Sophia Nicholson 26 y o  male MRN: [id number]  Unit/Bed#: [facility] 112-01 Encounter: [id number]    Assessment/Plan   Principal Problem:    Chronic paranoid schizophrenia ([address] )  Active Problems:    Encounter for medical assessment      Behavior over the last 24 hours:  unchanged  Sleep: normal  Appetite: normal  Medication side effects: No  ROS: no complaints    Mental Status Evaluation:  Appearance:  disheveled   Behavior:  evasive   Speech:  soft   Mood:  constricted   Affect:  constricted   Thought Process:  blocked   Thought Content:  normal   Perceptual Disturbances: Auditory hallucinations without commands   Risk Potential: none   Sensorium:  person, place, time/date and situation   Cognition:  grossly intact   Consciousness:  alert    Attention: attention span appeared shorter than expected for age   Insight:  fair   Judgment: fair   Gait/Station: normal gait/station   Motor Activity: no abnormal movements     Progress Toward Goals:       Patient is currently on a therapeutic leave of absence  Recommended Treatment: Continue with group therapy, milieu therapy and occupational therapy  Risks, benefits and possible side effects of Medications:   Risks, benefits, and possible side effects of medications explained to patient and patient verbalizes understanding  Medications: all current active meds have been reviewed  Labs:   Reviewed    Counseling / Coordination of Care  Total floor / unit time spent today 10 minutes  Greater than 50% of total time was spent with the patient and / or family counseling and / or coordination of care

## 2018-10-21 NOTE — PROGRESS NOTES
Rin Rubio has been awake, alert, and visible intermittently out in the milieu  Affect remains flat, blunted, and offers minimal conversation with staff  Encouraging fluids  No overt responding to internal stimuli noted  Compliant with all scheduled meds with little prompting  Ate 100% supper  Mother and sister visited and brought food in for pt which pt ate  Attended and participated in 2/2 evening groups  Had evening snack  No behavioral issues  Minimal interaction noted with peers  Resting quietly in bed at present, arouses easily  Will continue to monitor/assess for any changes

## 2018-10-21 NOTE — PROGRESS NOTES
Individual maintained on ongoing SAFE and fall precaution without any incident on this shift    He is laying in bed, eyes closed, breath evenly and unlabored   Checked every 15 minutes during rounds   In no apparent distress   Will continue to monitor behavior and sleep pattern  Individual has a pass with mom from [id number] to assist in the community,for therapeutic reintegrate, socialization and building relationships

## 2018-10-21 NOTE — PROGRESS NOTES
Chandrika Liang attended goals group today  Hudson Gracia was compliant with medications and meals and denied pain, fluids encouraged, gait steady, remains on Fall precautions, left for day pass with mom at this time  He will call to check in at 5pm and return at 8pm   He is planning on going to the movies, getting a haircut and doing laundry

## 2018-10-21 NOTE — PROGRESS NOTES
Quinton called at [phone] to check in  Pt stated that pass is going well  Await pt's return from pass with mother at 5

## 2018-10-22 RX ADMIN — BUPROPION HYDROCHLORIDE 300 MG: 300 TABLET, FILM COATED, EXTENDED RELEASE ORAL at 08:32

## 2018-10-22 RX ADMIN — SENNOSIDES 8.6 MG: 8.6 TABLET, FILM COATED ORAL at 17:07

## 2018-10-22 RX ADMIN — MIDODRINE HYDROCHLORIDE 10 MG: 2.5 TABLET ORAL at 20:48

## 2018-10-22 RX ADMIN — LEVOTHYROXINE SODIUM 75 MCG: 75 TABLET ORAL at 06:12

## 2018-10-22 RX ADMIN — DIVALPROEX SODIUM 1000 MG: 500 TABLET, FILM COATED, EXTENDED RELEASE ORAL at 20:48

## 2018-10-22 RX ADMIN — MIDODRINE HYDROCHLORIDE 10 MG: 2.5 TABLET ORAL at 16:54

## 2018-10-22 RX ADMIN — CLOZAPINE 450 MG: 25 TABLET ORAL at 20:49

## 2018-10-22 RX ADMIN — SENNOSIDES 8.6 MG: 8.6 TABLET, FILM COATED ORAL at 08:32

## 2018-10-22 RX ADMIN — POLYETHYLENE GLYCOL 3350 17 G: 17 POWDER, FOR SOLUTION ORAL at 08:32

## 2018-10-22 RX ADMIN — MIDODRINE HYDROCHLORIDE 10 MG: 2.5 TABLET ORAL at 06:12

## 2018-10-22 RX ADMIN — FLUPHENAZINE HYDROCHLORIDE 20 MG: 10 TABLET, FILM COATED ORAL at 20:49

## 2018-10-22 NOTE — PROGRESS NOTES
Individual maintained on ongoing SAFE and fall precaution without any incident on this shift    He is laying in bed, eyes closed, breath evenly and unlabored   Checked every 15 minutes during rounds   In no apparent distress   Will continue to monitor behavior and sleep pattern  Individual has a schedule to drop off application to the [employer] in the morning

## 2018-10-22 NOTE — PROGRESS NOTES
Mendel Majestic attended goals group today  Margy Tan was compliant with medications and meals and denied pain, fluids encouraged, gait steady, remains on Fall precautions, left for day pass with mom at this time

## 2018-10-22 NOTE — PROGRESS NOTES
Neida Sarwat met his goals while out on pass  Attended and participated in 2/2 evening groups  Compliant with scheduled 2100 meds without prompting  2100 Midodrine held per MD BP parameters  No behavioral issues  Resting quietly in bed at present, arouses easily  Will continue to monitor/assess for any changes

## 2018-10-22 NOTE — PROGRESS NOTES
Psychiatry Progress Note    Subjective: Interval History     This pt continues to be compliant with the meds, meals and attendance in groups;  Very little conversation today      Current medications:    Current Facility-Administered Medications:     bisacodyl (DULCOLAX) EC tablet 10 mg, 10 mg, Oral, Daily PRN, Provider Cutover, 10 mg at 10/19/18 0647    bisacodyl (DULCOLAX) rectal suppository 10 mg, 10 mg, Rectal, Daily PRN, LIZETH Weinberg    buPROPion (WELLBUTRIN XL) 24 hr tablet 300 mg, 300 mg, Oral, Daily, Provider Cutover, 300 mg at 10/22/18 1453    cloZAPine (CLOZARIL) tablet 450 mg, 450 mg, Oral, HS, Adelso Siegel MD, 450 mg at 10/21/18 2055    divalproex sodium (DEPAKOTE ER) 24 hr tablet 1,000 mg, 1,000 mg, Oral, HS, Krystina Sanders MD, 1,000 mg at 10/21/18 2055    fluPHENAZine (PROLIXIN) tablet 20 mg, 20 mg, Oral, HS, Adelso Siegel MD, 20 mg at 10/21/18 2055    levothyroxine tablet 75 mcg, 75 mcg, Oral, Early Morning, Provider Cutover, 75 mcg at 10/22/18 0612    midodrine (PROAMATINE) tablet 10 mg, 10 mg, Oral, TID, Wally Lynn MD, 10 mg at 10/22/18 1654    polyethylene glycol (MIRALAX) packet 17 g, 17 g, Oral, Daily, Provider Cutover, 17 g at 10/22/18 7519    senna (SENOKOT) tablet 8 6 mg, 1 tablet, Oral, BID, LIZETH Catalan, 8 6 mg at 10/22/18 1707    Current Problem List:    Patient Active Problem List   Diagnosis    Chronic paranoid schizophrenia ([facility] [address] )    Encounter for medical assessment       Problem list reviewed 10/22/18     Objective:     Vital Signs:  Vitals:    10/22/18 0732 10/22/18 1500 10/22/18 1501 10/22/18 1502   BP: 108/68 114/78 125/78 109/88   BP Location: Left arm Left arm Left arm Left arm   Pulse: (!) 114 94 (!) 107 (!) 109   Resp:  17     Temp:  (!) 96 5 °F (35 8 °C)     TempSrc:  Temporal     SpO2:       Weight:       Height:             Appearance:  age appropriate and casually dressed   Behavior:  normal   Speech:  normal volume   Mood:  depressed   Affect: constricted   Thought Process:  normal   Thought Content:  normal   Perceptual Disturbances: None   Risk Potential: none   Sensorium:  person, place, situation and time   Cognition:  intact   Consciousness:  alert and awake    Attention: attention span and concentration were age appropriate   Intellect: average   Insight:  good   Judgment: good      Motor Activity: no abnormal movements           Recent Labs:  Results Reviewed     None          I/O Past 24 hours:  No intake/output data recorded  No intake/output data recorded  Assessment / Plan:     Chronic paranoid schizophrenia ([facility] )    Recommended Treatment:      Medication changes:  1) none    Non-pharmacological treatments  1) Continue with group therapy, milieu therapy and occupational therapy  Safety  1) Safety/communication plan established targeting dynamic risk factors above  2) Risks, benefits, and possible side effects of medications explained to patient and patient verbalizes understanding  Counseling / Coordination of Care    Total floor / unit time spent today 20 minutes  Greater than 50% of total time was spent with the patient and / or family counseling and / or coordination of care  A description of the counseling / coordination of care  Patient's Rights, confidentiality and exceptions to confidentiality, use of automated medical record, [address] [place] [address] staff access to medical record, and consent to treatment reviewed      Hafsa Wall MD

## 2018-10-22 NOTE — PROGRESS NOTES
Emy Pritchard returned from pass with mother at 5  Pass went well per pt and mother  Pt stated that he got a haircut, went shopping and bought new clothes, did his laundry, went to the movies, and went out to eat  Body assessment completed and UDS obtained and to lab per [facility] protocol  Will continue to monitor/assess for any changes

## 2018-10-23 RX ADMIN — CLOZAPINE 450 MG: 25 TABLET ORAL at 20:36

## 2018-10-23 RX ADMIN — POLYETHYLENE GLYCOL 3350 17 G: 17 POWDER, FOR SOLUTION ORAL at 08:22

## 2018-10-23 RX ADMIN — MIDODRINE HYDROCHLORIDE 10 MG: 2.5 TABLET ORAL at 06:23

## 2018-10-23 RX ADMIN — SENNOSIDES 8.6 MG: 8.6 TABLET, FILM COATED ORAL at 17:09

## 2018-10-23 RX ADMIN — LEVOTHYROXINE SODIUM 75 MCG: 75 TABLET ORAL at 06:23

## 2018-10-23 RX ADMIN — MIDODRINE HYDROCHLORIDE 10 MG: 2.5 TABLET ORAL at 16:56

## 2018-10-23 RX ADMIN — FLUPHENAZINE HYDROCHLORIDE 20 MG: 10 TABLET, FILM COATED ORAL at 20:36

## 2018-10-23 RX ADMIN — SENNOSIDES 8.6 MG: 8.6 TABLET, FILM COATED ORAL at 08:22

## 2018-10-23 RX ADMIN — BUPROPION HYDROCHLORIDE 300 MG: 300 TABLET, FILM COATED, EXTENDED RELEASE ORAL at 08:22

## 2018-10-23 RX ADMIN — DIVALPROEX SODIUM 1000 MG: 500 TABLET, FILM COATED, EXTENDED RELEASE ORAL at 20:36

## 2018-10-23 NOTE — CASE MANAGEMENT
CM called and spoke with Darin Ernandez,  at [facility] in [place], and she stated they did look at patient's referral, however, are unable to accept him as he has a Schizophrenia diagnosis and they have an agreement with the [facility] that they will not accept any Schizophrenia/Schizoaffective diagnosed residents to their facility  CM will continue to follow patient's progress and assist with discharge planning needs

## 2018-10-23 NOTE — PROGRESS NOTES
Progress Note - Behavioral Health   Melissa Butler 26 y o  male MRN: [id number]  [facility] Encounter: [id number]    Assessment/Plan   Principal Problem:    Chronic paranoid schizophrenia ([address] )  Active Problems:    Encounter for medical assessment      Behavior over the last 24 hours:  unchanged  Sleep: normal  Appetite: normal  Medication side effects: No  ROS: no complaints    Mental Status Evaluation:  Appearance:  disheveled   Behavior:  guarded   Speech:  delayed   Mood:  constricted   Affect:  constricted   Thought Process:  blocked   Thought Content:  normal   Perceptual Disturbances: Auditory hallucinations without commands   Risk Potential: none   Sensorium:  person and place   Cognition:  grossly intact   Consciousness:  alert    Attention: attention span appeared shorter than expected for age   Insight:  fair   Judgment: fair   Gait/Station: normal gait/station   Motor Activity: no abnormal movements     Progress Toward Goals:   Patient about the same  He is however excited about volunteering for the [employer]  No change in meds needed    Recommended Treatment: Continue with group therapy, milieu therapy and occupational therapy  Risks, benefits and possible side effects of Medications:   Risks, benefits, and possible side effects of medications explained to patient and patient verbalizes understanding  Medications: all current active meds have been reviewed  Labs:   Reviewed    Counseling / Coordination of Care  Total floor / unit time spent today 10 minutes  Greater than 50% of total time was spent with the patient and / or family counseling and / or coordination of care

## 2018-10-23 NOTE — NURSING NOTE
Pt remains isolative in room, laying in bed  Attended 2 groups today  Out for meals, eats well  Takes prescribed medications as ordered  No peer interaction noted

## 2018-10-23 NOTE — PROGRESS NOTES
Erick Repress has been awake, alert, and visible intermittently out in the milieu  Behavior quiet and mostly isolative to self  Affect remains flat, blunted, and offers minimal conversation with staff  Ate 100% supper  Attended and participated in 2/2 evening groups  Compliant with all scheduled meds with little prompting  No interaction noted with peers  Had evening snack  Resting quietly in bed at present, arouses easily  Will continue to monitor/assess for any changes

## 2018-10-23 NOTE — PROGRESS NOTES
Patient attended [facility] today, the focus of which was Introduction to Recovery  New  introduced self, discussed role and purpose, and set guidelines for group session  Conducted inventory of preferred topics and format  Discussed Recovery Plans, with an emphasis on patient strengths, goals, and obstacles  Patient was engaged in group part of the time, but did close his eyes part of the time  He did suggest spirituality as a potential topic for the future  He recognizes his biggest barrier to discharge as waiting for a bed at a group home  He expressed pride for his Health and Wellness in that he takes his medications and tries to eat healthy  Patient did well when prompted

## 2018-10-24 RX ADMIN — POLYETHYLENE GLYCOL 3350 17 G: 17 POWDER, FOR SOLUTION ORAL at 08:29

## 2018-10-24 RX ADMIN — MIDODRINE HYDROCHLORIDE 10 MG: 2.5 TABLET ORAL at 06:24

## 2018-10-24 RX ADMIN — CLOZAPINE 450 MG: 25 TABLET ORAL at 20:58

## 2018-10-24 RX ADMIN — LEVOTHYROXINE SODIUM 75 MCG: 75 TABLET ORAL at 06:24

## 2018-10-24 RX ADMIN — FLUPHENAZINE HYDROCHLORIDE 20 MG: 10 TABLET, FILM COATED ORAL at 20:58

## 2018-10-24 RX ADMIN — SENNOSIDES 8.6 MG: 8.6 TABLET, FILM COATED ORAL at 17:04

## 2018-10-24 RX ADMIN — MIDODRINE HYDROCHLORIDE 10 MG: 2.5 TABLET ORAL at 16:31

## 2018-10-24 RX ADMIN — DIVALPROEX SODIUM 1000 MG: 500 TABLET, FILM COATED, EXTENDED RELEASE ORAL at 20:58

## 2018-10-24 RX ADMIN — BUPROPION HYDROCHLORIDE 300 MG: 300 TABLET, FILM COATED, EXTENDED RELEASE ORAL at 08:30

## 2018-10-24 RX ADMIN — SENNOSIDES 8.6 MG: 8.6 TABLET, FILM COATED ORAL at 08:30

## 2018-10-24 NOTE — PROGRESS NOTES
Rene Mendez has been isolative to his room and self a lot during this shift  No interaction with peers  Intermittent eye contact  Denies anxiety, depression and voices  Cooperative upon approach by staff  Blunted, flat affect  Ate 75% of his meal and took medication without difficulty  BM tonight, but no shower  Attended and participated in 2/2 evening groups  Well groomed tonight  Took HS medication and ate snack before going to bed  Patient safety and fall precautions maintained without incident

## 2018-10-24 NOTE — PROGRESS NOTES
Progress Note - Behavioral Health   Remonia Grippe 26 y o  male MRN: [id number]  Unit/Bed#: [facility] 112-01 Encounter: [id number]    Assessment/Plan   Principal Problem:    Chronic paranoid schizophrenia ([address] )  Active Problems:    Encounter for medical assessment      Behavior over the last 24 hours:  unchanged  Sleep: hypersomnia  Appetite: normal  Medication side effects: No  ROS: no complaints    Mental Status Evaluation:  Appearance:  age appropriate   Behavior:  guarded   Speech:  delayed   Mood:  constricted   Affect:  constricted   Thought Process:  blocked   Thought Content:  normal   Perceptual Disturbances: Auditory hallucinations without commands   Risk Potential: none   Sensorium:  person and place   Cognition:  grossly intact   Consciousness:  alert    Attention: attention span appeared shorter than expected for age   Insight:  fair   Judgment: fair   Gait/Station: normal gait/station   Motor Activity: no abnormal movements     Progress Toward Goals:   Patient is working on [employer] at the [employer] and is excited about it  Patient continues to have auditory hallucinations, is cooperative with the program   He is able to express his own needs to staff  Recommended Treatment: Continue with group therapy, milieu therapy and occupational therapy  Risks, benefits and possible side effects of Medications:   Risks, benefits, and possible side effects of medications explained to patient and patient verbalizes understanding  Medications: all current active meds have been reviewed  Reviewed  Labs:   Reviewed    Counseling / Coordination of Care  Total floor / unit time spent today 15 minutes  Greater than 50% of total time was spent with the patient and / or family counseling and / or coordination of care

## 2018-10-24 NOTE — NURSING NOTE
Pt isolative to room, however does come out for groups  Also out for meals, eats well  Denies voices in conversation with this RN, but physician note states auditory hallucinations persist   No overt responding noted, but pt is spend much of day in his room  Take medications without prompting

## 2018-10-24 NOTE — PROGRESS NOTES
Individual maintained on ongoing SAFE and fall precaution without any incident on this shift    He is laying in bed, eyes closed, breath evenly and unlabored   Checked every 15 minutes during rounds   In no apparent distress   Will continue to monitor behavior and sleep pattern  Individual has a schedule to drop off application to the [employer] in the morning

## 2018-10-24 NOTE — PROGRESS NOTES
Stevie Loya has been awake, alert, and mostly isolative to self in room  Compliant with scheduled 1600/1800 meds with little prompting  Affect remains flat, blunted, and offers minimal conversation with staff  Ate 100% supper  Mother in to visit with fair interaction noted and brought food in for pt which pt ate  No interaction noted with peers  Will continue to monitor/assess for any changes

## 2018-10-25 RX ADMIN — DIVALPROEX SODIUM 1000 MG: 500 TABLET, FILM COATED, EXTENDED RELEASE ORAL at 20:41

## 2018-10-25 RX ADMIN — POLYETHYLENE GLYCOL 3350 17 G: 17 POWDER, FOR SOLUTION ORAL at 08:27

## 2018-10-25 RX ADMIN — LEVOTHYROXINE SODIUM 75 MCG: 75 TABLET ORAL at 06:43

## 2018-10-25 RX ADMIN — BUPROPION HYDROCHLORIDE 300 MG: 300 TABLET, FILM COATED, EXTENDED RELEASE ORAL at 08:27

## 2018-10-25 RX ADMIN — SENNOSIDES 8.6 MG: 8.6 TABLET, FILM COATED ORAL at 17:06

## 2018-10-25 RX ADMIN — CLOZAPINE 450 MG: 25 TABLET ORAL at 20:41

## 2018-10-25 RX ADMIN — MIDODRINE HYDROCHLORIDE 10 MG: 2.5 TABLET ORAL at 20:41

## 2018-10-25 RX ADMIN — MIDODRINE HYDROCHLORIDE 10 MG: 2.5 TABLET ORAL at 06:43

## 2018-10-25 RX ADMIN — SENNOSIDES 8.6 MG: 8.6 TABLET, FILM COATED ORAL at 08:27

## 2018-10-25 RX ADMIN — FLUPHENAZINE HYDROCHLORIDE 20 MG: 10 TABLET, FILM COATED ORAL at 20:41

## 2018-10-25 NOTE — PROGRESS NOTES
Psychiatry Progress Note    Subjective:   Interval History     Team mtg today; pt is making contact with [employer] [facility] and is pleased about it'; conversation limited but he can ask and answer appropriately      Current medications:    Current Facility-Administered Medications:     bisacodyl (DULCOLAX) EC tablet 10 mg, 10 mg, Oral, Daily PRN, Provider Cutover, 10 mg at 10/19/18 0647    bisacodyl (DULCOLAX) rectal suppository 10 mg, 10 mg, Rectal, Daily PRN, [facility] Patel    buPROPion (WELLBUTRIN XL) 24 hr tablet 300 mg, 300 mg, Oral, Daily, Provider Cutover, 300 mg at 10/25/18 0827    cloZAPine (CLOZARIL) tablet 450 mg, 450 mg, Oral, HS, Dc Carlos MD, 450 mg at 10/24/18 2058    divalproex sodium (DEPAKOTE ER) 24 hr tablet 1,000 mg, 1,000 mg, Oral, HS, Jack Kuhn MD, 1,000 mg at 10/24/18 2058    fluPHENAZine (PROLIXIN) tablet 20 mg, 20 mg, Oral, HS, Dc Carlos MD, 20 mg at 10/24/18 2058    levothyroxine tablet 75 mcg, 75 mcg, Oral, Early Morning, Provider Cutover, 75 mcg at 10/25/18 0643    midodrine (PROAMATINE) tablet 10 mg, 10 mg, Oral, TID, Wally Lynn MD, 10 mg at 10/25/18 4931    polyethylene glycol (MIRALAX) packet 17 g, 17 g, Oral, Daily, Provider Cutover, 17 g at 10/25/18 0827    senna (SENOKOT) tablet 8 6 mg, 1 tablet, Oral, BID, LIZETH Estevez, 8 6 mg at 10/25/18 0827    Current Problem List:    Patient Active Problem List   Diagnosis    [facility]    Encounter for medical assessment       Problem list reviewed 10/25/18     Objective:     Vital Signs:  Vitals:    10/24/18 1534 10/24/18 1930 10/25/18 0600 10/25/18 0700   BP: 116/80 135/74 94/64 110/69   BP Location: Left arm Left arm Left arm Right arm   Pulse: (!) 116 (!) 136 (!) 116 (!) 106   Resp:    20   Temp:    (!) 97 4 °F (36 3 °C)   TempSrc:       SpO2:       Weight:       Height:             Appearance:  age appropriate and casually dressed   Behavior:  normal   Speech:  normal volume Mood:  depressed   Affect:  constricted   Thought Process:  normal   Thought Content:  normal   Perceptual Disturbances: None   Risk Potential: none   Sensorium:  person, place, situation and time   Cognition:  intact   Consciousness:  alert and awake    Attention: attention span and concentration were age appropriate   Intellect: average   Insight:  good   Judgment: good      Motor Activity: no abnormal movements           Recent Labs:  Results Reviewed     None          I/O Past 24 hours:  I/O last 3 completed shifts:  In: -   Out: 2 [Stool:2]  No intake/output data recorded  Assessment / Plan:     [facility]    Recommended Treatment:      Medication changes:  1) none    Non-pharmacological treatments  1) Continue with group therapy, milieu therapy and occupational therapy  Safety  1) Safety/communication plan established targeting dynamic risk factors above  2) Risks, benefits, and possible side effects of medications explained to patient and patient verbalizes understanding  Counseling / Coordination of Care    Total floor / unit time spent today 20 minutes  Greater than 50% of total time was spent with the patient and / or family counseling and / or coordination of care  A description of the counseling / coordination of care  Patient's Rights, confidentiality and exceptions to confidentiality, use of automated medical record, [address] [place] [address] staff access to medical record, and consent to treatment reviewed      Ranjit Del Cid MD

## 2018-10-25 NOTE — PROGRESS NOTES
Lawton Goldmann attended and participated in 2/2 evening groups  Compliant with scheduled 2100 meds  2100 dose of Midodrine held per MD BP parameters  Had evening snack  Resting quietly in bed at present, arouses easily  Will continue to monitor/assess for any changes

## 2018-10-25 NOTE — PROGRESS NOTES
Pt did extremely well in Intensive Life Skills group  Pt was able to complete Problem Solving Style Scale without assistance, explain his findings and also help another group member tally their score  Pt did need some prompting however was less anxious in small group

## 2018-10-26 RX ADMIN — LEVOTHYROXINE SODIUM 75 MCG: 75 TABLET ORAL at 06:55

## 2018-10-26 RX ADMIN — FLUPHENAZINE HYDROCHLORIDE 20 MG: 10 TABLET, FILM COATED ORAL at 20:38

## 2018-10-26 RX ADMIN — POLYETHYLENE GLYCOL 3350 17 G: 17 POWDER, FOR SOLUTION ORAL at 09:02

## 2018-10-26 RX ADMIN — DIVALPROEX SODIUM 1000 MG: 500 TABLET, FILM COATED, EXTENDED RELEASE ORAL at 20:38

## 2018-10-26 RX ADMIN — MIDODRINE HYDROCHLORIDE 10 MG: 2.5 TABLET ORAL at 06:55

## 2018-10-26 RX ADMIN — SENNOSIDES 8.6 MG: 8.6 TABLET, FILM COATED ORAL at 17:02

## 2018-10-26 RX ADMIN — CLOZAPINE 450 MG: 25 TABLET ORAL at 20:38

## 2018-10-26 RX ADMIN — BUPROPION HYDROCHLORIDE 300 MG: 300 TABLET, FILM COATED, EXTENDED RELEASE ORAL at 09:02

## 2018-10-26 RX ADMIN — MIDODRINE HYDROCHLORIDE 10 MG: 2.5 TABLET ORAL at 16:52

## 2018-10-26 RX ADMIN — SENNOSIDES 8.6 MG: 8.6 TABLET, FILM COATED ORAL at 09:02

## 2018-10-26 NOTE — PROGRESS NOTES
Gricel Hernandez has been awake, alert, and visible intermittently out in the milieu  No interaction noted with peers  Affect remains flat, blunted, and offers minimal conversation with staff  Compliant with scheduled 1800 med with little prompting  Ate 100% supper  Behavior continues to be quiet and isolative to self  Will continue to monitor/assess for any changes

## 2018-10-26 NOTE — PROGRESS NOTES
Amol Love attended and participated in 2/2 evening groups  Compliant with scheduled 2100 meds without prompting  Declined evening snack  Resting quietly in bed at present, arouses easily  Will continue to monitor/assess for any changes

## 2018-10-26 NOTE — ESCALATED TEAM TX
Patient attended treatment team meeting this morning prepared with self-assessment  Patient's group attendance for last week was uncalculated at the time of the meeting  Patient was granted pass for Sunday, 10/28 from 10-8p with his mother  His goals for the pass are to do laundry, do dishes, and clean the litterbox  Patient was with bright affect in team meeting, relating well  He does report sadness over waiting for a group home  Patient verbalized no further questions or concerns at the conclusion of the meeting  Next team meeting scheduled for 11/1

## 2018-10-26 NOTE — PROGRESS NOTES
Progress Note - Behavioral Health   Kylah Chan 26 y o  male MRN: [id number]  [facility]-01 Encounter: [id number]    Assessment/Plan   Principal Problem:    Chronic paranoid schizophrenia ([address] )  Active Problems:    Encounter for medical assessment      Behavior over the last 24 hours:  unchanged  Sleep: hypersomnia  Appetite: normal  Medication side effects: No  ROS: no complaints    Mental Status Evaluation:  Appearance:  age appropriate   Behavior:  guarded   Speech:  delayed   Mood:  constricted   Affect:  constricted   Thought Process:  blocked   Thought Content:  normal   Perceptual Disturbances: Auditory hallucinations without commands   Risk Potential: None   Sensorium:  person and place   Cognition:  grossly intact   Consciousness:  alert    Attention: attention span appeared shorter than expected for age   Insight:  fair   Judgment: fair   Gait/Station: normal gait/station   Motor Activity: no abnormal movements     Progress Toward Goals:   Patient does better in smaller groups on 1 on 1  Able to have a brief conversations to a better degree  And is more appropriate overall  He still has hallucinations but they do not seem to be as intense or frequent  Recommended Treatment: Continue with group therapy, milieu therapy and occupational therapy  Risks, benefits and possible side effects of Medications:   Risks, benefits, and possible side effects of medications explained to patient and patient verbalizes understanding  Medications: all current active meds have been reviewed  Labs:   Reviewed    Counseling / Coordination of Care  Total floor / unit time spent today 15 minutes   Greater than 50% of total time was spent with the patient and / or family counseling and / or coordination of care

## 2018-10-27 RX ADMIN — SENNOSIDES 8.6 MG: 8.6 TABLET, FILM COATED ORAL at 17:03

## 2018-10-27 RX ADMIN — MIDODRINE HYDROCHLORIDE 10 MG: 2.5 TABLET ORAL at 17:03

## 2018-10-27 RX ADMIN — DIVALPROEX SODIUM 1000 MG: 500 TABLET, FILM COATED, EXTENDED RELEASE ORAL at 20:59

## 2018-10-27 RX ADMIN — LEVOTHYROXINE SODIUM 75 MCG: 75 TABLET ORAL at 06:20

## 2018-10-27 RX ADMIN — SENNOSIDES 8.6 MG: 8.6 TABLET, FILM COATED ORAL at 09:28

## 2018-10-27 RX ADMIN — BUPROPION HYDROCHLORIDE 300 MG: 300 TABLET, FILM COATED, EXTENDED RELEASE ORAL at 09:28

## 2018-10-27 RX ADMIN — MIDODRINE HYDROCHLORIDE 10 MG: 2.5 TABLET ORAL at 06:20

## 2018-10-27 RX ADMIN — FLUPHENAZINE HYDROCHLORIDE 20 MG: 10 TABLET, FILM COATED ORAL at 20:59

## 2018-10-27 RX ADMIN — CLOZAPINE 450 MG: 25 TABLET ORAL at 20:59

## 2018-10-27 RX ADMIN — POLYETHYLENE GLYCOL 3350 17 G: 17 POWDER, FOR SOLUTION ORAL at 09:28

## 2018-10-27 NOTE — PROGRESS NOTES
Pt reports for meals and meds without prompt Did not attend goals group   Returned to room/bed sleeping intermittently most of am  1430 Pt remains quietly cooperative, isolative to room

## 2018-10-27 NOTE — PROGRESS NOTES
Fatoumata President attended 3 of the 4  group activities today  Facundo Rout was compliant with medications and meals and denied pain, fluids encouraged, gait steady, remains on Fall precautions, isolative except for structured activities, spent most of the day alone in his room, polite and cooperative on approach, limited interaction with staff and peers will continue to monitor

## 2018-10-27 NOTE — PROGRESS NOTES
Psychiatry Progress Note    Subjective: Interval History     The patient is isolative to his room  Staff stated he did come out for medication today without prompting today  Patient has a flat affect and soft speech  Patient denies feeling depressed  He is guarded and does not engage in conversation  He is visible at times on the unit  His mother visited yesterday and brought food and for the patient  He did not attend goal group today  He offers no concerns         Current medications:    Current Facility-Administered Medications:     bisacodyl (DULCOLAX) EC tablet 10 mg, 10 mg, Oral, Daily PRN, Provider Cutover, 10 mg at 10/19/18 0647    bisacodyl (DULCOLAX) rectal suppository 10 mg, 10 mg, Rectal, Daily PRN, Jason Leyden Ciminieri, CRNP    buPROPion (WELLBUTRIN XL) 24 hr tablet 300 mg, 300 mg, Oral, Daily, Provider Cutover, 300 mg at 10/27/18 0928    cloZAPine (CLOZARIL) tablet 450 mg, 450 mg, Oral, HS, Seymour Serna MD, 450 mg at 10/26/18 2038    divalproex sodium (DEPAKOTE ER) 24 hr tablet 1,000 mg, 1,000 mg, Oral, HS, Brianna Woodard MD, 1,000 mg at 10/26/18 2038    fluPHENAZine (PROLIXIN) tablet 20 mg, 20 mg, Oral, HS, Seymour Serna MD, 20 mg at 10/26/18 2038    levothyroxine tablet 75 mcg, 75 mcg, Oral, Early Morning, Provider Cutover, 75 mcg at 10/27/18 0620    midodrine (PROAMATINE) tablet 10 mg, 10 mg, Oral, TID, Wally Lynn MD, 10 mg at 10/27/18 0620    polyethylene glycol (MIRALAX) packet 17 g, 17 g, Oral, Daily, Provider Cutover, 17 g at 10/27/18 4514    senna (SENOKOT) tablet 8 6 mg, 1 tablet, Oral, BID, LIZETH Dyer, 8 6 mg at 10/27/18 4578    Current Problem List:    Patient Active Problem List   Diagnosis    [facility] )    Encounter for medical assessment       Problem list reviewed 10/27/18     Objective:     Vital Signs:  Vitals:    10/27/18 0735 10/27/18 1500 10/27/18 1505 10/27/18 1510   BP: 114/58 116/69 116/72 110/70   BP Location: Left arm Left arm Left arm Left arm   Pulse: (!) 119 104 (!) 112 (!) 124   Resp: 20      Temp:       TempSrc:       SpO2:       Weight:       Height:             Appearance:  age appropriate and disheveled   Behavior:  guarded   Speech:  soft   Mood:  constricted   Affect:  constricted   Thought Process:  blocked   Thought Content:  normal   Perceptual Disturbances: None   Risk Potential: none   Sensorium:  person, place and time   Cognition:  intact   Consciousness:  alert and awake    Attention: attention span and concentration were age appropriate   Intellect: average   Insight:  limited   Judgment: limited      Motor Activity: no abnormal movements           Recent Labs:  Results Reviewed     None          I/O Past 24 hours:  I/O last 3 completed shifts:  In: -   Out: 2 [Stool:2]  No intake/output data recorded  Assessment / Plan:     [facility]    Recommended Treatment:      Medication changes:  1) continue current medication regimen  Non-pharmacological treatments  1) Continue with group therapy, milieu therapy and occupational therapy  Safety  1) Safety/communication plan established targeting dynamic risk factors above  2) Risks, benefits, and possible side effects of medications explained to patient and patient verbalizes understanding  Counseling / Coordination of Care    Total floor / unit time spent today 20 minutes  Greater than 50% of total time was spent with the patient and / or family counseling and / or coordination of care  A description of the counseling / coordination of care  Patient's Rights, confidentiality and exceptions to confidentiality, use of automated medical record, [facility] Ibanez staff access to medical record, and consent to treatment reviewed      Priscila Curtis PA-C

## 2018-10-27 NOTE — PROGRESS NOTES
Isa Gaming has been awake, alert, and visible intermittently out in the milieu  Ate 100% supper  Behavior has been quiet and isolative to self  Neat and well groomed in appearance  Mother in to visit with fair interaction noted  Mother brought food in for pt which pt ate  Compliant with all scheduled meds with little prompting  2100 dose of Midodrine held per MD BP parameters  No interaction noted with peers  Affect remains flat, blunted, and offers minimal conversation with staff  Pt observed smiling inappropriately while in line for meds as responding to internal stimuli but denies hearing any voices  No behavioral issues  Attended and participated in evening wrap up group  Declined evening snack  Resting quietly in bed at present, arouses easily  Will continue to monitor/assess for any changes

## 2018-10-28 PROCEDURE — 80307 DRUG TEST PRSMV CHEM ANLYZR: CPT | Performed by: PSYCHIATRY & NEUROLOGY

## 2018-10-28 RX ADMIN — LEVOTHYROXINE SODIUM 75 MCG: 75 TABLET ORAL at 06:47

## 2018-10-28 RX ADMIN — CLOZAPINE 450 MG: 25 TABLET ORAL at 20:42

## 2018-10-28 RX ADMIN — MIDODRINE HYDROCHLORIDE 10 MG: 2.5 TABLET ORAL at 06:47

## 2018-10-28 RX ADMIN — FLUPHENAZINE HYDROCHLORIDE 20 MG: 10 TABLET, FILM COATED ORAL at 20:42

## 2018-10-28 RX ADMIN — POLYETHYLENE GLYCOL 3350 17 G: 17 POWDER, FOR SOLUTION ORAL at 08:30

## 2018-10-28 RX ADMIN — BUPROPION HYDROCHLORIDE 300 MG: 300 TABLET, FILM COATED, EXTENDED RELEASE ORAL at 08:29

## 2018-10-28 RX ADMIN — DIVALPROEX SODIUM 1000 MG: 500 TABLET, FILM COATED, EXTENDED RELEASE ORAL at 20:42

## 2018-10-28 RX ADMIN — SENNOSIDES 8.6 MG: 8.6 TABLET, FILM COATED ORAL at 08:29

## 2018-10-28 RX ADMIN — MIDODRINE HYDROCHLORIDE 10 MG: 2.5 TABLET ORAL at 20:42

## 2018-10-28 NOTE — PROGRESS NOTES
2125 Christoph Camacho was out of his room @ intervals doing stanton laps, briefly sitting in [place] [address] area as waited for mother to arrive for visit  He enjoyed the food treats she brought, interacted briefly, quietly w/her  Has come up on own for scheduled medicines  Had meal, took part in PM Groups  Declined HS snack  Is noted to be responding; smiling to himself  Will stop immediately if becomes aware is watched; denies voices  Will not be drawn into even a superficial conversation; is silent & just stares @ staff  Not interactive w/peers

## 2018-10-28 NOTE — PROGRESS NOTES
Pt received @ 0700  Remains blunted, isolative & w/drawn  Compliant w/meds  Refused breakfast because he was going on pass with mom from [id number]  Attended goals group  No behavioral issues  D/T call in @ 1600

## 2018-10-29 RX ADMIN — POLYETHYLENE GLYCOL 3350 17 G: 17 POWDER, FOR SOLUTION ORAL at 08:46

## 2018-10-29 RX ADMIN — CLOZAPINE 450 MG: 25 TABLET ORAL at 21:03

## 2018-10-29 RX ADMIN — SENNOSIDES 8.6 MG: 8.6 TABLET, FILM COATED ORAL at 08:46

## 2018-10-29 RX ADMIN — BUPROPION HYDROCHLORIDE 300 MG: 300 TABLET, FILM COATED, EXTENDED RELEASE ORAL at 08:46

## 2018-10-29 RX ADMIN — MIDODRINE HYDROCHLORIDE 10 MG: 2.5 TABLET ORAL at 16:42

## 2018-10-29 RX ADMIN — LEVOTHYROXINE SODIUM 75 MCG: 75 TABLET ORAL at 06:24

## 2018-10-29 RX ADMIN — SENNOSIDES 8.6 MG: 8.6 TABLET, FILM COATED ORAL at 17:27

## 2018-10-29 RX ADMIN — DIVALPROEX SODIUM 1000 MG: 500 TABLET, FILM COATED, EXTENDED RELEASE ORAL at 21:03

## 2018-10-29 RX ADMIN — MIDODRINE HYDROCHLORIDE 10 MG: 2.5 TABLET ORAL at 21:02

## 2018-10-29 RX ADMIN — MIDODRINE HYDROCHLORIDE 10 MG: 2.5 TABLET ORAL at 06:24

## 2018-10-29 RX ADMIN — FLUPHENAZINE HYDROCHLORIDE 20 MG: 10 TABLET, FILM COATED ORAL at 21:03

## 2018-10-29 NOTE — PROGRESS NOTES
Pt slept the rest of the night  Compliant with meds  No signs or symptoms of distress, pt safety maintained and will continue to monitor

## 2018-10-29 NOTE — PROGRESS NOTES
Pt appears to be sleeping at this time, chest rising, audible breath sounds  No signs or symptoms of complaint, will continue to monitor

## 2018-10-29 NOTE — PROGRESS NOTES
Patient arrived from pass at 200  Patient was pleasant upon arrival, stated he had a good time, he saw the movie Venom and ate dinner  Patient cooperated with body assessment and urine for UDS  Patient's mother stated that patient requested something for a rash that he has but he refuses to show anyone, she stated that patient does not want attention to be given to him  Patient gets embarrassed  Patient's mother stated he has been having this rash on and off for about a year  Due to patient feeling uncomfortable this writer did not assess the rash  This writer will inform oncoming nurse to request for MD to assess patient

## 2018-10-29 NOTE — PROGRESS NOTES
RN spoke to patient's mom  Patient's mom stated she went to the movies with the patient and had a good time  Patient's mother stated patient really enjoyed the movie and was able to relate to the movie  The movie was about an alien life form that attaches itself to a host and enters the body  The alien life form speaks to the human and the human is able to hear what the alien life form is saying  The patient's mother stated that the patient was able to relate and expressed it  She was surprised that he expressed himself to her because this is something he rarely does  He told her that he hears voices like that sometimes too  Patient's mother expressed how happy she was because this is the best she has seen him in 15 years  Patient's mother stated he is polite and thanks her and this is something that he has not done in a very long time, stating that he was such a "sweet sweet boy" prior to his illness, and was going to college and working a full time job when he became ill

## 2018-10-29 NOTE — PROGRESS NOTES
Bib Enriquez did not attend group activities today  [facility] was compliant with medications and meals, denied pain, fluids encouraged, gait steady, remains on Fall precautions, isolative, spent most of the day alone in his room, polite and cooperative on approach, limited interaction with staff and peers will continue to monitor

## 2018-10-29 NOTE — PROGRESS NOTES
Psychiatry Progress Note    Subjective: Interval History     Patient had an uneventful past with mother this weekend  Interestingly, he saw a movie about an alien that invaded a person and was she able to communicate with that person  Apparently Uriel related welll to this, expressed himself to his mother which was unusual for him  He then told his mother he hears voices sometimes  He must feel like there is an alien in his body and that the movie was speaking to him personally        Current medications:    Current Facility-Administered Medications:     bisacodyl (DULCOLAX) EC tablet 10 mg, 10 mg, Oral, Daily PRN, Provider Cutover, 10 mg at 10/19/18 0647    bisacodyl (DULCOLAX) rectal suppository 10 mg, 10 mg, Rectal, Daily PRN, Magdaline Primok LIZETH Greenfield    buPROPion (WELLBUTRIN XL) 24 hr tablet 300 mg, 300 mg, Oral, Daily, Provider Cutover, 300 mg at 10/29/18 0846    cloZAPine (CLOZARIL) tablet 450 mg, 450 mg, Oral, HS, Page Zazueta MD, 450 mg at 10/28/18 2042    divalproex sodium (DEPAKOTE ER) 24 hr tablet 1,000 mg, 1,000 mg, Oral, HS, Dell Kaufman MD, 1,000 mg at 10/28/18 2042    fluPHENAZine (PROLIXIN) tablet 20 mg, 20 mg, Oral, HS, Page Zazueta MD, 20 mg at 10/28/18 2042    levothyroxine tablet 75 mcg, 75 mcg, Oral, Early Morning, Provider Cutover, 75 mcg at 10/29/18 0624    midodrine (PROAMATINE) tablet 10 mg, 10 mg, Oral, TID, Wally Lynn MD, 10 mg at 10/29/18 0624    polyethylene glycol (MIRALAX) packet 17 g, 17 g, Oral, Daily, Provider Cutover, 17 g at 10/29/18 0846    senna (SENOKOT) tablet 8 6 mg, 1 tablet, Oral, BID, LIZETH Shell, 8 6 mg at 10/29/18 0846    Current Problem List:    Patient Active Problem List   Diagnosis    [facility]    Encounter for medical assessment       Problem list reviewed 10/29/18     Objective:     Vital Signs:  Vitals:    10/28/18 0730 10/28/18 0731 10/29/18 0500 10/29/18 0700   BP: 136/81 133/74 98/58 113/74   BP Location: Left arm Left arm  Right arm   Pulse: (!) 109 (!) 120 (!) 119 (!) 108   Resp: 18   20   Temp: (!) 96 8 °F (36 °C)   98 2 °F (36 8 °C)   TempSrc: Temporal      SpO2:       Weight: 89 2 kg (196 lb 9 6 oz)      Height:             Appearance:  age appropriate and casually dressed   Behavior:  normal   Speech:  normal volume   Mood:  depressed   Affect:  constricted   Thought Process:  blocked   Thought Content:  normal   Perceptual Disturbances: None and Auditory hallucinations without commands   Risk Potential: none   Sensorium:  person, place, situation and time   Cognition:  intact   Consciousness:  alert and awake    Attention: attention span and concentration were age appropriate   Intellect: average   Insight:  fair   Judgment: fair      Motor Activity: no abnormal movements           Recent Labs:  Results Reviewed     None          I/O Past 24 hours:  No intake/output data recorded  No intake/output data recorded  Assessment / Plan:     Chronic paranoid schizophrenia ([facility] )    Recommended Treatment:      Medication changes:  1) none    Non-pharmacological treatments  1) Continue with group therapy, milieu therapy and occupational therapy  Safety  1) Safety/communication plan established targeting dynamic risk factors above  2) Risks, benefits, and possible side effects of medications explained to patient and patient verbalizes understanding  Counseling / Coordination of Care    Total floor / unit time spent today 20 minutes  Greater than 50% of total time was spent with the patient and / or family counseling and / or coordination of care  A description of the counseling / coordination of care  Patient's Rights, confidentiality and exceptions to confidentiality, use of automated medical record, Tanmay Ibanez staff access to medical record, and consent to treatment reviewed      Savannah Cee MD

## 2018-10-30 LAB
BASOPHILS # BLD AUTO: 0 THOUSANDS/ΜL (ref 0–0.1)
BASOPHILS NFR BLD AUTO: 1 % (ref 0–1)
EOSINOPHIL # BLD AUTO: 0.3 THOUSAND/ΜL (ref 0–0.4)
EOSINOPHIL NFR BLD AUTO: 5 % (ref 0–6)
ERYTHROCYTE [DISTWIDTH] IN BLOOD BY AUTOMATED COUNT: 12.9 %
HCT VFR BLD AUTO: 47.5 % (ref 41–53)
HGB BLD-MCNC: 15.7 G/DL (ref 13.5–17.5)
LYMPHOCYTES # BLD AUTO: 2.2 THOUSANDS/ΜL (ref 0.5–4)
LYMPHOCYTES NFR BLD AUTO: 32 % (ref 20–50)
MCH RBC QN AUTO: 28.4 PG (ref 26–34)
MCHC RBC AUTO-ENTMCNC: 33 G/DL (ref 31–36)
MCV RBC AUTO: 86 FL (ref 80–100)
MONOCYTES # BLD AUTO: 0.4 THOUSAND/ΜL (ref 0.2–0.9)
MONOCYTES NFR BLD AUTO: 6 % (ref 1–10)
NEUTROPHILS # BLD AUTO: 3.9 THOUSANDS/ΜL (ref 1.8–7.8)
NEUTS SEG NFR BLD AUTO: 56 % (ref 45–65)
PLATELET # BLD AUTO: 218 THOUSANDS/UL (ref 150–450)
PMV BLD AUTO: 7.9 FL (ref 8.9–12.7)
RBC # BLD AUTO: 5.54 MILLION/UL (ref 4.5–5.9)
WBC # BLD AUTO: 6.9 THOUSAND/UL (ref 4.5–11)

## 2018-10-30 PROCEDURE — 85025 COMPLETE CBC W/AUTO DIFF WBC: CPT | Performed by: PSYCHIATRY & NEUROLOGY

## 2018-10-30 RX ADMIN — MIDODRINE HYDROCHLORIDE 10 MG: 2.5 TABLET ORAL at 16:43

## 2018-10-30 RX ADMIN — SENNOSIDES 8.6 MG: 8.6 TABLET, FILM COATED ORAL at 18:00

## 2018-10-30 RX ADMIN — LEVOTHYROXINE SODIUM 75 MCG: 75 TABLET ORAL at 06:01

## 2018-10-30 RX ADMIN — FLUPHENAZINE HYDROCHLORIDE 20 MG: 10 TABLET, FILM COATED ORAL at 21:26

## 2018-10-30 RX ADMIN — POLYETHYLENE GLYCOL 3350 17 G: 17 POWDER, FOR SOLUTION ORAL at 08:01

## 2018-10-30 RX ADMIN — MIDODRINE HYDROCHLORIDE 10 MG: 2.5 TABLET ORAL at 06:01

## 2018-10-30 RX ADMIN — MIDODRINE HYDROCHLORIDE 10 MG: 2.5 TABLET ORAL at 21:26

## 2018-10-30 RX ADMIN — SENNOSIDES 8.6 MG: 8.6 TABLET, FILM COATED ORAL at 08:01

## 2018-10-30 RX ADMIN — CLOZAPINE 450 MG: 25 TABLET ORAL at 21:26

## 2018-10-30 RX ADMIN — DIVALPROEX SODIUM 1000 MG: 500 TABLET, FILM COATED, EXTENDED RELEASE ORAL at 21:25

## 2018-10-30 RX ADMIN — BUPROPION HYDROCHLORIDE 300 MG: 300 TABLET, FILM COATED, EXTENDED RELEASE ORAL at 08:01

## 2018-10-30 NOTE — PROGRESS NOTES
Progress Note - Behavioral Health   Angelito Edwards 26 y o  male MRN: [id number]  Unit/Bed#: [facility] 112-01 Encounter: [id number]    Assessment/Plan   Principal Problem:    Chronic paranoid schizophrenia ([facility] )  Active Problems:    Encounter for medical assessment      Behavior over the last 24 hours:  unchanged  Sleep: hypersomnia  Appetite: normal  Medication side effects: No  ROS: no complaints    Mental Status Evaluation:  Appearance:  age appropriate and bearded   Behavior:  evasive   Speech:  delayed   Mood:  constricted   Affect:  constricted   Thought Process:  blocked   Thought Content:  normal   Perceptual Disturbances: Auditory hallucinations without commands   Risk Potential: none   Sensorium:  person, place and time/date   Cognition:  grossly intact   Consciousness:  alert    Attention: attention span appeared shorter than expected for age   Insight:  fair   Judgment: fair   Gait/Station: normal gait/station   Motor Activity: no abnormal movements     Progress Toward Goals:   Patient had another good day, appears to be at his baseline, continues to hallucinate but not excessively and the hallucinations do not interfere with his day-to-day functioning  He has not engaged in conversation this last 24 hr except for brief comments  He is tolerating his medication well    Recommended Treatment: Continue with group therapy, milieu therapy and occupational therapy  Risks, benefits and possible side effects of Medications:   Risks, benefits, and possible side effects of medications explained to patient and patient verbalizes understanding  Medications: all current active meds have been reviewed  ReviewedLabs:     Counseling / Coordination of Care 15 reater than 50% of total time was spent with the patient and / or family counseling and / or coordination of care

## 2018-10-30 NOTE — PROGRESS NOTES
Jn Hale has been awake, alert, and visible intermittently out in the milieu  Compliant with scheduled 1600/1800 meds with little prompting  Ate 100% supper  Pt comes out of room and walks around milieu at times  Pt observed watching tv with male peer in living room  Minimal interation noted  Affect remains flat, blunted, and offers little conversation with staff  No behavioral issues  Will continue to monitor/assess for any changes

## 2018-10-30 NOTE — PROGRESS NOTES
Mendel Majestic attended and participated in 2/2 evening groups  Compliant with scheduled 2100 meds without prompting  Had evening snack  Resting quietly in bed at present, arouses easily  Will continue to monitor/assess for any changes

## 2018-10-30 NOTE — PROGRESS NOTES
Rene Mendez attended 1 of the 3 group activities today  Aubrey Huffman was compliant with medications and meals, denied pain, fluids encouraged, gait steady, remains on Fall precautions, isolative, spent most of the day alone in his room, polite and cooperative on approach, limited interaction with staff and peers will continue to monitor

## 2018-10-31 PROCEDURE — 93005 ELECTROCARDIOGRAM TRACING: CPT

## 2018-10-31 RX ADMIN — POLYETHYLENE GLYCOL 3350 17 G: 17 POWDER, FOR SOLUTION ORAL at 09:13

## 2018-10-31 RX ADMIN — LEVOTHYROXINE SODIUM 75 MCG: 75 TABLET ORAL at 06:08

## 2018-10-31 RX ADMIN — CLOZAPINE 450 MG: 25 TABLET ORAL at 20:58

## 2018-10-31 RX ADMIN — MIDODRINE HYDROCHLORIDE 10 MG: 2.5 TABLET ORAL at 06:08

## 2018-10-31 RX ADMIN — SENNOSIDES 8.6 MG: 8.6 TABLET, FILM COATED ORAL at 17:27

## 2018-10-31 RX ADMIN — SENNOSIDES 8.6 MG: 8.6 TABLET, FILM COATED ORAL at 09:14

## 2018-10-31 RX ADMIN — FLUPHENAZINE HYDROCHLORIDE 20 MG: 10 TABLET, FILM COATED ORAL at 20:58

## 2018-10-31 RX ADMIN — BUPROPION HYDROCHLORIDE 300 MG: 300 TABLET, FILM COATED, EXTENDED RELEASE ORAL at 09:14

## 2018-10-31 RX ADMIN — DIVALPROEX SODIUM 1000 MG: 500 TABLET, FILM COATED, EXTENDED RELEASE ORAL at 20:58

## 2018-10-31 RX ADMIN — MIDODRINE HYDROCHLORIDE 10 MG: 2.5 TABLET ORAL at 16:45

## 2018-10-31 RX ADMIN — MIDODRINE HYDROCHLORIDE 10 MG: 2.5 TABLET ORAL at 20:57

## 2018-10-31 NOTE — PROGRESS NOTES
Vinnyedmund Petit has been awake, alert, and visible intermittently out in the milieu  Affect remains flat, blunted, and offers minimal conversation with staff  No overt responding to internal stimuli noted  Compliant with all scheduled meds with little prompting  Ate 100% supper  No interaction noted with peers  No behavioral issues  Had evening snack  Resting quietly in bed at present, arouses easily  Will continue to monitor/assess for any changes

## 2018-10-31 NOTE — PROGRESS NOTES
Standing BP 98/60, pulse 127, fluids encouraged, compliant with routine medications, remains on Fall precautions

## 2018-10-31 NOTE — PROGRESS NOTES
Mendel Majestic has been visible, compliant with routine medications   He ate 100% of breakfast and 50% of lunch   He attended the 2 required group activities as well as Spirituality group and the [address] [place] [address]    EKG completed as per Dr Amalia James order, results reported to Dr Jessa Major, sinus tachycardia heart rate 102, instructed to follow up with sylvia Ball 7821 notified, no new orders received, suspected medication related sinus tachycardia related to Clozaril therapy, attending psychiatrist to determine if benefit outweighs the risk

## 2018-10-31 NOTE — PROGRESS NOTES
Progress Note - Behavioral Health   Bridget Warner 26 y o  male MRN: [id number]  Unit/Bed#: [facility] 112-01 Encounter: [id number]    Assessment/Plan   Principal Problem:    Chronic paranoid schizophrenia ([address] )  Active Problems:    Encounter for medical assessment      Behavior over the last 24 hours:  regressed  Sleep: hypersomnia  Appetite: normal  Medication side effects: No  ROS: no complaints    Mental Status Evaluation:  Appearance:  age appropriate and bearded   Behavior:  guarded   Speech:  delayed   Mood:  constricted   Affect:  constricted   Thought Process:  blocked   Thought Content:  normal   Perceptual Disturbances: Auditory hallucinations without commands   Risk Potential: none   Sensorium:  person, place and situation   Cognition:  grossly intact   Consciousness:  alert    Attention: attention span appeared shorter than expected for age   Insight:  fair   Judgment: fair   Gait/Station: normal gait/station   Motor Activity: no abnormal movements     Progress Toward Goals:   Patient appears to be about the same, withdrawn for little conversation affect flat but cooperative  Continues to have intermittent hallucinations  No new issues  Recommended Treatment: Continue with group therapy, milieu therapy and occupational therapy  Risks, benefits and possible side effects of Medications:   Risks, benefits, and possible side effects of medications explained to patient and patient verbalizes understanding  Medications: all current active meds have been reviewed  Labs:   Reviewed    Counseling / Coordination of Care  Total floor / unit time spent today 15 minutes  Greater than 50% of total time was spent with the patient and / or family counseling and / or coordination of care

## 2018-10-31 NOTE — PROGRESS NOTES
Clozapine registry entry was completed for patient taking clozapine 450 mg per day  Lab Results   Component Value Date    WBC 6 90 10/30/2018       Lab Results   Component Value Date    NEUTROABS 3 90 10/30/2018       The next labs are due on 11/27/18 and have been ordered for patient  Patient requires every 4 weeks monitoring per the clozapine registry data base

## 2018-11-01 RX ADMIN — SENNOSIDES 8.6 MG: 8.6 TABLET, FILM COATED ORAL at 08:25

## 2018-11-01 RX ADMIN — MIDODRINE HYDROCHLORIDE 10 MG: 2.5 TABLET ORAL at 16:48

## 2018-11-01 RX ADMIN — BUPROPION HYDROCHLORIDE 300 MG: 300 TABLET, FILM COATED, EXTENDED RELEASE ORAL at 08:25

## 2018-11-01 RX ADMIN — DIVALPROEX SODIUM 1000 MG: 500 TABLET, FILM COATED, EXTENDED RELEASE ORAL at 21:02

## 2018-11-01 RX ADMIN — CLOZAPINE 450 MG: 25 TABLET ORAL at 21:02

## 2018-11-01 RX ADMIN — MIDODRINE HYDROCHLORIDE 10 MG: 2.5 TABLET ORAL at 21:00

## 2018-11-01 RX ADMIN — POLYETHYLENE GLYCOL 3350 17 G: 17 POWDER, FOR SOLUTION ORAL at 08:25

## 2018-11-01 RX ADMIN — FLUPHENAZINE HYDROCHLORIDE 20 MG: 10 TABLET, FILM COATED ORAL at 21:02

## 2018-11-01 RX ADMIN — SENNOSIDES 8.6 MG: 8.6 TABLET, FILM COATED ORAL at 17:03

## 2018-11-01 RX ADMIN — LEVOTHYROXINE SODIUM 75 MCG: 75 TABLET ORAL at 06:46

## 2018-11-01 RX ADMIN — MIDODRINE HYDROCHLORIDE 10 MG: 2.5 TABLET ORAL at 06:46

## 2018-11-01 NOTE — PROGRESS NOTES
The patient has been intermittently visible in the milieu this evening, but mainly isolative to himself  No interaction with his peers, guarded but pleasant during interactions with staff  Did not attend the movie group, but did attend wrap up group  Med compliant without prompting  No issues noted and no concerns voiced  Fall precautions maintained  Retired to his bed where he is currently resting  Will continue to monitor

## 2018-11-01 NOTE — NURSING NOTE
Patient visible on unit at intervals  Isolative to self and room most of shift  Yes and no answers during conversation  Offers no complaints  Denies suicidal ideations  Poor hygiene  Attends all groups  Affect flat, blunted  Appetite good  Encouraged to focus on his treatment plan goals and expectations as part of his recovery process

## 2018-11-01 NOTE — CASE MANAGEMENT
Patient continues to do well on the unit with attending groups  Patient is trying to be more visible on the unit and working on keeping himself awake in the mornings by staying in the dining room after her AM medications  Patient also states he is struggling with depression  A coping skill he reports he has but does not use often is playing video games  An order was put in today for patient to be able to use his hand held video game console for an hour on day shift and an hour during electronic time to help him cope with his depression  Patient will be going on pass with his mom this Sunday from 10 AM to 8 PM  Patient is still on the wait list for [facility] vs [facility]  CM will continue to follow patient's progress and assist with reintegration back into the community as well as discharge planning needs

## 2018-11-01 NOTE — PROGRESS NOTES
Psychiatry Progress Note    Subjective:   Interval History     Team mtg today with pt present; cooperative with EAC mtgs and activities; reports depression about being in the hospital so long; brief answers to questions today      Current medications:    Current Facility-Administered Medications:     bisacodyl (DULCOLAX) EC tablet 10 mg, 10 mg, Oral, Daily PRN, Provider Cutover, 10 mg at 10/19/18 0647    bisacodyl (DULCOLAX) rectal suppository 10 mg, 10 mg, Rectal, Daily PRN, LIZETH Bautista    buPROPion (WELLBUTRIN XL) 24 hr tablet 300 mg, 300 mg, Oral, Daily, Provider Cutover, 300 mg at 11/01/18 0825    cloZAPine (CLOZARIL) tablet 450 mg, 450 mg, Oral, HS, Lizbeth Benoit MD, 450 mg at 10/31/18 2058    divalproex sodium (DEPAKOTE ER) 24 hr tablet 1,000 mg, 1,000 mg, Oral, HS, Marco A Blum MD, 1,000 mg at 10/31/18 2058    fluPHENAZine (PROLIXIN) tablet 20 mg, 20 mg, Oral, HS, Lizbeth Benoit MD, 20 mg at 10/31/18 2058    levothyroxine tablet 75 mcg, 75 mcg, Oral, Early Morning, Provider Cutover, 75 mcg at 11/01/18 0646    midodrine (PROAMATINE) tablet 10 mg, 10 mg, Oral, TID, Wally Lynn MD, 10 mg at 11/01/18 0646    polyethylene glycol (MIRALAX) packet 17 g, 17 g, Oral, Daily, Provider Cutover, 17 g at 11/01/18 0825    senna (SENOKOT) tablet 8 6 mg, 1 tablet, Oral, BID, LIZETH Melo, 8 6 mg at 11/01/18 0825    Current Problem List:    Patient Active Problem List   Diagnosis    Chronic paranoid schizophrenia ([facility] )    Encounter for medical assessment       Problem list reviewed 11/01/18     Objective:     Vital Signs:  Vitals:    10/31/18 1534 10/31/18 1930 11/01/18 0730 11/01/18 0735   BP: 98/60 122/84 117/82 128/84   BP Location: Left arm Left arm Left arm Left arm   Pulse: (!) 127 (!) 130 (!) 116 (!) 118   Resp:   19 19   Temp:   (!) 97 4 °F (36 3 °C)    TempSrc:   Temporal    SpO2:       Weight:       Height:             Appearance:  age appropriate and casually dressed   Behavior:  normal Speech:  normal volume   Mood:  depressed   Affect:  constricted   Thought Process:  normal   Thought Content:  normal   Perceptual Disturbances: None and Auditory hallucinations without commands   Risk Potential: none   Sensorium:  person, place, situation and time   Cognition:  intact   Consciousness:  alert and awake    Attention: attention span and concentration were age appropriate   Intellect: average   Insight:  good   Judgment: good      Motor Activity: no abnormal movements           Recent Labs:  Results Reviewed     None          I/O Past 24 hours:  I/O last 3 completed shifts:  In: -   Out: 2 [Stool:2]  No intake/output data recorded  Assessment / Plan:     [facility]    Recommended Treatment:      Medication changes:  1) none    Non-pharmacological treatments  1) Continue with group therapy, milieu therapy and occupational therapy  Safety  1) Safety/communication plan established targeting dynamic risk factors above  2) Risks, benefits, and possible side effects of medications explained to patient and patient verbalizes understanding  Counseling / Coordination of Care    Total floor / unit time spent today 20 minutes  Greater than 50% of total time was spent with the patient and / or family counseling and / or coordination of care  A description of the counseling / coordination of care  Patient's Rights, confidentiality and exceptions to confidentiality, use of automated medical record, [address] [place] [address] staff access to medical record, and consent to treatment reviewed      Jorge Davis MD

## 2018-11-01 NOTE — ESCALATED TEAM TX
Patient attended treatment team meeting this morning prepared with self-assessment  Patient's group attendance for last week was not calculated at the time of meeting  Patient was acknowledged for his efforts in exploring new coping skills each week  Team will advocate for patient to be able to use his handheld liyah device at certain points during the day for coping   will follow up on [employer] application  He was granted a pass for Sunday, 11/4, from 10-8p with his mother  Patient verbalized no further questions or concerns at the conclusion of the meeting  Next team meeting scheduled for 11/8

## 2018-11-02 LAB
ATRIAL RATE: 102 BPM
P AXIS: 23 DEGREES
PR INTERVAL: 162 MS
QRS AXIS: 52 DEGREES
QRSD INTERVAL: 110 MS
QT INTERVAL: 344 MS
QTC INTERVAL: 448 MS
T WAVE AXIS: 61 DEGREES
VENTRICULAR RATE: 102 BPM

## 2018-11-02 PROCEDURE — 93010 ELECTROCARDIOGRAM REPORT: CPT | Performed by: INTERNAL MEDICINE

## 2018-11-02 RX ADMIN — MIDODRINE HYDROCHLORIDE 10 MG: 2.5 TABLET ORAL at 17:04

## 2018-11-02 RX ADMIN — SENNOSIDES 8.6 MG: 8.6 TABLET, FILM COATED ORAL at 17:17

## 2018-11-02 RX ADMIN — SENNOSIDES 8.6 MG: 8.6 TABLET, FILM COATED ORAL at 08:53

## 2018-11-02 RX ADMIN — LEVOTHYROXINE SODIUM 75 MCG: 75 TABLET ORAL at 06:13

## 2018-11-02 RX ADMIN — FLUPHENAZINE HYDROCHLORIDE 20 MG: 10 TABLET, FILM COATED ORAL at 20:15

## 2018-11-02 RX ADMIN — CLOZAPINE 450 MG: 25 TABLET ORAL at 20:15

## 2018-11-02 RX ADMIN — POLYETHYLENE GLYCOL 3350 17 G: 17 POWDER, FOR SOLUTION ORAL at 08:52

## 2018-11-02 RX ADMIN — BUPROPION HYDROCHLORIDE 300 MG: 300 TABLET, FILM COATED, EXTENDED RELEASE ORAL at 08:53

## 2018-11-02 RX ADMIN — MIDODRINE HYDROCHLORIDE 10 MG: 2.5 TABLET ORAL at 06:13

## 2018-11-02 RX ADMIN — DIVALPROEX SODIUM 1000 MG: 500 TABLET, FILM COATED, EXTENDED RELEASE ORAL at 20:15

## 2018-11-02 RX ADMIN — MIDODRINE HYDROCHLORIDE 10 MG: 2.5 TABLET ORAL at 20:15

## 2018-11-02 NOTE — PROGRESS NOTES
Stacia Hylton has been awake, alert, and visible intermittently out in the milieu  Compliant with scheduled 1600/1800 meds without prompting  Ate 100% supper  Affect remains flat, blunted, and offers minimal conversation with staff  Denies any depression, anxiety, or a/v hallucinations  No overt responding to internal stimuli noted  Mother in to visit and brought food in which pt ate  Fair interaction noted  No interaction noted as pt remains isolative to himself  No behavioral issues  Will continue to monitor/assess for any changes

## 2018-11-02 NOTE — PROGRESS NOTES
Pt visible on unit for meals and meds reports without prompt   Affect flat, withdrawn isolative to self returns to room/bed immediately after attending goals group  1345-affect remains flat pt remains in room/bed most of afternoon

## 2018-11-02 NOTE — PROGRESS NOTES
Progress Note - Behavioral Health   Jaden Kirkland 26 y o  male MRN: [id number]  Unit/Bed#: [facility] 112-01 Encounter: [id number]    Assessment/Plan   Principal Problem:    Chronic paranoid schizophrenia ([address] )  Active Problems:    Encounter for medical assessment      Behavior over the last 24 hours:  unchanged  Sleep: hypersomnia  Appetite: normal  Medication side effects: No  ROS: no complaints    Mental Status Evaluation:  Appearance:  age appropriate and bearded   Behavior:  evasive   Speech:  delayed   Mood:  constricted   Affect:  constricted   Thought Process:  normal   Thought Content:  normal   Perceptual Disturbances: Auditory hallucinations without commands   Risk Potential: none   Sensorium:  person, place and situation   Cognition:  grossly intact   Consciousness:  alert    Attention: attention span appeared shorter than expected for age   Insight:  fair   Judgment: fair   Gait/Station: normal gait/station   Motor Activity: no abnormal movements     Progress Toward Goals:   Patient is still looking forward to going to the [employer]  He was offered given a electronic game device so that he could occupy himself during down times  He says he gets depressed because he has been on the unit so long waiting for disposition  Recommended Treatment: Continue with group therapy, milieu therapy and occupational therapy  Risks, benefits and possible side effects of Medications:   Risks, benefits, and possible side effects of medications explained to patient and patient verbalizes understanding  Medications: all current active meds have been reviewed  Labs:   Reviewed    Counseling / Coordination of Care  Total floor / unit time spent today 15 minutes  Greater than 50% of total time was spent with the patient and / or family counseling and / or coordination of care

## 2018-11-02 NOTE — PROGRESS NOTES
Bib Enriquez attended and participated in 2/2 evening groups  Compliant with scheduled 2100 meds without prompting  Declined evening snack  Resting quietly in bed at present, arouses easily  Will continue to monitor/assess for any changes

## 2018-11-03 RX ADMIN — POLYETHYLENE GLYCOL 3350 17 G: 17 POWDER, FOR SOLUTION ORAL at 09:20

## 2018-11-03 RX ADMIN — MIDODRINE HYDROCHLORIDE 10 MG: 2.5 TABLET ORAL at 06:39

## 2018-11-03 RX ADMIN — MIDODRINE HYDROCHLORIDE 10 MG: 2.5 TABLET ORAL at 17:11

## 2018-11-03 RX ADMIN — CLOZAPINE 450 MG: 25 TABLET ORAL at 21:28

## 2018-11-03 RX ADMIN — FLUPHENAZINE HYDROCHLORIDE 20 MG: 10 TABLET, FILM COATED ORAL at 21:28

## 2018-11-03 RX ADMIN — BUPROPION HYDROCHLORIDE 300 MG: 300 TABLET, FILM COATED, EXTENDED RELEASE ORAL at 09:20

## 2018-11-03 RX ADMIN — SENNOSIDES 8.6 MG: 8.6 TABLET, FILM COATED ORAL at 17:11

## 2018-11-03 RX ADMIN — DIVALPROEX SODIUM 1000 MG: 500 TABLET, FILM COATED, EXTENDED RELEASE ORAL at 21:29

## 2018-11-03 RX ADMIN — MIDODRINE HYDROCHLORIDE 10 MG: 2.5 TABLET ORAL at 21:28

## 2018-11-03 RX ADMIN — SENNOSIDES 8.6 MG: 8.6 TABLET, FILM COATED ORAL at 09:20

## 2018-11-03 RX ADMIN — LEVOTHYROXINE SODIUM 75 MCG: 75 TABLET ORAL at 06:39

## 2018-11-03 NOTE — PROGRESS NOTES
Patient is isolative to room, med and meal compliant  Patient attended goals group, presents with a flat affect  Patient was med compliant with minimal prompting had minimal interaction with staff  No complaints of SI, HI, audio or visual hallucinations, anxiety or depression  Will continue to monitor for changes in current status

## 2018-11-03 NOTE — PROGRESS NOTES
Individual maintained on ongoing SAFE, and fall precaution without incident on this shift  Awake and alert at the onset of the shift  Pleasant and cooperative upon approach  Partially visible on the uni, isolative to selft  Appetite intact  Continues to be meds and meals complaint without promptings  Mouth checks done for evasion of meds  Flat and blunted  Attended and participated in evening group today  Denies any depression or anxiety  No delusion or overt a/t/v hallucination noted  Will continue to monitor

## 2018-11-04 PROCEDURE — 80307 DRUG TEST PRSMV CHEM ANLYZR: CPT | Performed by: PSYCHIATRY & NEUROLOGY

## 2018-11-04 RX ADMIN — CLOZAPINE 450 MG: 25 TABLET ORAL at 21:18

## 2018-11-04 RX ADMIN — SENNOSIDES 8.6 MG: 8.6 TABLET, FILM COATED ORAL at 08:17

## 2018-11-04 RX ADMIN — BUPROPION HYDROCHLORIDE 300 MG: 300 TABLET, FILM COATED, EXTENDED RELEASE ORAL at 08:17

## 2018-11-04 RX ADMIN — FLUPHENAZINE HYDROCHLORIDE 20 MG: 10 TABLET, FILM COATED ORAL at 21:17

## 2018-11-04 RX ADMIN — MIDODRINE HYDROCHLORIDE 10 MG: 2.5 TABLET ORAL at 06:38

## 2018-11-04 RX ADMIN — POLYETHYLENE GLYCOL 3350 17 G: 17 POWDER, FOR SOLUTION ORAL at 08:17

## 2018-11-04 RX ADMIN — LEVOTHYROXINE SODIUM 75 MCG: 75 TABLET ORAL at 06:38

## 2018-11-04 RX ADMIN — DIVALPROEX SODIUM 1000 MG: 500 TABLET, FILM COATED, EXTENDED RELEASE ORAL at 21:17

## 2018-11-04 NOTE — PROGRESS NOTES
Patient is visible on unit intermittently  Med and meal compliant  Patient was pleasant, approached staff and informed them he had a bowel movement and completed hygiene  Patient's mother came to visit and brought patient food  Patient ate all of the food  No responding noted during this shift

## 2018-11-04 NOTE — PROGRESS NOTES
Pt received @ 0700  Woke up this morning for VS, prompted for meds  Pt did not eat breakfast because he was going on a pass w/mom, attended goals groups  Remains blunted & isolative to self/room  Left on pass w/mom @ 1020 w/meds & d/t return @ 2000   No behavioral issues

## 2018-11-05 RX ADMIN — FLUPHENAZINE HYDROCHLORIDE 20 MG: 10 TABLET, FILM COATED ORAL at 20:40

## 2018-11-05 RX ADMIN — MIDODRINE HYDROCHLORIDE 10 MG: 2.5 TABLET ORAL at 17:00

## 2018-11-05 RX ADMIN — CLOZAPINE 450 MG: 25 TABLET ORAL at 20:40

## 2018-11-05 RX ADMIN — MIDODRINE HYDROCHLORIDE 10 MG: 2.5 TABLET ORAL at 06:02

## 2018-11-05 RX ADMIN — SENNOSIDES 8.6 MG: 8.6 TABLET, FILM COATED ORAL at 17:07

## 2018-11-05 RX ADMIN — POLYETHYLENE GLYCOL 3350 17 G: 17 POWDER, FOR SOLUTION ORAL at 08:29

## 2018-11-05 RX ADMIN — DIVALPROEX SODIUM 1000 MG: 500 TABLET, FILM COATED, EXTENDED RELEASE ORAL at 20:40

## 2018-11-05 RX ADMIN — LEVOTHYROXINE SODIUM 75 MCG: 75 TABLET ORAL at 06:02

## 2018-11-05 RX ADMIN — SENNOSIDES 8.6 MG: 8.6 TABLET, FILM COATED ORAL at 08:29

## 2018-11-05 RX ADMIN — MIDODRINE HYDROCHLORIDE 10 MG: 2.5 TABLET ORAL at 20:40

## 2018-11-05 RX ADMIN — BUPROPION HYDROCHLORIDE 300 MG: 300 TABLET, FILM COATED, EXTENDED RELEASE ORAL at 08:29

## 2018-11-05 NOTE — PROGRESS NOTES
Patient arrived from pass at 80  Patient was pleasant and cooperative  Patient stated pass went well  Patient's mother stated patient ate, smoked cigarettes, and visited with sister  Patient was cooperative with staff with body assessment upon return and urine drug screen  No responding noted, no behaviors

## 2018-11-05 NOTE — PROGRESS NOTES
Progress Note - Behavioral Health   Tootie Messer 26 y o  male MRN: [id number]  Unit/Bed#: [facility] 112-01 Encounter: [id number]    Assessment/Plan   Principal Problem:    Chronic paranoid schizophrenia ([address] )  Active Problems:    Encounter for medical assessment      Behavior over the last 24 hours:  unchanged  Sleep: hypersomnia  Appetite: normal  Medication side effects: No  ROS: no complaints    Mental Status Evaluation:  Appearance:  age appropriate   Behavior:  guarded   Speech:  delayed   Mood:  constricted   Affect:  constricted   Thought Process:  blocked   Thought Content:  normal   Perceptual Disturbances: Auditory hallucinations without commands   Risk Potential: none   Sensorium:  person, place and situation   Cognition:  grossly intact   Consciousness:  alert    Attention: attention span appeared shorter than expected for age   Insight:  fair   Judgment: fair   Gait/Station: normal gait/station   Motor Activity: no abnormal movements     Progress Toward Goals:   Patient is doing the about the same on the unit  He had an uneventful pass this weekend with his family  He seems to be tolerating the psych meds well  No new issues  Recommended Treatment: Continue with group therapy, milieu therapy and occupational therapy  Risks, benefits and possible side effects of Medications:   Risks, benefits, and possible side effects of medications explained to patient and patient verbalizes understanding  Risks of medications in pregnancy explained if female patient  Patient verbalizes understanding and agrees to notify her doctor if she becomes pregnant  Medications: all current active meds have been reviewed  Labs:   Reviewed    Counseling / Coordination of Care  Total floor / unit time spent today 15 minutes  Greater than 50% of total time was spent with the patient and / or family counseling and / or coordination of care

## 2018-11-05 NOTE — PROGRESS NOTES
Progress Note - Behavioral Health   Polina Pang 26 y o  male MRN: [id number]  [facility] Encounter: [id number]    Assessment/Plan   Principal Problem:    Chronic paranoid schizophrenia [facility])  Active Problems:    Encounter for medical assessment    Excited for pass tomorrow, hopes to be taken out to dinner  Sleeping and eating well  Denies any homicidal or suicidal ideations  Behavior over the last 24 hours:  unchanged  Sleep: hypersomnia  Appetite: normal  Medication side effects: No  ROS: no complaints    Mental Status Evaluation:  Appearance:  age appropriate and bearded   Behavior:  evasive   Speech:  delayed   Mood:  constricted   Affect:  constricted   Thought Process:  normal   Thought Content:  normal   Perceptual Disturbances: Auditory hallucinations without commands   Risk Potential: none   Sensorium:  person, place and situation   Cognition:  grossly intact   Consciousness:  alert    Attention: attention span appeared shorter than expected for age   Insight:  fair   Judgment: fair   Gait/Station: normal gait/station   Motor Activity: no abnormal movements     Progress Toward Goals:   Patient is still looking forward to going to the [facility]  He was offered given a electronic game device so that he could occupy himself during down times  He says he gets depressed because he has been on the unit so long waiting for disposition  Recommended Treatment: Continue with group therapy, milieu therapy and occupational therapy  Risks, benefits and possible side effects of Medications:   Risks, benefits, and possible side effects of medications explained to patient and patient verbalizes understanding  Medications: all current active meds have been reviewed  Labs:   Reviewed    Counseling / Coordination of Care  Total floor / unit time spent today 15 minutes   Greater than 50% of total time was spent with the patient and / or family counseling and / or coordination of care

## 2018-11-05 NOTE — PROGRESS NOTES
Aashish Kilpatrick remains blunted and with a flat affect   He is mostly isolative to his room  Abdullahi Duff attended 1/2 groups today  Yoselin Multani is med and meal complaint with no prompting needed   No complaints or issues noted   Continue to monitor

## 2018-11-06 RX ADMIN — FLUPHENAZINE HYDROCHLORIDE 20 MG: 10 TABLET, FILM COATED ORAL at 20:46

## 2018-11-06 RX ADMIN — MIDODRINE HYDROCHLORIDE 10 MG: 2.5 TABLET ORAL at 06:08

## 2018-11-06 RX ADMIN — CLOZAPINE 450 MG: 25 TABLET ORAL at 20:45

## 2018-11-06 RX ADMIN — BUPROPION HYDROCHLORIDE 300 MG: 300 TABLET, FILM COATED, EXTENDED RELEASE ORAL at 08:26

## 2018-11-06 RX ADMIN — DIVALPROEX SODIUM 1000 MG: 500 TABLET, FILM COATED, EXTENDED RELEASE ORAL at 20:45

## 2018-11-06 RX ADMIN — SENNOSIDES 8.6 MG: 8.6 TABLET, FILM COATED ORAL at 17:04

## 2018-11-06 RX ADMIN — SENNOSIDES 8.6 MG: 8.6 TABLET, FILM COATED ORAL at 08:26

## 2018-11-06 RX ADMIN — POLYETHYLENE GLYCOL 3350 17 G: 17 POWDER, FOR SOLUTION ORAL at 08:26

## 2018-11-06 RX ADMIN — LEVOTHYROXINE SODIUM 75 MCG: 75 TABLET ORAL at 06:08

## 2018-11-06 RX ADMIN — MIDODRINE HYDROCHLORIDE 10 MG: 2.5 TABLET ORAL at 20:46

## 2018-11-06 NOTE — PROGRESS NOTES
Julia Irwin has been awake, alert, and visible intermittently out in the milieu  No interaction noted with peers  Affect remains flat, blunted, preoccupied and delayed at times  Pt offers minimal conversation with staff except for one, two word answers to questions asked  Compliant with all scheduled meds without prompting  Ate 100% supper  Mother in to visit with fair interaction noted and brought food in for pt which pt ate  Attended and participated in 2/2 evening groups  Had evening snack  No behavioral issues  Resting quietly in bed at present, arouses easily  Will continue to monitor/assess for any changes

## 2018-11-06 NOTE — PROGRESS NOTES
Progress Note - Behavioral Health   Lj Rojas 26 y o  male MRN: [id number]  Unit/Bed#: [facility] 112-01 Encounter: [id number]    Assessment/Plan   Principal Problem:    Chronic paranoid schizophrenia ([address] )  Active Problems:    Encounter for medical assessment      Behavior over the last 24 hours:  unchanged  Sleep: hypersomnia  Appetite: normal  Medication side effects: No  ROS: no complaints    Mental Status Evaluation:  Appearance:  age appropriate   Behavior:  guarded   Speech:  delayed   Mood:  constricted   Affect:  constricted   Thought Process:  blocked   Thought Content:  normal   Perceptual Disturbances: Auditory hallucinations without commands   Risk Potential: none   Sensorium:  person, place and situation   Cognition:  grossly intact   Consciousness:  alert    Attention: attention span appeared shorter than expected for age   Insight:  fair   Judgment: fair   Gait/Station: normal gait/station   Motor Activity: no abnormal movements     Progress Toward Goals:   Patient appears a bit more withdrawn than usual   Attended only 1 out of to groups  Spends most of time in his room resting on his bed  Conversations are brief and minimal   No new complaints  Recommended Treatment: Continue with group therapy, milieu therapy and occupational therapy  Risks, benefits and possible side effects of Medications:   Risks, benefits, and possible side effects of medications explained to patient and patient verbalizes understanding  Medications: all current active meds have been reviewed  Labs:   Reviewed    Counseling / Coordination of Care  Total floor / unit time spent today 15 minutes  Greater than 50% of total time was spent with the patient and / or family counseling and / or coordination of care

## 2018-11-06 NOTE — PROGRESS NOTES
Anshule President remains blunted and with a flat affect   He is mostly isolative to his room  Tyshawn Villatoro attended and participated 2/2 groups today  [facility] is med and meal complaint with no prompting needed   No complaints or issues noted   Continue to monitor

## 2018-11-07 RX ADMIN — POLYETHYLENE GLYCOL 3350 17 G: 17 POWDER, FOR SOLUTION ORAL at 08:47

## 2018-11-07 RX ADMIN — MIDODRINE HYDROCHLORIDE 10 MG: 2.5 TABLET ORAL at 16:56

## 2018-11-07 RX ADMIN — BISACODYL 10 MG: 5 TABLET, COATED ORAL at 06:20

## 2018-11-07 RX ADMIN — BUPROPION HYDROCHLORIDE 300 MG: 300 TABLET, FILM COATED, EXTENDED RELEASE ORAL at 08:47

## 2018-11-07 RX ADMIN — DIVALPROEX SODIUM 1000 MG: 500 TABLET, FILM COATED, EXTENDED RELEASE ORAL at 20:59

## 2018-11-07 RX ADMIN — LEVOTHYROXINE SODIUM 75 MCG: 75 TABLET ORAL at 06:20

## 2018-11-07 RX ADMIN — FLUPHENAZINE HYDROCHLORIDE 20 MG: 10 TABLET, FILM COATED ORAL at 20:59

## 2018-11-07 RX ADMIN — SENNOSIDES 8.6 MG: 8.6 TABLET, FILM COATED ORAL at 08:47

## 2018-11-07 RX ADMIN — SENNOSIDES 8.6 MG: 8.6 TABLET, FILM COATED ORAL at 17:11

## 2018-11-07 RX ADMIN — MIDODRINE HYDROCHLORIDE 10 MG: 2.5 TABLET ORAL at 06:20

## 2018-11-07 RX ADMIN — MIDODRINE HYDROCHLORIDE 10 MG: 2.5 TABLET ORAL at 20:59

## 2018-11-07 RX ADMIN — CLOZAPINE 450 MG: 25 TABLET ORAL at 20:58

## 2018-11-07 NOTE — CASE MANAGEMENT
CM called and spoke with patient's mother, Lis Cerda, regarding patient's discharge plans and the lack of bed availability at any of the [facility] and other [facility] facilities in the area  CM inquired if she would reconsider having patient come live with her at her home  Lis Cerda reported she has been thinking about this and believes that if she is given support for him that she will be able to make it work with him moving back home  CM encouraged Lis Cerda to attend patient's treatment team meeting tomorrow morning to discuss further with the treatment team  CM will also contact [facility] informing them of this potential discharge plan and will work with them in coordinating care for patient  CM will also discuss with patient getting involved at [place] [address] again so that he will have somewhere structured to go while mom is at work three days a week  CM will continue to follow patient's progress and assist with discharge planning needs

## 2018-11-07 NOTE — PROGRESS NOTES
Progress Note - Behavioral Health   Nilton Mason 26 y o  male MRN: [id number]  Unit/Bed#: [facility] 112-01 Encounter: [id number]    Assessment/Plan   Principal Problem:    Chronic paranoid schizophrenia [address] )  Active Problems:    Encounter for medical assessment      Behavior over the last 24 hours:  unchanged  Sleep: hypersomnia  Appetite: normal  Medication side effects: No  ROS: no complaints    Mental Status Evaluation:  Appearance:  age appropriate   Behavior:  guarded   Speech:  delayed   Mood:  constricted   Affect:  constricted   Thought Process:  blocked   Thought Content:  normal   Perceptual Disturbances: Auditory hallucinations without commands   Risk Potential: none   Sensorium:  person, place and situation   Cognition:  grossly intact   Consciousness:  alert    Attention: attention span appeared shorter than expected for age   Insight:  fair   Judgment: fair   Gait/Station: normal gait/station   Motor Activity: no abnormal movements     Progress Toward Goals:   Patient continues to be isolative and guarded on conversation  He is able to express his needs to the staff however  He still noted to be talking and/or laughing to himself at times  Patient is tolerating the psych meds without difficulty  No new issues  Recommended Treatment: Continue with group therapy, milieu therapy and occupational therapy  Risks, benefits and possible side effects of Medications:   Risks, benefits, and possible side effects of medications explained to patient and patient verbalizes understanding  Medications: all current active meds have been reviewed  Review  Counseling / Coordination of Care  Total floor / unit time spent today 15 minutes  Greater than 50% of total time was spent with the patient and / or family counseling and / or coordination of care

## 2018-11-07 NOTE — PROGRESS NOTES
~Individual received his midodrine this morning for BP 91/53 with P116 with 8 oz of water  ~Also received PRN dulcolax EC 10mg for last recorded bowel 11/03/18, with pending result  Will continue to monitor

## 2018-11-07 NOTE — PROGRESS NOTES
Keily Lisa has been isolative to self and room most of the shift  No interaction with peers and little with staff  Intermittent eye contact  Denies anxiety, depression and voices  At times he smiles to himself while he is out walking in the hallway  Ate 100% of his meal and took medication without a problem  No shower or BM this shift  Attended and participated in evening groups 2/2  Took HS medication and ate snack before going to bed  Patient safety and fall precautions maintained without incident

## 2018-11-07 NOTE — PROGRESS NOTES
Chandrika Liang has been isolative to room and self, except for attending and participating in structured activities  No interaction with peers and little with staff  Denies anxiety, depression and hearing voices  Blunted, flat affect  Intermittent eye contact  Lays in bed with covers over his head  Ate only 50% and 25% of his meals  No shower or BM today  Attended 2/2 groups this shift

## 2018-11-08 RX ADMIN — BUPROPION HYDROCHLORIDE 300 MG: 300 TABLET, FILM COATED, EXTENDED RELEASE ORAL at 08:16

## 2018-11-08 RX ADMIN — SENNOSIDES 8.6 MG: 8.6 TABLET, FILM COATED ORAL at 17:06

## 2018-11-08 RX ADMIN — FLUPHENAZINE HYDROCHLORIDE 20 MG: 10 TABLET, FILM COATED ORAL at 21:06

## 2018-11-08 RX ADMIN — SENNOSIDES 8.6 MG: 8.6 TABLET, FILM COATED ORAL at 08:16

## 2018-11-08 RX ADMIN — MIDODRINE HYDROCHLORIDE 10 MG: 2.5 TABLET ORAL at 06:08

## 2018-11-08 RX ADMIN — CLOZAPINE 450 MG: 25 TABLET ORAL at 21:05

## 2018-11-08 RX ADMIN — POLYETHYLENE GLYCOL 3350 17 G: 17 POWDER, FOR SOLUTION ORAL at 08:16

## 2018-11-08 RX ADMIN — DIVALPROEX SODIUM 1000 MG: 500 TABLET, FILM COATED, EXTENDED RELEASE ORAL at 21:05

## 2018-11-08 RX ADMIN — MIDODRINE HYDROCHLORIDE 10 MG: 2.5 TABLET ORAL at 16:47

## 2018-11-08 RX ADMIN — LEVOTHYROXINE SODIUM 75 MCG: 75 TABLET ORAL at 06:08

## 2018-11-08 NOTE — PROGRESS NOTES
Jaciel Britton has been visible, compliant with routine medications   He ate 100% of breakfast and 100% of lunch   He attended treatment team and IMR, gait steady, remains on Fall precautions, fluids encouraged, denied pain

## 2018-11-08 NOTE — PROGRESS NOTES
Individual maintained on ongoing SAFE and fall precaution without any incident on this shift    He is laying in bed, eyes closed, breath evenly distributed and unlabored   Checked every 15 minutes during rounds   In no apparent distress   Will continue to monitor behavior and sleep pattern

## 2018-11-08 NOTE — ESCALATED TEAM TX
Patient attended treatment team meeting this morning prepared with self-assessment  Patient's group attendance for last week was 80%  Patient's mother reports that she is able to take patient home, as he has waited too long for a bed at a [facility]  Team urged patient to join some community activities like [employer] or [employer]  Patient was very adamant that he does not want to work or go to school  Therefore, patient was agreeable to try [employer] or [employer]  Team and patient's mother agree that it will be very important for patient to have a structured day when he leaves the hospital   Patient is agreeable to work with  to fill out application  Patient was granted a pass for Sunday, 11/11 from 10-8p with his mother  Patient verbalized no further questions or concerns at the conclusion of the meeting  Next team meeting scheduled for 11/15

## 2018-11-08 NOTE — PROGRESS NOTES
Isa Gaming has been awake, alert, and visible intermittently out in the milieu  Affect remains flat, blunted, and offers minimal conversation with staff  Compliant with all scheduled meds without prompting  No overt responding to internal stimuli noted  Ate 50% supper  Behavior continues to be quiet and isolative to self  Attended and participated in 2/2 evening groups and then had snack  No behavioral issues  Resting quietly in bed at present, arouses easily  Will continue to monitor/assess for any changes

## 2018-11-08 NOTE — PROGRESS NOTES
Psychiatry Progress Note    Subjective:   Interval History     Team mtg with pt and mother present; mental status unchanged;flat affect, brief conversations with staff; mother may be receptive to having pt live at home with her after discharge      Current medications:    Current Facility-Administered Medications:     bisacodyl (DULCOLAX) EC tablet 10 mg, 10 mg, Oral, Daily PRN, Provider Cutover, 10 mg at 11/07/18 0620    bisacodyl (DULCOLAX) rectal suppository 10 mg, 10 mg, Rectal, Daily PRN, LIZETH Mesa    buPROPion (WELLBUTRIN XL) 24 hr tablet 300 mg, 300 mg, Oral, Daily, Provider Cutover, 300 mg at 11/08/18 0816    cloZAPine (CLOZARIL) tablet 450 mg, 450 mg, Oral, HS, Lino Recinos MD, 450 mg at 11/07/18 2058    divalproex sodium (DEPAKOTE ER) 24 hr tablet 1,000 mg, 1,000 mg, Oral, HS, Leah Forman MD, 1,000 mg at 11/07/18 2059    fluPHENAZine (PROLIXIN) tablet 20 mg, 20 mg, Oral, HS, Lino Recinos MD, 20 mg at 11/07/18 2059    levothyroxine tablet 75 mcg, 75 mcg, Oral, Early Morning, Provider Cutover, 75 mcg at 11/08/18 0608    midodrine (PROAMATINE) tablet 10 mg, 10 mg, Oral, TID, Wally Lynn MD, 10 mg at 11/08/18 0608    polyethylene glycol (MIRALAX) packet 17 g, 17 g, Oral, Daily, Provider Cutover, 17 g at 11/08/18 0816    senna (SENOKOT) tablet 8 6 mg, 1 tablet, Oral, BID, LIZETH Mcclellan, 8 6 mg at 11/08/18 0816    Current Problem List:    Patient Active Problem List   Diagnosis    [facility]    Encounter for medical assessment       Problem list reviewed 11/08/18     Objective:     Vital Signs:  Vitals:    11/07/18 1532 11/07/18 2031 11/08/18 0600 11/08/18 0700   BP: 117/75 114/86 (!) 82/53 109/76   BP Location: Left arm Left arm Right arm Right arm   Pulse: (!) 113 (!) 129 (!) 124 104   Resp:    20   Temp:    (!) 97 1 °F (36 2 °C)   TempSrc:       SpO2:       Weight:       Height:             Appearance:  age appropriate and casually dressed   Behavior: normal   Speech:  normal volume   Mood:  depressed   Affect:  constricted   Thought Process:  normal   Thought Content:  normal   Perceptual Disturbances: None and Auditory hallucinations without commands   Risk Potential: none   Sensorium:  person, place, situation and time   Cognition:  intact   Consciousness:  alert and awake    Attention: attention span and concentration were age appropriate   Intellect: average   Insight:  good   Judgment: good      Motor Activity: no abnormal movements           Recent Labs:  Results Reviewed     None          I/O Past 24 hours:  I/O last 3 completed shifts:  In: -   Out: 1 [Stool:1]  No intake/output data recorded  Assessment / Plan:     [facility]    Recommended Treatment:      Medication changes:  1) none    Non-pharmacological treatments  1) Continue with group therapy, milieu therapy and occupational therapy  Safety  1) Safety/communication plan established targeting dynamic risk factors above  2) Risks, benefits, and possible side effects of medications explained to patient and patient verbalizes understanding  Counseling / Coordination of Care    Total floor / unit time spent today 20 minutes  Greater than 50% of total time was spent with the patient and / or family counseling and / or coordination of care  A description of the counseling / coordination of care  Patient's Rights, confidentiality and exceptions to confidentiality, use of automated medical record, [address] [place] [address] staff access to medical record, and consent to treatment reviewed      Mitch Mckoy MD

## 2018-11-09 RX ADMIN — FLUPHENAZINE HYDROCHLORIDE 20 MG: 10 TABLET, FILM COATED ORAL at 21:10

## 2018-11-09 RX ADMIN — SENNOSIDES 8.6 MG: 8.6 TABLET, FILM COATED ORAL at 17:04

## 2018-11-09 RX ADMIN — DIVALPROEX SODIUM 1000 MG: 500 TABLET, FILM COATED, EXTENDED RELEASE ORAL at 21:09

## 2018-11-09 RX ADMIN — LEVOTHYROXINE SODIUM 75 MCG: 75 TABLET ORAL at 06:07

## 2018-11-09 RX ADMIN — MIDODRINE HYDROCHLORIDE 10 MG: 2.5 TABLET ORAL at 06:07

## 2018-11-09 RX ADMIN — BUPROPION HYDROCHLORIDE 300 MG: 300 TABLET, FILM COATED, EXTENDED RELEASE ORAL at 08:21

## 2018-11-09 RX ADMIN — POLYETHYLENE GLYCOL 3350 17 G: 17 POWDER, FOR SOLUTION ORAL at 08:21

## 2018-11-09 RX ADMIN — MIDODRINE HYDROCHLORIDE 10 MG: 2.5 TABLET ORAL at 16:59

## 2018-11-09 RX ADMIN — SENNOSIDES 8.6 MG: 8.6 TABLET, FILM COATED ORAL at 08:21

## 2018-11-09 RX ADMIN — CLOZAPINE 450 MG: 25 TABLET ORAL at 21:09

## 2018-11-09 NOTE — NURSING NOTE
Pt remains isolative to room  Attended one group this shift  No peer interaction  Takes prescribed medications without prompting  Out for meals, eats well

## 2018-11-09 NOTE — PROGRESS NOTES
Liam Meeks has been awake, alert, and visible intermittently out in the milieu  Behavior remains quiet and isolative to self  No interaction noted with peers  Affect flat, blunted, and offers minimal conversation with staff  Ate 100% supper  Mother in to visit and brought food in for pt which pt ate  Fair interaction noted  Attended and participated in 2/2 evening groups  Compliant with scheduled meds without prompting  2100 scheduled Midodrine held per MD BP parameters  Pt declined evening snack  Resting quietly in bed at present, arouses easily  Will continue to monitor/assess for any changes

## 2018-11-09 NOTE — PROGRESS NOTES
Progress Note - Behavioral Health   Leotis Riedel 26 y o  male MRN: [id number]  Unit/Bed#: [facility] 112-01 Encounter: [id number]    Assessment/Plan   Principal Problem:    Chronic paranoid schizophrenia ([address] )  Active Problems:    Encounter for medical assessment      Behavior over the last 24 hours:  unchanged  Sleep: hypersomnia  Appetite: normal  Medication side effects: No  ROS: no complaints    Mental Status Evaluation:  Appearance:  age appropriate   Behavior:  guarded   Speech:  delayed   Mood:  depressed   Affect:  constricted   Thought Process:  blocked   Thought Content:  normal   Perceptual Disturbances: Auditory hallucinations without commands   Risk Potential: none   Sensorium:  person, place and situation   Cognition:  grossly intact   Consciousness:  alert    Attention: attention span and concentration were age appropriate   Insight:  fair   Judgment: fair   Gait/Station: normal gait/station   Motor Activity: no abnormal movements     Progress Toward Goals: The patient's behavior mental status is basically unchanged  The discussion now is whether or not patient can live with his mother because there is no outpatient residential facility that has been found so far for [facility]  Also discussions that he should be going to [facility] on a regular basis which she has reluctantly agreed to  Recommended Treatment: Continue with group therapy, milieu therapy and occupational therapy  Risks, benefits and possible side effects of Medications:   Risks, benefits, and possible side effects of medications explained to patient and patient verbalizes understanding  Medications: all current active meds have been reviewed  Labs:   Reviewed    Counseling / Coordination of Care  Total floor / unit time spent today 15 minutes  Greater than 50% of total time was spent with the patient and / or family counseling and / or coordination of care

## 2018-11-10 RX ADMIN — POLYETHYLENE GLYCOL 3350 17 G: 17 POWDER, FOR SOLUTION ORAL at 08:53

## 2018-11-10 RX ADMIN — DIVALPROEX SODIUM 1000 MG: 500 TABLET, FILM COATED, EXTENDED RELEASE ORAL at 21:21

## 2018-11-10 RX ADMIN — SENNOSIDES 8.6 MG: 8.6 TABLET, FILM COATED ORAL at 08:53

## 2018-11-10 RX ADMIN — MIDODRINE HYDROCHLORIDE 10 MG: 2.5 TABLET ORAL at 06:24

## 2018-11-10 RX ADMIN — FLUPHENAZINE HYDROCHLORIDE 20 MG: 10 TABLET, FILM COATED ORAL at 21:21

## 2018-11-10 RX ADMIN — LEVOTHYROXINE SODIUM 75 MCG: 75 TABLET ORAL at 06:24

## 2018-11-10 RX ADMIN — SENNOSIDES 8.6 MG: 8.6 TABLET, FILM COATED ORAL at 17:48

## 2018-11-10 RX ADMIN — BUPROPION HYDROCHLORIDE 300 MG: 300 TABLET, FILM COATED, EXTENDED RELEASE ORAL at 08:53

## 2018-11-10 RX ADMIN — CLOZAPINE 450 MG: 25 TABLET ORAL at 21:21

## 2018-11-10 NOTE — PROGRESS NOTES
Psychiatry Progress Note    Subjective: Interval History     Pt still isolative to his room in between groups and meals  Pt still with poor hygiene  Pt still with short one word responses during assessment  Continues to report that he is doing fine  Patient continues to deny all psychiatric symptoms  Patient with no change in presentation  Patient still compliant with his medications and meals      Current medications:    Current Facility-Administered Medications:     bisacodyl (DULCOLAX) EC tablet 10 mg, 10 mg, Oral, Daily PRN, Provider Cutover, 10 mg at 11/07/18 0620    bisacodyl (DULCOLAX) rectal suppository 10 mg, 10 mg, Rectal, Daily PRN, LIZETH Owen    buPROPion (WELLBUTRIN XL) 24 hr tablet 300 mg, 300 mg, Oral, Daily, Provider Cutover, 300 mg at 11/10/18 0853    cloZAPine (CLOZARIL) tablet 450 mg, 450 mg, Oral, HS, All Huffman MD, 450 mg at 11/09/18 2109    divalproex sodium (DEPAKOTE ER) 24 hr tablet 1,000 mg, 1,000 mg, Oral, HS, Doreen Olmstead MD, 1,000 mg at 11/09/18 2109    fluPHENAZine (PROLIXIN) tablet 20 mg, 20 mg, Oral, HS, All Huffman MD, 20 mg at 11/09/18 2110    levothyroxine tablet 75 mcg, 75 mcg, Oral, Early Morning, Provider Cutover, 75 mcg at 11/10/18 0624    midodrine (PROAMATINE) tablet 10 mg, 10 mg, Oral, TID, Wally Lynn MD, 10 mg at 11/10/18 0624    polyethylene glycol (MIRALAX) packet 17 g, 17 g, Oral, Daily, Provider Cutover, 17 g at 11/10/18 0853    senna (SENOKOT) tablet 8 6 mg, 1 tablet, Oral, BID, LIZETH Meyer, 8 6 mg at 11/10/18 0853      Objective:     Vital Signs:  Vitals:    11/09/18 1606 11/09/18 1953 11/10/18 0730 11/10/18 0732   BP: 109/56 126/77 106/74 100/72   BP Location: Left arm Left arm Right arm Right arm   Pulse: (!) 127 (!) 135 (!) 106 (!) 111   Resp:   18    Temp:   98 1 °F (36 7 °C)    TempSrc:   Temporal    SpO2:       Weight:       Height:             Appearance:  age appropriate, casually dressed and disheveled   Behavior: normal   Speech:  soft   Mood:  depressed   Affect:  flat   Thought Process:  normal   Thought Content:  normal   Perceptual Disturbances: None   Risk Potential: none   Sensorium:  person, place and time   Cognition:  intact   Consciousness:  alert and awake    Attention: attention span and concentration were age diminished   Intellect: average   Insight:  limited   Judgment: limited      Motor Activity: no abnormal movements           Recent Labs:  Results Reviewed     None          I/O Past 24 hours:  I/O last 3 completed shifts:  In: -   Out: 1 [Stool:1]  No intake/output data recorded  Assessment / Plan:     [facility]    Recommended Treatment:      Medication changes:  1) continue current medication regimen  Non-pharmacological treatments  1) Continue with group therapy, milieu therapy and occupational therapy  Safety  1) Safety/communication plan established targeting dynamic risk factors above  2) Risks, benefits, and possible side effects of medications explained to patient and patient verbalizes understanding  Counseling / Coordination of Care    Total floor / unit time spent today 20 minutes  Greater than 50% of total time was spent with the patient and / or family counseling and / or coordination of care  A description of the counseling / coordination of care  Patient's Rights, confidentiality and exceptions to confidentiality, use of automated medical record, [facility] willa staff access to medical record, and consent to treatment reviewed      Jt Meyers PA-C

## 2018-11-10 NOTE — PROGRESS NOTES
Patient was isolative to room  Med and meal compliant  Isolative to room, presents with a flat affect, Patient attended goals and medication group  Patient's mother visited today and brought food that the patient ate, it was a quiet visit  No complaints of SI, HI, audio or visual hallucinations  Will continue to monitor for changes in current status

## 2018-11-10 NOTE — PROGRESS NOTES
Lodema Turner has been awake, alert, and visible intermittently out in the milieu  Pt remains isolative to self with flat, blunted affect  No socialization noted with peers  Pt offers minimal conversation with staff except for one/two word answers  Mother in to visit with fair interaction noted and brought food in for pt which pt ate  Attended and participated in 2/2 evening groups  Compliant with scheduled meds with little prompting  2100 Midodrine held per MD BP parameters  No behavioral issues  Declined evening snack  Resting quietly in bed at present, arouses easily  Will continue to monitor/assess for any changes

## 2018-11-11 PROCEDURE — 80307 DRUG TEST PRSMV CHEM ANLYZR: CPT | Performed by: PSYCHIATRY & NEUROLOGY

## 2018-11-11 RX ADMIN — SENNOSIDES 8.6 MG: 8.6 TABLET, FILM COATED ORAL at 20:51

## 2018-11-11 RX ADMIN — BUPROPION HYDROCHLORIDE 300 MG: 300 TABLET, FILM COATED, EXTENDED RELEASE ORAL at 08:54

## 2018-11-11 RX ADMIN — POLYETHYLENE GLYCOL 3350 17 G: 17 POWDER, FOR SOLUTION ORAL at 08:54

## 2018-11-11 RX ADMIN — FLUPHENAZINE HYDROCHLORIDE 20 MG: 10 TABLET, FILM COATED ORAL at 20:51

## 2018-11-11 RX ADMIN — SENNOSIDES 8.6 MG: 8.6 TABLET, FILM COATED ORAL at 08:54

## 2018-11-11 RX ADMIN — LEVOTHYROXINE SODIUM 75 MCG: 75 TABLET ORAL at 06:42

## 2018-11-11 RX ADMIN — CLOZAPINE 450 MG: 25 TABLET ORAL at 20:51

## 2018-11-11 RX ADMIN — MIDODRINE HYDROCHLORIDE 10 MG: 2.5 TABLET ORAL at 06:42

## 2018-11-11 RX ADMIN — DIVALPROEX SODIUM 1000 MG: 500 TABLET, FILM COATED, EXTENDED RELEASE ORAL at 20:51

## 2018-11-11 RX ADMIN — MIDODRINE HYDROCHLORIDE 10 MG: 2.5 TABLET ORAL at 20:51

## 2018-11-11 NOTE — PROGRESS NOTES
Patient visible intermittently  Patient presents with a flat affect, med compliant with minimal prompting, attended goals group  No complaints of SI, HI, audio or visual hallucinations, anxiety or depression  Will continue to monitor for changes in current status

## 2018-11-11 NOTE — PROGRESS NOTES
Psychiatry Progress Note    Subjective: Interval History     Pt visible on the unit this morning  Pt with improved hygiene as he showered  Has pass with mother scheduled for today  States that he is looking forward to visit, however no brightening of affect  Pt still appears to have depressed mood, but minimizing symptoms  Still compliant with medications and meals  Attending groups but then retreating back to his room      Current medications:    Current Facility-Administered Medications:     bisacodyl (DULCOLAX) EC tablet 10 mg, 10 mg, Oral, Daily PRN, Provider Cutover, 10 mg at 11/07/18 0620    bisacodyl (DULCOLAX) rectal suppository 10 mg, 10 mg, Rectal, Daily PRN, [place] LIZETH Greenfield    buPROPion (WELLBUTRIN XL) 24 hr tablet 300 mg, 300 mg, Oral, Daily, Provider Cutover, 300 mg at 11/10/18 0853    cloZAPine (CLOZARIL) tablet 450 mg, 450 mg, Oral, HS, Arnie Contreras MD, 450 mg at 11/10/18 2121    divalproex sodium (DEPAKOTE ER) 24 hr tablet 1,000 mg, 1,000 mg, Oral, HS, Scooter Loera MD, 1,000 mg at 11/10/18 2121    fluPHENAZine (PROLIXIN) tablet 20 mg, 20 mg, Oral, HS, Arnie Contreras MD, 20 mg at 11/10/18 2121    levothyroxine tablet 75 mcg, 75 mcg, Oral, Early Morning, Provider Cutover, 75 mcg at 11/11/18 0642    midodrine (PROAMATINE) tablet 10 mg, 10 mg, Oral, TID, Wally Lynn MD, 10 mg at 11/11/18 2066    polyethylene glycol (MIRALAX) packet 17 g, 17 g, Oral, Daily, Provider Cutover, 17 g at 11/10/18 0853    senna (SENOKOT) tablet 8 6 mg, 1 tablet, Oral, BID, Francisca LIZETH Greenfield, 8 6 mg at 11/10/18 1748      Objective:     Vital Signs:  Vitals:    11/10/18 1532 11/10/18 1534 11/10/18 1930 11/11/18 0647   BP: 133/86 123/81 120/87 (!) 89/52   BP Location: Left arm Left arm Left arm Left arm   Pulse: (!) 126 (!) 130 (!) 129 (!) 127   Resp:       Temp:       TempSrc:       SpO2:       Weight:       Height:             Appearance:  age appropriate, casually dressed and disheveled   Behavior: normal   Speech:  soft   Mood:  depressed   Affect:  flat   Thought Process:  normal   Thought Content:  normal   Perceptual Disturbances: None   Risk Potential: none   Sensorium:  person, place and time   Cognition:  intact   Consciousness:  alert and awake    Attention: attention span and concentration were age diminished   Intellect: average   Insight:  limited   Judgment: limited      Motor Activity: no abnormal movements           Recent Labs:  Results Reviewed     None          I/O Past 24 hours:  No intake/output data recorded  No intake/output data recorded  Assessment / Plan:     Chronic paranoid schizophrenia ([email or web address] )    Recommended Treatment:      Medication changes:  1) continue current medication regimen  Non-pharmacological treatments  1) Continue with group therapy, milieu therapy and occupational therapy  Safety  1) Safety/communication plan established targeting dynamic risk factors above  2) Risks, benefits, and possible side effects of medications explained to patient and patient verbalizes understanding  Counseling / Coordination of Care    Total floor / unit time spent today 20 minutes  Greater than 50% of total time was spent with the patient and / or family counseling and / or coordination of care  A description of the counseling / coordination of care  Patient's Rights, confidentiality and exceptions to confidentiality, use of automated medical record, [facility] staff access to medical record, and consent to treatment reviewed      Cathie Pinzon PA-C

## 2018-11-12 RX ADMIN — LEVOTHYROXINE SODIUM 75 MCG: 75 TABLET ORAL at 06:36

## 2018-11-12 RX ADMIN — DIVALPROEX SODIUM 1000 MG: 500 TABLET, FILM COATED, EXTENDED RELEASE ORAL at 21:06

## 2018-11-12 RX ADMIN — BUPROPION HYDROCHLORIDE 300 MG: 300 TABLET, FILM COATED, EXTENDED RELEASE ORAL at 08:34

## 2018-11-12 RX ADMIN — MIDODRINE HYDROCHLORIDE 10 MG: 2.5 TABLET ORAL at 06:36

## 2018-11-12 RX ADMIN — MIDODRINE HYDROCHLORIDE 10 MG: 2.5 TABLET ORAL at 16:53

## 2018-11-12 RX ADMIN — SENNOSIDES 8.6 MG: 8.6 TABLET, FILM COATED ORAL at 08:34

## 2018-11-12 RX ADMIN — MIDODRINE HYDROCHLORIDE 10 MG: 2.5 TABLET ORAL at 21:05

## 2018-11-12 RX ADMIN — POLYETHYLENE GLYCOL 3350 17 G: 17 POWDER, FOR SOLUTION ORAL at 08:34

## 2018-11-12 RX ADMIN — SENNOSIDES 8.6 MG: 8.6 TABLET, FILM COATED ORAL at 21:06

## 2018-11-12 RX ADMIN — CLOZAPINE 450 MG: 25 TABLET ORAL at 21:06

## 2018-11-12 RX ADMIN — FLUPHENAZINE HYDROCHLORIDE 20 MG: 10 TABLET, FILM COATED ORAL at 21:06

## 2018-11-12 NOTE — PROGRESS NOTES
Pharmacy Clozapine Monitoring Progress Note     Arsalan Butt is receiving 450 mg clozapine total daily dose scheduled as 450 mg daily at bedtime  (Recommended 1/3 total daily dose in AM and 2/3 total daily dose in PM)    Assessment/Plan:    Current phase of treatment is maintenance/continuation  Patient is eligible to receive clozapine and the [facility] monitoring frequency is every four weeks per clozapine REMS ([employer]  com/[email or web address]/home  u)  The Clozapine REMS is an FDA-mandated program implemented by the manufacturers of clozapine  Last Clozapine level (if available):    No results found for: CLOZAPINE  No results found for: NCLOZIP        Recommendation:    The patient  is missing laboratory values  Based on the table below, pharmacy recommends the following: Assess scheduled ANC for 11/27,  order hemoglobin A1c, order BNP and troponin to monitor for myocarditis, and add docusate 100 mg BID to complete prophylactic bowel regimen  Adverse Effect Suggested Monitoring Patient's Status    Agranulocytosis ANC baseline and then repeat weekly for first 6 months and every 2 weeks for the second 6 months  Maintenance after one year of therapy every month  The most recent [facility] was   Lab Results   Component Value Date    NEUTROABS 3 90 10/30/2018       This ANC is considered normal range (ANC >/= 1500/mcL)  Continue current treatment and monitoring course next [facility] scheduled for 11/27      Respiratory Depression Please ensure patient is not on concomitant respiratory lowering medications such as benzodiazepines, sedative hypnotics (eg  zolpidem) This patient is not on respiratory depression lowering medications   Myocarditis Baseline and weekly EKG, CRP, BNP, and troponin  Weekly symptomatic complaints of fatigue, dyspnea, tachycardia, chest pain, palpitations, and fever for the first 4 weeks    Last EKG Results on 10/31 showed:  Sinus tachycardia, otherwise normal ECG     Last QTC on 10/31 was     0  Lab Value Date/Time   QTCINT 448 10/31/2018 1216       Last BNP was No results found for: BNP    Last Troponin was  No results found for: TROPONIN   Orthostatic hypotension/  bradycardia Blood pressure and vital signs      Monitor blood pressure and orthostatic hypotension at least twice daily upon initiation and continue to follow at least once daily afterwards  /72 (BP Location: Left arm)   Pulse (!) 114   Temp 97 5 °F (36 4 °C) (Temporal)   Resp 19   Ht 6' 1" (1 854 m)   Wt 89 1 kg (196 lb 6 4 oz)   SpO2 97%   BMI 25 91 kg/m²             Constipation (bowel obstruction) Assess at baseline and weekly during first four months of therapy  Docusate 100mg BID and Miralax 17 grams daily recommended initially  Prophylactic bowel regimen ordered and includessenna 8 6 mg BID, please add docusate 100 mg BID  Sialorrhea Assess at baseline and weekly during first four months of therapy  May be managed using sublingual anticholinergic preparations (atropine 1% eye drops)  Patient is not experiencing sialorrhea  This will continue to be monitored         Pharmacy will continue to follow patient with team   Electronically signed by: Jacek Bernabe, Pharmacist

## 2018-11-12 NOTE — PLAN OF CARE
Problem: Alteration in Thoughts and Perception  Goal: Verbalize thoughts and feelings  Interventions:  - Promote a nonjudgmental and trusting relationship with the patient through active listening and therapeutic communication  - Assess patient's level of functioning, behavior and potential for risk  - Engage patient in 1 on 1 interactions for a minimum of 15 minutes each session  - Encourage patient to express fears, feelings, frustrations, and discuss symptoms    - [place] patient to reality, help patient recognize reality-based thinking   - Administer medications as ordered and assess for potential side effects  - Provide the patient education related to the signs and symptoms of the illness and desired effects of prescribed medications   Outcome: Progressing  Individual is beginning the process to prepare self for leaving the program, going home to live with his mother  The treatment team has been encouraging him to become more familiar with his medication, learning when they are due and the reason for taking the particular medications  Today he was presented with an updated list of medications with what each is used for, to become more comfortable with the regimen

## 2018-11-12 NOTE — PROGRESS NOTES
Lawton Goldmann remains blunted and with a flat affect   He is mostly isolative to his room  Archie Tinajero attended and participated 1/3 groups today  Frankie Sexton is med and meal complaint with no prompting needed   No complaints or issues noted   Continue to monitor

## 2018-11-12 NOTE — PROGRESS NOTES
Lawton Goldmann was on pass with his Mom until 2000  Blunted affect when returned  He nor his Mom called in at scheduled time of 1600  Staff called the number provided for his home and there was no answer and message was left for someone to call to let us know everything was going okay  Never received a call  Mom said that he forgot to call and when he realized he said, "it's too late now"  Reminded them that we do need a call when he is out on pass  Body assessment was done and UDS was sent to the lab, which was negative  Remains isolative to self and room  He did not attend groups after returning from pass  Prompted for medication at HS  Did not eat snack before going to bed  Patient safety and fall precautions maintained without incident

## 2018-11-13 RX ADMIN — SENNOSIDES 8.6 MG: 8.6 TABLET, FILM COATED ORAL at 08:29

## 2018-11-13 RX ADMIN — POLYETHYLENE GLYCOL 3350 17 G: 17 POWDER, FOR SOLUTION ORAL at 08:29

## 2018-11-13 RX ADMIN — SENNOSIDES 8.6 MG: 8.6 TABLET, FILM COATED ORAL at 20:46

## 2018-11-13 RX ADMIN — CLOZAPINE 450 MG: 25 TABLET ORAL at 20:46

## 2018-11-13 RX ADMIN — BUPROPION HYDROCHLORIDE 300 MG: 300 TABLET, FILM COATED, EXTENDED RELEASE ORAL at 08:29

## 2018-11-13 RX ADMIN — MIDODRINE HYDROCHLORIDE 10 MG: 2.5 TABLET ORAL at 20:45

## 2018-11-13 RX ADMIN — MIDODRINE HYDROCHLORIDE 10 MG: 2.5 TABLET ORAL at 06:25

## 2018-11-13 RX ADMIN — MIDODRINE HYDROCHLORIDE 10 MG: 2.5 TABLET ORAL at 16:34

## 2018-11-13 RX ADMIN — DIVALPROEX SODIUM 1000 MG: 500 TABLET, FILM COATED, EXTENDED RELEASE ORAL at 20:46

## 2018-11-13 RX ADMIN — FLUPHENAZINE HYDROCHLORIDE 20 MG: 10 TABLET, FILM COATED ORAL at 20:46

## 2018-11-13 RX ADMIN — LEVOTHYROXINE SODIUM 75 MCG: 75 TABLET ORAL at 06:24

## 2018-11-13 NOTE — PROGRESS NOTES
Liam Meeks has been awake, alert, and mostly isolative to self in room  Affect remains flat, blunted, and offers minimal conversation with staff  No interaction noted with peers  Ate 100% supper  Encouraged fluids  Attended and participated in 2/2 evening groups  Compliant with all scheduled meds with little prompting  No behavioral issues   Had evening snack  Resting quietly in bed at present, arouses easily  Will continue to monitor/assess for any changes

## 2018-11-13 NOTE — PROGRESS NOTES
Chandrika Liang has been visible, compliant with routine medications   He ate 50% of breakfast and 100% of lunch   He attended both of the required group activities, gait steady, remains on Fall precautions, fluids encouraged, denied pain

## 2018-11-13 NOTE — PROGRESS NOTES
Progress Note - Behavioral Health   Gaudencio Gilbert 26 y o  male MRN: [id number]  Unit/Bed#: [facility] 112-01 Encounter: [id number]    Assessment/Plan   Principal Problem:    Chronic paranoid schizophrenia ([address] )  Active Problems:    Encounter for medical assessment      Behavior over the last 24 hours:  unchanged  Sleep: hypersomnia  Appetite: normal  Medication side effects: No  ROS: no complaints    Mental Status Evaluation:  Appearance:  age appropriate   Behavior:  evasive   Speech:  delayed   Mood:  constricted   Affect:  constricted   Thought Process:  blocked   Thought Content:  normal   Perceptual Disturbances: Auditory hallucinations without commands   Risk Potential: none   Sensorium:  person, place and situation   Cognition:  grossly intact   Consciousness:  alert    Attention: attention span appeared shorter than expected for age   Insight:  fair   Judgment: fair   Gait/Station: normal gait/station   Motor Activity: no abnormal movements     Progress Toward Goals:   No change in his behavior or mental status  He remains withdrawn, brief conversations, but he can express his needs to staff without difficulty  Next step is getting patient to start going to the clubhouse which she has agreed to see since he is moving back with mom he he will need activities during the day which are therapeutic  Recommended Treatment: Continue with group therapy, milieu therapy and occupational therapy  Risks, benefits and possible side effects of Medications:   Risks, benefits, and possible side effects of medications explained to patient and patient verbalizes understanding  Medications: all current active meds have been reviewed  Labs:   Reviewed    Counseling / Coordination of Care reviewedTotal floor / unit time spent today 15 minutes  Greater than 50% of total time was spent with the patient and / or family counseling and / or coordination of care

## 2018-11-14 RX ORDER — ATROPINE SULFATE 10 MG/ML
1 SOLUTION/ DROPS OPHTHALMIC 3 TIMES DAILY
Status: DISCONTINUED | OUTPATIENT
Start: 2018-11-14 | End: 2018-12-20 | Stop reason: HOSPADM

## 2018-11-14 RX ADMIN — MIDODRINE HYDROCHLORIDE 10 MG: 2.5 TABLET ORAL at 17:00

## 2018-11-14 RX ADMIN — MIDODRINE HYDROCHLORIDE 10 MG: 2.5 TABLET ORAL at 20:34

## 2018-11-14 RX ADMIN — MIDODRINE HYDROCHLORIDE 10 MG: 2.5 TABLET ORAL at 06:32

## 2018-11-14 RX ADMIN — DIVALPROEX SODIUM 1000 MG: 500 TABLET, FILM COATED, EXTENDED RELEASE ORAL at 20:34

## 2018-11-14 RX ADMIN — ATROPINE SULFATE 1 DROP: 10 SOLUTION/ DROPS OPHTHALMIC at 17:00

## 2018-11-14 RX ADMIN — CLOZAPINE 450 MG: 25 TABLET ORAL at 20:34

## 2018-11-14 RX ADMIN — BUPROPION HYDROCHLORIDE 300 MG: 300 TABLET, FILM COATED, EXTENDED RELEASE ORAL at 08:10

## 2018-11-14 RX ADMIN — ATROPINE SULFATE 1 DROP: 10 SOLUTION/ DROPS OPHTHALMIC at 20:34

## 2018-11-14 RX ADMIN — SENNOSIDES 8.6 MG: 8.6 TABLET, FILM COATED ORAL at 20:33

## 2018-11-14 RX ADMIN — SENNOSIDES 8.6 MG: 8.6 TABLET, FILM COATED ORAL at 08:10

## 2018-11-14 RX ADMIN — POLYETHYLENE GLYCOL 3350 17 G: 17 POWDER, FOR SOLUTION ORAL at 08:10

## 2018-11-14 RX ADMIN — ATROPINE SULFATE 1 DROP: 10 SOLUTION/ DROPS OPHTHALMIC at 12:27

## 2018-11-14 RX ADMIN — LEVOTHYROXINE SODIUM 75 MCG: 75 TABLET ORAL at 06:32

## 2018-11-14 RX ADMIN — FLUPHENAZINE HYDROCHLORIDE 20 MG: 10 TABLET, FILM COATED ORAL at 20:33

## 2018-11-14 NOTE — PROGRESS NOTES
Bib Enriquez has been visible, compliant with routine medications   He ate 75% of breakfast and 25% of lunch   He attended both of the required group activities and left the unit with the small group for RA, gait steady, remains on Fall precautions, fluids encouraged, denied pain

## 2018-11-14 NOTE — PROGRESS NOTES
Progress Note - Behavioral Health   Van Cai 26 y o  male MRN: [id number]  Unit/Bed#: [facility] 112-01 Encounter: [id number]    Assessment/Plan   Principal Problem:    Chronic paranoid schizophrenia ([address] )  Active Problems:    Encounter for medical assessment      Behavior over the last 24 hours:  regressed  Sleep: insomnia  Appetite: normal  Medication side effects: No  ROS: no complaints    Mental Status Evaluation:  Appearance:  age appropriate   Behavior:  guarded   Speech:  delayed   Mood:  constricted   Affect:  constricted   Thought Process:  blocked   Thought Content:  normal   Perceptual Disturbances: Auditory hallucinations without commands   Risk Potential: none   Sensorium:  person, time/date and situation   Cognition:  grossly intact   Consciousness:  alert    Attention: attention span appeared shorter than expected for age   Insight:  fair   Judgment: fair   Gait/Station: normal gait/station   Motor Activity: no abnormal movements     Progress Toward Goals:   His day seems to be about the same, no major changes in his mental status or behavior on the unit  He is withdrawn, quiet and was say a few words when you ask him  He can make his needs known however to the staff when necessary    Recommended Treatment: Continue with group therapy, milieu therapy and occupational therapy  Risks, benefits and possible side effects of Medications:   Risks, benefits, and possible side effects of medications explained to patient and patient verbalizes understanding  Medications: all current active meds have been reviewed  Labs:   Reviewed    Counseling / Coordination of Care  Total floor / unit time spent today 15 minutes  Greater than 50% of total time was spent with the patient and / or family counseling and / or coordination of care

## 2018-11-14 NOTE — PROGRESS NOTES
Amol Love has been awake, alert, and mostly isolative to self in room  No interaction noted with peers  Ate 100% supper  Encouraged fluids  Affect remains flat, blunted, and offers little conversation with staff  Attended and participated in 2/2 evening groups and Men's Group  Compliant with all scheduled meds with little prompting  Had evening snack  No behavioral issues  Resting quietly in bed at present, arouses easily  Will continue to monitor/assess for any changes

## 2018-11-15 RX ADMIN — ATROPINE SULFATE 1 DROP: 10 SOLUTION/ DROPS OPHTHALMIC at 08:49

## 2018-11-15 RX ADMIN — SENNOSIDES 8.6 MG: 8.6 TABLET, FILM COATED ORAL at 20:35

## 2018-11-15 RX ADMIN — FLUPHENAZINE HYDROCHLORIDE 20 MG: 10 TABLET, FILM COATED ORAL at 20:35

## 2018-11-15 RX ADMIN — SENNOSIDES 8.6 MG: 8.6 TABLET, FILM COATED ORAL at 08:50

## 2018-11-15 RX ADMIN — ATROPINE SULFATE 1 DROP: 10 SOLUTION/ DROPS OPHTHALMIC at 20:35

## 2018-11-15 RX ADMIN — LEVOTHYROXINE SODIUM 75 MCG: 75 TABLET ORAL at 06:14

## 2018-11-15 RX ADMIN — CLOZAPINE 450 MG: 25 TABLET ORAL at 20:35

## 2018-11-15 RX ADMIN — MIDODRINE HYDROCHLORIDE 10 MG: 2.5 TABLET ORAL at 06:14

## 2018-11-15 RX ADMIN — MIDODRINE HYDROCHLORIDE 10 MG: 2.5 TABLET ORAL at 20:34

## 2018-11-15 RX ADMIN — BUPROPION HYDROCHLORIDE 300 MG: 300 TABLET, FILM COATED, EXTENDED RELEASE ORAL at 08:50

## 2018-11-15 RX ADMIN — DIVALPROEX SODIUM 1000 MG: 500 TABLET, FILM COATED, EXTENDED RELEASE ORAL at 20:35

## 2018-11-15 RX ADMIN — MIDODRINE HYDROCHLORIDE 10 MG: 2.5 TABLET ORAL at 16:38

## 2018-11-15 RX ADMIN — POLYETHYLENE GLYCOL 3350 17 G: 17 POWDER, FOR SOLUTION ORAL at 08:50

## 2018-11-15 RX ADMIN — ATROPINE SULFATE 1 DROP: 10 SOLUTION/ DROPS OPHTHALMIC at 16:39

## 2018-11-15 NOTE — PROGRESS NOTES
Psychiatry Progress Note    Subjective: Interval History     Team mtg with pt present  Pt keeps a low profile   Keeps to himself, is observed laughing to himself; and still very little communication with staff; Planning for pt to start going to [facility]      Current medications:    Current Facility-Administered Medications:     atropine (ISOPTO ATROPINE) 1 % ophthalmic solution 1 drop, 1 drop, Sublingual, TID, Nicole Jensen MD, 1 drop at 11/15/18 0849    bisacodyl (DULCOLAX) EC tablet 10 mg, 10 mg, Oral, Daily PRN, Provider Cutover, 10 mg at 11/07/18 0620    bisacodyl (DULCOLAX) rectal suppository 10 mg, 10 mg, Rectal, Daily PRN, LIZETH Taylor    buPROPion (WELLBUTRIN XL) 24 hr tablet 300 mg, 300 mg, Oral, Daily, Provider Cutover, 300 mg at 11/15/18 0850    cloZAPine (CLOZARIL) tablet 450 mg, 450 mg, Oral, HS, Nicole Jensen MD, 450 mg at 11/14/18 2034    divalproex sodium (DEPAKOTE ER) 24 hr tablet 1,000 mg, 1,000 mg, Oral, HS, Jp Simeon MD, 1,000 mg at 11/14/18 2034    fluPHENAZine (PROLIXIN) tablet 20 mg, 20 mg, Oral, HS, Nicole Jensen MD, 20 mg at 11/14/18 2033    levothyroxine tablet 75 mcg, 75 mcg, Oral, Early Morning, Provider Cutover, 75 mcg at 11/15/18 0614    midodrine (PROAMATINE) tablet 10 mg, 10 mg, Oral, TID, Wally Lynn MD, 10 mg at 11/15/18 0614    polyethylene glycol (MIRALAX) packet 17 g, 17 g, Oral, Daily, Provider Cutover, 17 g at 11/15/18 0850    senna (SENOKOT) tablet 8 6 mg, 1 tablet, Oral, BID, LIZETH Henriquez, 8 6 mg at 11/15/18 0850    Current Problem List:    Patient Active Problem List   Diagnosis    Chronic paranoid schizophrenia [facility] )    Encounter for medical assessment       Problem list reviewed 11/15/18     Objective:     Vital Signs:  Vitals:    11/14/18 2000 11/15/18 0657 11/15/18 0730 11/15/18 0732   BP: 109/70 (!) 84/52 105/73 105/71   BP Location: Left arm Left arm Left arm Left arm   Pulse: (!) 122  (!) 110 (!) 120   Resp:   21 21   Temp:   97 5 °F (36 4 °C)    TempSrc:   Temporal    SpO2:       Weight:       Height:             Appearance:  age appropriate and casually dressed   Behavior:  normal   Speech:  normal volume   Mood:  depressed   Affect:  constricted   Thought Process:  normal   Thought Content:  normal   Perceptual Disturbances: None and Auditory hallucinations without commands   Risk Potential: none   Sensorium:  person, place, situation and time   Cognition:  intact   Consciousness:  alert and awake    Attention: attention span and concentration were age appropriate   Intellect: average   Insight:  limited   Judgment: limited      Motor Activity: no abnormal movements       Behavior over the last 24 hours:  unchanged  Sleep: normal  Appetite: increased  Medication side effects: No  ROS: no complaints      I/O Past 24 hours:  No intake/output data recorded  No intake/output data recorded  Labs:  Reviewed 11/15/18    Assessment / Plan:     [facility]    Recommended Treatment:      Medication changes:  1) no med changes    Non-pharmacological treatments  1) Continue with group therapy, milieu therapy and occupational therapy  Safety  1) Safety/communication plan established targeting dynamic risk factors above  2) Risks, benefits, and possible side effects of medications explained to patient and patient verbalizes understanding  Counseling / Coordination of Care    Total floor / unit time spent today 20 minutes  Greater than 50% of total time was spent with the patient and / or family counseling and / or coordination of care  A description of the counseling / coordination of care  Patient's Rights, confidentiality and exceptions to confidentiality, use of automated medical record, [facility] staff access to medical record, and consent to treatment reviewed      Re Casillas MD

## 2018-11-15 NOTE — CASE MANAGEMENT
CM emailed Bebeto Fatima with [facility] who stated she has called and left messages for patient's mother, Antonina Owusu, to discuss what ACT will be able to provide to patient and also make sure she is aware that patient will not be able to remain on the [facility] lists at the [facility] but that the [facility] will work closely with the [facility] and assist with appropriate placement from there as needed  Once she has made contact with her and she is in agreement with this she will be able to sign him on for services  CM will explain this to patient  CM will continue to follow patient's progress

## 2018-11-15 NOTE — PROGRESS NOTES
Rin Rubio has been awake, alert, and mostly isolative to self in room  No interaction noted with peers  Pt noted smiling to self, preoccupied, as responding to internal stimuli while waiting in line for meds  Compliant with scheduled 1600 meds without prompting  Ate 100% supper  Pt remains flat, blunted in affect and offers little conversation with staff  Ate 25% supper  Will continue to monitor

## 2018-11-15 NOTE — PROGRESS NOTES
Mayra Garnett has been isolative    He did not attend groups today but participated with treatment team, compliant with routine medications   He ate 75% of breakfast and 75% of lunch   He left the unit this afternoon to turn in his paperwork for [facility], gait steady, remains on Fall precautions, fluids encouraged, denied pain

## 2018-11-15 NOTE — ESCALATED TEAM TX
Patient attended treatment team meeting this morning prepared with self-assessment  Patient's group attendance for last week was 68%  Patient advocated for himself in team meeting, asking for an overnight pass  Team will get back to patient in the afternoon about a response  Otherwise, patient was approved for a pass on 11/18 from 10-8p with his mother  Patient planning to drop off [facility] application today  Patient verbalized no further questions or concerns at the conclusion of the meeting  Next team meeting scheduled for 11/22, but is subject to change due to upcoming holiday

## 2018-11-16 RX ADMIN — POLYETHYLENE GLYCOL 3350 17 G: 17 POWDER, FOR SOLUTION ORAL at 09:18

## 2018-11-16 RX ADMIN — LEVOTHYROXINE SODIUM 75 MCG: 75 TABLET ORAL at 06:12

## 2018-11-16 RX ADMIN — DIVALPROEX SODIUM 1000 MG: 500 TABLET, FILM COATED, EXTENDED RELEASE ORAL at 20:44

## 2018-11-16 RX ADMIN — SENNOSIDES 8.6 MG: 8.6 TABLET, FILM COATED ORAL at 09:19

## 2018-11-16 RX ADMIN — FLUPHENAZINE HYDROCHLORIDE 20 MG: 10 TABLET, FILM COATED ORAL at 20:44

## 2018-11-16 RX ADMIN — CLOZAPINE 450 MG: 25 TABLET ORAL at 20:44

## 2018-11-16 RX ADMIN — ATROPINE SULFATE 1 DROP: 10 SOLUTION/ DROPS OPHTHALMIC at 20:45

## 2018-11-16 RX ADMIN — MIDODRINE HYDROCHLORIDE 10 MG: 2.5 TABLET ORAL at 06:12

## 2018-11-16 RX ADMIN — MIDODRINE HYDROCHLORIDE 10 MG: 2.5 TABLET ORAL at 16:58

## 2018-11-16 RX ADMIN — BUPROPION HYDROCHLORIDE 300 MG: 300 TABLET, FILM COATED, EXTENDED RELEASE ORAL at 09:19

## 2018-11-16 RX ADMIN — SENNOSIDES 8.6 MG: 8.6 TABLET, FILM COATED ORAL at 20:45

## 2018-11-16 RX ADMIN — ATROPINE SULFATE 1 DROP: 10 SOLUTION/ DROPS OPHTHALMIC at 09:19

## 2018-11-16 NOTE — PROGRESS NOTES
Psychiatry Progress Note    Subjective: Interval History     Patient is working on plans to go to [place] break  He filled out an application form yesterday  He is interested in an overnight pass with his mother any did ask for that and himself without prompting  A hallucinations continue he is not making any bizarre or delusional thought conversation  Tolerating medication he is gets hypotensive at times and midodrine is then given to him        Current medications:    Current Facility-Administered Medications:     atropine (ISOPTO ATROPINE) 1 % ophthalmic solution 1 drop, 1 drop, Sublingual, TID, Arnie Contreras MD, 1 drop at 11/15/18 2035    bisacodyl (DULCOLAX) EC tablet 10 mg, 10 mg, Oral, Daily PRN, Provider Cutover, 10 mg at 11/07/18 0620    bisacodyl (DULCOLAX) rectal suppository 10 mg, 10 mg, Rectal, Daily PRN, [place] Reddish NEO GreenfieldNP    buPROPion (WELLBUTRIN XL) 24 hr tablet 300 mg, 300 mg, Oral, Daily, Provider Cutover, 300 mg at 11/15/18 0850    cloZAPine (CLOZARIL) tablet 450 mg, 450 mg, Oral, HS, Arnie Contreras MD, 450 mg at 11/15/18 2035    divalproex sodium (DEPAKOTE ER) 24 hr tablet 1,000 mg, 1,000 mg, Oral, HS, Scooter Loera MD, 1,000 mg at 11/15/18 2035    fluPHENAZine (PROLIXIN) tablet 20 mg, 20 mg, Oral, HS, Arnie Contreras MD, 20 mg at 11/15/18 2035    levothyroxine tablet 75 mcg, 75 mcg, Oral, Early Morning, Provider Cutover, 75 mcg at 11/16/18 0612    midodrine (PROAMATINE) tablet 10 mg, 10 mg, Oral, TID, Wally Lynn MD, 10 mg at 11/16/18 0612    polyethylene glycol (MIRALAX) packet 17 g, 17 g, Oral, Daily, Provider Cutover, 17 g at 11/15/18 0850    senna (SENOKOT) tablet 8 6 mg, 1 tablet, Oral, BID, Francisca LIZETH Greenfield, 8 6 mg at 11/15/18 2035    Current Problem List:    Patient Active Problem List   Diagnosis    Chronic paranoid schizophrenia [facility])    Encounter for medical assessment       Problem list reviewed 11/16/18     Objective:     Vital Signs:  Vitals:    11/15/18 1546 11/15/18 1547 11/15/18 1900 11/16/18 0500   BP: 118/81 115/58 103/71 (!) 88/52   BP Location: Right arm Right arm Left arm Left arm   Pulse: (!) 120 (!) 135 (!) 122 (!) 119   Resp: 21 21     Temp:       TempSrc:       SpO2:       Weight:       Height:             Appearance:  age appropriate and casually dressed   Behavior:  normal   Speech:  normal volume   Mood:  depressed   Affect:  constricted   Thought Process:  blocked   Thought Content:  normal   Perceptual Disturbances: None and Auditory hallucinations without commands   Risk Potential: none   Sensorium:  person, place, situation and time   Cognition:  intact   Consciousness:  alert and awake    Attention: attention span and concentration were age appropriate   Intellect: average   Insight:  limited   Judgment: limited      Motor Activity: no abnormal movements       Behavior over the last 24 hours:  unchanged  Sleep: hypersomnia  Appetite: normal  Medication side effects: No  ROS: no complaints      I/O Past 24 hours:  No intake/output data recorded  No intake/output data recorded  Labs:  Reviewed 11/16/18    Assessment / Plan:     [facility]    Recommended Treatment:      Medication changes:  1) no medication changes today    Non-pharmacological treatments  1) Continue with group therapy, milieu therapy and occupational therapy  Safety  1) Safety/communication plan established targeting dynamic risk factors above  2) Risks, benefits, and possible side effects of medications explained to patient and patient verbalizes understanding  Counseling / Coordination of Care    Total floor / unit time spent today 20 minutes  Greater than 50% of total time was spent with the patient and / or family counseling and / or coordination of care  A description of the counseling / coordination of care       Patient's Rights, confidentiality and exceptions to confidentiality, use of automated medical record, [facility] staff access to medical record, and consent to treatment reviewed      Brian Zapata MD

## 2018-11-16 NOTE — PROGRESS NOTES
Julia Alida has been awake, alert, and visible intermittently out in the milieu  No interaction noted with peers  Remains isolative to self  Affect flat, blunted, and offers little conversation with staff  Ate 100% supper  Encouraged fluids  Compliant with all scheduled meds without prompting  Attended and participated in 2/2 evening groups  Had evening snack  No behavioral issues  Resting quietly in bed at present, arouses easily  Will continue to monitor/assess for any changes

## 2018-11-16 NOTE — PROGRESS NOTES
Keily Lisa has been isolative    He attended 2 of the 3 groups today, compliant with routine medications   He ate 100% of breakfast and 75% of lunch, gait steady, remains on Fall precautions, fluids encouraged, denied pain

## 2018-11-17 RX ADMIN — MIDODRINE HYDROCHLORIDE 10 MG: 2.5 TABLET ORAL at 06:19

## 2018-11-17 RX ADMIN — ATROPINE SULFATE 1 DROP: 10 SOLUTION/ DROPS OPHTHALMIC at 21:25

## 2018-11-17 RX ADMIN — BUPROPION HYDROCHLORIDE 300 MG: 300 TABLET, FILM COATED, EXTENDED RELEASE ORAL at 09:10

## 2018-11-17 RX ADMIN — LEVOTHYROXINE SODIUM 75 MCG: 75 TABLET ORAL at 06:19

## 2018-11-17 RX ADMIN — CLOZAPINE 450 MG: 25 TABLET ORAL at 21:24

## 2018-11-17 RX ADMIN — ATROPINE SULFATE 1 DROP: 10 SOLUTION/ DROPS OPHTHALMIC at 17:07

## 2018-11-17 RX ADMIN — SENNOSIDES 8.6 MG: 8.6 TABLET, FILM COATED ORAL at 21:25

## 2018-11-17 RX ADMIN — ATROPINE SULFATE 1 DROP: 10 SOLUTION/ DROPS OPHTHALMIC at 09:10

## 2018-11-17 RX ADMIN — FLUPHENAZINE HYDROCHLORIDE 20 MG: 10 TABLET, FILM COATED ORAL at 21:25

## 2018-11-17 RX ADMIN — DIVALPROEX SODIUM 1000 MG: 500 TABLET, FILM COATED, EXTENDED RELEASE ORAL at 21:24

## 2018-11-17 RX ADMIN — MIDODRINE HYDROCHLORIDE 10 MG: 2.5 TABLET ORAL at 17:07

## 2018-11-17 RX ADMIN — SENNOSIDES 8.6 MG: 8.6 TABLET, FILM COATED ORAL at 09:10

## 2018-11-17 RX ADMIN — MIDODRINE HYDROCHLORIDE 10 MG: 2.5 TABLET ORAL at 21:24

## 2018-11-17 RX ADMIN — POLYETHYLENE GLYCOL 3350 17 G: 17 POWDER, FOR SOLUTION ORAL at 09:11

## 2018-11-17 NOTE — PROGRESS NOTES
Psychiatry Progress Note    Subjective: Interval History     The patient is lying in bed this morning has been isolative to his room  Patient has a pass tomorrow at his mother's house  Patient has poor hygiene and a flat affect  Patient states he is going to some groups  He rates his depression as a 5/10  He denies hallucinations or suicidal ideation  He does not engage in conversation much with staff or peers  He offers no other concerns today        Current medications:    Current Facility-Administered Medications:     atropine (ISOPTO ATROPINE) 1 % ophthalmic solution 1 drop, 1 drop, Sublingual, TID, Pema Ferrara MD, 1 drop at 11/17/18 0910    bisacodyl (DULCOLAX) EC tablet 10 mg, 10 mg, Oral, Daily PRN, Provider Cutover, 10 mg at 11/07/18 0620    bisacodyl (DULCOLAX) rectal suppository 10 mg, 10 mg, Rectal, Daily PRN, LIZETH Mendez    buPROPion (WELLBUTRIN XL) 24 hr tablet 300 mg, 300 mg, Oral, Daily, Provider Cutover, 300 mg at 11/17/18 0910    cloZAPine (CLOZARIL) tablet 450 mg, 450 mg, Oral, HS, Pema Ferrara MD, 450 mg at 11/16/18 2044    divalproex sodium (DEPAKOTE ER) 24 hr tablet 1,000 mg, 1,000 mg, Oral, HS, Alysha Carrillo MD, 1,000 mg at 11/16/18 2044    fluPHENAZine (PROLIXIN) tablet 20 mg, 20 mg, Oral, HS, Pema Ferrara MD, 20 mg at 11/16/18 2044    levothyroxine tablet 75 mcg, 75 mcg, Oral, Early Morning, Provider Cutover, 75 mcg at 11/17/18 4680    midodrine (PROAMATINE) tablet 10 mg, 10 mg, Oral, TID, Wally Lynn MD, 10 mg at 11/17/18 1763    polyethylene glycol (MIRALAX) packet 17 g, 17 g, Oral, Daily, Provider Cutover, 17 g at 11/17/18 0911    senna (SENOKOT) tablet 8 6 mg, 1 tablet, Oral, BID, LIZETH Meyer, 8 6 mg at 11/17/18 3461    Current Problem List:    Patient Active Problem List   Diagnosis    Chronic paranoid schizophrenia ([address] )    Encounter for medical assessment       Problem list reviewed 11/17/18     Objective:     Vital Signs:  Vitals:    11/16/18 1500 11/16/18 1503 11/16/18 1506 11/16/18 1930   BP: 116/79 115/81 116/83 137/85   BP Location: Left arm Left arm Left arm Left arm   Pulse: 89 (!) 108 (!) 115 (!) 135   Resp:       Temp:       TempSrc:       SpO2:       Weight:       Height:             Appearance:  age appropriate and casually dressed, poor hygiene   Behavior:  guarded   Speech:  soft   Mood:  constricted and depressed   Affect:  flat   Thought Process:  blocked   Thought Content:  normal   Perceptual Disturbances: None   Risk Potential: none   Sensorium:  person, place, situation and time   Cognition:  intact   Consciousness:  alert and awake    Attention: attention span and concentration were age appropriate   Intellect: average   Insight:  limited   Judgment: limited      Motor Activity: no abnormal movements       Behavior over the last 24 hours:  unchanged  Sleep: normal  Appetite: normal  Medication side effects: No  ROS: no complaints      I/O Past 24 hours:  No intake/output data recorded  No intake/output data recorded  Labs:  Reviewed 11/17/18    Assessment / Plan:     Ch[facility] ([facility] )    Recommended Treatment:      Medication changes:  1) continue current medication regimen  Non-pharmacological treatments  1) Continue with group therapy, milieu therapy and occupational therapy  Safety  1) Safety/communication plan established targeting dynamic risk factors above  2) Risks, benefits, and possible side effects of medications explained to patient and patient verbalizes understanding  Counseling / Coordination of Care    Total floor / unit time spent today 20 minutes  Greater than 50% of total time was spent with the patient and / or family counseling and / or coordination of care  A description of the counseling / coordination of care       Patient's Rights, confidentiality and exceptions to confidentiality, use of automated medical record, [facility] staff access to medical record, and consent to treatment reviewed      Renetta Burleson PA-C

## 2018-11-17 NOTE — PROGRESS NOTES
Patient is isolative to room, out for meds and breakfast  Patient presents with a flat affect, no auditory or visual hallucinations noted  Patient has minimal interaction with staff  No complaints of SI, HI, audio or visual hallucinations, anxiety or depression  Will continue to monitor for changes in current status

## 2018-11-17 NOTE — PROGRESS NOTES
In bed quietly resting eyes closed chest noted to be rising, will monitor for change in behavior/assessent

## 2018-11-17 NOTE — NURSING NOTE
Patient visible on unit, isolative to self , no peer interactions, offers no complaints, quiet, calm, behaviors controlled, appears withdrawn and guarded  Minimal verbal communication and poor eye contact with staff  Patient med and meal compliant, positive group attendance and visit with mother this evening  Will continue to monitor

## 2018-11-18 PROCEDURE — 80307 DRUG TEST PRSMV CHEM ANLYZR: CPT | Performed by: PSYCHIATRY & NEUROLOGY

## 2018-11-18 RX ADMIN — CLOZAPINE 450 MG: 25 TABLET ORAL at 20:58

## 2018-11-18 RX ADMIN — DIVALPROEX SODIUM 1000 MG: 500 TABLET, FILM COATED, EXTENDED RELEASE ORAL at 20:58

## 2018-11-18 RX ADMIN — SENNOSIDES 8.6 MG: 8.6 TABLET, FILM COATED ORAL at 20:58

## 2018-11-18 RX ADMIN — FLUPHENAZINE HYDROCHLORIDE 20 MG: 10 TABLET, FILM COATED ORAL at 20:58

## 2018-11-18 RX ADMIN — SENNOSIDES 8.6 MG: 8.6 TABLET, FILM COATED ORAL at 08:51

## 2018-11-18 RX ADMIN — MIDODRINE HYDROCHLORIDE 10 MG: 2.5 TABLET ORAL at 06:39

## 2018-11-18 RX ADMIN — LEVOTHYROXINE SODIUM 75 MCG: 75 TABLET ORAL at 06:39

## 2018-11-18 RX ADMIN — BUPROPION HYDROCHLORIDE 300 MG: 300 TABLET, FILM COATED, EXTENDED RELEASE ORAL at 08:51

## 2018-11-18 RX ADMIN — ATROPINE SULFATE 1 DROP: 10 SOLUTION/ DROPS OPHTHALMIC at 21:00

## 2018-11-18 NOTE — PROGRESS NOTES
Patient out on pass today and unable to be assessed  Patient is on a pass at his mother's house  Staff states he continues to be withdrawn and has a blunted affect  He continues to isolate to his room

## 2018-11-18 NOTE — PROGRESS NOTES
Patient visible on unit intermittently, isolative to room most of the shift  Patient's mother visited brought him food, that he ate  Patient attended evening group  No complaints of SI, HI, audio or visual hallucinations, will continue to monitor for changes in current status

## 2018-11-18 NOTE — PROGRESS NOTES
Neida Fam went out on pass with mother, filled out appropriate paperwork with prompting and took meds prior to leaving with prompting   meds sent with patient and was told to call in at 4pm

## 2018-11-19 RX ADMIN — ATROPINE SULFATE 1 DROP: 10 SOLUTION/ DROPS OPHTHALMIC at 16:54

## 2018-11-19 RX ADMIN — BUPROPION HYDROCHLORIDE 300 MG: 300 TABLET, FILM COATED, EXTENDED RELEASE ORAL at 08:31

## 2018-11-19 RX ADMIN — MIDODRINE HYDROCHLORIDE 10 MG: 2.5 TABLET ORAL at 06:20

## 2018-11-19 RX ADMIN — SENNOSIDES 8.6 MG: 8.6 TABLET, FILM COATED ORAL at 20:53

## 2018-11-19 RX ADMIN — FLUPHENAZINE HYDROCHLORIDE 20 MG: 10 TABLET, FILM COATED ORAL at 20:53

## 2018-11-19 RX ADMIN — ATROPINE SULFATE 1 DROP: 10 SOLUTION/ DROPS OPHTHALMIC at 08:32

## 2018-11-19 RX ADMIN — DIVALPROEX SODIUM 1000 MG: 500 TABLET, FILM COATED, EXTENDED RELEASE ORAL at 20:53

## 2018-11-19 RX ADMIN — MIDODRINE HYDROCHLORIDE 10 MG: 2.5 TABLET ORAL at 20:53

## 2018-11-19 RX ADMIN — SENNOSIDES 8.6 MG: 8.6 TABLET, FILM COATED ORAL at 08:31

## 2018-11-19 RX ADMIN — POLYETHYLENE GLYCOL 3350 17 G: 17 POWDER, FOR SOLUTION ORAL at 08:31

## 2018-11-19 RX ADMIN — CLOZAPINE 450 MG: 25 TABLET ORAL at 20:53

## 2018-11-19 RX ADMIN — ATROPINE SULFATE 1 DROP: 10 SOLUTION/ DROPS OPHTHALMIC at 20:54

## 2018-11-19 RX ADMIN — MIDODRINE HYDROCHLORIDE 10 MG: 2.5 TABLET ORAL at 16:54

## 2018-11-19 RX ADMIN — LEVOTHYROXINE SODIUM 75 MCG: 75 TABLET ORAL at 06:20

## 2018-11-19 NOTE — NURSING NOTE
Pt isolative to room most of shift  Attended goals group only  No responding to internal stimuli at the moment  Takes prescribed medications

## 2018-11-19 NOTE — PROGRESS NOTES
Patient was on day pass until 8p  Patient checked in at 1600  Patient returned at 26  Mom reports they bought an Xbox and patient spent his time setting it up at home and waiting for it to set up, and right before it was time for them to return back to unit, Xbox setup was complete  Mom stated pt handled it very well  Mom stated, "I told him we could be late and I'll call and he told me know, it's ok, let's go"  Patient compliant with evening medications without prompting  Midodrine held due to patient bp being outside parameters  Patient refused shower today, patient appears very anxious  Patient is intermittently visible on unit but stays to himself  Patient attended 2/2 groups today  No further issues throughout shift  Will continue to monitor

## 2018-11-19 NOTE — PROGRESS NOTES
Pt slept the night, chest rising and dropping, there were no signs or symptoms of distress or discomfort  Pt compliant with morning meds, went back to sleep  Will continue to monitor

## 2018-11-19 NOTE — PROGRESS NOTES
Pt appears to be sleeping at this time  Chest rising and dropping, no signs or symptoms of distress or discomfort  Will continue to monitor

## 2018-11-19 NOTE — PROGRESS NOTES
Psychiatry Progress Note    Subjective: Interval History     Patient continues to isolate, is found in bed on and off  He does however go to groups takes his meds without prompting and meals as well  He continues to have auditory hallucinations but they do not seem excessive or intrusive  No new issues  Patient is is tolerating his psych meds well        Current medications:    Current Facility-Administered Medications:     atropine (ISOPTO ATROPINE) 1 % ophthalmic solution 1 drop, 1 drop, Sublingual, TID, Lizbeth Benoit MD, 1 drop at 11/18/18 2100    bisacodyl (DULCOLAX) EC tablet 10 mg, 10 mg, Oral, Daily PRN, Provider Cutover, 10 mg at 11/07/18 0620    bisacodyl (DULCOLAX) rectal suppository 10 mg, 10 mg, Rectal, Daily PRN, LIZETH Bautista    buPROPion (WELLBUTRIN XL) 24 hr tablet 300 mg, 300 mg, Oral, Daily, Provider Cutover, 300 mg at 11/18/18 0851    cloZAPine (CLOZARIL) tablet 450 mg, 450 mg, Oral, HS, Lizbeth Benoit MD, 450 mg at 11/18/18 2058    divalproex sodium (DEPAKOTE ER) 24 hr tablet 1,000 mg, 1,000 mg, Oral, HS, Marco A Blum MD, 1,000 mg at 11/18/18 2058    fluPHENAZine (PROLIXIN) tablet 20 mg, 20 mg, Oral, HS, Lizbeth Benoit MD, 20 mg at 11/18/18 2058    levothyroxine tablet 75 mcg, 75 mcg, Oral, Early Morning, Provider Cutover, 75 mcg at 11/19/18 0620    midodrine (PROAMATINE) tablet 10 mg, 10 mg, Oral, TID, Wally Lynn MD, 10 mg at 11/19/18 0620    polyethylene glycol (MIRALAX) packet 17 g, 17 g, Oral, Daily, Provider Cutover, 17 g at 11/17/18 0911    senna (SENOKOT) tablet 8 6 mg, 1 tablet, Oral, BID, LIZETH Melo, 8 6 mg at 11/18/18 2058    Current Problem List:    Patient Active Problem List   Diagnosis    [facility] )    Encounter for medical assessment       Problem list reviewed 11/19/18     Objective:     Vital Signs:  Vitals:    11/17/18 1900 11/18/18 0600 11/18/18 0730 11/19/18 0600   BP: 115/85 116/77 113/73 91/59   BP Location: Left arm Right arm Left arm   Pulse: (!) 129 (!) 117 100 (!) 118   Resp:   18    Temp:   (!) 97 4 °F (36 3 °C)    TempSrc:       SpO2:       Weight:   88 9 kg (196 lb)    Height:             Appearance:  age appropriate and casually dressed   Behavior:  normal   Speech:  normal volume   Mood:  depressed   Affect:  constricted   Thought Process:  normal   Thought Content:  normal   Perceptual Disturbances: Auditory hallucinations without commands   Risk Potential: none   Sensorium:  person, place, situation and time   Cognition:  intact   Consciousness:  alert and awake    Attention: attention span and concentration were age appropriate   Intellect: average   Insight:  limited   Judgment: limited      Motor Activity: no abnormal movements       Behavior over the last 24 hours:  unchanged  Sleep: hypersomnia  Appetite: normal  Medication side effects: No  ROS: no complaints      I/O Past 24 hours:  I/O last 3 completed shifts:  In: -   Out: 1 [Stool:1]  No intake/output data recorded  Labs:  Reviewed 11/19/18    Assessment / Plan:     Chronic paranoid schizophrenia ([facility] )    Recommended Treatment:      Medication changes:  1) none;  will order valproic acid level and a CMP    Non-pharmacological treatments  1) Continue with group therapy, milieu therapy and occupational therapy  Safety  1) Safety/communication plan established targeting dynamic risk factors above  2) Risks, benefits, and possible side effects of medications explained to patient and patient verbalizes understanding  Counseling / Coordination of Care    Total floor / unit time spent today 20 minutes  Greater than 50% of total time was spent with the patient and / or family counseling and / or coordination of care  A description of the counseling / coordination of care       Patient's Rights, confidentiality and exceptions to confidentiality, use of automated medical record, [facility] staff access to medical record, and consent to treatment reviewed      Jackie Rogers MD

## 2018-11-20 LAB
ALBUMIN SERPL BCP-MCNC: 3.6 G/DL (ref 3–5.2)
ALP SERPL-CCNC: 58 U/L (ref 43–122)
ALT SERPL W P-5'-P-CCNC: 18 U/L (ref 9–52)
AMPHETAMINES SERPL QL SCN: NEGATIVE
ANION GAP SERPL CALCULATED.3IONS-SCNC: 6 MMOL/L (ref 5–14)
AST SERPL W P-5'-P-CCNC: 11 U/L (ref 17–59)
BARBITURATES UR QL: NEGATIVE
BENZODIAZ UR QL: NEGATIVE
BILIRUB SERPL-MCNC: 0.2 MG/DL
BUN SERPL-MCNC: 11 MG/DL (ref 5–25)
CALCIUM SERPL-MCNC: 8.7 MG/DL (ref 8.4–10.2)
CHLORIDE SERPL-SCNC: 105 MMOL/L (ref 97–108)
CO2 SERPL-SCNC: 28 MMOL/L (ref 22–30)
COCAINE UR QL: NEGATIVE
CREAT SERPL-MCNC: 1.08 MG/DL (ref 0.7–1.5)
GFR SERPL CREATININE-BSD FRML MDRD: 88 ML/MIN/1.73SQ M
GLUCOSE P FAST SERPL-MCNC: 96 MG/DL (ref 70–99)
GLUCOSE SERPL-MCNC: 96 MG/DL (ref 70–99)
METHADONE UR QL: NEGATIVE
OPIATES UR QL SCN: NEGATIVE
PCP UR QL: NEGATIVE
POTASSIUM SERPL-SCNC: 3.7 MMOL/L (ref 3.6–5)
PROT SERPL-MCNC: 6.1 G/DL (ref 5.9–8.4)
SODIUM SERPL-SCNC: 139 MMOL/L (ref 137–147)
THC UR QL: NEGATIVE
VALPROATE SERPL-MCNC: 54.8 UG/ML (ref 50–120)

## 2018-11-20 PROCEDURE — 80053 COMPREHEN METABOLIC PANEL: CPT | Performed by: PSYCHIATRY & NEUROLOGY

## 2018-11-20 PROCEDURE — 80307 DRUG TEST PRSMV CHEM ANLYZR: CPT | Performed by: PSYCHIATRY & NEUROLOGY

## 2018-11-20 PROCEDURE — 80164 ASSAY DIPROPYLACETIC ACD TOT: CPT | Performed by: PSYCHIATRY & NEUROLOGY

## 2018-11-20 RX ADMIN — ATROPINE SULFATE 1 DROP: 10 SOLUTION/ DROPS OPHTHALMIC at 08:46

## 2018-11-20 RX ADMIN — ATROPINE SULFATE 1 DROP: 10 SOLUTION/ DROPS OPHTHALMIC at 17:08

## 2018-11-20 RX ADMIN — MIDODRINE HYDROCHLORIDE 10 MG: 2.5 TABLET ORAL at 17:07

## 2018-11-20 RX ADMIN — SENNOSIDES 8.6 MG: 8.6 TABLET, FILM COATED ORAL at 21:03

## 2018-11-20 RX ADMIN — BUPROPION HYDROCHLORIDE 300 MG: 300 TABLET, FILM COATED, EXTENDED RELEASE ORAL at 08:44

## 2018-11-20 RX ADMIN — MIDODRINE HYDROCHLORIDE 10 MG: 2.5 TABLET ORAL at 20:50

## 2018-11-20 RX ADMIN — DIVALPROEX SODIUM 1000 MG: 500 TABLET, FILM COATED, EXTENDED RELEASE ORAL at 20:49

## 2018-11-20 RX ADMIN — SENNOSIDES 8.6 MG: 8.6 TABLET, FILM COATED ORAL at 08:44

## 2018-11-20 RX ADMIN — FLUPHENAZINE HYDROCHLORIDE 20 MG: 10 TABLET, FILM COATED ORAL at 20:50

## 2018-11-20 RX ADMIN — CLOZAPINE 450 MG: 25 TABLET ORAL at 20:49

## 2018-11-20 RX ADMIN — POLYETHYLENE GLYCOL 3350 17 G: 17 POWDER, FOR SOLUTION ORAL at 08:43

## 2018-11-20 RX ADMIN — MIDODRINE HYDROCHLORIDE 10 MG: 2.5 TABLET ORAL at 06:30

## 2018-11-20 RX ADMIN — ATROPINE SULFATE 1 DROP: 10 SOLUTION/ DROPS OPHTHALMIC at 20:49

## 2018-11-20 RX ADMIN — LEVOTHYROXINE SODIUM 75 MCG: 75 TABLET ORAL at 06:30

## 2018-11-20 NOTE — NURSING NOTE
Patient is awake, withdrawn and without motivation  He has a flat affect and did not do any preparation for his off unit outing to [facility]  Patient was not ready when it was time to leave  Patient also fell asleep in the dining room after breakfast   He is med and meal compliant without prompting today  Patient did attend goals group prior to departure to [facility]  No c/o s/s pain, discomfort to distress  Will continue to monitor progress with program requirements

## 2018-11-20 NOTE — PROGRESS NOTES
Psychiatry Progress Note    Subjective: Interval History     Patient has been about the same the last 24 hours  Socially withdrawn, interacts only briefly with staff  He is polite and and otherwise cooperative and has been attending groups meals and meds without difficulty  Staff has not observed hallucinatory behavior in the last 24 hours as well  Affect remains totally flat which is a constant for him  Arrangements are still in process about going to daybreak        Current medications:    Current Facility-Administered Medications:     atropine (ISOPTO ATROPINE) 1 % ophthalmic solution 1 drop, 1 drop, Sublingual, TID, Mehnaz Rosales MD, 1 drop at 11/19/18 2054    bisacodyl (DULCOLAX) EC tablet 10 mg, 10 mg, Oral, Daily PRN, Provider Cutover, 10 mg at 11/07/18 0620    bisacodyl (DULCOLAX) rectal suppository 10 mg, 10 mg, Rectal, Daily PRN, [place] LIZETH Greenfield    buPROPion (WELLBUTRIN XL) 24 hr tablet 300 mg, 300 mg, Oral, Daily, Provider Cutover, 300 mg at 11/19/18 0831    cloZAPine (CLOZARIL) tablet 450 mg, 450 mg, Oral, HS, Mehnaz Rosales MD, 450 mg at 11/19/18 2053    divalproex sodium (DEPAKOTE ER) 24 hr tablet 1,000 mg, 1,000 mg, Oral, HS, Meldon Curling, MD, 1,000 mg at 11/19/18 2053    fluPHENAZine (PROLIXIN) tablet 20 mg, 20 mg, Oral, HS, Mehnaz Rosales MD, 20 mg at 11/19/18 2053    levothyroxine tablet 75 mcg, 75 mcg, Oral, Early Morning, Provider Cutover, 75 mcg at 11/20/18 0630    midodrine (PROAMATINE) tablet 10 mg, 10 mg, Oral, TID, Wally Lynn MD, 10 mg at 11/20/18 0630    polyethylene glycol (MIRALAX) packet 17 g, 17 g, Oral, Daily, Provider Cutover, 17 g at 11/19/18 0831    senna (SENOKOT) tablet 8 6 mg, 1 tablet, Oral, BID, LIZETH Meyer, 8 6 mg at 11/19/18 2053    Current Problem List:    Patient Active Problem List   Diagnosis    [facility] )    Encounter for medical assessment       Problem list reviewed 11/20/18     Objective:     Vital Signs:  Vitals: 11/19/18 1545 11/19/18 1548 11/19/18 1551 11/19/18 1900   BP: 110/72 103/77 102/80 104/74   BP Location: Right arm Right arm Right arm Right arm   Pulse: 94 104 (!) 115 (!) 125   Resp: 18      Temp: (!) 97 4 °F (36 3 °C)      TempSrc: Temporal      SpO2:       Weight:       Height:             Appearance:  age appropriate and casually dressed   Behavior:  guarded   Speech:  soft   Mood:  depressed   Affect:  constricted and flat   Thought Process:  normal   Thought Content:  normal   Perceptual Disturbances: Auditory hallucinations without commands   Risk Potential: none   Sensorium:  person, place, situation and time   Cognition:  intact   Consciousness:  alert and awake    Attention: attention span and concentration were age appropriate   Intellect: average   Insight:  limited   Judgment: limited      Motor Activity: no abnormal movements       Behavior over the last 24 hours:  unchanged  Sleep: hypersomnia  Appetite: normal  Medication side effects: No  ROS: no complaints      I/O Past 24 hours:  I/O last 3 completed shifts:  In: -   Out: 1 [Stool:1]  No intake/output data recorded  Labs:  Reviewed 11/20/18    Assessment / Plan:     [facility]    Recommended Treatment:      Medication changes:  1) no med changes today  CMP and valproic acid levels pending    Non-pharmacological treatments  1) Continue with group therapy, milieu therapy and occupational therapy  Safety  1) Safety/communication plan established targeting dynamic risk factors above  2) Risks, benefits, and possible side effects of medications explained to patient and patient verbalizes understanding  Counseling / Coordination of Care    Total floor / unit time spent today 20 minutes  Greater than 50% of total time was spent with the patient and / or family counseling and / or coordination of care  A description of the counseling / coordination of care       Patient's Rights, confidentiality and exceptions to confidentiality, use of automated medical record, [facility] staff access to medical record, and consent to treatment reviewed      Alan Avila MD

## 2018-11-20 NOTE — PROGRESS NOTES
Alexxkasie Irwin has been awake, alert, and visible intermittently out in the milieu  Affect remains flat, blunted, and offers minimal conversation with staff  Compliant with all scheduled meds this shift with little prompting  Ate 100% supper  Behavior has been mostly quiet and isolative to self in room this shift  No behavioral issues  No overt responding to internal stimuli noted this shift  Attended and participated in 2/2 evening groups  Having evening snack in dining room at present  Will continue to monitor/assess for any changes

## 2018-11-20 NOTE — PROGRESS NOTES
The patient slept through the night with no behaviors or issues noted  Fall precautions maintained  Compliant with providing a sample for labs and in taking meds  Continue to monitor

## 2018-11-21 RX ADMIN — FLUPHENAZINE HYDROCHLORIDE 20 MG: 10 TABLET, FILM COATED ORAL at 21:05

## 2018-11-21 RX ADMIN — LEVOTHYROXINE SODIUM 75 MCG: 75 TABLET ORAL at 06:29

## 2018-11-21 RX ADMIN — SENNOSIDES 8.6 MG: 8.6 TABLET, FILM COATED ORAL at 21:06

## 2018-11-21 RX ADMIN — ATROPINE SULFATE 1 DROP: 10 SOLUTION/ DROPS OPHTHALMIC at 08:31

## 2018-11-21 RX ADMIN — DIVALPROEX SODIUM 1000 MG: 500 TABLET, FILM COATED, EXTENDED RELEASE ORAL at 21:05

## 2018-11-21 RX ADMIN — POLYETHYLENE GLYCOL 3350 17 G: 17 POWDER, FOR SOLUTION ORAL at 08:30

## 2018-11-21 RX ADMIN — ATROPINE SULFATE 1 DROP: 10 SOLUTION/ DROPS OPHTHALMIC at 16:52

## 2018-11-21 RX ADMIN — CLOZAPINE 450 MG: 25 TABLET ORAL at 21:05

## 2018-11-21 RX ADMIN — MIDODRINE HYDROCHLORIDE 10 MG: 2.5 TABLET ORAL at 16:51

## 2018-11-21 RX ADMIN — MIDODRINE HYDROCHLORIDE 10 MG: 2.5 TABLET ORAL at 21:07

## 2018-11-21 RX ADMIN — MIDODRINE HYDROCHLORIDE 10 MG: 2.5 TABLET ORAL at 06:29

## 2018-11-21 RX ADMIN — SENNOSIDES 8.6 MG: 8.6 TABLET, FILM COATED ORAL at 08:30

## 2018-11-21 RX ADMIN — BUPROPION HYDROCHLORIDE 300 MG: 300 TABLET, FILM COATED, EXTENDED RELEASE ORAL at 08:29

## 2018-11-21 RX ADMIN — ATROPINE SULFATE 1 DROP: 10 SOLUTION/ DROPS OPHTHALMIC at 21:07

## 2018-11-21 NOTE — PROGRESS NOTES
2125 Aramis Abdul has been isolative to his room & bed in free time  Did use his handheld electronic game during Electronics hour  Came up for scheduled medicines when called via overhead  Did have meal  Also enjoyed "Hanh العراقي" brought by mother during her visit w/him  He is mostly nonverbal during visit; focusing on eating  He continues flat of affect, low energy, reluctant to interact even superficially  His responses are delayed to absent; one or two words only  Did attend PM Groups, had HS snack & retired now to bed

## 2018-11-21 NOTE — PLAN OF CARE
Problem: DISCHARGE PLANNING - CARE MANAGEMENT  Goal: Discharge to post-acute care or home with appropriate resources  INTERVENTIONS:  - Conduct assessment to determine patient/family and health care team treatment goals, and need for post-acute services based on payer coverage, community resources, and patient preferences, and barriers to discharge  - Address psychosocial, clinical, and financial barriers to discharge as identified in assessment in conjunction with the patient/family and health care team  - Arrange appropriate level of post-acute services according to patients   needs and preference and payer coverage in collaboration with the physician and health care team  - Communicate with and update the patient/family, physician, and health care team regarding progress on the discharge plan  - Arrange appropriate transportation to post-acute venues   Outcome: Progressing    Psychotherapy note     Recovery Areas Addressed:  Health and Wellness; Emotional and Behavioral; Social and Community   Recovery Obstacles Addressed:  Medication noncompliance, Hygiene, Motivation, Recovery, Community reintegration    Therapist called patient's mother to discuss her expectations for patient when he goes home to live  The goal is to create a "Rental Agreement" likened to a contract patient would see in the community  Patient's mother expects patient to clean up after himself - laundry and meals, especially  She would also like to see patient in some day programming while she is at work  Patient's mother will require patient to take his medications and stay on an appropriate sleep schedule  She knows patient would like to move into his own place, but she believes he needs to stabilize at home for at least a year  He will also pay rent and be required to shower every other day    Patient's mother believed this agreement will be helpful for patient, and supports the stipulation that patient has to go to a group home if he is unwilling to comply with contract  Therapist spoke with patient about same  Patient is in agreement and verbalized to therapist the reasons why this is a useful intervention for him  Patient was talkative and contributed to the project  Patient believes he will be expected to pay $300/month  He is also in agreement to helping therapist create a daily calendar for after discharge to keep him motivated and focused  Therapist will create a draft "rental agreement" and calendar for patient to review  These items will be passed onto [facility] for coordination of care

## 2018-11-21 NOTE — CASE MANAGEMENT
CM received word from [facility] with [facility] that she will be out Monday morning about 10:30 AM to sign patient on for services  CM will continue to follow up

## 2018-11-21 NOTE — PROGRESS NOTES
Psychiatry Progress Note    Subjective: Interval History     Patient continues to be very superficial in his interactions and conversations  He avoids eye contact, avoids interactions less absolutely necessary  He will ask questions about specific issues that are of importance to him  There are no new issues        Current medications:    Current Facility-Administered Medications:     atropine (ISOPTO ATROPINE) 1 % ophthalmic solution 1 drop, 1 drop, Sublingual, TID, Coy Ramey MD, 1 drop at 11/20/18 2049    bisacodyl (DULCOLAX) EC tablet 10 mg, 10 mg, Oral, Daily PRN, Provider Cutover, 10 mg at 11/07/18 0620    bisacodyl (DULCOLAX) rectal suppository 10 mg, 10 mg, Rectal, Daily PRN, LIZETH Neumann    buPROPion (WELLBUTRIN XL) 24 hr tablet 300 mg, 300 mg, Oral, Daily, Provider Cutover, 300 mg at 11/20/18 0844    cloZAPine (CLOZARIL) tablet 450 mg, 450 mg, Oral, HS, Coy Ramey MD, 450 mg at 11/20/18 2049    divalproex sodium (DEPAKOTE ER) 24 hr tablet 1,000 mg, 1,000 mg, Oral, HS, aTmar Back MD, 1,000 mg at 11/20/18 2049    fluPHENAZine (PROLIXIN) tablet 20 mg, 20 mg, Oral, HS, Coy Ramey MD, 20 mg at 11/20/18 2050    levothyroxine tablet 75 mcg, 75 mcg, Oral, Early Morning, Provider Cutover, 75 mcg at 11/21/18 0629    midodrine (PROAMATINE) tablet 10 mg, 10 mg, Oral, TID, Wally Lynn MD, 10 mg at 11/21/18 4372    polyethylene glycol (MIRALAX) packet 17 g, 17 g, Oral, Daily, Provider Cutover, 17 g at 11/20/18 0843    senna (SENOKOT) tablet 8 6 mg, 1 tablet, Oral, BID, LIZETH Knott, 8 6 mg at 11/20/18 2103    Current Problem List:    Patient Active Problem List   Diagnosis    Chronic paranoid schizophrenia ([facility] [address] )    Encounter for medical assessment       Problem list reviewed 11/21/18     Objective:     Vital Signs:  Vitals:    11/20/18 1522 11/20/18 1526 11/20/18 2000 11/21/18 0700   BP: 121/92 114/74 118/83 118/68   BP Location: Right arm Right arm Left arm Left arm   Pulse: 104 (!) 119 (!) 143 99   Resp:       Temp:       TempSrc:       SpO2:       Weight:       Height:             Appearance:  age appropriate and casually dressed   Behavior:  evasive   Speech:  soft   Mood:  depressed   Affect:  constricted   Thought Process:  blocked   Thought Content:  normal   Perceptual Disturbances: Auditory hallucinations without commands   Risk Potential: none   Sensorium:  person, place, situation and time   Cognition:  intact   Consciousness:  alert and awake    Attention: attention span and concentration were age appropriate   Intellect: average   Insight:  limited   Judgment: limited      Motor Activity: no abnormal movements       Behavior over the last 24 hours:  unchanged  Sleep: hypersomnia  Appetite: normal  Medication side effects: No  ROS: no complaints      I/O Past 24 hours:  I/O last 3 completed shifts:  In: -   Out: 1 [Stool:1]  No intake/output data recorded  Labs:  Reviewed 11/21/18    Assessment / Plan:     [facility] )    Recommended Treatment:      Medication changes:  1) no changes    Non-pharmacological treatments  1) Continue with group therapy, milieu therapy and occupational therapy  Safety  1) Safety/communication plan established targeting dynamic risk factors above  2) Risks, benefits, and possible side effects of medications explained to patient and patient verbalizes understanding  Counseling / Coordination of Care    Total floor / unit time spent today 20 minutes  Greater than 50% of total time was spent with the patient and / or family counseling and / or coordination of care  A description of the counseling / coordination of care  Patient's Rights, confidentiality and exceptions to confidentiality, use of automated medical record, [facility] staff access to medical record, and consent to treatment reviewed      Matt Wright MD

## 2018-11-21 NOTE — NURSING NOTE
Pt is isolative to room this shift  Attended one group this AM   No peer interaction, minimal staff interaction  Out for meals, appetite is good  Takes prescribed medications as ordered

## 2018-11-22 PROCEDURE — 80307 DRUG TEST PRSMV CHEM ANLYZR: CPT | Performed by: PSYCHIATRY & NEUROLOGY

## 2018-11-22 RX ADMIN — FLUPHENAZINE HYDROCHLORIDE 20 MG: 10 TABLET, FILM COATED ORAL at 21:03

## 2018-11-22 RX ADMIN — ATROPINE SULFATE 1 DROP: 10 SOLUTION/ DROPS OPHTHALMIC at 09:26

## 2018-11-22 RX ADMIN — BUPROPION HYDROCHLORIDE 300 MG: 300 TABLET, FILM COATED, EXTENDED RELEASE ORAL at 08:43

## 2018-11-22 RX ADMIN — LEVOTHYROXINE SODIUM 75 MCG: 75 TABLET ORAL at 06:15

## 2018-11-22 RX ADMIN — SENNOSIDES 8.6 MG: 8.6 TABLET, FILM COATED ORAL at 21:03

## 2018-11-22 RX ADMIN — SENNOSIDES 8.6 MG: 8.6 TABLET, FILM COATED ORAL at 08:44

## 2018-11-22 RX ADMIN — ATROPINE SULFATE 1 DROP: 10 SOLUTION/ DROPS OPHTHALMIC at 21:12

## 2018-11-22 RX ADMIN — DIVALPROEX SODIUM 1000 MG: 500 TABLET, FILM COATED, EXTENDED RELEASE ORAL at 21:03

## 2018-11-22 RX ADMIN — MIDODRINE HYDROCHLORIDE 10 MG: 2.5 TABLET ORAL at 06:15

## 2018-11-22 RX ADMIN — CLOZAPINE 450 MG: 25 TABLET ORAL at 21:03

## 2018-11-22 RX ADMIN — POLYETHYLENE GLYCOL 3350 17 G: 17 POWDER, FOR SOLUTION ORAL at 08:44

## 2018-11-22 RX ADMIN — MIDODRINE HYDROCHLORIDE 10 MG: 2.5 TABLET ORAL at 21:03

## 2018-11-22 NOTE — PROGRESS NOTES
Progress Note - Behavioral Health   Melissa Butler 26 y o  male MRN: [id number]  Unit/Bed#: [facility] 112-01 Encounter: [id number]    Assessment/Plan   Principal Problem:    Chronic paranoid schizophrenia ([address] )  Active Problems:    Encounter for medical assessment      Behavior over the last 24 hours:  unchanged  Sleep: normal  Appetite: normal  Medication side effects: No  ROS: no complaints    Mental Status Evaluation:  Appearance:  disheveled   Behavior:  evasive   Speech:  soft   Mood:  constricted   Affect:  constricted   Thought Process:  blocked   Thought Content:  normal   Perceptual Disturbances: Auditory hallucinations without commands   Risk Potential: none   Sensorium:  person, place, time/date and situation   Cognition:  grossly intact   Consciousness:  alert    Attention: attention span appeared shorter than expected for age   Insight:  fair   Judgment: fair   Gait/Station: normal gait/station   Motor Activity: no abnormal movements     Progress Toward Goals:       Patient is currently on a therapeutic leave of absence  Recommended Treatment: Continue with group therapy, milieu therapy and occupational therapy  Risks, benefits and possible side effects of Medications:   Risks, benefits, and possible side effects of medications explained to patient and patient verbalizes understanding  Medications: all current active meds have been reviewed  Labs:   Reviewed    Counseling / Coordination of Care  Total floor / unit time spent today 10 minutes  Greater than 50% of total time was spent with the patient and / or family counseling and / or coordination of care

## 2018-11-22 NOTE — PROGRESS NOTES
The patient slept through the night with with no distress or discomfort  Safety precautions maintained  Will continue to monitor for changes in current status

## 2018-11-22 NOTE — PROGRESS NOTES
Attended goals goals group this AM, filled out leave form by 0730 without prompting  Mother came to  for pass, Shayne Rapp stated he was going to do his laundry today and excited for thanksgiving dinner   meds sent on pass

## 2018-11-22 NOTE — NURSING NOTE
Patient visible on unit, blunted, guarded, withdrawn, poor eye contact  Patient isolative to self, no peer interaction  Patient med and meal compliant, offers no complaints, behaviors controlled, positive group attendance this evening, will continue to monitor

## 2018-11-23 RX ADMIN — SENNOSIDES 8.6 MG: 8.6 TABLET, FILM COATED ORAL at 20:45

## 2018-11-23 RX ADMIN — ATROPINE SULFATE 1 DROP: 10 SOLUTION/ DROPS OPHTHALMIC at 20:44

## 2018-11-23 RX ADMIN — MIDODRINE HYDROCHLORIDE 10 MG: 2.5 TABLET ORAL at 06:21

## 2018-11-23 RX ADMIN — ATROPINE SULFATE 1 DROP: 10 SOLUTION/ DROPS OPHTHALMIC at 08:45

## 2018-11-23 RX ADMIN — CLOZAPINE 450 MG: 25 TABLET ORAL at 20:45

## 2018-11-23 RX ADMIN — BUPROPION HYDROCHLORIDE 300 MG: 300 TABLET, FILM COATED, EXTENDED RELEASE ORAL at 08:44

## 2018-11-23 RX ADMIN — DIVALPROEX SODIUM 1000 MG: 500 TABLET, FILM COATED, EXTENDED RELEASE ORAL at 20:45

## 2018-11-23 RX ADMIN — LEVOTHYROXINE SODIUM 75 MCG: 75 TABLET ORAL at 06:21

## 2018-11-23 RX ADMIN — ATROPINE SULFATE 1 DROP: 10 SOLUTION/ DROPS OPHTHALMIC at 17:03

## 2018-11-23 RX ADMIN — MIDODRINE HYDROCHLORIDE 10 MG: 2.5 TABLET ORAL at 20:45

## 2018-11-23 RX ADMIN — MIDODRINE HYDROCHLORIDE 10 MG: 2.5 TABLET ORAL at 17:06

## 2018-11-23 RX ADMIN — FLUPHENAZINE HYDROCHLORIDE 20 MG: 10 TABLET, FILM COATED ORAL at 20:46

## 2018-11-23 RX ADMIN — POLYETHYLENE GLYCOL 3350 17 G: 17 POWDER, FOR SOLUTION ORAL at 08:46

## 2018-11-23 RX ADMIN — SENNOSIDES 8.6 MG: 8.6 TABLET, FILM COATED ORAL at 08:44

## 2018-11-23 NOTE — PROGRESS NOTES
Psychiatry Progress Note    Subjective:   Interval History     Team mtg today: had a good pass over Thanksgiving; mood and behavior unchanged; affect flat, no emotional response to his environment;  Trying to plan for possible discharge to home; very little interaction with staff and no interaction with other residents      Current medications:    Current Facility-Administered Medications:     atropine (ISOPTO ATROPINE) 1 % ophthalmic solution 1 drop, 1 drop, Sublingual, TID, Rico Byrne MD, 1 drop at 11/23/18 0845    bisacodyl (DULCOLAX) EC tablet 10 mg, 10 mg, Oral, Daily PRN, Provider Cutover, 10 mg at 11/07/18 0620    bisacodyl (DULCOLAX) rectal suppository 10 mg, 10 mg, Rectal, Daily PRN, LIZETH Menendez    buPROPion (WELLBUTRIN XL) 24 hr tablet 300 mg, 300 mg, Oral, Daily, Provider Cutover, 300 mg at 11/23/18 0844    cloZAPine (CLOZARIL) tablet 450 mg, 450 mg, Oral, HS, Rico Byrne MD, 450 mg at 11/22/18 2103    divalproex sodium (DEPAKOTE ER) 24 hr tablet 1,000 mg, 1,000 mg, Oral, HS, Arnel Bauer MD, 1,000 mg at 11/22/18 2103    fluPHENAZine (PROLIXIN) tablet 20 mg, 20 mg, Oral, HS, Rcio Byrne MD, 20 mg at 11/22/18 2103    levothyroxine tablet 75 mcg, 75 mcg, Oral, Early Morning, Provider Cutover, 75 mcg at 11/23/18 0621    midodrine (PROAMATINE) tablet 10 mg, 10 mg, Oral, TID, Wally Lynn MD, 10 mg at 11/23/18 0621    polyethylene glycol (MIRALAX) packet 17 g, 17 g, Oral, Daily, Provider Cutover, 17 g at 11/23/18 0846    senna (SENOKOT) tablet 8 6 mg, 1 tablet, Oral, BID, LIZETH Kelly, 8 6 mg at 11/23/18 0844    Current Problem List:    Patient Active Problem List   Diagnosis    Chronic paranoid schizophrenia ([facility] [address] )    Encounter for medical assessment       Problem list reviewed 11/23/18     Objective:     Vital Signs:  Vitals:    11/22/18 2100 11/23/18 0630 11/23/18 0730 11/23/18 0732   BP: 118/79 90/56 107/79 96/70   BP Location: Left arm Left arm Left arm Left arm   Pulse: (!) 143 (!) 118 101 (!) 108   Resp:   16    Temp:   (!) 97 1 °F (36 2 °C)    TempSrc:   Temporal    SpO2:       Weight:       Height:             Appearance:  age appropriate and casually dressed   Behavior:  evasive   Speech:  soft   Mood:  depressed   Affect:  constricted   Thought Process:  blocked   Thought Content:  normal   Perceptual Disturbances: Auditory hallucinations without commands   Risk Potential: none   Sensorium:  person, place, situation and time   Cognition:  intact   Consciousness:  alert and awake    Attention: attention span and concentration were age appropriate   Intellect: average   Insight:  limited   Judgment: limited      Motor Activity: no abnormal movements       Behavior over the last 24 hours:  unchanged  Sleep: insomnia  Appetite: normal  Medication side effects: No  ROS: no complaints      I/O Past 24 hours:  No intake/output data recorded  No intake/output data recorded  Labs:  Reviewed 11/23/18    Assessment / Plan:     Chronic [facility] ([facility] )    Recommended Treatment:      Medication changes:  1) no med changes    Non-pharmacological treatments  1) Continue with group therapy, milieu therapy and occupational therapy  Safety  1) Safety/communication plan established targeting dynamic risk factors above  2) Risks, benefits, and possible side effects of medications explained to patient and patient verbalizes understanding  Counseling / Coordination of Care    Total floor / unit time spent today 20 minutes  Greater than 50% of total time was spent with the patient and / or family counseling and / or coordination of care  A description of the counseling / coordination of care  Patient's Rights, confidentiality and exceptions to confidentiality, use of automated medical record, [facility] staff access to medical record, and consent to treatment reviewed      Yudelka Willett MD

## 2018-11-23 NOTE — PROGRESS NOTES
Reports for meals and meds  Refuses all group activity   Pt remains isolative to room in bed most of day  Cooperative, soft verbal response to staff ques  Denies voices/Jurado - no overt responses noted

## 2018-11-23 NOTE — PROGRESS NOTES
~Individual maintained on ongoing SAFE and fall precaution without any incident on this shift    He is laying in bed, eyes closed, breath evenly distributed and unlabored   Checked every 15 minutes during rounds   In no apparent distress   Will continue to monitor behavior and sleep pattern   ~ Individual has a pass to attend [facility] from [id number] to assist in the community for therapeutic reintegration, socialization and building relationship

## 2018-11-23 NOTE — ESCALATED TEAM TX
Patient attended treatment team meeting this morning prepared with self-assessment  Patient's group attendance for last week was 76  Patient presented with poor eye contact today  He did however inquire about his overnight pass again, which he was informed will depend on [facility]'s availability next week  Patient verbalized no further questions or concerns at the conclusion of the meeting  Next team meeting scheduled for 11/29

## 2018-11-23 NOTE — PROGRESS NOTES
Patient continues on a pass  Patient called the unit, stated he was having a good time and pass was going well

## 2018-11-23 NOTE — PROGRESS NOTES
Individual maintained on ongoing fall and SAFE precaution without incident on this unit  Return from pass with mom and sister at 63  He accomplish his goal during pass, ate dinner with the family, played with his xbox and did his laundry  Body assessment done, urine collect with negative result  He is isolative to himself and in his room  Meds and snack complaint without prompting  He did not attend group nor part take in movie night "[place]ide Along"  Denies depression or anxiety  No overt delusion or a/t/v hallucination noted  No behavioral noted    Will continue to monitor

## 2018-11-24 PROCEDURE — 99232 SBSQ HOSP IP/OBS MODERATE 35: CPT | Performed by: NURSE PRACTITIONER

## 2018-11-24 RX ADMIN — CLOZAPINE 450 MG: 25 TABLET ORAL at 22:05

## 2018-11-24 RX ADMIN — DIVALPROEX SODIUM 1000 MG: 500 TABLET, FILM COATED, EXTENDED RELEASE ORAL at 22:05

## 2018-11-24 RX ADMIN — MIDODRINE HYDROCHLORIDE 10 MG: 2.5 TABLET ORAL at 22:04

## 2018-11-24 RX ADMIN — ATROPINE SULFATE 1 DROP: 10 SOLUTION/ DROPS OPHTHALMIC at 17:05

## 2018-11-24 RX ADMIN — SENNOSIDES 8.6 MG: 8.6 TABLET, FILM COATED ORAL at 09:02

## 2018-11-24 RX ADMIN — ATROPINE SULFATE 1 DROP: 10 SOLUTION/ DROPS OPHTHALMIC at 09:02

## 2018-11-24 RX ADMIN — MIDODRINE HYDROCHLORIDE 10 MG: 2.5 TABLET ORAL at 17:05

## 2018-11-24 RX ADMIN — SENNOSIDES 8.6 MG: 8.6 TABLET, FILM COATED ORAL at 22:05

## 2018-11-24 RX ADMIN — POLYETHYLENE GLYCOL 3350 17 G: 17 POWDER, FOR SOLUTION ORAL at 09:02

## 2018-11-24 RX ADMIN — LEVOTHYROXINE SODIUM 75 MCG: 75 TABLET ORAL at 06:18

## 2018-11-24 RX ADMIN — MIDODRINE HYDROCHLORIDE 10 MG: 2.5 TABLET ORAL at 06:18

## 2018-11-24 RX ADMIN — BUPROPION HYDROCHLORIDE 300 MG: 300 TABLET, FILM COATED, EXTENDED RELEASE ORAL at 09:02

## 2018-11-24 RX ADMIN — FLUPHENAZINE HYDROCHLORIDE 20 MG: 10 TABLET, FILM COATED ORAL at 22:05

## 2018-11-24 RX ADMIN — ATROPINE SULFATE 1 DROP: 10 SOLUTION/ DROPS OPHTHALMIC at 22:05

## 2018-11-24 NOTE — PROGRESS NOTES
Progress Note - Behavioral Health   Anny Joseph 26 y o  male MRN: [id number]  Unit/Bed#: [facility] 112-01 Encounter: [id number]    Assessment/Plan   Principal Problem:    Chronic paranoid schizophrenia [facility])  Active Problems:    Encounter for medical assessment   Patient was seen and case was discussed with the nursing staff  Patient remains isolative to his room  He is med and meal compliant  Remains very paranoid he has not been responding to internal stimuli  Patient to continue current meds and treatment per Dr Adelso Siegel  Behavior over the last 24 hours:  unchanged  Sleep: normal  Appetite: normal  Medication side effects: No  ROS: no complaints    Mental Status Evaluation:  Appearance:  casually dressed and disheveled   Behavior:  evasive and guarded   Speech:  normal volume   Mood:  labile   Affect:  constricted   Thought Process:  circumstantial   Thought Content:  delusions  obsessive/rumination and obsessions   Perceptual Disturbances: Auditory hallucinations without commands   Risk Potential: Not presently   Sensorium:  person and place   Cognition:  grossly intact   Consciousness:  alert and awake    Attention: attention span appeared shorter than expected for age   Insight:  limited   Judgment: limited   Gait/Station: normal gait/station   Motor Activity: no abnormal movements     Progress Toward Goals: Remains psychotic    Recommended Treatment: Continue with group therapy, milieu therapy and occupational therapy  Risks, benefits and possible side effects of Medications:   Risks, benefits, and possible side effects of medications explained to patient and patient verbalizes understanding  Medications: continue current psychiatric medications  Labs: Reviewed    Counseling / Coordination of Care  Total floor / unit time spent today 15 minutes  Greater than 50% of total time was spent with the patient and / or family counseling and / or coordination of care   A description of the counseling / coordination of care: 10

## 2018-11-24 NOTE — PROGRESS NOTES
Patient is isolative to room, presents with a flat and blunted affect  Med and meal compliant  Patient arrives for meds with no prompting  Patient has minimal eye contact, takes medications, retreats back to room  No responding noted, no SI, HI, audio or visual hallucinations, anxiety or depression  Will continue to monitor for changes in current status

## 2018-11-24 NOTE — CMS CERTIFICATION NOTE
Certification: Based upon physical, mental and social evaluations, I certify that inpatient psychiatric services are medically necessary for this patient for a duration of 30 midnights for the treatment of schizophrenia Available alternative community resources do not meet the patient's mental health care needs  I further attest that an established written individualized plan of care has been implemented and is outlined in the patient's medical records

## 2018-11-24 NOTE — NURSING NOTE
Patient is alert and oriented  Visible intermittently, flat affect, isolative to room and self, minimal socialization with peers  Patient is med and meal compliant  No prompting required this shift  Patient attended wrap up group tonight but did not attend the movie with peers  He did not attend to hygiene this shift    We will continue to monitor progress in behavioral health program

## 2018-11-25 PROCEDURE — 99231 SBSQ HOSP IP/OBS SF/LOW 25: CPT | Performed by: NURSE PRACTITIONER

## 2018-11-25 RX ADMIN — SENNOSIDES 8.6 MG: 8.6 TABLET, FILM COATED ORAL at 21:13

## 2018-11-25 RX ADMIN — SENNOSIDES 8.6 MG: 8.6 TABLET, FILM COATED ORAL at 08:05

## 2018-11-25 RX ADMIN — BUPROPION HYDROCHLORIDE 300 MG: 300 TABLET, FILM COATED, EXTENDED RELEASE ORAL at 08:05

## 2018-11-25 RX ADMIN — ATROPINE SULFATE 1 DROP: 10 SOLUTION/ DROPS OPHTHALMIC at 16:48

## 2018-11-25 RX ADMIN — CLOZAPINE 450 MG: 25 TABLET ORAL at 21:13

## 2018-11-25 RX ADMIN — MIDODRINE HYDROCHLORIDE 10 MG: 2.5 TABLET ORAL at 21:14

## 2018-11-25 RX ADMIN — ATROPINE SULFATE 1 DROP: 10 SOLUTION/ DROPS OPHTHALMIC at 08:06

## 2018-11-25 RX ADMIN — FLUPHENAZINE HYDROCHLORIDE 20 MG: 10 TABLET, FILM COATED ORAL at 21:13

## 2018-11-25 RX ADMIN — DIVALPROEX SODIUM 1000 MG: 500 TABLET, FILM COATED, EXTENDED RELEASE ORAL at 21:13

## 2018-11-25 RX ADMIN — MIDODRINE HYDROCHLORIDE 10 MG: 2.5 TABLET ORAL at 06:32

## 2018-11-25 RX ADMIN — ATROPINE SULFATE 1 DROP: 10 SOLUTION/ DROPS OPHTHALMIC at 21:15

## 2018-11-25 RX ADMIN — MIDODRINE HYDROCHLORIDE 10 MG: 2.5 TABLET ORAL at 16:47

## 2018-11-25 RX ADMIN — LEVOTHYROXINE SODIUM 75 MCG: 75 TABLET ORAL at 06:33

## 2018-11-25 RX ADMIN — POLYETHYLENE GLYCOL 3350 17 G: 17 POWDER, FOR SOLUTION ORAL at 08:05

## 2018-11-25 NOTE — PROGRESS NOTES
Progress Note - Behavioral Health   Bridget Warner 26 y o  male MRN: [id number]  [facility] Encounter: [id number]    Assessment/Plan   Principal Problem:    Chronic paranoid schizophrenia [facility])  Active Problems:    Encounter for medical assessment   Patient continues for treatment for chronic paranoid schizophrenia  According to staff, he slept well through during the night  Patient mostly isolates himself to his room  There are no new clinical concerns or issues noted in the last 24 hours  Continue current meds and treatment per Dr Jasmin Hendrix  Behavior over the last 24 hours:  unchanged  Sleep: normal  Appetite: normal  Medication side effects: No  ROS: no complaints    Mental Status Evaluation:  Appearance:  casually dressed   Behavior:  evasive and guarded   Speech:  normal volume   Mood:  labile   Affect:  labile   Thought Process:  disorganized, loose associations and tangential   Thought Content:  delusions  obsessive/rumination   Perceptual Disturbances: Auditory hallucinations without commands   Risk Potential: None   Sensorium:  person and place   Cognition:  grossly intact   Consciousness:  alert and awake    Attention: attention span appeared shorter than expected for age   Insight:  poor   Judgment: poor   Gait/Station: normal gait/station   Motor Activity: no abnormal movements         Recommended Treatment: Continue with group therapy, milieu therapy and occupational therapy  Risks, benefits and possible side effects of Medications:   Risks, benefits, and possible side effects of medications explained to patient and patient verbalizes understanding  Medications: continue current psychiatric medications  Labs: Reviewed    Counseling / Coordination of Care  Total floor / unit time spent today 15 minutes  Greater than 50% of total time was spent with the patient and / or family counseling and / or coordination of care   A description of the counseling / coordination of care: 10

## 2018-11-25 NOTE — PROGRESS NOTES
Patient isolative to room  Med and meal compliant  Patient attended evening group  No change from day assessment  Will continue to monitor for changes in current status

## 2018-11-25 NOTE — PROGRESS NOTES
Patient sleeping throughout the night with no behaviors  noted  Midodrine given, parameters met  Patient offers no interaction with staff while obtaining vitals or medication administration

## 2018-11-25 NOTE — PROGRESS NOTES
Pt received @ 0700  Blunted, prompted for med &meals  Refused to get up for breakfast & only ate 50% for lunch  Attended goals group  Isolative to self/room, no interaction w/peers   No behavioral issues, will continue to monitor

## 2018-11-26 RX ADMIN — SENNOSIDES 8.6 MG: 8.6 TABLET, FILM COATED ORAL at 20:59

## 2018-11-26 RX ADMIN — CLOZAPINE 450 MG: 25 TABLET ORAL at 20:59

## 2018-11-26 RX ADMIN — MIDODRINE HYDROCHLORIDE 10 MG: 2.5 TABLET ORAL at 06:45

## 2018-11-26 RX ADMIN — ATROPINE SULFATE 1 DROP: 10 SOLUTION/ DROPS OPHTHALMIC at 16:54

## 2018-11-26 RX ADMIN — MIDODRINE HYDROCHLORIDE 10 MG: 2.5 TABLET ORAL at 16:54

## 2018-11-26 RX ADMIN — POLYETHYLENE GLYCOL 3350 17 G: 17 POWDER, FOR SOLUTION ORAL at 08:54

## 2018-11-26 RX ADMIN — ATROPINE SULFATE 1 DROP: 10 SOLUTION/ DROPS OPHTHALMIC at 20:58

## 2018-11-26 RX ADMIN — BUPROPION HYDROCHLORIDE 300 MG: 300 TABLET, FILM COATED, EXTENDED RELEASE ORAL at 08:56

## 2018-11-26 RX ADMIN — ATROPINE SULFATE 1 DROP: 10 SOLUTION/ DROPS OPHTHALMIC at 08:56

## 2018-11-26 RX ADMIN — LEVOTHYROXINE SODIUM 75 MCG: 75 TABLET ORAL at 06:45

## 2018-11-26 RX ADMIN — SENNOSIDES 8.6 MG: 8.6 TABLET, FILM COATED ORAL at 08:56

## 2018-11-26 RX ADMIN — DIVALPROEX SODIUM 1000 MG: 500 TABLET, FILM COATED, EXTENDED RELEASE ORAL at 20:59

## 2018-11-26 RX ADMIN — FLUPHENAZINE HYDROCHLORIDE 20 MG: 10 TABLET, FILM COATED ORAL at 20:59

## 2018-11-26 RX ADMIN — MIDODRINE HYDROCHLORIDE 10 MG: 2.5 TABLET ORAL at 08:55

## 2018-11-26 RX ADMIN — MIDODRINE HYDROCHLORIDE 10 MG: 2.5 TABLET ORAL at 20:58

## 2018-11-26 NOTE — PROGRESS NOTES
Psychiatry Progress Note    Subjective: Interval History     Patient continues to behave about the same  He is quiet and withdrawn briefly ask questions or answers them  Eye contact is poor and his affect is totally flat  He is tolerating the medication there are no new issues        Current medications:    Current Facility-Administered Medications:     atropine (ISOPTO ATROPINE) 1 % ophthalmic solution 1 drop, 1 drop, Sublingual, TID, Lizbeth Benoit MD, 1 drop at 11/26/18 0856    bisacodyl (DULCOLAX) EC tablet 10 mg, 10 mg, Oral, Daily PRN, Provider Cutover, 10 mg at 11/07/18 0620    bisacodyl (DULCOLAX) rectal suppository 10 mg, 10 mg, Rectal, Daily PRN, LIZETH Bautista    buPROPion (WELLBUTRIN XL) 24 hr tablet 300 mg, 300 mg, Oral, Daily, Provider Cutover, 300 mg at 11/26/18 0856    cloZAPine (CLOZARIL) tablet 450 mg, 450 mg, Oral, HS, Lizbeth Benoit MD, 450 mg at 11/25/18 2113    divalproex sodium (DEPAKOTE ER) 24 hr tablet 1,000 mg, 1,000 mg, Oral, HS, Marco A Blum MD, 1,000 mg at 11/25/18 2113    fluPHENAZine (PROLIXIN) tablet 20 mg, 20 mg, Oral, HS, Lizbeth Benoit MD, 20 mg at 11/25/18 2113    levothyroxine tablet 75 mcg, 75 mcg, Oral, Early Morning, Provider Cutover, 75 mcg at 11/26/18 0645    midodrine (PROAMATINE) tablet 10 mg, 10 mg, Oral, TID, Wally Lynn MD, 10 mg at 11/26/18 0855    polyethylene glycol (MIRALAX) packet 17 g, 17 g, Oral, Daily, Provider Cutover, 17 g at 11/26/18 0854    senna (SENOKOT) tablet 8 6 mg, 1 tablet, Oral, BID, LIZETH Meyer, 8 6 mg at 11/26/18 0856    Current Problem List:    Patient Active Problem List   Diagnosis    Chronic paranoid schizophrenia ([facility] [address] )    Encounter for medical assessment       Problem list reviewed 11/26/18     Objective:     Vital Signs:  Vitals:    11/25/18 1532 11/25/18 1534 11/25/18 2100 11/26/18 0730   BP: 117/82 114/75 97/75 99/70   BP Location: Left arm Left arm Left arm Left arm   Pulse: (!) 107 (!) 119 (!) 120 (!) 113 Resp:    21   Temp:    97 9 °F (36 6 °C)   TempSrc:    Temporal   SpO2:       Weight:       Height:             Appearance:  age appropriate and casually dressed   Behavior:  evasive and guarded   Speech:  soft   Mood:  depressed   Affect:  constricted   Thought Process:  normal   Thought Content:  normal   Perceptual Disturbances: Auditory hallucinations without commands   Risk Potential: none   Sensorium:  person, place, situation and time   Cognition:  intact   Consciousness:  alert and awake    Attention: attention span and concentration were not age appropriate   Intellect: average   Insight:  limited   Judgment: limited      Motor Activity: no abnormal movements       Behavior over the last 24 hours:  unchanged  Sleep: hypersomnia  Appetite: normal  Medication side effects: No  ROS: no complaints      I/O Past 24 hours:  I/O last 3 completed shifts:  In: -   Out: 1 [Stool:1]  No intake/output data recorded  Labs:  Reviewed 11/26/18    Assessment / Plan:     [facility]    Recommended Treatment:      Medication changes:  1) continue patient on current medications    Non-pharmacological treatments  1) Continue with group therapy, milieu therapy and occupational therapy  Safety  1) Safety/communication plan established targeting dynamic risk factors above  2) Risks, benefits, and possible side effects of medications explained to patient and patient verbalizes understanding  Counseling / Coordination of Care    Total floor / unit time spent today 20 minutes  Greater than 50% of total time was spent with the patient and / or family counseling and / or coordination of care  A description of the counseling / coordination of care  Patient's Rights, confidentiality and exceptions to confidentiality, use of automated medical record, [facility] staff access to medical record, and consent to treatment reviewed      Nicole Jensen MD

## 2018-11-26 NOTE — NURSING NOTE
Patient has a flat affect, is isolative to room and self  He has poor eye contact, is disheveled and is without any signs of overt responding  Patient met with ACT team today  Patient had poor appetite at breakfast (consuming only 10%) but did eat 75% at lunch  He remains in room resting  He was out of bed and room to attend goals group, IMR and treatment team   Patient did not shower this shift  He is without c/o s/s pain, discomfort and distress   Will continue to monitor physical and behavioral progress in program

## 2018-11-26 NOTE — PROGRESS NOTES
Patient is isolative to room, quiet, cooperative with staff  Med and meal compliant  Patient attended evening group  No complaints of SI, HI, audio or visual hallucinations, anxiety or depression  Will continue to monitor for changes in current status

## 2018-11-27 LAB
AMPHETAMINES SERPL QL SCN: NEGATIVE
BARBITURATES UR QL: NEGATIVE
BASOPHILS # BLD AUTO: 0 THOUSANDS/ΜL (ref 0–0.1)
BASOPHILS NFR BLD AUTO: 1 % (ref 0–1)
BENZODIAZ UR QL: NEGATIVE
COCAINE UR QL: NEGATIVE
EOSINOPHIL # BLD AUTO: 0.4 THOUSAND/ΜL (ref 0–0.4)
EOSINOPHIL NFR BLD AUTO: 5 % (ref 0–6)
ERYTHROCYTE [DISTWIDTH] IN BLOOD BY AUTOMATED COUNT: 13.1 %
HCT VFR BLD AUTO: 46.1 % (ref 41–53)
HGB BLD-MCNC: 14.8 G/DL (ref 13.5–17.5)
LYMPHOCYTES # BLD AUTO: 2.8 THOUSANDS/ΜL (ref 0.5–4)
LYMPHOCYTES NFR BLD AUTO: 39 % (ref 20–50)
MCH RBC QN AUTO: 27.6 PG (ref 26–34)
MCHC RBC AUTO-ENTMCNC: 32.1 G/DL (ref 31–36)
MCV RBC AUTO: 86 FL (ref 80–100)
METHADONE UR QL: NEGATIVE
MONOCYTES # BLD AUTO: 0.6 THOUSAND/ΜL (ref 0.2–0.9)
MONOCYTES NFR BLD AUTO: 9 % (ref 1–10)
NEUTROPHILS # BLD AUTO: 3.3 THOUSANDS/ΜL (ref 1.8–7.8)
NEUTS SEG NFR BLD AUTO: 46 % (ref 45–65)
OPIATES UR QL SCN: NEGATIVE
PCP UR QL: NEGATIVE
PLATELET # BLD AUTO: 223 THOUSANDS/UL (ref 150–450)
PMV BLD AUTO: 8.3 FL (ref 8.9–12.7)
RBC # BLD AUTO: 5.36 MILLION/UL (ref 4.5–5.9)
THC UR QL: NEGATIVE
WBC # BLD AUTO: 7.2 THOUSAND/UL (ref 4.5–11)

## 2018-11-27 PROCEDURE — 85025 COMPLETE CBC W/AUTO DIFF WBC: CPT | Performed by: PSYCHIATRY & NEUROLOGY

## 2018-11-27 PROCEDURE — 80307 DRUG TEST PRSMV CHEM ANLYZR: CPT | Performed by: PSYCHIATRY & NEUROLOGY

## 2018-11-27 RX ADMIN — FLUPHENAZINE HYDROCHLORIDE 20 MG: 10 TABLET, FILM COATED ORAL at 20:48

## 2018-11-27 RX ADMIN — CLOZAPINE 450 MG: 25 TABLET ORAL at 20:48

## 2018-11-27 RX ADMIN — BUPROPION HYDROCHLORIDE 300 MG: 300 TABLET, FILM COATED, EXTENDED RELEASE ORAL at 08:30

## 2018-11-27 RX ADMIN — MIDODRINE HYDROCHLORIDE 10 MG: 2.5 TABLET ORAL at 06:10

## 2018-11-27 RX ADMIN — MIDODRINE HYDROCHLORIDE 10 MG: 2.5 TABLET ORAL at 20:48

## 2018-11-27 RX ADMIN — SENNOSIDES 8.6 MG: 8.6 TABLET, FILM COATED ORAL at 20:48

## 2018-11-27 RX ADMIN — ATROPINE SULFATE 1 DROP: 10 SOLUTION/ DROPS OPHTHALMIC at 17:00

## 2018-11-27 RX ADMIN — LEVOTHYROXINE SODIUM 75 MCG: 75 TABLET ORAL at 06:10

## 2018-11-27 RX ADMIN — DIVALPROEX SODIUM 1000 MG: 500 TABLET, FILM COATED, EXTENDED RELEASE ORAL at 20:48

## 2018-11-27 RX ADMIN — ATROPINE SULFATE 1 DROP: 10 SOLUTION/ DROPS OPHTHALMIC at 20:49

## 2018-11-27 RX ADMIN — MIDODRINE HYDROCHLORIDE 10 MG: 2.5 TABLET ORAL at 17:00

## 2018-11-27 RX ADMIN — SENNOSIDES 8.6 MG: 8.6 TABLET, FILM COATED ORAL at 08:31

## 2018-11-27 RX ADMIN — ATROPINE SULFATE 1 DROP: 10 SOLUTION/ DROPS OPHTHALMIC at 08:31

## 2018-11-27 RX ADMIN — POLYETHYLENE GLYCOL 3350 17 G: 17 POWDER, FOR SOLUTION ORAL at 08:30

## 2018-11-27 NOTE — PROGRESS NOTES
Nayanajessica Colón has been awake, alert, and visible intermittently out in the milieu  Behavior remains quiet and isolative to self  Compliant with all scheduled meds with some prompting  Affect remains flat, with little eye contact or conversation with staff  Ate 100% supper  Mother visited with fair interaction noted and brought food for pt which pt ate  Attended and participated in 2/2 evening groups  Declined evening snack  No overt responding to internal stimuli noted but preoccupied at times  Resting quietly in bed at present, arouses easily  Will continue to monitor/assess for any changes

## 2018-11-27 NOTE — PROGRESS NOTES
Patient is out on a therapeutic pass to daybreak  There are no changes in his behavior or mental status according to the up-to-date nursing reports

## 2018-11-27 NOTE — PROGRESS NOTES
~Individual maintained on ongoing SAFE and fall precaution without any incident on this shift    He is laying in bed, eyes closed, breath evenly distributed and unlabored   Checked every 15 minutes during rounds   In no apparent distress   Will continue to monitor behavior and sleep pattern    ~Individual has a pass to attend daybreak from [id number] to assist in the community for therapeutic reintegration, socialization and building relationship   ~ He had his morning midodrine for /75   P111

## 2018-11-27 NOTE — PROGRESS NOTES
Pt reports to meals and meds without prompt  Affect blunted, depressed cooperative  Pt verbal denies hallucinations/voices  Soft quiet verbal response/flat  with acknowledge going to daybreak today

## 2018-11-28 RX ADMIN — MIDODRINE HYDROCHLORIDE 10 MG: 2.5 TABLET ORAL at 16:52

## 2018-11-28 RX ADMIN — BUPROPION HYDROCHLORIDE 300 MG: 300 TABLET, FILM COATED, EXTENDED RELEASE ORAL at 08:57

## 2018-11-28 RX ADMIN — MIDODRINE HYDROCHLORIDE 10 MG: 2.5 TABLET ORAL at 06:28

## 2018-11-28 RX ADMIN — ATROPINE SULFATE 1 DROP: 10 SOLUTION/ DROPS OPHTHALMIC at 09:00

## 2018-11-28 RX ADMIN — ATROPINE SULFATE 1 DROP: 10 SOLUTION/ DROPS OPHTHALMIC at 16:52

## 2018-11-28 RX ADMIN — ATROPINE SULFATE 1 DROP: 10 SOLUTION/ DROPS OPHTHALMIC at 21:01

## 2018-11-28 RX ADMIN — LEVOTHYROXINE SODIUM 75 MCG: 75 TABLET ORAL at 06:28

## 2018-11-28 RX ADMIN — SENNOSIDES 8.6 MG: 8.6 TABLET, FILM COATED ORAL at 21:01

## 2018-11-28 RX ADMIN — CLOZAPINE 450 MG: 25 TABLET ORAL at 21:01

## 2018-11-28 RX ADMIN — SENNOSIDES 8.6 MG: 8.6 TABLET, FILM COATED ORAL at 08:57

## 2018-11-28 RX ADMIN — POLYETHYLENE GLYCOL 3350 17 G: 17 POWDER, FOR SOLUTION ORAL at 08:57

## 2018-11-28 RX ADMIN — MIDODRINE HYDROCHLORIDE 10 MG: 2.5 TABLET ORAL at 21:00

## 2018-11-28 RX ADMIN — FLUPHENAZINE HYDROCHLORIDE 20 MG: 10 TABLET, FILM COATED ORAL at 21:01

## 2018-11-28 RX ADMIN — DIVALPROEX SODIUM 1000 MG: 500 TABLET, FILM COATED, EXTENDED RELEASE ORAL at 21:00

## 2018-11-28 NOTE — PROGRESS NOTES
Pt reports for meals and meds without prompt  Affect depressed with soft mumbling verbal return  No c/o  Pt attended all scheduled groups today 4/4   1355-Pt remains in room when no events sched  In bed isolative to self

## 2018-11-28 NOTE — CASE MANAGEMENT
RAYNA was able to arrange for a CSP meeting to happen on Wednesday, December 5, 2018 at 10:30 AM with Dr Sakina Palumbo and patient's mother  RAYNA also spoke with patient's mother, Karen Tomas, who reported she would like patient's first overnight pass to happen from Saturday 5 PM to Sunday 5 PM  CM will continue to follow patient's progress and assist with discharge planning needs

## 2018-11-28 NOTE — PROGRESS NOTES
Lawton Goldmann has been awake, alert, and visible intermittently out in the milieu  Pt used his Gameboy during electronics hour  Ate 100% supper  Affect remains flat, blunted, and offers minimal conversation with staff  Eye contact remains poor  No interaction noted with peers  Behavior continues to be quiet and isolative to self  Attended and participated in 2/2 evening groups and Men's Group  Compliant with all scheduled meds with some prompting  Had evening snack  Resting quietly in bed at present, arouses easily  Will continue to monitor/assess for any changes

## 2018-11-28 NOTE — PROGRESS NOTES
Pharmacy Clozapine Monitoring Progress Note     Jennifer Galvan is receiving 450 mg mg clozapine daily at bedtime  (Recommended 1/3 total daily dose in AM and 2/3 total daily dose in PM)    Assessment/Plan:    Current phase of treatment is maintenance/continuation  **If clozapine therapy is interrupted for more than 48 hours, therapy MUST be restarted at the initial titration dose to avoid hypotension, bradycardia, and syncope  **    ANC monitoring frequency is every four weeks per clozapine REMS  The most recent [address] was   Lab Results   Component Value Date    NEUTROABS 3 30 11/27/2018    and the next lab is due on 12/25  Last Clozapine level (if available):    No results found for: CLOZAPINE  No results found for: NCLOZIP    Recommendation:    The patient  is missing laboratory values  Based on the table below, pharmacy recommends the following: Please schedule ANC draw for 12/25, reassess another EKG, and order baseline BNP, CRP, and TROP  Monitoring: Adverse Effect Suggested Monitoring Patient's Status    Agranulocytosis ANC baseline and then repeat weekly for first 6 months and every 2 weeks for the second 6 months  Maintenance after one year of therapy every month  The most recent [address] was   Lab Results   Component Value Date    NEUTROABS 3 30 11/27/2018       This ANC is considered normal range (ANC >/= 1500/mcL)  Continue current treatment and monitoring course      Respiratory Depression Please ensure patient is not on concomitant respiratory lowering medications such as benzodiazepines, sedative hypnotics (eg  zolpidem) This patient is not on respiratory depression lowering medications   Myocarditis Baseline and weekly EKG, CRP, BNP, and troponin  Weekly symptomatic complaints of fatigue, dyspnea, tachycardia, chest pain, palpitations, and fever for the first 4 weeks    Last EKG Results on 10/31 showed:  No Noted Abnormalities    Last QTC on 10/31 was     0  Lab Value Date/Time QTCINT 448 10/31/2018 1216       Last BNP was No results found for: BNP    Last Troponin was  No results found for: TROPONIN   Orthostatic hypotension/  bradycardia Blood pressure and vital signs      Monitor blood pressure and orthostatic hypotension at least twice daily upon initiation and continue to follow at least once daily afterwards  BP 94/67 (BP Location: Left arm)   Pulse (!) 115   Temp (!) 97 3 °F (36 3 °C) (Temporal)   Resp 16   Ht 6' 1" (1 854 m)   Wt 88 9 kg (196 lb)   SpO2 97%   BMI 25 86 kg/m²             Constipation (bowel obstruction) Assess at baseline and weekly during first four months of therapy  Docusate 100mg BID and Miralax 17 grams daily recommended initially  Prophylactic bowel regimen ordered and includesSenna 8 6 mg BID, please order docusate 100 mg BID as well  Sialorrhea Assess at baseline and weekly during first four months of therapy  May be managed using sublingual anticholinergic preparations (atropine 1% eye drops)  Patient is ordered atropine for drooling   This will continue to be monitored  Please note that per current REMS protocol the provider can submit a treatment rationale that justifies clozapine treatment even if patient parameters are outside of REMS recommendations  The Clozapine REMS is an FDA-mandated program implemented by the manufacturers of clozapine  (HelloBooksn com cy  com/CpmgClozapineUI/home  u)  Pharmacy will continue to follow patient with team, thank you    Electronically signed by: Alon Wesley, Pharmacist

## 2018-11-28 NOTE — PROGRESS NOTES
Psychiatry Progress Note    Subjective: Interval History     Uneventful past to daybreak yesterday  It is not clear whether he enjoyed it but he said was [facility] he will be going back for further passes  The plan is for him to use daybreak once he is discharged to his mother's house so that he has some structure in his week and some the look for to  He will also help him that he is interacting with other people        Current medications:    Current Facility-Administered Medications:     atropine (ISOPTO ATROPINE) 1 % ophthalmic solution 1 drop, 1 drop, Sublingual, TID, Graeme Pepper MD, 1 drop at 11/28/18 0900    bisacodyl (DULCOLAX) EC tablet 10 mg, 10 mg, Oral, Daily PRN, Provider Cutover, 10 mg at 11/07/18 0620    bisacodyl (DULCOLAX) rectal suppository 10 mg, 10 mg, Rectal, Daily PRN, LIZETH Chappell    buPROPion (WELLBUTRIN XL) 24 hr tablet 300 mg, 300 mg, Oral, Daily, Provider Cutover, 300 mg at 11/28/18 0857    cloZAPine (CLOZARIL) tablet 450 mg, 450 mg, Oral, HS, Graeme Pepper MD, 450 mg at 11/27/18 2048    divalproex sodium (DEPAKOTE ER) 24 hr tablet 1,000 mg, 1,000 mg, Oral, HS, Bladimir Hernandez MD, 1,000 mg at 11/27/18 2048    fluPHENAZine (PROLIXIN) tablet 20 mg, 20 mg, Oral, HS, Graeme Pepper MD, 20 mg at 11/27/18 2048    levothyroxine tablet 75 mcg, 75 mcg, Oral, Early Morning, Provider Cutover, 75 mcg at 11/28/18 8658    midodrine (PROAMATINE) tablet 10 mg, 10 mg, Oral, TID, Wally Lynn MD, 10 mg at 11/28/18 0305    polyethylene glycol (MIRALAX) packet 17 g, 17 g, Oral, Daily, Provider Cutover, 17 g at 11/28/18 0857    senna (SENOKOT) tablet 8 6 mg, 1 tablet, Oral, BID, LIZETH Meyer, 8 6 mg at 11/28/18 3887    Current Problem List:    Patient Active Problem List   Diagnosis    Chronic paranoid schizophrenia [facility])    Encounter for medical assessment       Problem list reviewed 11/28/18     Objective:     Vital Signs:  Vitals:    11/27/18 1502 11/27/18 1930 11/28/18 0730 11/28/18 0732   BP: 111/62 104/77 110/81 94/67   BP Location: Left arm Left arm Left arm Left arm   Pulse: (!) 119 (!) 137 (!) 106 (!) 115   Resp:   16    Temp:   (!) 97 3 °F (36 3 °C)    TempSrc:   Temporal    SpO2:       Weight:       Height:             Appearance:  age appropriate and casually dressed   Behavior:  normal   Speech:  soft   Mood:  depressed   Affect:  constricted   Thought Process:  normal   Thought Content:  normal   Perceptual Disturbances: Auditory hallucinations without commands   Risk Potential: none   Sensorium:  person, place, situation and time   Cognition:  intact   Consciousness:  alert and awake    Attention: attention span and concentration were age appropriate   Intellect: average   Insight:  limited   Judgment: limited      Motor Activity: no abnormal movements       Behavior over the last 24 hours:  regressed  Sleep: hypersomnia  Appetite: normal  Medication side effects: No  ROS: no complaints      I/O Past 24 hours:  No intake/output data recorded  No intake/output data recorded  Labs:  Reviewed 11/28/18    Assessment / Plan:     [facility]    Recommended Treatment:      Medication changes:  1) no medication changes  Patient will continue with going to daybreak    Non-pharmacological treatments  1) Continue with group therapy, milieu therapy and occupational therapy  Safety  1) Safety/communication plan established targeting dynamic risk factors above  2) Risks, benefits, and possible side effects of medications explained to patient and patient verbalizes understanding  Counseling / Coordination of Care    Total floor / unit time spent today 20 minutes  Greater than 50% of total time was spent with the patient and / or family counseling and / or coordination of care  A description of the counseling / coordination of care       Patient's Rights, confidentiality and exceptions to confidentiality, use of automated medical record, [facility] staff access to medical record, and consent to treatment reviewed      Lizbeth Benoit MD

## 2018-11-29 RX ADMIN — ATROPINE SULFATE 1 DROP: 10 SOLUTION/ DROPS OPHTHALMIC at 17:00

## 2018-11-29 RX ADMIN — SENNOSIDES 8.6 MG: 8.6 TABLET, FILM COATED ORAL at 08:31

## 2018-11-29 RX ADMIN — DIVALPROEX SODIUM 1000 MG: 500 TABLET, FILM COATED, EXTENDED RELEASE ORAL at 21:11

## 2018-11-29 RX ADMIN — LEVOTHYROXINE SODIUM 75 MCG: 75 TABLET ORAL at 06:30

## 2018-11-29 RX ADMIN — POLYETHYLENE GLYCOL 3350 17 G: 17 POWDER, FOR SOLUTION ORAL at 08:31

## 2018-11-29 RX ADMIN — ATROPINE SULFATE 1 DROP: 10 SOLUTION/ DROPS OPHTHALMIC at 08:31

## 2018-11-29 RX ADMIN — FLUPHENAZINE HYDROCHLORIDE 20 MG: 10 TABLET, FILM COATED ORAL at 21:11

## 2018-11-29 RX ADMIN — BUPROPION HYDROCHLORIDE 300 MG: 300 TABLET, FILM COATED, EXTENDED RELEASE ORAL at 08:31

## 2018-11-29 RX ADMIN — CLOZAPINE 450 MG: 25 TABLET ORAL at 21:11

## 2018-11-29 RX ADMIN — SENNOSIDES 8.6 MG: 8.6 TABLET, FILM COATED ORAL at 21:11

## 2018-11-29 RX ADMIN — ATROPINE SULFATE 1 DROP: 10 SOLUTION/ DROPS OPHTHALMIC at 21:11

## 2018-11-29 RX ADMIN — MIDODRINE HYDROCHLORIDE 10 MG: 2.5 TABLET ORAL at 06:30

## 2018-11-29 RX ADMIN — MIDODRINE HYDROCHLORIDE 10 MG: 2.5 TABLET ORAL at 17:00

## 2018-11-29 NOTE — ESCALATED TEAM TX
Patient attended treatment team meeting this morning prepared with self-assessment  Patient's group attendance for last week was 81%  Patient entered the room with poor awareness of his surroundings, as he closed the door on the county worker entering behind him  He does this almost every week  Patient reports that he has been dealing with his depression by speaking with his therapist   He was acknowledged for his ongoing progress, and expectations were outlined for him after he is discharged  Patient is excited for an overnight pass from Saturday 12/1-Sunday 12/2, from 5pm-5pm    Patient verbalized no further questions or concerns at the conclusion of the meeting  Next team meeting scheduled for 12/6

## 2018-11-29 NOTE — PROGRESS NOTES
Psychiatry Progress Note    Subjective:   Interval History     Team mtg with pt present; some improvement noted on the unit with conversation in groups; was able to recognize and talk sbout his voices; no new issues      Current medications:    Current Facility-Administered Medications:     atropine (ISOPTO ATROPINE) 1 % ophthalmic solution 1 drop, 1 drop, Sublingual, TID, Joy Wright MD, 1 drop at 11/29/18 0831    bisacodyl (DULCOLAX) EC tablet 10 mg, 10 mg, Oral, Daily PRN, Provider Cutover, 10 mg at 11/07/18 0620    bisacodyl (DULCOLAX) rectal suppository 10 mg, 10 mg, Rectal, Daily PRN, LIZETH Clayton    buPROPion (WELLBUTRIN XL) 24 hr tablet 300 mg, 300 mg, Oral, Daily, Provider Cutover, 300 mg at 11/29/18 0831    cloZAPine (CLOZARIL) tablet 450 mg, 450 mg, Oral, HS, Joy Wright MD, 450 mg at 11/28/18 2101    divalproex sodium (DEPAKOTE ER) 24 hr tablet 1,000 mg, 1,000 mg, Oral, HS, Ken Pineda MD, 1,000 mg at 11/28/18 2100    fluPHENAZine (PROLIXIN) tablet 20 mg, 20 mg, Oral, HS, Joy Wright MD, 20 mg at 11/28/18 2101    levothyroxine tablet 75 mcg, 75 mcg, Oral, Early Morning, Provider Cutover, 75 mcg at 11/29/18 0630    midodrine (PROAMATINE) tablet 10 mg, 10 mg, Oral, TID, Wally Lynn MD, 10 mg at 11/29/18 0630    polyethylene glycol (MIRALAX) packet 17 g, 17 g, Oral, Daily, Provider Cutover, 17 g at 11/29/18 0831    senna (SENOKOT) tablet 8 6 mg, 1 tablet, Oral, BID, DoneldLIZETH Jimenez, 8 6 mg at 11/29/18 0831    Current Problem List:    Patient Active Problem List   Diagnosis    [facility] paranoid schizophrenia ([facility] [address] )    Encounter for medical assessment       Problem list reviewed 11/29/18     Objective:     Vital Signs:  Vitals:    11/28/18 1502 11/28/18 1900 11/29/18 0730 11/29/18 0732   BP: 115/67 110/74 101/70 91/50   BP Location: Left arm Left arm Left arm Left arm   Pulse: (!) 135 (!) 122 (!) 107 (!) 117   Resp:   19    Temp:   97 5 °F (36 4 °C)    TempSrc:   Temporal    SpO2: Weight:       Height:             Appearance:  age appropriate and casually dressed   Behavior:  normal   Speech:  normal volume   Mood:  depressed   Affect:  constricted   Thought Process:  normal   Thought Content:  normal   Perceptual Disturbances: Auditory hallucinations without commands   Risk Potential: none   Sensorium:  person, place, situation and time   Cognition:  intact   Consciousness:  alert and awake    Attention: attention span and concentration were age appropriate   Intellect: average   Insight:  limited   Judgment: limited      Motor Activity: no abnormal movements       Behavior over the last 24 hours:  improved  Sleep: normal  Appetite: normal  Medication side effects: No  ROS: no complaints      I/O Past 24 hours:  No intake/output data recorded  No intake/output data recorded  Labs:  Reviewed 11/29/18    Assessment / Plan:     [facility]    Recommended Treatment:      Medication changes:  1) no med chsnges    Non-pharmacological treatments  1) Continue with group therapy, milieu therapy and occupational therapy  Safety  1) Safety/communication plan established targeting dynamic risk factors above  2) Risks, benefits, and possible side effects of medications explained to patient and patient verbalizes understanding  Counseling / Coordination of Care    Total floor / unit time spent today 20 minutes  Greater than 50% of total time was spent with the patient and / or family counseling and / or coordination of care  A description of the counseling / coordination of care  Patient's Rights, confidentiality and exceptions to confidentiality, use of automated medical record, [facility] to medical record, and consent to treatment reviewed      Coy Ramey MD

## 2018-11-29 NOTE — NURSING NOTE
Patient is blunted, flat and visible on unit intermittantly  Isolative to self  No socialization with peers  Med and meal compliant  Refused goals group  Did attend IMR  No c/o s/s pain, discomfort or distress  Patient is expected to have overnight pass 12/1/18 to 12/2/18 from 5pm to 5pm with mom  He is also scheduled for CSP meeting on 12/5/18 at 1030am, after which he will be scheduled for discharge  We will continue to monitor progress in recovery program until discharge

## 2018-11-29 NOTE — PROGRESS NOTES
Patient attended [facility] group today which focused on mental illnesses depicted in film and TV  Group objectives were to watch a video with media representations of mental illness, identify the disordered thoughts/behaviors, and discuss as a group  This activity allows patients to witness mental illness on a more global level, in hopes that it will decrease stigma and promote relatability  Patient intermittently slept though the first half of group, but was more active in the second half  Patient was able to identify mental health symptoms in the clip  He participated and completed course objectives

## 2018-11-30 PROCEDURE — 80307 DRUG TEST PRSMV CHEM ANLYZR: CPT | Performed by: PSYCHIATRY & NEUROLOGY

## 2018-11-30 RX ADMIN — DIVALPROEX SODIUM 1000 MG: 500 TABLET, FILM COATED, EXTENDED RELEASE ORAL at 21:14

## 2018-11-30 RX ADMIN — POLYETHYLENE GLYCOL 3350 17 G: 17 POWDER, FOR SOLUTION ORAL at 08:21

## 2018-11-30 RX ADMIN — LEVOTHYROXINE SODIUM 75 MCG: 75 TABLET ORAL at 06:12

## 2018-11-30 RX ADMIN — BUPROPION HYDROCHLORIDE 300 MG: 300 TABLET, FILM COATED, EXTENDED RELEASE ORAL at 08:20

## 2018-11-30 RX ADMIN — ATROPINE SULFATE 1 DROP: 10 SOLUTION/ DROPS OPHTHALMIC at 09:13

## 2018-11-30 RX ADMIN — SENNOSIDES 8.6 MG: 8.6 TABLET, FILM COATED ORAL at 21:14

## 2018-11-30 RX ADMIN — MIDODRINE HYDROCHLORIDE 10 MG: 2.5 TABLET ORAL at 16:41

## 2018-11-30 RX ADMIN — MIDODRINE HYDROCHLORIDE 10 MG: 2.5 TABLET ORAL at 21:14

## 2018-11-30 RX ADMIN — ATROPINE SULFATE 1 DROP: 10 SOLUTION/ DROPS OPHTHALMIC at 16:41

## 2018-11-30 RX ADMIN — FLUPHENAZINE HYDROCHLORIDE 20 MG: 10 TABLET, FILM COATED ORAL at 21:14

## 2018-11-30 RX ADMIN — SENNOSIDES 8.6 MG: 8.6 TABLET, FILM COATED ORAL at 08:20

## 2018-11-30 RX ADMIN — CLOZAPINE 450 MG: 25 TABLET ORAL at 21:14

## 2018-11-30 RX ADMIN — ATROPINE SULFATE 1 DROP: 10 SOLUTION/ DROPS OPHTHALMIC at 21:15

## 2018-11-30 NOTE — PROGRESS NOTES
Rene Mendez has been awake, alert, and mostly quiet and isolative to self  Ate 100% supper  Affect remains flat, blunted, and offers minimal conversation with staff  Eye contact still poor  No interaction noted with peers  Mother in to visit with fair interaction noted and brought food in which pt ate  Compliant with all scheduled meds with some prompting  2100 dose of Midodrine held per MD BP parameters  Attended and participated in 2/2 evening groups  Had evening snack  Resting quietly in bed at present, arouses easily  Will continue to monitor/assess for any changes

## 2018-11-30 NOTE — PROGRESS NOTES
Pt received @ 0700  Remains blunted & disheveled  Poor eye contact & isolative to room/self  Med/meal compliant, ate 100% of breakfast   Attended goals group then left to Day Break @ 0930  No behavioral issues

## 2018-11-30 NOTE — PROGRESS NOTES
Pt returned from  Day Break @ [phone]  Unable to obtain urine sample   Will continue to monitor

## 2018-11-30 NOTE — PROGRESS NOTES
The patient slept through the night with no behaviors or issues noted  Fall precautions maintained  Med compliant  Midodrine held per hold parameters  Continue to monitor

## 2018-12-01 PROCEDURE — 99231 SBSQ HOSP IP/OBS SF/LOW 25: CPT | Performed by: PSYCHIATRY & NEUROLOGY

## 2018-12-01 RX ADMIN — BUPROPION HYDROCHLORIDE 300 MG: 300 TABLET, FILM COATED, EXTENDED RELEASE ORAL at 08:45

## 2018-12-01 RX ADMIN — LEVOTHYROXINE SODIUM 75 MCG: 75 TABLET ORAL at 06:48

## 2018-12-01 RX ADMIN — ATROPINE SULFATE 1 DROP: 10 SOLUTION/ DROPS OPHTHALMIC at 08:46

## 2018-12-01 RX ADMIN — MIDODRINE HYDROCHLORIDE 10 MG: 2.5 TABLET ORAL at 17:11

## 2018-12-01 RX ADMIN — SENNOSIDES 8.6 MG: 8.6 TABLET, FILM COATED ORAL at 08:45

## 2018-12-01 RX ADMIN — POLYETHYLENE GLYCOL 3350 17 G: 17 POWDER, FOR SOLUTION ORAL at 08:45

## 2018-12-01 RX ADMIN — ATROPINE SULFATE 1 DROP: 10 SOLUTION/ DROPS OPHTHALMIC at 17:11

## 2018-12-01 RX ADMIN — MIDODRINE HYDROCHLORIDE 10 MG: 2.5 TABLET ORAL at 06:49

## 2018-12-01 NOTE — PROGRESS NOTES
Reports for meals and meds without prompt, attended goals group this am  Affect flat/blunted  Cooperative with staff, soft single word answers to staff questions  Pt defers lunch today as stated he will be going out to eat with family on pass  1430-Remains in room in bed most of afternoon, isolative to self

## 2018-12-01 NOTE — PROGRESS NOTES
Patient isolative to room  Med compliant with some prompting  Patient attended wrap up group  Will continue to monitor for changes in current status

## 2018-12-02 PROCEDURE — 80307 DRUG TEST PRSMV CHEM ANLYZR: CPT | Performed by: PSYCHIATRY & NEUROLOGY

## 2018-12-02 RX ADMIN — MIDODRINE HYDROCHLORIDE 10 MG: 2.5 TABLET ORAL at 21:00

## 2018-12-02 RX ADMIN — ATROPINE SULFATE 1 DROP: 10 SOLUTION/ DROPS OPHTHALMIC at 21:00

## 2018-12-02 RX ADMIN — SENNOSIDES 8.6 MG: 8.6 TABLET, FILM COATED ORAL at 21:00

## 2018-12-02 RX ADMIN — DIVALPROEX SODIUM 1000 MG: 500 TABLET, FILM COATED, EXTENDED RELEASE ORAL at 21:00

## 2018-12-02 RX ADMIN — FLUPHENAZINE HYDROCHLORIDE 20 MG: 10 TABLET, FILM COATED ORAL at 21:00

## 2018-12-02 RX ADMIN — CLOZAPINE 450 MG: 25 TABLET ORAL at 21:00

## 2018-12-02 NOTE — PROGRESS NOTES
Pt arrived from pass @ 1655 w/mom  Blunted, minimal eye contact  Stated he had a good pass but did not do much  Mostly played video games  Mom stated he took all his meds except the stool softerners/laxatives  States every time he is on a pass he has several BM's  Obtained urine sample   No behavioral issues, will continue to monitor

## 2018-12-02 NOTE — PROGRESS NOTES
Progress note - Behavioral Health       Assessment/Plan  Principal Problem:  F[age] schizophrenia chronic paranoid  PLAN:   1) Continue current medications  2) Continue group and individual therapy  3) Family therapy when feasible  4) Discharge planning  5) case discussed with staff members  Interval history:  Patient continues to be compliant with his treatment on the unit attending groups regularly taking his medication and does not complain of any problems with his medications  Patient says that he is taking his medications and he feels medications are helping him  He was laying in the bed but was willing to comply with the interview  He remained quite and withdrawn and only answer questions in one or two sentences and mostly yes and no  He denied any hallucinations delusions denied any suicidal homicidal idea  Recently later in the day he is going out to on the family past   Doubt his mood is fine and he feels better now  Staff also reports that patient has good behavior on the unit and is compliant with his treatment  Chief Complaint: "I have depression  "    History of Present Illness: This is a 40-year-old  male who was admitted after he accidentally overdosed on insulin  Patient says that he has history of depression in the past he was on Wellbutrin and Seroquel  He said this was not a suicide attempt but an accidental overdose  However he feels depressed she is having trouble sleeping he feels hopeless he has trouble concentrating he feels like crying however he does not have suicidal ideations  He said that in the past he has had history of suicide attempt has been hospitalized he was seen by a psychiatrist and was supposed to be on medication but he stopped taking it  However is willing to start medications again        PAST PSYCH HISTORY:    Patient has been hospitalized to the  3 times a day inpatient hospital last hospitalization was sent [facility] year and a half ago after that he followed up outpatient his outpatient clinic and sent [facility] and was on Wellbutrin and cervical he said he stopped taking it as he felt better however is willing to take it again  Substance Abuse History:    History of opiate use      Family Psychiatric History:   He reports that his mother has depression    Social History:  Education:  Some community college  Learning Disabilities: None  Marital history:  Single has no kids  Living arrangement, social support:  Patient lives at home with his mother  Occupational History:  Patient reported that he was going to start a new job in [place] but now probably he will have to look for a job  Functioning Relationships:  Good support system  Other Pertinent History: None      Traumatic History:   Abuse: None  Other Traumatic Events: None  Med Hx; Review H& P    Medical Review Of Systems:  All 12 point review of system is normal except for what is mention in medical hisotry  Scheduled Meds:Review meds           Mental Status Evaluation:  Appearance:  Schedule address laying in bed however he was not sleeping willing to talk to this writer  Behavior:   behavior was cooperative    Speech:  Soft monotonous reduced in amount only answered questions in one on to word sentences  Mood:  I feel better  Affect Flat quite and withdrawn  Language: Intact  Thought Process: Thought process seemed logical however slowed  Thought Content:  No paranoia or delusions  Perceptual Disturbances:  denying any auditory and visual hallucinations  Risk Potential: No suicidal homicidal ideas  Sensorium:  person, place, time/date, situation, day of week, month of year and year   Cognition:  Clear cognition     Consciousness:   alert, awake, and oriented ×3    Attention: Good attention span   Intellect: Normal   Fund of Knowledge: awareness of current events: normal    Insight:  Good   Judgment: Good   Muscle Strength and Tone: Normal    Gait/Station: Normal gait  Motor Activity: no abnormal movements     Lab Results: I have personally reviewed pertinent lab results  NOTE:  Total of 15  minutes were spent in talking to patient completing this medical record reviewing medical chart medical decision making    Shahbaz Alejandra MD

## 2018-12-02 NOTE — PROGRESS NOTES
Patient's mother arrived to take patient on pass  Reviewed goals and medications with patient and mother, questions encouraged  No questions at this time  Patient and mother had no questions  Patient left on pass with mother

## 2018-12-03 RX ADMIN — MIDODRINE HYDROCHLORIDE 10 MG: 2.5 TABLET ORAL at 20:49

## 2018-12-03 RX ADMIN — LEVOTHYROXINE SODIUM 75 MCG: 75 TABLET ORAL at 05:28

## 2018-12-03 RX ADMIN — ATROPINE SULFATE 1 DROP: 10 SOLUTION/ DROPS OPHTHALMIC at 08:28

## 2018-12-03 RX ADMIN — DIVALPROEX SODIUM 1000 MG: 500 TABLET, FILM COATED, EXTENDED RELEASE ORAL at 20:49

## 2018-12-03 RX ADMIN — ATROPINE SULFATE 1 DROP: 10 SOLUTION/ DROPS OPHTHALMIC at 20:50

## 2018-12-03 RX ADMIN — FLUPHENAZINE HYDROCHLORIDE 20 MG: 10 TABLET, FILM COATED ORAL at 20:49

## 2018-12-03 RX ADMIN — ATROPINE SULFATE 1 DROP: 10 SOLUTION/ DROPS OPHTHALMIC at 16:56

## 2018-12-03 RX ADMIN — MIDODRINE HYDROCHLORIDE 10 MG: 2.5 TABLET ORAL at 16:55

## 2018-12-03 RX ADMIN — MIDODRINE HYDROCHLORIDE 10 MG: 2.5 TABLET ORAL at 06:08

## 2018-12-03 RX ADMIN — SENNOSIDES 8.6 MG: 8.6 TABLET, FILM COATED ORAL at 08:27

## 2018-12-03 RX ADMIN — POLYETHYLENE GLYCOL 3350 17 G: 17 POWDER, FOR SOLUTION ORAL at 08:27

## 2018-12-03 RX ADMIN — BUPROPION HYDROCHLORIDE 300 MG: 300 TABLET, FILM COATED, EXTENDED RELEASE ORAL at 08:27

## 2018-12-03 NOTE — PROGRESS NOTES
Patient isolative to room  Med compliant with some prompting  Patient attended 2-2 evening group  Will continue to monitor for changes in current status

## 2018-12-03 NOTE — PROGRESS NOTES
Psychiatry Progress Note    Subjective: Interval History     Patient is had an uneventful overnight pass with family home  His behavior mental status are essentially unchanged  Affect is flat he, keeps to himself, little communication with staff and no interaction at all with other residents  He keeps to himself    He is noted to hallucinate from time to time but not excessively and does not interfere with  his day-to-day activities      Current medications:    Current Facility-Administered Medications:     atropine (ISOPTO ATROPINE) 1 % ophthalmic solution 1 drop, 1 drop, Sublingual, TID, Joy Wright MD, 1 drop at 12/02/18 2100    bisacodyl (DULCOLAX) EC tablet 10 mg, 10 mg, Oral, Daily PRN, Provider Cutover, 10 mg at 11/07/18 0620    bisacodyl (DULCOLAX) rectal suppository 10 mg, 10 mg, Rectal, Daily PRN, LIZETH Clayton    buPROPion (WELLBUTRIN XL) 24 hr tablet 300 mg, 300 mg, Oral, Daily, Provider Cutover, 300 mg at 12/01/18 0845    divalproex sodium (DEPAKOTE ER) 24 hr tablet 1,000 mg, 1,000 mg, Oral, HS, Ken Pineda MD, 1,000 mg at 12/02/18 2100    fluPHENAZine (PROLIXIN) tablet 20 mg, 20 mg, Oral, HS, Joy Wright MD, 20 mg at 12/02/18 2100    levothyroxine tablet 75 mcg, 75 mcg, Oral, Early Morning, Provider Cutover, 75 mcg at 12/03/18 0528    midodrine (PROAMATINE) tablet 10 mg, 10 mg, Oral, TID, Wally Lynn MD, 10 mg at 12/03/18 0608    polyethylene glycol (MIRALAX) packet 17 g, 17 g, Oral, Daily, Provider Cutover, 17 g at 12/01/18 0845    senna (SENOKOT) tablet 8 6 mg, 1 tablet, Oral, BID, DoneldLIZETH Jimenez, 8 6 mg at 12/02/18 2100    Current Problem List:    Patient Active Problem List   Diagnosis    Chronic paranoid schizophrenia ([facility] )    Encounter for medical assessment       Problem list reviewed 12/03/18     Objective:     Vital Signs:  Vitals:    12/01/18 1500 12/01/18 1503 12/02/18 1900 12/03/18 0545   BP: 108/75 112/79 90/66 (!) 88/56   BP Location: Left arm Left arm Left arm Right arm   Pulse: 99 (!) 106 (!) 124 102   Resp:       Temp:       TempSrc:       SpO2:       Weight:       Height:             Appearance:  age appropriate and casually dressed   Behavior:  normal   Speech:  soft   Mood:  depressed   Affect:  constricted   Thought Process:  blocked   Thought Content:  normal   Perceptual Disturbances: Auditory hallucinations without commands   Risk Potential: none   Sensorium:  person, place, situation and time   Cognition:  intact   Consciousness:  alert and awake    Attention: attention span and concentration were age appropriate   Intellect: average   Insight:  limited   Judgment: limited      Motor Activity: no abnormal movements       Behavior over the last 24 hours:  unchanged  Sleep: hypersomnia  Appetite: normal  Medication side effects: No  ROS: no complaints      I/O Past 24 hours:  I/O last 3 completed shifts:  In: -   Out: 1 [Stool:1]  No intake/output data recorded  Labs:  Reviewed 12/03/18    Assessment / Plan:     [facility]    Recommended Treatment:      Medication changes:  1) no changes at this time    Non-pharmacological treatments  1) Continue with group therapy, milieu therapy and occupational therapy  Safety  1) Safety/communication plan established targeting dynamic risk factors above  2) Risks, benefits, and possible side effects of medications explained to patient and patient verbalizes understanding  Counseling / Coordination of Care    Total floor / unit time spent today 20 minutes  Greater than 50% of total time was spent with the patient and / or family counseling and / or coordination of care  A description of the counseling / coordination of care  Patient's Rights, confidentiality and exceptions to confidentiality, use of automated medical record, [facility] staff access to medical record, and consent to treatment reviewed      Lizbeth Benoit MD

## 2018-12-04 PROCEDURE — 80307 DRUG TEST PRSMV CHEM ANLYZR: CPT | Performed by: PSYCHIATRY & NEUROLOGY

## 2018-12-04 RX ORDER — POLYETHYLENE GLYCOL 3350 17 G/17G
17 POWDER, FOR SOLUTION ORAL DAILY PRN
Status: DISCONTINUED | OUTPATIENT
Start: 2018-12-04 | End: 2018-12-20 | Stop reason: HOSPADM

## 2018-12-04 RX ADMIN — DIVALPROEX SODIUM 1000 MG: 500 TABLET, FILM COATED, EXTENDED RELEASE ORAL at 20:48

## 2018-12-04 RX ADMIN — SENNOSIDES 8.6 MG: 8.6 TABLET, FILM COATED ORAL at 20:49

## 2018-12-04 RX ADMIN — MIDODRINE HYDROCHLORIDE 10 MG: 2.5 TABLET ORAL at 06:38

## 2018-12-04 RX ADMIN — ATROPINE SULFATE 1 DROP: 10 SOLUTION/ DROPS OPHTHALMIC at 08:31

## 2018-12-04 RX ADMIN — ATROPINE SULFATE 1 DROP: 10 SOLUTION/ DROPS OPHTHALMIC at 20:49

## 2018-12-04 RX ADMIN — SENNOSIDES 8.6 MG: 8.6 TABLET, FILM COATED ORAL at 08:31

## 2018-12-04 RX ADMIN — LEVOTHYROXINE SODIUM 75 MCG: 75 TABLET ORAL at 06:38

## 2018-12-04 RX ADMIN — CLOZAPINE 450 MG: 25 TABLET ORAL at 21:02

## 2018-12-04 RX ADMIN — ATROPINE SULFATE 1 DROP: 10 SOLUTION/ DROPS OPHTHALMIC at 16:42

## 2018-12-04 RX ADMIN — FLUPHENAZINE HYDROCHLORIDE 20 MG: 10 TABLET, FILM COATED ORAL at 20:48

## 2018-12-04 RX ADMIN — MIDODRINE HYDROCHLORIDE 10 MG: 2.5 TABLET ORAL at 20:48

## 2018-12-04 RX ADMIN — BUPROPION HYDROCHLORIDE 300 MG: 300 TABLET, FILM COATED, EXTENDED RELEASE ORAL at 08:31

## 2018-12-04 NOTE — PROGRESS NOTES
Returned from [facility] at [phone]  Body assessment was done by [facility] and UDS was sent to lab

## 2018-12-04 NOTE — PROGRESS NOTES
Neida Fam has been isolative to his room all day  He refused to eat breakfast   Attended no groups  Remains flat and blunted  He states he is tried from his pass  Continue to monitor

## 2018-12-04 NOTE — PROGRESS NOTES
Alberto Maguireyee remains blunted and with a flat affect   He was up and about on the unit this morning  Alberto Petit left with staff at 0930 to attend daybreak for the day  Awaiting her return

## 2018-12-04 NOTE — PROGRESS NOTES
Bib Enriquez has been awake, alert, and visible intermittently out in the milieu  Affect remains flat, blunted, and offers little conversation with staff  Pt isolative to self in room  No interaction noted with peers  Preoccupied and noted smiling to self as is responding to internal stimuli  Pt continues to deny any depression, anxiety, or a/v hallucinations  Compliant with all scheduled meds with some prompting  Attended and participated in 2/2 evening groups and then had snack  Resting quietly in bed at present, arouses easily  Will continue to monitor/assess for any changes

## 2018-12-05 RX ADMIN — MIDODRINE HYDROCHLORIDE 10 MG: 2.5 TABLET ORAL at 21:03

## 2018-12-05 RX ADMIN — ATROPINE SULFATE 1 DROP: 10 SOLUTION/ DROPS OPHTHALMIC at 08:42

## 2018-12-05 RX ADMIN — DIVALPROEX SODIUM 1000 MG: 500 TABLET, FILM COATED, EXTENDED RELEASE ORAL at 21:02

## 2018-12-05 RX ADMIN — ATROPINE SULFATE 1 DROP: 10 SOLUTION/ DROPS OPHTHALMIC at 16:57

## 2018-12-05 RX ADMIN — FLUPHENAZINE HYDROCHLORIDE 20 MG: 10 TABLET, FILM COATED ORAL at 21:02

## 2018-12-05 RX ADMIN — MIDODRINE HYDROCHLORIDE 10 MG: 2.5 TABLET ORAL at 06:43

## 2018-12-05 RX ADMIN — LEVOTHYROXINE SODIUM 75 MCG: 75 TABLET ORAL at 06:43

## 2018-12-05 RX ADMIN — MIDODRINE HYDROCHLORIDE 10 MG: 2.5 TABLET ORAL at 16:57

## 2018-12-05 RX ADMIN — CLOZAPINE 450 MG: 25 TABLET ORAL at 21:02

## 2018-12-05 RX ADMIN — SENNOSIDES 8.6 MG: 8.6 TABLET, FILM COATED ORAL at 08:41

## 2018-12-05 RX ADMIN — ATROPINE SULFATE 1 DROP: 10 SOLUTION/ DROPS OPHTHALMIC at 21:02

## 2018-12-05 RX ADMIN — BUPROPION HYDROCHLORIDE 300 MG: 300 TABLET, FILM COATED, EXTENDED RELEASE ORAL at 08:41

## 2018-12-05 NOTE — PROGRESS NOTES
Psychiatry Progress Note    Subjective: Interval History     Patient's behavior and mental status are unchanged  He is distant, withdrawn, has brief conversations but can express his own needs to the staff when needed  He is compliant with meals meds and does go to group without much prompting  Affect remains totally flat  He continues to have hallucinations from time to time      Patient will have a CSP today and planning for discharge      Current medications:    Current Facility-Administered Medications:     atropine (ISOPTO ATROPINE) 1 % ophthalmic solution 1 drop, 1 drop, Sublingual, TID, Ld Warren MD, 1 drop at 12/05/18 2104    bisacodyl (DULCOLAX) EC tablet 10 mg, 10 mg, Oral, Daily PRN, Provider Cutover, 10 mg at 11/07/18 0620    bisacodyl (DULCOLAX) rectal suppository 10 mg, 10 mg, Rectal, Daily PRN, LIZETH Islas    buPROPion (WELLBUTRIN XL) 24 hr tablet 300 mg, 300 mg, Oral, Daily, Provider Cutover, 300 mg at 12/05/18 0841    cloZAPine (CLOZARIL) tablet 450 mg, 450 mg, Oral, HS, Lavelle Garcia PA-C, 450 mg at 12/04/18 2102    divalproex sodium (DEPAKOTE ER) 24 hr tablet 1,000 mg, 1,000 mg, Oral, HS, Bakari Velasquez MD, 1,000 mg at 12/04/18 2048    fluPHENAZine (PROLIXIN) tablet 20 mg, 20 mg, Oral, HS, Ld Warren MD, 20 mg at 12/04/18 2048    levothyroxine tablet 75 mcg, 75 mcg, Oral, Early Morning, Provider Cutover, 75 mcg at 12/05/18 3137    midodrine (PROAMATINE) tablet 10 mg, 10 mg, Oral, TID, Wally Lynn MD, 10 mg at 12/05/18 2168    polyethylene glycol (MIRALAX) packet 17 g, 17 g, Oral, Daily PRN, Ld Warren MD    senna (SENOKOT) tablet 8 6 mg, 1 tablet, Oral, BID, LIZETH Meyer, 8 6 mg at 12/05/18 0900    Current Problem List:    Patient Active Problem List   Diagnosis    Chronic paranoid schizophrenia [facility])    Encounter for medical assessment       Problem list reviewed 12/05/18     Objective:     Vital Signs:  Vitals:    12/04/18 1543 12/04/18 1930 12/05/18 0730 12/05/18 0735   BP: 121/77 104/77 97/71 100/70   BP Location: Left arm Left arm Left arm Left arm   Pulse: (!) 113 (!) 140 101 98   Resp:   20 20   Temp:   (!) 97 3 °F (36 3 °C)    TempSrc:   Temporal    SpO2:       Weight:       Height:             Appearance:  age appropriate and casually dressed   Behavior:  normal   Speech:  soft   Mood:  depressed   Affect:  constricted   Thought Process:  normal   Thought Content:  normal   Perceptual Disturbances: Auditory hallucinations without commands   Risk Potential: none   Sensorium:  person, place, situation and time   Cognition:  intact   Consciousness:  alert and awake    Attention: attention span and concentration were age appropriate   Intellect: average   Insight:  limited   Judgment: limited      Motor Activity: no abnormal movements       Behavior over the last 24 hours:  unchanged  Sleep: hypersomnia  Appetite: normal  Medication side effects: No  ROS: no complaints      I/O Past 24 hours:  No intake/output data recorded  No intake/output data recorded  Labs:  Reviewed 12/05/18    Assessment / Plan:     [facility]    Recommended Treatment:      Medication changes:  1) no med med changes, discharge planning in progress    Non-pharmacological treatments  1) Continue with group therapy, milieu therapy and occupational therapy  Safety  1) Safety/communication plan established targeting dynamic risk factors above  2) Risks, benefits, and possible side effects of medications explained to patient and patient verbalizes understanding  Counseling / Coordination of Care    Total floor / unit time spent today 20 minutes  Greater than 50% of total time was spent with the patient and / or family counseling and / or coordination of care  A description of the counseling / coordination of care       Patient's Rights, confidentiality and exceptions to confidentiality, use of automated medical record, [employer] staff access to medical record, and consent to treatment reviewed      Yomaira Ocampo MD

## 2018-12-05 NOTE — PROGRESS NOTES
Amol Love has been isolative to his room and self most of the shift  No interaction with peers and very little with staff  Poor eye contact  Blunted, flat affect  Denies anxiety, depression and voices  Prompted for medication  Took all pills without difficulty  Ate 100% of his meal  No shower or BM tonight  UDS was negative after return from [facility]  Attended and participated in evening groups  Took HS medication and ate snack before going to bed  Patient safety and fall precautions maintained without incident

## 2018-12-05 NOTE — PLAN OF CARE
Problem: DISCHARGE PLANNING - CARE MANAGEMENT  Goal: Discharge to post-acute care or home with appropriate resources  INTERVENTIONS:  - Conduct assessment to determine patient/family and health care team treatment goals, and need for post-acute services based on payer coverage, community resources, and patient preferences, and barriers to discharge  - Address psychosocial, clinical, and financial barriers to discharge as identified in assessment in conjunction with the patient/family and health care team  - Arrange appropriate level of post-acute services according to patients   needs and preference and payer coverage in collaboration with the physician and health care team  - Communicate with and update the patient/family, physician, and health care team regarding progress on the discharge plan  - Arrange appropriate transportation to post-acute venues   Outcome: Progressing    Psychotherapy note     Recovery Areas Addressed:  Health and Wellness; Emotional and Behavioral; Social and Community   Recovery Obstacles Addressed: Medication noncompliance, Poor hygiene; Motivation, Depression, Recovery; Community integration    Therapist and patient met for individual session to complete a housing agreement and weekly calendar  These items will be used as guides for patient and [facility] to help patient structure his days and remain responsible  Patient understands that he is being discharged to his mother's, even though the initial recommendation was for a setting that would support him in completing his ADLs  Patient participated in completing a weekly calendar to keep him on track  He recognized medications and sleep as two areas that he needs to stay vigilant with, to best prevent the return of symptoms  Patient was also willing to initial and sign a "Housing Agreement" that outlines the terms suggested by his mother  Patient is in agreement to share this information with [facility] and the [facility] during his CSP  Therapist will continue to support patient in maintaining recovery until he is discharged

## 2018-12-05 NOTE — PROGRESS NOTES
Nayanajessica Larsenannwilla remains blunted and with a flat affect  He is isolative to himself and his room between structured activities  Attended no groups this shift but he did attend his CSP meeting this morning  He did go to [facility] with staff this afternoon  Continue to monitor

## 2018-12-06 ENCOUNTER — TELEPHONE (OUTPATIENT)
Dept: FAMILY MEDICINE CLINIC | Facility: CLINIC | Age: 33
End: 2018-12-06

## 2018-12-06 RX ADMIN — BUPROPION HYDROCHLORIDE 300 MG: 300 TABLET, FILM COATED, EXTENDED RELEASE ORAL at 08:40

## 2018-12-06 RX ADMIN — ATROPINE SULFATE 1 DROP: 10 SOLUTION/ DROPS OPHTHALMIC at 21:01

## 2018-12-06 RX ADMIN — ATROPINE SULFATE 1 DROP: 10 SOLUTION/ DROPS OPHTHALMIC at 08:40

## 2018-12-06 RX ADMIN — ATROPINE SULFATE 1 DROP: 10 SOLUTION/ DROPS OPHTHALMIC at 16:45

## 2018-12-06 RX ADMIN — MIDODRINE HYDROCHLORIDE 10 MG: 2.5 TABLET ORAL at 21:01

## 2018-12-06 RX ADMIN — DIVALPROEX SODIUM 1000 MG: 500 TABLET, FILM COATED, EXTENDED RELEASE ORAL at 21:00

## 2018-12-06 RX ADMIN — SENNOSIDES 8.6 MG: 8.6 TABLET, FILM COATED ORAL at 08:40

## 2018-12-06 RX ADMIN — MIDODRINE HYDROCHLORIDE 10 MG: 2.5 TABLET ORAL at 16:45

## 2018-12-06 RX ADMIN — MIDODRINE HYDROCHLORIDE 10 MG: 2.5 TABLET ORAL at 06:53

## 2018-12-06 RX ADMIN — FLUPHENAZINE HYDROCHLORIDE 20 MG: 10 TABLET, FILM COATED ORAL at 21:00

## 2018-12-06 RX ADMIN — LEVOTHYROXINE SODIUM 75 MCG: 75 TABLET ORAL at 06:53

## 2018-12-06 RX ADMIN — SENNOSIDES 8.6 MG: 8.6 TABLET, FILM COATED ORAL at 21:01

## 2018-12-06 RX ADMIN — CLOZAPINE 450 MG: 25 TABLET ORAL at 21:00

## 2018-12-06 NOTE — ESCALATED TEAM TX
Patient attended treatment team meeting this morning prepared with self-assessment  Patient's group attendance for last week was 82%  Patient was granted an overnight pass from Saturday 12/8- Sunday 12/9, 5p-5p  Patient has added "listen to music" to his list of coping skills  He did well during his CSP yesterday and is pending discharge for 12/20  Patient verbalized no further questions or concerns at the conclusion of the meeting  Next team meeting scheduled for 12/13

## 2018-12-06 NOTE — CASE MANAGEMENT
Patient had his [facility]  Patient's mother did attend via phone conference line  Patient did well in this meeting  It was determined that patient will have two more overnight visits with [facility] checking in on him (8th-9th and 17th-18th with [facility] bringing him back to the unit on the 18th)  Pending both visits go well, patient will be discharged home on 12/20/18 with [facility] picking him up at 11 AM and taking him over to the office to be seen by Dr Lucía Gasca in the office prior to going home  CM will continue to monitor patient's progress and continue to assist with discharge planning needs

## 2018-12-06 NOTE — TELEPHONE ENCOUNTER
----- Message from Patience Frey DO sent at 12/4/2018  1:59 PM EST -----  Could you ask Vaughn's mother If he is following with us? It has been some time  He does have underlying psychiatric disorder and I understand he follows with psych as well   Thanks

## 2018-12-06 NOTE — NURSING NOTE
Patient is isolative and withdrawn  Disheveled and blunted  No attention to hygiene  Patient attended treatment team, IMR and spiritually group  Med and meal compliant  Was not observed out of room after lunch    Will continue to monitor progress in recovery program

## 2018-12-06 NOTE — PROGRESS NOTES
Psychiatry Progress Note    Subjective: Interval History     Patient had a CSP meeting yesterday which went well and patient was fully cooperative although not very conversational as expected  His behavior and mental status are the same today  He is withdrawn affect flat difficulty engaging in conversation but cooperative with his meds meals and group attendance        Current medications:    Current Facility-Administered Medications:     atropine (ISOPTO ATROPINE) 1 % ophthalmic solution 1 drop, 1 drop, Sublingual, TID, Graeme Pepper MD, 1 drop at 12/06/18 0840    bisacodyl (DULCOLAX) EC tablet 10 mg, 10 mg, Oral, Daily PRN, Provider Cutover, 10 mg at 11/07/18 0620    bisacodyl (DULCOLAX) rectal suppository 10 mg, 10 mg, Rectal, Daily PRN, LIZETH Chappell    buPROPion (WELLBUTRIN XL) 24 hr tablet 300 mg, 300 mg, Oral, Daily, Provider Cutover, 300 mg at 12/06/18 0840    cloZAPine (CLOZARIL) tablet 450 mg, 450 mg, Oral, HS, Eila Woods PA-C, 450 mg at 12/05/18 2102    divalproex sodium (DEPAKOTE ER) 24 hr tablet 1,000 mg, 1,000 mg, Oral, HS, Bladimir Hernandez MD, 1,000 mg at 12/05/18 2102    fluPHENAZine (PROLIXIN) tablet 20 mg, 20 mg, Oral, HS, Graeme Pepper MD, 20 mg at 12/05/18 2102    levothyroxine tablet 75 mcg, 75 mcg, Oral, Early Morning, Provider Cutover, 75 mcg at 12/06/18 0653    midodrine (PROAMATINE) tablet 10 mg, 10 mg, Oral, TID, Wally Lynn MD, 10 mg at 12/06/18 3478    polyethylene glycol (MIRALAX) packet 17 g, 17 g, Oral, Daily PRN, Graeme Pepper MD    senna (SENOKOT) tablet 8 6 mg, 1 tablet, Oral, BID, LIZETH Meyer, 8 6 mg at 12/06/18 0840    Current Problem List:    Patient Active Problem List   Diagnosis    [facility]    Encounter for medical assessment       Problem list reviewed 12/06/18     Objective:     Vital Signs:  Vitals:    12/05/18 1545 12/05/18 1555 12/05/18 1900 12/06/18 0730   BP: 124/79 99/73 107/81 124/67   BP Location: Left arm Left arm Left arm Right arm   Pulse: (!) 120 (!) 131 (!) 133 99   Resp:    20   Temp:    (!) 97 1 °F (36 2 °C)   TempSrc:       SpO2:       Weight:       Height:             Appearance:  age appropriate and casually dressed   Behavior:  guarded   Speech:  soft   Mood:  depressed   Affect:  constricted   Thought Process:  normal   Thought Content:  normal   Perceptual Disturbances: Auditory hallucinations without commands not   Risk Potential: none   Sensorium:  person, place, situation and time   Cognition:  intact   Consciousness:  alert and awake    Attention: attention span and concentration were not age appropriate   Intellect: average   Insight:  limited   Judgment: limited      Motor Activity: no abnormal movements       Behavior over the last 24 hours:  unchanged  Sleep: hypersomnia  Appetite: normal  Medication side effects: No  ROS: no complaints      I/O Past 24 hours:  I/O last 3 completed shifts:  In: -   Out: 1 [Stool:1]  No intake/output data recorded  Labs:  Reviewed 12/06/18    Assessment / Plan:     [facility]    Recommended Treatment:      Medication changes:  1) no change    Non-pharmacological treatments  1) Continue with group therapy, milieu therapy and occupational therapy  Safety  1) Safety/communication plan established targeting dynamic risk factors above  2) Risks, benefits, and possible side effects of medications explained to patient and patient verbalizes understanding  No change  Counseling / Coordination of Care    Total floor / unit time spent today 20 minutes  Greater than 50% of total time was spent with the patient and / or family counseling and / or coordination of care  A description of the counseling / coordination of care  Patient's Rights, confidentiality and exceptions to confidentiality, use of automated medical record, [facility] staff access to medical record, and consent to treatment reviewed      Brian Zapata MD

## 2018-12-06 NOTE — TELEPHONE ENCOUNTER
Called and spoke to pt mother crescencio, pt was admitted in a facility to get mental illness help and will be discharged to the home with his mother on 12/20  Pt mother is going to call me back and schedule a physical for patient

## 2018-12-06 NOTE — PROGRESS NOTES
Veronika Late has been awake, alert, and visible intermittently out in the milieu  Behavior remains quiet, isolative to self  No interaction noted with peers  Affect continues to be flat, blunted, and offers minimal conversation with staff  Ate 100% supper  Attended and participated in 2/2 evening groups  Compliant with scheduled meds with little prompting  Pt refused [address] and stated that he did not need it but denied having loose stools  Had evening snack  Resting quietly in bed at present, arouses easily  Will continue to monitor/assess for any changes

## 2018-12-07 PROBLEM — Z72.0 TOBACCO ABUSE: Status: ACTIVE | Noted: 2018-12-07

## 2018-12-07 PROBLEM — R00.0 CHRONIC TACHYCARDIA: Status: ACTIVE | Noted: 2018-12-07

## 2018-12-07 PROCEDURE — 99221 1ST HOSP IP/OBS SF/LOW 40: CPT | Performed by: NURSE PRACTITIONER

## 2018-12-07 RX ADMIN — SENNOSIDES 8.6 MG: 8.6 TABLET, FILM COATED ORAL at 20:49

## 2018-12-07 RX ADMIN — BUPROPION HYDROCHLORIDE 300 MG: 300 TABLET, FILM COATED, EXTENDED RELEASE ORAL at 08:24

## 2018-12-07 RX ADMIN — MIDODRINE HYDROCHLORIDE 10 MG: 2.5 TABLET ORAL at 20:48

## 2018-12-07 RX ADMIN — ATROPINE SULFATE 1 DROP: 10 SOLUTION/ DROPS OPHTHALMIC at 16:56

## 2018-12-07 RX ADMIN — CLOZAPINE 450 MG: 25 TABLET ORAL at 21:17

## 2018-12-07 RX ADMIN — ATROPINE SULFATE 1 DROP: 10 SOLUTION/ DROPS OPHTHALMIC at 08:24

## 2018-12-07 RX ADMIN — SENNOSIDES 8.6 MG: 8.6 TABLET, FILM COATED ORAL at 08:24

## 2018-12-07 RX ADMIN — MIDODRINE HYDROCHLORIDE 10 MG: 2.5 TABLET ORAL at 16:55

## 2018-12-07 RX ADMIN — ATROPINE SULFATE 1 DROP: 10 SOLUTION/ DROPS OPHTHALMIC at 20:48

## 2018-12-07 RX ADMIN — DIVALPROEX SODIUM 1000 MG: 500 TABLET, FILM COATED, EXTENDED RELEASE ORAL at 20:49

## 2018-12-07 RX ADMIN — LEVOTHYROXINE SODIUM 75 MCG: 75 TABLET ORAL at 06:44

## 2018-12-07 RX ADMIN — MIDODRINE HYDROCHLORIDE 10 MG: 2.5 TABLET ORAL at 06:45

## 2018-12-07 RX ADMIN — FLUPHENAZINE HYDROCHLORIDE 20 MG: 10 TABLET, FILM COATED ORAL at 20:49

## 2018-12-07 NOTE — PROGRESS NOTES
Pt attended IMR but struggled to focus, stay awake and participate  Pt was somewhat brighter towards end of group but still seemed groggy and disheveled

## 2018-12-07 NOTE — NURSING NOTE
Patient is blunted, isolative to self and visible intermittently  Patient remains in room in bed with head under covers except when attending scheduled activities  Patient was med and meal compliant with prompting  He attended 1:2 groups  He has no s/s c/o pain, discomfort or distress  No reports of or signs of responding to internal stimuli  No attention to hygiene this shift    Will continue to monitor progress in recovery program

## 2018-12-07 NOTE — PROGRESS NOTES
Jn Hale has been awake, alert, and visible intermittently out in the milieu  Isolative to self  No interaction noted with peers  Ate 100% supper  Affect remains flat, blunted, and offers minimal conversation with staff  Compliant with all scheduled meds without prompting  Mother in to visit with fair interaction noted and brought food for pt which pt ate  Attended and participated in 2/2 evening groups  Had evening snack  Resting quietly in bed at present, arouses easily  Will continue to monitor/assess for any changes

## 2018-12-07 NOTE — H&P
sherman Note - Elvin Dance 1985, 26 y o  male MRN: [id number]    Unit/Bed#: [facility] [address] Encounter: [id number]    Primary Care Provider: Sheng Flores DO   Date and time admitted to hospital: 9/6/2017 12:15 PM    Updated H&P    Encounter for medical assessment   Assessment & Plan    Patient is medically stable with no significant past medical history  He currently denies medical complaints at this time  Chronic tachycardia   Assessment & Plan    EKG on 10/31/18 showed sinus tachycardia  Pt is on clozaril 450mg at HS  HR has been stable - typically 1-teens to 120's  Likely will not improve unless clozaril is discontinued, however, this is psychologically necessary at this time  He denies CP, SOB, and palpitations  Tobacco abuse   Assessment & Plan    Counseled on cessation  * Chronic paranoid schizophrenia [facility])   Assessment & Plan    Management per psych  VTE Prophylaxis: Pt is ambulatory  / reason for no mechanical VTE prophylaxis on psychiatric unit  Code Status: Level 1 full code  POLST: POLST form is not discussed and not completed at this time  Discussion with family: Not present at this time  Total Time for Visit, including Counseling / Coordination of Care: 30 minutes  Greater than 50% of this total time spent on direct patient counseling and coordination of care  Chief Complaint:   Chronic paranoid schizophrenia    History of Present Illness:    Elvin Dance is a 26 y o  male who presented with chronic paranoid schizophrenia with hallucinations  We were consulted for medical management  See assessment and plan as above  Review of Systems:    Review of Systems   Constitutional: Negative for chills and fever  HENT: Negative for congestion, rhinorrhea, sinus pain, sinus pressure, sneezing and sore throat  Respiratory: Negative  Cardiovascular: Negative      Gastrointestinal: Negative for abdominal distention, abdominal pain, constipation, diarrhea, nausea and vomiting  Genitourinary: Negative for difficulty urinating and dysuria  Musculoskeletal: Negative for arthralgias, back pain and myalgias  Neurological: Negative for dizziness and light-headedness  Psychiatric/Behavioral: Negative for agitation and confusion  Past Medical and Surgical History:     No past medical history on file  No past surgical history on file  Meds/Allergies:    Prior to Admission medications    Medication Sig Start Date End Date Taking? Authorizing Provider   acetaminophen (TYLENOL) 100 mg/mL solution Take 10 mg/kg by mouth every 4 (four) hours as needed for fever   Yes Historical Provider, MD   acetaminophen (TYLENOL) 325 mg tablet Take 650 mg by mouth every 4 (four) hours as needed for mild pain   Yes Historical Provider, MD   clozapine (CLOZARIL) 200 MG tablet Take 600 mg by mouth daily at bedtime   Yes Historical Provider, MD   divalproex sodium (DEPAKOTE) 500 mg EC tablet Take 250 mg by mouth every 8 (eight) hours   Yes Historical Provider, MD   divalproex sodium (DEPAKOTE) 500 mg EC tablet Take 1,250 mg by mouth daily at bedtime   Yes Historical Provider, MD   lithium carbonate (LITHOBID) 450 mg CR tablet Take 450 mg by mouth daily at bedtime   Yes Historical Provider, MD   PARoxetine (PAXIL) 20 mg tablet Take 20 mg by mouth daily   Yes Historical Provider, MD     I have reviewed home medications using allscripts      Allergies: No Known Allergies    Social History:     Marital Status: Single   Occupation: unemployed  Patient Pre-hospital Living Situation: lived alone  Patient Pre-hospital Level of Mobility: independent  Patient Pre-hospital Diet Restrictions: none  Substance Use History:   History   Alcohol Use No     History   Smoking Status    Former Smoker    Types: Cigarettes   Smokeless Tobacco    Never Used     History   Drug Use No       Family History:    non-contributory    Physical Exam:     Vitals:   Blood Pressure: 115/77 (12/07/18 1548)  Pulse: (!) 127 (12/07/18 1548)  Temperature: (!) 97 1 °F (36 2 °C) (12/06/18 0730)  Temp Source: Temporal (12/05/18 0730)  Respirations: 20 (12/06/18 0730)  Height: 6' 1" (185 4 cm) (06/23/18 0807)  Weight - Scale: 88 9 kg (196 lb) (11/18/18 0730)  SpO2: 97 % (06/29/18 4451)    Physical Exam   Constitutional: He is oriented to person, place, and time  He appears well-developed and well-nourished  No distress  HENT:   Head: Normocephalic and atraumatic  Eyes: Right eye exhibits no discharge  Left eye exhibits no discharge  Neck: No JVD present  Cardiovascular: Normal rate, regular rhythm, normal heart sounds and intact distal pulses  Exam reveals no gallop and no friction rub  No murmur heard  Pulmonary/Chest: Effort normal and breath sounds normal  No stridor  No respiratory distress  He has no wheezes  He has no rales  Abdominal: Soft  Bowel sounds are normal  He exhibits no distension  There is no tenderness  There is no rebound and no guarding  Musculoskeletal: He exhibits no edema  Neurological: He is alert and oriented to person, place, and time  Skin: Skin is warm and dry  He is not diaphoretic  Psychiatric:   Flat affect         Additional Data:     Lab Results: Most recent labs reviewed             Imaging: I have personally reviewed pertinent reports  No orders to display       EKG, Pathology, and Other Studies Reviewed on Admission:   · EKG: sinus tachycardia    Allscripts / Epic Records Reviewed:  Yes

## 2018-12-07 NOTE — PROGRESS NOTES
Progress Note - Kylah Chan 1985, 26 y o  male MRN: [id number]    Unit/Bed#: [facility] 971-27 Encounter: [id number]    Primary Care Provider: Jaqui Gunderson DO   Date and time admitted to hospital: 9/6/2017 12:15 PM    Updated H&P    Encounter for medical assessment   Assessment & Plan    Patient is medically stable with no significant past medical history  He currently denies medical complaints at this time  Chronic tachycardia   Assessment & Plan    EKG on 10/31/18 showed sinus tachycardia  Pt is on clozaril 450mg at HS  HR has been stable - typically 1-teens to 120's  Likely will not improve unless clozaril is discontinued, however, this is psychologically necessary at this time  He denies CP, SOB, and palpitations  Tobacco abuse   Assessment & Plan    Counseled on cessation  * Chronic paranoid schizophrenia [facility])   Assessment & Plan    Management per psych  VTE Prophylaxis: Pt is ambulatory  / reason for no mechanical VTE prophylaxis on psychiatric unit  Code Status: Level 1 full code  POLST: POLST form is not discussed and not completed at this time  Discussion with family: Not present at this time  Total Time for Visit, including Counseling / Coordination of Care: 30 minutes  Greater than 50% of this total time spent on direct patient counseling and coordination of care  Chief Complaint:   Chronic paranoid schizophrenia    History of Present Illness:    Kylah Chan is a 26 y o  male who presented with chronic paranoid schizophrenia with hallucinations  We were consulted for medical management  See assessment and plan as above  Review of Systems:    Review of Systems   Constitutional: Negative for chills and fever  HENT: Negative for congestion, rhinorrhea, sinus pain, sinus pressure, sneezing and sore throat  Respiratory: Negative  Cardiovascular: Negative      Gastrointestinal: Negative for abdominal distention, abdominal pain, constipation, diarrhea, nausea and vomiting  Genitourinary: Negative for difficulty urinating and dysuria  Musculoskeletal: Negative for arthralgias, back pain and myalgias  Neurological: Negative for dizziness and light-headedness  Psychiatric/Behavioral: Negative for agitation and confusion  Past Medical and Surgical History:     No past medical history on file  No past surgical history on file  Meds/Allergies:    Prior to Admission medications    Medication Sig Start Date End Date Taking? Authorizing Provider   acetaminophen (TYLENOL) 100 mg/mL solution Take 10 mg/kg by mouth every 4 (four) hours as needed for fever   Yes Historical Provider, MD   acetaminophen (TYLENOL) 325 mg tablet Take 650 mg by mouth every 4 (four) hours as needed for mild pain   Yes Historical Provider, MD   clozapine (CLOZARIL) 200 MG tablet Take 600 mg by mouth daily at bedtime   Yes Historical Provider, MD   divalproex sodium (DEPAKOTE) 500 mg EC tablet Take 250 mg by mouth every 8 (eight) hours   Yes Historical Provider, MD   divalproex sodium (DEPAKOTE) 500 mg EC tablet Take 1,250 mg by mouth daily at bedtime   Yes Historical Provider, MD   lithium carbonate (LITHOBID) 450 mg CR tablet Take 450 mg by mouth daily at bedtime   Yes Historical Provider, MD   PARoxetine (PAXIL) 20 mg tablet Take 20 mg by mouth daily   Yes Historical Provider, MD     I have reviewed home medications using allscripts      Allergies: No Known Allergies    Social History:     Marital Status: Single   Occupation: unemployed  Patient Pre-hospital Living Situation: lived alone  Patient Pre-hospital Level of Mobility: independent  Patient Pre-hospital Diet Restrictions: none  Substance Use History:   History   Alcohol Use No     History   Smoking Status    Former Smoker    Types: Cigarettes   Smokeless Tobacco    Never Used     History   Drug Use No       Family History:    non-contributory    Physical Exam:     Vitals:   Blood Pressure: 115/77 (12/07/18 1548)  Pulse: (!) 127 (12/07/18 1548)  Temperature: (!) 97 1 °F (36 2 °C) (12/06/18 0730)  Temp Source: Temporal (12/05/18 0730)  Respirations: 20 (12/06/18 0730)  Height: 6' 1" (185 4 cm) (06/23/18 0807)  Weight - Scale: 88 9 kg (196 lb) (11/18/18 0730)  SpO2: 97 % (06/29/18 5117)    Physical Exam   Constitutional: He is oriented to person, place, and time  He appears well-developed and well-nourished  No distress  HENT:   Head: Normocephalic and atraumatic  Eyes: Right eye exhibits no discharge  Left eye exhibits no discharge  Neck: No JVD present  Cardiovascular: Normal rate, regular rhythm, normal heart sounds and intact distal pulses  Exam reveals no gallop and no friction rub  No murmur heard  Pulmonary/Chest: Effort normal and breath sounds normal  No stridor  No respiratory distress  He has no wheezes  He has no rales  Abdominal: Soft  Bowel sounds are normal  He exhibits no distension  There is no tenderness  There is no rebound and no guarding  Musculoskeletal: He exhibits no edema  Neurological: He is alert and oriented to person, place, and time  Skin: Skin is warm and dry  He is not diaphoretic  Psychiatric:   Flat affect         Additional Data:     Lab Results: Most recent labs reviewed                              Imaging: I have personally reviewed pertinent reports  No orders to display       EKG, Pathology, and Other Studies Reviewed on Admission:   · EKG: sinus tachycardia    Allscripts / Epic Records Reviewed:  Yes

## 2018-12-07 NOTE — PROGRESS NOTES
Psychiatry Progress Note    Subjective: Interval History     Patient will be going out on the to more over nights in preparation for an eventual discharged to mother's home he he seems somewhat quieter the last several days and was described is looking disheveled at times    withdrawn and not very conversational; intermittent hallucinations remain present there are no new issues    Behavior over the last 24 hours:  unchanged  Sleep: hypersomnia  Appetite: normal  Medication side effects: No  ROS: no complaints    Current medications:    Current Facility-Administered Medications:     atropine (ISOPTO ATROPINE) 1 % ophthalmic solution 1 drop, 1 drop, Sublingual, TID, Rush Huff MD, 1 drop at 12/07/18 1364    bisacodyl (DULCOLAX) EC tablet 10 mg, 10 mg, Oral, Daily PRN, Provider Cutover, 10 mg at 11/07/18 0620    bisacodyl (DULCOLAX) rectal suppository 10 mg, 10 mg, Rectal, Daily PRN, LIZETH Quiñones    buPROPion (WELLBUTRIN XL) 24 hr tablet 300 mg, 300 mg, Oral, Daily, Provider Cutover, 300 mg at 12/07/18 0824    cloZAPine (CLOZARIL) tablet 450 mg, 450 mg, Oral, HS, Mee Infante PA-C, 450 mg at 12/06/18 2100    divalproex sodium (DEPAKOTE ER) 24 hr tablet 1,000 mg, 1,000 mg, Oral, HS, Ada Phillips MD, 1,000 mg at 12/06/18 2100    fluPHENAZine (PROLIXIN) tablet 20 mg, 20 mg, Oral, HS, Rush Huff MD, 20 mg at 12/06/18 2100    levothyroxine tablet 75 mcg, 75 mcg, Oral, Early Morning, Provider Cutover, 75 mcg at 12/07/18 0644    midodrine (PROAMATINE) tablet 10 mg, 10 mg, Oral, TID, Wally Lynn MD, 10 mg at 12/07/18 0645    polyethylene glycol (MIRALAX) packet 17 g, 17 g, Oral, Daily PRN, Rush Huff MD    senna (SENOKOT) tablet 8 6 mg, 1 tablet, Oral, BID, LIZETH Meyer, 8 6 mg at 12/07/18 1178    Current Problem List:    Patient Active Problem List   Diagnosis    Chronic paranoid schizophrenia ([address] )    Encounter for medical assessment       Problem list reviewed 12/07/18 Objective:     Vital Signs:  Vitals:    12/05/18 1545 12/05/18 1555 12/05/18 1900 12/06/18 0730   BP: 124/79 99/73 107/81 124/67   BP Location: Left arm Left arm Left arm Right arm   Pulse: (!) 120 (!) 131 (!) 133 99   Resp:    20   Temp:    (!) 97 1 °F (36 2 °C)   TempSrc:       SpO2:       Weight:       Height:             Appearance:  age appropriate and casually dressed   Behavior:  guarded   Speech:  soft   Mood:  depressed   Affect:  constricted   Thought Process:  normal   Thought Content:  normal   Perceptual Disturbances: Auditory hallucinations without commands   Risk Potential: none   Sensorium:  person, place, situation and time   Cognition:  intact   Consciousness:  alert and awake    Attention: attention span and concentration were age appropriate   Intellect: average   Insight:  limited   Judgment: limited      Motor Activity: no abnormal movements       I/O Past 24 hours:  No intake/output data recorded  No intake/output data recorded  Labs:  Reviewed 12/07/18    Progress Toward Goals: The remains stable, working toward the it discharged to his mother's house  Assessment / Plan:     Chronic paranoid schizophrenia ([facility] [address] )    Recommended Treatment:      Medication changes:  1) no change  No change  Non-pharmacological treatments  1) Continue with group therapy, milieu therapy and occupational therapy  Safety  1) Safety/communication plan established targeting dynamic risk factors above  2) Risks, benefits, and possible side effects of medications explained to patient and patient verbalizes understanding  Counseling / Coordination of Care    Total floor / unit time spent today 20 minutes  Greater than 50% of total time was spent with the patient and / or family counseling and / or coordination of care  A description of the counseling / coordination of care       Patient's Rights, confidentiality and exceptions to confidentiality, use of automated medical record, [facility] staff access to medical record, and consent to treatment reviewed      Alan Avila MD

## 2018-12-07 NOTE — ASSESSMENT & PLAN NOTE
EKG on 10/31/18 showed sinus tachycardia  Pt is on clozaril 450mg at HS  HR has been stable - typically 1-teens to 120's  Likely will not improve unless clozaril is discontinued, however, this is psychologically necessary at this time  He denies CP, SOB, and palpitations

## 2018-12-08 RX ADMIN — BUPROPION HYDROCHLORIDE 300 MG: 300 TABLET, FILM COATED, EXTENDED RELEASE ORAL at 08:23

## 2018-12-08 RX ADMIN — LEVOTHYROXINE SODIUM 75 MCG: 75 TABLET ORAL at 06:29

## 2018-12-08 RX ADMIN — ATROPINE SULFATE 1 DROP: 10 SOLUTION/ DROPS OPHTHALMIC at 08:24

## 2018-12-08 RX ADMIN — MIDODRINE HYDROCHLORIDE 10 MG: 2.5 TABLET ORAL at 16:59

## 2018-12-08 RX ADMIN — MIDODRINE HYDROCHLORIDE 10 MG: 2.5 TABLET ORAL at 06:29

## 2018-12-08 RX ADMIN — SENNOSIDES 8.6 MG: 8.6 TABLET, FILM COATED ORAL at 08:23

## 2018-12-08 RX ADMIN — ATROPINE SULFATE 1 DROP: 10 SOLUTION/ DROPS OPHTHALMIC at 16:59

## 2018-12-08 NOTE — PROGRESS NOTES
[address] has been garbed in his coat ready to leave on overnight pass since shift onset  He was given his supper medicines prior to leaving  Mother arrived & this nurse reviewed w/her what medicines he gets when as she says she is the one that administers them  She voiced understanding  Reviewed w/them both what times to call in w/progress reports ([id number]) to which they are agreeable as well as return time tomorrow @ 1700  He gathered up his laundry to wash @ home & left now w/his mother on his overnight pass

## 2018-12-08 NOTE — PROGRESS NOTES
Psychiatry Progress Note    Subjective: Interval History     Patient isolative to his room  Patient with disheveled appearance  Patient's affect remains flat  Patient reporting that he is doing fine  Patient continues to be superficial and evasive during assessment  One question of his medications were helping he reported that they were  Patient however when asked to further elaborate on what symptoms his medications were helping with he was having difficulty  Eventually reported that he feels his depression has improved  Patient denying any suicidal ideations or homicidal ideations  Patient is still internally preoccupied at times however denying psychosis  Has been medication and meal compliant  Sleeping well      Current medications:    Current Facility-Administered Medications:     atropine (ISOPTO ATROPINE) 1 % ophthalmic solution 1 drop, 1 drop, Sublingual, TID, Saeid Albarran MD, 1 drop at 12/08/18 0824    bisacodyl (DULCOLAX) EC tablet 10 mg, 10 mg, Oral, Daily PRN, Provider Cutover, 10 mg at 11/07/18 0620    bisacodyl (DULCOLAX) rectal suppository 10 mg, 10 mg, Rectal, Daily PRN, LIZETH Sheridan    buPROPion (WELLBUTRIN XL) 24 hr tablet 300 mg, 300 mg, Oral, Daily, Provider Cutover, 300 mg at 12/08/18 4951    cloZAPine (CLOZARIL) tablet 450 mg, 450 mg, Oral, HS, Julissa Lutz PA-C, 450 mg at 12/07/18 2117    divalproex sodium (DEPAKOTE ER) 24 hr tablet 1,000 mg, 1,000 mg, Oral, HS, Michael Mejia MD, 1,000 mg at 12/07/18 2049    fluPHENAZine (PROLIXIN) tablet 20 mg, 20 mg, Oral, HS, Saeid Albarran MD, 20 mg at 12/07/18 2049    levothyroxine tablet 75 mcg, 75 mcg, Oral, Early Morning, Provider Cutover, 75 mcg at 12/08/18 6000    midodrine (PROAMATINE) tablet 10 mg, 10 mg, Oral, TID, Wally Lynn MD, 10 mg at 12/08/18 7632    polyethylene glycol (MIRALAX) packet 17 g, 17 g, Oral, Daily PRN, Saeid Albarran MD    senna (SENOKOT) tablet 8 6 mg, 1 tablet, Oral, BID, LIZETH Milan, 8 6 mg at 12/08/18 6799      Objective:     Vital Signs:  Vitals:    12/07/18 1542 12/07/18 1548 12/08/18 0630 12/08/18 0730   BP: 119/83 115/77 102/58 114/74   BP Location: Right arm Right arm Left arm Left arm   Pulse: (!) 119 (!) 127 98 100   Resp:   19 20   Temp:    (!) 96 9 °F (36 1 °C)   TempSrc:    Temporal   SpO2:       Weight:       Height:             Appearance:  age appropriate, casually dressed and disheveled   Behavior:  normal   Speech:  soft   Mood:  depressed   Affect:  flat   Thought Process:  normal   Thought Content:  normal   Perceptual Disturbances: None   Risk Potential: none   Sensorium:  person, place and time   Cognition:  intact   Consciousness:  alert and awake    Attention: attention span and concentration were age diminished   Intellect: average   Insight:  limited   Judgment: limited      Motor Activity: no abnormal movements           Recent Labs:  Results Reviewed     None          I/O Past 24 hours:  No intake/output data recorded  No intake/output data recorded  Assessment / Plan:     Chronic [facility] ([facility] )    Recommended Treatment:      Medication changes:  1) continue current medication regimen  Non-pharmacological treatments  1) Continue with group therapy, milieu therapy and occupational therapy  Safety  1) Safety/communication plan established targeting dynamic risk factors above  2) Risks, benefits, and possible side effects of medications explained to patient and patient verbalizes understanding  Counseling / Coordination of Care    Total floor / unit time spent today 20 minutes  Greater than 50% of total time was spent with the patient and / or family counseling and / or coordination of care  A description of the counseling / coordination of care       Patient's Rights, confidentiality and exceptions to confidentiality, use of automated medical record, [facility]bane[facility] staff access to medical record, and consent to treatment reviewed      Jorge Jaimes PA-C

## 2018-12-08 NOTE — PROGRESS NOTES
Individual maintained on ongoing SAFE and fall precaution without any incident on this shift    He is laying in bed, eyes closed, breath evenly distributed and unlabored   Checked every 15 minutes during rounds   In no apparent distress   Will continue to monitor behavior and sleep pattern    ~Individual has an overnight pass with mother from 12/8/18 at 1700 to 1700 on 12/9/18 with meds to assist in the community for therapeutic reintergration, socialization and building relationship

## 2018-12-08 NOTE — PROGRESS NOTES
Aashish Kilpatrick remains blunted and with a flat affect  He is isolative to himself and his room between structured activities   Attended and participated in 2/2 groups this shift  Aashish Kilpatrick states that he is no longer having diarrhea  No issues or complaints noted   Continue to monitor

## 2018-12-08 NOTE — PROGRESS NOTES
Liam Meeks has been awake, alert, and visible intermittently out in the milieu  Pt noted smiling to self at times as responding to internal stimuli but denies hearing any voices  Isolative to self  No interaction noted with peers  Ate 100% supper  Compliant with all scheduled meds without prompting  Attended and participated in 2/2 evening groups  Had evening snack  Resting quietly in bed at present, arouses easily  Will continue to monitor/assess for any changes

## 2018-12-09 PROCEDURE — 80307 DRUG TEST PRSMV CHEM ANLYZR: CPT | Performed by: PSYCHIATRY & NEUROLOGY

## 2018-12-09 RX ADMIN — ATROPINE SULFATE 1 DROP: 10 SOLUTION/ DROPS OPHTHALMIC at 17:13

## 2018-12-09 RX ADMIN — ATROPINE SULFATE 1 DROP: 10 SOLUTION/ DROPS OPHTHALMIC at 20:41

## 2018-12-09 RX ADMIN — FLUPHENAZINE HYDROCHLORIDE 20 MG: 10 TABLET, FILM COATED ORAL at 20:41

## 2018-12-09 RX ADMIN — MIDODRINE HYDROCHLORIDE 10 MG: 2.5 TABLET ORAL at 17:14

## 2018-12-09 RX ADMIN — DIVALPROEX SODIUM 1000 MG: 500 TABLET, FILM COATED, EXTENDED RELEASE ORAL at 20:41

## 2018-12-09 RX ADMIN — CLOZAPINE 450 MG: 25 TABLET ORAL at 21:03

## 2018-12-09 NOTE — PROGRESS NOTES
[address] returned from pass @ [phone] accompanied by mother  Flat of affect & responded, "Not much" when asked what he had done on pass  This time his mother did not help him by answering for him  It was evident his laundry had been done & later, he mentioned having meal @ [employer] prior to return by way of explanation for why refused meal  He was cooperative w/body search & in providing UDS  Socially withdrawn  Resting now in bed

## 2018-12-09 NOTE — PROGRESS NOTES
[facility]  He called in @ 2015 to report that pass going smoothly, is enjoying himself  He voices awareness of need to call in tomorrow @ [phone] w/progress reports & to return to unit @ 1700 tomorrow

## 2018-12-10 RX ADMIN — MIDODRINE HYDROCHLORIDE 10 MG: 2.5 TABLET ORAL at 17:20

## 2018-12-10 RX ADMIN — ATROPINE SULFATE 1 DROP: 10 SOLUTION/ DROPS OPHTHALMIC at 20:58

## 2018-12-10 RX ADMIN — DIVALPROEX SODIUM 1000 MG: 500 TABLET, FILM COATED, EXTENDED RELEASE ORAL at 20:57

## 2018-12-10 RX ADMIN — CLOZAPINE 450 MG: 25 TABLET ORAL at 21:09

## 2018-12-10 RX ADMIN — LEVOTHYROXINE SODIUM 75 MCG: 75 TABLET ORAL at 06:35

## 2018-12-10 RX ADMIN — SENNOSIDES 8.6 MG: 8.6 TABLET, FILM COATED ORAL at 08:19

## 2018-12-10 RX ADMIN — MIDODRINE HYDROCHLORIDE 10 MG: 2.5 TABLET ORAL at 20:58

## 2018-12-10 RX ADMIN — BUPROPION HYDROCHLORIDE 300 MG: 300 TABLET, FILM COATED, EXTENDED RELEASE ORAL at 08:19

## 2018-12-10 RX ADMIN — MIDODRINE HYDROCHLORIDE 10 MG: 2.5 TABLET ORAL at 06:35

## 2018-12-10 RX ADMIN — SENNOSIDES 8.6 MG: 8.6 TABLET, FILM COATED ORAL at 20:57

## 2018-12-10 RX ADMIN — ATROPINE SULFATE 1 DROP: 10 SOLUTION/ DROPS OPHTHALMIC at 08:20

## 2018-12-10 RX ADMIN — ATROPINE SULFATE 1 DROP: 10 SOLUTION/ DROPS OPHTHALMIC at 17:22

## 2018-12-10 RX ADMIN — FLUPHENAZINE HYDROCHLORIDE 20 MG: 10 TABLET, FILM COATED ORAL at 20:58

## 2018-12-10 NOTE — PROGRESS NOTES
[address] Shae Rahman took part in PM Groups, came up on own for HS medicine, had HS snack & retired now to bed

## 2018-12-10 NOTE — PROGRESS NOTES
Stephanie Goodwin remains blunted and with a flat affect   He is isolative to himself and his room between structured activities   He attended all 3 groups this shift however, he did not get credit for [facility] because he slept through the entire group  No issues or complaints noted   Continue to monitor

## 2018-12-10 NOTE — PROGRESS NOTES
Psychiatry Progress Note    Subjective: Interval History     Patient had an overnight pass home with his mother which went well  He says he enjoys himself on these passes and there are no complaints from his mother  On the unit he remains about the same isolative and withdrawn with little conversation and affect is totally flat    I do not remember the last time I have seen him smile    Behavior over the last 24 hours:  unchanged  Sleep: insomnia  Appetite: normal  Medication side effects: No  ROS: no complaints    Current medications:    Current Facility-Administered Medications:     atropine (ISOPTO ATROPINE) 1 % ophthalmic solution 1 drop, 1 drop, Sublingual, TID, Simi Barbour MD, 1 drop at 12/10/18 0820    bisacodyl (DULCOLAX) EC tablet 10 mg, 10 mg, Oral, Daily PRN, Provider Cutover, 10 mg at 11/07/18 0620    bisacodyl (DULCOLAX) rectal suppository 10 mg, 10 mg, Rectal, Daily PRN, LIZETH Dunham    buPROPion (WELLBUTRIN XL) 24 hr tablet 300 mg, 300 mg, Oral, Daily, Provider Cutover, 300 mg at 12/10/18 0819    cloZAPine (CLOZARIL) tablet 450 mg, 450 mg, Oral, HS, Ellie Lopez PA-C, 450 mg at 12/09/18 2103    divalproex sodium (DEPAKOTE ER) 24 hr tablet 1,000 mg, 1,000 mg, Oral, HS, Adriana Casillas MD, 1,000 mg at 12/09/18 2041    fluPHENAZine (PROLIXIN) tablet 20 mg, 20 mg, Oral, HS, Simi Barbour MD, 20 mg at 12/09/18 2041    levothyroxine tablet 75 mcg, 75 mcg, Oral, Early Morning, Provider Cutover, 75 mcg at 12/10/18 1533    midodrine (PROAMATINE) tablet 10 mg, 10 mg, Oral, TID, Wally Lynn MD, 10 mg at 12/10/18 4302    polyethylene glycol (MIRALAX) packet 17 g, 17 g, Oral, Daily PRN, Simi Barbour MD    senna (SENOKOT) tablet 8 6 mg, 1 tablet, Oral, BID, LIZETH Meyer, 8 6 mg at 12/10/18 7191    Current Problem List:    Patient Active Problem List   Diagnosis    Chronic paranoid schizophrenia ([facility] )    Encounter for medical assessment    Chronic tachycardia    Tobacco abuse Problem list reviewed 12/10/18     Objective:     Vital Signs:  Vitals:    12/09/18 1930 12/10/18 0500 12/10/18 0730 12/10/18 0732   BP: 122/70 109/51 118/73 128/72   BP Location: Left arm Right arm Left arm Left arm   Pulse: (!) 125  94 105   Resp:   18    Temp:   (!) 96 9 °F (36 1 °C)    TempSrc:   Temporal    SpO2:       Weight:       Height:             Appearance:  age appropriate and casually dressed   Behavior:  normal   Speech:  normal volume   Mood:  depressed   Affect:  constricted   Thought Process:  normal   Thought Content:  normal   Perceptual Disturbances: None   Risk Potential: none   Sensorium:  person, place, situation and time   Cognition:  intact   Consciousness:  alert and awake    Attention: attention span and concentration were age appropriate   Intellect: average   Insight:  limited   Judgment: limited      Motor Activity: no abnormal movements       I/O Past 24 hours:  No intake/output data recorded  No intake/output data recorded  Labs:  Reviewed 12/10/18    Progress Toward Goals:  Patient stable  on the unit and what looks like his baseline    Assessment / Plan:     Chronic paranoid schizophrenia ([facility] [address] )    Recommended Treatment:      Medication changes:  1) none    Non-pharmacological treatments  1) Continue with group therapy, milieu therapy and occupational therapy  Safety  1) Safety/communication plan established targeting dynamic risk factors above  2) Risks, benefits, and possible side effects of medications explained to patient and patient verbalizes understanding  Counseling / Coordination of Care    Total floor / unit time spent today 20 minutes  Greater than 50% of total time was spent with the patient and / or family counseling and / or coordination of care  A description of the counseling / coordination of care       Patient's Rights, confidentiality and exceptions to confidentiality, use of automated medical record, [facility] staff access to medical record, and consent to treatment reviewed      Joy Wright MD

## 2018-12-11 PROCEDURE — 80307 DRUG TEST PRSMV CHEM ANLYZR: CPT | Performed by: PSYCHIATRY & NEUROLOGY

## 2018-12-11 RX ADMIN — BISACODYL 10 MG: 5 TABLET, COATED ORAL at 06:33

## 2018-12-11 RX ADMIN — SENNOSIDES 8.6 MG: 8.6 TABLET, FILM COATED ORAL at 08:06

## 2018-12-11 RX ADMIN — DIVALPROEX SODIUM 1000 MG: 500 TABLET, FILM COATED, EXTENDED RELEASE ORAL at 21:30

## 2018-12-11 RX ADMIN — MIDODRINE HYDROCHLORIDE 10 MG: 2.5 TABLET ORAL at 17:33

## 2018-12-11 RX ADMIN — FLUPHENAZINE HYDROCHLORIDE 20 MG: 10 TABLET, FILM COATED ORAL at 21:30

## 2018-12-11 RX ADMIN — ATROPINE SULFATE 1 DROP: 10 SOLUTION/ DROPS OPHTHALMIC at 21:30

## 2018-12-11 RX ADMIN — LEVOTHYROXINE SODIUM 75 MCG: 75 TABLET ORAL at 06:25

## 2018-12-11 RX ADMIN — MIDODRINE HYDROCHLORIDE 10 MG: 2.5 TABLET ORAL at 21:29

## 2018-12-11 RX ADMIN — ATROPINE SULFATE 1 DROP: 10 SOLUTION/ DROPS OPHTHALMIC at 08:06

## 2018-12-11 RX ADMIN — MIDODRINE HYDROCHLORIDE 10 MG: 2.5 TABLET ORAL at 06:25

## 2018-12-11 RX ADMIN — SENNOSIDES 8.6 MG: 8.6 TABLET, FILM COATED ORAL at 21:30

## 2018-12-11 RX ADMIN — BUPROPION HYDROCHLORIDE 300 MG: 300 TABLET, FILM COATED, EXTENDED RELEASE ORAL at 08:06

## 2018-12-11 RX ADMIN — CLOZAPINE 450 MG: 25 TABLET ORAL at 21:29

## 2018-12-11 RX ADMIN — ATROPINE SULFATE 1 DROP: 10 SOLUTION/ DROPS OPHTHALMIC at 17:33

## 2018-12-11 NOTE — PROGRESS NOTES
I started caring for Jose Luis Panchal at 1900  Isolative to self when out of his room  No interaction with peers  Intermittent eye contact  Denies anxiety, depression and voices  Blunted, flat affect  Mom brought food for him when she was here to visit  He had a good appetite  Shower this shift  Attended and participated in evening groups  Prompted for HS medication  Took all pills without a problem  Did not eat snack before going to bed  Patient safety and fall precautions maintained without incident

## 2018-12-11 NOTE — PROGRESS NOTES
~Individual maintained on ongoing SAFE and fall precaution without any incident on this shift    He is laying in bed, eyes closed, breath evenly distributed and unlabored   Checked every 15 minutes during rounds   In no apparent distress   Will continue to monitor behavior and sleep pattern  ~Individual has a pass to attend [facility] from [id number], to assist in the community, for therapeutic reintegration, socialization and building relationship

## 2018-12-11 NOTE — PROGRESS NOTES
Patient is also off the floor and out of the hospital on a therapeutic pass to [facility] today    The was reported by the nurse that in the middle of the night yesterday he got up and was accusing somebody of shining a light on him, very angry, confronted the nurses and stomped back to his room and slammed the door  This sounds like a delusion

## 2018-12-11 NOTE — PROGRESS NOTES
Emy Pritchard had a B/P of 98/62 and P was 115  Ate 100% of meal  Paces the unit and appears lost in his own world  No interactions not with peers  Cooperative for meds

## 2018-12-11 NOTE — PROGRESS NOTES
Individual had his midodrine this morning for BP 83/53  P118  Denies any dizziness or blurred vision  Will continue to monitor

## 2018-12-11 NOTE — PROGRESS NOTES
Individual came out of his room , only wearing shorts, bang on the nurse's station partition accusing staff "where is he, where is he, where is he, tell him to stop flashing the light on me?"   "Whose in charge", "Tell him to stop flashing the light on me"!!!   At the time, only the writer and a female MHT was present on the unit  The male nurse was on break,and  no other male ambulating on the unit  Stomp back into his room and slam the door

## 2018-12-11 NOTE — NURSING NOTE
Patient remains isolative to himself  Medication and meal compliant  Offers no complaints  Denies suicidal ideations  Presently at [facility] for community integration  Appetite good  Guarded  Offers one word answers during conversations  Attended am group  Will continue to monitor

## 2018-12-12 RX ADMIN — MIDODRINE HYDROCHLORIDE 10 MG: 2.5 TABLET ORAL at 17:00

## 2018-12-12 RX ADMIN — ATROPINE SULFATE 1 DROP: 10 SOLUTION/ DROPS OPHTHALMIC at 20:54

## 2018-12-12 RX ADMIN — LEVOTHYROXINE SODIUM 75 MCG: 75 TABLET ORAL at 06:26

## 2018-12-12 RX ADMIN — FLUPHENAZINE HYDROCHLORIDE 20 MG: 10 TABLET, FILM COATED ORAL at 20:55

## 2018-12-12 RX ADMIN — DIVALPROEX SODIUM 1000 MG: 500 TABLET, FILM COATED, EXTENDED RELEASE ORAL at 20:55

## 2018-12-12 RX ADMIN — CLOZAPINE 450 MG: 25 TABLET ORAL at 21:15

## 2018-12-12 RX ADMIN — MIDODRINE HYDROCHLORIDE 10 MG: 2.5 TABLET ORAL at 20:55

## 2018-12-12 RX ADMIN — ATROPINE SULFATE 1 DROP: 10 SOLUTION/ DROPS OPHTHALMIC at 08:43

## 2018-12-12 RX ADMIN — BUPROPION HYDROCHLORIDE 300 MG: 300 TABLET, FILM COATED, EXTENDED RELEASE ORAL at 08:42

## 2018-12-12 RX ADMIN — ATROPINE SULFATE 1 DROP: 10 SOLUTION/ DROPS OPHTHALMIC at 17:00

## 2018-12-12 RX ADMIN — SENNOSIDES 8.6 MG: 8.6 TABLET, FILM COATED ORAL at 20:55

## 2018-12-12 RX ADMIN — SENNOSIDES 8.6 MG: 8.6 TABLET, FILM COATED ORAL at 08:42

## 2018-12-12 NOTE — PLAN OF CARE
Problem: Alteration in Thoughts and Perception  Goal: Treatment Goal: Gain control of psychotic behaviors/thinking, reduce/eliminate presenting symptoms and demonstrate improved reality functioning upon discharge  Outcome: Progressing    Goal: Verbalize thoughts and feelings  Interventions:  - Promote a nonjudgmental and trusting relationship with the patient through active listening and therapeutic communication  - Assess patient's level of functioning, behavior and potential for risk  - Engage patient in 1 on 1 interactions for a minimum of 15 minutes each session  - Encourage patient to express fears, feelings, frustrations, and discuss symptoms    - Metter patient to reality, help patient recognize reality-based thinking   - Administer medications as ordered and assess for potential side effects  - Provide the patient education related to the signs and symptoms of the illness and desired effects of prescribed medications   Outcome: Progressing    Goal: Refrain from acting on delusional thinking/internal stimuli  Interventions:  - Monitor patient closely, per order   - Utilize least restrictive measures   - Set reasonable limits, give positive feedback for acceptable   - Administer medications as ordered and monitor of potential side effects   Outcome: Progressing    Goal: Agree to be compliant with medication regime, as prescribed and report medication side effects  Interventions:  - Offer appropriate PRN medication and supervise ingestion; conduct aims, as needed    Outcome: Progressing    Goal: Attend and participate in unit activities, including therapeutic, recreational, and educational groups  Interventions:  - Provide therapeutic and educational activities daily, encourage attendance and participation, and document same in the medical record    Outcome: Progressing    Goal: Recognize dysfunctional thoughts, communicate reality-based thoughts at the time of discharge  Interventions:  - Provide medication and psycho-education to assist patient in compliance and developing insight into his/her illness    Outcome: Progressing    Goal: Complete daily ADLs, including personal hygiene independently, as able  Interventions:  - Observe, teach, and assist patient with ADLS  - Monitor and promote a balance of rest/activity, with adequate nutrition and elimination    Outcome: Progressing

## 2018-12-12 NOTE — PROGRESS NOTES
Individual has been in bed most of the shift  He appears to be struggling with energy level/ low energy level  He only attended goal group  Affect is blunted, eye contact poor  He did not complete hygiene this shift  RN did point out that he is spending a lot of time in bed, but would not respond to writer  Compliant with meds without prompting  Encouraged him to verbalize needs to staff  Will continue to monitor

## 2018-12-12 NOTE — PROGRESS NOTES
Rene Mendez continues to pace the unit  He is pleasant and cooperative,but keeps to himself  No issues expressed

## 2018-12-12 NOTE — PROGRESS NOTES
Pt received @ 2300  Sleeping since beginning of shift w/out any signs of distress  /55, P-109 @ 0615  Held midodrine this morning   Will continue to monitor

## 2018-12-12 NOTE — PROGRESS NOTES
Psychiatry Progress Note    Subjective: Interval History     The no further incidence as was described  2 nights ago when he became paranoid and thought someone was shot in light into his room, got very angry with the nurses  Mental status remains the same, he is alert and oriented but withdrawn with little socialization and little conversation  He still is hallucinating    Behavior over the last 24 hours:  regressed  Sleep: hypersomnia  Appetite: normal  Medication side effects: No  ROS: no complaints    Current medications:    Current Facility-Administered Medications:     atropine (ISOPTO ATROPINE) 1 % ophthalmic solution 1 drop, 1 drop, Sublingual, TID, Simi Barbour MD, 1 drop at 12/12/18 0843    bisacodyl (DULCOLAX) EC tablet 10 mg, 10 mg, Oral, Daily PRN, Provider Cutover, 10 mg at 12/11/18 0868    bisacodyl (DULCOLAX) rectal suppository 10 mg, 10 mg, Rectal, Daily PRN, LIZETH Dunham    buPROPion (WELLBUTRIN XL) 24 hr tablet 300 mg, 300 mg, Oral, Daily, Provider Cutover, 300 mg at 12/12/18 0842    cloZAPine (CLOZARIL) tablet 450 mg, 450 mg, Oral, HS, Ellie Lopez PA-C, 450 mg at 12/11/18 2129    divalproex sodium (DEPAKOTE ER) 24 hr tablet 1,000 mg, 1,000 mg, Oral, HS, Adriana Casillas MD, 1,000 mg at 12/11/18 2130    fluPHENAZine (PROLIXIN) tablet 20 mg, 20 mg, Oral, HS, Simi Barbour MD, 20 mg at 12/11/18 2130    levothyroxine tablet 75 mcg, 75 mcg, Oral, Early Morning, Provider Cutover, 75 mcg at 12/12/18 6318    midodrine (PROAMATINE) tablet 10 mg, 10 mg, Oral, TID, Wally Lynn MD, 10 mg at 12/11/18 2129    polyethylene glycol (MIRALAX) packet 17 g, 17 g, Oral, Daily PRN, Simi Barbour MD    senna (SENOKOT) tablet 8 6 mg, 1 tablet, Oral, BID, LIZETH Meyer, 8 6 mg at 12/12/18 9077    Current Problem List:    Patient Active Problem List   Diagnosis    Chronic paranoid schizophrenia [facility] [address] [facility]    Encounter for medical assessment    Chronic tachycardia    Tobacco abuse Problem list reviewed 12/12/18     Objective:     Vital Signs:  Vitals:    12/11/18 1500 12/11/18 1501 12/11/18 1503 12/12/18 0615   BP: 107/74 120/90 100/80 135/55   BP Location: Left arm Left arm Left arm    Pulse: 96 101 (!) 109 (!) 109   Resp:       Temp:       TempSrc:       SpO2:       Weight:       Height:             Appearance:  age appropriate and casually dressed   Behavior:  normal   Speech:  soft   Mood:  depressed   Affect:  constricted   Thought Process:  normal   Thought Content:  normal   Perceptual Disturbances: Auditory hallucinations without commands   Risk Potential: none   Sensorium:  person, place, situation and time   Cognition:  intact   Consciousness:  alert and awake    Attention: attention span and concentration were age appropriate   Intellect: average   Insight:  limited   Judgment: limited      Motor Activity: no abnormal movements       I/O Past 24 hours:  No intake/output data recorded  No intake/output data recorded  Labs:  Reviewed 12/12/18    Progress Toward Goals:  Patient is the same, continues with the chronic psychotic symptoms    Assessment / Plan:     [facility]    Recommended Treatment:      Medication changes:  1) no change    Non-pharmacological treatments  1) Continue with group therapy, milieu therapy and occupational therapy  Safety  1) Safety/communication plan established targeting dynamic risk factors above  2) Risks, benefits, and possible side effects of medications explained to patient and patient verbalizes understanding  Counseling / Coordination of Care    Total floor / unit time spent today 20 minutes  Greater than 50% of total time was spent with the patient and / or family counseling and / or coordination of care  A description of the counseling / coordination of care       Patient's Rights, confidentiality and exceptions to confidentiality, use of automated medical record, [facility] staff access to medical record, and consent to treatment reviewed      Nicole Jensen MD

## 2018-12-13 RX ADMIN — ATROPINE SULFATE 1 DROP: 10 SOLUTION/ DROPS OPHTHALMIC at 17:01

## 2018-12-13 RX ADMIN — SENNOSIDES 8.6 MG: 8.6 TABLET, FILM COATED ORAL at 08:32

## 2018-12-13 RX ADMIN — ATROPINE SULFATE 1 DROP: 10 SOLUTION/ DROPS OPHTHALMIC at 20:33

## 2018-12-13 RX ADMIN — DIVALPROEX SODIUM 1000 MG: 500 TABLET, FILM COATED, EXTENDED RELEASE ORAL at 20:34

## 2018-12-13 RX ADMIN — SENNOSIDES 8.6 MG: 8.6 TABLET, FILM COATED ORAL at 20:34

## 2018-12-13 RX ADMIN — MIDODRINE HYDROCHLORIDE 10 MG: 2.5 TABLET ORAL at 17:00

## 2018-12-13 RX ADMIN — BUPROPION HYDROCHLORIDE 300 MG: 300 TABLET, FILM COATED, EXTENDED RELEASE ORAL at 08:32

## 2018-12-13 RX ADMIN — CLOZAPINE 450 MG: 25 TABLET ORAL at 22:18

## 2018-12-13 RX ADMIN — MIDODRINE HYDROCHLORIDE 10 MG: 2.5 TABLET ORAL at 20:33

## 2018-12-13 RX ADMIN — FLUPHENAZINE HYDROCHLORIDE 20 MG: 10 TABLET, FILM COATED ORAL at 20:34

## 2018-12-13 RX ADMIN — MIDODRINE HYDROCHLORIDE 10 MG: 2.5 TABLET ORAL at 06:20

## 2018-12-13 RX ADMIN — LEVOTHYROXINE SODIUM 75 MCG: 75 TABLET ORAL at 06:20

## 2018-12-13 RX ADMIN — ATROPINE SULFATE 1 DROP: 10 SOLUTION/ DROPS OPHTHALMIC at 08:32

## 2018-12-13 NOTE — NURSING NOTE
Patient is alert, isolative to room and self  Non-social with peers  Motivated to attend only necessary groups then retreats back to bed  Med and meal compliant with prompting  Will continue to monitor

## 2018-12-13 NOTE — PROGRESS NOTES
Psychiatry Progress Note    Subjective: Interval History     Team meeting today with patient present  His mental status behavior are unchanged  He seems to hallucinate but does not discuss it    He has extreme social withdrawal and extreme flat affect    Behavior over the last 24 hours:  unchanged  Sleep: hypersomnia  Appetite: normal  Medication side effects: No  ROS: no complaints    Current medications:    Current Facility-Administered Medications:     atropine (ISOPTO ATROPINE) 1 % ophthalmic solution 1 drop, 1 drop, Sublingual, TID, Simi Barbour MD, 1 drop at 12/13/18 5653    bisacodyl (DULCOLAX) EC tablet 10 mg, 10 mg, Oral, Daily PRN, Provider Cutover, 10 mg at 12/11/18 9156    bisacodyl (DULCOLAX) rectal suppository 10 mg, 10 mg, Rectal, Daily PRN, LIZETH Dunham    buPROPion (WELLBUTRIN XL) 24 hr tablet 300 mg, 300 mg, Oral, Daily, Provider Cutover, 300 mg at 12/13/18 8175    cloZAPine (CLOZARIL) tablet 450 mg, 450 mg, Oral, HS, Ellie Lopez PA-C, 450 mg at 12/12/18 2115    divalproex sodium (DEPAKOTE ER) 24 hr tablet 1,000 mg, 1,000 mg, Oral, HS, Adriana Casillas MD, 1,000 mg at 12/12/18 2055    fluPHENAZine (PROLIXIN) tablet 20 mg, 20 mg, Oral, HS, Simi Barbour MD, 20 mg at 12/12/18 2055    levothyroxine tablet 75 mcg, 75 mcg, Oral, Early Morning, Provider Cutover, 75 mcg at 12/13/18 0620    midodrine (PROAMATINE) tablet 10 mg, 10 mg, Oral, TID, Wally Lynn MD, 10 mg at 12/13/18 0620    polyethylene glycol (MIRALAX) packet 17 g, 17 g, Oral, Daily PRN, Simi Barbour MD    senna (SENOKOT) tablet 8 6 mg, 1 tablet, Oral, BID, LIZETH Meyer, 8 6 mg at 12/13/18 6693    Current Problem List:    Patient Active Problem List   Diagnosis    [facility]    Encounter for medical assessment    Chronic tachycardia    Tobacco abuse       Problem list reviewed 12/13/18     Objective:     Vital Signs:  Vitals:    12/12/18 2100 12/13/18 0607 12/13/18 0730 12/13/18 0735   BP: 110/70 102/75 101/74 100/58   BP Location: Left arm Right arm Left arm Left arm   Pulse: (!) 108  94 104   Resp: 22  19 19   Temp: 97 6 °F (36 4 °C)  (!) 97 3 °F (36 3 °C)    TempSrc: Temporal  Temporal    SpO2:       Weight:       Height:             Appearance:  age appropriate and casually dressed   Behavior:  normal   Speech:  soft   Mood:  depressed   Affect:  constricted   Thought Process:  normal   Thought Content:  normal   Perceptual Disturbances: Auditory hallucinations without commands   Risk Potential: none   Sensorium:  person, place, situation and time   Cognition:  intact   Consciousness:  alert and awake    Attention: attention span and concentration were age appropriate   Intellect: average   Insight:  limited   Judgment: limited      Motor Activity: no abnormal movements       I/O Past 24 hours:  No intake/output data recorded  No intake/output data recorded  Labs:  Reviewed 12/13/18    Progress Toward Goals:  Patient remains about the same    Assessment / Plan:     [facility]    Recommended Treatment:      Medication changes:  1) no change    Non-pharmacological treatments  1) Continue with group therapy, milieu therapy and occupational therapy  Safety  1) Safety/communication plan established targeting dynamic risk factors above  2) Risks, benefits, and possible side effects of medications explained to patient and patient verbalizes understanding  Counseling / Coordination of Care    Total floor / unit time spent today 20 minutes  Greater than 50% of total time was spent with the patient and / or family counseling and / or coordination of care  A description of the counseling / coordination of care  Patient's Rights, confidentiality and exceptions to confidentiality, use of automated medical record, [facility] staff access to medical record, and consent to treatment reviewed      Yudelka Willett MD   Psychiatry Progress Note

## 2018-12-13 NOTE — PROGRESS NOTES
Stacia Hylton has been awake, alert, and visible intermittently out in the milieu  Affect remains flat, blunted, and offers minimal conversation with staff  Pt lacks energy, motivation  No interaction noted with peers  Ate 100% supper  Pt noted to come out of room and look at activity board and then retreats back to bed in room  Attended and participated in 2/2 evening groups  Compliant with all scheduled meds with little prompting  Had evening snack  Resting quietly in bed at present, arouses easily  Will continue to monitor/assess for any changes

## 2018-12-14 PROCEDURE — 80307 DRUG TEST PRSMV CHEM ANLYZR: CPT | Performed by: PSYCHIATRY & NEUROLOGY

## 2018-12-14 RX ADMIN — LEVOTHYROXINE SODIUM 75 MCG: 75 TABLET ORAL at 06:18

## 2018-12-14 RX ADMIN — MIDODRINE HYDROCHLORIDE 10 MG: 2.5 TABLET ORAL at 16:54

## 2018-12-14 RX ADMIN — ATROPINE SULFATE 1 DROP: 10 SOLUTION/ DROPS OPHTHALMIC at 08:33

## 2018-12-14 RX ADMIN — DIVALPROEX SODIUM 1000 MG: 500 TABLET, FILM COATED, EXTENDED RELEASE ORAL at 20:56

## 2018-12-14 RX ADMIN — MIDODRINE HYDROCHLORIDE 10 MG: 2.5 TABLET ORAL at 20:56

## 2018-12-14 RX ADMIN — FLUPHENAZINE HYDROCHLORIDE 20 MG: 10 TABLET, FILM COATED ORAL at 20:56

## 2018-12-14 RX ADMIN — SENNOSIDES 8.6 MG: 8.6 TABLET, FILM COATED ORAL at 20:56

## 2018-12-14 RX ADMIN — ATROPINE SULFATE 1 DROP: 10 SOLUTION/ DROPS OPHTHALMIC at 20:59

## 2018-12-14 RX ADMIN — MIDODRINE HYDROCHLORIDE 10 MG: 2.5 TABLET ORAL at 06:18

## 2018-12-14 RX ADMIN — SENNOSIDES 8.6 MG: 8.6 TABLET, FILM COATED ORAL at 08:32

## 2018-12-14 RX ADMIN — CLOZAPINE 450 MG: 25 TABLET ORAL at 21:09

## 2018-12-14 RX ADMIN — ATROPINE SULFATE 1 DROP: 10 SOLUTION/ DROPS OPHTHALMIC at 16:54

## 2018-12-14 RX ADMIN — BUPROPION HYDROCHLORIDE 300 MG: 300 TABLET, FILM COATED, EXTENDED RELEASE ORAL at 08:32

## 2018-12-14 NOTE — PROGRESS NOTES
Stephanie Goodwin has been awake, alert, and visible intermittently out in the milieu  No interaction noted with peers  Behavior quiet and isolative to self  Affect remains flat, blunted, and offers minimal conversation with staff  Pt lacks insight into his illness and motivation  Ate 100% supper  Attended and participated in 2/2 evening groups  Compliant with all scheduled meds with some prompting  Had evening snack  Resting quietly in bed at present, arouses easily  Will continue to monitor/assess for any changes

## 2018-12-14 NOTE — CASE MANAGEMENT
Patient was converted to involuntary commitment this morning for up 90 days  CM then completed 306 outpatient commitment with Dr Rommel Posada and faxed it to Arinaa Hermosillo with [facility], requesting outpatient commitment starting the day of discharge to [facility]  CM will continue to follow patient's progress and assist with discharge planning needs

## 2018-12-14 NOTE — PROGRESS NOTES
Veronika Anderson remains blunted and with a flat affect   He showered last evening  He got up for vitals and breakfast   Veronika Anderson went to morning meeting and then left to go to [address] [place] [address] his return

## 2018-12-14 NOTE — ESCALATED TEAM TX
Patient attended treatment team meeting this morning prepared with self-assessment  Patient's group attendance for last week was unavailable at the time of meeting  Patient was granted an overnight pass from Monday, 12/17-Tuesday, 12/18, 4p-4p  Patient's goal is to uphold his calendar while on pass  Patient verbalized no further questions or concerns at the conclusion of the meeting  Next team meeting scheduled for 12/20

## 2018-12-14 NOTE — PROGRESS NOTES
Patient is out of the unit and the hospital today for a therapeutic pass to [facility] he had a 304 outpatient hearing today  According to the nursing notes and discussions there have been no changes in his behavior or mental status

## 2018-12-15 RX ADMIN — MIDODRINE HYDROCHLORIDE 10 MG: 2.5 TABLET ORAL at 16:52

## 2018-12-15 RX ADMIN — SENNOSIDES 8.6 MG: 8.6 TABLET, FILM COATED ORAL at 21:35

## 2018-12-15 RX ADMIN — CLOZAPINE 450 MG: 25 TABLET ORAL at 21:35

## 2018-12-15 RX ADMIN — MIDODRINE HYDROCHLORIDE 10 MG: 2.5 TABLET ORAL at 21:34

## 2018-12-15 RX ADMIN — ATROPINE SULFATE 1 DROP: 10 SOLUTION/ DROPS OPHTHALMIC at 08:51

## 2018-12-15 RX ADMIN — LEVOTHYROXINE SODIUM 75 MCG: 75 TABLET ORAL at 06:29

## 2018-12-15 RX ADMIN — SENNOSIDES 8.6 MG: 8.6 TABLET, FILM COATED ORAL at 08:50

## 2018-12-15 RX ADMIN — MIDODRINE HYDROCHLORIDE 10 MG: 2.5 TABLET ORAL at 06:29

## 2018-12-15 RX ADMIN — FLUPHENAZINE HYDROCHLORIDE 20 MG: 10 TABLET, FILM COATED ORAL at 21:35

## 2018-12-15 RX ADMIN — ATROPINE SULFATE 1 DROP: 10 SOLUTION/ DROPS OPHTHALMIC at 21:35

## 2018-12-15 RX ADMIN — BUPROPION HYDROCHLORIDE 300 MG: 300 TABLET, FILM COATED, EXTENDED RELEASE ORAL at 08:50

## 2018-12-15 RX ADMIN — ATROPINE SULFATE 1 DROP: 10 SOLUTION/ DROPS OPHTHALMIC at 16:54

## 2018-12-15 RX ADMIN — DIVALPROEX SODIUM 1000 MG: 500 TABLET, FILM COATED, EXTENDED RELEASE ORAL at 21:35

## 2018-12-15 NOTE — PROGRESS NOTES
Willie Colón has been isolative to his room and self  No interaction with peers and very little with staff  Blunted, flat affect  Denies anxiety, depression and voices  Smiles to self at times when walking around  Poor eye contact  Took medication with prompting  Mouth check was done with medications  Ate 100% of his meal  UDS was negative after [facility]  No shower or BM this shift  Attended and participated in goals group  Currently watching a movie with peers

## 2018-12-15 NOTE — PROGRESS NOTES
Psychiatry Progress Note    Subjective: Interval History     Patient was on a pass yesterday and attended [facility]  He continues to have a constricted and blunted affect  This morning he is seen resting comfortably in bed with sheets pulled over his head  The patient has no complaints to offer me at this time  He continues to be compliant with medications as well as meals      Behavior over the last 24 hours:  unchanged  Sleep: hypersomnia  Appetite: normal  Medication side effects: No  ROS: no complaints    Current medications:    Current Facility-Administered Medications:     atropine (ISOPTO ATROPINE) 1 % ophthalmic solution 1 drop, 1 drop, Sublingual, TID, Zenon Lay MD, 1 drop at 12/15/18 0851    bisacodyl (DULCOLAX) EC tablet 10 mg, 10 mg, Oral, Daily PRN, Provider Cutover, 10 mg at 12/11/18 1959    bisacodyl (DULCOLAX) rectal suppository 10 mg, 10 mg, Rectal, Daily PRN, Birdena Epley Ciminieri, CRNP    buPROPion (WELLBUTRIN XL) 24 hr tablet 300 mg, 300 mg, Oral, Daily, Provider Cutover, 300 mg at 12/15/18 0850    cloZAPine (CLOZARIL) tablet 450 mg, 450 mg, Oral, HS, Ellis Kwan PA-C, 450 mg at 12/14/18 2109    divalproex sodium (DEPAKOTE ER) 24 hr tablet 1,000 mg, 1,000 mg, Oral, HS, Carolin Oakley MD, 1,000 mg at 12/14/18 2056    fluPHENAZine (PROLIXIN) tablet 20 mg, 20 mg, Oral, HS, Zenon Lay MD, 20 mg at 12/14/18 2056    levothyroxine tablet 75 mcg, 75 mcg, Oral, Early Morning, Provider Cutover, 75 mcg at 12/15/18 0977    midodrine (PROAMATINE) tablet 10 mg, 10 mg, Oral, TID, Wally Lynn MD, 10 mg at 12/15/18 3500    polyethylene glycol (MIRALAX) packet 17 g, 17 g, Oral, Daily PRN, Zenon Lay MD    senna (SENOKOT) tablet 8 6 mg, 1 tablet, Oral, BID, LIZETH Meyer, 8 6 mg at 12/15/18 9580    Current Problem List:    Patient Active Problem List   Diagnosis    Chronic paranoid schizophrenia ([facility] [address] )    Encounter for medical assessment    Chronic tachycardia    Tobacco abuse Problem list reviewed 12/15/18     Objective:     Vital Signs:  Vitals:    12/14/18 1605 12/14/18 1930 12/15/18 0604 12/15/18 0900   BP: 107/68 95/65 90/58 104/71   BP Location: Right arm Left arm Right arm Right arm   Pulse: (!) 126 (!) 131  100   Resp:    18   Temp:    (!) 97 4 °F (36 3 °C)   TempSrc:    Tympanic   SpO2:       Weight:       Height:             Appearance:  age appropriate and casually dressed   Behavior:  normal   Speech:  soft   Mood:  depressed   Affect:  constricted   Thought Process:  normal   Thought Content:  normal   Perceptual Disturbances: Auditory hallucinations without commands   Risk Potential: none   Sensorium:  person, place, situation and time   Cognition:  intact   Consciousness:  alert and awake    Attention: attention span and concentration were age appropriate   Intellect: average   Insight:  limited   Judgment: limited      Motor Activity: no abnormal movements       I/O Past 24 hours:  No intake/output data recorded  No intake/output data recorded  Labs:  Reviewed 12/15/18    Progress Toward Goals:  Unchanged    Assessment / Plan:     [facility]    Recommended Treatment:      Medication changes:  1) no change    Non-pharmacological treatments  1) Continue with group therapy, milieu therapy and occupational therapy  Safety  1) Safety/communication plan established targeting dynamic risk factors above  2) Risks, benefits, and possible side effects of medications explained to patient and patient verbalizes understanding  Counseling / Coordination of Care    Total floor / unit time spent today 20 minutes  Greater than 50% of total time was spent with the patient and / or family counseling and / or coordination of care  A description of the counseling / coordination of care       Patient's Rights, confidentiality and exceptions to confidentiality, use of automated medical record, [facility] staff access to medical record, and consent to treatment reviewed      Kelli Arceo PA-C

## 2018-12-15 NOTE — PROGRESS NOTES
Pt appears to be sleeping well through the night, no distress noted, respirations even and non labored  Safety precautions, Q15 min checks maintained, no problem behaviors noted or reported, will continue to monitor

## 2018-12-15 NOTE — PROGRESS NOTES
Took HS medication and mouth check was done  Ate snack before going to bed  Patient safety and fall precautions maintained without incident

## 2018-12-16 PROCEDURE — 99231 SBSQ HOSP IP/OBS SF/LOW 25: CPT | Performed by: NURSE PRACTITIONER

## 2018-12-16 RX ADMIN — FLUPHENAZINE HYDROCHLORIDE 20 MG: 10 TABLET, FILM COATED ORAL at 21:19

## 2018-12-16 RX ADMIN — ATROPINE SULFATE 1 DROP: 10 SOLUTION/ DROPS OPHTHALMIC at 21:19

## 2018-12-16 RX ADMIN — CLOZAPINE 450 MG: 25 TABLET ORAL at 21:19

## 2018-12-16 RX ADMIN — BUPROPION HYDROCHLORIDE 300 MG: 300 TABLET, FILM COATED, EXTENDED RELEASE ORAL at 08:43

## 2018-12-16 RX ADMIN — ATROPINE SULFATE 1 DROP: 10 SOLUTION/ DROPS OPHTHALMIC at 16:58

## 2018-12-16 RX ADMIN — MIDODRINE HYDROCHLORIDE 10 MG: 2.5 TABLET ORAL at 06:10

## 2018-12-16 RX ADMIN — ATROPINE SULFATE 1 DROP: 10 SOLUTION/ DROPS OPHTHALMIC at 08:44

## 2018-12-16 RX ADMIN — DIVALPROEX SODIUM 1000 MG: 500 TABLET, FILM COATED, EXTENDED RELEASE ORAL at 21:19

## 2018-12-16 RX ADMIN — SENNOSIDES 8.6 MG: 8.6 TABLET, FILM COATED ORAL at 21:19

## 2018-12-16 RX ADMIN — LEVOTHYROXINE SODIUM 75 MCG: 75 TABLET ORAL at 06:09

## 2018-12-16 RX ADMIN — MIDODRINE HYDROCHLORIDE 10 MG: 2.5 TABLET ORAL at 16:58

## 2018-12-16 RX ADMIN — MIDODRINE HYDROCHLORIDE 10 MG: 2.5 TABLET ORAL at 21:19

## 2018-12-16 RX ADMIN — SENNOSIDES 8.6 MG: 8.6 TABLET, FILM COATED ORAL at 08:43

## 2018-12-16 NOTE — PROGRESS NOTES
Patient isolative to room  Out for structure and for meds and meals  Patient has fair eye contact, responds softly and does not complete what he is saying  No complaints of SI, HI, audio or visual hallucinations, anxiety or depression

## 2018-12-16 NOTE — PROGRESS NOTES
Patient isolative to room most of the shift, presents with a paranoid affect, went to wrap up group  No change in previous assessment

## 2018-12-16 NOTE — PROGRESS NOTES
Bib Enriquez has been visible but isolate to himself    He attended groups today and was compliant with routine medications and meals, gait steady, remains on Fall precautions, fluids encouraged, denied pain

## 2018-12-16 NOTE — PROGRESS NOTES
Progress Note - Behavioral Health   Alok Rasmussen 26 y o  male MRN: [id number]  Unit/Bed#: [facility] 112-01 Encounter: [id number]    Assessment/Plan   Principal Problem:    Chronic paranoid schizophrenia ([address] )  Active Problems:    Encounter for medical assessment    Chronic tachycardia    Tobacco abuse   Patient was seen for continuing care and treatment  Case was discussed with the nursing staff  On examination today, the patient is very suspicious and psychotic  He is visible but he isolates himself  He has no or very limited interaction with others  Compliant with meds and treatment  No new issues or concerns noted over the last 24 hours  Continue current meds and treatment per Dr Garrett Torres  Behavior over the last 24 hours:  unchanged  Sleep: normal  Appetite: normal  Medication side effects: No  ROS: no complaints    Mental Status Evaluation:  Appearance:  casually dressed and disheveled   Behavior:  evasive and guarded   Speech:  normal volume   Mood:  constricted   Affect:  blunted and constricted   Thought Process:  circumstantial   Thought Content:  delusions  obsessive/rumination and obsessions   Perceptual Disturbances: Auditory hallucinations without commands   Risk Potential: None   Sensorium:  person and place   Cognition:  impaired due to illness   Consciousness:  alert and awake    Attention: attention span appeared shorter than expected for age   Insight:  impaired due to illness   Judgment: impaired due to illness   Gait/Station: normal gait/station   Motor Activity: no abnormal movements     Progress Toward Goals: No change    Recommended Treatment: Continue with group therapy, milieu therapy and occupational therapy  Risks, benefits and possible side effects of Medications:   Risks, benefits, and possible side effects of medications explained to patient and patient verbalizes understanding  Medications: continue current psychiatric medications      Labs: Reviewed    Counseling / Coordination of Care  Total floor / unit time spent today 15 minutes  Greater than 50% of total time was spent with the patient and / or family counseling and / or coordination of care   A description of the counseling / coordination of care: 10

## 2018-12-17 RX ADMIN — ATROPINE SULFATE 1 DROP: 10 SOLUTION/ DROPS OPHTHALMIC at 16:30

## 2018-12-17 RX ADMIN — BUPROPION HYDROCHLORIDE 300 MG: 300 TABLET, FILM COATED, EXTENDED RELEASE ORAL at 08:18

## 2018-12-17 RX ADMIN — LEVOTHYROXINE SODIUM 75 MCG: 75 TABLET ORAL at 06:15

## 2018-12-17 RX ADMIN — MIDODRINE HYDROCHLORIDE 10 MG: 2.5 TABLET ORAL at 06:15

## 2018-12-17 RX ADMIN — SENNOSIDES 8.6 MG: 8.6 TABLET, FILM COATED ORAL at 08:17

## 2018-12-17 RX ADMIN — ATROPINE SULFATE 1 DROP: 10 SOLUTION/ DROPS OPHTHALMIC at 08:18

## 2018-12-17 NOTE — PROGRESS NOTES
Patient isolative to room, med and meal compliant  Patient ambulates on unit, scanning the unit as he walks the unit with his hands in his pocket, slightly hunched over  Patient was noted to be responding and laughing quietly but hysterically  His face was red and his head was flinging back as he was quietly laughing  Patient quickly stopped laughing, straightened up, became serious when he noticed the nurse was watching him  Patient attended evening group

## 2018-12-17 NOTE — PROGRESS NOTES
Psychiatry Progress Note    Subjective: Interval History     Patient is the same  He is withdrawn little eye contact flat affect, and also no interaction with any of his fellow residents ; he seems continue the paranoid about the people around him, not trusting him and the avoiding contact if it all possible      Behavior over the last 24 hours:  unchanged  Sleep: hypersomnia  Appetite: normal  Medication side effects: No  ROS: no complaints    Current medications:    Current Facility-Administered Medications:     atropine (ISOPTO ATROPINE) 1 % ophthalmic solution 1 drop, 1 drop, Sublingual, TID, Jackie Rogers MD, 1 drop at 12/17/18 0818    bisacodyl (DULCOLAX) EC tablet 10 mg, 10 mg, Oral, Daily PRN, Provider Cutover, 10 mg at 12/11/18 5762    bisacodyl (DULCOLAX) rectal suppository 10 mg, 10 mg, Rectal, Daily PRN, LIZETH Osborne    buPROPion (WELLBUTRIN XL) 24 hr tablet 300 mg, 300 mg, Oral, Daily, Provider Cutover, 300 mg at 12/17/18 0818    cloZAPine (CLOZARIL) tablet 450 mg, 450 mg, Oral, HS, Nguyen Peng PA-C, 450 mg at 12/16/18 2119    divalproex sodium (DEPAKOTE ER) 24 hr tablet 1,000 mg, 1,000 mg, Oral, HS, Leeroy Seay MD, 1,000 mg at 12/16/18 2119    fluPHENAZine (PROLIXIN) tablet 20 mg, 20 mg, Oral, HS, Jackie Rogers MD, 20 mg at 12/16/18 2119    levothyroxine tablet 75 mcg, 75 mcg, Oral, Early Morning, Provider Cutover, 75 mcg at 12/17/18 0615    midodrine (PROAMATINE) tablet 10 mg, 10 mg, Oral, TID, Wally Lynn MD, 10 mg at 12/17/18 0615    polyethylene glycol (MIRALAX) packet 17 g, 17 g, Oral, Daily PRN, Jackie Rogers MD    senna (SENOKOT) tablet 8 6 mg, 1 tablet, Oral, BID, LIZETH Meyer, 8 6 mg at 12/17/18 9630    Current Problem List:    Patient Active Problem List   Diagnosis    Chronic paranoid schizophrenia ([address] )    Encounter for medical assessment    Chronic tachycardia    Tobacco abuse       Problem list reviewed 12/17/18     Objective:     Vital Signs:  Vitals:    12/16/18 1611 12/16/18 1612 12/16/18 1900 12/17/18 0500   BP: 118/85 113/83 105/62 (!) 96/49   BP Location: Left arm Left arm Left arm Left arm   Pulse: (!) 107 (!) 113 (!) 130 (!) 115   Resp:  20     Temp:  97 7 °F (36 5 °C)     TempSrc:  Tympanic     SpO2:       Weight:       Height:             Appearance:  age appropriate and casually dressed   Behavior:  guarded   Speech:  soft   Mood:  depressed   Affect:  constricted   Thought Process:  blocked   Thought Content:  Paranoid   Perceptual Disturbances: None   Risk Potential: none   Sensorium:  person, place, situation and time   Cognition:  intact   Consciousness:  alert and awake    Attention: attention span and concentration were not age appropriate   Intellect: average   Insight:  limited   Judgment: limited      Motor Activity: no abnormal movements       I/O Past 24 hours:  I/O last 3 completed shifts:  In: -   Out: 2 [Stool:2]  No intake/output data recorded  Labs:  Reviewed 12/17/18    Progress Toward Goals:  Remains the same    Assessment / Plan:     [facility]    Recommended Treatment:      Medication changes:  1) no change    Non-pharmacological treatments  1) Continue with group therapy, milieu therapy and occupational therapy  Safety  1) Safety/communication plan established targeting dynamic risk factors above  2) Risks, benefits, and possible side effects of medications explained to patient and patient verbalizes understanding  Counseling / Coordination of Care    Total floor / unit time spent today 20 minutes  Greater than 50% of total time was spent with the patient and / or family counseling and / or coordination of care  A description of the counseling / coordination of care  Patient's Rights, confidentiality and exceptions to confidentiality, use of automated medical record, [facility] staff access to medical record, and consent to treatment reviewed      Stevie Lopez Kaylee Osorio MD

## 2018-12-17 NOTE — PROGRESS NOTES
Evans Zheng has been visible but isolate to himself  Renettajose Ross attended goals group today and was compliant with routine medications with prompting from staff, gait steady, remains on Fall precautions, fluids encouraged, denied pain, ate 100% of breakfast, refused lunch because he will be going out to eat later with his mom    He will be leaving for an overnight pass with his mom at about 4pm this afternoon, medications received from the pharmacy

## 2018-12-18 PROCEDURE — 80307 DRUG TEST PRSMV CHEM ANLYZR: CPT | Performed by: PSYCHIATRY & NEUROLOGY

## 2018-12-18 RX ORDER — CLOZAPINE 50 MG/1
450 TABLET ORAL
Qty: 30 TABLET | Refills: 12 | Status: CANCELLED | OUTPATIENT
Start: 2018-12-18

## 2018-12-18 RX ORDER — POLYETHYLENE GLYCOL 3350 17 G/17G
17 POWDER, FOR SOLUTION ORAL DAILY PRN
Qty: 14 EACH | Refills: 0 | Status: CANCELLED | OUTPATIENT
Start: 2018-12-18

## 2018-12-18 RX ORDER — ATROPINE SULFATE 10 MG/ML
1 SOLUTION/ DROPS OPHTHALMIC 3 TIMES DAILY
Qty: 2 ML | Refills: 0 | Status: CANCELLED | OUTPATIENT
Start: 2018-12-18

## 2018-12-18 RX ORDER — POLYETHYLENE GLYCOL 3350 17 G/17G
17 POWDER, FOR SOLUTION ORAL DAILY PRN
Qty: 14 EACH | Refills: 0 | Status: SHIPPED | OUTPATIENT
Start: 2018-12-18 | End: 2019-04-23 | Stop reason: SDUPTHER

## 2018-12-18 RX ORDER — FLUPHENAZINE HYDROCHLORIDE 10 MG/1
20 TABLET ORAL
Qty: 60 TABLET | Refills: 0 | Status: SHIPPED | OUTPATIENT
Start: 2018-12-18

## 2018-12-18 RX ORDER — LEVOTHYROXINE SODIUM 0.07 MG/1
75 TABLET ORAL
Qty: 30 TABLET | Refills: 0 | Status: SHIPPED | OUTPATIENT
Start: 2018-12-18 | End: 2018-12-27 | Stop reason: SDUPTHER

## 2018-12-18 RX ORDER — SENNOSIDES 8.6 MG
1 TABLET ORAL 2 TIMES DAILY
Qty: 120 EACH | Refills: 0 | Status: SHIPPED | OUTPATIENT
Start: 2018-12-18 | End: 2018-12-27 | Stop reason: SDUPTHER

## 2018-12-18 RX ORDER — BISACODYL 10 MG
10 SUPPOSITORY, RECTAL RECTAL DAILY PRN
Qty: 12 SUPPOSITORY | Refills: 0 | Status: SHIPPED | OUTPATIENT
Start: 2018-12-18 | End: 2018-12-27 | Stop reason: SDUPTHER

## 2018-12-18 RX ORDER — LEVOTHYROXINE SODIUM 0.07 MG/1
75 TABLET ORAL
Qty: 30 TABLET | Refills: 0 | Status: CANCELLED | OUTPATIENT
Start: 2018-12-18

## 2018-12-18 RX ORDER — CLOZAPINE 200 MG/1
400 TABLET ORAL
Qty: 60 TABLET | Refills: 0 | Status: SHIPPED | OUTPATIENT
Start: 2018-12-18

## 2018-12-18 RX ORDER — FLUPHENAZINE HYDROCHLORIDE 10 MG/1
20 TABLET ORAL
Qty: 60 TABLET | Refills: 0 | Status: CANCELLED | OUTPATIENT
Start: 2018-12-18

## 2018-12-18 RX ORDER — BISACODYL 10 MG
10 SUPPOSITORY, RECTAL RECTAL DAILY PRN
Qty: 12 SUPPOSITORY | Refills: 0 | Status: CANCELLED | OUTPATIENT
Start: 2018-12-18

## 2018-12-18 RX ORDER — BUPROPION HYDROCHLORIDE 300 MG/1
300 TABLET ORAL DAILY
Qty: 30 TABLET | Refills: 0 | Status: SHIPPED | OUTPATIENT
Start: 2018-12-18

## 2018-12-18 RX ORDER — CLOZAPINE 50 MG/1
50 TABLET ORAL
Qty: 30 TABLET | Refills: 0 | Status: SHIPPED | OUTPATIENT
Start: 2018-12-18

## 2018-12-18 RX ORDER — MIDODRINE HYDROCHLORIDE 10 MG/1
10 TABLET ORAL 3 TIMES DAILY
Qty: 90 TABLET | Refills: 0 | Status: CANCELLED | OUTPATIENT
Start: 2018-12-18

## 2018-12-18 RX ORDER — ATROPINE SULFATE 10 MG/ML
1 SOLUTION/ DROPS OPHTHALMIC 3 TIMES DAILY
Qty: 2 ML | Refills: 0 | Status: SHIPPED | OUTPATIENT
Start: 2018-12-18 | End: 2019-12-10 | Stop reason: ALTCHOICE

## 2018-12-18 RX ORDER — DIVALPROEX SODIUM 500 MG/1
250 TABLET, DELAYED RELEASE ORAL EVERY 8 HOURS SCHEDULED
Refills: 0 | Status: CANCELLED | OUTPATIENT
Start: 2018-12-18

## 2018-12-18 RX ORDER — DIVALPROEX SODIUM 500 MG/1
1000 TABLET, EXTENDED RELEASE ORAL
Qty: 60 TABLET | Refills: 0 | Status: SHIPPED | OUTPATIENT
Start: 2018-12-18

## 2018-12-18 RX ORDER — MIDODRINE HYDROCHLORIDE 10 MG/1
10 TABLET ORAL 3 TIMES DAILY
Qty: 90 TABLET | Refills: 0 | Status: SHIPPED | OUTPATIENT
Start: 2018-12-18 | End: 2019-02-08 | Stop reason: SDUPTHER

## 2018-12-18 RX ADMIN — DIVALPROEX SODIUM 1000 MG: 500 TABLET, FILM COATED, EXTENDED RELEASE ORAL at 20:37

## 2018-12-18 RX ADMIN — MIDODRINE HYDROCHLORIDE 10 MG: 2.5 TABLET ORAL at 20:37

## 2018-12-18 RX ADMIN — MIDODRINE HYDROCHLORIDE 10 MG: 2.5 TABLET ORAL at 17:33

## 2018-12-18 RX ADMIN — FLUPHENAZINE HYDROCHLORIDE 20 MG: 10 TABLET, FILM COATED ORAL at 20:38

## 2018-12-18 RX ADMIN — CLOZAPINE 450 MG: 25 TABLET ORAL at 21:03

## 2018-12-18 RX ADMIN — SENNOSIDES 8.6 MG: 8.6 TABLET, FILM COATED ORAL at 20:38

## 2018-12-18 RX ADMIN — ATROPINE SULFATE 1 DROP: 10 SOLUTION/ DROPS OPHTHALMIC at 17:32

## 2018-12-18 RX ADMIN — ATROPINE SULFATE 1 DROP: 10 SOLUTION/ DROPS OPHTHALMIC at 20:38

## 2018-12-18 NOTE — PROGRESS NOTES
Patient continues to be out of the hospital on an overnight pass  Due to return today, 12/18 at 1600

## 2018-12-18 NOTE — PROGRESS NOTES
Patient is out of the hospital, on an overnight pass with mother at home  He called in to said everything was well and felt safe  Nursing does not report any new issues

## 2018-12-18 NOTE — PROGRESS NOTES
Stevie Loya called to check in  At 2107 while out on overnight pass with his mother  Pt stated that the pass is going well  Pt to return to [facility] tomorrow from pass at 1600 with ACT team staff member

## 2018-12-18 NOTE — PROGRESS NOTES
Writer reviewed meds with pt and his mother and both verbalized understanding  1600 dose of Midodrine held per MD BP parameters  Pt informed that he is to call and check in at 2100 tonight and 0900 tomorrow AM   Mother also asked if pt is to go to [employer] and per RAYNA Dunne, mother instructed to call [facility] tomorrow and ask if they will be picking up pt tomorrow for [facility] and if not then pt not to go to [facility] but to return from pass with ACT tomorrow at 1600  Mother verbalized understanding  Pt left for overnight pass with mother at 0

## 2018-12-19 LAB
BASOPHILS # BLD AUTO: 0 THOUSANDS/ΜL (ref 0–0.1)
BASOPHILS NFR BLD AUTO: 1 % (ref 0–1)
EOSINOPHIL # BLD AUTO: 0.5 THOUSAND/ΜL (ref 0–0.4)
EOSINOPHIL NFR BLD AUTO: 7 % (ref 0–6)
ERYTHROCYTE [DISTWIDTH] IN BLOOD BY AUTOMATED COUNT: 13.3 %
HCT VFR BLD AUTO: 46.5 % (ref 41–53)
HGB BLD-MCNC: 15.3 G/DL (ref 13.5–17.5)
LYMPHOCYTES # BLD AUTO: 3 THOUSANDS/ΜL (ref 0.5–4)
LYMPHOCYTES NFR BLD AUTO: 43 % (ref 25–45)
MCH RBC QN AUTO: 28.1 PG (ref 26–34)
MCHC RBC AUTO-ENTMCNC: 33 G/DL (ref 31–36)
MCV RBC AUTO: 85 FL (ref 80–100)
MONOCYTES # BLD AUTO: 0.5 THOUSAND/ΜL (ref 0.2–0.9)
MONOCYTES NFR BLD AUTO: 7 % (ref 1–10)
NEUTROPHILS # BLD AUTO: 3 THOUSANDS/ΜL (ref 1.8–7.8)
NEUTS SEG NFR BLD AUTO: 43 % (ref 45–65)
PLATELET # BLD AUTO: 209 THOUSANDS/UL (ref 150–450)
PMV BLD AUTO: 8.3 FL (ref 8.9–12.7)
RBC # BLD AUTO: 5.46 MILLION/UL (ref 4.5–5.9)
WBC # BLD AUTO: 6.9 THOUSAND/UL (ref 4.5–11)

## 2018-12-19 PROCEDURE — 85025 COMPLETE CBC W/AUTO DIFF WBC: CPT | Performed by: PSYCHIATRY & NEUROLOGY

## 2018-12-19 RX ADMIN — FLUPHENAZINE HYDROCHLORIDE 20 MG: 10 TABLET, FILM COATED ORAL at 20:39

## 2018-12-19 RX ADMIN — CLOZAPINE 450 MG: 25 TABLET ORAL at 21:14

## 2018-12-19 RX ADMIN — MIDODRINE HYDROCHLORIDE 10 MG: 2.5 TABLET ORAL at 06:24

## 2018-12-19 RX ADMIN — MIDODRINE HYDROCHLORIDE 10 MG: 2.5 TABLET ORAL at 20:38

## 2018-12-19 RX ADMIN — ATROPINE SULFATE 1 DROP: 10 SOLUTION/ DROPS OPHTHALMIC at 20:39

## 2018-12-19 RX ADMIN — BUPROPION HYDROCHLORIDE 300 MG: 300 TABLET, FILM COATED, EXTENDED RELEASE ORAL at 08:34

## 2018-12-19 RX ADMIN — DIVALPROEX SODIUM 1000 MG: 500 TABLET, FILM COATED, EXTENDED RELEASE ORAL at 20:39

## 2018-12-19 RX ADMIN — ATROPINE SULFATE 1 DROP: 10 SOLUTION/ DROPS OPHTHALMIC at 08:34

## 2018-12-19 RX ADMIN — MIDODRINE HYDROCHLORIDE 10 MG: 2.5 TABLET ORAL at 16:54

## 2018-12-19 RX ADMIN — LEVOTHYROXINE SODIUM 75 MCG: 75 TABLET ORAL at 06:24

## 2018-12-19 RX ADMIN — ATROPINE SULFATE 1 DROP: 10 SOLUTION/ DROPS OPHTHALMIC at 16:54

## 2018-12-19 RX ADMIN — SENNOSIDES 8.6 MG: 8.6 TABLET, FILM COATED ORAL at 08:34

## 2018-12-19 RX ADMIN — SENNOSIDES 8.6 MG: 8.6 TABLET, FILM COATED ORAL at 20:39

## 2018-12-19 NOTE — PROGRESS NOTES
2125 Cleotha Salmon returned from deanna accompanied by ACT worker who commented that "he doesn't talk much"  Cleotha Salmon presented as flat, but, said deanna went well, left laundered clothes @ home since being discharged Thursday  He was cooperative w/body search & in providing UDS  Came up on own for scheduled medicines, ate 25% of meal, took part in PM Group, had HS snack & retired now to bed  Is socially withdrawn, though, observed responding to internal stimuli; talking & smiling to himself as walked in halls

## 2018-12-19 NOTE — PROGRESS NOTES
Psychiatry Progress Note    Subjective: Interval History     Patient returns from his overnight pass home which went well  Continues to hallucinate talked himself smiling at times and is withdrawn seems suspicious of others and paranoid in general but does not express any delusions at this time    Patient tolerates his medication well no side effects reported patient is planning for discharge later this week    Behavior over the last 24 hours:  unchanged  Sleep: hypersomnia  Appetite: normal  Medication side effects: No  ROS: no complaints    Current medications:    Current Facility-Administered Medications:     atropine (ISOPTO ATROPINE) 1 % ophthalmic solution 1 drop, 1 drop, Sublingual, TID, Rigo Perry MD, 1 drop at 12/18/18 2038    bisacodyl (DULCOLAX) EC tablet 10 mg, 10 mg, Oral, Daily PRN, Provider Cutover, 10 mg at 12/11/18 5136    bisacodyl (DULCOLAX) rectal suppository 10 mg, 10 mg, Rectal, Daily PRN, LIZETH Herrera    buPROPion (WELLBUTRIN XL) 24 hr tablet 300 mg, 300 mg, Oral, Daily, Provider Cutover, 300 mg at 12/17/18 0818    cloZAPine (CLOZARIL) tablet 450 mg, 450 mg, Oral, HS, Dwayne Rey PA-C, 450 mg at 12/18/18 2103    divalproex sodium (DEPAKOTE ER) 24 hr tablet 1,000 mg, 1,000 mg, Oral, HS, Nova Collins MD, 1,000 mg at 12/18/18 2037    fluPHENAZine (PROLIXIN) tablet 20 mg, 20 mg, Oral, HS, Rigo Perry MD, 20 mg at 12/18/18 2038    levothyroxine tablet 75 mcg, 75 mcg, Oral, Early Morning, Provider Cutover, 75 mcg at 12/19/18 1045    midodrine (PROAMATINE) tablet 10 mg, 10 mg, Oral, TID, Wally Lynn MD, 10 mg at 12/19/18 1349    polyethylene glycol (MIRALAX) packet 17 g, 17 g, Oral, Daily PRN, Rigo Perry MD    [facility]) tablet 8 6 mg, 1 tablet, Oral, BID, LIZETH Meyer, 8 6 mg at 12/18/18 2038    Current Problem List:    Patient Active Problem List   Diagnosis    [facility] ([facility] [address] [facility]    Encounter for medical assessment    Chronic tachycardia    Tobacco abuse       Problem list reviewed 12/19/18     Objective:     Vital Signs:  Vitals:    12/17/18 1500 12/17/18 1501 12/17/18 1502 12/19/18 0627   BP: 108/72 121/87 122/65 109/73   BP Location: Left arm Left arm Left arm    Pulse: 99 (!) 107 (!) 122 (!) 107   Resp: 17   16   Temp: 97 9 °F (36 6 °C)   97 8 °F (36 6 °C)   TempSrc: Temporal      SpO2:    94%   Weight:       Height:             Appearance:  age appropriate and casually dressed   Behavior:  guarded   Speech:  soft   Mood:  depressed   Affect:  constricted   Thought Process:  blocked   Thought Content:  delusions  persecutory   Perceptual Disturbances: Auditory hallucinations without commands   Risk Potential: none   Sensorium:  person, place, situation and time   Cognition:  intact   Consciousness:  alert and awake    Attention: attention span and concentration were age appropriate   Intellect: average   Insight:  limited   Judgment: limited      Motor Activity: no abnormal movements       I/O Past 24 hours:  No intake/output data recorded  No intake/output data recorded  Labs:  Reviewed 12/19/18    Progress Toward Goals:  Patient seems to be stabilizing, although he still presents with the psychotic symptoms    Assessment / Plan:     Chronic paranoid schizophrenia ([facility] [address] )    Recommended Treatment:      Medication changes:  1) none    Non-pharmacological treatments  1) Continue with group therapy, milieu therapy and occupational therapy  Safety  1) Safety/communication plan established targeting dynamic risk factors above  2) Risks, benefits, and possible side effects of medications explained to patient and patient verbalizes understanding  Counseling / Coordination of Care    Total floor / unit time spent today 20 minutes  Greater than 50% of total time was spent with the patient and / or family counseling and / or coordination of care  A description of the counseling / coordination of care       Patient's Rights, confidentiality and exceptions to confidentiality, use of automated medical record, [facility] staff access to medical record, and consent to treatment reviewed      Pema Ferrara MD

## 2018-12-19 NOTE — PROGRESS NOTES
Pt slept the rest of the nights, moving in bed, chest rising and dropping  Woke up for vitals and blood work no issues  Will continue to monitor

## 2018-12-19 NOTE — PROGRESS NOTES
Spoke with Dr Suyapa Lisa about Vaughn's Clozaril Rx for discharge  The outpatient pharmacy is unable to fill  Clozaril for Vaughn's discharge because his name his spelled wrong in the registry  The pharmacist is unable to correct the spelling with the registry  Dr Suyapa Lisa was given the phone number needed to correct this issue, [phone] and will follow up this morning  He was asked to please call us back if he is unable to correct the problem  The the specific issue is that [id number] [employer] last name is misspelled  The E is missing from the end of his last name

## 2018-12-20 VITALS
SYSTOLIC BLOOD PRESSURE: 112 MMHG | DIASTOLIC BLOOD PRESSURE: 81 MMHG | TEMPERATURE: 98.5 F | BODY MASS INDEX: 25.58 KG/M2 | OXYGEN SATURATION: 94 % | HEART RATE: 104 BPM | WEIGHT: 193 LBS | RESPIRATION RATE: 18 BRPM | HEIGHT: 73 IN

## 2018-12-20 RX ADMIN — ATROPINE SULFATE 1 DROP: 10 SOLUTION/ DROPS OPHTHALMIC at 09:32

## 2018-12-20 RX ADMIN — SENNOSIDES 8.6 MG: 8.6 TABLET, FILM COATED ORAL at 09:32

## 2018-12-20 RX ADMIN — MIDODRINE HYDROCHLORIDE 10 MG: 2.5 TABLET ORAL at 06:16

## 2018-12-20 RX ADMIN — LEVOTHYROXINE SODIUM 75 MCG: 75 TABLET ORAL at 06:16

## 2018-12-20 RX ADMIN — BUPROPION HYDROCHLORIDE 300 MG: 300 TABLET, FILM COATED, EXTENDED RELEASE ORAL at 09:32

## 2018-12-20 NOTE — PLAN OF CARE
Individual is being discharged today  During his treatment team meeting this morning the team reviewed progress that was made will in the program along with recovery/ care plan goals which were achieved

## 2018-12-20 NOTE — PROGRESS NOTES
Pt reports for meals and meds without prompt  Attempt to engage pt in conversation related to scheduled discharge today, met with soft verbal mumbling vague response  Affect remains flat  1115 Pt discharged with Act Rep  All personal belongings returned to pt   Education r/t discharge plan, medication and follow up instructions- written and verbal reviewed with pt by charge nurse Stevie Hutchison

## 2018-12-20 NOTE — PROGRESS NOTES
Keily Lisa has been awake, alert, and visible intermittently out in the milieu  Behavior remains quiet and isolative to self with flat, blunted affect  No interaction noted with peers  Ate 100% supper  Walks around unit at times and reads activity board in hallway  Attended and participated in 2/2 evening groups  Compliant with all scheduled meds with little prompting  Had evening snack  Resting quietly in bed at present, arouses easily  Will continue to monitor/assess for any changes

## 2018-12-20 NOTE — PROGRESS NOTES
Psychiatry Progress Note    Subjective: Interval History     Team mtg today, pt present; pt is to be discharged today       Behavior over the last 24 hours:  unchanged  Sleep: hypersomnia  Appetite: normal  Medication side effects: No  ROS: no complaints    Current medications:    Current Facility-Administered Medications:     atropine (ISOPTO ATROPINE) 1 % ophthalmic solution 1 drop, 1 drop, Sublingual, TID, Yudelka Willett MD, 1 drop at 12/19/18 2039    bisacodyl (DULCOLAX) EC tablet 10 mg, 10 mg, Oral, Daily PRN, Provider Cutover, 10 mg at 12/11/18 6403    bisacodyl (DULCOLAX) rectal suppository 10 mg, 10 mg, Rectal, Daily PRN, Vinnie Apley Ciminieri, CRNP    buPROPion (WELLBUTRIN XL) 24 hr tablet 300 mg, 300 mg, Oral, Daily, Provider Cutover, 300 mg at 12/19/18 0834    cloZAPine (CLOZARIL) tablet 450 mg, 450 mg, Oral, HS, Tad Duval PA-C, 450 mg at 12/19/18 2114    divalproex sodium (DEPAKOTE ER) 24 hr tablet 1,000 mg, 1,000 mg, Oral, HS, Chris Fatima MD, 1,000 mg at 12/19/18 2039    fluPHENAZine (PROLIXIN) tablet 20 mg, 20 mg, Oral, HS, Yudelka Wlilett MD, 20 mg at 12/19/18 2039    levothyroxine tablet 75 mcg, 75 mcg, Oral, Early Morning, Provider Cutover, 75 mcg at 12/20/18 0616    midodrine (PROAMATINE) tablet 10 mg, 10 mg, Oral, TID, Wally Lynn MD, 10 mg at 12/20/18 7778    polyethylene glycol (MIRALAX) packet 17 g, 17 g, Oral, Daily PRN, Yudelka Willett MD    senna (SENOKOT) tablet 8 6 mg, 1 tablet, Oral, BID, LIZETH Meyer, 8 6 mg at 12/19/18 2039    Current Problem List:    Patient Active Problem List   Diagnosis    Chronic paranoid schizophrenia ([facility] )    Encounter for medical assessment    Chronic tachycardia    Tobacco abuse       Problem list reviewed 12/20/18     Objective:     Vital Signs:  Vitals:    12/19/18 1545 12/19/18 1555 12/19/18 2011 12/20/18 0700   BP: 117/81 98/72 95/69 112/81   BP Location: Right arm Right arm  Left arm   Pulse: (!) 107 (!) 120 (!) 127 104   Resp:    18 Temp:    98 5 °F (36 9 °C)   TempSrc:    Tympanic   SpO2:       Weight:       Height:             Appearance:  age appropriate, casually dressed and well dressed   Behavior:  normal   Speech:  soft   Mood:  depressed   Affect:  constricted   Thought Process:  normal   Thought Content:  normal   Perceptual Disturbances: Auditory hallucinations without commands   Risk Potential: none   Sensorium:  person, place, situation and time   Cognition:  intact   Consciousness:  alert and awake    Attention: attention span and concentration were age appropriate   Intellect: average   Insight:  limited   Judgment: limited      Motor Activity: no abnormal movements       I/O Past 24 hours:  No intake/output data recorded  No intake/output data recorded  Labs:  Reviewed 12/20/18    Progress Toward Goals: For DC today    Assessment / Plan:     Chronic paranoid [facility] ([facility] )    Recommended Treatment:      Medication changes:  1) none    Non-pharmacological treatments  1) Continue with group therapy, milieu therapy and occupational therapy  Safety  1) Safety/communication plan established targeting dynamic risk factors above  2) Risks, benefits, and possible side effects of medications explained to patient and patient verbalizes understanding  Counseling / Coordination of Care    Total floor / unit time spent today 20 minutes  Greater than 50% of total time was spent with the patient and / or family counseling and / or coordination of care  A description of the counseling / coordination of care  Patient's Rights, confidentiality and exceptions to confidentiality, use of automated medical record, [facility] staff access to medical record, and consent to treatment reviewed      Jackie Rogers MD

## 2018-12-20 NOTE — CASE MANAGEMENT
The patient is being discharged today to live at home with mother and family  The patient's mental status upon discharge was pleasant, cooperative and alert  The patient has follow-up appointments as follows:  Psychiatrist, Therapist, Case Management and PCP  The patient will be transported home by [facility] Team  Additional resources provided upon discharge included [employer] [place] applications and [employer] application  CM will continue to assist with any other discharge planning needs which may arise

## 2018-12-20 NOTE — PROGRESS NOTES
Individual will be discharge with the ACT team from [facility] Unit in the morning at 1100

## 2018-12-20 NOTE — ESCALATED TEAM TX
Patient attended treatment team meeting this morning prepared with self-assessment  Patient's group attendance for last week was 88%  Patient will be discharged home to his mother today  Patient verbalized no further questions or concerns at the conclusion of the meeting    Graduation at 10am

## 2018-12-24 ENCOUNTER — TELEPHONE (OUTPATIENT)
Dept: FAMILY MEDICINE CLINIC | Facility: CLINIC | Age: 33
End: 2018-12-24

## 2018-12-24 NOTE — DISCHARGE SUMMARY
Psychiatric Discharge Summary    Medical Record Number: [id number]  Encounter: [id number]    Discharge diagnosis:  Chronic paranoid schizophrenia [facility])    Secondary diagnoses:  Problem List     * (Principal)Chronic paranoid schizophrenia ([facility] ) (Chronic)    Encounter for medical assessment    Chronic tachycardia    Tobacco abuse          HPI:     This patient was [place] [facility] [place] he stayed approximately 3 months for treatment and did not improve significantly needed longer term treatment  He then was transferred to the [facility] at [facility]     Patient has long history of paranoid schizophrenia had living on his own apartment when his mother visited him it and Angelica Yang was trashed and also the patient was saying that the police should shoot him  He has had a difficult time with treatment for his illness has been a prior [facility] that in the past and has has had very little success being independent in the community  At meal in [facility] he had been on multiple medications including Suman rule and lithium Paxil and Depakote without significant improvement  On the EAC he continue with similar symptoms of auditory hallucinations the, people trying to possess him, his affect was totally flat he had little  interaction, poor communication and social withdrawal from other residents and staff  Psych medication was evaluated and changed  His clozapine dose had to be reduced from 600-450 because of orthostatic hypertension for which she was given admitted drawn as well as excessive hydration  He was tried on other medications including Wellbutrin and Depakote and Prolixin  On these medications he started to stabilize and function somewhat better in the community although he never was the interactive  He would answer questions briefly his affect was totally flat any continue to hallucinate when he was alone      He did however have a multiple passes the home which went well according to his mother and also [facility] the [facility] was going to use following discharge for social integration  Pterygium the hospitalization is mother agreed to take him home with her because we are having such a difficult time finding a suitable residential facility that would accept him  At 1 point during 1 of his passes with his mother when they went to see a movie regarding aliens controlling and taking over people's bodies and mines  , he said to his mother Diana Ochao is exactly how I feel           Condition on discharge:     Improved, but with some residual auditory hallucinations mild paranoia and social withdrawal     Medications Upon Discharge:     No current facility-administered medications for this encounter       Current Outpatient Prescriptions:     atropine (ISOPTO ATROPINE) 1 % ophthalmic solution, Place 1 drop under the tongue 3 (three) times a day, Disp: 2 mL, Rfl: 0    bisacodyl (DULCOLAX) 10 mg suppository, Insert 1 suppository (10 mg total) into the rectum daily as needed for constipation, Disp: 12 suppository, Rfl: 0    bisacodyl (DULCOLAX) 5 mg EC tablet, Take 2 tablets (10 mg total) by mouth daily as needed for constipation, Disp: 30 tablet, Rfl: 0    buPROPion (WELLBUTRIN XL) 300 mg 24 hr tablet, Take 1 tablet (300 mg total) by mouth daily, Disp: 30 tablet, Rfl: 0    clozapine (CLOZARIL) 200 MG tablet, Take 2 tablets (400 mg total) by mouth daily at bedtime, Disp: 60 tablet, Rfl: 0    cloZAPine (CLOZARIL) 50 MG tablet, Take 1 tablet (50 mg total) by mouth daily at bedtime, Disp: 30 tablet, Rfl: 0    divalproex sodium (DEPAKOTE ER) 500 mg 24 hr tablet, Take 2 tablets (1,000 mg total) by mouth daily at bedtime, Disp: 60 tablet, Rfl: 0    fluPHENAZine (PROLIXIN) 10 MG tablet, Take 2 tablets (20 mg total) by mouth daily at bedtime, Disp: 60 tablet, Rfl: 0    levothyroxine 75 mcg tablet, Take 1 tablet (75 mcg total) by mouth daily in the early morning, Disp: 30 tablet, Rfl: 0    midodrine (PROAMATINE) 10 MG tablet, Take 1 tablet (10 mg total) by mouth 3 (three) times a day, Disp: 90 tablet, Rfl: 0    polyethylene glycol (MIRALAX) 17 g packet, Take 17 g by mouth daily as needed (constipation), Disp: 14 each, Rfl: 0    senna (SENOKOT) 8 6 mg, Take 1 tablet (8 6 mg total) by mouth 2 (two) times a day, Disp: 120 each, Rfl: 0    Mitch Mckoy MD

## 2018-12-24 NOTE — TELEPHONE ENCOUNTER
Tried calling patient to confirm he is switching providers due to last telephone note stating that he was supposed to call [facility] office for follow up appointment  Patients phone number is disconnected, unable to leave message

## 2018-12-27 ENCOUNTER — OFFICE VISIT (OUTPATIENT)
Dept: FAMILY MEDICINE CLINIC | Facility: CLINIC | Age: 33
End: 2018-12-27
Payer: MEDICARE

## 2018-12-27 VITALS
SYSTOLIC BLOOD PRESSURE: 102 MMHG | TEMPERATURE: 97.8 F | RESPIRATION RATE: 20 BRPM | OXYGEN SATURATION: 98 % | WEIGHT: 198 LBS | BODY MASS INDEX: 26.82 KG/M2 | HEIGHT: 72 IN | HEART RATE: 121 BPM | DIASTOLIC BLOOD PRESSURE: 70 MMHG

## 2018-12-27 DIAGNOSIS — F20.0 CHRONIC PARANOID SCHIZOPHRENIA (HCC): Chronic | ICD-10-CM

## 2018-12-27 DIAGNOSIS — I95.1 ORTHOSTATIC HYPOTENSION: ICD-10-CM

## 2018-12-27 DIAGNOSIS — E03.9 HYPOTHYROIDISM, UNSPECIFIED TYPE: Primary | ICD-10-CM

## 2018-12-27 DIAGNOSIS — K59.00 CONSTIPATION, UNSPECIFIED CONSTIPATION TYPE: ICD-10-CM

## 2018-12-27 PROBLEM — Z00.8 ENCOUNTER FOR MEDICAL ASSESSMENT: Status: RESOLVED | Noted: 2018-10-03 | Resolved: 2018-12-27

## 2018-12-27 PROCEDURE — 99214 OFFICE O/P EST MOD 30 MIN: CPT | Performed by: PHYSICIAN ASSISTANT

## 2018-12-27 RX ORDER — SENNOSIDES 8.6 MG
1 TABLET ORAL 2 TIMES DAILY
Qty: 60 EACH | Refills: 5 | Status: SHIPPED | OUTPATIENT
Start: 2018-12-27

## 2018-12-27 RX ORDER — BISACODYL 10 MG
10 SUPPOSITORY, RECTAL RECTAL DAILY PRN
Qty: 30 SUPPOSITORY | Refills: 5 | Status: SHIPPED | OUTPATIENT
Start: 2018-12-27 | End: 2019-04-24

## 2018-12-27 RX ORDER — LEVOTHYROXINE SODIUM 0.07 MG/1
75 TABLET ORAL
Qty: 30 TABLET | Refills: 5 | Status: SHIPPED | OUTPATIENT
Start: 2018-12-27 | End: 2019-04-23 | Stop reason: SDUPTHER

## 2018-12-27 NOTE — PROGRESS NOTES
Assessment/Plan:    Chronic paranoid schizophrenia ([address] )  Appears stable  Continue f/u with ACT  Hypothyroidism  Continue levothyroxine 75 mcg daily  Follow-up in 6 months    Orthostatic hypotension  Continue midodrine    Constipation  Continue senna b i d  With as needed MiraLax and Dulcolax suppository    Instructed to make an appointment for eye exam  Recommended exercising 150 minutes per week   Diagnoses and all orders for this visit:    Hypothyroidism, unspecified type    Chronic paranoid schizophrenia ([facility] )  -     senna (SENOKOT) 8 6 mg; Take 1 tablet (8 6 mg total) by mouth 2 (two) times a day  -     bisacodyl (DULCOLAX) 10 mg suppository; Insert 1 suppository (10 mg total) into the rectum daily as needed for constipation  -     levothyroxine 75 mcg tablet; Take 1 tablet (75 mcg total) by mouth daily in the early morning    Constipation, unspecified constipation type    Orthostatic hypotension          Subjective:      Patient ID: Mariella Nava is a 26 y o  male  25 y/o M hx paranoid schizophrenia, hypothyroidism, constipation presents with  to establish care after psychiatric admission from 04/20/2017- 12/20/2018 due to psychosis  Mother had went to visit him at his apartment and found the place trashed and he had made suicidal remarks  Had been hospitalized since under Dr Tuttle [facility] care  Had auditory hallucinations and withdrawn behavior throughout his stay  He had failed an arsenal of medication regimens leading to prolonged stay  Currently reports doing well at home with his mother- denies hallucinations, depressed mood, suicidal thoughts  Follows with  ACT program therapists and social workers 2x weekly and psychiatrist Dr Maximus Huff monthly  Hypothyroidism has been well controlled on levothyroxine 75 mcg daily  TSH in September was within normal limits  Routine blood work at that time including CBC, CMP, and lipids were within normal limits    He reports constipation has been well controlled, not having to use suppositories or MiraLax  Takes senna twice daily  Has daily bowel movements and denies abdominal pain  Occasionally takes midodrine up to 3 times daily for orthostatic hypertension, reports this isn't often  He has a dentist appointment scheduled for January 7th  Has not had an eye exam for several years  Has never needed contacts and denies problems with vision  Reports his diet is pretty well balance  Goes for walks around the neighborhood for exercise        The following portions of the patient's history were reviewed and updated as appropriate: allergies, current medications, past family history, past medical history, past social history, past surgical history and problem list     Review of Systems   Constitutional: Negative for diaphoresis, fatigue and fever  HENT: Negative for congestion, ear pain, hearing loss, postnasal drip, rhinorrhea and sore throat  Eyes: Negative for pain, redness and visual disturbance  Respiratory: Negative for cough, shortness of breath and wheezing  Cardiovascular: Negative for chest pain, palpitations and leg swelling  Gastrointestinal: Negative for anal bleeding, constipation, diarrhea, nausea and vomiting  Endocrine: Negative for polydipsia and polyuria  Genitourinary: Negative for dysuria, flank pain and hematuria  Musculoskeletal: Negative for arthralgias, back pain, joint swelling and myalgias  Skin: Negative for pallor, rash and wound  Neurological: Negative for dizziness, syncope, numbness and headaches  Hematological: Negative for adenopathy  Does not bruise/bleed easily  Psychiatric/Behavioral: Negative for dysphoric mood, sleep disturbance and suicidal ideas  The patient is not nervous/anxious            Objective:      /70 (BP Location: Left arm, Patient Position: Sitting, Cuff Size: Adult)   Pulse (!) 121   Temp 97 8 °F (36 6 °C) (Tympanic)   Resp 20   Ht 6' (1 829 m)   Wt 89 8 kg (198 lb) SpO2 98%   BMI 26 85 kg/m²          Physical Exam   Constitutional: He is oriented to person, place, and time  He appears well-developed and well-nourished  No distress  HENT:   Head: Normocephalic and atraumatic  Mouth/Throat: Oropharynx is clear and moist    Eyes: Pupils are equal, round, and reactive to light  Conjunctivae and EOM are normal  Right eye exhibits no discharge  Left eye exhibits no discharge  No scleral icterus  Neck: Normal range of motion  Neck supple  No thyromegaly present  Cardiovascular: Normal rate, regular rhythm and normal heart sounds  Exam reveals no gallop and no friction rub  No murmur heard  Pulmonary/Chest: Effort normal and breath sounds normal  No respiratory distress  He has no wheezes  He has no rales  Abdominal: Soft  He exhibits no distension and no mass  There is no tenderness  There is no rebound and no guarding  Musculoskeletal: Normal range of motion  He exhibits no edema  Lymphadenopathy:     He has no cervical adenopathy  Neurological: He is alert and oriented to person, place, and time  No cranial nerve deficit  Skin: Skin is warm and dry  No rash noted  He is not diaphoretic  No erythema  No pallor  dandruff   Psychiatric: His speech is normal  He is not actively hallucinating  Thought content is not paranoid and not delusional  He expresses no suicidal ideation     Flat affect

## 2019-01-15 ENCOUNTER — LAB (OUTPATIENT)
Dept: LAB | Facility: HOSPITAL | Age: 34
End: 2019-01-15
Payer: MEDICARE

## 2019-01-15 ENCOUNTER — TRANSCRIBE ORDERS (OUTPATIENT)
Dept: ADMINISTRATIVE | Facility: HOSPITAL | Age: 34
End: 2019-01-15

## 2019-01-15 DIAGNOSIS — E03.9 MYXEDEMA HEART DISEASE: ICD-10-CM

## 2019-01-15 DIAGNOSIS — Z79.899 NEED FOR PROPHYLACTIC CHEMOTHERAPY: ICD-10-CM

## 2019-01-15 DIAGNOSIS — I51.9 MYXEDEMA HEART DISEASE: ICD-10-CM

## 2019-01-15 DIAGNOSIS — F20.5 RESIDUAL TYPE SCHIZOPHRENIC DISORDER, SUBCHRONIC CONDITION (HCC): ICD-10-CM

## 2019-01-15 DIAGNOSIS — F20.5 RESIDUAL TYPE SCHIZOPHRENIC DISORDER, SUBCHRONIC CONDITION (HCC): Primary | ICD-10-CM

## 2019-01-15 LAB
BASOPHILS # BLD AUTO: 0 THOUSANDS/ΜL (ref 0–0.1)
BASOPHILS NFR BLD AUTO: 1 % (ref 0–1)
EOSINOPHIL # BLD AUTO: 0.6 THOUSAND/ΜL (ref 0–0.4)
EOSINOPHIL NFR BLD AUTO: 8 % (ref 0–6)
ERYTHROCYTE [DISTWIDTH] IN BLOOD BY AUTOMATED COUNT: 13.5 %
HCT VFR BLD AUTO: 46.8 % (ref 41–53)
HGB BLD-MCNC: 15 G/DL (ref 13.5–17.5)
LYMPHOCYTES # BLD AUTO: 3.1 THOUSANDS/ΜL (ref 0.5–4)
LYMPHOCYTES NFR BLD AUTO: 43 % (ref 25–45)
MCH RBC QN AUTO: 28.1 PG (ref 26–34)
MCHC RBC AUTO-ENTMCNC: 32.2 G/DL (ref 31–36)
MCV RBC AUTO: 87 FL (ref 80–100)
MONOCYTES # BLD AUTO: 0.6 THOUSAND/ΜL (ref 0.2–0.9)
MONOCYTES NFR BLD AUTO: 9 % (ref 1–10)
NEUTROPHILS # BLD AUTO: 2.8 THOUSANDS/ΜL (ref 1.8–7.8)
NEUTS SEG NFR BLD AUTO: 40 % (ref 45–65)
PLATELET # BLD AUTO: 245 THOUSANDS/UL (ref 150–450)
PMV BLD AUTO: 8.4 FL (ref 8.9–12.7)
RBC # BLD AUTO: 5.36 MILLION/UL (ref 4.5–5.9)
WBC # BLD AUTO: 7.2 THOUSAND/UL (ref 4.5–11)

## 2019-01-15 PROCEDURE — 36415 COLL VENOUS BLD VENIPUNCTURE: CPT

## 2019-01-15 PROCEDURE — 85025 COMPLETE CBC W/AUTO DIFF WBC: CPT

## 2019-02-08 DIAGNOSIS — F20.0 CHRONIC PARANOID SCHIZOPHRENIA (HCC): Chronic | ICD-10-CM

## 2019-02-08 RX ORDER — MIDODRINE HYDROCHLORIDE 10 MG/1
TABLET ORAL
Qty: 90 TABLET | Refills: 1 | Status: SHIPPED | OUTPATIENT
Start: 2019-02-08 | End: 2019-04-23 | Stop reason: SDUPTHER

## 2019-02-14 ENCOUNTER — TELEPHONE (OUTPATIENT)
Dept: FAMILY MEDICINE CLINIC | Facility: CLINIC | Age: 34
End: 2019-02-14

## 2019-02-15 ENCOUNTER — APPOINTMENT (OUTPATIENT)
Dept: LAB | Facility: HOSPITAL | Age: 34
End: 2019-02-15
Payer: MEDICARE

## 2019-02-15 ENCOUNTER — TRANSCRIBE ORDERS (OUTPATIENT)
Dept: ADMINISTRATIVE | Facility: HOSPITAL | Age: 34
End: 2019-02-15

## 2019-02-15 DIAGNOSIS — Z79.899 ENCOUNTER FOR LONG-TERM (CURRENT) USE OF MEDICATIONS: ICD-10-CM

## 2019-02-15 DIAGNOSIS — F03.90 SENILE DEMENTIA, UNCOMPLICATED (HCC): ICD-10-CM

## 2019-02-15 DIAGNOSIS — Z79.899 NEED FOR PROPHYLACTIC CHEMOTHERAPY: ICD-10-CM

## 2019-02-15 DIAGNOSIS — E03.9 MYXEDEMA HEART DISEASE: ICD-10-CM

## 2019-02-15 DIAGNOSIS — F20.5 RESIDUAL TYPE SCHIZOPHRENIC DISORDER, SUBCHRONIC CONDITION (HCC): Primary | ICD-10-CM

## 2019-02-15 DIAGNOSIS — I51.9 MYXEDEMA HEART DISEASE: ICD-10-CM

## 2019-02-15 DIAGNOSIS — F20.5 RESIDUAL TYPE SCHIZOPHRENIC DISORDER, SUBCHRONIC CONDITION (HCC): ICD-10-CM

## 2019-02-15 LAB
BASOPHILS # BLD AUTO: 0 THOUSANDS/ΜL (ref 0–0.1)
BASOPHILS NFR BLD AUTO: 0 % (ref 0–1)
EOSINOPHIL # BLD AUTO: 0.4 THOUSAND/ΜL (ref 0–0.4)
EOSINOPHIL NFR BLD AUTO: 5 % (ref 0–6)
ERYTHROCYTE [DISTWIDTH] IN BLOOD BY AUTOMATED COUNT: 13.1 %
HCT VFR BLD AUTO: 45.8 % (ref 41–53)
HGB BLD-MCNC: 14.8 G/DL (ref 13.5–17.5)
LYMPHOCYTES # BLD AUTO: 2.2 THOUSANDS/ΜL (ref 0.5–4)
LYMPHOCYTES NFR BLD AUTO: 24 % (ref 25–45)
MCH RBC QN AUTO: 27.8 PG (ref 26–34)
MCHC RBC AUTO-ENTMCNC: 32.4 G/DL (ref 31–36)
MCV RBC AUTO: 86 FL (ref 80–100)
MONOCYTES # BLD AUTO: 0.8 THOUSAND/ΜL (ref 0.2–0.9)
MONOCYTES NFR BLD AUTO: 8 % (ref 1–10)
NEUTROPHILS # BLD AUTO: 5.8 THOUSANDS/ΜL (ref 1.8–7.8)
NEUTS SEG NFR BLD AUTO: 63 % (ref 45–65)
PLATELET # BLD AUTO: 227 THOUSANDS/UL (ref 150–450)
PMV BLD AUTO: 8.3 FL (ref 8.9–12.7)
RBC # BLD AUTO: 5.34 MILLION/UL (ref 4.5–5.9)
WBC # BLD AUTO: 9.3 THOUSAND/UL (ref 4.5–11)

## 2019-02-15 PROCEDURE — 85025 COMPLETE CBC W/AUTO DIFF WBC: CPT

## 2019-02-15 PROCEDURE — 36415 COLL VENOUS BLD VENIPUNCTURE: CPT

## 2019-02-21 NOTE — PROGRESS NOTES
Isa Gaming attended and participated in 2/2 evening groups  Compliant with scheduled 2100 meds without prompting  Had evening snack  Resting quietly in bed at present, arouses easily  Will continue to monitor/assess for any changes  Problem: Adult Inpatient Plan of Care  Goal: Plan of Care Review  Dialysis done with 3 liter removed. Diet advanced. No pain. Heparin gtt started. NPO after midnight for Heart cath in am.

## 2019-02-26 ENCOUNTER — HOSPITAL ENCOUNTER (OUTPATIENT)
Dept: NON INVASIVE DIAGNOSTICS | Facility: HOSPITAL | Age: 34
Discharge: HOME | End: 2019-02-26
Payer: MEDICARE

## 2019-02-26 ENCOUNTER — APPOINTMENT (OUTPATIENT)
Dept: LAB | Facility: HOSPITAL | Age: 34
End: 2019-02-26
Payer: MEDICARE

## 2019-02-26 DIAGNOSIS — E03.9 MYXEDEMA HEART DISEASE: ICD-10-CM

## 2019-02-26 DIAGNOSIS — Z79.899 NEED FOR PROPHYLACTIC CHEMOTHERAPY: ICD-10-CM

## 2019-02-26 DIAGNOSIS — I51.9 MYXEDEMA HEART DISEASE: ICD-10-CM

## 2019-02-26 DIAGNOSIS — F20.5 RESIDUAL TYPE SCHIZOPHRENIC DISORDER, SUBCHRONIC CONDITION (HCC): ICD-10-CM

## 2019-02-26 DIAGNOSIS — F03.90 SENILE DEMENTIA, UNCOMPLICATED (HCC): ICD-10-CM

## 2019-02-26 DIAGNOSIS — Z79.899 ENCOUNTER FOR LONG-TERM (CURRENT) USE OF MEDICATIONS: ICD-10-CM

## 2019-02-26 LAB
ALBUMIN SERPL BCP-MCNC: 4.2 G/DL (ref 3–5.2)
ALP SERPL-CCNC: 76 U/L (ref 43–122)
ALT SERPL W P-5'-P-CCNC: 10 U/L (ref 9–52)
AMMONIA PLAS-SCNC: 18 UMOL/L (ref 9–33)
ANION GAP SERPL CALCULATED.3IONS-SCNC: 11 MMOL/L (ref 5–14)
AST SERPL W P-5'-P-CCNC: 19 U/L (ref 17–59)
ATRIAL RATE: 94 BPM
BASOPHILS # BLD AUTO: 0.1 THOUSANDS/ΜL (ref 0–0.1)
BASOPHILS NFR BLD AUTO: 1 % (ref 0–1)
BILIRUB SERPL-MCNC: 0.4 MG/DL
BUN SERPL-MCNC: 15 MG/DL (ref 5–25)
CALCIUM SERPL-MCNC: 9.6 MG/DL (ref 8.4–10.2)
CHLORIDE SERPL-SCNC: 103 MMOL/L (ref 97–108)
CHOLEST SERPL-MCNC: 204 MG/DL
CO2 SERPL-SCNC: 26 MMOL/L (ref 22–30)
CREAT SERPL-MCNC: 1.26 MG/DL (ref 0.7–1.5)
EOSINOPHIL # BLD AUTO: 0.5 THOUSAND/ΜL (ref 0–0.4)
EOSINOPHIL NFR BLD AUTO: 8 % (ref 0–6)
ERYTHROCYTE [DISTWIDTH] IN BLOOD BY AUTOMATED COUNT: 13.2 %
EST. AVERAGE GLUCOSE BLD GHB EST-MCNC: 131 MG/DL
GFR SERPL CREATININE-BSD FRML MDRD: 73 ML/MIN/1.73SQ M
GLUCOSE P FAST SERPL-MCNC: 103 MG/DL (ref 70–99)
HBA1C MFR BLD: 6.2 % (ref 4.2–6.3)
HCT VFR BLD AUTO: 47.4 % (ref 41–53)
HDLC SERPL-MCNC: 45 MG/DL (ref 40–59)
HGB BLD-MCNC: 15.8 G/DL (ref 13.5–17.5)
LDLC SERPL CALC-MCNC: 129 MG/DL
LYMPHOCYTES # BLD AUTO: 2.6 THOUSANDS/ΜL (ref 0.5–4)
LYMPHOCYTES NFR BLD AUTO: 38 % (ref 25–45)
MCH RBC QN AUTO: 28.7 PG (ref 26–34)
MCHC RBC AUTO-ENTMCNC: 33.3 G/DL (ref 31–36)
MCV RBC AUTO: 86 FL (ref 80–100)
MONOCYTES # BLD AUTO: 0.5 THOUSAND/ΜL (ref 0.2–0.9)
MONOCYTES NFR BLD AUTO: 7 % (ref 1–10)
NEUTROPHILS # BLD AUTO: 3.2 THOUSANDS/ΜL (ref 1.8–7.8)
NEUTS SEG NFR BLD AUTO: 47 % (ref 45–65)
NONHDLC SERPL-MCNC: 159 MG/DL
P AXIS: 40 DEGREES
PLATELET # BLD AUTO: 249 THOUSANDS/UL (ref 150–450)
PMV BLD AUTO: 7.8 FL (ref 8.9–12.7)
POTASSIUM SERPL-SCNC: 4 MMOL/L (ref 3.6–5)
PR INTERVAL: 164 MS
PROT SERPL-MCNC: 7.2 G/DL (ref 5.9–8.4)
QRS AXIS: 47 DEGREES
QRSD INTERVAL: 104 MS
QT INTERVAL: 362 MS
QTC INTERVAL: 452 MS
RBC # BLD AUTO: 5.51 MILLION/UL (ref 4.5–5.9)
SODIUM SERPL-SCNC: 140 MMOL/L (ref 137–147)
T WAVE AXIS: 63 DEGREES
T4 FREE SERPL-MCNC: 1.31 NG/DL (ref 0.76–1.46)
TRIGL SERPL-MCNC: 149 MG/DL
TSH SERPL DL<=0.05 MIU/L-ACNC: 4.2 UIU/ML (ref 0.47–4.68)
VENTRICULAR RATE: 94 BPM
WBC # BLD AUTO: 6.9 THOUSAND/UL (ref 4.5–11)

## 2019-02-26 PROCEDURE — 82140 ASSAY OF AMMONIA: CPT

## 2019-02-26 PROCEDURE — 85025 COMPLETE CBC W/AUTO DIFF WBC: CPT

## 2019-02-26 PROCEDURE — 80165 DIPROPYLACETIC ACID FREE: CPT

## 2019-02-26 PROCEDURE — 80053 COMPREHEN METABOLIC PANEL: CPT

## 2019-02-26 PROCEDURE — 80061 LIPID PANEL: CPT

## 2019-02-26 PROCEDURE — 84443 ASSAY THYROID STIM HORMONE: CPT

## 2019-02-26 PROCEDURE — 80159 DRUG ASSAY CLOZAPINE: CPT

## 2019-02-26 PROCEDURE — 84439 ASSAY OF FREE THYROXINE: CPT

## 2019-02-26 PROCEDURE — 93010 ELECTROCARDIOGRAM REPORT: CPT | Performed by: INTERNAL MEDICINE

## 2019-02-26 PROCEDURE — 83036 HEMOGLOBIN GLYCOSYLATED A1C: CPT

## 2019-02-26 PROCEDURE — 93005 ELECTROCARDIOGRAM TRACING: CPT

## 2019-02-26 PROCEDURE — 36415 COLL VENOUS BLD VENIPUNCTURE: CPT

## 2019-02-28 LAB
CLOZAPINE SERPL-MCNC: 613 NG/ML (ref 350–650)
CLOZAPINE+NOR SERPL-MCNC: 787 NG/ML
NORCLOZAPINE SERPL-MCNC: 174 NG/ML
VALPROATE FREE SERPL-MCNC: 12 UG/ML (ref 6–22)

## 2019-03-12 ENCOUNTER — APPOINTMENT (OUTPATIENT)
Dept: LAB | Facility: HOSPITAL | Age: 34
End: 2019-03-12
Payer: MEDICARE

## 2019-03-12 DIAGNOSIS — F20.5 RESIDUAL TYPE SCHIZOPHRENIC DISORDER, SUBCHRONIC CONDITION (HCC): ICD-10-CM

## 2019-03-12 DIAGNOSIS — E03.9 MYXEDEMA HEART DISEASE: ICD-10-CM

## 2019-03-12 DIAGNOSIS — Z79.899 ENCOUNTER FOR LONG-TERM (CURRENT) USE OF MEDICATIONS: ICD-10-CM

## 2019-03-12 DIAGNOSIS — I51.9 MYXEDEMA HEART DISEASE: ICD-10-CM

## 2019-03-12 LAB
BASOPHILS # BLD AUTO: 0 THOUSANDS/ΜL (ref 0–0.1)
BASOPHILS NFR BLD AUTO: 1 % (ref 0–1)
EOSINOPHIL # BLD AUTO: 0.5 THOUSAND/ΜL (ref 0–0.4)
EOSINOPHIL NFR BLD AUTO: 7 % (ref 0–6)
ERYTHROCYTE [DISTWIDTH] IN BLOOD BY AUTOMATED COUNT: 13.2 %
HCT VFR BLD AUTO: 46 % (ref 41–53)
HGB BLD-MCNC: 15 G/DL (ref 13.5–17.5)
LYMPHOCYTES # BLD AUTO: 2.7 THOUSANDS/ΜL (ref 0.5–4)
LYMPHOCYTES NFR BLD AUTO: 35 % (ref 25–45)
MCH RBC QN AUTO: 28.2 PG (ref 26–34)
MCHC RBC AUTO-ENTMCNC: 32.6 G/DL (ref 31–36)
MCV RBC AUTO: 86 FL (ref 80–100)
MONOCYTES # BLD AUTO: 0.6 THOUSAND/ΜL (ref 0.2–0.9)
MONOCYTES NFR BLD AUTO: 8 % (ref 1–10)
NEUTROPHILS # BLD AUTO: 3.9 THOUSANDS/ΜL (ref 1.8–7.8)
NEUTS SEG NFR BLD AUTO: 50 % (ref 45–65)
PLATELET # BLD AUTO: 247 THOUSANDS/UL (ref 150–450)
PMV BLD AUTO: 7.8 FL (ref 8.9–12.7)
RBC # BLD AUTO: 5.33 MILLION/UL (ref 4.5–5.9)
WBC # BLD AUTO: 7.7 THOUSAND/UL (ref 4.5–11)

## 2019-03-12 PROCEDURE — 85025 COMPLETE CBC W/AUTO DIFF WBC: CPT

## 2019-03-12 PROCEDURE — 36415 COLL VENOUS BLD VENIPUNCTURE: CPT

## 2019-04-11 DIAGNOSIS — F20.0 CHRONIC PARANOID SCHIZOPHRENIA (HCC): Chronic | ICD-10-CM

## 2019-04-12 ENCOUNTER — APPOINTMENT (OUTPATIENT)
Dept: LAB | Facility: HOSPITAL | Age: 34
End: 2019-04-12
Payer: MEDICARE

## 2019-04-12 ENCOUNTER — TRANSCRIBE ORDERS (OUTPATIENT)
Dept: ADMINISTRATIVE | Facility: HOSPITAL | Age: 34
End: 2019-04-12

## 2019-04-12 DIAGNOSIS — F20.5 RESIDUAL TYPE SCHIZOPHRENIC DISORDER, SUBCHRONIC CONDITION (HCC): ICD-10-CM

## 2019-04-12 DIAGNOSIS — F20.5 SCHIZOPHRENIA, RESIDUAL (HCC): ICD-10-CM

## 2019-04-12 DIAGNOSIS — F20.5 RESIDUAL TYPE SCHIZOPHRENIC DISORDER, SUBCHRONIC CONDITION (HCC): Primary | ICD-10-CM

## 2019-04-12 DIAGNOSIS — I51.9 MYXEDEMA HEART DISEASE: ICD-10-CM

## 2019-04-12 DIAGNOSIS — E03.9 HYPOTHYROIDISM, UNSPECIFIED TYPE: ICD-10-CM

## 2019-04-12 DIAGNOSIS — E03.9 MYXEDEMA HEART DISEASE: ICD-10-CM

## 2019-04-12 DIAGNOSIS — Z79.899 ENCOUNTER FOR LONG-TERM (CURRENT) USE OF OTHER MEDICATIONS: ICD-10-CM

## 2019-04-12 LAB
BASOPHILS # BLD AUTO: 0 THOUSANDS/ΜL (ref 0–0.1)
BASOPHILS NFR BLD AUTO: 1 % (ref 0–1)
EOSINOPHIL # BLD AUTO: 0.4 THOUSAND/ΜL (ref 0–0.4)
EOSINOPHIL NFR BLD AUTO: 7 % (ref 0–6)
ERYTHROCYTE [DISTWIDTH] IN BLOOD BY AUTOMATED COUNT: 13.4 %
HCT VFR BLD AUTO: 45.9 % (ref 41–53)
HGB BLD-MCNC: 15.5 G/DL (ref 13.5–17.5)
LYMPHOCYTES # BLD AUTO: 2.3 THOUSANDS/ΜL (ref 0.5–4)
LYMPHOCYTES NFR BLD AUTO: 35 % (ref 25–45)
MCH RBC QN AUTO: 29.3 PG (ref 26–34)
MCHC RBC AUTO-ENTMCNC: 33.8 G/DL (ref 31–36)
MCV RBC AUTO: 87 FL (ref 80–100)
MONOCYTES # BLD AUTO: 0.5 THOUSAND/ΜL (ref 0.2–0.9)
MONOCYTES NFR BLD AUTO: 8 % (ref 1–10)
NEUTROPHILS # BLD AUTO: 3.3 THOUSANDS/ΜL (ref 1.8–7.8)
NEUTS SEG NFR BLD AUTO: 50 % (ref 45–65)
PLATELET # BLD AUTO: 235 THOUSANDS/UL (ref 150–450)
PMV BLD AUTO: 8.1 FL (ref 8.9–12.7)
RBC # BLD AUTO: 5.3 MILLION/UL (ref 4.5–5.9)
WBC # BLD AUTO: 6.6 THOUSAND/UL (ref 4.5–11)

## 2019-04-12 PROCEDURE — 85025 COMPLETE CBC W/AUTO DIFF WBC: CPT

## 2019-04-12 PROCEDURE — 36415 COLL VENOUS BLD VENIPUNCTURE: CPT

## 2019-04-23 ENCOUNTER — OFFICE VISIT (OUTPATIENT)
Dept: FAMILY MEDICINE CLINIC | Facility: CLINIC | Age: 34
End: 2019-04-23
Payer: MEDICARE

## 2019-04-23 VITALS
OXYGEN SATURATION: 98 % | HEIGHT: 72 IN | SYSTOLIC BLOOD PRESSURE: 110 MMHG | WEIGHT: 202 LBS | HEART RATE: 108 BPM | BODY MASS INDEX: 27.36 KG/M2 | TEMPERATURE: 97.1 F | DIASTOLIC BLOOD PRESSURE: 74 MMHG

## 2019-04-23 DIAGNOSIS — I95.1 ORTHOSTATIC HYPOTENSION: ICD-10-CM

## 2019-04-23 DIAGNOSIS — Z72.0 TOBACCO ABUSE: ICD-10-CM

## 2019-04-23 DIAGNOSIS — R73.9 ELEVATED BLOOD SUGAR: ICD-10-CM

## 2019-04-23 DIAGNOSIS — F20.0 CHRONIC PARANOID SCHIZOPHRENIA (HCC): Chronic | ICD-10-CM

## 2019-04-23 DIAGNOSIS — K59.00 CONSTIPATION, UNSPECIFIED CONSTIPATION TYPE: ICD-10-CM

## 2019-04-23 DIAGNOSIS — E03.9 HYPOTHYROIDISM, UNSPECIFIED TYPE: Primary | ICD-10-CM

## 2019-04-23 DIAGNOSIS — R00.0 CHRONIC TACHYCARDIA: ICD-10-CM

## 2019-04-23 DIAGNOSIS — R89.8 EOSINOPHILS INCREASED: ICD-10-CM

## 2019-04-23 PROCEDURE — 99214 OFFICE O/P EST MOD 30 MIN: CPT | Performed by: FAMILY MEDICINE

## 2019-04-23 RX ORDER — MIDODRINE HYDROCHLORIDE 10 MG/1
10 TABLET ORAL 3 TIMES DAILY
Qty: 90 TABLET | Refills: 1 | Status: SHIPPED | OUTPATIENT
Start: 2019-04-23 | End: 2019-06-17 | Stop reason: SDUPTHER

## 2019-04-23 RX ORDER — POLYETHYLENE GLYCOL 3350 17 G/17G
17 POWDER, FOR SOLUTION ORAL DAILY PRN
Qty: 14 EACH | Refills: 0 | Status: SHIPPED | OUTPATIENT
Start: 2019-04-23 | End: 2019-12-10 | Stop reason: ALTCHOICE

## 2019-04-23 RX ORDER — LEVOTHYROXINE SODIUM 0.07 MG/1
75 TABLET ORAL
Qty: 30 TABLET | Refills: 5 | Status: SHIPPED | OUTPATIENT
Start: 2019-04-23 | End: 2019-10-18 | Stop reason: SDUPTHER

## 2019-04-24 RX ORDER — MIDODRINE HYDROCHLORIDE 10 MG/1
TABLET ORAL
Qty: 90 TABLET | Refills: 1 | OUTPATIENT
Start: 2019-04-24

## 2019-05-02 ENCOUNTER — HOSPITAL ENCOUNTER (OUTPATIENT)
Dept: NON INVASIVE DIAGNOSTICS | Facility: HOSPITAL | Age: 34
Discharge: HOME | End: 2019-05-02
Attending: FAMILY MEDICINE
Payer: MEDICARE

## 2019-05-02 DIAGNOSIS — R00.0 CHRONIC TACHYCARDIA: ICD-10-CM

## 2019-05-02 PROCEDURE — 93225 XTRNL ECG REC<48 HRS REC: CPT

## 2019-05-02 PROCEDURE — 93226 XTRNL ECG REC<48 HR SCAN A/R: CPT

## 2019-05-07 PROCEDURE — 93227 XTRNL ECG REC<48 HR R&I: CPT | Performed by: INTERNAL MEDICINE

## 2019-05-15 ENCOUNTER — TELEPHONE (OUTPATIENT)
Dept: FAMILY MEDICINE CLINIC | Facility: CLINIC | Age: 34
End: 2019-05-15

## 2019-05-15 DIAGNOSIS — R00.0 TACHYCARDIA: Primary | ICD-10-CM

## 2019-05-16 LAB
BUN SERPL-MCNC: 7 MG/DL (ref 7–25)
BUN/CREAT SERPL: NORMAL (CALC) (ref 6–22)
CALCIUM SERPL-MCNC: 9 MG/DL (ref 8.6–10.3)
CHLORIDE SERPL-SCNC: 104 MMOL/L (ref 98–110)
CO2 SERPL-SCNC: 26 MMOL/L (ref 20–32)
CREAT SERPL-MCNC: 1.24 MG/DL (ref 0.6–1.35)
GLUCOSE SERPL-MCNC: 98 MG/DL (ref 65–139)
POTASSIUM SERPL-SCNC: 3.9 MMOL/L (ref 3.5–5.3)
SL AMB EGFR AFRICAN AMERICAN: 86 ML/MIN/1.73M2
SL AMB EGFR NON AFRICAN AMERICAN: 74 ML/MIN/1.73M2
SODIUM SERPL-SCNC: 139 MMOL/L (ref 135–146)

## 2019-05-21 ENCOUNTER — HOSPITAL ENCOUNTER (OUTPATIENT)
Dept: RADIOLOGY | Facility: HOSPITAL | Age: 34
Discharge: HOME | End: 2019-05-21
Attending: FAMILY MEDICINE
Payer: MEDICARE

## 2019-05-21 DIAGNOSIS — R89.8 EOSINOPHILS INCREASED: ICD-10-CM

## 2019-05-21 PROCEDURE — 71046 X-RAY EXAM CHEST 2 VIEWS: CPT

## 2019-05-28 ENCOUNTER — OFFICE VISIT (OUTPATIENT)
Dept: FAMILY MEDICINE CLINIC | Facility: CLINIC | Age: 34
End: 2019-05-28
Payer: MEDICARE

## 2019-05-28 VITALS
OXYGEN SATURATION: 97 % | SYSTOLIC BLOOD PRESSURE: 115 MMHG | TEMPERATURE: 97.4 F | HEART RATE: 110 BPM | BODY MASS INDEX: 26.98 KG/M2 | DIASTOLIC BLOOD PRESSURE: 74 MMHG | HEIGHT: 72 IN | WEIGHT: 199.2 LBS

## 2019-05-28 DIAGNOSIS — R73.9 ELEVATED BLOOD SUGAR: ICD-10-CM

## 2019-05-28 DIAGNOSIS — E78.00 PURE HYPERCHOLESTEROLEMIA: ICD-10-CM

## 2019-05-28 DIAGNOSIS — R00.0 SINUS TACHYCARDIA: Primary | ICD-10-CM

## 2019-05-28 DIAGNOSIS — K59.00 CONSTIPATION, UNSPECIFIED CONSTIPATION TYPE: ICD-10-CM

## 2019-05-28 DIAGNOSIS — I95.1 ORTHOSTATIC HYPOTENSION: ICD-10-CM

## 2019-05-28 DIAGNOSIS — J98.6 ELEVATED HEMIDIAPHRAGM: ICD-10-CM

## 2019-05-28 DIAGNOSIS — R89.8 EOSINOPHILS INCREASED: ICD-10-CM

## 2019-05-28 PROCEDURE — 99214 OFFICE O/P EST MOD 30 MIN: CPT | Performed by: FAMILY MEDICINE

## 2019-05-31 ENCOUNTER — HOSPITAL ENCOUNTER (OUTPATIENT)
Dept: ULTRASOUND IMAGING | Facility: HOSPITAL | Age: 34
Discharge: HOME | End: 2019-05-31
Attending: FAMILY MEDICINE
Payer: MEDICARE

## 2019-05-31 DIAGNOSIS — J98.6 ELEVATED HEMIDIAPHRAGM: ICD-10-CM

## 2019-05-31 PROCEDURE — 76700 US EXAM ABDOM COMPLETE: CPT

## 2019-06-11 ENCOUNTER — APPOINTMENT (OUTPATIENT)
Dept: LAB | Facility: HOSPITAL | Age: 34
End: 2019-06-11
Payer: MEDICARE

## 2019-06-11 ENCOUNTER — TRANSCRIBE ORDERS (OUTPATIENT)
Dept: ADMINISTRATIVE | Facility: HOSPITAL | Age: 34
End: 2019-06-11

## 2019-06-11 DIAGNOSIS — F20.0 PARANOID SCHIZOPHRENIA, SUBCHRONIC CONDITION (HCC): Primary | ICD-10-CM

## 2019-06-11 DIAGNOSIS — F20.0 PARANOID SCHIZOPHRENIA, SUBCHRONIC CONDITION (HCC): ICD-10-CM

## 2019-06-11 DIAGNOSIS — I51.9 MYXEDEMA HEART DISEASE: ICD-10-CM

## 2019-06-11 DIAGNOSIS — Z79.899 ENCOUNTER FOR LONG-TERM (CURRENT) USE OF OTHER MEDICATIONS: ICD-10-CM

## 2019-06-11 DIAGNOSIS — E03.9 MYXEDEMA HEART DISEASE: ICD-10-CM

## 2019-06-11 DIAGNOSIS — R73.9 ELEVATED BLOOD SUGAR: ICD-10-CM

## 2019-06-11 LAB
ANION GAP SERPL CALCULATED.3IONS-SCNC: 12 MMOL/L (ref 5–14)
BASOPHILS # BLD AUTO: 0 THOUSANDS/ΜL (ref 0–0.1)
BASOPHILS NFR BLD AUTO: 1 % (ref 0–1)
BUN SERPL-MCNC: 12 MG/DL (ref 5–25)
CALCIUM SERPL-MCNC: 9.2 MG/DL (ref 8.4–10.2)
CHLORIDE SERPL-SCNC: 103 MMOL/L (ref 97–108)
CO2 SERPL-SCNC: 27 MMOL/L (ref 22–30)
CREAT SERPL-MCNC: 1.21 MG/DL (ref 0.7–1.5)
EOSINOPHIL # BLD AUTO: 0.3 THOUSAND/ΜL (ref 0–0.4)
EOSINOPHIL NFR BLD AUTO: 4 % (ref 0–6)
ERYTHROCYTE [DISTWIDTH] IN BLOOD BY AUTOMATED COUNT: 13.3 %
GFR SERPL CREATININE-BSD FRML MDRD: 77 ML/MIN/1.73SQ M
GLUCOSE P FAST SERPL-MCNC: 102 MG/DL (ref 70–99)
HCT VFR BLD AUTO: 46.8 % (ref 41–53)
HGB BLD-MCNC: 15.8 G/DL (ref 13.5–17.5)
LYMPHOCYTES # BLD AUTO: 2.6 THOUSANDS/ΜL (ref 0.5–4)
LYMPHOCYTES NFR BLD AUTO: 33 % (ref 25–45)
MCH RBC QN AUTO: 29 PG (ref 26–34)
MCHC RBC AUTO-ENTMCNC: 33.7 G/DL (ref 31–36)
MCV RBC AUTO: 86 FL (ref 80–100)
MONOCYTES # BLD AUTO: 0.7 THOUSAND/ΜL (ref 0.2–0.9)
MONOCYTES NFR BLD AUTO: 9 % (ref 1–10)
NEUTROPHILS # BLD AUTO: 4.2 THOUSANDS/ΜL (ref 1.8–7.8)
NEUTS SEG NFR BLD AUTO: 54 % (ref 45–65)
PLATELET # BLD AUTO: 229 THOUSANDS/UL (ref 150–450)
PMV BLD AUTO: 8 FL (ref 8.9–12.7)
POTASSIUM SERPL-SCNC: 3.8 MMOL/L (ref 3.6–5)
RBC # BLD AUTO: 5.45 MILLION/UL (ref 4.5–5.9)
SODIUM SERPL-SCNC: 142 MMOL/L (ref 137–147)
WBC # BLD AUTO: 7.8 THOUSAND/UL (ref 4.5–11)

## 2019-06-11 PROCEDURE — 80048 BASIC METABOLIC PNL TOTAL CA: CPT

## 2019-06-11 PROCEDURE — 85025 COMPLETE CBC W/AUTO DIFF WBC: CPT

## 2019-06-11 PROCEDURE — 36415 COLL VENOUS BLD VENIPUNCTURE: CPT

## 2019-06-17 ENCOUNTER — CONSULT (OUTPATIENT)
Dept: CARDIOLOGY CLINIC | Facility: CLINIC | Age: 34
End: 2019-06-17
Payer: MEDICARE

## 2019-06-17 VITALS
WEIGHT: 200 LBS | HEIGHT: 72 IN | SYSTOLIC BLOOD PRESSURE: 110 MMHG | OXYGEN SATURATION: 96 % | DIASTOLIC BLOOD PRESSURE: 88 MMHG | BODY MASS INDEX: 27.09 KG/M2 | HEART RATE: 117 BPM

## 2019-06-17 DIAGNOSIS — F20.0 CHRONIC PARANOID SCHIZOPHRENIA (HCC): Chronic | ICD-10-CM

## 2019-06-17 DIAGNOSIS — R00.0 TACHYCARDIA: Primary | ICD-10-CM

## 2019-06-17 PROCEDURE — 93000 ELECTROCARDIOGRAM COMPLETE: CPT | Performed by: INTERNAL MEDICINE

## 2019-06-17 PROCEDURE — 99214 OFFICE O/P EST MOD 30 MIN: CPT | Performed by: INTERNAL MEDICINE

## 2019-06-17 RX ORDER — MIDODRINE HYDROCHLORIDE 10 MG/1
TABLET ORAL
Qty: 90 TABLET | Refills: 1 | Status: SHIPPED | OUTPATIENT
Start: 2019-06-17 | End: 2019-08-10 | Stop reason: SDUPTHER

## 2019-07-09 ENCOUNTER — LAB (OUTPATIENT)
Dept: LAB | Facility: HOSPITAL | Age: 34
End: 2019-07-09
Payer: MEDICARE

## 2019-07-09 DIAGNOSIS — I51.9 MYXEDEMA HEART DISEASE: ICD-10-CM

## 2019-07-09 DIAGNOSIS — E03.9 MYXEDEMA HEART DISEASE: ICD-10-CM

## 2019-07-09 DIAGNOSIS — F20.0 PARANOID SCHIZOPHRENIA, SUBCHRONIC CONDITION (HCC): ICD-10-CM

## 2019-07-09 DIAGNOSIS — Z79.899 ENCOUNTER FOR LONG-TERM (CURRENT) USE OF OTHER MEDICATIONS: ICD-10-CM

## 2019-07-09 LAB
ALBUMIN SERPL BCP-MCNC: 4.2 G/DL (ref 3–5.2)
ALP SERPL-CCNC: 78 U/L (ref 43–122)
ALT SERPL W P-5'-P-CCNC: 8 U/L (ref 9–52)
AMMONIA PLAS-SCNC: 19 UMOL/L (ref 9–33)
ANION GAP SERPL CALCULATED.3IONS-SCNC: 11 MMOL/L (ref 5–14)
AST SERPL W P-5'-P-CCNC: 12 U/L (ref 17–59)
BASOPHILS # BLD AUTO: 0 THOUSANDS/ΜL (ref 0–0.1)
BASOPHILS NFR BLD AUTO: 1 % (ref 0–1)
BILIRUB SERPL-MCNC: 0.3 MG/DL
BUN SERPL-MCNC: 16 MG/DL (ref 5–25)
CALCIUM SERPL-MCNC: 9.3 MG/DL (ref 8.4–10.2)
CHLORIDE SERPL-SCNC: 106 MMOL/L (ref 97–108)
CHOLEST SERPL-MCNC: 183 MG/DL
CO2 SERPL-SCNC: 25 MMOL/L (ref 22–30)
CREAT SERPL-MCNC: 1.15 MG/DL (ref 0.7–1.5)
EOSINOPHIL # BLD AUTO: 0.2 THOUSAND/ΜL (ref 0–0.4)
EOSINOPHIL NFR BLD AUTO: 4 % (ref 0–6)
ERYTHROCYTE [DISTWIDTH] IN BLOOD BY AUTOMATED COUNT: 13.1 %
GFR SERPL CREATININE-BSD FRML MDRD: 81 ML/MIN/1.73SQ M
GLUCOSE P FAST SERPL-MCNC: 98 MG/DL (ref 70–99)
HCT VFR BLD AUTO: 46.8 % (ref 41–53)
HDLC SERPL-MCNC: 38 MG/DL (ref 40–59)
HGB BLD-MCNC: 15.4 G/DL (ref 13.5–17.5)
LDLC SERPL CALC-MCNC: 114 MG/DL
LYMPHOCYTES # BLD AUTO: 2.9 THOUSANDS/ΜL (ref 0.5–4)
LYMPHOCYTES NFR BLD AUTO: 41 % (ref 25–45)
MCH RBC QN AUTO: 28.3 PG (ref 26–34)
MCHC RBC AUTO-ENTMCNC: 33 G/DL (ref 31–36)
MCV RBC AUTO: 86 FL (ref 80–100)
MONOCYTES # BLD AUTO: 0.5 THOUSAND/ΜL (ref 0.2–0.9)
MONOCYTES NFR BLD AUTO: 8 % (ref 1–10)
NEUTROPHILS # BLD AUTO: 3.3 THOUSANDS/ΜL (ref 1.8–7.8)
NEUTS SEG NFR BLD AUTO: 47 % (ref 45–65)
NONHDLC SERPL-MCNC: 145 MG/DL
PLATELET # BLD AUTO: 242 THOUSANDS/UL (ref 150–450)
PMV BLD AUTO: 8 FL (ref 8.9–12.7)
POTASSIUM SERPL-SCNC: 4.2 MMOL/L (ref 3.6–5)
PROT SERPL-MCNC: 7.1 G/DL (ref 5.9–8.4)
RBC # BLD AUTO: 5.45 MILLION/UL (ref 4.5–5.9)
SODIUM SERPL-SCNC: 142 MMOL/L (ref 137–147)
T4 FREE SERPL-MCNC: 1.24 NG/DL (ref 0.76–1.46)
TRIGL SERPL-MCNC: 157 MG/DL
TSH SERPL DL<=0.05 MIU/L-ACNC: 3.74 UIU/ML (ref 0.47–4.68)
WBC # BLD AUTO: 6.9 THOUSAND/UL (ref 4.5–11)

## 2019-07-09 PROCEDURE — 80061 LIPID PANEL: CPT

## 2019-07-09 PROCEDURE — 85025 COMPLETE CBC W/AUTO DIFF WBC: CPT

## 2019-07-09 PROCEDURE — 80053 COMPREHEN METABOLIC PANEL: CPT

## 2019-07-09 PROCEDURE — 82140 ASSAY OF AMMONIA: CPT

## 2019-07-09 PROCEDURE — 80164 ASSAY DIPROPYLACETIC ACD TOT: CPT

## 2019-07-09 PROCEDURE — 84443 ASSAY THYROID STIM HORMONE: CPT

## 2019-07-09 PROCEDURE — 84439 ASSAY OF FREE THYROXINE: CPT

## 2019-07-09 PROCEDURE — 36415 COLL VENOUS BLD VENIPUNCTURE: CPT

## 2019-07-11 LAB — VALPROATE SERPL-MCNC: 82 UG/ML (ref 50–100)

## 2019-08-06 ENCOUNTER — APPOINTMENT (OUTPATIENT)
Dept: LAB | Facility: HOSPITAL | Age: 34
End: 2019-08-06
Payer: MEDICARE

## 2019-08-06 ENCOUNTER — TRANSCRIBE ORDERS (OUTPATIENT)
Dept: ADMINISTRATIVE | Facility: HOSPITAL | Age: 34
End: 2019-08-06

## 2019-08-06 DIAGNOSIS — E03.9 MYXEDEMA HEART DISEASE: ICD-10-CM

## 2019-08-06 DIAGNOSIS — Z79.899 ENCOUNTER FOR LONG-TERM (CURRENT) USE OF OTHER MEDICATIONS: ICD-10-CM

## 2019-08-06 DIAGNOSIS — F20.0 PARANOID SCHIZOPHRENIA, SUBCHRONIC CONDITION (HCC): Primary | ICD-10-CM

## 2019-08-06 DIAGNOSIS — I51.9 MYXEDEMA HEART DISEASE: ICD-10-CM

## 2019-08-06 DIAGNOSIS — F20.0 PARANOID SCHIZOPHRENIA, SUBCHRONIC CONDITION (HCC): ICD-10-CM

## 2019-08-06 LAB
BASOPHILS # BLD AUTO: 0 THOUSANDS/ΜL (ref 0–0.1)
BASOPHILS NFR BLD AUTO: 1 % (ref 0–1)
EOSINOPHIL # BLD AUTO: 0.2 THOUSAND/ΜL (ref 0–0.4)
EOSINOPHIL NFR BLD AUTO: 4 % (ref 0–6)
ERYTHROCYTE [DISTWIDTH] IN BLOOD BY AUTOMATED COUNT: 13.3 %
HCT VFR BLD AUTO: 46.8 % (ref 41–53)
HGB BLD-MCNC: 15.6 G/DL (ref 13.5–17.5)
LYMPHOCYTES # BLD AUTO: 2.9 THOUSANDS/ΜL (ref 0.5–4)
LYMPHOCYTES NFR BLD AUTO: 41 % (ref 25–45)
MCH RBC QN AUTO: 28.5 PG (ref 26–34)
MCHC RBC AUTO-ENTMCNC: 33.2 G/DL (ref 31–36)
MCV RBC AUTO: 86 FL (ref 80–100)
MONOCYTES # BLD AUTO: 0.6 THOUSAND/ΜL (ref 0.2–0.9)
MONOCYTES NFR BLD AUTO: 8 % (ref 1–10)
NEUTROPHILS # BLD AUTO: 3.4 THOUSANDS/ΜL (ref 1.8–7.8)
NEUTS SEG NFR BLD AUTO: 47 % (ref 45–65)
PLATELET # BLD AUTO: 234 THOUSANDS/UL (ref 150–450)
PMV BLD AUTO: 8.1 FL (ref 8.9–12.7)
RBC # BLD AUTO: 5.45 MILLION/UL (ref 4.5–5.9)
WBC # BLD AUTO: 7.2 THOUSAND/UL (ref 4.5–11)

## 2019-08-06 PROCEDURE — 85025 COMPLETE CBC W/AUTO DIFF WBC: CPT

## 2019-08-06 PROCEDURE — 36415 COLL VENOUS BLD VENIPUNCTURE: CPT

## 2019-08-10 DIAGNOSIS — F20.0 CHRONIC PARANOID SCHIZOPHRENIA (HCC): Chronic | ICD-10-CM

## 2019-08-12 RX ORDER — MIDODRINE HYDROCHLORIDE 10 MG/1
TABLET ORAL
Qty: 90 TABLET | Refills: 1 | Status: SHIPPED | OUTPATIENT
Start: 2019-08-12 | End: 2019-10-18 | Stop reason: SDUPTHER

## 2019-08-13 ENCOUNTER — HOSPITAL ENCOUNTER (OUTPATIENT)
Dept: NON INVASIVE DIAGNOSTICS | Facility: HOSPITAL | Age: 34
Discharge: HOME | End: 2019-08-13
Attending: FAMILY MEDICINE
Payer: MEDICARE

## 2019-08-13 DIAGNOSIS — R00.0 SINUS TACHYCARDIA: ICD-10-CM

## 2019-08-13 PROCEDURE — 93306 TTE W/DOPPLER COMPLETE: CPT

## 2019-08-13 PROCEDURE — 93306 TTE W/DOPPLER COMPLETE: CPT | Performed by: INTERNAL MEDICINE

## 2019-08-22 ENCOUNTER — OFFICE VISIT (OUTPATIENT)
Dept: CARDIOLOGY CLINIC | Facility: CLINIC | Age: 34
End: 2019-08-22
Payer: MEDICARE

## 2019-08-22 VITALS
OXYGEN SATURATION: 96 % | WEIGHT: 196 LBS | HEART RATE: 96 BPM | DIASTOLIC BLOOD PRESSURE: 60 MMHG | HEIGHT: 72 IN | BODY MASS INDEX: 26.55 KG/M2 | SYSTOLIC BLOOD PRESSURE: 102 MMHG

## 2019-08-22 DIAGNOSIS — I95.1 ORTHOSTATIC HYPOTENSION: Primary | ICD-10-CM

## 2019-08-22 PROCEDURE — 99214 OFFICE O/P EST MOD 30 MIN: CPT | Performed by: INTERNAL MEDICINE

## 2019-08-22 NOTE — PROGRESS NOTES
[address] [facility] [place]  [address]  [facility], [address] [facility]  [phone]    Cardiology Follow up    Patient:  Pamela Hand  :  1985  MRN:  [id number]    History of Present Illness:     49-year-old with past medical history of schizophrenia and orthostatic hypotension presents for cardiology follow-up for tachycardia  He is accompanied by his mom today  He notes no symptoms of chest pain, shortness of breath, palpitations, dizziness, or syncope  His mom notes when he lift weights he sometimes gets pale and has blue lips  This does not occur when he does aerobic exercise        Patient Active Problem List   Diagnosis    Chronic paranoid schizophrenia (HCC)    Sinus tachycardia    Tobacco abuse    Hypothyroidism    Constipation    Orthostatic hypotension    Pure hypercholesterolemia    Eosinophils increased    Elevated blood sugar       Past Surgical History  Past Surgical History:   Procedure Laterality Date    TONSILLECTOMY AND ADENOIDECTOMY         Social History   Social History     Socioeconomic History    Marital status: Single     Spouse name: Not on file    Number of children: Not on file    Years of education: Not on file    Highest education level: Not on file   Occupational History    Occupation: unemployed   Social Needs    Financial resource strain: Not on file    Food insecurity:     Worry: Not on file     Inability: Not on file   [employer] needs:     Medical: Not on file     Non-medical: Not on file   Tobacco Use    Smoking status: Current Every Day Smoker     Packs/day: 0 50     Types: Cigarettes    Smokeless tobacco: Never Used    Tobacco comment: Per [employer]-current everyday smoker   Substance and Sexual Activity    Alcohol use: No    Drug use: No    Sexual activity: Not on file   Lifestyle    Physical activity:     Days per week: Not on file     Minutes per session: Not on file    Stress: Not on file   Relationships    Social connections:     Talks on phone: Not on file     Gets together: Not on file     Attends [employer] service: Not on file     Active member of club or organization: Not on file     Attends meetings of clubs or organizations: Not on file     Relationship status: Not on file    Intimate partner violence:     Fear of current or ex partner: Not on file     Emotionally abused: Not on file     Physically abused: Not on file     Forced sexual activity: Not on file   Other Topics Concern    Not on file   Social History Narrative    Lives at home with mother and cat        No Known Allergies    Family History   Family History   Problem Relation Age of Onset    Hypertension Father     Depression Mother        Review of Systems:  Review of Systems   Constitutional: Negative for chills, fatigue and fever  HENT: Negative for hearing loss and trouble swallowing  Eyes: Negative for pain  Respiratory: Negative for cough, chest tightness and shortness of breath  Cardiovascular: Negative for chest pain, palpitations and leg swelling  Gastrointestinal: Negative for abdominal pain, blood in stool, nausea and vomiting  Endocrine: Negative for cold intolerance and heat intolerance  Genitourinary: Negative for difficulty urinating, frequency and hematuria  Musculoskeletal: Negative for arthralgias and neck pain  Skin: Negative for rash  Allergic/Immunologic: Negative for environmental allergies  Neurological: Negative for dizziness, weakness and headaches  Hematological: Does not bruise/bleed easily  Psychiatric/Behavioral: Negative for decreased concentration and sleep disturbance  The patient is not nervous/anxious            Current Outpatient Medications:     buPROPion (WELLBUTRIN XL) 300 mg 24 hr tablet, Take 1 tablet (300 mg total) by mouth daily, Disp: 30 tablet, Rfl: 0    clozapine (CLOZARIL) 200 MG tablet, Take 2 tablets (400 mg total) by mouth daily at bedtime, Disp: 60 tablet, Rfl: 0   cloZAPine (CLOZARIL) 50 MG tablet, Take 1 tablet (50 mg total) by mouth daily at bedtime (Patient taking differently: Take 50 mg by mouth daily at bedtime ), Disp: 30 tablet, Rfl: 0    divalproex sodium (DEPAKOTE ER) 500 mg 24 hr tablet, Take 2 tablets (1,000 mg total) by mouth daily at bedtime, Disp: 60 tablet, Rfl: 0    fluPHENAZine (PROLIXIN) 10 MG tablet, Take 2 tablets (20 mg total) by mouth daily at bedtime, Disp: 60 tablet, Rfl: 0    levothyroxine 75 mcg tablet, Take 1 tablet (75 mcg total) by mouth daily in the early morning, Disp: 30 tablet, Rfl: 5    metoprolol tartrate (LOPRESSOR) 25 mg tablet, Take 0 5 tablets (12 5 mg total) by mouth every 12 (twelve) hours, Disp: 30 tablet, Rfl: 5    midodrine (PROAMATINE) 10 MG tablet, TAKE ONE TABLET BY MOUTH THREE TIMES A DAY, Disp: 90 tablet, Rfl: 1    polyethylene glycol (MIRALAX) 17 g packet, Take 17 g by mouth daily as needed (constipation), Disp: 14 each, Rfl: 0    senna (SENOKOT) 8 6 mg, Take 1 tablet (8 6 mg total) by mouth 2 (two) times a day, Disp: 60 each, Rfl: 5    atropine (ISOPTO ATROPINE) 1 % ophthalmic solution, Place 1 drop under the tongue 3 (three) times a day (Patient not taking: Reported on 8/22/2019), Disp: 2 mL, Rfl: 0     Physical Exam:    Vitals:    08/22/19 0932   BP: 102/60   BP Location: Left arm   Patient Position: Sitting   Cuff Size: Adult   Pulse: 96   SpO2: 96%   Weight: 88 9 kg (196 lb)   Height: 6' (1 829 m)       Physical Exam   Constitutional: He is oriented to person, place, and time  He appears well-developed and well-nourished  HENT:   Head: Normocephalic  Right Ear: External ear normal    Left Ear: External ear normal    Mouth/Throat: Oropharynx is clear and moist    Eyes: Pupils are equal, round, and reactive to light  Neck: No JVD present  Carotid bruit is not present  Cardiovascular: Normal rate, regular rhythm and intact distal pulses  Exam reveals no gallop and no friction rub     No murmur heard   Pulmonary/Chest: Effort normal and breath sounds normal  No tachypnea  No respiratory distress  He has no wheezes  He has no rales  He exhibits no tenderness  Abdominal: Soft  He exhibits no distension  There is no tenderness  There is no rebound and no guarding  Musculoskeletal: He exhibits no edema  Neurological: He is alert and oriented to person, place, and time  Skin: Skin is warm and dry  Psychiatric: He has a normal mood and affect  His behavior is normal  Judgment and thought content normal    Nursing note and vitals reviewed  Labs:not applicable    Assessment/Plan:    1  Sinus tachycardia/orthostatic hypotension  This is likely secondary to cause a row  He is on metoprolol and midodrine and asymptomatic at this time  2  Episodes of blue lips with lifting weights  I did say if this continues we should get an echocardiogram with Valsalva to rule out shunting  This may be secondary to low blood pressure  They will follow up on an as-needed basis  Thank you so much, please do not hesitate to contact me with any questions or concerns          Blanco Villalta MD  8/22/2019  9:42 AM

## 2019-08-27 ENCOUNTER — OFFICE VISIT (OUTPATIENT)
Dept: FAMILY MEDICINE CLINIC | Facility: CLINIC | Age: 34
End: 2019-08-27
Payer: MEDICARE

## 2019-08-27 VITALS
SYSTOLIC BLOOD PRESSURE: 110 MMHG | HEART RATE: 97 BPM | DIASTOLIC BLOOD PRESSURE: 70 MMHG | WEIGHT: 199.6 LBS | OXYGEN SATURATION: 97 % | HEIGHT: 72 IN | BODY MASS INDEX: 27.04 KG/M2 | TEMPERATURE: 98.1 F

## 2019-08-27 DIAGNOSIS — F20.0 CHRONIC PARANOID SCHIZOPHRENIA (HCC): Chronic | ICD-10-CM

## 2019-08-27 DIAGNOSIS — R94.5 ABNORMAL LIVER FUNCTION: ICD-10-CM

## 2019-08-27 DIAGNOSIS — R00.0 SINUS TACHYCARDIA: ICD-10-CM

## 2019-08-27 DIAGNOSIS — R93.1 ABNORMAL ECHOCARDIOGRAM: ICD-10-CM

## 2019-08-27 DIAGNOSIS — I51.7 LEFT VENTRICULAR HYPERTROPHY: Primary | ICD-10-CM

## 2019-08-27 DIAGNOSIS — I95.1 ORTHOSTATIC HYPOTENSION: ICD-10-CM

## 2019-08-27 DIAGNOSIS — Z72.0 TOBACCO ABUSE: ICD-10-CM

## 2019-08-27 DIAGNOSIS — R74.8 LOW SERUM HDL: ICD-10-CM

## 2019-08-27 DIAGNOSIS — R73.9 ELEVATED BLOOD SUGAR: ICD-10-CM

## 2019-08-27 DIAGNOSIS — E78.1 PURE HYPERGLYCERIDEMIA: ICD-10-CM

## 2019-08-27 DIAGNOSIS — I34.0 NON-RHEUMATIC MITRAL REGURGITATION: ICD-10-CM

## 2019-08-27 DIAGNOSIS — I36.1 NON-RHEUMATIC TRICUSPID VALVE INSUFFICIENCY: ICD-10-CM

## 2019-08-27 DIAGNOSIS — R89.8 EOSINOPHILS INCREASED: ICD-10-CM

## 2019-08-27 PROCEDURE — G0438 PPPS, INITIAL VISIT: HCPCS | Performed by: FAMILY MEDICINE

## 2019-08-27 PROCEDURE — 99214 OFFICE O/P EST MOD 30 MIN: CPT | Performed by: FAMILY MEDICINE

## 2019-08-27 NOTE — PROGRESS NOTES
Assessment and Plan:     Problem List Items Addressed This Visit     None         History of Present Illness:     Patient presents for Medicare Annual Wellness visit    Patient Care Team:  Maricruz Hernandez MD as PCP - General (Family Medicine)     Problem List:     Patient Active Problem List   Diagnosis    [facility] paranoid [facility] )    Sinus tachycardia    Tobacco abuse    Hypothyroidism    Constipation    Orthostatic hypotension    Pure hypercholesterolemia    Eosinophils increased    Elevated blood sugar      Past Medical and Surgical History:     Past Medical History:   Diagnosis Date    Disease of thyroid gland     Schizophrenia ([facility] )      Past Surgical History:   Procedure Laterality Date    TONSILLECTOMY AND ADENOIDECTOMY        Family History:     Family History   Problem Relation Age of Onset    Hypertension Father     Depression Mother       Social History:     Social History     Tobacco Use   Smoking Status Current Every Day Smoker    Packs/day: 0 50    Types: Cigarettes   Smokeless Tobacco Never Used   Tobacco Comment    Per [employer]-current everyday smoker     Social History     Substance and Sexual Activity   Alcohol Use No     Social History     Substance and Sexual Activity   Drug Use No      Medications and Allergies:     Current Outpatient Medications   Medication Sig Dispense Refill    atropine (ISOPTO ATROPINE) 1 % ophthalmic solution Place 1 drop under the tongue 3 (three) times a day (Patient not taking: Reported on 8/22/2019) 2 mL 0    buPROPion (WELLBUTRIN XL) 300 mg 24 hr tablet Take 1 tablet (300 mg total) by mouth daily 30 tablet 0    clozapine (CLOZARIL) 200 MG tablet Take 2 tablets (400 mg total) by mouth daily at bedtime 60 tablet 0    cloZAPine (CLOZARIL) 50 MG tablet Take 1 tablet (50 mg total) by mouth daily at bedtime (Patient taking differently: Take 50 mg by mouth daily at bedtime ) 30 tablet 0    divalproex sodium (DEPAKOTE ER) 500 mg 24 hr tablet Take 2 tablets (1,000 mg total) by mouth daily at bedtime 60 tablet 0    fluPHENAZine (PROLIXIN) 10 MG tablet Take 2 tablets (20 mg total) by mouth daily at bedtime 60 tablet 0    levothyroxine 75 mcg tablet Take 1 tablet (75 mcg total) by mouth daily in the early morning 30 tablet 5    metoprolol tartrate (LOPRESSOR) 25 mg tablet Take 0 5 tablets (12 5 mg total) by mouth every 12 (twelve) hours 30 tablet 5    midodrine (PROAMATINE) 10 MG tablet TAKE ONE TABLET BY MOUTH THREE TIMES A DAY 90 tablet 1    polyethylene glycol (MIRALAX) 17 g packet Take 17 g by mouth daily as needed (constipation) 14 each 0    senna (SENOKOT) 8 6 mg Take 1 tablet (8 6 mg total) by mouth 2 (two) times a day 60 each 5     No current facility-administered medications for this visit  No Known Allergies   Immunizations:     Immunization History   Administered Date(s) Administered    DTaP 12/16/2009    INFLUENZA 11/20/2012    Influenza, injectable, quadrivalent, preservative free 0 5 mL 10/17/2018    Tuberculin Skin Test-PPD Intradermal 12/16/2009, 10/04/2010, 08/10/2017      Medicare Screening Tests and Risk Assessments:         Health Risk Assessment:  Patient rates overall health as very good  Patient feels that their physical health rating is Same  Eyesight was rated as Same  Hearing was rated as Same  Patient feels that their emotional and mental health rating is Much better  Pain experienced by patient in the last 7 days has been None  Emotional/Mental Health:  Patient has not been feeling nervous/anxious  PHQ-9 Depression Screening:    Frequency of the following problems over the past two weeks:      1  Little interest or pleasure in doing things: 0 - not at all      2  Feeling down, depressed, or hopeless: 0 - not at all  PHQ-2 Score: 0          Broken Bones/Falls:     Fall Risk Assessment:    In the past year, patient has experienced: No history of falling in past year          Bladder/Bowel:  Patient has not leaked urine accidently in the last six months  Patient reports no loss of bowel control  Immunizations:  Patient has had a flu vaccination within the last year  Patient has not received a pneumonia shot  Patient has not received a shingles shot  Patient has received tetanus/diphtheria shot  Home Safety:  Patient does not have trouble with stairs inside or outside of their home  Patient currently reports that there are no safety hazards present in home, working smoke alarms, working carbon monoxide detectors  Preventative Screenings:   no prostate cancer screen performed, no colon cancer screen completed, cholesterol screen completed, glaucoma eye exam completed,     Nutrition:  Current diet: Regular with servings of the following:    Medications:  Patient is not currently taking any over-the-counter supplements  Patient is not able to manage medications  Lifestyle Choices:  Patient reports current tobacco use  Patient reports no alcohol use  Patient does not drive a vehicle  Patient wears seat belt  Current level of exercise of physical activity described by patient as: walks twice a week  Activities of Daily Living:  Can get out of bed by his or her self, able to dress self, able to make own meals, unable to do own shopping, able to bathe self, unable to do laundry/housekeeping, unable to manage own money and other related tasks    Previous Hospitalizations:  Hospitalization or ED visit in past 12 months  Number of hospitalizations within the last year: 1-2        Advanced Directives:  Patient has decided on a power of   Patient has spoken to designated power of   Patient has not completed advanced directive          Preventative Screening/Counseling:      Cardiovascular:      General: Screening Current      Counseling: Improve Cholesterol          Diabetes:      General: Screening Current      Counseling: Healthy Weight          Colorectal Cancer:      General: Screening Not Indicated          Prostate Cancer:      General: Screening Not Indicated          AAA:      General: Screening Not Indicated          Glaucoma:      General: Risks and Benefits Discussed and Patient Declines          HIV:      General: Risks and Benefits Discussed and Patient Declines          Hepatitis C:      General: Risks and Benefits Discussed and Patient Declines        Advanced Directives:   Patient has no living will for healthcare, patient does not have an advanced directive     Additional Comments: Patient talked about it verbally to his family  And   Immunizations:      Influenza: Influenza UTD This Year      Pneumococcal: Risks & Benefits Discussed and Patient Declines      Hepatitis B (Low risk patients): Series Not Indicated      TD: Td Vaccine UTD      TDAP: Risks & Benefits Discussed  Additional Comments:  Recommend Tdap at the end of this year was not given Tdap before and recommend regular D DT if he was given T tab before

## 2019-08-27 NOTE — PROGRESS NOTES
Assessment/Plan:          Diagnoses and all orders for this visit:    Left ventricular hypertrophy  Comments:  Discussed with patient and his  to follow with Cardiology    Abnormal echocardiogram  Comments:   abnormal septal motion    Non-rheumatic mitral regurgitation  Comments:  Recheck echocardiogram in 1 year    Non-rheumatic tricuspid valve insufficiency  Comments:  Check echo cardiogram in 1 year    Elevated blood sugar  Comments:  Controlled  To follow with low sweet diet    Pure hyperglyceridemia  Comments: To follow with low fat diet    Low serum HDL  Comments:  Walk half an hour daily    Abnormal liver function  -     Hepatic function panel; Future    Tobacco abuse  Comments:   pt to stop smoking     Sinus tachycardia  Comments:   controlled  Cardiology office visits June 17 and August 22noted    Chronic paranoid schizophrenia [facility])  Comments: Follow with Psychiatry    Eosinophils increased  Comments:  resolved    Orthostatic hypotension  Comments:  Improved            Subjective:     Patient ID: Zunilda Hyman is a 37 y o  male      Patie his Cardiology nt is here with his   Hypotension  Patient denied lightheadedness  Or fatigue  Tachycardia  He she denied medication  Was placed on metoprolol by Cardiology  Hyperlipidemia admit to regular fat intake denied chest pain  Increase Oesinophil,  Denied the home call or diarrhea  Abnormal liver function test   Patient denied abdominal pain denied drinking  test results      echo/Doppler   abdominal ultrasound  Lab done on July 9 and 18 , August 6, 2019   Discussed result with patient      Review of Systems   Constitutional: Negative for activity change, appetite change, chills, fatigue, fever and unexpected weight change  HENT: Negative for congestion, ear discharge, ear pain, hearing loss, nosebleeds, rhinorrhea, sinus pressure, sore throat, tinnitus, trouble swallowing and voice change      Eyes: Negative for photophobia, pain and visual disturbance  Respiratory: Negative for cough, chest tightness, shortness of breath and wheezing  Cardiovascular: Negative for chest pain, palpitations and leg swelling  Gastrointestinal: Negative for abdominal pain, anal bleeding, blood in stool, constipation, diarrhea, nausea and vomiting  Endocrine: Negative for cold intolerance, heat intolerance, polydipsia and polyuria  Genitourinary: Negative for dysuria, frequency, hematuria and urgency  Musculoskeletal: Negative for arthralgias, back pain, gait problem, joint swelling, myalgias and neck pain  Skin: Negative for rash  Neurological: Negative for dizziness, tremors, seizures, syncope, weakness, light-headedness and headaches  Hematological: Negative for adenopathy  Does not bruise/bleed easily  Psychiatric/Behavioral: Negative for agitation, behavioral problems, confusion, dysphoric mood, hallucinations and sleep disturbance  The patient is not nervous/anxious  Objective:     Physical Exam   Constitutional: He is oriented to person, place, and time  He appears well-developed and well-nourished  No distress  HENT:   Head: Normocephalic  Mouth/Throat: Oropharynx is clear and moist  No oropharyngeal exudate  Eyes: Pupils are equal, round, and reactive to light  EOM are normal  No scleral icterus  Neck: Normal range of motion  Neck supple  No JVD present  No tracheal deviation present  Cardiovascular: Normal rate, regular rhythm and normal heart sounds  Exam reveals no gallop and no friction rub  No murmur heard  Pulses:       Carotid pulses are 3+ on the right side, and 3+ on the left side  Legs , no edema    Pulmonary/Chest: Effort normal and breath sounds normal    Abdominal: Soft  Bowel sounds are normal  He exhibits no mass  There is no tenderness  Musculoskeletal: Normal range of motion  He exhibits no edema or tenderness  Lymphadenopathy:     He has no cervical adenopathy  Neurological: He is alert and oriented to person, place, and time  No cranial nerve deficit or sensory deficit  He exhibits normal muscle tone  Coordination normal    Normal gait   Skin: No rash noted  Psychiatric: He has a normal mood and affect   His behavior is normal

## 2019-08-27 NOTE — PATIENT INSTRUCTIONS
Obesity   AMBULATORY CARE:   Obesity  is when your body mass index (BMI) is greater than 30  Your healthcare provider will use your height and weight to measure your BMI  The risks of obesity include  many health problems, such as injuries or physical disability  You may need tests to check for the following:  · Diabetes     · High blood pressure or high cholesterol     · Heart disease     · Gallbladder or liver disease     · Cancer of the colon, breast, prostate, liver, or kidney     · Sleep apnea     · Arthritis or gout  Seek care immediately if:   · You have a severe headache, confusion, or difficulty speaking  · You have weakness on one side of your body  · You have chest pain, sweating, or shortness of breath  Contact your healthcare provider if:   · You have symptoms of gallbladder or liver disease, such as pain in your upper abdomen  · You have knee or hip pain and discomfort while walking  · You have symptoms of diabetes, such as intense hunger and thirst, and frequent urination  · You have symptoms of sleep apnea, such as snoring or daytime sleepiness  · You have questions or concerns about your condition or care  Treatment for obesity  focuses on helping you lose weight to improve your health  Even a small decrease in BMI can reduce the risk for many health problems  Your healthcare provider will help you set a weight-loss goal   · Lifestyle changes  are the first step in treating obesity  These include making healthy food choices and getting regular physical activity  Your healthcare provider may suggest a weight-loss program that involves coaching, education, and therapy  · Medicine  may help you lose weight when it is used with a healthy diet and physical activity  · Surgery  can help you lose weight if you are very obese and have other health problems  There are several types of weight-loss surgery  Ask your healthcare provider for more information    Be successful losing weight:   · Set small, realistic goals  An example of a small goal is to walk for 20 minutes 5 days a week  Anther goal is to lose 5% of your body weight  · Tell friends, family members, and coworkers about your goals  and ask for their support  Ask a friend to lose weight with you, or join a weight-loss support group  · Identify foods or triggers that may cause you to overeat , and find ways to avoid them  Remove tempting high-calorie foods from your home and workplace  Place a bowl of fresh fruit on your kitchen counter  If stress causes you to eat, then find other ways to cope with stress  · Keep a diary to track what you eat and drink  Also write down how many minutes of physical activity you do each day  Weigh yourself once a week and record it in your diary  Eating changes: You will need to eat 500 to 1,000 fewer calories each day than you currently eat to lose 1 to 2 pounds a week  The following changes will help you cut calories:  · Eat smaller portions  Use small plates, no larger than 9 inches in diameter  Fill your plate half full of fruits and vegetables  Measure your food using measuring cups until you know what a serving size looks like  · Eat 3 meals and 1 or 2 snacks each day  Plan your meals in advance  Yousif Pineda and eat at home most of the time  Eat slowly  · Eat fruits and vegetables at every meal   They are low in calories and high in fiber, which makes you feel full  Do not add butter, margarine, or cream sauce to vegetables  Use herbs to season steamed vegetables  · Eat less fat and fewer fried foods  Eat more baked or grilled chicken and fish  These protein sources are lower in calories and fat than red meat  Limit fast food  Dress your salads with olive oil and vinegar instead of bottled dressing  · Limit the amount of sugar you eat  Do not drink sugary beverages  Limit alcohol  Activity changes:  Physical activity is good for your body in many ways   It helps you burn calories and build strong muscles  It decreases stress and depression, and improves your mood  It can also help you sleep better  Talk to your healthcare provider before you begin an exercise program   · Exercise for at least 30 minutes 5 days a week  Start slowly  Set aside time each day for physical activity that you enjoy and that is convenient for you  It is best to do both weight training and an activity that increases your heart rate, such as walking, bicycling, or swimming  · Find ways to be more active  Do yard work and housecleaning  Walk up the stairs instead of using elevators  Spend your leisure time going to events that require walking, such as outdoor festivals or fairs  This extra physical activity can help you lose weight and keep it off  Follow up with your healthcare provider as directed: You may need to meet with a dietitian  Write down your questions so you remember to ask them during your visits  © 2017 [address] [employer] Information is for End User's use only and may not be sold, redistributed or otherwise used for commercial purposes  All illustrations and images included in CareNotes® are the copyrighted property of [employer]  or Pradip Tapia  The above information is an  only  It is not intended as medical advice for individual conditions or treatments  Talk to your doctor, nurse or pharmacist before following any medical regimen to see if it is safe and effective for you  Urinary Incontinence   WHAT YOU NEED TO KNOW:   What is urinary incontinence? Urinary incontinence (UI) is when you lose control of your bladder  What causes UI? UI occurs because your bladder cannot store or empty urine properly  The following are the most common types of UI:  · Stress incontinence  is when you leak urine due to increased bladder pressure  This may happen when you cough, sneeze, or exercise       · Urge incontinence  is when you feel the need to urinate right away and leak urine accidentally  · Mixed incontinence  is when you have both stress and urge UI  What are the signs and symptoms of UI?   · You feel like your bladder does not empty completely when you urinate  · You urinate often and need to urinate immediately  · You leak urine when you sleep, or you wake up with the urge to urinate  · You leak urine when you cough, sneeze, exercise, or laugh  How is UI diagnosed? Your healthcare provider will ask how often you leak urine and whether you have stress or urge symptoms  Tell him which medicines you take, how often you urinate, and how much liquid you drink each day  You may need any of the following tests:  · Urine tests  may show infection or kidney function  · A pelvic exam  may be done to check for blockages  A pelvic exam will also show if your bladder, uterus, or other organs have moved out of place  · An x-ray, ultrasound, or CT  may show problems with parts of your urinary system  You may be given contrast liquid to help your organs show up better in the pictures  Tell the healthcare provider if you have ever had an allergic reaction to contrast liquid  Do not enter the MRI room with anything metal  Metal can cause serious injury  Tell the healthcare provider if you have any metal in or on your body  · A bladder scan  will show how much urine is left in your bladder after you urinate  You will be asked to urinate and then healthcare providers will use a small ultrasound machine to check the urine left in your bladder  · Cystometry  is used to check the function of your urinary system  Your healthcare provider checks the pressure in your bladder while filling it with fluid  Your bladder pressure may also be tested when your bladder is full and while you urinate  How is UI treated? · Medicines  can help strengthen your bladder control      · Electrical stimulation  is used to send a small amount of electrical energy to your pelvic floor muscles  This helps control your bladder function  Electrodes may be placed outside your body or in your rectum  For women, the electrodes may be placed in the vagina  · A bulking agent  may be injected into the wall of your urethra to make it thicker  This helps keep your urethra closed and decreases urine leakage  · Devices  such as a clamp, pessary, or tampon may help stop urine leaks  Ask your healthcare provider for more information about these and other devices  · Surgery  may be needed if other treatments do not work  Several types of surgery can help improve your bladder control  Ask your healthcare provider for more information about the surgery you may need  How can I manage my symptoms? · Do pelvic muscle exercises often  Your pelvic muscles help you stop urinating  Squeeze these muscles tight for 5 seconds, then relax for 5 seconds  Gradually work up to squeezing for 10 seconds  Do 3 sets of 15 repetitions a day, or as directed  This will help strengthen your pelvic muscles and improve bladder control  · A catheter  may be used to help empty your bladder  A catheter is a tiny, plastic tube that is put into your bladder to drain your urine  Your healthcare provider may tell you to use a catheter to prevent your bladder from getting too full and leaking urine  · Keep a UI record  Write down how often you leak urine and how much you leak  Make a note of what you were doing when you leaked urine  · Train your bladder  Go to the bathroom at set times, such as every 2 hours, even if you do not feel the urge to go  You can also try to hold your urine when you feel the urge to go  For example, hold your urine for 5 minutes when you feel the urge to go  As that becomes easier, hold your urine for 10 minutes  · Drink liquids as directed  Ask your healthcare provider how much liquid to drink each day and which liquids are best for you   You may need to limit the amount of liquid you drink to help control your urine leakage  Limit or do not have drinks that contain caffeine or alcohol  Do not drink any liquid right before you go to bed  · Prevent constipation  Eat a variety of high-fiber foods  Good examples are high-fiber cereals, beans, vegetables, and whole-grain breads  Prune juice may help make your bowel movement softer  Walking is the best way to trigger your intestines to have a bowel movement  · Exercise regularly and maintain a healthy weight  Ask your healthcare provider how much you should weigh and about the best exercise plan for you  Weight loss and exercise will decrease pressure on your bladder and help you control your leakage  Ask him to help you create a weight loss plan if you are overweight  When should I seek immediate care? · You have severe pain  · You are confused or cannot think clearly  When should I contact my healthcare provider? · You have a fever  · You see blood in your urine  · You have pain when you urinate  · You have new or worse pain, even after treatment  · Your mouth feels dry or you have vision changes  · Your urine is cloudy or smells bad  · You have questions or concerns about your condition or care  CARE AGREEMENT:   You have the right to help plan your care  Learn about your health condition and how it may be treated  Discuss treatment options with your caregivers to decide what care you want to receive  You always have the right to refuse treatment  The above information is an  only  It is not intended as medical advice for individual conditions or treatments  Talk to your doctor, nurse or pharmacist before following any medical regimen to see if it is safe and effective for you  © 2017 [address] [employer] Information is for End User's use only and may not be sold, redistributed or otherwise used for commercial purposes   All illustrations and images included in CareNotes® are the copyrighted property of [employer]  or Pradip Tapia  Cigarette Smoking and Your Health   AMBULATORY CARE:   Risks to your health if you smoke:  Nicotine and other chemicals found in tobacco damage every cell in your body  Even if you are a light smoker, you have an increased risk for cancer, heart disease, and lung disease  If you are pregnant or have diabetes, smoking increases your risk for complications  Benefits to your health if you stop smoking:   · You decrease respiratory symptoms such as coughing, wheezing, and shortness of breath  · You reduce your risk for cancers of the lung, mouth, throat, kidney, bladder, pancreas, stomach, and cervix  If you already have cancer, you increase the benefits of chemotherapy  You also reduce your risk for cancer returning or a second cancer from developing  · You reduce your risk for heart disease, blood clots, heart attack, and stroke  · You reduce your risk for lung infections, and diseases such as pneumonia, asthma, chronic bronchitis, and emphysema  · Your circulation improves  More oxygen can be delivered to your body  If you have diabetes, you lower your risk for complications, such as kidney, artery, and eye diseases  You also lower your risk for nerve damage  Nerve damage can lead to amputations, poor vision, and blindness  · You improve your body's ability to heal and to fight infections  Benefits to the health of others if you stop smoking:  Tobacco is harmful to nonsmokers who breathe in your secondhand smoke  The following are ways the health of others around you may improve when you stop smoking:  · You lower the risks for lung cancer and heart disease in nonsmoking adults  · If you are pregnant, you lower the risk for miscarriage, early delivery, low birth weight, and stillbirth  You also lower your baby's risk for SIDS, obesity, developmental delay, and neurobehavioral problems, such as ADHD  · If you have children, you lower their risk for ear infections, colds, pneumonia, bronchitis, and asthma  For more information and support to stop smoking:   · Smokefree  gov  Phone: 4- 747 - 113-1659  Web Address: www smokefrOpathica  Follow up with your healthcare provider as directed:  Write down your questions so you remember to ask them during your visits  © 2017 [address] [employer] Information is for End User's use only and may not be sold, redistributed or otherwise used for commercial purposes  All illustrations and images included in CareNotes® are the copyrighted property of [employer]  or Pradip Tapia  The above information is an  only  It is not intended as medical advice for individual conditions or treatments  Talk to your doctor, nurse or pharmacist before following any medical regimen to see if it is safe and effective for you  Fall Prevention   WHAT YOU NEED TO KNOW:   What is fall prevention? Fall prevention includes ways to make your home and other areas safer  It also includes ways you can move more carefully to prevent a fall  What increases my risk for falls? · Lack of vitamin D    · Not getting enough sleep each night    · Trouble walking or keeping your balance, or foot problems    · Health conditions that cause changes in your blood pressure, vision, or muscle strength and coordination    · Medicines that make you dizzy, weak, or sleepy    · Problems seeing clearly    · Shoes that have high heels or are not supportive    · Tripping hazards, such as items left on the floor, no handrails on the stairs, or broken steps  How can I help protect myself from falls? · Stand or sit up slowly  This may help you keep your balance and prevent falls  If you need to get up during the night, sit up first  Be sure you are fully awake before you stand  Turn on the light before you start walking  Go slowly in case you are still sleepy   Make sure you will not trip over any pets sleeping in the bedroom  · Use assistive devices as directed  Your healthcare provider may suggest that you use a cane or walker to help you keep your balance  You may need to have grab bars put in your bathroom near the toilet or in the shower  · Wear shoes that fit well and have soles that   Wear shoes both inside and outside  Use slippers with good   Do not wear shoes with high heels  · Wear a personal alarm  This is a device that allows you to call 911 if you fall and need help  Ask your healthcare provider for more information  · Stay active  Exercise can help strengthen your muscles and improve your balance  Your healthcare provider may recommend water aerobics or walking  He or she may also recommend physical therapy to improve your coordination  Never start an exercise program without talking to your healthcare provider first      · Manage medical conditions  Keep all appointments with your healthcare providers  Visit your eye doctor as directed  How can I make my home safer? · Add items to prevent falls in the bathroom  Put nonslip strips on your bath or shower floor to prevent you from slipping  Use a bath mat if you do not have carpet in the bathroom  This will prevent you from falling when you step out of the bath or shower  Use a shower seat so you do not need to stand while you shower  Sit on the toilet or a chair in your bathroom to dry yourself and put on clothing  This will prevent you from losing your balance from drying or dressing yourself while you are standing  · Keep paths clear  Remove books, shoes, and other objects from walkways and stairs  Place cords for telephones and lamps out of the way so that you do not need to walk over them  Tape them down if you cannot move them  Remove small rugs  If you cannot remove a rug, secure it with double-sided tape  This will prevent you from tripping  · Install bright lights in your home  Use night lights to help light paths to the bathroom or kitchen  Always turn on the light before you start walking  · Keep items you use often on shelves within reach  Do not use a step stool to help you reach an item  · Paint or place reflective tape on the edges of your stairs  This will help you see the stairs better  Call 911 or have someone else call if:   · You have fallen and are unconscious  · You have fallen and cannot move part of your body  Contact your healthcare provider if:   · You have fallen and have pain or a headache  · You have questions or concerns about your condition or care  CARE AGREEMENT:   You have the right to help plan your care  Learn about your health condition and how it may be treated  Discuss treatment options with your caregivers to decide what care you want to receive  You always have the right to refuse treatment  The above information is an  only  It is not intended as medical advice for individual conditions or treatments  Talk to your doctor, nurse or pharmacist before following any medical regimen to see if it is safe and effective for you  © 2017 [address] [facility] Information is for End User's use only and may not be sold, redistributed or otherwise used for commercial purposes  All illustrations and images included in CareNotes® are the copyrighted property of [employer]  or Pradip Tapia  Advance Directives   WHAT YOU NEED TO KNOW:   What are advance directives? Advance directives are legal documents that state your wishes and plans for medical care  These plans are made ahead of time in case you lose your ability to make decisions for yourself  Advance directives can apply to any medical decision, such as the treatments you want, and if you want to donate organs  What are the types of advance directives? There are many types of advance directives, and each state has rules about how to use them   You may choose a combination of any of the following:  · Living will: This is a written record of the treatment you want  You can also choose which treatments you do not want, which to limit, and which to stop at a certain time  This includes surgery, medicine, IV fluid, and tube feedings  · Durable power of  for healthcare [facility]): This is a written record that states who you want to make healthcare choices for you when you are unable to make them for yourself  This person, called a proxy, is usually a family member or a friend  You may choose more than 1 proxy  · Do not resuscitate (DNR) order:  A DNR order is used in case your heart stops beating or you stop breathing  It is a request not to have certain forms of treatment, such as CPR  A DNR order may be included in other types of advance directives  · Medical directive: This covers the care that you want if you are in a coma, near death, or unable to make decisions for yourself  You can list the treatments you want for each condition  Treatment may include pain medicine, surgery, blood transfusions, dialysis, IV or tube feedings, and a ventilator (breathing machine)  · Values history: This document has questions about your views, beliefs, and how you feel and think about life  This information can help others choose the care that you would choose  Why are advance directives important? An advance directive helps you control your care  Although spoken wishes may be used, it is better to have your wishes written down  Spoken wishes can be misunderstood, or not followed  Treatments may be given even if you do not want them  An advance directive may make it easier for your family to make difficult choices about your care  How do I decide what to put in my advance directives? · Make informed decisions:  Make sure you fully understand treatments or care you may receive   Think about the benefits and problems your decisions could cause for you or your family  Talk to healthcare providers if you have concerns or questions before you write down your wishes  You may also want to talk with your [employer] or , or a   Check your state laws to make sure that what you put in your advance directive is legal      · Sign all forms:  Sign and date your advance directive when you have finished  You may also need 2 witnesses to sign the forms  Witnesses cannot be your doctor or his staff, your spouse, heirs or beneficiaries, people you owe money to, or your chosen proxy  Talk to your family, proxy, and healthcare providers about your advance directive  Give each person a copy, and keep one for yourself in a place you can get to easily  Do not keep it hidden or locked away  · Review and revise your plans: You can revise your advance directive at any time, as long as you are able to make decisions  Review your plan every year, and when there are changes in your life, or your health  When you make changes, let your family, proxy, and healthcare providers know  Give each a new copy  Where can I find more information? · [employer]  [address] , [address]  Phone: 0- 690 - 720-7435  Phone: 3- 447 - 764-5693  Web Address: http://www  aafp org  · [address] [facility]  [address] [facility] [place], [address]  Phone: 6- 132 - [phone]  Phone: [phone]  Web Address: Curtis hicks  CARE AGREEMENT:   You have the right to help plan your care  To help with this plan, you must learn about your health condition and treatment options  You must also learn about advance directives and how they are used  Work with your healthcare providers to decide what care will be used to treat you  You always have the right to refuse treatment  The above information is an  only   It is not intended as medical advice for individual conditions or treatments  Talk to your doctor, nurse or pharmacist before following any medical regimen to see if it is safe and effective for you  © 2017 [address] [employer] Information is for End User's use only and may not be sold, redistributed or otherwise used for commercial purposes  All illustrations and images included in CareNotes® are the copyrighted property of [employer] M [employer]  or Pradip Tapia     patient to follow up with his test results

## 2019-08-31 PROBLEM — E78.1 PURE HYPERGLYCERIDEMIA: Status: ACTIVE | Noted: 2019-08-31

## 2019-08-31 PROBLEM — R94.5 ABNORMAL LIVER FUNCTION: Status: ACTIVE | Noted: 2019-08-31

## 2019-08-31 PROBLEM — I34.0 NON-RHEUMATIC MITRAL REGURGITATION: Status: ACTIVE | Noted: 2019-08-31

## 2019-08-31 PROBLEM — I36.1 NON-RHEUMATIC TRICUSPID VALVE INSUFFICIENCY: Status: ACTIVE | Noted: 2019-08-31

## 2019-08-31 PROBLEM — R93.1 ABNORMAL ECHOCARDIOGRAM: Status: ACTIVE | Noted: 2019-08-31

## 2019-08-31 PROBLEM — I51.7 LEFT VENTRICULAR HYPERTROPHY: Status: ACTIVE | Noted: 2019-08-31

## 2019-08-31 PROBLEM — R74.8 LOW SERUM HDL: Status: ACTIVE | Noted: 2019-08-31

## 2019-09-03 ENCOUNTER — APPOINTMENT (OUTPATIENT)
Dept: LAB | Facility: HOSPITAL | Age: 34
End: 2019-09-03
Payer: MEDICARE

## 2019-09-03 DIAGNOSIS — I51.9 MYXEDEMA HEART DISEASE: ICD-10-CM

## 2019-09-03 DIAGNOSIS — E03.9 MYXEDEMA HEART DISEASE: ICD-10-CM

## 2019-09-03 DIAGNOSIS — R94.5 ABNORMAL LIVER FUNCTION: ICD-10-CM

## 2019-09-03 DIAGNOSIS — F20.0 PARANOID SCHIZOPHRENIA, SUBCHRONIC CONDITION (HCC): ICD-10-CM

## 2019-09-03 DIAGNOSIS — Z79.899 ENCOUNTER FOR LONG-TERM (CURRENT) USE OF OTHER MEDICATIONS: ICD-10-CM

## 2019-09-03 LAB
ALBUMIN SERPL BCP-MCNC: 4.1 G/DL (ref 3–5.2)
ALP SERPL-CCNC: 75 U/L (ref 43–122)
ALT SERPL W P-5'-P-CCNC: 14 U/L (ref 9–52)
AST SERPL W P-5'-P-CCNC: 16 U/L (ref 17–59)
BASOPHILS # BLD AUTO: 0 THOUSANDS/ΜL (ref 0–0.1)
BASOPHILS NFR BLD AUTO: 0 % (ref 0–1)
BILIRUB DIRECT SERPL-MCNC: 0 MG/DL
BILIRUB SERPL-MCNC: 0.3 MG/DL
EOSINOPHIL # BLD AUTO: 0.2 THOUSAND/ΜL (ref 0–0.4)
EOSINOPHIL NFR BLD AUTO: 4 % (ref 0–6)
ERYTHROCYTE [DISTWIDTH] IN BLOOD BY AUTOMATED COUNT: 13.2 %
HCT VFR BLD AUTO: 44.1 % (ref 41–53)
HGB BLD-MCNC: 15 G/DL (ref 13.5–17.5)
LYMPHOCYTES # BLD AUTO: 2.3 THOUSANDS/ΜL (ref 0.5–4)
LYMPHOCYTES NFR BLD AUTO: 35 % (ref 25–45)
MCH RBC QN AUTO: 28.9 PG (ref 26–34)
MCHC RBC AUTO-ENTMCNC: 34.1 G/DL (ref 31–36)
MCV RBC AUTO: 85 FL (ref 80–100)
MONOCYTES # BLD AUTO: 0.6 THOUSAND/ΜL (ref 0.2–0.9)
MONOCYTES NFR BLD AUTO: 8 % (ref 1–10)
NEUTROPHILS # BLD AUTO: 3.6 THOUSANDS/ΜL (ref 1.8–7.8)
NEUTS SEG NFR BLD AUTO: 53 % (ref 45–65)
PLATELET # BLD AUTO: 232 THOUSANDS/UL (ref 150–450)
PMV BLD AUTO: 8.1 FL (ref 8.9–12.7)
PROT SERPL-MCNC: 6.9 G/DL (ref 5.9–8.4)
RBC # BLD AUTO: 5.2 MILLION/UL (ref 4.5–5.9)
WBC # BLD AUTO: 6.7 THOUSAND/UL (ref 4.5–11)

## 2019-09-03 PROCEDURE — 36415 COLL VENOUS BLD VENIPUNCTURE: CPT

## 2019-09-03 PROCEDURE — 80076 HEPATIC FUNCTION PANEL: CPT

## 2019-09-03 PROCEDURE — 85025 COMPLETE CBC W/AUTO DIFF WBC: CPT

## 2019-10-01 ENCOUNTER — TRANSCRIBE ORDERS (OUTPATIENT)
Dept: ADMINISTRATIVE | Facility: HOSPITAL | Age: 34
End: 2019-10-01

## 2019-10-01 ENCOUNTER — APPOINTMENT (OUTPATIENT)
Dept: LAB | Facility: HOSPITAL | Age: 34
End: 2019-10-01
Payer: MEDICARE

## 2019-10-01 DIAGNOSIS — Z79.899 ENCOUNTER FOR LONG-TERM (CURRENT) USE OF OTHER MEDICATIONS: ICD-10-CM

## 2019-10-01 DIAGNOSIS — I51.9 MYXEDEMA HEART DISEASE: ICD-10-CM

## 2019-10-01 DIAGNOSIS — E03.9 MYXEDEMA HEART DISEASE: ICD-10-CM

## 2019-10-01 DIAGNOSIS — F20.0 PARANOID SCHIZOPHRENIA, SUBCHRONIC CONDITION (HCC): ICD-10-CM

## 2019-10-01 DIAGNOSIS — F20.0 PARANOID SCHIZOPHRENIA, SUBCHRONIC CONDITION (HCC): Primary | ICD-10-CM

## 2019-10-01 LAB
BASOPHILS # BLD AUTO: 0 THOUSANDS/ΜL (ref 0–0.1)
BASOPHILS NFR BLD AUTO: 1 % (ref 0–1)
EOSINOPHIL # BLD AUTO: 0.3 THOUSAND/ΜL (ref 0–0.4)
EOSINOPHIL NFR BLD AUTO: 4 % (ref 0–6)
ERYTHROCYTE [DISTWIDTH] IN BLOOD BY AUTOMATED COUNT: 13.2 %
HCT VFR BLD AUTO: 45.5 % (ref 41–53)
HGB BLD-MCNC: 15.3 G/DL (ref 13.5–17.5)
LYMPHOCYTES # BLD AUTO: 2.5 THOUSANDS/ΜL (ref 0.5–4)
LYMPHOCYTES NFR BLD AUTO: 31 % (ref 25–45)
MCH RBC QN AUTO: 29.1 PG (ref 26–34)
MCHC RBC AUTO-ENTMCNC: 33.6 G/DL (ref 31–36)
MCV RBC AUTO: 87 FL (ref 80–100)
MONOCYTES # BLD AUTO: 0.7 THOUSAND/ΜL (ref 0.2–0.9)
MONOCYTES NFR BLD AUTO: 8 % (ref 1–10)
NEUTROPHILS # BLD AUTO: 4.6 THOUSANDS/ΜL (ref 1.8–7.8)
NEUTS SEG NFR BLD AUTO: 57 % (ref 45–65)
PLATELET # BLD AUTO: 263 THOUSANDS/UL (ref 150–450)
PMV BLD AUTO: 7.8 FL (ref 8.9–12.7)
RBC # BLD AUTO: 5.24 MILLION/UL (ref 4.5–5.9)
WBC # BLD AUTO: 8.1 THOUSAND/UL (ref 4.5–11)

## 2019-10-01 PROCEDURE — 85025 COMPLETE CBC W/AUTO DIFF WBC: CPT

## 2019-10-01 PROCEDURE — 36415 COLL VENOUS BLD VENIPUNCTURE: CPT

## 2019-10-18 DIAGNOSIS — F20.0 CHRONIC PARANOID SCHIZOPHRENIA (HCC): Chronic | ICD-10-CM

## 2019-10-21 RX ORDER — LEVOTHYROXINE SODIUM 0.07 MG/1
75 TABLET ORAL
Qty: 30 TABLET | Refills: 5 | Status: SHIPPED | OUTPATIENT
Start: 2019-10-21 | End: 2019-12-10 | Stop reason: SDUPTHER

## 2019-10-21 RX ORDER — MIDODRINE HYDROCHLORIDE 10 MG/1
10 TABLET ORAL 3 TIMES DAILY
Qty: 90 TABLET | Refills: 1 | Status: SHIPPED | OUTPATIENT
Start: 2019-10-21 | End: 2019-12-10 | Stop reason: SDUPTHER

## 2019-10-29 ENCOUNTER — APPOINTMENT (OUTPATIENT)
Dept: LAB | Facility: HOSPITAL | Age: 34
End: 2019-10-29
Payer: MEDICARE

## 2019-10-29 ENCOUNTER — TRANSCRIBE ORDERS (OUTPATIENT)
Dept: ADMINISTRATIVE | Facility: HOSPITAL | Age: 34
End: 2019-10-29

## 2019-10-29 DIAGNOSIS — F20.0 PARANOID SCHIZOPHRENIA, SUBCHRONIC CONDITION (HCC): Primary | ICD-10-CM

## 2019-10-29 DIAGNOSIS — F20.0 PARANOID SCHIZOPHRENIA, SUBCHRONIC CONDITION (HCC): ICD-10-CM

## 2019-10-29 DIAGNOSIS — Z79.899 ENCOUNTER FOR LONG-TERM (CURRENT) USE OF OTHER MEDICATIONS: ICD-10-CM

## 2019-10-29 DIAGNOSIS — I51.9 MYXEDEMA HEART DISEASE: ICD-10-CM

## 2019-10-29 DIAGNOSIS — E03.9 MYXEDEMA HEART DISEASE: ICD-10-CM

## 2019-10-29 LAB
BASOPHILS # BLD AUTO: 0 THOUSANDS/ΜL (ref 0–0.1)
BASOPHILS NFR BLD AUTO: 1 % (ref 0–1)
EOSINOPHIL # BLD AUTO: 0.3 THOUSAND/ΜL (ref 0–0.4)
EOSINOPHIL NFR BLD AUTO: 4 % (ref 0–6)
ERYTHROCYTE [DISTWIDTH] IN BLOOD BY AUTOMATED COUNT: 13.2 %
HCT VFR BLD AUTO: 45 % (ref 41–53)
HGB BLD-MCNC: 15.5 G/DL (ref 13.5–17.5)
LYMPHOCYTES # BLD AUTO: 2 THOUSANDS/ΜL (ref 0.5–4)
LYMPHOCYTES NFR BLD AUTO: 23 % (ref 25–45)
MCH RBC QN AUTO: 29.2 PG (ref 26–34)
MCHC RBC AUTO-ENTMCNC: 34.4 G/DL (ref 31–36)
MCV RBC AUTO: 85 FL (ref 80–100)
MONOCYTES # BLD AUTO: 0.6 THOUSAND/ΜL (ref 0.2–0.9)
MONOCYTES NFR BLD AUTO: 8 % (ref 1–10)
NEUTROPHILS # BLD AUTO: 5.6 THOUSANDS/ΜL (ref 1.8–7.8)
NEUTS SEG NFR BLD AUTO: 65 % (ref 45–65)
PLATELET # BLD AUTO: 222 THOUSANDS/UL (ref 150–450)
PMV BLD AUTO: 8.4 FL (ref 8.9–12.7)
RBC # BLD AUTO: 5.3 MILLION/UL (ref 4.5–5.9)
WBC # BLD AUTO: 8.6 THOUSAND/UL (ref 4.5–11)

## 2019-10-29 PROCEDURE — 85025 COMPLETE CBC W/AUTO DIFF WBC: CPT

## 2019-10-29 PROCEDURE — 36415 COLL VENOUS BLD VENIPUNCTURE: CPT

## 2019-11-26 ENCOUNTER — APPOINTMENT (OUTPATIENT)
Dept: LAB | Facility: HOSPITAL | Age: 34
End: 2019-11-26
Payer: MEDICARE

## 2019-11-26 DIAGNOSIS — Z79.899 ENCOUNTER FOR LONG-TERM (CURRENT) USE OF OTHER MEDICATIONS: ICD-10-CM

## 2019-11-26 DIAGNOSIS — I51.9 MYXEDEMA HEART DISEASE: ICD-10-CM

## 2019-11-26 DIAGNOSIS — F20.0 PARANOID SCHIZOPHRENIA, SUBCHRONIC CONDITION (HCC): ICD-10-CM

## 2019-11-26 DIAGNOSIS — E03.9 MYXEDEMA HEART DISEASE: ICD-10-CM

## 2019-11-26 LAB
BASOPHILS # BLD AUTO: 0.1 THOUSANDS/ΜL (ref 0–0.1)
BASOPHILS NFR BLD AUTO: 1 % (ref 0–1)
EOSINOPHIL # BLD AUTO: 0.6 THOUSAND/ΜL (ref 0–0.4)
EOSINOPHIL NFR BLD AUTO: 7 % (ref 0–6)
ERYTHROCYTE [DISTWIDTH] IN BLOOD BY AUTOMATED COUNT: 13.5 %
HCT VFR BLD AUTO: 46.2 % (ref 41–53)
HGB BLD-MCNC: 15.3 G/DL (ref 13.5–17.5)
LYMPHOCYTES # BLD AUTO: 3.5 THOUSANDS/ΜL (ref 0.5–4)
LYMPHOCYTES NFR BLD AUTO: 42 % (ref 25–45)
MCH RBC QN AUTO: 28.6 PG (ref 26–34)
MCHC RBC AUTO-ENTMCNC: 33 G/DL (ref 31–36)
MCV RBC AUTO: 87 FL (ref 80–100)
MONOCYTES # BLD AUTO: 0.6 THOUSAND/ΜL (ref 0.2–0.9)
MONOCYTES NFR BLD AUTO: 7 % (ref 1–10)
NEUTROPHILS # BLD AUTO: 3.6 THOUSANDS/ΜL (ref 1.8–7.8)
NEUTS SEG NFR BLD AUTO: 44 % (ref 45–65)
PLATELET # BLD AUTO: 274 THOUSANDS/UL (ref 150–450)
PMV BLD AUTO: 8.5 FL (ref 8.9–12.7)
RBC # BLD AUTO: 5.34 MILLION/UL (ref 4.5–5.9)
WBC # BLD AUTO: 8.3 THOUSAND/UL (ref 4.5–11)

## 2019-11-26 PROCEDURE — 85025 COMPLETE CBC W/AUTO DIFF WBC: CPT

## 2019-11-26 PROCEDURE — 36415 COLL VENOUS BLD VENIPUNCTURE: CPT

## 2019-12-10 ENCOUNTER — OFFICE VISIT (OUTPATIENT)
Dept: FAMILY MEDICINE CLINIC | Facility: CLINIC | Age: 34
End: 2019-12-10
Payer: MEDICARE

## 2019-12-10 VITALS
DIASTOLIC BLOOD PRESSURE: 70 MMHG | HEART RATE: 93 BPM | BODY MASS INDEX: 27.17 KG/M2 | TEMPERATURE: 97.6 F | SYSTOLIC BLOOD PRESSURE: 102 MMHG | RESPIRATION RATE: 18 BRPM | HEIGHT: 72 IN | OXYGEN SATURATION: 99 % | WEIGHT: 200.6 LBS

## 2019-12-10 DIAGNOSIS — R00.0 SINUS TACHYCARDIA: ICD-10-CM

## 2019-12-10 DIAGNOSIS — F17.200 SMOKER: ICD-10-CM

## 2019-12-10 DIAGNOSIS — R73.9 ELEVATED BLOOD SUGAR: ICD-10-CM

## 2019-12-10 DIAGNOSIS — Z11.4 ENCOUNTER FOR SCREENING FOR HIV: ICD-10-CM

## 2019-12-10 DIAGNOSIS — E78.1 PURE HYPERGLYCERIDEMIA: ICD-10-CM

## 2019-12-10 DIAGNOSIS — I95.1 ORTHOSTATIC HYPOTENSION: ICD-10-CM

## 2019-12-10 DIAGNOSIS — F20.0 CHRONIC PARANOID SCHIZOPHRENIA (HCC): Chronic | ICD-10-CM

## 2019-12-10 DIAGNOSIS — Z23 ENCOUNTER FOR IMMUNIZATION: ICD-10-CM

## 2019-12-10 DIAGNOSIS — E03.9 HYPOTHYROIDISM, UNSPECIFIED TYPE: ICD-10-CM

## 2019-12-10 DIAGNOSIS — R89.8 EOSINOPHILS INCREASED: Primary | ICD-10-CM

## 2019-12-10 DIAGNOSIS — R94.5 ABNORMAL LIVER FUNCTION: ICD-10-CM

## 2019-12-10 DIAGNOSIS — R74.8 LOW SERUM HDL: ICD-10-CM

## 2019-12-10 PROCEDURE — 90670 PCV13 VACCINE IM: CPT | Performed by: FAMILY MEDICINE

## 2019-12-10 PROCEDURE — G0009 ADMIN PNEUMOCOCCAL VACCINE: HCPCS | Performed by: FAMILY MEDICINE

## 2019-12-10 PROCEDURE — 99214 OFFICE O/P EST MOD 30 MIN: CPT | Performed by: FAMILY MEDICINE

## 2019-12-10 PROCEDURE — 90682 RIV4 VACC RECOMBINANT DNA IM: CPT | Performed by: FAMILY MEDICINE

## 2019-12-10 PROCEDURE — G0008 ADMIN INFLUENZA VIRUS VAC: HCPCS | Performed by: FAMILY MEDICINE

## 2019-12-10 RX ORDER — BISACODYL 10 MG
10 SUPPOSITORY, RECTAL RECTAL DAILY
COMMUNITY
End: 2020-07-22 | Stop reason: ALTCHOICE

## 2019-12-10 RX ORDER — LEVOTHYROXINE SODIUM 0.07 MG/1
75 TABLET ORAL
Qty: 30 TABLET | Refills: 5 | Status: SHIPPED | OUTPATIENT
Start: 2019-12-10 | End: 2020-03-10 | Stop reason: SDUPTHER

## 2019-12-10 RX ORDER — MIDODRINE HYDROCHLORIDE 10 MG/1
10 TABLET ORAL 3 TIMES DAILY
Qty: 90 TABLET | Refills: 1 | Status: SHIPPED | OUTPATIENT
Start: 2019-12-10 | End: 2020-02-11 | Stop reason: SDUPTHER

## 2019-12-10 NOTE — PROGRESS NOTES
Assessment/Plan:          Diagnoses and all orders for this visit:    Eosinophils increased  Comments:  Patient is going for repeat CBC this months  Has an order from his Psychiatry  Orders:  -     Comprehensive metabolic panel; Future    Abnormal liver function  Comments:  Corrected    Sinus tachycardia  Comments:  Controlled  Orders:  -     metoprolol tartrate (LOPRESSOR) 25 mg tablet; Take 0 5 tablets (12 5 mg total) by mouth every 12 (twelve) hours    Pure hyperglyceridemia  Comments: To follow with low-fat diet  Orders:  -     Comprehensive metabolic panel; Future  -     Lipid Panel with Direct LDL reflex; Future    Low serum HDL  Comments:  Advised to walk 20 minutes daily    Elevated blood sugar  Comments: To watch sweet and carbohydrate intake  Orders:  -     Comprehensive metabolic panel; Future  -     Hemoglobin A1C; Future    Chronic paranoid schizophrenia ([facility] [address] )  Comments:  Stable  Following with Psychiatry    Encounter for immunization  Comments:  Discussed side effect off Prevnar and flu shot  Orders:  -     PNEUMOCOCCAL CONJUGATE VACCINE 13-VALENT GREATER THAN 6 MONTHS  -     influenza vaccine, 5006-8466, quadrivalent, recombinant, PF, 0 5 mL, for patients 18 yr+ (FLUBLOK)    Smoker  Comments:   patient to stop smoking again    Hypothyroidism, unspecified type  -     levothyroxine 75 mcg tablet; Take 1 tablet (75 mcg total) by mouth daily in the early morning  -     TSH, 3rd generation with Free T4 reflex; Future    Orthostatic hypotension  Comments:  Blood pressure is good today  Orders:  -     midodrine (PROAMATINE) 10 MG tablet; Take 1 tablet (10 mg total) by mouth 3 (three) times a day    Encounter for screening for HIV  Comments:  Verbal consent obtained  Consel patient  Orders:  -     HIV 1/2 AG-AB combo; Future    Other orders  -     bisacodyl (DULCOLAX) 10 mg suppository; Insert 10 mg into the rectum daily            Subjective:     Patient ID: Ana Ríos is a 37 y o  male      Patient is here with his  for follow-up  Hypertension  Denied dizziness, fatigue or passing out  Sinus tachycardia  Denied palpitation  Not following with Cardiology  Schizophrenia  Controlled following with Psychiatry  Denied hallucination  Hyperlipidemia admit to regular diet  Patient does not exercise  Denied chest pain  Abnormal liver function test   Denied abdominal pain    Test results  Lab done on September 3rd, October 1st and 29 November 26/2019  Discussed result with patient      Review of Systems   Constitutional: Negative for activity change, appetite change, chills, fatigue, fever and unexpected weight change  HENT: Negative for congestion, ear discharge, ear pain, hearing loss, nosebleeds, rhinorrhea, sinus pressure, sore throat, tinnitus, trouble swallowing and voice change  Eyes: Negative for photophobia, pain and visual disturbance  Respiratory: Negative for cough, chest tightness, shortness of breath and wheezing  Cardiovascular: Negative for chest pain, palpitations and leg swelling  Gastrointestinal: Negative for abdominal pain, anal bleeding, blood in stool, constipation, diarrhea, nausea and vomiting  Endocrine: Negative for cold intolerance, heat intolerance, polydipsia and polyuria  Genitourinary: Negative for dysuria, frequency, hematuria and urgency  Musculoskeletal: Negative for arthralgias, back pain, gait problem, joint swelling, myalgias and neck pain  Skin: Negative for rash  Neurological: Negative for dizziness, tremors, seizures, syncope, weakness, light-headedness and headaches  Hematological: Negative for adenopathy  Does not bruise/bleed easily  Psychiatric/Behavioral: Negative for agitation, behavioral problems, confusion, dysphoric mood, hallucinations and sleep disturbance  The patient is not nervous/anxious  Objective:     Physical Exam   Constitutional: He is oriented to person, place, and time   He appears well-developed and well-nourished  No distress  HENT:   Head: Normocephalic and atraumatic  Mouth/Throat: No oropharyngeal exudate  Eyes: Pupils are equal, round, and reactive to light  EOM are normal  No scleral icterus  Neck: Normal range of motion  No JVD present  No thyromegaly present  Cardiovascular: Normal rate and regular rhythm  Exam reveals no gallop and no friction rub  No murmur heard  Legs  No edema   Pulmonary/Chest: Effort normal and breath sounds normal    Abdominal: Soft  Bowel sounds are normal  He exhibits no distension and no mass  There is no tenderness  There is no rebound and no guarding  Musculoskeletal: Normal range of motion  He exhibits no edema or tenderness  Lymphadenopathy:     He has no cervical adenopathy  Neurological: He is alert and oriented to person, place, and time  No cranial nerve deficit  He exhibits normal muscle tone  Coordination normal    Skin: No rash noted  He is not diaphoretic  Psychiatric: He has a normal mood and affect   His behavior is normal

## 2019-12-24 ENCOUNTER — TRANSCRIBE ORDERS (OUTPATIENT)
Dept: ADMINISTRATIVE | Facility: HOSPITAL | Age: 34
End: 2019-12-24

## 2019-12-24 ENCOUNTER — LAB (OUTPATIENT)
Dept: LAB | Facility: HOSPITAL | Age: 34
End: 2019-12-24
Payer: MEDICARE

## 2019-12-24 DIAGNOSIS — E03.9 MYXEDEMA HEART DISEASE: ICD-10-CM

## 2019-12-24 DIAGNOSIS — Z11.4 ENCOUNTER FOR SCREENING FOR HIV: ICD-10-CM

## 2019-12-24 DIAGNOSIS — E78.1 PURE HYPERGLYCERIDEMIA: ICD-10-CM

## 2019-12-24 DIAGNOSIS — R73.9 ELEVATED BLOOD SUGAR: ICD-10-CM

## 2019-12-24 DIAGNOSIS — R89.8 EOSINOPHILS INCREASED: ICD-10-CM

## 2019-12-24 DIAGNOSIS — E03.9 HYPOTHYROIDISM, UNSPECIFIED TYPE: ICD-10-CM

## 2019-12-24 DIAGNOSIS — Z79.899 ENCOUNTER FOR LONG-TERM (CURRENT) USE OF OTHER MEDICATIONS: ICD-10-CM

## 2019-12-24 DIAGNOSIS — I51.9 MYXEDEMA HEART DISEASE: ICD-10-CM

## 2019-12-24 DIAGNOSIS — F20.0 PARANOID SCHIZOPHRENIA, SUBCHRONIC CONDITION (HCC): Primary | ICD-10-CM

## 2019-12-24 DIAGNOSIS — F20.0 PARANOID SCHIZOPHRENIA, SUBCHRONIC CONDITION (HCC): ICD-10-CM

## 2019-12-24 LAB
ALBUMIN SERPL BCP-MCNC: 4.3 G/DL (ref 3–5.2)
ALP SERPL-CCNC: 68 U/L (ref 43–122)
ALT SERPL W P-5'-P-CCNC: 15 U/L (ref 9–52)
ANION GAP SERPL CALCULATED.3IONS-SCNC: 10 MMOL/L (ref 5–14)
AST SERPL W P-5'-P-CCNC: 11 U/L (ref 17–59)
BASOPHILS # BLD AUTO: 0.1 THOUSANDS/ΜL (ref 0–0.1)
BASOPHILS NFR BLD AUTO: 1 % (ref 0–1)
BILIRUB SERPL-MCNC: 0.5 MG/DL
BUN SERPL-MCNC: 11 MG/DL (ref 5–25)
CALCIUM SERPL-MCNC: 9.3 MG/DL (ref 8.4–10.2)
CHLORIDE SERPL-SCNC: 104 MMOL/L (ref 97–108)
CHOLEST SERPL-MCNC: 188 MG/DL
CO2 SERPL-SCNC: 27 MMOL/L (ref 22–30)
CREAT SERPL-MCNC: 1.38 MG/DL (ref 0.7–1.5)
EOSINOPHIL # BLD AUTO: 0.5 THOUSAND/ΜL (ref 0–0.4)
EOSINOPHIL NFR BLD AUTO: 7 % (ref 0–6)
ERYTHROCYTE [DISTWIDTH] IN BLOOD BY AUTOMATED COUNT: 13.6 %
EST. AVERAGE GLUCOSE BLD GHB EST-MCNC: 105 MG/DL
GFR SERPL CREATININE-BSD FRML MDRD: 65 ML/MIN/1.73SQ M
GLUCOSE P FAST SERPL-MCNC: 118 MG/DL (ref 70–99)
HBA1C MFR BLD: 5.3 % (ref 4.2–6.3)
HCT VFR BLD AUTO: 47.1 % (ref 41–53)
HDLC SERPL-MCNC: 38 MG/DL
HGB BLD-MCNC: 15.6 G/DL (ref 13.5–17.5)
LDLC SERPL CALC-MCNC: 114 MG/DL
LYMPHOCYTES # BLD AUTO: 2.7 THOUSANDS/ΜL (ref 0.5–4)
LYMPHOCYTES NFR BLD AUTO: 35 % (ref 25–45)
MCH RBC QN AUTO: 28.7 PG (ref 26–34)
MCHC RBC AUTO-ENTMCNC: 33.1 G/DL (ref 31–36)
MCV RBC AUTO: 87 FL (ref 80–100)
MONOCYTES # BLD AUTO: 0.6 THOUSAND/ΜL (ref 0.2–0.9)
MONOCYTES NFR BLD AUTO: 8 % (ref 1–10)
NEUTROPHILS # BLD AUTO: 3.9 THOUSANDS/ΜL (ref 1.8–7.8)
NEUTS SEG NFR BLD AUTO: 50 % (ref 45–65)
PLATELET # BLD AUTO: 264 THOUSANDS/UL (ref 150–450)
PMV BLD AUTO: 7.8 FL (ref 8.9–12.7)
POTASSIUM SERPL-SCNC: 3.8 MMOL/L (ref 3.6–5)
PROT SERPL-MCNC: 7.3 G/DL (ref 5.9–8.4)
RBC # BLD AUTO: 5.44 MILLION/UL (ref 4.5–5.9)
SODIUM SERPL-SCNC: 141 MMOL/L (ref 137–147)
T4 FREE SERPL-MCNC: 1.27 NG/DL (ref 0.76–1.46)
TRIGL SERPL-MCNC: 178 MG/DL
TSH SERPL DL<=0.05 MIU/L-ACNC: 4.53 UIU/ML (ref 0.47–4.68)
WBC # BLD AUTO: 7.8 THOUSAND/UL (ref 4.5–11)

## 2019-12-24 PROCEDURE — 36415 COLL VENOUS BLD VENIPUNCTURE: CPT

## 2019-12-24 PROCEDURE — 84439 ASSAY OF FREE THYROXINE: CPT

## 2019-12-24 PROCEDURE — 83036 HEMOGLOBIN GLYCOSYLATED A1C: CPT

## 2019-12-24 PROCEDURE — 80165 DIPROPYLACETIC ACID FREE: CPT

## 2019-12-24 PROCEDURE — 85025 COMPLETE CBC W/AUTO DIFF WBC: CPT

## 2019-12-24 PROCEDURE — 80061 LIPID PANEL: CPT

## 2019-12-24 PROCEDURE — 87389 HIV-1 AG W/HIV-1&-2 AB AG IA: CPT

## 2019-12-24 PROCEDURE — 80053 COMPREHEN METABOLIC PANEL: CPT

## 2019-12-24 PROCEDURE — 84443 ASSAY THYROID STIM HORMONE: CPT

## 2019-12-25 LAB — HIV 1+2 AB+HIV1 P24 AG SERPL QL IA: NORMAL

## 2019-12-26 LAB — VALPROATE FREE SERPL-MCNC: 6.3 UG/ML (ref 6–22)

## 2019-12-27 ENCOUNTER — TELEPHONE (OUTPATIENT)
Dept: FAMILY MEDICINE CLINIC | Facility: CLINIC | Age: 34
End: 2019-12-27

## 2019-12-27 NOTE — TELEPHONE ENCOUNTER
----- Message from Opal Paulino MD sent at 12/27/2019  3:10 PM EST -----  Please call the patient regarding his results  HIV is negative  Blood sugar is normal   Thyroid is okay  Blood count chemistry is okay  Cholesterol is acceptable  No change in medication

## 2019-12-27 NOTE — RESULT ENCOUNTER NOTE
Please call the patient regarding his results  HIV is negative  Blood sugar is normal   Thyroid is okay  Blood count chemistry is okay  Cholesterol is acceptable  No change in medication

## 2020-01-21 ENCOUNTER — APPOINTMENT (OUTPATIENT)
Dept: LAB | Facility: HOSPITAL | Age: 35
End: 2020-01-21
Payer: MEDICARE

## 2020-01-21 DIAGNOSIS — F20.0 PARANOID SCHIZOPHRENIA, SUBCHRONIC CONDITION (HCC): ICD-10-CM

## 2020-01-21 DIAGNOSIS — E03.9 MYXEDEMA HEART DISEASE: ICD-10-CM

## 2020-01-21 DIAGNOSIS — Z79.899 ENCOUNTER FOR LONG-TERM (CURRENT) USE OF OTHER MEDICATIONS: ICD-10-CM

## 2020-01-21 DIAGNOSIS — I51.9 MYXEDEMA HEART DISEASE: ICD-10-CM

## 2020-01-21 LAB
AMMONIA PLAS-SCNC: <9 UMOL/L (ref 9–33)
BASOPHILS # BLD AUTO: 0.1 THOUSANDS/ΜL (ref 0–0.1)
BASOPHILS NFR BLD AUTO: 1 % (ref 0–1)
EOSINOPHIL # BLD AUTO: 0.5 THOUSAND/ΜL (ref 0–0.4)
EOSINOPHIL NFR BLD AUTO: 7 % (ref 0–6)
ERYTHROCYTE [DISTWIDTH] IN BLOOD BY AUTOMATED COUNT: 13.1 %
HCT VFR BLD AUTO: 45.5 % (ref 41–53)
HGB BLD-MCNC: 15.1 G/DL (ref 13.5–17.5)
LYMPHOCYTES # BLD AUTO: 3.5 THOUSANDS/ΜL (ref 0.5–4)
LYMPHOCYTES NFR BLD AUTO: 43 % (ref 25–45)
MCH RBC QN AUTO: 28.7 PG (ref 26–34)
MCHC RBC AUTO-ENTMCNC: 33.3 G/DL (ref 31–36)
MCV RBC AUTO: 86 FL (ref 80–100)
MONOCYTES # BLD AUTO: 0.6 THOUSAND/ΜL (ref 0.2–0.9)
MONOCYTES NFR BLD AUTO: 7 % (ref 1–10)
NEUTROPHILS # BLD AUTO: 3.5 THOUSANDS/ΜL (ref 1.8–7.8)
NEUTS SEG NFR BLD AUTO: 43 % (ref 45–65)
PLATELET # BLD AUTO: 247 THOUSANDS/UL (ref 150–450)
PMV BLD AUTO: 8.3 FL (ref 8.9–12.7)
RBC # BLD AUTO: 5.27 MILLION/UL (ref 4.5–5.9)
WBC # BLD AUTO: 8.2 THOUSAND/UL (ref 4.5–11)

## 2020-01-21 PROCEDURE — 36415 COLL VENOUS BLD VENIPUNCTURE: CPT

## 2020-01-21 PROCEDURE — 85025 COMPLETE CBC W/AUTO DIFF WBC: CPT

## 2020-01-21 PROCEDURE — 82140 ASSAY OF AMMONIA: CPT

## 2020-02-11 DIAGNOSIS — I95.1 ORTHOSTATIC HYPOTENSION: ICD-10-CM

## 2020-02-11 RX ORDER — MIDODRINE HYDROCHLORIDE 10 MG/1
10 TABLET ORAL 3 TIMES DAILY
Qty: 90 TABLET | Refills: 1 | Status: SHIPPED | OUTPATIENT
Start: 2020-02-11 | End: 2020-03-10 | Stop reason: SDUPTHER

## 2020-02-18 ENCOUNTER — TRANSCRIBE ORDERS (OUTPATIENT)
Dept: ADMINISTRATIVE | Facility: HOSPITAL | Age: 35
End: 2020-02-18

## 2020-02-18 ENCOUNTER — APPOINTMENT (OUTPATIENT)
Dept: LAB | Facility: HOSPITAL | Age: 35
End: 2020-02-18
Payer: MEDICARE

## 2020-02-18 DIAGNOSIS — F20.0 PARANOID SCHIZOPHRENIA, SUBCHRONIC CONDITION (HCC): Primary | ICD-10-CM

## 2020-02-18 DIAGNOSIS — I51.9 MYXEDEMA HEART DISEASE: ICD-10-CM

## 2020-02-18 DIAGNOSIS — F20.0 PARANOID SCHIZOPHRENIA, SUBCHRONIC CONDITION (HCC): ICD-10-CM

## 2020-02-18 DIAGNOSIS — Z79.899 ENCOUNTER FOR LONG-TERM (CURRENT) USE OF OTHER MEDICATIONS: ICD-10-CM

## 2020-02-18 DIAGNOSIS — E03.9 MYXEDEMA HEART DISEASE: ICD-10-CM

## 2020-02-18 LAB
BASOPHILS # BLD AUTO: 0 THOUSANDS/ΜL (ref 0–0.1)
BASOPHILS NFR BLD AUTO: 0 % (ref 0–1)
EOSINOPHIL # BLD AUTO: 0.4 THOUSAND/ΜL (ref 0–0.4)
EOSINOPHIL NFR BLD AUTO: 5 % (ref 0–6)
ERYTHROCYTE [DISTWIDTH] IN BLOOD BY AUTOMATED COUNT: 13.2 %
HCT VFR BLD AUTO: 46 % (ref 41–53)
HGB BLD-MCNC: 15.2 G/DL (ref 13.5–17.5)
LYMPHOCYTES # BLD AUTO: 2.4 THOUSANDS/ΜL (ref 0.5–4)
LYMPHOCYTES NFR BLD AUTO: 30 % (ref 25–45)
MCH RBC QN AUTO: 28.1 PG (ref 26–34)
MCHC RBC AUTO-ENTMCNC: 33 G/DL (ref 31–36)
MCV RBC AUTO: 85 FL (ref 80–100)
MONOCYTES # BLD AUTO: 0.6 THOUSAND/ΜL (ref 0.2–0.9)
MONOCYTES NFR BLD AUTO: 7 % (ref 1–10)
NEUTROPHILS # BLD AUTO: 4.4 THOUSANDS/ΜL (ref 1.8–7.8)
NEUTS SEG NFR BLD AUTO: 57 % (ref 45–65)
PLATELET # BLD AUTO: 257 THOUSANDS/UL (ref 150–450)
PMV BLD AUTO: 8.3 FL (ref 8.9–12.7)
RBC # BLD AUTO: 5.4 MILLION/UL (ref 4.5–5.9)
WBC # BLD AUTO: 7.8 THOUSAND/UL (ref 4.5–11)

## 2020-02-18 PROCEDURE — 85025 COMPLETE CBC W/AUTO DIFF WBC: CPT

## 2020-02-18 PROCEDURE — 36415 COLL VENOUS BLD VENIPUNCTURE: CPT

## 2020-03-10 ENCOUNTER — OFFICE VISIT (OUTPATIENT)
Dept: FAMILY MEDICINE CLINIC | Facility: CLINIC | Age: 35
End: 2020-03-10
Payer: MEDICARE

## 2020-03-10 VITALS
BODY MASS INDEX: 28.34 KG/M2 | HEART RATE: 86 BPM | HEIGHT: 71 IN | TEMPERATURE: 96.9 F | DIASTOLIC BLOOD PRESSURE: 78 MMHG | WEIGHT: 202.4 LBS | SYSTOLIC BLOOD PRESSURE: 102 MMHG | RESPIRATION RATE: 16 BRPM

## 2020-03-10 DIAGNOSIS — I51.7 LEFT VENTRICULAR HYPERTROPHY: ICD-10-CM

## 2020-03-10 DIAGNOSIS — I95.1 ORTHOSTATIC HYPOTENSION: ICD-10-CM

## 2020-03-10 DIAGNOSIS — E03.9 HYPOTHYROIDISM, UNSPECIFIED TYPE: ICD-10-CM

## 2020-03-10 DIAGNOSIS — R73.9 ELEVATED BLOOD SUGAR: Primary | ICD-10-CM

## 2020-03-10 DIAGNOSIS — R00.0 SINUS TACHYCARDIA: ICD-10-CM

## 2020-03-10 DIAGNOSIS — F20.0 CHRONIC PARANOID SCHIZOPHRENIA (HCC): ICD-10-CM

## 2020-03-10 DIAGNOSIS — R89.8 EOSINOPHILS INCREASED: ICD-10-CM

## 2020-03-10 DIAGNOSIS — F17.200 SMOKER: ICD-10-CM

## 2020-03-10 DIAGNOSIS — R94.5 ABNORMAL LIVER FUNCTION: ICD-10-CM

## 2020-03-10 DIAGNOSIS — E78.1 PURE HYPERGLYCERIDEMIA: ICD-10-CM

## 2020-03-10 DIAGNOSIS — R74.8 LOW SERUM HDL: ICD-10-CM

## 2020-03-10 PROCEDURE — 99214 OFFICE O/P EST MOD 30 MIN: CPT | Performed by: FAMILY MEDICINE

## 2020-03-10 PROCEDURE — 3008F BODY MASS INDEX DOCD: CPT | Performed by: FAMILY MEDICINE

## 2020-03-10 RX ORDER — LEVOTHYROXINE SODIUM 0.07 MG/1
75 TABLET ORAL
Qty: 30 TABLET | Refills: 5 | Status: SHIPPED | OUTPATIENT
Start: 2020-03-10 | End: 2020-09-24

## 2020-03-10 RX ORDER — MIDODRINE HYDROCHLORIDE 10 MG/1
10 TABLET ORAL 3 TIMES DAILY
Qty: 90 TABLET | Refills: 1 | Status: SHIPPED | OUTPATIENT
Start: 2020-03-10 | End: 2020-06-03

## 2020-03-10 NOTE — PROGRESS NOTES
Assessment/Plan:       No problem-specific Assessment & Plan notes found for this encounter  Diagnoses and all orders for this visit:    Elevated blood sugar  Comments: To watch sweet and carbohydrate intake  His psychiatrist order hemoglobin A1c and fasting blood sugar every 6 months    Sinus tachycardia  Comments:  Controlled  Orders:  -     metoprolol tartrate (LOPRESSOR) 25 mg tablet; Take 0 5 tablets (12 5 mg total) by mouth every 12 (twelve) hours    Orthostatic hypotension  Comments:  Blood pressure is good today  Orders:  -     midodrine (PROAMATINE) 10 MG tablet; Take 1 tablet (10 mg total) by mouth 3 (three) times a day    Hypothyroidism, unspecified type  Comments:  Compensated continue levothyroxine  TSH checked by Psychiatry every 6 months  Orders:  -     levothyroxine 75 mcg tablet; Take 1 tablet (75 mcg total) by mouth daily in the early morning    Pure hyperglyceridemia  Comments: To follow with low-fat diet, Psychiatry check lipid profile every 6 months    Abnormal liver function  Comments:  Corrected    Eosinophils increased  Comments:  Corrected  Psychiatry order CBC every month    Chronic paranoid schizophrenia ([facility] [address] )  Comments:  Stable he is following with Psychiatry    Left ventricular hypertrophy  Comments:  Check echocardiogram September this year    Smoker  Comments:  Consel patient about stopping smoking advised about the complication including heart disease peripheral vascular disease stroke cancer and COPD  Patient decli    Low serum HDL  Comments:  Advised to walk half an hour daily        Patient Instructions   Patient to refer a copy of blood work ordered by Psychiatry  To follow-up in 3 months      No orders of the defined types were placed in this encounter  Subjective:     Patient ID: Jamin Smoker is a 37 y o  male      Patient is here for follow-up on his chronic medical problem he is here with his   He feels well he has no complaint    Hypothyroid he denied weight gain, cold intolerance or fatigue  Sinus tachycardia  He is not following with Cardiology  Denied palpitation  Low blood pressure   Patient denied dizziness  Schizophrenia  Denied hallucination following with psychiatry  Denied depression suicide or homicide  Elevated blood sugar denied polyuria or polydipsia  Smoking  He still smokes about half pack a day  Also his on Wellbutrin    Test results  Patient had lab on December 24, 2019 and February 18 and 01/20/2020  Discussed result with patient and his         Review of Systems    Objective:     Physical Exam

## 2020-03-18 ENCOUNTER — APPOINTMENT (OUTPATIENT)
Dept: LAB | Facility: HOSPITAL | Age: 35
End: 2020-03-18
Payer: MEDICARE

## 2020-03-18 ENCOUNTER — HOSPITAL ENCOUNTER (OUTPATIENT)
Dept: NON INVASIVE DIAGNOSTICS | Facility: HOSPITAL | Age: 35
Discharge: HOME | End: 2020-03-18
Payer: MEDICARE

## 2020-03-18 DIAGNOSIS — E03.9 MYXEDEMA HEART DISEASE: ICD-10-CM

## 2020-03-18 DIAGNOSIS — Z79.899 ENCOUNTER FOR LONG-TERM (CURRENT) USE OF OTHER MEDICATIONS: ICD-10-CM

## 2020-03-18 DIAGNOSIS — F20.0 PARANOID SCHIZOPHRENIA, SUBCHRONIC CONDITION (HCC): ICD-10-CM

## 2020-03-18 DIAGNOSIS — I51.9 MYXEDEMA HEART DISEASE: ICD-10-CM

## 2020-03-18 LAB
ATRIAL RATE: 87 BPM
BASOPHILS # BLD AUTO: 0.1 THOUSANDS/ΜL (ref 0–0.1)
BASOPHILS NFR BLD AUTO: 1 % (ref 0–1)
EOSINOPHIL # BLD AUTO: 0.5 THOUSAND/ΜL (ref 0–0.4)
EOSINOPHIL NFR BLD AUTO: 6 % (ref 0–6)
ERYTHROCYTE [DISTWIDTH] IN BLOOD BY AUTOMATED COUNT: 13.3 %
HCT VFR BLD AUTO: 48.3 % (ref 41–53)
HGB BLD-MCNC: 15.9 G/DL (ref 13.5–17.5)
LYMPHOCYTES # BLD AUTO: 2.5 THOUSANDS/ΜL (ref 0.5–4)
LYMPHOCYTES NFR BLD AUTO: 32 % (ref 25–45)
MCH RBC QN AUTO: 28.1 PG (ref 26–34)
MCHC RBC AUTO-ENTMCNC: 32.8 G/DL (ref 31–36)
MCV RBC AUTO: 86 FL (ref 80–100)
MONOCYTES # BLD AUTO: 0.4 THOUSAND/ΜL (ref 0.2–0.9)
MONOCYTES NFR BLD AUTO: 6 % (ref 1–10)
NEUTROPHILS # BLD AUTO: 4.3 THOUSANDS/ΜL (ref 1.8–7.8)
NEUTS SEG NFR BLD AUTO: 56 % (ref 45–65)
P AXIS: 24 DEGREES
PLATELET # BLD AUTO: 234 THOUSANDS/UL (ref 150–450)
PMV BLD AUTO: 8.2 FL (ref 8.9–12.7)
PR INTERVAL: 164 MS
QRS AXIS: 51 DEGREES
QRSD INTERVAL: 104 MS
QT INTERVAL: 358 MS
QTC INTERVAL: 430 MS
RBC # BLD AUTO: 5.65 MILLION/UL (ref 4.5–5.9)
T WAVE AXIS: 63 DEGREES
VENTRICULAR RATE: 87 BPM
WBC # BLD AUTO: 7.8 THOUSAND/UL (ref 4.5–11)

## 2020-03-18 PROCEDURE — 85025 COMPLETE CBC W/AUTO DIFF WBC: CPT

## 2020-03-18 PROCEDURE — 36415 COLL VENOUS BLD VENIPUNCTURE: CPT

## 2020-03-18 PROCEDURE — 93005 ELECTROCARDIOGRAM TRACING: CPT

## 2020-03-18 PROCEDURE — 93010 ELECTROCARDIOGRAM REPORT: CPT | Performed by: INTERNAL MEDICINE

## 2020-03-25 NOTE — PROGRESS NOTES
Neida Fam attended pet therapy this afternoon  TRANSFER - OUT REPORT:    Verbal report given to [facility], [facility], RN on Salvador Gómez  being transferred to [address] [place] [address] for routine progression of care       Report consisted of patients Situation, Background, Assessment and   Recommendations(SBAR). Information from the following report(s) SBAR was reviewed with the receiving nurse. Lines:   Peripheral IV 03/25/20 Right Forearm (Active)   Site Assessment Clean, dry, & intact 3/25/2020 12:54 PM   Phlebitis Assessment 0 3/25/2020 12:54 PM   Infiltration Assessment 0 3/25/2020 12:54 PM   Dressing Status Clean, dry, & intact 3/25/2020 12:54 PM   Dressing Type Transparent 3/25/2020 12:54 PM   Hub Color/Line Status Flushed;Patent 3/25/2020 12:54 PM   Action Taken Blood drawn 3/25/2020 12:54 PM        Opportunity for questions and clarification was provided.       Patient transported with:  [facility], EMS

## 2020-06-03 DIAGNOSIS — I95.1 ORTHOSTATIC HYPOTENSION: ICD-10-CM

## 2020-06-03 RX ORDER — MIDODRINE HYDROCHLORIDE 10 MG/1
TABLET ORAL
Qty: 90 TABLET | Refills: 0 | Status: SHIPPED | OUTPATIENT
Start: 2020-06-03 | End: 2020-07-20 | Stop reason: SDUPTHER

## 2020-06-22 ENCOUNTER — APPOINTMENT (OUTPATIENT)
Dept: RADIOLOGY | Facility: MEDICAL CENTER | Age: 35
End: 2020-06-22
Payer: MEDICARE

## 2020-06-22 ENCOUNTER — OFFICE VISIT (OUTPATIENT)
Dept: URGENT CARE | Facility: MEDICAL CENTER | Age: 35
End: 2020-06-22
Payer: MEDICARE

## 2020-06-22 VITALS
RESPIRATION RATE: 16 BRPM | BODY MASS INDEX: 28 KG/M2 | OXYGEN SATURATION: 98 % | WEIGHT: 200 LBS | HEIGHT: 71 IN | SYSTOLIC BLOOD PRESSURE: 117 MMHG | DIASTOLIC BLOOD PRESSURE: 81 MMHG | TEMPERATURE: 97.6 F | HEART RATE: 126 BPM

## 2020-06-22 DIAGNOSIS — S49.91XA INJURY OF RIGHT SHOULDER, INITIAL ENCOUNTER: ICD-10-CM

## 2020-06-22 DIAGNOSIS — S42.291A CLOSED FRACTURE OF HEAD OF RIGHT HUMERUS, INITIAL ENCOUNTER: Primary | ICD-10-CM

## 2020-06-22 PROCEDURE — 99213 OFFICE O/P EST LOW 20 MIN: CPT | Performed by: PHYSICIAN ASSISTANT

## 2020-06-22 PROCEDURE — 73030 X-RAY EXAM OF SHOULDER: CPT

## 2020-06-22 PROCEDURE — G0463 HOSPITAL OUTPT CLINIC VISIT: HCPCS | Performed by: PHYSICIAN ASSISTANT

## 2020-06-26 ENCOUNTER — OFFICE VISIT (OUTPATIENT)
Dept: OBGYN CLINIC | Facility: CLINIC | Age: 35
End: 2020-06-26
Payer: MEDICARE

## 2020-06-26 VITALS
WEIGHT: 200 LBS | SYSTOLIC BLOOD PRESSURE: 110 MMHG | HEIGHT: 71 IN | DIASTOLIC BLOOD PRESSURE: 68 MMHG | BODY MASS INDEX: 28 KG/M2

## 2020-06-26 DIAGNOSIS — S43.014A ANTERIOR SHOULDER DISLOCATION, RIGHT, INITIAL ENCOUNTER: Primary | ICD-10-CM

## 2020-06-26 PROCEDURE — 99203 OFFICE O/P NEW LOW 30 MIN: CPT | Performed by: ORTHOPAEDIC SURGERY

## 2020-06-26 PROCEDURE — 3008F BODY MASS INDEX DOCD: CPT | Performed by: ORTHOPAEDIC SURGERY

## 2020-06-30 ENCOUNTER — EVALUATION (OUTPATIENT)
Dept: PHYSICAL THERAPY | Facility: MEDICAL CENTER | Age: 35
End: 2020-06-30
Payer: MEDICARE

## 2020-06-30 DIAGNOSIS — S43.014A ANTERIOR SHOULDER DISLOCATION, RIGHT, INITIAL ENCOUNTER: Primary | ICD-10-CM

## 2020-06-30 PROCEDURE — 97161 PT EVAL LOW COMPLEX 20 MIN: CPT | Performed by: PHYSICAL THERAPIST

## 2020-07-06 ENCOUNTER — OFFICE VISIT (OUTPATIENT)
Dept: PHYSICAL THERAPY | Facility: MEDICAL CENTER | Age: 35
End: 2020-07-06
Payer: MEDICARE

## 2020-07-06 DIAGNOSIS — S43.014A ANTERIOR SHOULDER DISLOCATION, RIGHT, INITIAL ENCOUNTER: Primary | ICD-10-CM

## 2020-07-06 PROCEDURE — 97140 MANUAL THERAPY 1/> REGIONS: CPT | Performed by: PHYSICAL THERAPIST

## 2020-07-06 PROCEDURE — 97112 NEUROMUSCULAR REEDUCATION: CPT | Performed by: PHYSICAL THERAPIST

## 2020-07-06 NOTE — PROGRESS NOTES
Daily Note     Today's date: 2020  Patient name: Rubin Wray  : 1985  MRN: [id number]  Referring provider: Ailyn Schmidt  Dx:   Encounter Diagnosis     ICD-10-CM    1  Anterior shoulder dislocation, right, initial encounter S43 014A                   Subjective: Pt reports that his pain is going down and feels he is starting to get better  He stated that he his performing his HEP two times per day  Objective: See treatment diary below      Assessment: Tolerated treatment well  Patient would benefit from continued PT  Pt shows limitations in flexion and abduction with AROM and AAROM  External rotation at 0 degrees abduction and in scaption appears to be progressing well  Continue to progress range of motion as tolerated in flexion and abduction  Plan: Continue per plan of care        Precautions: Schizophrenia       Manuals            PROM: flexion, ER, abd CK CK                                                  Neuro Re-Ed             Isometrics: ER, flexion, abd 10s x10 10s x 10           shoulder stabilization  perturbations supine nv 5x 30s           Ball wall ABCs nv nv           AAROM abd, ER, flex w/ wand  2x15                                                  Ther Ex             scap retrations 10 30           Elbow flexion AROM sitting   3x30           Sitting AROM ER   30                                                               Ther Activity                                       Gait Training                                       Modalities

## 2020-07-08 ENCOUNTER — APPOINTMENT (OUTPATIENT)
Dept: PHYSICAL THERAPY | Facility: MEDICAL CENTER | Age: 35
End: 2020-07-08
Payer: MEDICARE

## 2020-07-10 ENCOUNTER — OFFICE VISIT (OUTPATIENT)
Dept: PHYSICAL THERAPY | Facility: MEDICAL CENTER | Age: 35
End: 2020-07-10
Payer: MEDICARE

## 2020-07-10 DIAGNOSIS — S43.014A ANTERIOR SHOULDER DISLOCATION, RIGHT, INITIAL ENCOUNTER: Primary | ICD-10-CM

## 2020-07-10 PROCEDURE — 97140 MANUAL THERAPY 1/> REGIONS: CPT | Performed by: PHYSICAL THERAPIST

## 2020-07-10 PROCEDURE — 97110 THERAPEUTIC EXERCISES: CPT | Performed by: PHYSICAL THERAPIST

## 2020-07-10 NOTE — PROGRESS NOTES
Daily Note     Today's date: 7/10/2020  Patient name: Gume Gordon  : 1985  MRN: [id number]  Referring provider: Madison Adams  Dx:   Encounter Diagnosis     ICD-10-CM    1  Anterior shoulder dislocation, right, initial encounter S43 014A                   Subjective: Stephanie Goodwin reports that he is having less pain in his shoulder and is compliant with his home exercises  Objective: See treatment diary below      Assessment: Tolerated treatment well  Patient exhibited good technique with therapeutic exercises and would benefit from continued PT  Pt has ROM 50% ROM limitations in abduction and flexion passively  External rotation is progressing well  Pt is pain limited with active range of motion  Continue to progress active assisted range to full active as tolerated  Plan: Continue per plan of care        Precautions: Schizophrenia       Manuals 6/30 7/6 7/10          PROM: flexion, ER, abd CK CK CK          GH mobs post/inf   CK                                    Neuro Re-Ed             Isometrics: ER, flexion, abd 10s x10 10s x 10 HEP          shoulder stabilization  perturbations supine nv 5x 30s 5x30s          Ball wall ABCs nv nv nv          AAROM scaption ER, flex w/ wand  2x15 2x15          scap 4   20          Wand AAROM flexion, abd   15                       Ther Ex             scap retrations 10 30 HEP          Elbow flexion AROM sitting   3x30 30, 1#                                                                           Ther Activity                                       Gait Training                                       Modalities

## 2020-07-13 ENCOUNTER — OFFICE VISIT (OUTPATIENT)
Dept: PHYSICAL THERAPY | Facility: MEDICAL CENTER | Age: 35
End: 2020-07-13
Payer: MEDICARE

## 2020-07-13 DIAGNOSIS — S43.014A ANTERIOR SHOULDER DISLOCATION, RIGHT, INITIAL ENCOUNTER: Primary | ICD-10-CM

## 2020-07-13 PROCEDURE — 97140 MANUAL THERAPY 1/> REGIONS: CPT | Performed by: PHYSICAL THERAPIST

## 2020-07-13 PROCEDURE — 97110 THERAPEUTIC EXERCISES: CPT | Performed by: PHYSICAL THERAPIST

## 2020-07-13 NOTE — PROGRESS NOTES
Daily Note     Today's date: 2020  Patient name: Lj Rojas  : 1985  MRN: [id number]  Referring provider: Giulia Williamson  Dx:   Encounter Diagnosis     ICD-10-CM    1  Anterior shoulder dislocation, right, initial encounter S43 014A                   Subjective: Pt reports that his shoulder is doing well and he is continuing his exercises at home  Objective: See treatment diary below      Assessment: Tolerated treatment well  Patient would benefit from continued PT  Passive shoulder flexion and external rotation are improving while abduction is still painful around 90 degrees  Pt shows good form with exercises  Pain irritability remains low  Plan: Continue per plan of care        Precautions: Schizophrenia       Manuals 6/30 7/6 7/10 7/13         PROM: flexion, ER, abd CK CK CK CK         GH mobs post/inf   CK CK                                   Neuro Re-Ed             Isometrics: ER, flexion, abd 10s x10 10s x 10 HEP HEP         shoulder stabilization  perturbations supine nv 5x 30s 5x30s 5X30s         Ball wall ABCs nv nv nv nv         AAROM scaption ER, flex w/ wand  2x15 2x15 30         scap 4   20 30         Wand AAROM flexion, abd   15 30                      Ther Ex             scap retrations 10 30 HEP HEP         Elbow flexion AROM sitting   3x30 30, 1# 30, 2#         Pulleys     6 min                                                             Ther Activity                                       Gait Training                                       Modalities

## 2020-07-17 ENCOUNTER — OFFICE VISIT (OUTPATIENT)
Dept: PHYSICAL THERAPY | Facility: MEDICAL CENTER | Age: 35
End: 2020-07-17
Payer: MEDICARE

## 2020-07-17 DIAGNOSIS — S43.014A ANTERIOR SHOULDER DISLOCATION, RIGHT, INITIAL ENCOUNTER: Primary | ICD-10-CM

## 2020-07-17 PROCEDURE — 97110 THERAPEUTIC EXERCISES: CPT | Performed by: PHYSICAL THERAPIST

## 2020-07-17 PROCEDURE — 97112 NEUROMUSCULAR REEDUCATION: CPT | Performed by: PHYSICAL THERAPIST

## 2020-07-17 PROCEDURE — 97140 MANUAL THERAPY 1/> REGIONS: CPT | Performed by: PHYSICAL THERAPIST

## 2020-07-17 NOTE — PROGRESS NOTES
Daily Note     Today's date: 2020  Patient name: Melissa Butler  : 1985  MRN: [id number]  Referring provider: Angelina Millan  Dx:   Encounter Diagnosis     ICD-10-CM    1  Anterior shoulder dislocation, right, initial encounter S43 014A                   Subjective: Pt reports having some pain at end ranges and "it hurts when I try to wash my hair or put on my shirt"      Objective: See treatment diary below      Assessment: Tolerated treatment well  Patient would benefit from continued PT  Passive range of motion is progressing well  Pt has limitations in active shoulder range of motion in all planes due to weakness and pain  Shoulder strength is limited as pt needs assistance with lifting arm to 90 degrees  In supine pt can tolerate for holding arm in flexion for 10 seconds isometrically  Continue to progress active assisted range of motion before strengthening  Plan: Continue per plan of care        Precautions: Schizophrenia       Manuals 6/30 7/6 7/10 7/13 7/17        PROM: flexion, ER, abd CK CK CK CK CK        GH mobs post/inf   CK CK CK                                  Neuro Re-Ed             Isometrics: ER, flexion, abd 10s x10 10s x 10 HEP HEP HEP        Supine holding arm in flexion     4x10s        shoulder stabilization  perturbations supine nv 5x 30s 5x30s 5X30s 4x30s,         Ball wall ABCs nv nv nv nv nv        AAROM scaption ER, flex w/ wand  2x15 2x15 30 30        scap 4   20 30 30                                  Ther Ex             scap retrations 10 30 HEP HEP HEP        Elbow flexion AROM sitting   3x30 30, 1# 30, 2# 30, 3#        Pulleys     6 min 5min        Function IR w/ wand     30        Functional ER w/ green rope     10x5s                                  Ther Activity                                       Gait Training                                       Modalities

## 2020-07-20 ENCOUNTER — OFFICE VISIT (OUTPATIENT)
Dept: PHYSICAL THERAPY | Facility: MEDICAL CENTER | Age: 35
End: 2020-07-20
Payer: MEDICARE

## 2020-07-20 DIAGNOSIS — S43.014A ANTERIOR SHOULDER DISLOCATION, RIGHT, INITIAL ENCOUNTER: Primary | ICD-10-CM

## 2020-07-20 DIAGNOSIS — I95.1 ORTHOSTATIC HYPOTENSION: ICD-10-CM

## 2020-07-20 PROCEDURE — 97140 MANUAL THERAPY 1/> REGIONS: CPT | Performed by: PHYSICAL THERAPIST

## 2020-07-20 PROCEDURE — 97110 THERAPEUTIC EXERCISES: CPT | Performed by: PHYSICAL THERAPIST

## 2020-07-20 PROCEDURE — 97112 NEUROMUSCULAR REEDUCATION: CPT | Performed by: PHYSICAL THERAPIST

## 2020-07-20 RX ORDER — MIDODRINE HYDROCHLORIDE 10 MG/1
TABLET ORAL
Qty: 90 TABLET | Refills: 1 | Status: SHIPPED | OUTPATIENT
Start: 2020-07-20 | End: 2020-07-21 | Stop reason: SDUPTHER

## 2020-07-20 NOTE — PROGRESS NOTES
Daily Note     Today's date: 2020  Patient name: Gume Gordon  : 1985  MRN: [id number]  Referring provider: Madison Adams  Dx:   Encounter Diagnosis     ICD-10-CM    1  Anterior shoulder dislocation, right, initial encounter S43 014A                   Subjective: Pt reports that he is having a little pain when he moves his arm but does not feel pain at rest        Objective: See treatment diary below  Passive shoulder ROM  -abduction: 90*  -scaption: 90*  -ER: 40*      Assessment: Tolerated treatment well  Patient would benefit from continued PT  Passive range of motion is progressing well but limited due to pain  Pt  Is able to perform return demonstration with good form  Continue to progress ROM as tolerated  Plan: Continue per plan of care        Precautions: Schizophrenia       Manuals 6/30 7/6 7/10 7/13 7/17 7/20       PROM: flexion, ER, abd CK CK CK CK CK CK       GH mobs post/inf   CK CK CK CK                                 Neuro Re-Ed             Isometrics: ER, flexion, abd 10s x10 10s x 10 HEP HEP HEP HEP       Supine holding arm in flexion     4x10s 4x10s       shoulder stabilization  perturbations supine nv 5x 30s 5x30s 5X30s 4x30s,  4x30s, 1#       Ball wall ABCs nv nv nv nv nv nv       AAROM abd, ER, flex w/ wand  2x15 2x15 30 30 30       scap 4   20 30 30 2x15, 1#                                 Ther Ex             scap retrations 10 30 HEP HEP HEP HEP       Elbow flexion AROM sitting   3x30 30, 1# 30, 2# 30, 3# 30, 3#       Pulleys     6 min 5min 6min       Function IR w/ wand     30 30       Functional ER w/ green rope     10x5s 15x 5s       Wall slides      30                    Ther Activity                                       Gait Training                                       Modalities

## 2020-07-21 ENCOUNTER — TELEPHONE (OUTPATIENT)
Dept: FAMILY MEDICINE CLINIC | Facility: CLINIC | Age: 35
End: 2020-07-21

## 2020-07-21 DIAGNOSIS — I95.1 ORTHOSTATIC HYPOTENSION: ICD-10-CM

## 2020-07-21 RX ORDER — MIDODRINE HYDROCHLORIDE 10 MG/1
TABLET ORAL
Qty: 90 TABLET | Refills: 1 | Status: SHIPPED | OUTPATIENT
Start: 2020-07-21 | End: 2020-09-14

## 2020-07-21 NOTE — TELEPHONE ENCOUNTER
Petra caregiver called and states that the medication for the midodrine was sent to the pharmacy but the pharmacy informed the patient that the medication was discontinued   I called the pharmacy and confirmed that the medication is ready for  and also informed the caregiver

## 2020-07-22 ENCOUNTER — OFFICE VISIT (OUTPATIENT)
Dept: FAMILY MEDICINE CLINIC | Facility: CLINIC | Age: 35
End: 2020-07-22
Payer: MEDICARE

## 2020-07-22 ENCOUNTER — OFFICE VISIT (OUTPATIENT)
Dept: PHYSICAL THERAPY | Facility: MEDICAL CENTER | Age: 35
End: 2020-07-22
Payer: MEDICARE

## 2020-07-22 VITALS
BODY MASS INDEX: 27.61 KG/M2 | WEIGHT: 197.2 LBS | TEMPERATURE: 98.2 F | SYSTOLIC BLOOD PRESSURE: 90 MMHG | HEIGHT: 71 IN | OXYGEN SATURATION: 96 % | HEART RATE: 100 BPM | DIASTOLIC BLOOD PRESSURE: 70 MMHG

## 2020-07-22 DIAGNOSIS — F17.200 SMOKER: ICD-10-CM

## 2020-07-22 DIAGNOSIS — E03.9 HYPOTHYROIDISM, UNSPECIFIED TYPE: ICD-10-CM

## 2020-07-22 DIAGNOSIS — F20.0 CHRONIC PARANOID SCHIZOPHRENIA (HCC): ICD-10-CM

## 2020-07-22 DIAGNOSIS — R74.8 LOW SERUM HDL: ICD-10-CM

## 2020-07-22 DIAGNOSIS — R00.0 SINUS TACHYCARDIA: Primary | ICD-10-CM

## 2020-07-22 DIAGNOSIS — R73.9 ELEVATED BLOOD SUGAR: ICD-10-CM

## 2020-07-22 DIAGNOSIS — I51.7 LVH (LEFT VENTRICULAR HYPERTROPHY): ICD-10-CM

## 2020-07-22 DIAGNOSIS — S43.014A ANTERIOR SHOULDER DISLOCATION, RIGHT, INITIAL ENCOUNTER: Primary | ICD-10-CM

## 2020-07-22 DIAGNOSIS — E78.1 PURE HYPERGLYCERIDEMIA: ICD-10-CM

## 2020-07-22 PROCEDURE — 97110 THERAPEUTIC EXERCISES: CPT | Performed by: PHYSICAL THERAPIST

## 2020-07-22 PROCEDURE — 97140 MANUAL THERAPY 1/> REGIONS: CPT | Performed by: PHYSICAL THERAPIST

## 2020-07-22 PROCEDURE — 99214 OFFICE O/P EST MOD 30 MIN: CPT | Performed by: FAMILY MEDICINE

## 2020-07-22 NOTE — PROGRESS NOTES
Daily Note     Today's date: 2020  Patient name: Gume Gordon  : 1985  MRN: [id number]  Referring provider: Madison Adams  Dx:   Encounter Diagnosis     ICD-10-CM    1  Anterior shoulder dislocation, right, initial encounter S43 014A                   Subjective: Pt reports no changes in functional status or pain  Reports having difficulty with overhead motion due to weakness  Objective: See treatment diary below      Assessment: Tolerated treatment fair  Patient would benefit from continued PT  Pt had increased fatigue today with performance of therapeutic exercises due to general fatigue  Range of motion is limited in abduction passively and actively  External rotation is progressing well passively but limited actively due to pain  Continue to progress as tolerated  Plan: Continue per plan of care        Precautions: Schizophrenia       Manuals 6/30 7/6 7/10 7/13 7/17 7/20 7/22      PROM: flexion, ER, abd CK CK CK CK CK CK CK      GH mobs post/inf   CK CK CK CK CK                                Neuro Re-Ed             Isometrics: ER, flexion, abd 10s x10 10s x 10 HEP HEP HEP HEP HEP      Supine holding arm in flexion     4x10s 4x10s 4x10s      shoulder stabilization  perturbations supine nv 5x 30s 5x30s 5X30s 4x30s,  4x30s, 1# 4x30s, 1#      Ball wall ABCs nv nv nv nv nv nv       AAROM abd, ER, flex w/ wand  2x15 2x15 30 30 30       scap 4   20 30 30 2x15, 1#                                 Ther Ex             scap retrations 10 30 HEP HEP HEP HEP HEP      Elbow flexion AROM sitting   3x30 30, 1# 30, 2# 30, 3# 30, 3#       Pulleys     6 min 5min 6min 5min      Function IR w/ wand     30 30       Functional ER w/ green rope     10x5s 15x 5s       Wall slides      30                    Ther Activity                                       Gait Training                                       Modalities

## 2020-07-22 NOTE — PROGRESS NOTES
Assessment/Plan:       No problem-specific Assessment & Plan notes found for this encounter  Diagnoses and all orders for this visit:    Sinus tachycardia  Comments:  Overall controlled  Advised to call if he developed palpitation or dizziness  Continue metoprolol    Hypothyroidism, unspecified type  Comments:  Compensated clinically  Orders:  -     TSH, 3rd generation with Free T4 reflex; Future    Elevated blood sugar  Comments:  Watch sweet and carbohydrate in  Orders:  -     Comprehensive metabolic panel; Future  -     Hemoglobin A1C; Future    Chronic paranoid schizophrenia ([address] )  Comments:  Stable  Following with Psychiatry    Smoker  Comments:  Advised to stop smoking    Low serum HDL  Comments:  Sized walk half an hour daily  Orders:  -     Lipid Panel with Direct LDL reflex; Future    LVH (left ventricular hypertrophy)  -     Echo complete with contrast if indicated; Future    Pure hyperglyceridemia  Comments: To follow with low-fat diet        Patient Instructions   To follow up with test results      Orders Placed This Encounter   Procedures    Comprehensive metabolic panel     This is a patient instruction: Patient fasting for 8 hours or longer recommended  Standing Status:   Future     Standing Expiration Date:   7/22/2021    Hemoglobin A1C     Standing Status:   Future     Standing Expiration Date:   7/22/2021    Lipid Panel with Direct LDL reflex     This is a patient instruction: This test requires patient fasting for 10-12 hours or longer  Drinking of black coffee or black tea is acceptable       Standing Status:   Future     Standing Expiration Date:   7/22/2021    TSH, 3rd generation with Free T4 reflex     Standing Status:   Future     Standing Expiration Date:   7/22/2021    Echo complete with contrast if indicated     Standing Status:   Future     Standing Expiration Date:   7/22/2024         Subjective:     Patient ID: Yoselin Harding is a 38 y o  male      HPI   Patient is here with his mother  Sinus tachycardia  Patient denied palpitation or dizziness  Also denied chest pain  Low blood pressure  Denied lightheadedness  Elevated blood sugar  Patient denied polyuria or polydipsia  Hypothyroid  Patient denied fatigue, cold intolerance or weight gain  Hyperlipidemia  Admit to regular fat diet  Denied chest pain  Schizophrenia  Patient denied hallucination admit to stable mood  He is following with Psychiatry  His mother stated he had blood work in the last couple months only for CBC    Test results  He had blood work March 13, 2020 discussed result with patient and his mother    Review of Systems   Constitutional: Negative for appetite change and fatigue  HENT: Negative for ear pain, tinnitus, trouble swallowing and voice change  Eyes: Negative for photophobia, pain and visual disturbance  Respiratory: Negative for cough, chest tightness and wheezing  Cardiovascular: Negative for chest pain, palpitations and leg swelling  Gastrointestinal: Negative for abdominal distention, abdominal pain, anal bleeding, constipation, diarrhea, nausea and rectal pain  Endocrine: Negative for cold intolerance, heat intolerance, polydipsia and polyuria  Genitourinary: Negative for decreased urine volume, difficulty urinating, dysuria, flank pain, frequency, hematuria and urgency  Musculoskeletal: Negative for arthralgias, back pain, gait problem, joint swelling, myalgias and neck pain  Skin: Negative for pallor and rash  Allergic/Immunologic: Negative for immunocompromised state  Neurological: Negative for dizziness, seizures, syncope and speech difficulty  Hematological: Negative for adenopathy  Does not bruise/bleed easily  Psychiatric/Behavioral: Negative for agitation, confusion, decreased concentration, dysphoric mood, hallucinations, sleep disturbance and suicidal ideas  The patient is not nervous/anxious          Objective:     Physical Exam   Constitutional: He is oriented to person, place, and time  He appears well-developed and well-nourished  No distress  HENT:   Head: Normocephalic  Eyes: Pupils are equal, round, and reactive to light  EOM are normal  Right eye exhibits no discharge  Left eye exhibits no discharge  No scleral icterus  Neck: Normal range of motion  No JVD present  No thyromegaly present  Cardiovascular: Normal rate, regular rhythm and normal heart sounds  Exam reveals no gallop and no friction rub  No murmur heard  Pulmonary/Chest: Effort normal and breath sounds normal    Abdominal: Soft  Bowel sounds are normal  He exhibits no distension  There is no tenderness  There is no rebound and no guarding  Musculoskeletal: Normal range of motion  He exhibits no edema or tenderness  Lymphadenopathy:     He has no cervical adenopathy  Neurological: He is alert and oriented to person, place, and time  No cranial nerve deficit  He exhibits normal muscle tone  Coordination normal    Skin: No rash noted  Psychiatric: He has a normal mood and affect  His behavior is normal    BMI Counseling: Body mass index is 27 3 kg/m²  The BMI is above normal  Nutrition recommendations include decreasing portion sizes and moderation in carbohydrate intake

## 2020-07-24 ENCOUNTER — APPOINTMENT (OUTPATIENT)
Dept: PHYSICAL THERAPY | Facility: MEDICAL CENTER | Age: 35
End: 2020-07-24
Payer: MEDICARE

## 2020-07-27 ENCOUNTER — OFFICE VISIT (OUTPATIENT)
Dept: PHYSICAL THERAPY | Facility: MEDICAL CENTER | Age: 35
End: 2020-07-27
Payer: MEDICARE

## 2020-07-27 DIAGNOSIS — S43.014A ANTERIOR SHOULDER DISLOCATION, RIGHT, INITIAL ENCOUNTER: Primary | ICD-10-CM

## 2020-07-27 PROCEDURE — 97110 THERAPEUTIC EXERCISES: CPT | Performed by: PHYSICAL THERAPIST

## 2020-07-27 PROCEDURE — 97140 MANUAL THERAPY 1/> REGIONS: CPT | Performed by: PHYSICAL THERAPIST

## 2020-07-27 NOTE — PROGRESS NOTES
Daily Note     Today's date: 2020  Patient name: Wendy Jama  : 1985  MRN: [id number]  Referring provider: Deloris George  Dx:   Encounter Diagnosis     ICD-10-CM    1  Anterior shoulder dislocation, right, initial encounter S43 014A                   Subjective: Pt reports that motion overhead like when washing his hair is starting to get easier  Objective: See treatment diary below      Assessment: Tolerated treatment well  Patient demonstrated fatigue post treatment, exhibited good technique with therapeutic exercises and would benefit from continued PT  Pt overhead motion is improving  Range of motion in ER and abduction demonstrates improvement with joint mobilization  Pt needs verbal cues to maximize motion during therapeutic exercises but otherwise has good form and return demonstration  Pt limitations include passive and active abduction, muscle endurance, and muscle strength of shoulder and scapular musculature  Exercises were progressed today to more active and active assisted motion and were tolerated well  Continue to progress as tolerated  Plan: Continue per plan of care        Precautions: Schizophrenia       Manuals 6/30 7/6 7/10 7/13 7/17 7/20 7/22 7/27     AAROM: flexion CK CK CK CK CK CK CK 3x10     GH mobs post/inf   CK CK CK CK CK CK     Manual resistance: ER        3x10                  Neuro Re-Ed             shoulder stabilization  perturbations supine nv 5x 30s 5x30s 5X30s 4x30s,  4x30s, 1# 4x30s, 1# np     Ball wall ABCs nv nv nv nv nv nv  30     AAROM abd, ER, flex w/ wand  2x15 2x15 30 30 30       scap 4   20 30 30 2x15, 1#  30 (yellow TB rows and ext)                               Ther Ex             scap retrations 10 30 HEP HEP HEP HEP HEP      Elbow flexion AROM sitting   3x30 30, 1# 30, 2# 30, 3# 30, 3#  30, 4#     Pulleys     6 min 5min 6min 5min np     Function IR w/ wand     30 30  30     Functional ER w/ green rope     10x5s 15x 5s  5s x 15 Wall slides / ball slides      30  30 flexion/ 30 abd                  Ther Activity                                       Gait Training                                       Modalities

## 2020-07-31 ENCOUNTER — OFFICE VISIT (OUTPATIENT)
Dept: PHYSICAL THERAPY | Facility: MEDICAL CENTER | Age: 35
End: 2020-07-31
Payer: MEDICARE

## 2020-07-31 DIAGNOSIS — S43.014A ANTERIOR SHOULDER DISLOCATION, RIGHT, INITIAL ENCOUNTER: Primary | ICD-10-CM

## 2020-07-31 PROCEDURE — 97110 THERAPEUTIC EXERCISES: CPT | Performed by: PHYSICAL THERAPIST

## 2020-07-31 PROCEDURE — 97140 MANUAL THERAPY 1/> REGIONS: CPT | Performed by: PHYSICAL THERAPIST

## 2020-07-31 NOTE — PROGRESS NOTES
Daily Note     Today's date: 2020  Patient name: Yoselin Harding  : 1985  MRN: [id number]  Referring provider: Liya Capellan  Dx:   Encounter Diagnosis     ICD-10-CM    1  Anterior shoulder dislocation, right, initial encounter S43 014A                   Subjective: Pt reports that he is trying the new stretching at home but has not noticed significant increases in motion yet  Objective: See treatment diary below      Assessment: Tolerated treatment well  Patient would benefit from continued PT  Repetitions per exercise were decreased today to focus on quality of motion and using full range of motion  Pt strength is limited with ER and overhead in flexion and abduction   Pt passive range responds well to manual therapy  Pt will follow up one time next week before follow up with ortho on 08/10  Plan: Continue per plan of care        Precautions: Schizophrenia       Manuals 6/30 7/6 7/10 7/13 7/17 7/20 7/22 7/27 7/31    AAROM: flexion CK CK CK CK CK CK CK 3x10 3x10    GH mobs post/inf   CK CK CK CK CK CK CK    Manual resistance: ER        3x10 3x10                 Neuro Re-Ed             shoulder stabilization  perturbations supine nv 5x 30s 5x30s 5X30s 4x30s,  4x30s, 1# 4x30s, 1# np np    Ball wall ABCs nv nv nv nv nv nv  30 30    AAROM ER, flex w/ wand  2x15 2x15 30 30 30   20    scap 4   20 30 30 2x15, 1#  30 (yellow TB rows and ext) 2x10 yellow TB                               Ther Ex             Elbow flexion AROM sitting   3x30 30, 1# 30, 2# 30, 3# 30, 3#  30, 4# 2x15, 5#    Pulleys     6 min 5min 6min 5min np np    Function IR w/ wand     30 30  30 3x20s    Functional ER w/ green rope     10x5s 15x 5s  5s x 15 np    Wall slides / ball slides      30  30 flexion/ 30 abd 20, 2# flex/ 20 abd                 Ther Activity                                       Gait Training                                       Modalities

## 2020-08-02 NOTE — PROGRESS NOTES
Chandrika Liang has been awake, alert, and visible intermittently out in the milieu  Pt continues with flat, blunted affect  Pt offers minimal conversation and remains guarded  Behavior is mostly quiet and isolative to self in room  Pt comes out and walks around unit at intervals  No interaction noted with peers  Compliant with scheduled 1600 meds with prompting x 1  Pt denies any depression, anxiety, or a/v hallucinations  Will continue to monitor/assess for any changes  ambulatory

## 2020-08-03 ENCOUNTER — OFFICE VISIT (OUTPATIENT)
Dept: PHYSICAL THERAPY | Facility: MEDICAL CENTER | Age: 35
End: 2020-08-03
Payer: MEDICARE

## 2020-08-03 DIAGNOSIS — S43.014A ANTERIOR SHOULDER DISLOCATION, RIGHT, INITIAL ENCOUNTER: Primary | ICD-10-CM

## 2020-08-03 PROCEDURE — 97112 NEUROMUSCULAR REEDUCATION: CPT | Performed by: PHYSICAL THERAPIST

## 2020-08-03 PROCEDURE — 97140 MANUAL THERAPY 1/> REGIONS: CPT | Performed by: PHYSICAL THERAPIST

## 2020-08-03 PROCEDURE — 97110 THERAPEUTIC EXERCISES: CPT | Performed by: PHYSICAL THERAPIST

## 2020-08-03 NOTE — PROGRESS NOTES
Daily Note     Today's date: 8/3/2020  Patient name: Leotis Riedel  : 1985  MRN: [id number]  Referring provider: Selene Vasques  Dx:   Encounter Diagnosis     ICD-10-CM    1  Anterior shoulder dislocation, right, initial encounter  S43 014A                   Subjective: Pt reports that it is easier to wash is hair but still has trouble putting on his shirt and getting his arm overhead  Objective: See treatment diary below  FOTO: 51  Active shoulder flexion: 100*  Passive shoulder abduction: 90*    Assessment: Tolerated treatment well  Patient demonstrated fatigue post treatment, exhibited good technique with therapeutic exercises and would benefit from continued PT  Pt continues to exhibit strength and mobility deficits overhead preventing him ability to don and doff his shirt  Pt just recently progressed to active assisted flexion and is unable to lift overhead actively  Shoulder abduction is limited with only passive 90* and pain limited abduction actively  Pt has good prognosis to regain full function with continued physical therapy to address strength and mobility deficits  Pt has follow up with ortho next week  Plan: Continue per plan of care        Precautions: Schizophrenia       Manuals 6/30 7/6 7/10 7/13 7/17 7/20 7/22 7/27 7/31 8/3   AAROM: flexion CK CK CK CK CK CK CK 3x10 3x10    GH mobs post/inf   CK CK CK CK CK CK CK CK                Neuro Re-Ed             shoulder stabilization  perturbations supine nv 5x 30s 5x30s 5X30s 4x30s,  4x30s, 1# 4x30s, 1# np np 3x10s   Ball wall ABCs nv nv nv nv nv nv  30 30 30   scap 4   20 30 30 2x15, 1#  30 (yellow TB rows and ext) 2x10 yellow TB  2x10, red, (yellow robers)                             Ther Ex             Elbow flexion AROM sitting   3x30 30, 1# 30, 2# 30, 3# 30, 3#  30, 4# 2x15, 5# 30, 5#   Pulleys     6 min 5min 6min 5min np np np   Function IR w/ wand     30 30  30 3x20s 30   Functional ER w/ green rope     10x5s 15x 5s  5s x 15 np 5s x 15   Wall slides / ball slides      30  30 flexion/ 30 abd 20, 2# flex/ 20 abd 3x10, 2#, 20abd   sidelying ER          3x10   Supine flexion          3x10   Ther Activity                                       Gait Training                                       Modalities

## 2020-08-10 ENCOUNTER — APPOINTMENT (OUTPATIENT)
Dept: RADIOLOGY | Facility: MEDICAL CENTER | Age: 35
End: 2020-08-10
Payer: MEDICARE

## 2020-08-10 ENCOUNTER — OFFICE VISIT (OUTPATIENT)
Dept: OBGYN CLINIC | Facility: MEDICAL CENTER | Age: 35
End: 2020-08-10
Payer: MEDICARE

## 2020-08-10 ENCOUNTER — TELEPHONE (OUTPATIENT)
Dept: FAMILY MEDICINE CLINIC | Facility: CLINIC | Age: 35
End: 2020-08-10

## 2020-08-10 VITALS
HEART RATE: 80 BPM | TEMPERATURE: 98.1 F | DIASTOLIC BLOOD PRESSURE: 81 MMHG | SYSTOLIC BLOOD PRESSURE: 111 MMHG | BODY MASS INDEX: 27.58 KG/M2 | WEIGHT: 197 LBS | HEIGHT: 71 IN

## 2020-08-10 DIAGNOSIS — S43.014A ANTERIOR SHOULDER DISLOCATION, RIGHT, INITIAL ENCOUNTER: ICD-10-CM

## 2020-08-10 DIAGNOSIS — S43.014A ANTERIOR SHOULDER DISLOCATION, RIGHT, INITIAL ENCOUNTER: Primary | ICD-10-CM

## 2020-08-10 PROCEDURE — 99213 OFFICE O/P EST LOW 20 MIN: CPT | Performed by: ORTHOPAEDIC SURGERY

## 2020-08-10 PROCEDURE — 3008F BODY MASS INDEX DOCD: CPT | Performed by: ORTHOPAEDIC SURGERY

## 2020-08-10 PROCEDURE — 73030 X-RAY EXAM OF SHOULDER: CPT

## 2020-08-10 NOTE — TELEPHONE ENCOUNTER
PRIOR AUTHORIZATION INFO    Procedure/test: Echo    Date of test: 9/29/2020    Location of test: [facility]     Telephone or online: Telephone    Insurance: [id number] [facility] number: [phone]    : Ike Carrillo    Status: Approved    Reference/tracking #: No prior authorization needed    Notes:

## 2020-08-10 NOTE — PROGRESS NOTES
Ortho Sports Medicine Shoulder Follow Up Visit     Assesment:   38 y o  male right shoulder 1st anterior shoulder dislocation with subsequent Hill-Sachs and Bankart lesions, DOI: 6/21/2020, doing well at this time    Plan:    Conservative treatment:    Ice to shoulder 1-2 times daily, for 20 minutes at a time  Home exercise program for shoulder, including ROM and strenthening  Instructions given to patient of what exercises to perform  Let pain guide return to activities  Imaging: All imaging from today was reviewed by myself and explained to the patient  New x-rays at next visit    Injection:    No Injection planned at this time  Surgery:     No surgery is recommended at this point, continue with conservative treatment plan as noted  Follow up:    Return in about 8 weeks (around 10/5/2020) for Recheck  Chief Complaint   Patient presents with    Right Shoulder - Follow-up, Fracture     History of Present Illness: The patient is returns for follow up of right shoulder 1st anterior shoulder dislocation with subsequent Hill-Sachs and Bankart lesions  Since the prior visit, He reports minimal improvement  Patient states that he has made much improvement with physical therapy and his range of motion  States that the pain he was experiencing has decreased as well  Pain is improved by rest, ice, NSAIDS and physical therapy  Pain is aggravated by overhead activity, reaching back and lifting  Symptoms include clicking, catching and popping  The patient denies weakness  The patient has tried rest, ice, NSAIDS and physical therapy          Shoulder Surgical History:  None    Past Medical, Social and Family History:  Past Medical History:   Diagnosis Date    Disease of thyroid gland     Schizophrenia ([facility] [address] )      Past Surgical History:   Procedure Laterality Date    TONSILLECTOMY AND ADENOIDECTOMY       No Known Allergies  Current Outpatient Medications on File Prior to Visit Medication Sig Dispense Refill    buPROPion (WELLBUTRIN XL) 300 mg 24 hr tablet Take 1 tablet (300 mg total) by mouth daily 30 tablet 0    clozapine (CLOZARIL) 200 MG tablet Take 2 tablets (400 mg total) by mouth daily at bedtime 60 tablet 0    cloZAPine (CLOZARIL) 50 MG tablet Take 1 tablet (50 mg total) by mouth daily at bedtime (Patient taking differently: Take 50 mg by mouth daily at bedtime ) 30 tablet 0    divalproex sodium (DEPAKOTE ER) 500 mg 24 hr tablet Take 2 tablets (1,000 mg total) by mouth daily at bedtime 60 tablet 0    fluPHENAZine (PROLIXIN) 10 MG tablet Take 2 tablets (20 mg total) by mouth daily at bedtime 60 tablet 0    levothyroxine 75 mcg tablet Take 1 tablet (75 mcg total) by mouth daily in the early morning 30 tablet 5    metoprolol tartrate (LOPRESSOR) 25 mg tablet Take 0 5 tablets (12 5 mg total) by mouth every 12 (twelve) hours 30 tablet 5    midodrine (PROAMATINE) 10 MG tablet Take 1 tablet 3 times daily 90 tablet 1    senna (SENOKOT) 8 6 mg Take 1 tablet (8 6 mg total) by mouth 2 (two) times a day 60 each 5     No current facility-administered medications on file prior to visit        Social History     Socioeconomic History    Marital status: Single     Spouse name: Not on file    Number of children: Not on file    Years of education: Not on file    Highest education level: Not on file   Occupational History    Occupation: unemployed   Social Needs    Financial resource strain: Not on file    Food insecurity     Worry: Not on file     Inability: Not on file   [employer] needs     Medical: Not on file     Non-medical: Not on file   Tobacco Use    Smoking status: Current Every Day Smoker     Packs/day: 0 50     Types: Cigarettes    Smokeless tobacco: Current User    Tobacco comment: Per [employer]-current everyday smoker   Substance and Sexual Activity    Alcohol use: No    Drug use: No    Sexual activity: Not on file   Lifestyle    Physical activity     Days per week: Not on file     Minutes per session: Not on file    Stress: Not on file   Relationships    Social connections     Talks on phone: Not on file     Gets together: Not on file     Attends Yazdanism service: Not on file     Active member of club or organization: Not on file     Attends meetings of clubs or organizations: Not on file     Relationship status: Not on file    Intimate partner violence     Fear of current or ex partner: Not on file     Emotionally abused: Not on file     Physically abused: Not on file     Forced sexual activity: Not on file   Other Topics Concern    Not on file   Social History Narrative    Lives at home with mother and cat     I have reviewed the past medical, surgical, social and family history, medications and allergies as documented in the EMR  Review of systems: ROS is negative other than that noted in the HPI  Constitutional: Negative for fatigue and fever  Physical Exam:    Blood pressure 111/81, pulse 80, temperature 98 1 °F (36 7 °C), height 5' 11" (1 803 m), weight 89 4 kg (197 lb)      General/Constitutional: NAD, well developed, well nourished  HENT: Normocephalic, atraumatic  CV: Intact distal pulses, regular rate  Resp: No respiratory distress or labored breathing  Lymphatic: No lymphadenopathy palpated  Neuro: Alert and Oriented x 3, no focal deficits  Psych: Normal mood, normal affect, normal judgement, normal behavior  Skin: Warm, dry, no rashes, no erythema    Shoulder focused exam:    RIGHT LEFT    Scapula Atrophy Negative Negative     Winging Negative Negative     Protraction Negative Negative    Rotator cuff SS 5/5 5/5     IS 5/5 5/5     SubS 5/5 5/5    ROM  active 170 passive    170     ER0 60 60     ER90 90    90     IR90 T6    T6     IRb T6    T6    TTP: AC Negative Negative     Biceps Negative Negative     Coracoid Negative Negative    Special Tests: O'Briens Negative Negative     Mcmanus-shear Negative Negative     Cross body Adduction Negative Negative     Speeds  Negative Negative     Mary's Negative Negative     Whipple Negative Negative       Neer Negative Negative     Sheffield Negative Negative    Instability: Apprehension & relocation not tested not tested     Load & shift not tested not tested    Other: Crank Negative Negative             UE NV Exam: +2 Radial pulses bilaterally  Sensation intact to light touch C5-T1 bilaterally, Radial/median/ulnar nerve motor intact    Cervical ROM is full without pain, numbness or tingling      Shoulder Imaging    New x-rays of the right shoulder performed today demonstrate comminuted fracture at the posterolateral aspect of the humeral head that appears stable and appropriately aligned  I do not have radiology report at this time will review once one is available      Scribe Attestation    I,:   Michael Miller am acting as a scribe while in the presence of the attending physician :        I,:   Ashleigh Pollack DO personally performed the services described in this documentation    as scribed in my presence :

## 2020-08-18 ENCOUNTER — OFFICE VISIT (OUTPATIENT)
Dept: PHYSICAL THERAPY | Facility: MEDICAL CENTER | Age: 35
End: 2020-08-18
Payer: MEDICARE

## 2020-08-18 DIAGNOSIS — S43.014A ANTERIOR SHOULDER DISLOCATION, RIGHT, INITIAL ENCOUNTER: Primary | ICD-10-CM

## 2020-08-18 PROCEDURE — 97110 THERAPEUTIC EXERCISES: CPT | Performed by: PHYSICAL THERAPIST

## 2020-08-18 NOTE — PROGRESS NOTES
Daily Note     Today's date: 2020  Patient name: Anaya Hurtado  : 1985  MRN: [id number]  Referring provider: Yelena Hooper  Dx:   Encounter Diagnosis     ICD-10-CM    1  Anterior shoulder dislocation, right, initial encounter  S43 014A                   Subjective: Pt reports that he is having the most difficulty with putting on his shirt and and pushing to get out of bed  Washing his hair and showering is not a problem  Objective: See treatment diary below      Assessment: Tolerated treatment well  Patient demonstrated fatigue post treatment and exhibited good technique with therapeutic exercises  Pt exhibits limited overhead range of motion that is mostly pain limited  Will continue address with manual therapy and thex  Pt strength is progressing well and will continue to be addressed with there ex  Plan: Continue per plan of care        Precautions: Schizophrenia       Manuals 8/18 7/6 7/10 7/13 7/17 7/20 7/22 7/27 7/31 8/3   AAROM: flexion CK CK CK CK CK CK CK 3x10 3x10    GH mobs post/inf CK  CK CK CK CK CK CK CK CK                Neuro Re-Ed             shoulder stabilization  perturbations supine 3x20s 5x 30s 5x30s 5X30s 4x30s,  4x30s, 1# 4x30s, 1# np np 3x10s   Ball wall ABCs 30 nv nv nv nv nv  30 30 30   scap 4   20 30 30 2x15, 1#  30 (yellow TB rows and ext) 2x10 yellow TB  2x10, red, (yellow robers)                             Ther Ex             Supine wand flexion 2x10            Elbow flexion AROM sitting   3x30 30, 1# 30, 2# 30, 3# 30, 3#  30, 4# 2x15, 5# 30, 5#   Pulleys  2min   6 min 5min 6min 5min np np np   Function IR w/ wand 10s x 3    30 30  30 3x20s 30   Functional ER w/ green rope 10s x 3    10x5s 15x 5s  5s x 15 np 5s x 15   Wall slides  3x5     30  30 flexion/ 30 abd 20, 2# flex/ 20 abd 3x10, 2#, 20abd   sidelying ER 3x10, 1#         3x10   Seated cone stacking (L4 from top)  2x5 cones             Ther Activity Gait Training                                       Modalities

## 2020-08-31 ENCOUNTER — OFFICE VISIT (OUTPATIENT)
Dept: PHYSICAL THERAPY | Facility: MEDICAL CENTER | Age: 35
End: 2020-08-31
Payer: MEDICARE

## 2020-08-31 DIAGNOSIS — S43.014A ANTERIOR SHOULDER DISLOCATION, RIGHT, INITIAL ENCOUNTER: Primary | ICD-10-CM

## 2020-08-31 PROCEDURE — 97140 MANUAL THERAPY 1/> REGIONS: CPT | Performed by: PHYSICAL THERAPIST

## 2020-08-31 PROCEDURE — 97110 THERAPEUTIC EXERCISES: CPT | Performed by: PHYSICAL THERAPIST

## 2020-08-31 NOTE — PROGRESS NOTES
Daily Note     Today's date: 2020  Patient name: Carri Yang  : 1985  MRN: [id number]  Referring provider: Angela Maria  Dx:   Encounter Diagnosis     ICD-10-CM    1  Anterior shoulder dislocation, right, initial encounter  S43 014A                   Subjective: Lawton Goldmann reports that he is doing well and his pain is largely non-existent at this time      Objective: See treatment diary below      Assessment: Tolerated treatment well  Patient reports he is comfortable continuing with his HEP at this time  He remains with some limitation into overhead reaching and behind the back but is feeling that he is making progress with his own stretching as well  Will only return if there is an issue      Plan: Continue per plan of care        Precautions: Schizophrenia       Manuals 8/31 7/6 7/10 7/13 7/17 7/20 7/22 7/27 7/31 8/3   AAROM: flexion AF CK CK CK CK CK CK 3x10 3x10    GH mobs post/inf AF  CK CK CK CK CK CK CK CK                Neuro Re-Ed             shoulder stabilization  perturbations supine 3x20s 5x 30s 5x30s 5X30s 4x30s,  4x30s, 1# 4x30s, 1# np np 3x10s   Ball wall ABCs 30 nv nv nv nv nv  30 30 30   scap 4   20 30 30 2x15, 1#  30 (yellow TB rows and ext) 2x10 yellow TB  2x10, red, (yellow robers)                             Ther Ex             Supine wand flexion 2x10            Elbow flexion AROM sitting   3x30 30, 1# 30, 2# 30, 3# 30, 3#  30, 4# 2x15, 5# 30, 5#   Pulleys  2min   6 min 5min 6min 5min np np np   Function IR w/ wand 10s x 3    30 30  30 3x20s 30   Functional ER w/ green rope 10s x 3    10x5s 15x 5s  5s x 15 np 5s x 15   Wall slides  3x5     30  30 flexion/ 30 abd 20, 2# flex/ 20 abd 3x10, 2#, 20abd   sidelying ER 3x10, 1#         3x10   Seated cone stacking (L4 from top)  2x5 cones             Ther Activity                                       Gait Training                                       Modalities

## 2020-09-14 DIAGNOSIS — I95.1 ORTHOSTATIC HYPOTENSION: ICD-10-CM

## 2020-09-14 RX ORDER — MIDODRINE HYDROCHLORIDE 10 MG/1
TABLET ORAL
Qty: 90 TABLET | Refills: 0 | Status: SHIPPED | OUTPATIENT
Start: 2020-09-14 | End: 2020-10-19 | Stop reason: SDUPTHER

## 2020-09-24 DIAGNOSIS — R00.0 SINUS TACHYCARDIA: ICD-10-CM

## 2020-09-24 DIAGNOSIS — E03.9 HYPOTHYROIDISM, UNSPECIFIED TYPE: ICD-10-CM

## 2020-09-24 RX ORDER — LEVOTHYROXINE SODIUM 0.07 MG/1
75 TABLET ORAL
Qty: 90 TABLET | Refills: 1 | Status: SHIPPED | OUTPATIENT
Start: 2020-09-24 | End: 2020-10-22 | Stop reason: SDUPTHER

## 2020-09-29 ENCOUNTER — HOSPITAL ENCOUNTER (OUTPATIENT)
Dept: NON INVASIVE DIAGNOSTICS | Facility: HOSPITAL | Age: 35
Discharge: HOME | End: 2020-09-29
Attending: FAMILY MEDICINE
Payer: MEDICARE

## 2020-09-29 DIAGNOSIS — I51.7 LVH (LEFT VENTRICULAR HYPERTROPHY): ICD-10-CM

## 2020-09-29 PROCEDURE — 93306 TTE W/DOPPLER COMPLETE: CPT

## 2020-09-29 PROCEDURE — 93306 TTE W/DOPPLER COMPLETE: CPT | Performed by: INTERNAL MEDICINE

## 2020-10-19 DIAGNOSIS — I95.1 ORTHOSTATIC HYPOTENSION: ICD-10-CM

## 2020-10-19 LAB — HBA1C MFR BLD HPLC: 5.7 %

## 2020-10-19 RX ORDER — MIDODRINE HYDROCHLORIDE 10 MG/1
TABLET ORAL
Qty: 90 TABLET | Refills: 0 | Status: SHIPPED | OUTPATIENT
Start: 2020-10-19 | End: 2020-10-22 | Stop reason: SDUPTHER

## 2020-10-22 ENCOUNTER — OFFICE VISIT (OUTPATIENT)
Dept: FAMILY MEDICINE CLINIC | Facility: CLINIC | Age: 35
End: 2020-10-22
Payer: MEDICARE

## 2020-10-22 VITALS
HEIGHT: 71 IN | SYSTOLIC BLOOD PRESSURE: 118 MMHG | TEMPERATURE: 97.8 F | WEIGHT: 199.6 LBS | DIASTOLIC BLOOD PRESSURE: 70 MMHG | RESPIRATION RATE: 16 BRPM | BODY MASS INDEX: 27.94 KG/M2 | OXYGEN SATURATION: 98 % | HEART RATE: 88 BPM

## 2020-10-22 DIAGNOSIS — E78.2 MIXED HYPERLIPIDEMIA: ICD-10-CM

## 2020-10-22 DIAGNOSIS — E03.9 HYPOTHYROIDISM, UNSPECIFIED TYPE: ICD-10-CM

## 2020-10-22 DIAGNOSIS — Z00.00 MEDICARE ANNUAL WELLNESS VISIT, SUBSEQUENT: Primary | ICD-10-CM

## 2020-10-22 DIAGNOSIS — R00.0 SINUS TACHYCARDIA: ICD-10-CM

## 2020-10-22 DIAGNOSIS — I51.7 LVH (LEFT VENTRICULAR HYPERTROPHY): ICD-10-CM

## 2020-10-22 DIAGNOSIS — I95.1 ORTHOSTATIC HYPOTENSION: ICD-10-CM

## 2020-10-22 DIAGNOSIS — R73.9 ELEVATED BLOOD SUGAR: ICD-10-CM

## 2020-10-22 DIAGNOSIS — Z23 NEED FOR INFLUENZA VACCINATION: ICD-10-CM

## 2020-10-22 DIAGNOSIS — N28.9 ABNORMAL RENAL FUNCTION: ICD-10-CM

## 2020-10-22 PROCEDURE — G0008 ADMIN INFLUENZA VIRUS VAC: HCPCS

## 2020-10-22 PROCEDURE — 90686 IIV4 VACC NO PRSV 0.5 ML IM: CPT

## 2020-10-22 PROCEDURE — G0439 PPPS, SUBSEQ VISIT: HCPCS | Performed by: FAMILY MEDICINE

## 2020-10-22 PROCEDURE — 99214 OFFICE O/P EST MOD 30 MIN: CPT | Performed by: FAMILY MEDICINE

## 2020-10-22 RX ORDER — LEVOTHYROXINE SODIUM 0.07 MG/1
75 TABLET ORAL
Qty: 90 TABLET | Refills: 1 | Status: SHIPPED | OUTPATIENT
Start: 2020-10-22 | End: 2021-02-11

## 2020-10-22 RX ORDER — MIDODRINE HYDROCHLORIDE 10 MG/1
TABLET ORAL
Qty: 90 TABLET | Refills: 0 | Status: SHIPPED | OUTPATIENT
Start: 2020-10-22 | End: 2020-12-15

## 2020-10-27 ENCOUNTER — TELEPHONE (OUTPATIENT)
Dept: FAMILY MEDICINE CLINIC | Facility: CLINIC | Age: 35
End: 2020-10-27

## 2020-12-15 DIAGNOSIS — I95.1 ORTHOSTATIC HYPOTENSION: ICD-10-CM

## 2020-12-15 RX ORDER — MIDODRINE HYDROCHLORIDE 10 MG/1
TABLET ORAL
Qty: 90 TABLET | Refills: 0 | Status: SHIPPED | OUTPATIENT
Start: 2020-12-15 | End: 2021-01-28 | Stop reason: SDUPTHER

## 2021-01-11 DIAGNOSIS — I95.1 ORTHOSTATIC HYPOTENSION: ICD-10-CM

## 2021-01-11 RX ORDER — MIDODRINE HYDROCHLORIDE 10 MG/1
TABLET ORAL
Qty: 90 TABLET | Refills: 0 | OUTPATIENT
Start: 2021-01-11

## 2021-01-14 DIAGNOSIS — I95.1 ORTHOSTATIC HYPOTENSION: ICD-10-CM

## 2021-01-14 RX ORDER — MIDODRINE HYDROCHLORIDE 10 MG/1
TABLET ORAL
Qty: 90 TABLET | Refills: 0 | OUTPATIENT
Start: 2021-01-14

## 2021-01-27 DIAGNOSIS — I95.1 ORTHOSTATIC HYPOTENSION: ICD-10-CM

## 2021-01-27 RX ORDER — MIDODRINE HYDROCHLORIDE 10 MG/1
10 TABLET ORAL 3 TIMES DAILY
Qty: 90 TABLET | Refills: 0 | Status: CANCELLED | OUTPATIENT
Start: 2021-01-27

## 2021-01-28 ENCOUNTER — OFFICE VISIT (OUTPATIENT)
Dept: FAMILY MEDICINE CLINIC | Facility: CLINIC | Age: 36
End: 2021-01-28
Payer: MEDICARE

## 2021-01-28 VITALS
OXYGEN SATURATION: 97 % | HEIGHT: 71 IN | DIASTOLIC BLOOD PRESSURE: 76 MMHG | SYSTOLIC BLOOD PRESSURE: 112 MMHG | BODY MASS INDEX: 27.52 KG/M2 | TEMPERATURE: 97.6 F | WEIGHT: 196.6 LBS | HEART RATE: 95 BPM

## 2021-01-28 DIAGNOSIS — E03.9 HYPOTHYROIDISM, UNSPECIFIED TYPE: ICD-10-CM

## 2021-01-28 DIAGNOSIS — I51.7 LVH (LEFT VENTRICULAR HYPERTROPHY): ICD-10-CM

## 2021-01-28 DIAGNOSIS — F17.200 SMOKER: ICD-10-CM

## 2021-01-28 DIAGNOSIS — E78.1 PURE HYPERTRIGLYCERIDEMIA: ICD-10-CM

## 2021-01-28 DIAGNOSIS — R73.9 ELEVATED BLOOD SUGAR: ICD-10-CM

## 2021-01-28 DIAGNOSIS — F20.0 CHRONIC PARANOID SCHIZOPHRENIA (HCC): ICD-10-CM

## 2021-01-28 DIAGNOSIS — E66.3 OVER WEIGHT: Primary | ICD-10-CM

## 2021-01-28 DIAGNOSIS — N28.9 ABNORMAL RENAL FUNCTION: ICD-10-CM

## 2021-01-28 DIAGNOSIS — I95.1 ORTHOSTATIC HYPOTENSION: ICD-10-CM

## 2021-01-28 DIAGNOSIS — E78.6 LOW HDL (UNDER 40): ICD-10-CM

## 2021-01-28 PROBLEM — F03.90 SENILE DEMENTIA, UNCOMPLICATED (HCC): Status: ACTIVE | Noted: 2021-01-28

## 2021-01-28 PROCEDURE — 99214 OFFICE O/P EST MOD 30 MIN: CPT | Performed by: FAMILY MEDICINE

## 2021-01-28 RX ORDER — MIDODRINE HYDROCHLORIDE 10 MG/1
10 TABLET ORAL 3 TIMES DAILY
Qty: 90 TABLET | Refills: 0 | Status: SHIPPED | OUTPATIENT
Start: 2021-01-28 | End: 2021-02-25

## 2021-01-28 NOTE — PROGRESS NOTES
Assessment/Plan:       No problem-specific Assessment & Plan notes found for this encounter  Diagnoses and all orders for this visit:    Over weight  Comments:  Advised to lose weight    Orthostatic hypotension  Comments:  Blood pressure is good   Orders:  -     midodrine (PROAMATINE) 10 MG tablet; Take 1 tablet (10 mg total) by mouth 3 (three) times a day    Elevated blood sugar  Comments:  Watch sweet and carbohydrate intake  Orders:  -     Hemoglobin A1C; Future  -     Comprehensive metabolic panel; Future  -     UA (URINE) with reflex to Scope    LVH (left ventricular hypertrophy)  Comments: To follow with Cardiology    Abnormal renal function  Comments:  Recheck renal function  Avoid NSAID  Orders:  -     Comprehensive metabolic panel; Future  -     UA (URINE) with reflex to Scope    Pure hypertriglyceridemia  -     Comprehensive metabolic panel; Future  -     Lipid Panel with Direct LDL reflex; Future  -     UA (URINE) with reflex to Scope    Hypothyroidism, unspecified type  -     TSH, 3rd generation with Free T4 reflex; Future    Low HDL (under 40)  Comments:  Advised to walk 20 minutes 3 4 times a week    Smoker  Comments:  Consel patient to stop smoking  Discussed potential side effect, patient has no interest to stop smoking    [facility]  Comments:  Stable  Following with psychiatry        Patient Instructions   Patient to follow up with test results      Orders Placed This Encounter   Procedures    Hemoglobin A1C     Standing Status:   Future     Standing Expiration Date:   1/28/2022    Comprehensive metabolic panel     This is a patient instruction: Patient fasting for 8 hours or longer recommended  Standing Status:   Future     Standing Expiration Date:   1/28/2022    Lipid Panel with Direct LDL reflex     This is a patient instruction: This test requires patient fasting for 10-12 hours or longer  Drinking of black coffee or black tea is acceptable  Standing Status:   Future     Standing Expiration Date:   1/28/2022    UA (URINE) with reflex to Scope    TSH, 3rd generation with Free T4 reflex     Standing Status:   Future     Standing Expiration Date:   1/28/2022         Subjective:     Patient ID: Daya Solorzano is a 38 y o  male      HPI  Low blood pressure  Denied lightheadedness  No side effect with midodren  Elevated blood sugar  Denied polyuria or polydipsia admit to regular sweet and carbohydrate intake  Elevated triglyceride admit to regular fat intake  Denied chest pain  Hypothyroid patient denied weight gain, cold intolerance or fatigue  Schizophrenia  He said he is stable his following with psychiatry  No hallucination  Patient stated he checks CBC monthly ordered by Psychiatry  Left ventricular hypertrophy  Did not follow with Cardiology     test results  No test done  Review of Systems   Constitutional: Negative for activity change, appetite change, chills, fatigue, fever and unexpected weight change  HENT: Negative for congestion, ear discharge, ear pain, hearing loss, nosebleeds, rhinorrhea, sinus pressure, sore throat, tinnitus, trouble swallowing and voice change  Eyes: Negative for photophobia, pain and visual disturbance  Respiratory: Negative for cough, chest tightness, shortness of breath and wheezing  Cardiovascular: Negative for chest pain, palpitations and leg swelling  Gastrointestinal: Negative for abdominal pain, anal bleeding, blood in stool, constipation, diarrhea, nausea and vomiting  Endocrine: Negative for cold intolerance, heat intolerance, polydipsia and polyuria  Genitourinary: Negative for dysuria, frequency, hematuria, penile swelling, scrotal swelling, testicular pain and urgency  Musculoskeletal: Negative for arthralgias, back pain, gait problem, joint swelling, myalgias and neck pain  Skin: Negative for rash     Neurological: Negative for dizziness, tremors, seizures, syncope, facial asymmetry, speech difficulty, weakness, light-headedness and headaches  Hematological: Negative for adenopathy  Does not bruise/bleed easily  Psychiatric/Behavioral: Negative for agitation, behavioral problems, confusion, dysphoric mood, hallucinations and sleep disturbance  The patient is not nervous/anxious  Objective:     Physical Exam  Constitutional:       General: He is not in acute distress  Appearance: He is well-developed  HENT:      Head: Normocephalic  Eyes:      General: No scleral icterus  Pupils: Pupils are equal, round, and reactive to light  Neck:      Musculoskeletal: Normal range of motion and neck supple  Thyroid: No thyromegaly  Vascular: No JVD  Cardiovascular:      Rate and Rhythm: Normal rate and regular rhythm  Pulses:           Carotid pulses are 3+ on the right side and 3+ on the left side  Dorsalis pedis pulses are 3+ on the right side and 3+ on the left side  Heart sounds: Normal heart sounds  No murmur  No friction rub  No gallop  Comments: Legs , no edema   Pulmonary:      Effort: Pulmonary effort is normal       Breath sounds: Normal breath sounds  Abdominal:      General: Bowel sounds are normal  There is no distension  Palpations: Abdomen is soft  Tenderness: There is no abdominal tenderness  There is no guarding  Musculoskeletal: Normal range of motion  General: No tenderness  Left lower leg: Edema present  Lymphadenopathy:      Cervical: No cervical adenopathy  Skin:     Findings: No rash  Neurological:      General: No focal deficit present  Mental Status: He is alert and oriented to person, place, and time  Cranial Nerves: No cranial nerve deficit  Motor: No abnormal muscle tone  Coordination: Coordination normal       Comments: Normal gait   Psychiatric:         Mood and Affect: Mood normal          Behavior: Behavior normal      BMI Counseling:  Body mass index is 27 42 kg/m²  The BMI is above normal  Nutrition recommendations include decreasing portion sizes, moderation in carbohydrate intake and reducing intake of saturated and trans fat  It is okay the patient to discuss his office visit today with his mom who was waiting for him in the waiting room    Mother came to the examining room and office visit with her

## 2021-02-10 DIAGNOSIS — E03.9 HYPOTHYROIDISM, UNSPECIFIED TYPE: ICD-10-CM

## 2021-02-10 DIAGNOSIS — R00.0 SINUS TACHYCARDIA: ICD-10-CM

## 2021-02-11 RX ORDER — LEVOTHYROXINE SODIUM 0.07 MG/1
75 TABLET ORAL
Qty: 30 TABLET | Refills: 3 | Status: SHIPPED | OUTPATIENT
Start: 2021-02-11 | End: 2021-06-22 | Stop reason: SDUPTHER

## 2021-02-25 DIAGNOSIS — I95.1 ORTHOSTATIC HYPOTENSION: ICD-10-CM

## 2021-02-25 RX ORDER — MIDODRINE HYDROCHLORIDE 10 MG/1
10 TABLET ORAL 3 TIMES DAILY
Qty: 90 TABLET | Refills: 0 | Status: SHIPPED | OUTPATIENT
Start: 2021-02-25 | End: 2021-03-26 | Stop reason: SDUPTHER

## 2021-03-25 DIAGNOSIS — I95.1 ORTHOSTATIC HYPOTENSION: ICD-10-CM

## 2021-03-25 RX ORDER — MIDODRINE HYDROCHLORIDE 10 MG/1
TABLET ORAL
Qty: 90 TABLET | Refills: 0 | OUTPATIENT
Start: 2021-03-25

## 2021-03-26 DIAGNOSIS — I95.1 ORTHOSTATIC HYPOTENSION: ICD-10-CM

## 2021-03-26 RX ORDER — MIDODRINE HYDROCHLORIDE 10 MG/1
10 TABLET ORAL 3 TIMES DAILY
Qty: 90 TABLET | Refills: 1 | Status: SHIPPED | OUTPATIENT
Start: 2021-03-26 | End: 2021-05-24

## 2021-03-26 NOTE — TELEPHONE ENCOUNTER
Petra [phone] called asking in regards to the midodrine for the patient, are you going to discontinue the medication for him?

## 2021-03-26 NOTE — TELEPHONE ENCOUNTER
Please advise on medication refuse patient had 30 day supply in February and he takes it 3 times a day it looks like he is due for refill

## 2021-03-26 NOTE — TELEPHONE ENCOUNTER
Spoke to Dr Ralph Lorenz she had refuse because she thought it was too soon  Last refill was in 02/25/2021 for a qty of 90 but he takes it three times a day  Dr Ralph Lorenz agree to sign and I placed the refill again for the patient

## 2021-04-29 ENCOUNTER — OFFICE VISIT (OUTPATIENT)
Dept: FAMILY MEDICINE CLINIC | Facility: CLINIC | Age: 36
End: 2021-04-29
Payer: MEDICARE

## 2021-04-29 VITALS
OXYGEN SATURATION: 98 % | SYSTOLIC BLOOD PRESSURE: 118 MMHG | TEMPERATURE: 97.1 F | HEIGHT: 71 IN | DIASTOLIC BLOOD PRESSURE: 78 MMHG | BODY MASS INDEX: 27.5 KG/M2 | HEART RATE: 95 BPM | WEIGHT: 196.4 LBS

## 2021-04-29 DIAGNOSIS — R73.9 ELEVATED BLOOD SUGAR: ICD-10-CM

## 2021-04-29 DIAGNOSIS — I51.7 LVH (LEFT VENTRICULAR HYPERTROPHY): ICD-10-CM

## 2021-04-29 DIAGNOSIS — E66.3 OVER WEIGHT: ICD-10-CM

## 2021-04-29 DIAGNOSIS — R00.0 SINUS TACHYCARDIA: ICD-10-CM

## 2021-04-29 DIAGNOSIS — E03.9 HYPOTHYROIDISM, UNSPECIFIED TYPE: ICD-10-CM

## 2021-04-29 DIAGNOSIS — E78.6 LOW HDL (UNDER 40): ICD-10-CM

## 2021-04-29 DIAGNOSIS — F20.0 CHRONIC PARANOID SCHIZOPHRENIA (HCC): ICD-10-CM

## 2021-04-29 DIAGNOSIS — I95.1 ORTHOSTATIC HYPOTENSION: Primary | ICD-10-CM

## 2021-04-29 DIAGNOSIS — N28.9 ABNORMAL RENAL FUNCTION: ICD-10-CM

## 2021-04-29 DIAGNOSIS — F17.200 SMOKER: ICD-10-CM

## 2021-04-29 DIAGNOSIS — E78.1 PURE HYPERTRIGLYCERIDEMIA: ICD-10-CM

## 2021-04-29 PROCEDURE — 99214 OFFICE O/P EST MOD 30 MIN: CPT | Performed by: FAMILY MEDICINE

## 2021-04-29 NOTE — PROGRESS NOTES
Assessment/Plan:       No problem-specific Assessment & Plan notes found for this encounter  Diagnoses and all orders for this visit:    Orthostatic hypotension  Comments:  Blood pressure is good today    Sinus tachycardia  Comments:  Controlled    Hypothyroidism, unspecified type  Comments:  Check TSH  Order exit    Elevated blood sugar  Comments:  Check CMP and hemoglobin A1c  Order exit    Abnormal renal function  Comments:  Recheck renal function  cmp order exist    Low HDL (under 40)  Comments:  Continue walking    LVH (left ventricular hypertrophy)  Comments: Will check echo September 2021    Pure hypertriglyceridemia  Comments: Follow with low-fat  Check lipid profile  Order    Smoker  Comments:  coucel patient  Advised stop smoking discussed complication of smoking including COPD, heart disease and cancer    Chronic paranoid schizophrenia ([facility] )  Comments:  Controlled  To follow with Psychiatry    Over weight  Comments:  Advised to lose weight, follow with low sweet and carbohydrate  Stop drinking soda        Patient Instructions   To follow up with test results      No orders of the defined types were placed in this encounter  Subjective:     Patient ID: Kylah Chan is a 43 y o  male      HPI   Patient is here with mother  Hypothyroid  Denied weight gain, cold intolerance or fatigue  Low blood pressure  Denied dizziness  He takes midodrine  No side effect  Hyperlipidemia  Admit to regular fat intake  Denied chest pain  Tachycardia  Denied palpitation  Abnormal renal function  Denied edema or problem with urination  Low HDL he does go for a walk, 34 minutes for  Smoker  Half back /day  Schizophrenia  Denied hallucination  Symptoms controlled  He follow [facility]  Had the 1st dose of COVID vaccine    No side effect    Test results  Not done    Review of Systems   Constitutional: Negative for activity change, appetite change, chills, fatigue, fever and unexpected weight change  HENT: Negative for congestion, ear discharge, ear pain, hearing loss, nosebleeds, rhinorrhea, sinus pressure, sore throat, tinnitus, trouble swallowing and voice change  Eyes: Negative for photophobia, pain and visual disturbance  Respiratory: Negative for cough, chest tightness, shortness of breath and wheezing  Cardiovascular: Negative for chest pain, palpitations and leg swelling  Gastrointestinal: Negative for abdominal pain, anal bleeding, blood in stool, constipation, diarrhea, nausea and vomiting  Endocrine: Negative for cold intolerance, heat intolerance, polydipsia and polyuria  Genitourinary: Negative for dysuria, frequency, hematuria and urgency  Musculoskeletal: Negative for arthralgias, back pain, gait problem, joint swelling, myalgias and neck pain  Skin: Negative for rash  Neurological: Negative for dizziness, tremors, seizures, syncope, weakness, light-headedness and headaches  Hematological: Negative for adenopathy  Does not bruise/bleed easily  Psychiatric/Behavioral: Negative for agitation, behavioral problems, confusion, dysphoric mood and sleep disturbance  The patient is not nervous/anxious  Objective:     Physical Exam  Constitutional:       General: He is not in acute distress  Appearance: Normal appearance  He is well-developed  He is not ill-appearing or diaphoretic  HENT:      Head: Normocephalic  Eyes:      General: No scleral icterus  Right eye: No discharge  Left eye: No discharge  Pupils: Pupils are equal, round, and reactive to light  Neck:      Musculoskeletal: Neck supple  Thyroid: No thyromegaly  Vascular: No carotid bruit or JVD  Cardiovascular:      Rate and Rhythm: Normal rate and regular rhythm  Heart sounds: Normal heart sounds  No murmur  No gallop  Pulmonary:      Effort: Pulmonary effort is normal       Breath sounds: Normal breath sounds  Abdominal:      General: Bowel sounds are normal  There is no distension  Palpations: Abdomen is soft  There is no mass  Tenderness: There is no abdominal tenderness  There is no guarding or rebound  Musculoskeletal: Normal range of motion  General: No swelling or tenderness  Right lower leg: No edema  Left lower leg: No edema  Lymphadenopathy:      Cervical: No cervical adenopathy  Skin:     Findings: No rash  Neurological:      General: No focal deficit present  Mental Status: He is alert and oriented to person, place, and time  Cranial Nerves: No cranial nerve deficit  Motor: No abnormal muscle tone  Coordination: Coordination normal       Gait: Gait normal    Psychiatric:         Mood and Affect: Mood normal          Behavior: Behavior normal          Thought Content:  Thought content normal

## 2021-05-04 ENCOUNTER — APPOINTMENT (OUTPATIENT)
Dept: LAB | Facility: MEDICAL CENTER | Age: 36
End: 2021-05-04
Payer: MEDICARE

## 2021-05-04 DIAGNOSIS — R73.9 ELEVATED BLOOD SUGAR: ICD-10-CM

## 2021-05-04 DIAGNOSIS — E78.1 PURE HYPERTRIGLYCERIDEMIA: ICD-10-CM

## 2021-05-04 DIAGNOSIS — E03.9 HYPOTHYROIDISM, UNSPECIFIED TYPE: ICD-10-CM

## 2021-05-04 DIAGNOSIS — N28.9 ABNORMAL RENAL FUNCTION: ICD-10-CM

## 2021-05-04 DIAGNOSIS — R74.8 LOW SERUM HDL: ICD-10-CM

## 2021-05-04 LAB
ALBUMIN SERPL BCP-MCNC: 3.5 G/DL (ref 3.5–5)
ALP SERPL-CCNC: 84 U/L (ref 46–116)
ALT SERPL W P-5'-P-CCNC: 12 U/L (ref 12–78)
ANION GAP SERPL CALCULATED.3IONS-SCNC: 6 MMOL/L (ref 4–13)
AST SERPL W P-5'-P-CCNC: 5 U/L (ref 5–45)
BASOPHILS # BLD AUTO: 0.07 THOUSANDS/ΜL (ref 0–0.1)
BASOPHILS NFR BLD AUTO: 1 % (ref 0–1)
BILIRUB SERPL-MCNC: 0.43 MG/DL (ref 0.2–1)
BILIRUB UR QL STRIP: NEGATIVE
BUN SERPL-MCNC: 10 MG/DL (ref 5–25)
CALCIUM SERPL-MCNC: 9.5 MG/DL (ref 8.3–10.1)
CHLORIDE SERPL-SCNC: 105 MMOL/L (ref 100–108)
CHOLEST SERPL-MCNC: 203 MG/DL (ref 50–200)
CLARITY UR: CLEAR
CO2 SERPL-SCNC: 26 MMOL/L (ref 21–32)
COLOR UR: YELLOW
CREAT SERPL-MCNC: 1.34 MG/DL (ref 0.6–1.3)
EOSINOPHIL # BLD AUTO: 0.54 THOUSAND/ΜL (ref 0–0.61)
EOSINOPHIL NFR BLD AUTO: 7 % (ref 0–6)
ERYTHROCYTE [DISTWIDTH] IN BLOOD BY AUTOMATED COUNT: 12.5 % (ref 11.6–15.1)
EST. AVERAGE GLUCOSE BLD GHB EST-MCNC: 100 MG/DL
GFR SERPL CREATININE-BSD FRML MDRD: 67 ML/MIN/1.73SQ M
GLUCOSE P FAST SERPL-MCNC: 124 MG/DL (ref 65–99)
GLUCOSE UR STRIP-MCNC: NEGATIVE MG/DL
HBA1C MFR BLD: 5.1 %
HCT VFR BLD AUTO: 46.5 % (ref 36.5–49.3)
HDLC SERPL-MCNC: 36 MG/DL
HGB BLD-MCNC: 15.2 G/DL (ref 12–17)
HGB UR QL STRIP.AUTO: NEGATIVE
IMM GRANULOCYTES # BLD AUTO: 0.02 THOUSAND/UL (ref 0–0.2)
IMM GRANULOCYTES NFR BLD AUTO: 0 % (ref 0–2)
KETONES UR STRIP-MCNC: NEGATIVE MG/DL
LDLC SERPL CALC-MCNC: 130 MG/DL (ref 0–100)
LEUKOCYTE ESTERASE UR QL STRIP: NEGATIVE
LYMPHOCYTES # BLD AUTO: 2.25 THOUSANDS/ΜL (ref 0.6–4.47)
LYMPHOCYTES NFR BLD AUTO: 27 % (ref 14–44)
MCH RBC QN AUTO: 28.6 PG (ref 26.8–34.3)
MCHC RBC AUTO-ENTMCNC: 32.7 G/DL (ref 31.4–37.4)
MCV RBC AUTO: 87 FL (ref 82–98)
MONOCYTES # BLD AUTO: 0.67 THOUSAND/ΜL (ref 0.17–1.22)
MONOCYTES NFR BLD AUTO: 8 % (ref 4–12)
NEUTROPHILS # BLD AUTO: 4.74 THOUSANDS/ΜL (ref 1.85–7.62)
NEUTS SEG NFR BLD AUTO: 57 % (ref 43–75)
NITRITE UR QL STRIP: NEGATIVE
NRBC BLD AUTO-RTO: 0 /100 WBCS
PH UR STRIP.AUTO: 6.5 [PH]
PLATELET # BLD AUTO: 237 THOUSANDS/UL (ref 149–390)
PMV BLD AUTO: 9.8 FL (ref 8.9–12.7)
POTASSIUM SERPL-SCNC: 4.1 MMOL/L (ref 3.5–5.3)
PROT SERPL-MCNC: 6.8 G/DL (ref 6.4–8.2)
PROT UR STRIP-MCNC: NEGATIVE MG/DL
RBC # BLD AUTO: 5.32 MILLION/UL (ref 3.88–5.62)
SODIUM SERPL-SCNC: 137 MMOL/L (ref 136–145)
SP GR UR STRIP.AUTO: 1.02 (ref 1–1.03)
T4 FREE SERPL-MCNC: 1.32 NG/DL (ref 0.76–1.46)
TRIGL SERPL-MCNC: 183 MG/DL
TSH SERPL DL<=0.05 MIU/L-ACNC: 4.35 UIU/ML (ref 0.36–3.74)
UROBILINOGEN UR QL STRIP.AUTO: 0.2 E.U./DL
WBC # BLD AUTO: 8.29 THOUSAND/UL (ref 4.31–10.16)

## 2021-05-04 PROCEDURE — 84443 ASSAY THYROID STIM HORMONE: CPT

## 2021-05-04 PROCEDURE — 84439 ASSAY OF FREE THYROXINE: CPT

## 2021-05-04 PROCEDURE — 83036 HEMOGLOBIN GLYCOSYLATED A1C: CPT

## 2021-05-04 PROCEDURE — 80053 COMPREHEN METABOLIC PANEL: CPT

## 2021-05-04 PROCEDURE — 36415 COLL VENOUS BLD VENIPUNCTURE: CPT

## 2021-05-04 PROCEDURE — 85025 COMPLETE CBC W/AUTO DIFF WBC: CPT

## 2021-05-04 PROCEDURE — 80061 LIPID PANEL: CPT

## 2021-05-04 PROCEDURE — 81003 URINALYSIS AUTO W/O SCOPE: CPT | Performed by: FAMILY MEDICINE

## 2021-05-05 DIAGNOSIS — E03.9 HYPOTHYROIDISM, UNSPECIFIED TYPE: Primary | ICD-10-CM

## 2021-05-05 DIAGNOSIS — N28.9 DECREASED RENAL FUNCTION: ICD-10-CM

## 2021-05-20 DIAGNOSIS — I95.1 ORTHOSTATIC HYPOTENSION: ICD-10-CM

## 2021-05-24 RX ORDER — MIDODRINE HYDROCHLORIDE 10 MG/1
TABLET ORAL
Qty: 90 TABLET | Refills: 5 | Status: SHIPPED | OUTPATIENT
Start: 2021-05-24 | End: 2021-10-20

## 2021-05-26 ENCOUNTER — TELEPHONE (OUTPATIENT)
Dept: LAB | Facility: HOSPITAL | Age: 36
End: 2021-05-26

## 2021-06-03 ENCOUNTER — CLINICAL SUPPORT (OUTPATIENT)
Dept: URGENT CARE | Facility: MEDICAL CENTER | Age: 36
End: 2021-06-03
Payer: MEDICARE

## 2021-06-03 ENCOUNTER — TRANSCRIBE ORDERS (OUTPATIENT)
Dept: ADMINISTRATIVE | Facility: HOSPITAL | Age: 36
End: 2021-06-03

## 2021-06-03 ENCOUNTER — APPOINTMENT (OUTPATIENT)
Dept: LAB | Facility: MEDICAL CENTER | Age: 36
End: 2021-06-03
Payer: MEDICARE

## 2021-06-03 DIAGNOSIS — F20.0 PARANOID SCHIZOPHRENIA, SUBCHRONIC CONDITION (HCC): Primary | ICD-10-CM

## 2021-06-03 DIAGNOSIS — E03.9 MYXEDEMA HEART DISEASE: ICD-10-CM

## 2021-06-03 DIAGNOSIS — F20.0 PARANOID SCHIZOPHRENIA, SUBCHRONIC CONDITION (HCC): ICD-10-CM

## 2021-06-03 DIAGNOSIS — Z79.899 ENCOUNTER FOR LONG-TERM (CURRENT) USE OF OTHER MEDICATIONS: ICD-10-CM

## 2021-06-03 DIAGNOSIS — I51.9 MYXEDEMA HEART DISEASE: ICD-10-CM

## 2021-06-03 DIAGNOSIS — R00.0 SINUS TACHYCARDIA: ICD-10-CM

## 2021-06-03 LAB
ATRIAL RATE: 96 BPM
BASOPHILS # BLD AUTO: 0.05 THOUSANDS/ΜL (ref 0–0.1)
BASOPHILS NFR BLD AUTO: 1 % (ref 0–1)
EOSINOPHIL # BLD AUTO: 0.54 THOUSAND/ΜL (ref 0–0.61)
EOSINOPHIL NFR BLD AUTO: 8 % (ref 0–6)
ERYTHROCYTE [DISTWIDTH] IN BLOOD BY AUTOMATED COUNT: 12.6 % (ref 11.6–15.1)
HCT VFR BLD AUTO: 47 % (ref 36.5–49.3)
HGB BLD-MCNC: 15.2 G/DL (ref 12–17)
IMM GRANULOCYTES # BLD AUTO: 0.02 THOUSAND/UL (ref 0–0.2)
IMM GRANULOCYTES NFR BLD AUTO: 0 % (ref 0–2)
LYMPHOCYTES # BLD AUTO: 2.12 THOUSANDS/ΜL (ref 0.6–4.47)
LYMPHOCYTES NFR BLD AUTO: 31 % (ref 14–44)
MCH RBC QN AUTO: 28.1 PG (ref 26.8–34.3)
MCHC RBC AUTO-ENTMCNC: 32.3 G/DL (ref 31.4–37.4)
MCV RBC AUTO: 87 FL (ref 82–98)
MONOCYTES # BLD AUTO: 0.49 THOUSAND/ΜL (ref 0.17–1.22)
MONOCYTES NFR BLD AUTO: 7 % (ref 4–12)
NEUTROPHILS # BLD AUTO: 3.64 THOUSANDS/ΜL (ref 1.85–7.62)
NEUTS SEG NFR BLD AUTO: 53 % (ref 43–75)
NRBC BLD AUTO-RTO: 0 /100 WBCS
P AXIS: 18 DEGREES
PLATELET # BLD AUTO: 215 THOUSANDS/UL (ref 149–390)
PMV BLD AUTO: 10 FL (ref 8.9–12.7)
PR INTERVAL: 160 MS
QRS AXIS: 56 DEGREES
QRSD INTERVAL: 90 MS
QT INTERVAL: 352 MS
QTC INTERVAL: 444 MS
RBC # BLD AUTO: 5.41 MILLION/UL (ref 3.88–5.62)
T WAVE AXIS: 58 DEGREES
VENTRICULAR RATE: 96 BPM
WBC # BLD AUTO: 6.86 THOUSAND/UL (ref 4.31–10.16)

## 2021-06-03 PROCEDURE — 93010 ELECTROCARDIOGRAM REPORT: CPT | Performed by: INTERNAL MEDICINE

## 2021-06-03 PROCEDURE — 80165 DIPROPYLACETIC ACID FREE: CPT

## 2021-06-03 PROCEDURE — 85025 COMPLETE CBC W/AUTO DIFF WBC: CPT

## 2021-06-03 PROCEDURE — 36415 COLL VENOUS BLD VENIPUNCTURE: CPT

## 2021-06-03 PROCEDURE — 93005 ELECTROCARDIOGRAM TRACING: CPT

## 2021-06-07 LAB — VALPROATE FREE SERPL-MCNC: 12.4 UG/ML (ref 6–22)

## 2021-06-07 NOTE — PROGRESS NOTES
Patient is out on pass with his mother  No meds needed to be sent on pass  Reviewed with patient and mother responsibility while away on pass  Patient will call the unit at 1600  No further questions at this time  ROS: all is WNL.

## 2021-06-22 DIAGNOSIS — E03.9 HYPOTHYROIDISM, UNSPECIFIED TYPE: ICD-10-CM

## 2021-06-22 RX ORDER — LEVOTHYROXINE SODIUM 0.07 MG/1
75 TABLET ORAL
Qty: 30 TABLET | Refills: 3 | Status: SHIPPED | OUTPATIENT
Start: 2021-06-22 | End: 2021-10-20

## 2021-08-13 ENCOUNTER — OFFICE VISIT (OUTPATIENT)
Dept: FAMILY MEDICINE CLINIC | Facility: CLINIC | Age: 36
End: 2021-08-13
Payer: MEDICARE

## 2021-08-13 VITALS
TEMPERATURE: 97.4 F | WEIGHT: 196 LBS | HEART RATE: 98 BPM | BODY MASS INDEX: 27.44 KG/M2 | SYSTOLIC BLOOD PRESSURE: 110 MMHG | RESPIRATION RATE: 16 BRPM | DIASTOLIC BLOOD PRESSURE: 64 MMHG | OXYGEN SATURATION: 96 % | HEIGHT: 71 IN

## 2021-08-13 DIAGNOSIS — I51.7 LEFT VENTRICULAR HYPERTROPHY: ICD-10-CM

## 2021-08-13 DIAGNOSIS — R00.0 SINUS TACHYCARDIA: ICD-10-CM

## 2021-08-13 DIAGNOSIS — R22.9 SUBCUTANEOUS MASS: Primary | ICD-10-CM

## 2021-08-13 DIAGNOSIS — Z11.59 NEED FOR HEPATITIS C SCREENING TEST: ICD-10-CM

## 2021-08-13 DIAGNOSIS — N18.2 CHRONIC RENAL IMPAIRMENT, STAGE 2 (MILD): ICD-10-CM

## 2021-08-13 DIAGNOSIS — E03.9 HYPOTHYROIDISM, UNSPECIFIED TYPE: ICD-10-CM

## 2021-08-13 DIAGNOSIS — I95.1 ORTHOSTATIC HYPOTENSION: ICD-10-CM

## 2021-08-13 DIAGNOSIS — E78.2 MIXED HYPERLIPIDEMIA: ICD-10-CM

## 2021-08-13 PROCEDURE — 99214 OFFICE O/P EST MOD 30 MIN: CPT | Performed by: FAMILY MEDICINE

## 2021-08-13 RX ORDER — GLYCOPYRROLATE 2 MG/1
TABLET ORAL
COMMUNITY
Start: 2021-07-29

## 2021-08-13 NOTE — PROGRESS NOTES
Assessment/Plan:       No problem-specific Assessment & Plan notes found for this encounter  Diagnoses and all orders for this visit:    Subcutaneous mass  Comments:  left upper bac x 2 y 3x3cm  Orders:  -     Ambulatory referral to General Surgery; Future    Hypothyroidism, unspecified type  Comments:  Not well compensated  Recheck TSH  To consider adjust levothyroxine as needed  Orders:  -     TSH, 3rd generation with Free T4 reflex; Future    Orthostatic hypotension  Comments:  Or recheck blood pressure is okay patient is asymptomatic patient to call if become dizzy  Sinus tachycardia  Comments:  Controlled to call if she developed palpitation ,continue metoprolol  Orders:  -     ECG 12 lead; Future    Chronic renal impairment, stage 2 (mild)  Comments:  Avoid NSAID  Hydrate self well  Orders:  -     PTH, intact; Future  -     Phosphorus; Future  -     Magnesium; Future  -     Basic metabolic panel; Future    Mixed hyperlipidemia  Comments: To follow with low-fat diet  Left ventricular hypertrophy  Comments:  Check echocardiogram  Orders:  -     Echo complete with contrast if indicated; Future    Need for hepatitis C screening test  Comments:  pt refused    Other orders  -     glycopyrrolate (ROBINUL) 2 MG tablet        Patient Instructions   Follow up with test results      Orders Placed This Encounter   Procedures    PTH, intact     Standing Status:   Future     Standing Expiration Date:   8/13/2022    Phosphorus     Standing Status:   Future     Standing Expiration Date:   8/13/2022    Magnesium     Standing Status:   Future     Standing Expiration Date:   8/13/2022    Basic metabolic panel     This is a patient instruction: Patient fasting for 8 hours or longer recommended       Standing Status:   Future     Standing Expiration Date:   8/13/2022    TSH, 3rd generation with Free T4 reflex     Standing Status:   Future     Standing Expiration Date:   8/13/2022    Ambulatory referral to General Surgery     Standing Status:   Future     Standing Expiration Date:   8/13/2022     Referral Priority:   Routine     Referral Type:   Consult - AMB     Referral Reason:   Specialty Services Required     Requested Specialty:   General Surgery     Number of Visits Requested:   1     Expiration Date:   8/13/2022    ECG 12 lead     Standing Status:   Future     Standing Expiration Date:   8/13/2022    Echo complete with contrast if indicated     Standing Status:   Future     Standing Expiration Date:   8/13/2025         Subjective:     Patient ID: Reilly Salas is a 43 y o  male      HPI  New complaint  Patient had a lump on his upper left back for 2 years it seen is growing with time  Not painful  Is not color  And also there is no drainage  Follow-up  Hypothyroid  Last blood work TSH was slightly above normal   Did not increase levothyroxine to 100 mcg daily he still taking 75 micro mg daily  Denied change in weight, cold intolerance or fatigue  Hypotension   Taking midodrine 3 times a day  Denied lightheadedness or fatigue  Sinus tach, denied dizziness or tachycardia  Elevated BS  Denied polyuria or polydipsia  LVH  Did not follow with Cardiology  Hyperlipidemia  Admit to regular fat intake      Test result  Lab done May 4, 2021 , 6-3 , 7-7 and 8-6-21  Discussed    Review of Systems   Constitutional: Negative for appetite change and fatigue  HENT: Negative for ear pain, tinnitus, trouble swallowing and voice change  Eyes: Negative for photophobia, pain and visual disturbance  Respiratory: Negative for cough, chest tightness and wheezing  Cardiovascular: Negative for chest pain, palpitations and leg swelling  Gastrointestinal: Negative for abdominal distention, abdominal pain, anal bleeding, constipation, diarrhea, nausea and rectal pain  Endocrine: Negative for cold intolerance, heat intolerance, polydipsia and polyuria     Genitourinary: Negative for decreased urine volume, difficulty urinating, dysuria, flank pain, frequency, hematuria and urgency  Musculoskeletal: Negative for arthralgias, back pain, gait problem, myalgias and neck pain  Skin: Negative for pallor and rash  Allergic/Immunologic: Negative for immunocompromised state  Neurological: Negative for dizziness, seizures, syncope and speech difficulty  Hematological: Negative for adenopathy  Does not bruise/bleed easily  Psychiatric/Behavioral: Negative for agitation, confusion, hallucinations and sleep disturbance  The patient is not nervous/anxious  Objective:     Physical Exam  Constitutional:       General: He is not in acute distress  Appearance: Normal appearance  He is well-developed  He is not ill-appearing or diaphoretic  HENT:      Head: Normocephalic  Eyes:      General: No scleral icterus  Right eye: No discharge  Left eye: No discharge  Pupils: Pupils are equal, round, and reactive to light  Neck:      Thyroid: No thyromegaly  Vascular: No JVD  Cardiovascular:      Rate and Rhythm: Normal rate and regular rhythm  Pulses:           Carotid pulses are 3+ on the right side and 3+ on the left side  Heart sounds: Normal heart sounds  No murmur heard  No friction rub  No gallop  Comments: Legs , no edema   Pulmonary:      Effort: Pulmonary effort is normal       Breath sounds: Normal breath sounds  Abdominal:      General: Bowel sounds are normal  There is no distension  Palpations: Abdomen is soft  Tenderness: There is no abdominal tenderness  There is no guarding  Musculoskeletal:         General: No tenderness  Normal range of motion  Cervical back: Normal range of motion and neck supple  Right lower leg: No edema  Left lower leg: No edema  Lymphadenopathy:      Cervical: No cervical adenopathy  Skin:     Findings: No rash     Neurological:      Mental Status: He is alert and oriented to person, place, and time       Cranial Nerves: No cranial nerve deficit  Motor: No abnormal muscle tone  Coordination: Coordination normal       Comments: Normal gait   Psychiatric:         Behavior: Behavior normal          Thought Content:  Thought content normal

## 2021-08-14 PROBLEM — R22.9 SUBCUTANEOUS MASS: Status: ACTIVE | Noted: 2021-08-14

## 2021-08-14 PROBLEM — E78.2 MIXED HYPERLIPIDEMIA: Status: ACTIVE | Noted: 2021-08-14

## 2021-08-14 PROBLEM — N18.2 CHRONIC RENAL IMPAIRMENT, STAGE 2 (MILD): Status: ACTIVE | Noted: 2021-08-14

## 2021-08-14 PROBLEM — Z11.59 NEED FOR HEPATITIS C SCREENING TEST: Status: ACTIVE | Noted: 2021-08-14

## 2021-09-07 ENCOUNTER — TELEPHONE (OUTPATIENT)
Dept: FAMILY MEDICINE CLINIC | Facility: CLINIC | Age: 36
End: 2021-09-07

## 2021-09-07 DIAGNOSIS — N28.9 ABNORMAL RENAL FUNCTION: Primary | ICD-10-CM

## 2021-09-07 NOTE — TELEPHONE ENCOUNTER
----- Message from Nikki Mahajan MD sent at 9/7/2021 10:06 AM EDT -----  TSH  Parathyroid her 1  Phosphorus  And magnesium all normal   BMP is remarkable for abnormal renal function and had been little trace last lab  Advised to check renal ultrasound and referred patient to Nephrology    Avoid NSAID and hydrate self well

## 2021-09-14 ENCOUNTER — HOSPITAL ENCOUNTER (OUTPATIENT)
Dept: ULTRASOUND IMAGING | Facility: MEDICAL CENTER | Age: 36
Discharge: HOME | End: 2021-09-14
Payer: MEDICARE

## 2021-09-14 DIAGNOSIS — N28.9 DECREASED RENAL FUNCTION: ICD-10-CM

## 2021-09-14 PROCEDURE — 76770 US EXAM ABDO BACK WALL COMP: CPT

## 2021-09-17 ENCOUNTER — APPOINTMENT (OUTPATIENT)
Dept: LAB | Facility: HOSPITAL | Age: 36
End: 2021-09-17
Attending: INTERNAL MEDICINE
Payer: MEDICARE

## 2021-09-17 ENCOUNTER — CONSULT (OUTPATIENT)
Dept: NEPHROLOGY | Facility: CLINIC | Age: 36
End: 2021-09-17
Payer: MEDICARE

## 2021-09-17 VITALS
HEART RATE: 100 BPM | DIASTOLIC BLOOD PRESSURE: 72 MMHG | WEIGHT: 191 LBS | BODY MASS INDEX: 26.74 KG/M2 | RESPIRATION RATE: 18 BRPM | HEIGHT: 71 IN | SYSTOLIC BLOOD PRESSURE: 108 MMHG

## 2021-09-17 DIAGNOSIS — N28.9 ABNORMAL RENAL FUNCTION: ICD-10-CM

## 2021-09-17 DIAGNOSIS — R79.89 ELEVATED SERUM CREATININE: ICD-10-CM

## 2021-09-17 DIAGNOSIS — N18.31 STAGE 3A CHRONIC KIDNEY DISEASE (HCC): ICD-10-CM

## 2021-09-17 DIAGNOSIS — E78.2 MIXED HYPERLIPIDEMIA: ICD-10-CM

## 2021-09-17 DIAGNOSIS — N18.2 CHRONIC RENAL IMPAIRMENT, STAGE 2 (MILD): ICD-10-CM

## 2021-09-17 DIAGNOSIS — R79.89 ELEVATED SERUM CREATININE: Primary | ICD-10-CM

## 2021-09-17 LAB
ALBUMIN SERPL BCP-MCNC: 3.6 G/DL (ref 3.5–5)
ANION GAP SERPL CALCULATED.3IONS-SCNC: 8 MMOL/L (ref 4–13)
BACTERIA UR QL AUTO: ABNORMAL /HPF
BILIRUB UR QL STRIP: NEGATIVE
BUN SERPL-MCNC: 13 MG/DL (ref 5–25)
CALCIUM SERPL-MCNC: 8.8 MG/DL (ref 8.3–10.1)
CHLORIDE SERPL-SCNC: 105 MMOL/L (ref 100–108)
CLARITY UR: CLEAR
CO2 SERPL-SCNC: 27 MMOL/L (ref 21–32)
COLOR UR: YELLOW
CREAT SERPL-MCNC: 1.47 MG/DL (ref 0.6–1.3)
CREAT UR-MCNC: 245 MG/DL
ERYTHROCYTE [DISTWIDTH] IN BLOOD BY AUTOMATED COUNT: 12.7 % (ref 11.6–15.1)
GFR SERPL CREATININE-BSD FRML MDRD: 60 ML/MIN/1.73SQ M
GLUCOSE SERPL-MCNC: 96 MG/DL (ref 65–140)
GLUCOSE UR STRIP-MCNC: NEGATIVE MG/DL
HCT VFR BLD AUTO: 48.4 % (ref 36.5–49.3)
HGB BLD-MCNC: 15.6 G/DL (ref 12–17)
HGB UR QL STRIP.AUTO: NEGATIVE
KETONES UR STRIP-MCNC: NEGATIVE MG/DL
LEUKOCYTE ESTERASE UR QL STRIP: NEGATIVE
MCH RBC QN AUTO: 28.5 PG (ref 26.8–34.3)
MCHC RBC AUTO-ENTMCNC: 32.2 G/DL (ref 31.4–37.4)
MCV RBC AUTO: 89 FL (ref 82–98)
MUCOUS THREADS UR QL AUTO: ABNORMAL
NITRITE UR QL STRIP: NEGATIVE
NON-SQ EPI CELLS URNS QL MICRO: ABNORMAL /HPF
PH UR STRIP.AUTO: 6.5 [PH]
PHOSPHATE SERPL-MCNC: 2.8 MG/DL (ref 2.7–4.5)
PLATELET # BLD AUTO: 229 THOUSANDS/UL (ref 149–390)
PMV BLD AUTO: 9.6 FL (ref 8.9–12.7)
POTASSIUM SERPL-SCNC: 4 MMOL/L (ref 3.5–5.3)
PROT UR STRIP-MCNC: NEGATIVE MG/DL
PROT UR-MCNC: 21 MG/DL
PROT/CREAT UR: 0.09 MG/G{CREAT} (ref 0–0.1)
RBC # BLD AUTO: 5.47 MILLION/UL (ref 3.88–5.62)
RBC #/AREA URNS AUTO: ABNORMAL /HPF
SODIUM SERPL-SCNC: 140 MMOL/L (ref 136–145)
SP GR UR STRIP.AUTO: >=1.03 (ref 1–1.03)
UROBILINOGEN UR QL STRIP.AUTO: 0.2 E.U./DL
WBC # BLD AUTO: 6.09 THOUSAND/UL (ref 4.31–10.16)
WBC #/AREA URNS AUTO: ABNORMAL /HPF

## 2021-09-17 PROCEDURE — 84165 PROTEIN E-PHORESIS SERUM: CPT | Performed by: PATHOLOGY

## 2021-09-17 PROCEDURE — 81001 URINALYSIS AUTO W/SCOPE: CPT

## 2021-09-17 PROCEDURE — 99203 OFFICE O/P NEW LOW 30 MIN: CPT | Performed by: INTERNAL MEDICINE

## 2021-09-17 PROCEDURE — 80069 RENAL FUNCTION PANEL: CPT

## 2021-09-17 PROCEDURE — 84165 PROTEIN E-PHORESIS SERUM: CPT

## 2021-09-17 PROCEDURE — 82610 CYSTATIN C: CPT

## 2021-09-17 PROCEDURE — 85027 COMPLETE CBC AUTOMATED: CPT

## 2021-09-17 PROCEDURE — 36415 COLL VENOUS BLD VENIPUNCTURE: CPT

## 2021-09-17 PROCEDURE — 82570 ASSAY OF URINE CREATININE: CPT

## 2021-09-17 PROCEDURE — 84156 ASSAY OF PROTEIN URINE: CPT

## 2021-09-17 PROCEDURE — 83883 ASSAY NEPHELOMETRY NOT SPEC: CPT

## 2021-09-17 PROCEDURE — 84166 PROTEIN E-PHORESIS/URINE/CSF: CPT

## 2021-09-17 PROCEDURE — 84166 PROTEIN E-PHORESIS/URINE/CSF: CPT | Performed by: PATHOLOGY

## 2021-09-17 NOTE — PROGRESS NOTES
Nephrology Initial Consultation  Luna Hilario 43 y o  male MRN: [id number]            REASON FOR CONSULTATION:  Luna Hilario is a 43 y o male who was referred by Tonie Gordon MD for evaluation of Consult and Abnormal Lab    ASSESSMENT / PLAN:   43 y o   male with pmh of multiple co-morbidities including hypercholesterolemia , orthostatic hypotension, schizophrenia and hypothyroidism presents to the office for evaluation and management of abnormal renal parameters  CKD stage 2/3A versus elevated creatinine:  - After review of records in [facility] as well as Care everywhere patient has a baseline creatinine of 1 2-1 5 mg/dL dating as far back as 2018  Most recent labs show a Creatinine of 1 49 mg/dL on 09/03/2021  Renal function remains stable  - likely there is some degree of underestimation of renal function based on his body habitus  We can check Cystatin C  There may be some degree of CKD due to prior lithium induced injury  Check CBC to rule out underlying AIN secondary to clozapine  - had a detailed discussion with the patient as well as his mom that for now we will do basic workup if there is anything suggestive of underlying GN then we will pursue full detailed GN workup  Renal ultrasound repeat was done 09/14/2021 results of which are still pending  For now will check Cystatin C levels, SPEP UPEP free light chain ratio  - abdominal ultrasound from 05/31/2019 showing right kidney 9 8 cm left kidney 11 cm     - Acid base and lytes stable  - Clinically the patient appears to be euvolemic  - Recommend to avoid use of NSAIDs, nephrotoxins  Caution advised with regards to exposure to IV contrast dye    - Discussed with the patient in depth his renal status, including the possible etiologies for CKD     - Advised the patient that when his GFR is close to 20mL/min then will start discussing about RRT(renal replacement therapy) options such as renal transplant, peritoneal dialysis and hemodialysis  - Informed the patient about the various options for Renal Replacement therapy  - Discussed with the patient how we need to work together to delay the progression of CKD with optimal BP control based on their age and co-morbidities and trying to reduce proteinuria by the use of anti-proteinuric agents  Orthostatic Hypotension:  - Patient is on midodrine 10 mg p o  t i d , metoprolol 25 mg p o  q day  - patient is on metoprolol due to tachycardia induced from being on clozapine   - Goal BP of <  140/90 based on age and comorbidities  - Instructed to follow low sodium (2gm)diet  Hemoglobin:  - Goal Hb of 10-12 g/dL  - Most recent labs suggestive of 15 2 grams/deciliter  - no role for IV iron at this time    MBD(Mineral Bone Disease): - Based on patients CKD stage following is the goal of therapy  - Maintain calcium phosphorus product of < 55   - Stage 3 CKD - Goal Ca 8 5-10 mg/dL , goal Phos 2 7-4 6 mg/dL  , goal iPTH 30-70 pg/mL  - Continue patient on no vitamin-D  - Patients' most recent intact PTH at 37 on 09/03/2021     Proteinuria:  - most recent UA from May 2021 with no blood or protein  - check protein creatinine ratio  - currently on therapy for proteinuria with no medications    Lipids:  - goal LDL less than 70  - Management as per PCP    Schizophrenia:  - Management as per primary team  - follow-up with psych  - on Wellbutrin, Depakote, clozapine, fluphenazine  - did discuss with them with regards to clozapine and fluphenazine both causing orthostatic hypotension  However as per the patient's mom these are medications of last resort and unfortunately cannot be changed  I did discuss concerns from a renal perspective  Patient was previously on lithium however the patient nor the mom remembers for how long he was on it  However has not been on it since 2019      Nutrition:  - Encouraged patient to follow a renal diet comprising of moderate potassium, low phosphorus and protein restriction to 0 8gm/kg  - Will check serum albumin with next blood work  Followup:  - Patient is to follow-up in 4 months, with lab work to be performed after the visit and then again in a few days prior to the next visit  Advised patient to call me in 10 days to review the results if they do not hear back from me, as I may have not received the results  Shayna Sher MD, [facility], 9/17/2021, 2:54 PM             HISTORY OF PRESENT ILLNESS: 43 y o  male with history of hypercholesterolemia , orthostatic hypotension, schizophrenia and hypothyroidism presents to the office for evaluation and management of abnormal renal parameters  Review of records shows patient has a baseline creatinine of 1 2-1 5 mg/dL dating as far back as 2018 most recent labs from 09/03/2021 at 1 49 mg/dL  Never seen a nephrologist before no history of recent hospitalization no issues with edema no history of Dandy I needing dialysis or kidney stones no history of NSAID use  Was placed on midodrine due to orthostatic hypotension caused by clozapine  Unfortunately he cannot be taken off of clozapine or fluphenazine as per patient's mom  Most of the history is as per patient's mom  He gets routine blood work with psych with CBCs monthly  Thankful for all the care information that he has gotten today  Denies history of liver disease  Review of Systems   Constitutional: Negative for appetite change, chills, fatigue and fever  HENT: Negative for congestion, rhinorrhea and sore throat  Respiratory: Negative for cough, shortness of breath and wheezing  Cardiovascular: Negative for leg swelling  Gastrointestinal: Negative for abdominal pain, constipation, diarrhea, nausea and vomiting  Genitourinary: Negative for difficulty urinating, dysuria and hematuria  Musculoskeletal: Negative for back pain  Skin: Negative for rash and wound  Neurological: Negative for dizziness and headaches     Psychiatric/Behavioral: Negative for agitation and confusion  All other systems reviewed and are negative  PAST MEDICAL HISTORY:  Past Medical History:   Diagnosis Date    Disease of thyroid gland     Schizophrenia ([place] [address] )        PROBLEM LIST    Patient Active Problem List   Diagnosis    Chronic paranoid schizophrenia ([place] [address] )    Sinus tachycardia    Tobacco abuse    Hypothyroidism    Constipation    Orthostatic hypotension    Pure hypercholesterolemia    Eosinophils increased    Elevated blood sugar    Left ventricular hypertrophy    Abnormal echocardiogram    Non-rheumatic mitral regurgitation    Non-rheumatic tricuspid valve insufficiency    Abnormal liver function    Low serum HDL    Pure hyperglyceridemia    Encounter for screening for HIV    Smoker    Senile dementia, uncomplicated ([place] [address] )    Need for hepatitis C screening test    Mixed hyperlipidemia    Chronic renal impairment, stage 2 (mild)    Subcutaneous mass    Stage 3a chronic kidney disease (HCC)    Abnormal renal function    Elevated serum creatinine       PAST SURGICAL HISTORY:  Past Surgical History:   Procedure Laterality Date    TONSILLECTOMY AND ADENOIDECTOMY         SOCIAL HISTORY :   reports that he has been smoking cigarettes  He has been smoking about 0 50 packs per day  He has never used smokeless tobacco  He reports that he does not drink alcohol and does not use drugs  FAMILY HISTORY:  Family History   Problem Relation Age of Onset    Hypertension Father     Depression Mother     Kidney disease Paternal Grandfather        ALLERGIES:  No Known Allergies        PHYSICAL EXAM:  Vitals:    09/17/21 1418   BP: 108/72   BP Location: Left arm   Patient Position: Sitting   Cuff Size: Standard   Pulse: 100   Resp: 18   Weight: 86 6 kg (191 lb)   Height: 5' 11" (1 803 m)     Body mass index is 26 64 kg/m²  Physical Exam  Vitals reviewed  Constitutional:       General: He is not in acute distress  Appearance: Normal appearance   He is normal weight  He is ill-appearing  He is not toxic-appearing or diaphoretic  HENT:      Head: Normocephalic and atraumatic  Mouth/Throat:      Mouth: Mucous membranes are moist       Pharynx: Oropharynx is clear  No oropharyngeal exudate  Eyes:      General: No scleral icterus  Conjunctiva/sclera: Conjunctivae normal    Cardiovascular:      Rate and Rhythm: Normal rate  Heart sounds: Normal heart sounds  No friction rub  Pulmonary:      Effort: Pulmonary effort is normal  No respiratory distress  Breath sounds: Normal breath sounds  No stridor  No wheezing  Abdominal:      General: There is no distension  Palpations: Abdomen is soft  There is no mass  Tenderness: There is no abdominal tenderness  There is no right CVA tenderness or left CVA tenderness  Musculoskeletal:         General: No swelling  Cervical back: Normal range of motion and neck supple  Skin:     General: Skin is warm  Coloration: Skin is not jaundiced  Neurological:      General: No focal deficit present  Mental Status: He is alert and oriented to person, place, and time     Psychiatric:         Mood and Affect: Mood normal          Behavior: Behavior normal            LABORATORY DATA:     Results from last 6 Months   Lab Units 06/03/21  1501 05/04/21  1145 05/04/21  1143   WBC Thousand/uL 6 86 8 29  --    HEMOGLOBIN g/dL 15 2 15 2  --    HEMATOCRIT % 47 0 46 5  --    PLATELETS Thousands/uL 215 237  --    POTASSIUM mmol/L  --   --  4 1   CHLORIDE mmol/L  --   --  105   CO2 mmol/L  --   --  26   BUN mg/dL  --   --  10   CREATININE mg/dL  --   --  1 34*   CALCIUM mg/dL  --   --  9 5        rest all reviewed    RADIOLOGY:  No orders to display     Rest all reviewed        MEDICATIONS:    Current Outpatient Medications:     buPROPion (WELLBUTRIN XL) 300 mg 24 hr tablet, Take 1 tablet (300 mg total) by mouth daily, Disp: 30 tablet, Rfl: 0    clozapine (CLOZARIL) 200 MG tablet, Take 2 tablets (400 mg total) by mouth daily at bedtime, Disp: 60 tablet, Rfl: 0    cloZAPine (CLOZARIL) 50 MG tablet, Take 1 tablet (50 mg total) by mouth daily at bedtime (Patient taking differently: Take 50 mg by mouth daily at bedtime ), Disp: 30 tablet, Rfl: 0    divalproex sodium (DEPAKOTE ER) 500 mg 24 hr tablet, Take 2 tablets (1,000 mg total) by mouth daily at bedtime, Disp: 60 tablet, Rfl: 0    fluPHENAZine (PROLIXIN) 10 MG tablet, Take 2 tablets (20 mg total) by mouth daily at bedtime, Disp: 60 tablet, Rfl: 0    glycopyrrolate (ROBINUL) 2 MG tablet, , Disp: , Rfl:     levothyroxine 75 mcg tablet, Take 1 tablet (75 mcg total) by mouth daily in the early morning, Disp: 30 tablet, Rfl: 3    metoprolol tartrate (LOPRESSOR) 25 mg tablet, TAKE HALF A TABLET BY MOUTH TWICE A DAY, Disp: 90 tablet, Rfl: 1    midodrine (PROAMATINE) 10 MG tablet, TAKE 1 TABLET (10 MG TOTAL) BY MOUTH THREE (THREE) TIMES A DAY, Disp: 90 tablet, Rfl: 5    senna (SENOKOT) 8 6 mg, Take 1 tablet (8 6 mg total) by mouth 2 (two) times a day, Disp: 60 each, Rfl: 5        Portions of the record may have been created with voice recognition software  Occasional wrong word or "sound a like" substitutions may have occurred due to the inherent limitations of voice recognition software  Read the chart carefully and recognize, using context, where substitutions have occurred  If you have any questions, please contact the dictating provider

## 2021-09-17 NOTE — PATIENT INSTRUCTIONS
- get blood work and urine test after the visit    - Please call me in 10 days after having your blood work done to review the results if you do not hear back from me or my office, as I may have not received the results  - please remember to perform blood work prior to the next visit  - Please call if the blood pressure top number is greater than 150 or less than 110 consistently  - Please call if you are gaining more than 2lbs in 2 days for adjustment of water pills  - Please call me in 10 days after having your blood work done to review the results if you do not hear back from me or my office, as I may have not received the results  - please remember to perform blood work prior to the next visit  - Please call if the blood pressure top number is greater than 150 or less than 110 consistently  - Please call if you are gaining more than 2lbs in 2 days for adjustment of water pills   ~ Please AVOID the following pain medications  LIST OF NSAIDS (NONSTEROIDAL ANTI-INFLAMMATORY DRUGS) AND BAKER-2 INHIBITORS    DIFLUNISAL (DOLOBID)  IBUPROFEN (MOTRIN, ADVIL)  FLURBIPROFEN (ANSAID)  KETOPROFEN (ORUDIS, ORUVAIL)  FENOPROFEN (NALFON)  NABUMETONE (RELAFEN)  PIROXICAM (FELDENE)  NAPROXEN (ALEVE, NAPROSYN, NAPRELAN, ANAPROX)  DICLOFENAC (VOLTAREN, CATAFLAM)  INDOMETHACIN (INDOCIN)  SULINDAC (CLINORIL)  TOLMETIN (TOLETIN)  ETODOLAC (LODINE)  MELOXICAM (MOBIC)  KETOROLAC (TORADOL)  OXAPROZIN (DAYPRO)  CELECOXIB (CELEBREX)    Phosphorus diet  Follow a low phosphorus diet      Avoid these higher phosphorus foods: Choose these lower phosphorus foods:   Milk, pudding or yogurt (from animals and from many soy varieties) Rice milk (unfortified), nondairy creamer (if it doesn't have terms in the ingredients list that contain the letters "phos")   Hard cheeses, ricotta or cottage cheese, fat-free cream cheese Regular and low-fat cream cheese   Ice cream or frozen yogurt Sherbet or frozen fruit pops   Soups made with higher phosphorus ingredients (milk, dried peas, beans, lentils) Soups made with lower phosphorus ingredients (broth- or water-based with other lower phosphorus ingredients)   Whole grains, including whole-grain breads, crackers, cereal, rice and pasta Refined grains, including white bread, crackers, cereals, rice and pasta   Quick breads, biscuits, cornbread, muffins, pancakes or waffles Homemade refined (white) dinner rolls, bagels or English muffins   Dried peas (split, black-eyed), beans (black, garbanzo, lima, kidney, navy, barry) or lentils Green peas (canned, frozen), green beans or wax beans   Organ meats, walleye, pollock or sardines Lean beef, pork, lamb, poultry or other fish   Nuts and seeds Popcorn   Peanut butter and other nut butters Jam, jelly or honey   Chocolate, including chocolate drinks Carob (chocolate-flavored) candy, hard candy or gumdrops   Janneth and pepper-type sodas, flavored lopez, bottled teas (if a term in the ingredients list contains the letters "phos") Lemon-lime soda, ginger ale or root beer, plain water     Follow a moderate potassium diet

## 2021-09-18 LAB
KAPPA LC FREE SER-MCNC: 12.5 MG/L (ref 3.3–19.4)
KAPPA LC FREE/LAMBDA FREE SER: 1.2 {RATIO} (ref 0.26–1.65)
LAMBDA LC FREE SERPL-MCNC: 10.4 MG/L (ref 5.7–26.3)

## 2021-09-20 LAB
ALBUMIN SERPL ELPH-MCNC: 4.29 G/DL (ref 3.5–5)
ALBUMIN SERPL ELPH-MCNC: 62.2 % (ref 52–65)
ALBUMIN UR ELPH-MCNC: 100 %
ALPHA1 GLOB MFR UR ELPH: 0 %
ALPHA1 GLOB SERPL ELPH-MCNC: 0.33 G/DL (ref 0.1–0.4)
ALPHA1 GLOB SERPL ELPH-MCNC: 4.8 % (ref 2.5–5)
ALPHA2 GLOB MFR UR ELPH: 0 %
ALPHA2 GLOB SERPL ELPH-MCNC: 0.73 G/DL (ref 0.4–1.2)
ALPHA2 GLOB SERPL ELPH-MCNC: 10.6 % (ref 7–13)
B-GLOBULIN MFR UR ELPH: 0 %
BETA GLOB ABNORMAL SERPL ELPH-MCNC: 0.44 G/DL (ref 0.4–0.8)
BETA1 GLOB SERPL ELPH-MCNC: 6.4 % (ref 5–13)
BETA2 GLOB SERPL ELPH-MCNC: 6.2 % (ref 2–8)
BETA2+GAMMA GLOB SERPL ELPH-MCNC: 0.43 G/DL (ref 0.2–0.5)
GAMMA GLOB ABNORMAL SERPL ELPH-MCNC: 0.68 G/DL (ref 0.5–1.6)
GAMMA GLOB MFR UR ELPH: 0 %
GAMMA GLOB SERPL ELPH-MCNC: 9.8 % (ref 12–22)
IGG/ALB SER: 1.65 {RATIO} (ref 1.1–1.8)
PROT PATTERN SERPL ELPH-IMP: ABNORMAL
PROT PATTERN UR ELPH-IMP: ABNORMAL
PROT SERPL-MCNC: 6.9 G/DL (ref 6.4–8.2)
PROT UR-MCNC: 20 MG/DL

## 2021-09-21 ENCOUNTER — TELEPHONE (OUTPATIENT)
Dept: FAMILY MEDICINE CLINIC | Facility: CLINIC | Age: 36
End: 2021-09-21

## 2021-09-21 NOTE — TELEPHONE ENCOUNTER
----- Message from Maria Luz Loera MD sent at 9/20/2021  2:38 PM EDT -----  Renal ultrasound, slightly suboptimal evaluation but overall is okay

## 2021-09-23 ENCOUNTER — TELEPHONE (OUTPATIENT)
Dept: NEPHROLOGY | Facility: CLINIC | Age: 36
End: 2021-09-23

## 2021-09-23 LAB — MISCELLANEOUS LAB TEST RESULT: NORMAL

## 2021-09-23 NOTE — TELEPHONE ENCOUNTER
Called and informed patients mom who will relay to patient that based on most recent labs from 9/17/21 pt just has elevated cr and not CKD  Based on cystatin C his gfr is 85 vs the 60 noted on the MDRD formula in Epic when his cr was 1 47mg/dl on 9/17/21  Spep, upep, flc and renal US normal  No evidence of CKD  Concentrated urine  Pt to follow up with PCP  Can return to nephrology if worsening parameters in the future or any other renal concerns   All questions and concerns answered

## 2021-10-13 ENCOUNTER — CONSULT (OUTPATIENT)
Dept: SURGERY | Facility: CLINIC | Age: 36
End: 2021-10-13
Payer: MEDICARE

## 2021-10-13 VITALS
BODY MASS INDEX: 26.6 KG/M2 | TEMPERATURE: 96.8 F | HEART RATE: 68 BPM | SYSTOLIC BLOOD PRESSURE: 114 MMHG | HEIGHT: 71 IN | WEIGHT: 190 LBS | DIASTOLIC BLOOD PRESSURE: 70 MMHG

## 2021-10-13 DIAGNOSIS — D17.1 LIPOMA OF TORSO: Primary | ICD-10-CM

## 2021-10-13 PROCEDURE — 99203 OFFICE O/P NEW LOW 30 MIN: CPT | Performed by: PHYSICIAN ASSISTANT

## 2021-10-20 DIAGNOSIS — E03.9 HYPOTHYROIDISM, UNSPECIFIED TYPE: ICD-10-CM

## 2021-10-20 DIAGNOSIS — I95.1 ORTHOSTATIC HYPOTENSION: ICD-10-CM

## 2021-10-20 RX ORDER — MIDODRINE HYDROCHLORIDE 10 MG/1
TABLET ORAL
Qty: 90 TABLET | Refills: 4 | Status: SHIPPED | OUTPATIENT
Start: 2021-10-20 | End: 2022-01-14

## 2021-10-20 RX ORDER — LEVOTHYROXINE SODIUM 0.07 MG/1
TABLET ORAL
Qty: 90 TABLET | Refills: 2 | Status: SHIPPED | OUTPATIENT
Start: 2021-10-20 | End: 2022-02-24

## 2021-10-26 ENCOUNTER — ANESTHESIA EVENT (OUTPATIENT)
Dept: PERIOP | Facility: HOSPITAL | Age: 36
End: 2021-10-26
Payer: MEDICARE

## 2021-10-28 ENCOUNTER — APPOINTMENT (OUTPATIENT)
Dept: LAB | Facility: HOSPITAL | Age: 36
End: 2021-10-28
Payer: MEDICARE

## 2021-10-28 ENCOUNTER — ANESTHESIA (OUTPATIENT)
Dept: PERIOP | Facility: HOSPITAL | Age: 36
End: 2021-10-28
Payer: MEDICARE

## 2021-10-28 ENCOUNTER — HOSPITAL ENCOUNTER (OUTPATIENT)
Facility: HOSPITAL | Age: 36
Setting detail: OUTPATIENT SURGERY
Discharge: HOME | End: 2021-10-28
Attending: SURGERY | Admitting: SURGERY
Payer: MEDICARE

## 2021-10-28 VITALS
BODY MASS INDEX: 27.2 KG/M2 | DIASTOLIC BLOOD PRESSURE: 73 MMHG | TEMPERATURE: 97.4 F | SYSTOLIC BLOOD PRESSURE: 100 MMHG | OXYGEN SATURATION: 97 % | HEART RATE: 88 BPM | WEIGHT: 190 LBS | RESPIRATION RATE: 18 BRPM | HEIGHT: 70 IN

## 2021-10-28 DIAGNOSIS — F20.0 PARANOID SCHIZOPHRENIA, SUBCHRONIC CONDITION (HCC): ICD-10-CM

## 2021-10-28 DIAGNOSIS — Z79.899 ENCOUNTER FOR LONG-TERM (CURRENT) USE OF OTHER MEDICATIONS: ICD-10-CM

## 2021-10-28 DIAGNOSIS — I51.9 MYXEDEMA HEART DISEASE: ICD-10-CM

## 2021-10-28 DIAGNOSIS — D17.1 LIPOMA OF TORSO: ICD-10-CM

## 2021-10-28 DIAGNOSIS — E03.9 MYXEDEMA HEART DISEASE: ICD-10-CM

## 2021-10-28 DIAGNOSIS — R22.9 SUBCUTANEOUS MASS: Primary | ICD-10-CM

## 2021-10-28 LAB
ALBUMIN SERPL BCP-MCNC: 3.3 G/DL (ref 3.5–5)
ALP SERPL-CCNC: 74 U/L (ref 46–116)
ALT SERPL W P-5'-P-CCNC: 21 U/L (ref 12–78)
AMMONIA PLAS-SCNC: <10 UMOL/L (ref 11–35)
ANION GAP SERPL CALCULATED.3IONS-SCNC: 9 MMOL/L (ref 4–13)
AST SERPL W P-5'-P-CCNC: 26 U/L (ref 5–45)
BASOPHILS # BLD AUTO: 0.05 THOUSANDS/ΜL (ref 0–0.1)
BASOPHILS NFR BLD AUTO: 1 % (ref 0–1)
BILIRUB SERPL-MCNC: 0.4 MG/DL (ref 0.2–1)
BUN SERPL-MCNC: 14 MG/DL (ref 5–25)
CALCIUM ALBUM COR SERPL-MCNC: 9.2 MG/DL (ref 8.3–10.1)
CALCIUM SERPL-MCNC: 8.6 MG/DL (ref 8.3–10.1)
CHLORIDE SERPL-SCNC: 105 MMOL/L (ref 100–108)
CHOLEST SERPL-MCNC: 174 MG/DL (ref 50–200)
CO2 SERPL-SCNC: 28 MMOL/L (ref 21–32)
CREAT SERPL-MCNC: 1.49 MG/DL (ref 0.6–1.3)
EOSINOPHIL # BLD AUTO: 0.63 THOUSAND/ΜL (ref 0–0.61)
EOSINOPHIL NFR BLD AUTO: 9 % (ref 0–6)
ERYTHROCYTE [DISTWIDTH] IN BLOOD BY AUTOMATED COUNT: 12.7 % (ref 11.6–15.1)
GFR SERPL CREATININE-BSD FRML MDRD: 59 ML/MIN/1.73SQ M
GLUCOSE P FAST SERPL-MCNC: 115 MG/DL (ref 65–99)
HCT VFR BLD AUTO: 45 % (ref 36.5–49.3)
HDLC SERPL-MCNC: 32 MG/DL
HGB BLD-MCNC: 14.4 G/DL (ref 12–17)
IMM GRANULOCYTES # BLD AUTO: 0.01 THOUSAND/UL (ref 0–0.2)
IMM GRANULOCYTES NFR BLD AUTO: 0 % (ref 0–2)
LDLC SERPL CALC-MCNC: 103 MG/DL (ref 0–100)
LYMPHOCYTES # BLD AUTO: 2.38 THOUSANDS/ΜL (ref 0.6–4.47)
LYMPHOCYTES NFR BLD AUTO: 35 % (ref 14–44)
MCH RBC QN AUTO: 28.2 PG (ref 26.8–34.3)
MCHC RBC AUTO-ENTMCNC: 32 G/DL (ref 31.4–37.4)
MCV RBC AUTO: 88 FL (ref 82–98)
MONOCYTES # BLD AUTO: 0.48 THOUSAND/ΜL (ref 0.17–1.22)
MONOCYTES NFR BLD AUTO: 7 % (ref 4–12)
NEUTROPHILS # BLD AUTO: 3.21 THOUSANDS/ΜL (ref 1.85–7.62)
NEUTS SEG NFR BLD AUTO: 48 % (ref 43–75)
NONHDLC SERPL-MCNC: 142 MG/DL
NRBC BLD AUTO-RTO: 0 /100 WBCS
PLATELET # BLD AUTO: 188 THOUSANDS/UL (ref 149–390)
PMV BLD AUTO: 9.3 FL (ref 8.9–12.7)
POTASSIUM SERPL-SCNC: 4.5 MMOL/L (ref 3.5–5.3)
PROT SERPL-MCNC: 6.8 G/DL (ref 6.4–8.2)
RBC # BLD AUTO: 5.11 MILLION/UL (ref 3.88–5.62)
SODIUM SERPL-SCNC: 142 MMOL/L (ref 136–145)
T4 FREE SERPL-MCNC: 1.39 NG/DL (ref 0.76–1.46)
TRIGL SERPL-MCNC: 193 MG/DL
TSH SERPL DL<=0.05 MIU/L-ACNC: 4.8 UIU/ML (ref 0.36–3.74)
WBC # BLD AUTO: 6.76 THOUSAND/UL (ref 4.31–10.16)

## 2021-10-28 PROCEDURE — 88304 TISSUE EXAM BY PATHOLOGIST: CPT | Performed by: PATHOLOGY

## 2021-10-28 PROCEDURE — 80165 DIPROPYLACETIC ACID FREE: CPT

## 2021-10-28 PROCEDURE — 84439 ASSAY OF FREE THYROXINE: CPT

## 2021-10-28 PROCEDURE — 80061 LIPID PANEL: CPT

## 2021-10-28 PROCEDURE — 82140 ASSAY OF AMMONIA: CPT

## 2021-10-28 PROCEDURE — 11406 EXC TR-EXT B9+MARG >4.0 CM: CPT | Performed by: SURGERY

## 2021-10-28 PROCEDURE — 85025 COMPLETE CBC W/AUTO DIFF WBC: CPT

## 2021-10-28 PROCEDURE — 12032 INTMD RPR S/A/T/EXT 2.6-7.5: CPT | Performed by: SURGERY

## 2021-10-28 PROCEDURE — 80053 COMPREHEN METABOLIC PANEL: CPT

## 2021-10-28 PROCEDURE — 36415 COLL VENOUS BLD VENIPUNCTURE: CPT

## 2021-10-28 PROCEDURE — 84443 ASSAY THYROID STIM HORMONE: CPT

## 2021-10-28 RX ORDER — ONDANSETRON 2 MG/ML
INJECTION INTRAMUSCULAR; INTRAVENOUS AS NEEDED
Status: DISCONTINUED | OUTPATIENT
Start: 2021-10-28 | End: 2021-10-28

## 2021-10-28 RX ORDER — MIDAZOLAM HYDROCHLORIDE 2 MG/2ML
INJECTION, SOLUTION INTRAMUSCULAR; INTRAVENOUS AS NEEDED
Status: DISCONTINUED | OUTPATIENT
Start: 2021-10-28 | End: 2021-10-28

## 2021-10-28 RX ORDER — CEFAZOLIN SODIUM 1 G/50ML
1000 SOLUTION INTRAVENOUS ONCE
Status: DISCONTINUED | OUTPATIENT
Start: 2021-10-28 | End: 2021-10-28

## 2021-10-28 RX ORDER — LIDOCAINE HYDROCHLORIDE 10 MG/ML
INJECTION, SOLUTION EPIDURAL; INFILTRATION; INTRACAUDAL; PERINEURAL AS NEEDED
Status: DISCONTINUED | OUTPATIENT
Start: 2021-10-28 | End: 2021-10-28

## 2021-10-28 RX ORDER — ONDANSETRON 2 MG/ML
4 INJECTION INTRAMUSCULAR; INTRAVENOUS ONCE AS NEEDED
Status: DISCONTINUED | OUTPATIENT
Start: 2021-10-28 | End: 2021-10-28 | Stop reason: HOSPADM

## 2021-10-28 RX ORDER — CEFAZOLIN SODIUM 2 G/50ML
2000 SOLUTION INTRAVENOUS ONCE
Status: COMPLETED | OUTPATIENT
Start: 2021-10-28 | End: 2021-10-28

## 2021-10-28 RX ORDER — MEPERIDINE HYDROCHLORIDE 25 MG/ML
12.5 INJECTION INTRAMUSCULAR; INTRAVENOUS; SUBCUTANEOUS
Status: DISCONTINUED | OUTPATIENT
Start: 2021-10-28 | End: 2021-10-28 | Stop reason: HOSPADM

## 2021-10-28 RX ORDER — PROPOFOL 10 MG/ML
INJECTION, EMULSION INTRAVENOUS AS NEEDED
Status: DISCONTINUED | OUTPATIENT
Start: 2021-10-28 | End: 2021-10-28

## 2021-10-28 RX ORDER — HYDROMORPHONE HCL/PF 1 MG/ML
0.5 SYRINGE (ML) INJECTION
Status: DISCONTINUED | OUTPATIENT
Start: 2021-10-28 | End: 2021-10-28 | Stop reason: HOSPADM

## 2021-10-28 RX ORDER — FENTANYL CITRATE/PF 50 MCG/ML
25 SYRINGE (ML) INJECTION
Status: DISCONTINUED | OUTPATIENT
Start: 2021-10-28 | End: 2021-10-28 | Stop reason: HOSPADM

## 2021-10-28 RX ORDER — HYDROCODONE BITARTRATE AND ACETAMINOPHEN 5; 325 MG/1; MG/1
1 TABLET ORAL EVERY 6 HOURS PRN
Qty: 5 TABLET | Refills: 0 | Status: SHIPPED | OUTPATIENT
Start: 2021-10-28

## 2021-10-28 RX ORDER — SODIUM CHLORIDE, SODIUM LACTATE, POTASSIUM CHLORIDE, CALCIUM CHLORIDE 600; 310; 30; 20 MG/100ML; MG/100ML; MG/100ML; MG/100ML
125 INJECTION, SOLUTION INTRAVENOUS CONTINUOUS
Status: DISCONTINUED | OUTPATIENT
Start: 2021-10-28 | End: 2021-10-28 | Stop reason: HOSPADM

## 2021-10-28 RX ORDER — FENTANYL CITRATE 50 UG/ML
INJECTION, SOLUTION INTRAMUSCULAR; INTRAVENOUS AS NEEDED
Status: DISCONTINUED | OUTPATIENT
Start: 2021-10-28 | End: 2021-10-28

## 2021-10-28 RX ADMIN — PROPOFOL 150 MG: 10 INJECTION, EMULSION INTRAVENOUS at 08:22

## 2021-10-28 RX ADMIN — SODIUM CHLORIDE, SODIUM LACTATE, POTASSIUM CHLORIDE, AND CALCIUM CHLORIDE 125 ML/HR: .6; .31; .03; .02 INJECTION, SOLUTION INTRAVENOUS at 07:08

## 2021-10-28 RX ADMIN — LIDOCAINE HYDROCHLORIDE 50 MG: 10 INJECTION, SOLUTION EPIDURAL; INFILTRATION; INTRACAUDAL; PERINEURAL at 08:22

## 2021-10-28 RX ADMIN — MIDAZOLAM 2 MG: 1 INJECTION INTRAMUSCULAR; INTRAVENOUS at 08:19

## 2021-10-28 RX ADMIN — FENTANYL CITRATE 100 MCG: 50 INJECTION INTRAMUSCULAR; INTRAVENOUS at 08:21

## 2021-10-28 RX ADMIN — ONDANSETRON 4 MG: 2 INJECTION INTRAMUSCULAR; INTRAVENOUS at 08:42

## 2021-10-28 RX ADMIN — CEFAZOLIN SODIUM 2000 MG: 2 SOLUTION INTRAVENOUS at 08:13

## 2021-11-01 LAB — VALPROATE FREE SERPL-MCNC: 23 UG/ML (ref 6–22)

## 2021-11-02 ENCOUNTER — TELEPHONE (OUTPATIENT)
Dept: SURGERY | Facility: CLINIC | Age: 36
End: 2021-11-02

## 2021-11-05 ENCOUNTER — OFFICE VISIT (OUTPATIENT)
Dept: FAMILY MEDICINE CLINIC | Facility: CLINIC | Age: 36
End: 2021-11-05
Payer: MEDICARE

## 2021-11-05 VITALS
HEART RATE: 96 BPM | OXYGEN SATURATION: 96 % | HEIGHT: 70 IN | SYSTOLIC BLOOD PRESSURE: 123 MMHG | DIASTOLIC BLOOD PRESSURE: 73 MMHG | BODY MASS INDEX: 26.37 KG/M2 | TEMPERATURE: 96.8 F | WEIGHT: 184.2 LBS

## 2021-11-05 DIAGNOSIS — R73.09 BLOOD SUGAR INCREASED: ICD-10-CM

## 2021-11-05 DIAGNOSIS — N18.31 CHRONIC RENAL IMPAIRMENT, STAGE 3A (HCC): ICD-10-CM

## 2021-11-05 DIAGNOSIS — F17.200 SMOKING: ICD-10-CM

## 2021-11-05 DIAGNOSIS — R11.0 NAUSEA: ICD-10-CM

## 2021-11-05 DIAGNOSIS — R94.6 ABNORMAL THYROID FUNCTION TEST: ICD-10-CM

## 2021-11-05 DIAGNOSIS — Z00.00 MEDICARE ANNUAL WELLNESS VISIT, SUBSEQUENT: Primary | ICD-10-CM

## 2021-11-05 DIAGNOSIS — F20.0 CHRONIC PARANOID SCHIZOPHRENIA (HCC): ICD-10-CM

## 2021-11-05 DIAGNOSIS — R63.4 WEIGHT LOSS: ICD-10-CM

## 2021-11-05 DIAGNOSIS — E78.2 MIXED HYPERLIPIDEMIA: ICD-10-CM

## 2021-11-05 DIAGNOSIS — Z23 IMMUNIZATION DUE: ICD-10-CM

## 2021-11-05 DIAGNOSIS — D72.10 EOSINOPHILIA, UNSPECIFIED TYPE: ICD-10-CM

## 2021-11-05 DIAGNOSIS — Z23 FLU VACCINE NEED: ICD-10-CM

## 2021-11-05 DIAGNOSIS — R10.13 EPIGASTRIC PAIN: ICD-10-CM

## 2021-11-05 PROCEDURE — 99214 OFFICE O/P EST MOD 30 MIN: CPT | Performed by: FAMILY MEDICINE

## 2021-11-05 PROCEDURE — 90682 RIV4 VACC RECOMBINANT DNA IM: CPT

## 2021-11-05 PROCEDURE — G0008 ADMIN INFLUENZA VIRUS VAC: HCPCS

## 2021-11-05 PROCEDURE — G0439 PPPS, SUBSEQ VISIT: HCPCS | Performed by: FAMILY MEDICINE

## 2021-11-05 RX ORDER — OMEPRAZOLE 20 MG/1
20 CAPSULE, DELAYED RELEASE ORAL DAILY
Qty: 30 CAPSULE | Refills: 0 | Status: SHIPPED | OUTPATIENT
Start: 2021-11-05 | End: 2021-11-24

## 2021-11-07 PROBLEM — R63.4 WEIGHT LOSS: Status: ACTIVE | Noted: 2021-11-07

## 2021-11-07 PROBLEM — Z00.00 MEDICARE ANNUAL WELLNESS VISIT, SUBSEQUENT: Status: ACTIVE | Noted: 2021-11-07

## 2021-11-07 PROBLEM — R10.13 EPIGASTRIC PAIN: Status: ACTIVE | Noted: 2021-11-07

## 2021-11-07 PROBLEM — Z23 FLU VACCINE NEED: Status: ACTIVE | Noted: 2021-11-07

## 2021-11-07 PROBLEM — D72.10 EOSINOPHILIA: Status: ACTIVE | Noted: 2021-11-07

## 2021-11-07 PROBLEM — N18.31 CHRONIC RENAL IMPAIRMENT, STAGE 3A (HCC): Status: ACTIVE | Noted: 2021-11-07

## 2021-11-07 PROBLEM — R11.0 NAUSEA: Status: ACTIVE | Noted: 2021-11-07

## 2021-11-13 ENCOUNTER — OFFICE VISIT (OUTPATIENT)
Dept: URGENT CARE | Facility: MEDICAL CENTER | Age: 36
End: 2021-11-13
Payer: MEDICARE

## 2021-11-13 VITALS
DIASTOLIC BLOOD PRESSURE: 68 MMHG | BODY MASS INDEX: 25.2 KG/M2 | TEMPERATURE: 96.1 F | HEIGHT: 71 IN | WEIGHT: 180 LBS | RESPIRATION RATE: 20 BRPM | HEART RATE: 94 BPM | OXYGEN SATURATION: 97 % | SYSTOLIC BLOOD PRESSURE: 119 MMHG

## 2021-11-13 DIAGNOSIS — L08.9 LOCAL INFECTION OF WOUND: Primary | ICD-10-CM

## 2021-11-13 DIAGNOSIS — T14.8XXA LOCAL INFECTION OF WOUND: Primary | ICD-10-CM

## 2021-11-13 PROCEDURE — 87205 SMEAR GRAM STAIN: CPT | Performed by: PHYSICIAN ASSISTANT

## 2021-11-13 PROCEDURE — 99213 OFFICE O/P EST LOW 20 MIN: CPT | Performed by: PHYSICIAN ASSISTANT

## 2021-11-13 PROCEDURE — 87070 CULTURE OTHR SPECIMN AEROBIC: CPT | Performed by: PHYSICIAN ASSISTANT

## 2021-11-13 PROCEDURE — G0463 HOSPITAL OUTPT CLINIC VISIT: HCPCS | Performed by: PHYSICIAN ASSISTANT

## 2021-11-13 RX ORDER — CEPHALEXIN 500 MG/1
500 CAPSULE ORAL EVERY 8 HOURS SCHEDULED
Qty: 30 CAPSULE | Refills: 0 | Status: SHIPPED | OUTPATIENT
Start: 2021-11-13 | End: 2021-11-23

## 2021-11-15 LAB
BACTERIA WND AEROBE CULT: NORMAL
GRAM STN SPEC: NORMAL
GRAM STN SPEC: NORMAL

## 2021-11-16 ENCOUNTER — HOSPITAL ENCOUNTER (OUTPATIENT)
Dept: NON INVASIVE DIAGNOSTICS | Facility: HOSPITAL | Age: 36
Discharge: HOME | End: 2021-11-16
Attending: FAMILY MEDICINE
Payer: MEDICARE

## 2021-11-16 VITALS
BODY MASS INDEX: 25.2 KG/M2 | HEART RATE: 97 BPM | SYSTOLIC BLOOD PRESSURE: 119 MMHG | HEIGHT: 71 IN | DIASTOLIC BLOOD PRESSURE: 68 MMHG | WEIGHT: 180 LBS

## 2021-11-16 DIAGNOSIS — R00.0 SINUS TACHYCARDIA: ICD-10-CM

## 2021-11-16 DIAGNOSIS — I51.7 LEFT VENTRICULAR HYPERTROPHY: ICD-10-CM

## 2021-11-16 LAB — SL CV LV EF: 60

## 2021-11-16 PROCEDURE — 93005 ELECTROCARDIOGRAM TRACING: CPT

## 2021-11-16 PROCEDURE — 93306 TTE W/DOPPLER COMPLETE: CPT

## 2021-11-17 LAB
ATRIAL RATE: 88 BPM
ATRIAL RATE: 91 BPM
P AXIS: 23 DEGREES
P AXIS: 25 DEGREES
PR INTERVAL: 152 MS
PR INTERVAL: 160 MS
QRS AXIS: 54 DEGREES
QRS AXIS: 59 DEGREES
QRSD INTERVAL: 102 MS
QRSD INTERVAL: 104 MS
QT INTERVAL: 366 MS
QT INTERVAL: 368 MS
QTC INTERVAL: 445 MS
QTC INTERVAL: 450 MS
T WAVE AXIS: 52 DEGREES
T WAVE AXIS: 56 DEGREES
VENTRICULAR RATE: 88 BPM
VENTRICULAR RATE: 91 BPM

## 2021-11-17 PROCEDURE — 93010 ELECTROCARDIOGRAM REPORT: CPT | Performed by: INTERNAL MEDICINE

## 2021-11-18 ENCOUNTER — TELEPHONE (OUTPATIENT)
Dept: FAMILY MEDICINE CLINIC | Facility: CLINIC | Age: 36
End: 2021-11-18

## 2021-11-19 ENCOUNTER — OFFICE VISIT (OUTPATIENT)
Dept: SURGERY | Facility: CLINIC | Age: 36
End: 2021-11-19

## 2021-11-19 VITALS
DIASTOLIC BLOOD PRESSURE: 70 MMHG | TEMPERATURE: 97 F | WEIGHT: 188 LBS | SYSTOLIC BLOOD PRESSURE: 120 MMHG | HEIGHT: 71 IN | HEART RATE: 94 BPM | BODY MASS INDEX: 26.32 KG/M2

## 2021-11-19 DIAGNOSIS — Z09 POSTOP CHECK: Primary | ICD-10-CM

## 2021-11-19 DIAGNOSIS — T81.89XA IMPAIRED SKIN INTEGRITY ASSOCIATED WITH SURGICAL INCISION: ICD-10-CM

## 2021-11-19 DIAGNOSIS — R23.9 IMPAIRED SKIN INTEGRITY ASSOCIATED WITH SURGICAL INCISION: ICD-10-CM

## 2021-11-19 PROCEDURE — 99024 POSTOP FOLLOW-UP VISIT: CPT | Performed by: SURGERY

## 2021-11-24 ENCOUNTER — OFFICE VISIT (OUTPATIENT)
Dept: FAMILY MEDICINE CLINIC | Facility: CLINIC | Age: 36
End: 2021-11-24
Payer: MEDICARE

## 2021-11-24 VITALS
SYSTOLIC BLOOD PRESSURE: 118 MMHG | DIASTOLIC BLOOD PRESSURE: 73 MMHG | OXYGEN SATURATION: 99 % | TEMPERATURE: 96.8 F | WEIGHT: 184.3 LBS | BODY MASS INDEX: 25.8 KG/M2 | HEART RATE: 100 BPM | HEIGHT: 71 IN

## 2021-11-24 DIAGNOSIS — R73.09 BLOOD SUGAR INCREASED: ICD-10-CM

## 2021-11-24 DIAGNOSIS — R10.13 EPIGASTRIC PAIN: Primary | ICD-10-CM

## 2021-11-24 DIAGNOSIS — R11.0 NAUSEA: ICD-10-CM

## 2021-11-24 DIAGNOSIS — T81.30XA WOUND DEHISCENCE: ICD-10-CM

## 2021-11-24 DIAGNOSIS — R63.4 WEIGHT LOSS: ICD-10-CM

## 2021-11-24 DIAGNOSIS — D72.10 EOSINOPHILIA, UNSPECIFIED TYPE: ICD-10-CM

## 2021-11-24 DIAGNOSIS — Z71.85 IMMUNIZATION COUNSELING: ICD-10-CM

## 2021-11-24 DIAGNOSIS — R93.1 ABNORMAL ECHOCARDIOGRAM: ICD-10-CM

## 2021-11-24 DIAGNOSIS — Z11.59 NEED FOR HEPATITIS C SCREENING TEST: ICD-10-CM

## 2021-11-24 PROBLEM — F11.20 CONTINUOUS OPIOID DEPENDENCE (HCC): Status: ACTIVE | Noted: 2021-11-24

## 2021-11-24 PROCEDURE — 99214 OFFICE O/P EST MOD 30 MIN: CPT | Performed by: FAMILY MEDICINE

## 2021-11-25 PROBLEM — Z71.85 IMMUNIZATION COUNSELING: Status: ACTIVE | Noted: 2021-11-25

## 2021-11-26 DIAGNOSIS — R00.0 SINUS TACHYCARDIA: ICD-10-CM

## 2021-12-02 ENCOUNTER — OFFICE VISIT (OUTPATIENT)
Dept: SURGERY | Facility: CLINIC | Age: 36
End: 2021-12-02

## 2021-12-02 VITALS
HEART RATE: 117 BPM | DIASTOLIC BLOOD PRESSURE: 70 MMHG | HEIGHT: 71 IN | BODY MASS INDEX: 25.81 KG/M2 | SYSTOLIC BLOOD PRESSURE: 102 MMHG | WEIGHT: 184.4 LBS | TEMPERATURE: 96.3 F

## 2021-12-02 DIAGNOSIS — Z51.89 ENCOUNTER FOR WOUND CARE: Primary | ICD-10-CM

## 2021-12-02 PROCEDURE — 99024 POSTOP FOLLOW-UP VISIT: CPT | Performed by: SURGERY

## 2021-12-23 ENCOUNTER — OFFICE VISIT (OUTPATIENT)
Dept: SURGERY | Facility: CLINIC | Age: 36
End: 2021-12-23

## 2021-12-23 ENCOUNTER — OFFICE VISIT (OUTPATIENT)
Dept: CARDIOLOGY CLINIC | Facility: CLINIC | Age: 36
End: 2021-12-23
Payer: MEDICARE

## 2021-12-23 VITALS
HEART RATE: 106 BPM | WEIGHT: 180.2 LBS | BODY MASS INDEX: 25.23 KG/M2 | HEIGHT: 71 IN | TEMPERATURE: 96.8 F | DIASTOLIC BLOOD PRESSURE: 80 MMHG | SYSTOLIC BLOOD PRESSURE: 100 MMHG

## 2021-12-23 VITALS
SYSTOLIC BLOOD PRESSURE: 98 MMHG | DIASTOLIC BLOOD PRESSURE: 78 MMHG | OXYGEN SATURATION: 98 % | WEIGHT: 179 LBS | BODY MASS INDEX: 25.06 KG/M2 | HEIGHT: 71 IN | HEART RATE: 105 BPM

## 2021-12-23 DIAGNOSIS — R07.2 PRECORDIAL PAIN: Primary | ICD-10-CM

## 2021-12-23 DIAGNOSIS — Z48.89 ENCOUNTER FOR POST SURGICAL WOUND CHECK: Primary | ICD-10-CM

## 2021-12-23 DIAGNOSIS — R93.1 ABNORMAL ECHOCARDIOGRAM: ICD-10-CM

## 2021-12-23 PROCEDURE — 99214 OFFICE O/P EST MOD 30 MIN: CPT | Performed by: INTERNAL MEDICINE

## 2021-12-23 PROCEDURE — 99024 POSTOP FOLLOW-UP VISIT: CPT | Performed by: SURGERY

## 2022-01-06 ENCOUNTER — HOSPITAL ENCOUNTER (OUTPATIENT)
Dept: NON INVASIVE DIAGNOSTICS | Facility: HOSPITAL | Age: 37
Discharge: HOME | End: 2022-01-06
Payer: MEDICARE

## 2022-01-06 ENCOUNTER — LAB (OUTPATIENT)
Dept: LAB | Facility: HOSPITAL | Age: 37
End: 2022-01-06
Attending: INTERNAL MEDICINE
Payer: MEDICARE

## 2022-01-06 VITALS — BODY MASS INDEX: 25.06 KG/M2 | WEIGHT: 179 LBS | HEIGHT: 71 IN

## 2022-01-06 DIAGNOSIS — N28.9 ABNORMAL RENAL FUNCTION: ICD-10-CM

## 2022-01-06 DIAGNOSIS — R79.89 ELEVATED SERUM CREATININE: ICD-10-CM

## 2022-01-06 DIAGNOSIS — R07.2 PRECORDIAL PAIN: ICD-10-CM

## 2022-01-06 DIAGNOSIS — R93.1 ABNORMAL ECHOCARDIOGRAM: ICD-10-CM

## 2022-01-06 LAB
ALBUMIN SERPL BCP-MCNC: 3.5 G/DL (ref 3.5–5)
ANION GAP SERPL CALCULATED.3IONS-SCNC: 9 MMOL/L (ref 4–13)
BASELINE ST DEPRESSION: 0 MM
BUN SERPL-MCNC: 7 MG/DL (ref 5–25)
CALCIUM SERPL-MCNC: 9.1 MG/DL (ref 8.3–10.1)
CHLORIDE SERPL-SCNC: 105 MMOL/L (ref 100–108)
CO2 SERPL-SCNC: 29 MMOL/L (ref 21–32)
CREAT SERPL-MCNC: 1.38 MG/DL (ref 0.6–1.3)
CREAT UR-MCNC: 288.8 MG/DL
GFR SERPL CREATININE-BSD FRML MDRD: 64 ML/MIN/1.73SQ M
GLUCOSE SERPL-MCNC: 111 MG/DL (ref 65–140)
MAX HR PERCENT: 71 %
MAX HR: 182 BPM
PHOSPHATE SERPL-MCNC: 3.4 MG/DL (ref 2.7–4.5)
POTASSIUM SERPL-SCNC: 4 MMOL/L (ref 3.5–5.3)
PROT UR-MCNC: 38 MG/DL
PROT/CREAT UR: 0.13 MG/G{CREAT} (ref 0–0.1)
RATE PRESSURE PRODUCT: NORMAL
SL CV STRESS RECOVERY BP: NORMAL MMHG
SL CV STRESS RECOVERY HR: 110 BPM
SL CV STRESS RECOVERY O2 SAT: 98 %
SL CV STRESS STAGE REACHED: 2
SODIUM SERPL-SCNC: 143 MMOL/L (ref 136–145)
STRESS ANGINA INDEX: 0
STRESS BASELINE BP: NORMAL MMHG
STRESS BASELINE HR: 101 BPM
STRESS DUKE TREADMILL SCORE: 4
STRESS O2 SAT REST: 96 %
STRESS PEAK HR: 131 BPM
STRESS PERCENT HR: 71 %
STRESS POST ESTIMATED WORKLOAD: 5.8 METS
STRESS POST EXERCISE DUR MIN: 4 MIN
STRESS POST O2 SAT PEAK: 98 %
STRESS POST PEAK BP: 84 MMHG
STRESS ST DEPRESSION: 0 MM
STRESS TARGET HR: 131 BPM

## 2022-01-06 PROCEDURE — 93016 CV STRESS TEST SUPVJ ONLY: CPT | Performed by: INTERNAL MEDICINE

## 2022-01-06 PROCEDURE — 82570 ASSAY OF URINE CREATININE: CPT

## 2022-01-06 PROCEDURE — 36415 COLL VENOUS BLD VENIPUNCTURE: CPT

## 2022-01-06 PROCEDURE — 93018 CV STRESS TEST I&R ONLY: CPT | Performed by: INTERNAL MEDICINE

## 2022-01-06 PROCEDURE — 84156 ASSAY OF PROTEIN URINE: CPT

## 2022-01-06 PROCEDURE — 93017 CV STRESS TEST TRACING ONLY: CPT

## 2022-01-06 PROCEDURE — 80069 RENAL FUNCTION PANEL: CPT

## 2022-01-07 LAB
ARRHY DURING EX: NORMAL
MAX DIASTOLIC BP: 60 MMHG
MAX HEART RATE: 131 BPM
MAX PREDICTED HEART RATE: 182 BPM
MAX. SYSTOLIC BP: 100 MMHG
PROTOCOL NAME: NORMAL
TARGET HR FORMULA: NORMAL
TEST INDICATION: NORMAL
TIME IN EXERCISE PHASE: NORMAL

## 2022-01-07 NOTE — RESULT ENCOUNTER NOTE
Please let the patient know that most recent lab work in terms of renal parameters are stable        Thanks

## 2022-01-10 ENCOUNTER — TELEPHONE (OUTPATIENT)
Dept: NEPHROLOGY | Facility: CLINIC | Age: 37
End: 2022-01-10

## 2022-01-10 NOTE — TELEPHONE ENCOUNTER
Spoke with patient's mother and made her aware recent labs show renal function stable  She has no questions or concerns at this time  difficulty concentrating/increased appetite/insomnia

## 2022-01-10 NOTE — TELEPHONE ENCOUNTER
----- Message from Tawny Zhou MD sent at 1/7/2022  7:59 AM EST -----  Please let the patient know that most recent lab work in terms of renal parameters are stable        Thanks

## 2022-01-14 ENCOUNTER — OFFICE VISIT (OUTPATIENT)
Dept: FAMILY MEDICINE CLINIC | Facility: CLINIC | Age: 37
End: 2022-01-14
Payer: MEDICARE

## 2022-01-14 VITALS
TEMPERATURE: 96.3 F | BODY MASS INDEX: 24.55 KG/M2 | OXYGEN SATURATION: 100 % | HEART RATE: 110 BPM | WEIGHT: 176 LBS | SYSTOLIC BLOOD PRESSURE: 92 MMHG | DIASTOLIC BLOOD PRESSURE: 58 MMHG

## 2022-01-14 DIAGNOSIS — N18.31 CHRONIC RENAL IMPAIRMENT, STAGE 3A (HCC): ICD-10-CM

## 2022-01-14 DIAGNOSIS — R63.4 WEIGHT LOSS: Primary | ICD-10-CM

## 2022-01-14 DIAGNOSIS — R06.02 SOB (SHORTNESS OF BREATH) ON EXERTION: ICD-10-CM

## 2022-01-14 DIAGNOSIS — I95.1 ORTHOSTATIC HYPOTENSION: ICD-10-CM

## 2022-01-14 DIAGNOSIS — F20.0 CHRONIC PARANOID SCHIZOPHRENIA (HCC): ICD-10-CM

## 2022-01-14 DIAGNOSIS — F17.200 SMOKING: ICD-10-CM

## 2022-01-14 DIAGNOSIS — R93.1 ABNORMAL ECHOCARDIOGRAM: ICD-10-CM

## 2022-01-14 DIAGNOSIS — R00.0 SINUS TACHYCARDIA: ICD-10-CM

## 2022-01-14 DIAGNOSIS — R42 LIGHT HEADEDNESS: ICD-10-CM

## 2022-01-14 DIAGNOSIS — R19.5 ABNORMAL STOOLS: ICD-10-CM

## 2022-01-14 PROCEDURE — 99214 OFFICE O/P EST MOD 30 MIN: CPT | Performed by: FAMILY MEDICINE

## 2022-01-14 RX ORDER — MIDODRINE HYDROCHLORIDE 10 MG/1
10 TABLET ORAL 3 TIMES DAILY
Qty: 90 TABLET | Refills: 4 | Status: SHIPPED | OUTPATIENT
Start: 2022-01-14 | End: 2022-06-29

## 2022-01-14 NOTE — PROGRESS NOTES
Assessment/Plan:       No problem-specific Assessment & Plan notes found for this encounter  Diagnoses and all orders for this visit:    Weight loss  Comments:  Per pt is intentional However to R/O underlying medical condition ,recheck lab ,to consider further evaluation pt was advised to eat more to see if hengain Wt  Orders:  -     XR chest pa & lateral; Future  -     TSH, 3rd generation with Free T4 reflex; Future  -     Comprehensive metabolic panel; Future  -     CBC and differential; Future  -     UA w Reflex to Microscopic w Reflex to Culture    Abnormal stools  Comments:  + yeast ? secondary to skin contamination,    Light headedness  Comments:  Most likely secondary to his orthostatic hypotension  Increase midodrin to 3 times daily ,to call if any further symptoms    Sinus tachycardia  Comments:  Not well controlled  Asymptomatic  Since pt BP is on the low side we will not increase metoprolol  To call if he developed palpitation or other symptoms    Orthostatic hypotension  Comments:  Increase midodrin to 5 3 times a day  Orders:  -     midodrine (PROAMATINE) 10 MG tablet; Take 1 tablet (10 mg total) by mouth 3 (three) times a day    SOB (shortness of breath) on exertion  Comments:  Questionable secondary to do deconditioning  Rule out under lying medical problem  Patient is a smoker to consider PFT   Orders:  -     XR chest pa & lateral; Future    Chronic paranoid schizophrenia ([facility] [address] )    Chronic renal impairment, stage 3a ([address] )  Comments:  Avoid NSAID  Hydrate self well    Smoking  Comments:  Consel patient to stop smoking     Abnormal echocardiogram  Comments:  Cardiology consult, noted        Patient Instructions   Follow-up with test results      Orders Placed This Encounter   Procedures    XR chest pa & lateral     Standing Status:   Future     Standing Expiration Date:   1/14/2023     Scheduling Instructions:      Bring along any outside films relating to this procedure             Order Specific Question:   Reason for Exam:     Answer:   weight loss    TSH, 3rd generation with Free T4 reflex     Standing Status:   Future     Standing Expiration Date:   1/14/2023    Comprehensive metabolic panel     This is a patient instruction: Patient fasting for 8 hours or longer recommended  Standing Status:   Future     Standing Expiration Date:   1/14/2023    CBC and differential     This is a patient instruction: This test is non-fasting  Please drink two glasses of water morning of bloodwork  Standing Status:   Future     Standing Expiration Date:   1/14/2023    UA w Reflex to Microscopic w Reflex to Culture         Subjective:     Patient ID: Meka Hill is a 43 y o  male      HPI   Wt loss,  Patient continued to lose weight  Patient insists he is intentionally losing weight  He did not eating much because his weight called was in the 160s and hydrate and he did gain weight and he would like to go back to his baseline  Also his mother is concerned and she think she does not have an appetite  Patient denied depression or anxiety  He said he feels well and he has no symptoms  Also his mother did mention he feels lightheadedness sometimes  Abnormal echo  Denied chest pain  His mother stated when he goes to the head he get out of breath  Symptoms are chronic for years  No shortness of breath at rest or orthopnea  Patient did see Cardiology  He had stress test   Patient is a smoker  Recent stool tests shows yeast   Patient denied any problem with bowel movement he did say he has a slight skin irritation around his rectum at the time when he elected his stool sample  But resolved    Test results   Lab on 11-12&- and 1-6-22  Discussed result with patient and his mother  Review of Systems   Constitutional: Negative for appetite change and fatigue  HENT: Negative for ear pain, tinnitus, trouble swallowing and voice change      Eyes: Negative for photophobia, pain and visual disturbance  Respiratory: Negative for cough, chest tightness and wheezing  Cardiovascular: Negative for chest pain, palpitations and leg swelling  Gastrointestinal: Negative for abdominal distention, abdominal pain, anal bleeding, constipation, diarrhea, nausea and rectal pain  Endocrine: Negative for cold intolerance, heat intolerance, polydipsia and polyuria  Genitourinary: Negative for decreased urine volume, difficulty urinating, dysuria, flank pain, frequency, hematuria and urgency  Musculoskeletal: Negative for arthralgias, back pain, gait problem, myalgias and neck pain  Skin: Negative for pallor and rash  Allergic/Immunologic: Negative for immunocompromised state  Neurological: Negative for dizziness, tremors, seizures, syncope, speech difficulty, numbness and headaches  Hematological: Negative for adenopathy  Does not bruise/bleed easily  Psychiatric/Behavioral: Negative for agitation, confusion and hallucinations  The patient is not nervous/anxious  Objective:     Physical Exam  Constitutional:       General: He is not in acute distress  Appearance: He is well-developed  He is not ill-appearing, toxic-appearing or diaphoretic  HENT:      Head: Normocephalic  Right Ear: Tympanic membrane, ear canal and external ear normal       Left Ear: Tympanic membrane, ear canal and external ear normal    Eyes:      General: No scleral icterus  Right eye: No discharge  Left eye: No discharge  Extraocular Movements: Extraocular movements intact  Pupils: Pupils are equal, round, and reactive to light  Neck:      Thyroid: No thyromegaly  Vascular: No carotid bruit or JVD  Cardiovascular:      Rate and Rhythm: Normal rate and regular rhythm  Heart sounds: Normal heart sounds  No murmur heard  No gallop  Pulmonary:      Effort: Pulmonary effort is normal       Breath sounds: Normal breath sounds     Abdominal:      General: Bowel sounds are normal  There is no distension  Palpations: Abdomen is soft  There is no mass  Tenderness: There is no abdominal tenderness  There is no guarding or rebound  Genitourinary:     Comments: Patient declined  Musculoskeletal:         General: No swelling or tenderness  Normal range of motion  Cervical back: Neck supple  Right lower leg: No edema  Left lower leg: No edema  Lymphadenopathy:      Cervical: No cervical adenopathy  Skin:     Findings: No rash  Neurological:      General: No focal deficit present  Mental Status: He is alert and oriented to person, place, and time  Cranial Nerves: No cranial nerve deficit  Motor: No abnormal muscle tone  Coordination: Coordination normal       Gait: Gait normal    Psychiatric:         Mood and Affect: Mood normal          Behavior: Behavior normal          Thought Content:  Thought content normal

## 2022-02-24 ENCOUNTER — TELEPHONE (OUTPATIENT)
Dept: FAMILY MEDICINE CLINIC | Facility: CLINIC | Age: 37
End: 2022-02-24

## 2022-02-24 DIAGNOSIS — E03.9 ACQUIRED HYPOTHYROIDISM: Primary | ICD-10-CM

## 2022-02-24 RX ORDER — LEVOTHYROXINE SODIUM 0.1 MG/1
100 TABLET ORAL DAILY
Qty: 90 TABLET | Refills: 0 | Status: SHIPPED | OUTPATIENT
Start: 2022-02-24

## 2022-02-24 NOTE — TELEPHONE ENCOUNTER
Dr Rex Posadas called me today  Because pt had TSH recently and is elevated 5 28  Abnormal renal function but stable

## 2022-02-24 NOTE — TELEPHONE ENCOUNTER
Please call patient  Change levothyroxine from 75 mcg daily to 100 mcg daily    Check TSH in 6 weeks

## 2022-06-02 DIAGNOSIS — R00.0 SINUS TACHYCARDIA: ICD-10-CM

## 2022-06-29 DIAGNOSIS — I95.1 ORTHOSTATIC HYPOTENSION: ICD-10-CM

## 2022-06-29 RX ORDER — MIDODRINE HYDROCHLORIDE 10 MG/1
TABLET ORAL
Qty: 90 TABLET | Refills: 3 | Status: SHIPPED | OUTPATIENT
Start: 2022-06-29

## 2022-08-18 DIAGNOSIS — E03.9 ACQUIRED HYPOTHYROIDISM: ICD-10-CM

## 2022-08-18 RX ORDER — LEVOTHYROXINE SODIUM 0.1 MG/1
TABLET ORAL
Qty: 90 TABLET | Refills: 0 | Status: SHIPPED | OUTPATIENT
Start: 2022-08-18

## 2022-08-31 ENCOUNTER — TELEPHONE (OUTPATIENT)
Dept: FAMILY MEDICINE CLINIC | Facility: CLINIC | Age: 37
End: 2022-08-31

## 2022-08-31 NOTE — TELEPHONE ENCOUNTER
Dr Barb Avina called, said patient has been experiencing lightheadedness  However patient has changed the way he takes his medication he was supposed to take his medication and some of them in the morning some of them at night he is taking all of them at 1 time a also his wondering wife patient he is taking metoprolol but in the meantime he is on midodrine  Advised he was on midodrine before because his blood pressure is low and then he developed sinus tachycardia he was evaluated by Cardiology and they started metoprolol for him  Advised to have patient to follow-up with our office for evaluation also he can call his car cardiologist and follow-up

## 2022-08-31 NOTE — TELEPHONE ENCOUNTER
Please call Dr Majo Buckley when finished with your last patient for the day  He has questions about patient's meds  He is with [facility]

## 2022-08-31 NOTE — TELEPHONE ENCOUNTER
Dr Luzma Russell spoke with Dr Majo Buckley  He was asking for a copy of the stress test to be faxed to his office  I spoke with Jenna Loyd mother and she gave a verbal consent to do so  I faxed it over to his office today

## 2022-09-20 DIAGNOSIS — R00.0 SINUS TACHYCARDIA: ICD-10-CM

## 2022-09-23 ENCOUNTER — APPOINTMENT (EMERGENCY)
Dept: CT IMAGING | Facility: HOSPITAL | Age: 37
End: 2022-09-23
Payer: MEDICARE

## 2022-09-23 ENCOUNTER — HOSPITAL ENCOUNTER (EMERGENCY)
Facility: HOSPITAL | Age: 37
Discharge: HOME | End: 2022-09-24
Attending: EMERGENCY MEDICINE
Payer: MEDICARE

## 2022-09-23 ENCOUNTER — APPOINTMENT (OUTPATIENT)
Dept: RADIOLOGY | Facility: HOSPITAL | Age: 37
End: 2022-09-23
Payer: MEDICARE

## 2022-09-23 VITALS
BODY MASS INDEX: 24.01 KG/M2 | DIASTOLIC BLOOD PRESSURE: 76 MMHG | RESPIRATION RATE: 18 BRPM | TEMPERATURE: 97.6 F | WEIGHT: 172.18 LBS | HEART RATE: 97 BPM | SYSTOLIC BLOOD PRESSURE: 116 MMHG | OXYGEN SATURATION: 98 %

## 2022-09-23 DIAGNOSIS — S00.81XA ABRASION OF FACE, INITIAL ENCOUNTER: ICD-10-CM

## 2022-09-23 DIAGNOSIS — R40.20 LOSS OF CONSCIOUSNESS (HCC): Primary | ICD-10-CM

## 2022-09-23 DIAGNOSIS — S09.90XA CLOSED HEAD INJURY, INITIAL ENCOUNTER: ICD-10-CM

## 2022-09-23 DIAGNOSIS — S16.1XXA STRAIN OF NECK MUSCLE, INITIAL ENCOUNTER: ICD-10-CM

## 2022-09-23 DIAGNOSIS — J98.6 ELEVATED HEMIDIAPHRAGM: ICD-10-CM

## 2022-09-23 DIAGNOSIS — S20.212A CONTUSION OF LEFT CHEST WALL, INITIAL ENCOUNTER: ICD-10-CM

## 2022-09-23 LAB
ALBUMIN SERPL BCP-MCNC: 3.1 G/DL (ref 3.5–5)
ALP SERPL-CCNC: 60 U/L (ref 46–116)
ALT SERPL W P-5'-P-CCNC: 14 U/L (ref 12–78)
ANION GAP SERPL CALCULATED.3IONS-SCNC: 12 MMOL/L (ref 4–13)
APAP SERPL-MCNC: <2 UG/ML (ref 10–20)
AST SERPL W P-5'-P-CCNC: 9 U/L (ref 5–45)
BASOPHILS # BLD AUTO: 0.03 THOUSANDS/ΜL (ref 0–0.1)
BASOPHILS NFR BLD AUTO: 1 % (ref 0–1)
BILIRUB SERPL-MCNC: 0.24 MG/DL (ref 0.2–1)
BUN SERPL-MCNC: 8 MG/DL (ref 5–25)
CALCIUM ALBUM COR SERPL-MCNC: 9 MG/DL (ref 8.3–10.1)
CALCIUM SERPL-MCNC: 8.3 MG/DL (ref 8.3–10.1)
CARDIAC TROPONIN I PNL SERPL HS: <2 NG/L
CHLORIDE SERPL-SCNC: 107 MMOL/L (ref 96–108)
CO2 SERPL-SCNC: 25 MMOL/L (ref 21–32)
CREAT SERPL-MCNC: 1.36 MG/DL (ref 0.6–1.3)
EOSINOPHIL # BLD AUTO: 0.72 THOUSAND/ΜL (ref 0–0.61)
EOSINOPHIL NFR BLD AUTO: 12 % (ref 0–6)
ERYTHROCYTE [DISTWIDTH] IN BLOOD BY AUTOMATED COUNT: 12.7 % (ref 11.6–15.1)
ETHANOL SERPL-MCNC: <3 MG/DL (ref 0–3)
GFR SERPL CREATININE-BSD FRML MDRD: 65 ML/MIN/1.73SQ M
GLUCOSE SERPL-MCNC: 79 MG/DL (ref 65–140)
HCT VFR BLD AUTO: 41.7 % (ref 36.5–49.3)
HGB BLD-MCNC: 13.4 G/DL (ref 12–17)
IMM GRANULOCYTES # BLD AUTO: 0.04 THOUSAND/UL (ref 0–0.2)
IMM GRANULOCYTES NFR BLD AUTO: 1 % (ref 0–2)
LYMPHOCYTES # BLD AUTO: 1.41 THOUSANDS/ΜL (ref 0.6–4.47)
LYMPHOCYTES NFR BLD AUTO: 24 % (ref 14–44)
MCH RBC QN AUTO: 29.3 PG (ref 26.8–34.3)
MCHC RBC AUTO-ENTMCNC: 32.1 G/DL (ref 31.4–37.4)
MCV RBC AUTO: 91 FL (ref 82–98)
MONOCYTES # BLD AUTO: 0.46 THOUSAND/ΜL (ref 0.17–1.22)
MONOCYTES NFR BLD AUTO: 8 % (ref 4–12)
NEUTROPHILS # BLD AUTO: 3.25 THOUSANDS/ΜL (ref 1.85–7.62)
NEUTS SEG NFR BLD AUTO: 54 % (ref 43–75)
NRBC BLD AUTO-RTO: 0 /100 WBCS
PLATELET # BLD AUTO: 170 THOUSANDS/UL (ref 149–390)
PMV BLD AUTO: 9.5 FL (ref 8.9–12.7)
POTASSIUM SERPL-SCNC: 4 MMOL/L (ref 3.5–5.3)
PROT SERPL-MCNC: 5.8 G/DL (ref 6.4–8.4)
RBC # BLD AUTO: 4.57 MILLION/UL (ref 3.88–5.62)
SALICYLATES SERPL-MCNC: <3 MG/DL (ref 3–20)
SODIUM SERPL-SCNC: 144 MMOL/L (ref 135–147)
WBC # BLD AUTO: 5.91 THOUSAND/UL (ref 4.31–10.16)

## 2022-09-23 PROCEDURE — 90471 IMMUNIZATION ADMIN: CPT

## 2022-09-23 PROCEDURE — 99285 EMERGENCY DEPT VISIT HI MDM: CPT

## 2022-09-23 PROCEDURE — 72125 CT NECK SPINE W/O DYE: CPT

## 2022-09-23 PROCEDURE — 90715 TDAP VACCINE 7 YRS/> IM: CPT | Performed by: EMERGENCY MEDICINE

## 2022-09-23 PROCEDURE — 99285 EMERGENCY DEPT VISIT HI MDM: CPT | Performed by: EMERGENCY MEDICINE

## 2022-09-23 PROCEDURE — 80053 COMPREHEN METABOLIC PANEL: CPT | Performed by: EMERGENCY MEDICINE

## 2022-09-23 PROCEDURE — 36415 COLL VENOUS BLD VENIPUNCTURE: CPT | Performed by: EMERGENCY MEDICINE

## 2022-09-23 PROCEDURE — 80143 DRUG ASSAY ACETAMINOPHEN: CPT | Performed by: EMERGENCY MEDICINE

## 2022-09-23 PROCEDURE — 82077 ASSAY SPEC XCP UR&BREATH IA: CPT | Performed by: EMERGENCY MEDICINE

## 2022-09-23 PROCEDURE — 96360 HYDRATION IV INFUSION INIT: CPT

## 2022-09-23 PROCEDURE — 84484 ASSAY OF TROPONIN QUANT: CPT | Performed by: EMERGENCY MEDICINE

## 2022-09-23 PROCEDURE — 80179 DRUG ASSAY SALICYLATE: CPT | Performed by: EMERGENCY MEDICINE

## 2022-09-23 PROCEDURE — 93005 ELECTROCARDIOGRAM TRACING: CPT

## 2022-09-23 PROCEDURE — 71046 X-RAY EXAM CHEST 2 VIEWS: CPT

## 2022-09-23 PROCEDURE — 85025 COMPLETE CBC W/AUTO DIFF WBC: CPT | Performed by: EMERGENCY MEDICINE

## 2022-09-23 PROCEDURE — 70450 CT HEAD/BRAIN W/O DYE: CPT

## 2022-09-23 PROCEDURE — G1004 CDSM NDSC: HCPCS

## 2022-09-23 RX ADMIN — TETANUS TOXOID, REDUCED DIPHTHERIA TOXOID AND ACELLULAR PERTUSSIS VACCINE, ADSORBED 0.5 ML: 5; 2.5; 8; 8; 2.5 SUSPENSION INTRAMUSCULAR at 23:25

## 2022-09-23 RX ADMIN — SODIUM CHLORIDE 1000 ML: 0.9 INJECTION, SOLUTION INTRAVENOUS at 22:57

## 2022-09-24 LAB
ATRIAL RATE: 99 BPM
P AXIS: 42 DEGREES
PR INTERVAL: 158 MS
QRS AXIS: 57 DEGREES
QRSD INTERVAL: 106 MS
QT INTERVAL: 352 MS
QTC INTERVAL: 451 MS
T WAVE AXIS: 54 DEGREES
VENTRICULAR RATE: 99 BPM

## 2022-09-24 PROCEDURE — 93010 ELECTROCARDIOGRAM REPORT: CPT | Performed by: INTERNAL MEDICINE

## 2022-09-24 NOTE — ED PROVIDER NOTES
History  Chief Complaint   Patient presents with    Seizure - New Onset     Per EMS pt's mother heard a loud noise in bathroom, walked into restroom to find pt on floor full body seizure lasted about 1 min, no previous hx of same  Pt has no recollection of what happened is c/o neck pain  Patient is a 42year old male presenting to the ED with his mother via EMS after having a seizure at home, witnessed by his mother  Mother states the episode occurred for approximately 45 seconds and describes the patient as having straight legs with both arms/hands turned inwards toward the midline of the body and shaking  Patient states he was about to get in the shower and was brushing his teeth when the episode happened  Patient does have Hx of orthostatic hypotension and states this episode was similar, but he never had seizure-like activity before  Patient does not remember having the episode  Patient's mother states he hit his head during the event  Patient does c/o neck pain when his trunk/neck is flexed and left sided rib pain that started after the episode  Patient's mother states he was disoriented after the event, but was able to walk with direction from her down the stairs  She states he was unsteady on his feet  Patient's mom immediately called EMS  Pt now is A&Ox3  He doesn't remember the event or walking after, including the ambulance ride  Patient denies recent drug or alcohol use, denies recent changes to medications, and denies personal and family Hx of seizures  Patient denies chest pain, SOB, abdominal pain, numbness or tingling in the arms or legs, HA, visual changes, dizziness, and lightheadedness         History provided by:  Parent and patient   used: No    Seizure - New Onset  Seizure activity on arrival: no    Seizure type:  Grand mal  Preceding symptoms: no sensation of an aura present, no dizziness, no euphoria, no headache, no hyperventilation, no nausea, no numbness, no panic and no vision change    Initial focality:  Multifocal  Episode characteristics: no abnormal movements, no apnea, no combativeness, no confusion, no disorientation, no eye deviation, no focal shaking, no generalized shaking, no incontinence, no limpness, fully responsive, no stiffening, no tongue biting and responsive    Postictal symptoms: memory loss    Postictal symptoms: no confusion    Return to baseline: yes    Severity:  Mild  Duration:  45 seconds  Timing:  Once  Number of seizures this episode:  1  Progression:  Improving  Context: not alcohol withdrawal, not change in medication, not sleeping less, not drug use, not family hx of seizures, not fever, not hydrocephalus, not intracranial lesion, medical compliance, not possible hypoglycemia, not possible medication ingestion and not previous head injury    Recent head injury:  During the event  PTA treatment:  None  History of seizures: no        Prior to Admission Medications   Prescriptions Last Dose Informant Patient Reported? Taking?    HYDROcodone-acetaminophen (NORCO) 5-325 mg per tablet  Family Member No No   Sig: Take 1 tablet by mouth every 6 (six) hours as needed for pain for up to 5 dosesMax Daily Amount: 4 tablets   buPROPion (WELLBUTRIN XL) 300 mg 24 hr tablet  Family Member No No   Sig: Take 1 tablet (300 mg total) by mouth daily   Patient taking differently: Take 300 mg by mouth daily at bedtime    cloZAPine (CLOZARIL) 50 MG tablet  Family Member No No   Sig: Take 1 tablet (50 mg total) by mouth daily at bedtime   Patient taking differently: Take 50 mg by mouth daily at bedtime    clozapine (CLOZARIL) 200 MG tablet  Family Member No No   Sig: Take 2 tablets (400 mg total) by mouth daily at bedtime   divalproex sodium (DEPAKOTE ER) 500 mg 24 hr tablet  Family Member No No   Sig: Take 2 tablets (1,000 mg total) by mouth daily at bedtime   fluPHENAZine (PROLIXIN) 10 MG tablet  Family Member No No   Sig: Take 2 tablets (20 mg total) by mouth daily at bedtime   glycopyrrolate (ROBINUL) 2 MG tablet  Family Member Yes No   Sig: daily at bedtime    Patient not taking: Reported on 1/14/2022    levothyroxine 100 mcg tablet   No No   Sig: TAKE 1 TABLET BY MOUTH DAILY   metoprolol tartrate (LOPRESSOR) 25 mg tablet   No No   Sig: TAKE HALF TABLET BY MOUTH TWICE A DAY   midodrine (PROAMATINE) 10 MG tablet   No No   Sig: TAKE 1 TABLET BY MOUTH THREE TIMES A DAY   senna (SENOKOT) 8 6 mg  Family Member No No   Sig: Take 1 tablet (8 6 mg total) by mouth 2 (two) times a day      Facility-Administered Medications: None       Past Medical History:   Diagnosis Date    Chronic kidney disease     mom reports "found on lab work but had ultrasound of kidney and "fine"    Cigarette smoker     Disease of thyroid gland     hypothyroid    Exercise involving walking     usually daily    Heart rate fast     Mom reports "sometimes and pt takes Metoprolol for this"    Lipoma of back     Low BP     Pt is on midodrine and Mom reports the Clozaril he takes causes low BP"    Schizophrenia ([facility] [address] )        Past Surgical History:   Procedure Laterality Date    SD EXC SKIN BENIG <0 5 CM TRUNK,ARM,LEG Left 10/28/2021    Procedure: EXCISION WIDE LESION BACK;  Surgeon: Tani Sanchez MD;  Location: [facility];  Service: General    TONSILLECTOMY AND ADENOIDECTOMY         Family History   Problem Relation Age of Onset    Hypertension Father     Depression Mother     Kidney disease Paternal Grandfather      I have reviewed and agree with the history as documented      E-Cigarette/Vaping    E-Cigarette Use Never User      E-Cigarette/Vaping Substances    Nicotine No     THC No     CBD No     Flavoring No     Other No     Unknown No      Social History     Tobacco Use    Smoking status: Current Every Day Smoker     Packs/day: 0 25     Types: Cigarettes    Smokeless tobacco: Never Used    Tobacco comment: Per [employer]-current everyday smoker, yesterday  2100 last cigarette Vaping Use    Vaping Use: Never used   Substance Use Topics    Alcohol use: No    Drug use: No       Review of Systems   Constitutional: Negative for chills, diaphoresis, fatigue and fever  HENT: Negative for drooling, ear pain, hearing loss, nosebleeds, sore throat and trouble swallowing  Eyes: Negative for photophobia, pain and visual disturbance  Respiratory: Negative for apnea, cough, choking, chest tightness, shortness of breath and stridor  Cardiovascular: Positive for chest pain (left sided rib pain)  Negative for palpitations  Gastrointestinal: Negative for abdominal pain, constipation, diarrhea, nausea and vomiting  Genitourinary: Negative for decreased urine volume, dysuria, flank pain, frequency, hematuria and urgency  Musculoskeletal: Positive for neck pain (midline, when trunk is flexed)  Negative for arthralgias, back pain, myalgias and neck stiffness  Skin: Positive for wound  Negative for color change and rash  Neurological: Positive for seizures  Negative for dizziness, tremors, syncope, facial asymmetry, weakness, light-headedness, numbness and headaches  All other systems reviewed and are negative  Physical Exam  Physical Exam  Vitals and nursing note reviewed  Constitutional:       General: He is not in acute distress  Appearance: He is well-developed  He is not ill-appearing, toxic-appearing or diaphoretic  Interventions: Cervical collar in place  HENT:      Head: Normocephalic  Abrasion present  No raccoon eyes, Yañez's sign or laceration  Jaw: There is normal jaw occlusion  No trismus, tenderness, swelling, pain on movement or malocclusion  Right Ear: Tympanic membrane, ear canal and external ear normal       Left Ear: Tympanic membrane, ear canal and external ear normal       Nose: Nose normal  No congestion  Mouth/Throat:      Mouth: Mucous membranes are not pale and not cyanotic     Eyes:      General: Lids are normal  No scleral icterus  Extraocular Movements: Extraocular movements intact  Conjunctiva/sclera: Conjunctivae normal       Right eye: Right conjunctiva is not injected  Left eye: Left conjunctiva is not injected  Pupils: Pupils are equal, round, and reactive to light  Comments: Pupils size 2-3   Neck:      Trachea: No tracheal deviation  Cardiovascular:      Rate and Rhythm: Regular rhythm  Tachycardia present  Pulses: Normal pulses  Heart sounds: Normal heart sounds  No murmur heard  No friction rub  Pulmonary:      Effort: Pulmonary effort is normal  No respiratory distress  Breath sounds: Normal breath sounds  No stridor  No wheezing or rales  Chest:      Chest wall: Tenderness (left rib pain) present  Abdominal:      General: Bowel sounds are normal  There is no distension  Palpations: Abdomen is soft  Tenderness: There is no abdominal tenderness  There is no guarding or rebound  Musculoskeletal:         General: No deformity  Normal range of motion  Right shoulder: Normal       Left shoulder: Normal       Right elbow: Normal       Left elbow: Normal       Right wrist: Normal       Left wrist: Normal       Cervical back: Full passive range of motion without pain, normal range of motion and neck supple  Tenderness (cervical spine tenderness when patient is flexed) present  No edema, erythema or rigidity  No spinous process tenderness or muscular tenderness  Normal range of motion  Thoracic back: Normal       Lumbar back: Normal       Right hip: Normal       Left hip: Normal       Right knee: Normal       Left knee: Normal       Right ankle: Normal       Left ankle: Normal    Skin:     General: Skin is cool and dry  Capillary Refill: Capillary refill takes less than 2 seconds  Coloration: Skin is not cyanotic or pale  Findings: No erythema or rash  Neurological:      Mental Status: He is alert and oriented to person, place, and time  GCS: GCS eye subscore is 4  GCS verbal subscore is 5  GCS motor subscore is 6  Cranial Nerves: No cranial nerve deficit  Sensory: No sensory deficit  Motor: No weakness or abnormal muscle tone  Psychiatric:         Mood and Affect: Mood normal          Behavior: Behavior normal          Vital Signs  ED Triage Vitals [09/23/22 2140]   Temperature Pulse Respirations Blood Pressure SpO2   97 6 °F (36 4 °C) 102 18 119/75 98 %      Temp Source Heart Rate Source Patient Position - Orthostatic VS BP Location FiO2 (%)   Oral Monitor Sitting Left arm --      Pain Score       2           Vitals:    09/23/22 2140 09/23/22 2327   BP: 119/75 116/76   Pulse: 102 97   Patient Position - Orthostatic VS: Sitting Lying         Visual Acuity      ED Medications  Medications   sodium chloride 0 9 % bolus 1,000 mL (0 mL Intravenous Stopped 9/24/22 0009)   tetanus-diphtheria-acellular pertussis (BOOSTRIX) IM injection 0 5 mL (0 5 mL Intramuscular Given 9/23/22 2325)       Diagnostic Studies  Results Reviewed     Procedure Component Value Units Date/Time    Acetaminophen level-If concentration is detectable, please discuss with medical  on call   [[id number]]  (Abnormal) Collected: 09/23/22 2256    Lab Status: Final result Specimen: Blood from Arm, Right Updated: 09/23/22 2337     Acetaminophen Level <2 ug/mL     Ethanol [[id number]]  (Normal) Collected: 09/23/22 2256    Lab Status: Final result Specimen: Blood from Arm, Right Updated: 09/23/22 2337     Ethanol Lvl <3 mg/dL     HS Troponin 0hr (reflex protocol) [[id number]]  (Normal) Collected: 09/23/22 2256    Lab Status: Final result Specimen: Blood from Arm, Right Updated: 09/23/22 2331     hs TnI 0hr <2 ng/L     Comprehensive metabolic panel [[id number]]  (Abnormal) Collected: 09/23/22 2256    Lab Status: Final result Specimen: Blood from Arm, Right Updated: 09/23/22 2325     Sodium 144 mmol/L      Potassium 4 0 mmol/L      Chloride 107 mmol/L      CO2 25 mmol/L      ANION GAP 12 mmol/L      BUN 8 mg/dL      Creatinine 1 36 mg/dL      Glucose 79 mg/dL      Calcium 8 3 mg/dL      Corrected Calcium 9 0 mg/dL      AST 9 U/L      ALT 14 U/L      Alkaline Phosphatase 60 U/L      Total Protein 5 8 g/dL      Albumin 3 1 g/dL      Total Bilirubin 0 24 mg/dL      eGFR 65 ml/min/1 73sq m     Narrative:      National Kidney Disease Foundation guidelines for Chronic Kidney Disease (CKD):     Stage 1 with normal or high GFR (GFR > 90 mL/min/1 73 square meters)    Stage 2 Mild CKD (GFR = 60-89 mL/min/1 73 square meters)    Stage 3A Moderate CKD (GFR = 45-59 mL/min/1 73 square meters)    Stage 3B Moderate CKD (GFR = 30-44 mL/min/1 73 square meters)    Stage 4 Severe CKD (GFR = 15-29 mL/min/1 73 square meters)    Stage 5 End Stage CKD (GFR <15 mL/min/1 73 square meters)  Note: GFR calculation is accurate only with a steady state creatinine    Salicylate level [[id number]]  (Abnormal) Collected: 09/23/22 2256    Lab Status: Final result Specimen: Blood from Arm, Right Updated: 28/25/49 9504     Salicylate Lvl <3 mg/dL     CBC and differential [[id number]]  (Abnormal) Collected: 09/23/22 2256    Lab Status: Final result Specimen: Blood from Arm, Right Updated: 09/23/22 2309     WBC 5 91 Thousand/uL      RBC 4 57 Million/uL      Hemoglobin 13 4 g/dL      Hematocrit 41 7 %      MCV 91 fL      MCH 29 3 pg      MCHC 32 1 g/dL      RDW 12 7 %      MPV 9 5 fL      Platelets 216 Thousands/uL      nRBC 0 /100 WBCs      Neutrophils Relative 54 %      Immat GRANS % 1 %      Lymphocytes Relative 24 %      Monocytes Relative 8 %      Eosinophils Relative 12 %      Basophils Relative 1 %      Neutrophils Absolute 3 25 Thousands/µL      Immature Grans Absolute 0 04 Thousand/uL      Lymphocytes Absolute 1 41 Thousands/µL      Monocytes Absolute 0 46 Thousand/µL      Eosinophils Absolute 0 72 Thousand/µL      Basophils Absolute 0 03 Thousands/µL                  XR chest 2 views   ED Interpretation by Leonarda Brizuela DO (09/24 0008)   Patient rotated to the right  Limited inspiratory effort  Elevated left hemidiaphragm  No pneumothorax  CT cervical spine without contrast   Final Result by Yared Molina MD (09/23 2344)      No cervical spine fracture or traumatic malalignment  Workstation performed: [id number]         CT head without contrast   Final Result by Yared Molina MD (09/23 2343)      No acute intracranial abnormality  Workstation performed: [id number]                    Procedures  Procedures         ED Course                               SBIRT 22yo+    Flowsheet Row Most Recent Value   SBIRT (25 yo +)    In order to provide better care to our patients, we are screening all of our patients for alcohol and drug use  Would it be okay to ask you these screening questions? No Filed at: 09/23/2022 2143                    MDM  Number of Diagnoses or Management Options  Abrasion of face, initial encounter: new and requires workup  Closed head injury, initial encounter: new and requires workup  Contusion of left chest wall, initial encounter: new and requires workup  Elevated hemidiaphragm: new and requires workup  Loss of consciousness ([address] ): new and requires workup  Strain of neck muscle, initial encounter: new and requires workup  Diagnosis management comments: Patient presents to the ED after having a single, witnessed, seizure-like episode at home with associated head and neck trauma  Patient has never had a seizure before  Patient does have Hx of orthostatic hypotension and states this episode felt similar, but he never had a seizure with it before  Patient denies drug or alcohol use, and has not had any changes in medications recently  Patient does not remember the episode  Will obtain a Head CT, cervical spine CT, and CXR to evaluate for possible trauma to the head, neck, and ribs   Patient has no neuro deficits on exam  Patient has Hx of cardiac arrhythmia including sinus tachycardia, will evaluate EKG and troponin to evaluate for possible cardiac cause  Patient does not have a fever in the ER, infectious cause unlikely  Seizure could be similar to an orthostatic hypotensive episode, but work up will be completed to rule out all other causes  11:15 PM   CBC returned, no evidence of anemia or elevated WBC  EKG normal sinus rhythm  11:39 PM  CXR reveals severely elevated left hemidiaphragm most likely secondary to rotation of patient, has Hx of elevated left hemidiaphragm  Tested negative for alcohol  Troponin <2, cardiac etiology unlikely, delta troponin can be d/c  Electrolytes and Cr consistent with baseline in 1/2022     11:49 PM  CT scan of head unremarkable, no signs of infarction, hemorrhage, or mass  Cervical spine CT showed no cervical spine fracture or malalignment, mild cervical spine degenerative changes  Cervical spine cleared, brace removed  12:25 AM   Patient's labs and imaging are unremarkable and patient can ambulate without difficulty or dizziness and lightheadedness, etiology of seizure is unknown at this point  Considering patient's workup, medical Hx, and description of event, seizure vs syncope is questionable  Patient's VS are stable and patient has not had another seizure in the ER  Patient will not need additional workup in ER, ready for discharge with instructions to follow up with PCP on Monday for reevaluation  Patient and his mother agreeable with discharge and understand that if patient were to have another seizure, fever, unresponsiveness, or behavior change to return to the ER  Patient educated to not drive until cleared by PCP due to unknown etiology of seizure, patient states he does not drive, license suspension paperwork will not be required to fill out  Neurology consult may be required to evaluate etiology of seizure            Amount and/or Complexity of Data Reviewed  Clinical lab tests: ordered and reviewed  Tests in the radiology section of CPT®: ordered and reviewed  Tests in the medicine section of CPT®: ordered and reviewed  Obtain history from someone other than the patient: yes  Review and summarize past medical records: yes  Independent visualization of images, tracings, or specimens: yes    Patient Progress  Patient progress: stable      Disposition  Final diagnoses:   Loss of consciousness ([facility] [address] )   Closed head injury, initial encounter   Abrasion of face, initial encounter   Strain of neck muscle, initial encounter   Contusion of left chest wall, initial encounter   Elevated hemidiaphragm     Time reflects when diagnosis was documented in both MDM as applicable and the Disposition within this note     Time User Action Codes Description Comment    9/24/2022 12:08 AM Smeriglio, Meredeth Fleet Add [R40 20] Loss of consciousness ([address] )     9/24/2022 12:09 AM Smeriglio, Meredeth Fleet Add [S09 90XA] Closed head injury, initial encounter     9/24/2022 12:09 AM Smeriglio, Meredeth Fleet Add [S00 81XA] Abrasion of face, initial encounter     9/24/2022 12:10 AM Smeriglio, Meredeth Fleet Add Balaji Goode  1XXA] Strain of neck muscle, initial encounter     9/24/2022 12:10 AM Smeriglio, Meredeth Fleet Add [S20 212A] Contusion of left chest wall, initial encounter     9/24/2022 12:14 AM Melissa Henderson Add [J98 6] Elevated hemidiaphragm       ED Disposition     ED Disposition   Discharge    Condition   Stable    Date/Time   Sat Sep 24, 2022 12:07 AM    Comment   Kimberlyn Madrigal discharge to home/self care                 Follow-up Information     Follow up With Specialties Details Why Contact Info    Em Lyons MD Family Medicine Call on 9/26/2022 Please make a follow up appointment from the ER for reevaluation of seizure vs syncopal episode [address] [facility]  [address] [facility]  [phone]            Discharge Medication List as of 9/24/2022 12:15 AM      CONTINUE these medications which have NOT CHANGED    Details buPROPion (WELLBUTRIN XL) 300 mg 24 hr tablet Take 1 tablet (300 mg total) by mouth daily, Starting Tue 12/18/2018, Print      !! clozapine (CLOZARIL) 200 MG tablet Take 2 tablets (400 mg total) by mouth daily at bedtime, Starting Tue 12/18/2018, Print      !! cloZAPine (CLOZARIL) 50 MG tablet Take 1 tablet (50 mg total) by mouth daily at bedtime, Starting Tue 12/18/2018, Print      divalproex sodium (DEPAKOTE ER) 500 mg 24 hr tablet Take 2 tablets (1,000 mg total) by mouth daily at bedtime, Starting Tue 12/18/2018, Print      fluPHENAZine (PROLIXIN) 10 MG tablet Take 2 tablets (20 mg total) by mouth daily at bedtime, Starting Tue 12/18/2018, Print      glycopyrrolate (ROBINUL) 2 MG tablet daily at bedtime , Starting Thu 7/29/2021, Historical Med      HYDROcodone-acetaminophen (NORCO) 5-325 mg per tablet Take 1 tablet by mouth every 6 (six) hours as needed for pain for up to 5 dosesMax Daily Amount: 4 tablets, Starting Thu 10/28/2021, Normal      levothyroxine 100 mcg tablet TAKE 1 TABLET BY MOUTH DAILY, Normal      metoprolol tartrate (LOPRESSOR) 25 mg tablet TAKE HALF TABLET BY MOUTH TWICE A DAY, Normal      midodrine (PROAMATINE) 10 MG tablet TAKE 1 TABLET BY MOUTH THREE TIMES A DAY, Normal      senna (SENOKOT) 8 6 mg Take 1 tablet (8 6 mg total) by mouth 2 (two) times a day, Starting Thu 12/27/2018, Normal       !! - Potential duplicate medications found  Please discuss with provider  No discharge procedures on file      PDMP Review     None          ED Provider  Electronically Signed by           Brian Parkinson DO  09/24/22 2395

## 2022-09-27 ENCOUNTER — OFFICE VISIT (OUTPATIENT)
Dept: FAMILY MEDICINE CLINIC | Facility: CLINIC | Age: 37
End: 2022-09-27
Payer: MEDICARE

## 2022-09-27 VITALS
OXYGEN SATURATION: 98 % | BODY MASS INDEX: 21.31 KG/M2 | WEIGHT: 152.2 LBS | DIASTOLIC BLOOD PRESSURE: 60 MMHG | HEART RATE: 103 BPM | HEIGHT: 71 IN | SYSTOLIC BLOOD PRESSURE: 90 MMHG | TEMPERATURE: 96.3 F

## 2022-09-27 DIAGNOSIS — F20.0 CHRONIC PARANOID SCHIZOPHRENIA (HCC): ICD-10-CM

## 2022-09-27 DIAGNOSIS — R63.4 WEIGHT LOSS: ICD-10-CM

## 2022-09-27 DIAGNOSIS — R68.81 EARLY SATIETY: ICD-10-CM

## 2022-09-27 DIAGNOSIS — E03.9 ACQUIRED HYPOTHYROIDISM: ICD-10-CM

## 2022-09-27 DIAGNOSIS — J98.6 ELEVATED DIAPHRAGM: ICD-10-CM

## 2022-09-27 DIAGNOSIS — R40.20 LOSS OF CONSCIOUSNESS (HCC): Primary | ICD-10-CM

## 2022-09-27 DIAGNOSIS — R73.09 BLOOD SUGAR INCREASED: ICD-10-CM

## 2022-09-27 DIAGNOSIS — N18.31 CHRONIC RENAL IMPAIRMENT, STAGE 3A (HCC): ICD-10-CM

## 2022-09-27 PROCEDURE — 99214 OFFICE O/P EST MOD 30 MIN: CPT | Performed by: FAMILY MEDICINE

## 2022-09-27 RX ORDER — DIVALPROEX SODIUM 250 MG/1
TABLET, EXTENDED RELEASE ORAL
COMMUNITY
Start: 2022-09-21

## 2022-09-27 NOTE — PROGRESS NOTES
Assessment/Plan:       No problem-specific Assessment & Plan notes found for this encounter  Diagnoses and all orders for this visit:    Loss of consciousness [facility])  Comments:  Per his mother it seem patient had seizure  Advised if symptoms recur to go back to the ER  To make an appointment with the Neurology  Orders:  -     Valproic acid level, total; Future  -     EEG Sleep deprived; Future    Weight loss  Comments: To follow soon if continue to lose weight , for further evaluation  Orders:  -     US abdomen complete; Future  -     UA (URINE) with reflex to Scope  -     TSH, 3rd generation with Free T4 reflex; Future    Early satiety  Comments:  Check upper GI  Order exist    Acquired hypothyroidism  -     TSH, 3rd generation with Free T4 reflex; Future    Chronic paranoid schizophrenia ([facility] [address] )  Comments: Following with Psychiatry  He has an office visit with psychiatrist tomorrow    Blood sugar increased  -     Hemoglobin A1C; Future  -     UA (URINE) with reflex to Scope    Elevated diaphragm  -     US abdomen complete; Future    Chronic renal impairment, stage 3a (HCC)  Comments:  Avoid NSAID  Hydrate self well  Orders:  -     UA (URINE) with reflex to Scope    Other orders  -     divalproex sodium (DEPAKOTE ER) 250 mg 24 hr tablet        Patient Instructions    To follow up with test results      Orders Placed This Encounter   Procedures    US abdomen complete     Standing Status:   Future     Standing Expiration Date:   9/27/2026     Scheduling Instructions:      NPO Adult Preps- patients over 15years of age             If your appointment is scheduled BEFORE 12:00pm, you may not eat or drink anything other than water, after midnight on the day of you test  Medications can be taken with water  Diabetics can have juice                If your appointment is AFTER 12:00pm, you may have a fat free breakfast before 8:00am and remain on clear liquids (no soda, you may have clear tea without dairy, juice or water) until your test is complete  If your appointment is scheduled AFTER 4:00pm, you may not have anything to eat after 12 noon on the day of the test  Water and juice is allowed up to the appointment time  Do not drink any carbonated beverages  Medications may be taken as needed with water  Diabetics who need PM appointments may have a light breakfast but cannot have any additional food or drinks after breakfast  Schedule PM appointments for a time 6-8 hours after that morning meal                   NPO Pediatric Preps birth to 15 years             Infants- Birth to 3 year of age  Hold the feeding closest to the appointment time but not more than 2 hours prior  Do not bottle or breast feed or give solid foods to the baby during that 2 hour period prior to the appointment  1 year to 12 years- low fat meal (no dairy) a minimum of 4 hours prior to the appointment  No carbonated beverages are allowed  Water, Juice and clear tea are allowed  Medications may be taken with water  ---------------------------------------------------------------------------------------------------------------------             ELASTOGRAPHY PATIENST ONLY: all preps are for an 6-8 hour period- MINIMUM- NO Medications allowed during that 8 hour period, sips of water or ice chips can be allowed  Please bring your insurance cards, a form of photo ID and a list of your medications with you  Arrive 15 minutes prior to your appointment time in order to register  To schedule this appointment, please contact Central Scheduling at [phone]                  Valproic acid level, total     Standing Status:   Future     Standing Expiration Date:   9/27/2023    Hemoglobin A1C     Standing Status:   Future     Standing Expiration Date:   9/27/2023    UA (URINE) with reflex to Scope    TSH, 3rd generation with Free T4 reflex     Standing Status:   Future     Standing Expiration Date:   10/1/2023    EEG Sleep deprived     Patient Instructions:    Try to go to bed at 9pm  DO NOT go to bed earlier than 9pm  AM Appointment - awake at midnight / PM Appointment - awake at 2am   Must have clean hair, washed the night before or the day of the test  No styling products after hair is rinsed and dried  Hair must be dry before test can be done  No fasting  Must eat something within two hours of testing  No caffeine the day of testing  Standing Status:   Future     Standing Expiration Date:   9/27/2023         Subjective:     Patient ID: Alok Rasmussen is a 42 y o  male      HPI  Post ER on May 23, 2022  His mother stated she found him laying on the floor in the bathroom  She taking with his arm and leg she believes he had a seizure  Took him to the ER  Diagnosis was seizure syncope versus seizure also she believes her son had a seizure  Patient stated he was standing in the bathroom trying to brush his teeth and home does not remember anything what happened except he found himself on the floor when he woke up  No recurrence  Patient was referred to Neurology at the ER  I noticed that  patient continued to lose weight  He stated he does feel full quickly after he start eating  Overall he does not feel sick denied abdominal pain ,nausea ,vomiting, diarrhea or constipation   Discussed with his mother he is on psych medication they they could cause seizure  She stated he has an appointment with psych tomorrow  Advised to discuss this with his psychiatrist   In the meantime we can increase Depakote or add med she prefer not to change his medication and she sees his psychiatrist      ER report noted   labs June 2022    Upper GI    Not  Review of Systems    Objective:     Physical Exam

## 2022-10-01 PROBLEM — R68.81 EARLY SATIETY: Status: ACTIVE | Noted: 2022-10-01

## 2022-10-01 PROBLEM — J98.6 ELEVATED DIAPHRAGM: Status: ACTIVE | Noted: 2022-10-01

## 2022-10-01 PROBLEM — R73.9 BLOOD SUGAR INCREASED: Status: ACTIVE | Noted: 2022-10-01

## 2022-10-01 PROBLEM — R73.09 BLOOD SUGAR INCREASED: Status: ACTIVE | Noted: 2022-10-01

## 2022-10-01 PROBLEM — R40.20 LOSS OF CONSCIOUSNESS (HCC): Status: ACTIVE | Noted: 2022-10-01

## 2022-10-05 LAB — HBA1C MFR BLD HPLC: 5.4 %

## 2022-10-12 PROBLEM — Z11.59 NEED FOR HEPATITIS C SCREENING TEST: Status: RESOLVED | Noted: 2021-08-14 | Resolved: 2022-10-12

## 2022-10-12 PROBLEM — Z00.00 MEDICARE ANNUAL WELLNESS VISIT, SUBSEQUENT: Status: RESOLVED | Noted: 2021-11-07 | Resolved: 2022-10-12

## 2022-10-25 ENCOUNTER — HOSPITAL ENCOUNTER (OUTPATIENT)
Dept: ULTRASOUND IMAGING | Facility: MEDICAL CENTER | Age: 37
Discharge: HOME | End: 2022-10-25
Payer: MEDICARE

## 2022-10-25 DIAGNOSIS — J98.6 ELEVATED DIAPHRAGM: ICD-10-CM

## 2022-10-25 DIAGNOSIS — R63.4 WEIGHT LOSS: ICD-10-CM

## 2022-10-25 PROCEDURE — 76700 US EXAM ABDOM COMPLETE: CPT

## 2022-11-15 ENCOUNTER — TELEPHONE (OUTPATIENT)
Dept: FAMILY MEDICINE CLINIC | Facility: CLINIC | Age: 37
End: 2022-11-15

## 2022-11-15 DIAGNOSIS — K76.0 FATTY LIVER: Primary | ICD-10-CM

## 2022-11-15 NOTE — TELEPHONE ENCOUNTER
Patients mom advised please place the order for the hepatitis panel it is asking for a waiver to be done because of the diagnosis   thanks

## 2022-11-15 NOTE — TELEPHONE ENCOUNTER
----- Message from Ravin Maharaj MD sent at 11/6/2022  1:55 PM EST -----  Liver ultrasound remarkable for f mild atty liver  Check hepatitis panel

## 2022-11-16 ENCOUNTER — HOSPITAL ENCOUNTER (OUTPATIENT)
Dept: NEUROLOGY | Facility: CLINIC | Age: 37
Discharge: HOME | End: 2022-11-16

## 2022-11-16 DIAGNOSIS — I95.1 ORTHOSTATIC HYPOTENSION: ICD-10-CM

## 2022-11-16 DIAGNOSIS — R40.20 LOSS OF CONSCIOUSNESS (HCC): ICD-10-CM

## 2022-11-16 RX ORDER — MIDODRINE HYDROCHLORIDE 10 MG/1
TABLET ORAL
Qty: 90 TABLET | Refills: 0 | Status: SHIPPED | OUTPATIENT
Start: 2022-11-16

## 2022-11-17 ENCOUNTER — TELEPHONE (OUTPATIENT)
Dept: FAMILY MEDICINE CLINIC | Facility: CLINIC | Age: 37
End: 2022-11-17

## 2022-11-17 DIAGNOSIS — R56.9 SEIZURE (HCC): ICD-10-CM

## 2022-11-17 DIAGNOSIS — R94.01 ABNORMAL EEG: Primary | ICD-10-CM

## 2022-11-17 NOTE — TELEPHONE ENCOUNTER
----- Message from Michele García MD sent at 11/16/2022  5:16 PM EST -----  EEG is abnormal, please refer patient to Neurology urgently

## 2022-11-17 NOTE — TELEPHONE ENCOUNTER
Spoke with patient's mother  Please place order for neurology and include location  Please let her know when this is done she would like to call to schedule ASAP

## 2022-11-17 NOTE — TELEPHONE ENCOUNTER
Spoke to Jagdish sommers mom and advised that the referral was placed for the patient, I did also advised her to call either [facility] or outside of [facility] to see where she can have him be seen soon

## 2022-11-18 ENCOUNTER — APPOINTMENT (OUTPATIENT)
Dept: LAB | Facility: CLINIC | Age: 37
End: 2022-11-18

## 2022-11-18 DIAGNOSIS — K76.0 FATTY LIVER: ICD-10-CM

## 2022-11-18 DIAGNOSIS — E03.9 HYPOTHYROIDISM, UNSPECIFIED TYPE: ICD-10-CM

## 2022-11-18 DIAGNOSIS — Z79.899 ENCOUNTER FOR LONG-TERM (CURRENT) USE OF OTHER MEDICATIONS: ICD-10-CM

## 2022-11-18 DIAGNOSIS — F20.0 PARANOID SCHIZOPHRENIA (HCC): ICD-10-CM

## 2022-11-18 LAB
BASOPHILS # BLD AUTO: 0.05 THOUSANDS/ÂΜL (ref 0–0.1)
BASOPHILS NFR BLD AUTO: 1 % (ref 0–1)
EOSINOPHIL # BLD AUTO: 0.7 THOUSAND/ÂΜL (ref 0–0.61)
EOSINOPHIL NFR BLD AUTO: 12 % (ref 0–6)
ERYTHROCYTE [DISTWIDTH] IN BLOOD BY AUTOMATED COUNT: 12.9 % (ref 11.6–15.1)
HCT VFR BLD AUTO: 45.3 % (ref 36.5–49.3)
HCV AB SER QL: NORMAL
HGB BLD-MCNC: 14.6 G/DL (ref 12–17)
IMM GRANULOCYTES # BLD AUTO: 0.01 THOUSAND/UL (ref 0–0.2)
IMM GRANULOCYTES NFR BLD AUTO: 0 % (ref 0–2)
LYMPHOCYTES # BLD AUTO: 2.29 THOUSANDS/ÂΜL (ref 0.6–4.47)
LYMPHOCYTES NFR BLD AUTO: 38 % (ref 14–44)
MCH RBC QN AUTO: 29.3 PG (ref 26.8–34.3)
MCHC RBC AUTO-ENTMCNC: 32.2 G/DL (ref 31.4–37.4)
MCV RBC AUTO: 91 FL (ref 82–98)
MONOCYTES # BLD AUTO: 0.37 THOUSAND/ÂΜL (ref 0.17–1.22)
MONOCYTES NFR BLD AUTO: 6 % (ref 4–12)
NEUTROPHILS # BLD AUTO: 2.58 THOUSANDS/ÂΜL (ref 1.85–7.62)
NEUTS SEG NFR BLD AUTO: 43 % (ref 43–75)
NRBC BLD AUTO-RTO: 0 /100 WBCS
PLATELET # BLD AUTO: 188 THOUSANDS/UL (ref 149–390)
PMV BLD AUTO: 9.6 FL (ref 8.9–12.7)
RBC # BLD AUTO: 4.98 MILLION/UL (ref 3.88–5.62)
WBC # BLD AUTO: 6 THOUSAND/UL (ref 4.31–10.16)

## 2022-11-21 LAB — MISCELLANEOUS LAB TEST RESULT: NORMAL

## 2022-11-22 ENCOUNTER — TELEPHONE (OUTPATIENT)
Dept: FAMILY MEDICINE CLINIC | Facility: CLINIC | Age: 37
End: 2022-11-22

## 2022-11-22 DIAGNOSIS — R89.8 EOSINOPHIL COUNT RAISED: Primary | ICD-10-CM

## 2022-11-22 NOTE — TELEPHONE ENCOUNTER
----- Message from Rober Price MD sent at 11/21/2022  6:09 PM EST -----  Hepatitis C and A, noncreative  CBC is remarkable for increase eosinophil    Check stool for ova and parasite

## 2022-12-01 PROBLEM — G40.109 LOCALIZATION-RELATED EPILEPSY (HCC): Status: ACTIVE | Noted: 2022-10-01

## 2022-12-14 DIAGNOSIS — I95.1 ORTHOSTATIC HYPOTENSION: ICD-10-CM

## 2022-12-14 DIAGNOSIS — E03.9 ACQUIRED HYPOTHYROIDISM: ICD-10-CM

## 2022-12-15 RX ORDER — LEVOTHYROXINE SODIUM 0.1 MG/1
TABLET ORAL
Qty: 90 TABLET | Refills: 0 | Status: SHIPPED | OUTPATIENT
Start: 2022-12-15

## 2022-12-15 RX ORDER — MIDODRINE HYDROCHLORIDE 10 MG/1
TABLET ORAL
Qty: 90 TABLET | Refills: 0 | Status: SHIPPED | OUTPATIENT
Start: 2022-12-15

## 2023-01-06 ENCOUNTER — HOSPITAL ENCOUNTER (OUTPATIENT)
Dept: RADIOLOGY | Facility: HOSPITAL | Age: 38
Discharge: HOME | End: 2023-01-06
Attending: PSYCHIATRY & NEUROLOGY

## 2023-01-06 DIAGNOSIS — G40.109 LOCALIZATION-RELATED EPILEPSY (HCC): ICD-10-CM

## 2023-01-06 RX ADMIN — GADOBUTROL 7 ML: 604.72 INJECTION INTRAVENOUS at 20:34

## 2023-03-06 DIAGNOSIS — E03.9 ACQUIRED HYPOTHYROIDISM: ICD-10-CM

## 2023-03-06 DIAGNOSIS — G40.109 LOCALIZATION-RELATED EPILEPSY (HCC): ICD-10-CM

## 2023-03-06 RX ORDER — LEVOTHYROXINE SODIUM 0.1 MG/1
TABLET ORAL
Qty: 90 TABLET | Refills: 0 | Status: SHIPPED | OUTPATIENT
Start: 2023-03-06

## 2023-03-06 NOTE — TELEPHONE ENCOUNTER
Raji from [facility] called in regards to the patient's medication that needs to be given today but is having issues from the pharmacy and needs the nurses team to call him back ASAP to discuss the issues      # [phone] or [phone]

## 2023-03-06 NOTE — TELEPHONE ENCOUNTER
Spoke to [employer]  Insurance will not cover 1 5 - 100mg tablets when 150mg tablets are available  Please sign off on updated script

## 2023-03-07 ENCOUNTER — TELEPHONE (OUTPATIENT)
Dept: FAMILY MEDICINE CLINIC | Facility: CLINIC | Age: 38
End: 2023-03-07

## 2023-03-07 RX ORDER — LACOSAMIDE 150 MG/1
150 TABLET ORAL EVERY 12 HOURS SCHEDULED
Qty: 60 TABLET | Refills: 2 | Status: SHIPPED | OUTPATIENT
Start: 2023-03-07

## 2023-03-07 NOTE — TELEPHONE ENCOUNTER
Hi, my name is Raji  I'm a nurse and  out of Doctor Ste[facility] Roberts office regarding a mutual patient of ours, Natalie Lou  That's SKINNY  His YOB: 1985  Calling to request a refill on 2 medications  I'm kind of confused  It looks like I have two primary care doctors for his topra as well as his Synthroid  The pharmacy sent over a request as well for some reason  This is the number they gave me for the Synthroid and then his heart medication, the Toprol  They gave me a different PCP out of [place]  I'm not sure who the prescribing Dr  is  If you guys if you could send over refill to go pharmacy should be on this chart there if there's any issues  Or if you're not prescribing Dr  if you could just throw me a courtesy call back so I can clarify who prescribed these meds  Again, my name is Raji  I'm his nurse and   My number is [phone]  Thank you

## 2023-03-08 ENCOUNTER — APPOINTMENT (OUTPATIENT)
Dept: LAB | Facility: HOSPITAL | Age: 38
End: 2023-03-08

## 2023-03-08 DIAGNOSIS — F20.0 PARANOID SCHIZOPHRENIA, SUBCHRONIC CONDITION (HCC): ICD-10-CM

## 2023-03-08 DIAGNOSIS — Z79.899 ENCOUNTER FOR LONG-TERM (CURRENT) USE OF OTHER MEDICATIONS: ICD-10-CM

## 2023-03-08 DIAGNOSIS — E03.9 MYXEDEMA HEART DISEASE: ICD-10-CM

## 2023-03-08 DIAGNOSIS — I51.9 MYXEDEMA HEART DISEASE: ICD-10-CM

## 2023-03-08 LAB
ATRIAL RATE: 84 BPM
P AXIS: 43 DEGREES
PR INTERVAL: 168 MS
QRS AXIS: 60 DEGREES
QRSD INTERVAL: 118 MS
QT INTERVAL: 358 MS
QTC INTERVAL: 423 MS
T WAVE AXIS: 68 DEGREES
VENTRICULAR RATE: 84 BPM

## 2023-03-09 DIAGNOSIS — R00.0 SINUS TACHYCARDIA: ICD-10-CM

## 2023-03-16 ENCOUNTER — OFFICE VISIT (OUTPATIENT)
Dept: FAMILY MEDICINE CLINIC | Facility: CLINIC | Age: 38
End: 2023-03-16

## 2023-03-16 VITALS
SYSTOLIC BLOOD PRESSURE: 112 MMHG | TEMPERATURE: 97.3 F | WEIGHT: 149 LBS | HEART RATE: 88 BPM | OXYGEN SATURATION: 97 % | HEIGHT: 71 IN | BODY MASS INDEX: 20.86 KG/M2 | DIASTOLIC BLOOD PRESSURE: 72 MMHG

## 2023-03-16 DIAGNOSIS — N18.2 CHRONIC RENAL IMPAIRMENT, STAGE 2 (MILD): ICD-10-CM

## 2023-03-16 DIAGNOSIS — R63.4 WEIGHT LOSS: ICD-10-CM

## 2023-03-16 DIAGNOSIS — G40.109 LOCALIZATION-RELATED EPILEPSY (HCC): ICD-10-CM

## 2023-03-16 DIAGNOSIS — Z72.0 TOBACCO ABUSE: ICD-10-CM

## 2023-03-16 DIAGNOSIS — F20.0 CHRONIC PARANOID SCHIZOPHRENIA (HCC): Chronic | ICD-10-CM

## 2023-03-16 DIAGNOSIS — E03.9 HYPOTHYROIDISM, UNSPECIFIED TYPE: ICD-10-CM

## 2023-03-16 DIAGNOSIS — Z00.00 MEDICARE ANNUAL WELLNESS VISIT, SUBSEQUENT: Primary | ICD-10-CM

## 2023-03-16 NOTE — PATIENT INSTRUCTIONS
Medicare Preventive Visit Patient Instructions  Thank you for completing your Welcome to Medicare Visit or Medicare Annual Wellness Visit today  Your next wellness visit will be due in one year (3/16/2024)  The screening/preventive services that you may require over the next 5-10 years are detailed below  Some tests may not apply to you based off risk factors and/or age  Screening tests ordered at today's visit but not completed yet may show as past due  Also, please note that scanned in results may not display below  Preventive Screenings:  Service Recommendations Previous Testing/Comments   Colorectal Cancer Screening  · Colonoscopy    · Fecal Occult Blood Test (FOBT)/Fecal Immunochemical Test (FIT)  · Fecal DNA/Cologuard Test  · Flexible Sigmoidoscopy Age: 39-70 years old   Colonoscopy: every 10 years (May be performed more frequently if at higher risk)  OR  FOBT/FIT: every 1 year  OR  Cologuard: every 3 years  OR  Sigmoidoscopy: every 5 years  Screening may be recommended earlier than age 39 if at higher risk for colorectal cancer  Also, an individualized decision between you and your healthcare provider will decide whether screening between the ages of 74-80 would be appropriate   Colonoscopy: Not on file  FOBT/FIT: Not on file  Cologuard: Not on file  Sigmoidoscopy: Not on file          Prostate Cancer Screening Individualized decision between patient and health care provider in men between ages of 53-78   Medicare will cover every 12 months beginning on the day after your 50th birthday PSA: No results in last 5 years           Hepatitis C Screening Once for adults born between 80 and 1965  More frequently in patients at high risk for Hepatitis C Hep C Antibody: 11/18/2022        Diabetes Screening 1-2 times per year if you're at risk for diabetes or have pre-diabetes Fasting glucose: 115 mg/dL (10/28/2021)  A1C: 5 4 % (10/5/2022)      Cholesterol Screening Once every 5 years if you don't have a lipid disorder  May order more often based on risk factors  Lipid panel: 01/11/2023         Other Preventive Screenings Covered by Medicare:  1  Abdominal Aortic Aneurysm (AAA) Screening: covered once if your at risk  You're considered to be at risk if you have a family history of AAA or a male between the age of 73-68 who smoking at least 100 cigarettes in your lifetime  2  Lung Cancer Screening: covers low dose CT scan once per year if you meet all of the following conditions: (1) Age 50-69; (2) No signs or symptoms of lung cancer; (3) Current smoker or have quit smoking within the last 15 years; (4) You have a tobacco smoking history of at least 20 pack years (packs per day x number of years you smoked); (5) You get a written order from a healthcare provider  3  Glaucoma Screening: covered annually if you're considered high risk: (1) You have diabetes OR (2) Family history of glaucoma OR (3)  aged 48 and older OR (3)  American aged 72 and older  3  Osteoporosis Screening: covered every 2 years if you meet one of the following conditions: (1) Have a vertebral abnormality; (2) On glucocorticoid therapy for more than 3 months; (3) Have primary hyperparathyroidism; (4) On osteoporosis medications and need to assess response to drug therapy  5  HIV Screening: covered annually if you're between the age of 12-76  Also covered annually if you are younger than 13 and older than 72 with risk factors for HIV infection  For pregnant patients, it is covered up to 3 times per pregnancy      Immunizations:  Immunization Recommendations   Influenza Vaccine Annual influenza vaccination during flu season is recommended for all persons aged >= 6 months who do not have contraindications   Pneumococcal Vaccine   * Pneumococcal conjugate vaccine = PCV13 (Prevnar 13), PCV15 (Vaxneuvance), PCV20 (Prevnar 20)  * Pneumococcal polysaccharide vaccine = PPSV23 (Pneumovax) Adults 25-60 years old: 1-3 doses may be recommended based on certain risk factors  Adults 72 years old: 1-2 doses may be recommended based off what pneumonia vaccine you previously received   Hepatitis B Vaccine 3 dose series if at intermediate or high risk (ex: diabetes, end stage renal disease, liver disease)   Tetanus (Td) Vaccine - COST NOT COVERED BY MEDICARE PART B Following completion of primary series, a booster dose should be given every 10 years to maintain immunity against tetanus  Td may also be given as tetanus wound prophylaxis  Tdap Vaccine - COST NOT COVERED BY MEDICARE PART B Recommended at least once for all adults  For pregnant patients, recommended with each pregnancy  Shingles Vaccine (Shingrix) - COST NOT COVERED BY MEDICARE PART B  2 shot series recommended in those aged 48 and above     Health Maintenance Due:      Topic Date Due   • HIV Screening  Completed   • Hepatitis C Screening  Completed     Immunizations Due:      Topic Date Due   • Pneumococcal Vaccine: Pediatrics (0 to 5 Years) and At-Risk Patients (6 to 59 Years) (2 - PPSV23 if available, else PCV20) 12/10/2020   • COVID-19 Vaccine (3 - Booster for Pfizer series) 07/20/2021   • Influenza Vaccine (1) 09/01/2022     Advance Directives   What are advance directives? Advance directives are legal documents that state your wishes and plans for medical care  These plans are made ahead of time in case you lose your ability to make decisions for yourself  Advance directives can apply to any medical decision, such as the treatments you want, and if you want to donate organs  What are the types of advance directives? There are many types of advance directives, and each state has rules about how to use them  You may choose a combination of any of the following:  · Living will: This is a written record of the treatment you want  You can also choose which treatments you do not want, which to limit, and which to stop at a certain time   This includes surgery, medicine, IV fluid, and tube feedings  · Durable power of  for healthcare [facility]): This is a written record that states who you want to make healthcare choices for you when you are unable to make them for yourself  This person, called a proxy, is usually a family member or a friend  You may choose more than 1 proxy  · Do not resuscitate (DNR) order:  A DNR order is used in case your heart stops beating or you stop breathing  It is a request not to have certain forms of treatment, such as CPR  A DNR order may be included in other types of advance directives  · Medical directive: This covers the care that you want if you are in a coma, near death, or unable to make decisions for yourself  You can list the treatments you want for each condition  Treatment may include pain medicine, surgery, blood transfusions, dialysis, IV or tube feedings, and a ventilator (breathing machine)  · Values history: This document has questions about your views, beliefs, and how you feel and think about life  This information can help others choose the care that you would choose  Why are advance directives important? An advance directive helps you control your care  Although spoken wishes may be used, it is better to have your wishes written down  Spoken wishes can be misunderstood, or not followed  Treatments may be given even if you do not want them  An advance directive may make it easier for your family to make difficult choices about your care  Depression   Depression  is a medical condition that causes feelings of sadness or hopelessness that do not go away  Depression may cause you to lose interest in things you used to enjoy  These feelings may interfere with your daily life  Call your local emergency number (911 in the [address] [place] [address],[address]) if:   · You think about harming yourself or someone else  · You have done something on purpose to hurt yourself    The following resources are available at any time to help you, if needed:   · [employer] [place] Suicide Prevention Lifeline: 6-928.233.1546 (5-209-798-RADT)   · Suicide Hotline: 0-117.147.6001 (5-811-GBWPJVJ)   · For a list of international numbers: https://save org/find-help/international-resources/  Treatment for depression may include medicine to relieve depression  Medicine is often used together with therapy  Therapy is a way for you to talk about your feelings and anything that may be causing depression  Therapy can be done alone or in a group  It may also be done with family members or a significant other  · Get regular physical activity  · Create a regular sleep schedule  · Eat a variety of healthy foods  · Do not drink alcohol or use drugs  Cigarette Smoking and Your Health   Risks to your health if you smoke:  Nicotine and other chemicals found in tobacco damage every cell in your body  Even if you are a light smoker, you have an increased risk for cancer, heart disease, and lung disease  If you are pregnant or have diabetes, smoking increases your risk for complications  Benefits to your health if you stop smoking:   · You decrease respiratory symptoms such as coughing, wheezing, and shortness of breath  · You reduce your risk for cancers of the lung, mouth, throat, kidney, bladder, pancreas, stomach, and cervix  If you already have cancer, you increase the benefits of chemotherapy  You also reduce your risk for cancer returning or a second cancer from developing  · You reduce your risk for heart disease, blood clots, heart attack, and stroke  · You reduce your risk for lung infections, and diseases such as pneumonia, asthma, chronic bronchitis, and emphysema  · Your circulation improves  More oxygen can be delivered to your body  If you have diabetes, you lower your risk for complications, such as kidney, artery, and eye diseases  You also lower your risk for nerve damage  Nerve damage can lead to amputations, poor vision, and blindness    · You improve your body's ability to heal and to fight infections  For more information and support to stop smoking:   · Smokefree  gov  Phone: 4- 845 - 380-4603  Web Address: www smokefree   Kendy Cassidyviola 2018 Information is for End User's use only and may not be sold, redistributed or otherwise used for commercial purposes   All illustrations and images included in CareNotes® are the copyrighted property of A D A M , Inc  or [address] [place] [address]

## 2023-03-16 NOTE — PROGRESS NOTES
Assessment and Plan:     Problem List Items Addressed This Visit        Endocrine    Hypothyroidism     - Most recent TSH reviewed and within normal limits   - Continue levothyroxine 100 mcg daily          Relevant Orders    TSH, 3rd generation       Nervous and Auditory    Localization-related epilepsy ([facility] )     - Continue management per Neurology with Vimpat 150mg BID             Genitourinary    Chronic renal impairment, stage 2 (mild)     Lab Results   Component Value Date    EGFR 65 09/23/2022    EGFR 64 01/06/2022    EGFR 59 10/28/2021    CREATININE 1 36 (H) 09/23/2022    CREATININE 1 38 (H) 01/06/2022    CREATININE 1 49 (H) 10/28/2021     - Most recent CMP from January 2023 reviewed; GFR 80 and Creatinine 1 18  - Keep well hydrated; avoid nephrotoxic agents such as NSAIDs and IV contrast; avoid hypotension   - Will continue to monitor          Relevant Orders    Comprehensive metabolic panel       Other    Chronic paranoid schizophrenia ([facility] ) (Chronic)     Depression Screening Follow-up Plan: Patient's depression screening was positive with a PHQ-2 score of 6  Their PHQ-9 score was 18  Continue regular follow-up with their psychologist/therapist/psychiatrist who is managing their mental health condition(s)  Tobacco abuse     Discussed tobacco cessation  Discussed the effects of smoking on cardiovascular system, skin and lungs  Patient verbalized understanding but is not ready to quit at this time  - PCV 20 vaccination declined          Weight loss     - This could be secondary to underlying depression  Paitent is also on Wellbutrin which causes decreased appetite  Most recent CBC, CMP and TSH reviewed and within normal limits  Will also order CXR given smoking history, ESR and CRP to assess for inflammatory disorder and stool hemoccult            Relevant Orders    XR chest pa & lateral    Occult blood 1-3, stool    Sedimentation rate, automated    C-reactive protein   Other Visit Diagnoses Medicare annual wellness visit, subsequent    -  Primary           Preventive health issues were discussed with patient, and age appropriate screening tests were ordered as noted in patient's After Visit Summary  Personalized health advice and appropriate referrals for health education or preventive services given if needed, as noted in patient's After Visit Summary  History of Present Illness:     MACARIO Fish is a 42year old male with a past medical history of paranoid schizophrenia, hypothyroidism, tobacco abuse who presents today accompanied by his mother for a Medicare Annual Wellness Visit  Patient endorses no complaints at this time  His mother is concerned that he is losing weight and doesn't seem to have much of an appetite  Patient denies any gastrointestinal symptoms such as abdominal pian, nausea, vomiting, change in bowel habits, blood in the stool, watery stools, greasy stools, early satiety  There is no night sweat, fevers or fatigue  Apart from that he is doing well  He continues to follow with Neurology and Psychiatry  Patient Care Team:  Mague Mcgill MD as PCP - General (Family Medicine)  MD Gricel Cool MD (Nephrology)     Review of Systems:     Review of Systems   Constitutional: Positive for appetite change and unexpected weight change  Negative for fatigue and fever  HENT: Negative  Respiratory: Negative for cough, shortness of breath and wheezing  Cardiovascular: Negative for chest pain and palpitations  Gastrointestinal: Negative for abdominal pain, blood in stool, constipation, nausea and vomiting  Genitourinary: Negative  Musculoskeletal: Negative for arthralgias, gait problem and myalgias  Skin: Negative  Neurological: Negative  Psychiatric/Behavioral: Negative           Problem List:     Patient Active Problem List   Diagnosis   • Chronic paranoid schizophrenia ([facility] )   • Sinus tachycardia   • Tobacco abuse   • Hypothyroidism   • Constipation   • Orthostatic hypotension   • Left ventricular hypertrophy   • Non-rheumatic mitral regurgitation   • Non-rheumatic tricuspid valve insufficiency   • Senile dementia, uncomplicated (HCC)   • Mixed hyperlipidemia   • Chronic renal impairment, stage 2 (mild)   • Weight loss   • Eosinophilia   • Localization-related epilepsy [facility])      Past Medical and Surgical History:     Past Medical History:   Diagnosis Date   • Chronic kidney disease     mom reports "found on lab work but had ultrasound of kidney and "fine"   • Cigarette smoker    • Disease of thyroid gland     hypothyroid   • Exercise involving walking     usually daily   • Heart rate fast     Mom reports "sometimes and pt takes Metoprolol for this"   • Lipoma of back    • Low BP     Pt is on midodrine and Mom reports the Clozaril he takes causes low BP"   • Memory loss    • Schizophrenia (HCC)    • Seizures (HCC)    • Syncope      Past Surgical History:   Procedure Laterality Date   • MS EXC B9 LESION MRGN XCP SK TG T/A/L 0 5 CM/< Left 10/28/2021    Procedure: EXCISION WIDE LESION BACK;  Surgeon: Bianca Erazo MD;  Location: [facility];  Service: General   • TONSILLECTOMY AND ADENOIDECTOMY        Family History:     Family History   Problem Relation Age of Onset   • Depression Mother    • Hypertension Father    • Kidney disease Paternal Grandfather    • Seizures Neg Hx       Social History:     Social History     Socioeconomic History   • Marital status: Single     Spouse name: None   • Number of children: None   • Years of education: None   • Highest education level: None   Occupational History   • Occupation: unemployed   Tobacco Use   • Smoking status: Every Day     Packs/day: 0 25     Years: 20 00     Pack years: 5 00     Types: Cigarettes   • Smokeless tobacco: Never   • Tobacco comments:     Per [employer]-current everyday smoker, yesterday  2100 last cigarette   Vaping Use   • Vaping Use: Never used   Substance and Sexual Activity   • Alcohol use: No   • Drug use: No   • Sexual activity: None     Comment: defer   Other Topics Concern   • None   Social History Narrative    Lives at home with mother and cat in [place]     Social Determinants of Health     Financial Resource Strain: Low Risk    • Difficulty of Paying Living Expenses: Not hard at all   Food Insecurity: Not on file   Transportation Needs: No Transportation Needs   • Lack of Transportation (Medical): No   • Lack of Transportation (Non-Medical):  No   Physical Activity: Not on file   Stress: Not on file   Social Connections: Not on file   Intimate Partner Violence: Not on file   Housing Stability: Not on file      Medications and Allergies:     Current Outpatient Medications   Medication Sig Dispense Refill   • buPROPion (WELLBUTRIN XL) 300 mg 24 hr tablet Take 1 tablet (300 mg total) by mouth daily (Patient taking differently: Take 300 mg by mouth daily at bedtime) 30 tablet 0   • clozapine (CLOZARIL) 200 MG tablet Take 2 tablets (400 mg total) by mouth daily at bedtime 60 tablet 0   • cloZAPine (CLOZARIL) 50 MG tablet Take 1 tablet (50 mg total) by mouth daily at bedtime (Patient taking differently: Take 50 mg by mouth daily at bedtime Mom states 250mg at bedtime) 30 tablet 0   • divalproex sodium (DEPAKOTE ER) 250 mg 24 hr tablet      • divalproex sodium (DEPAKOTE ER) 500 mg 24 hr tablet Take 2 tablets (1,000 mg total) by mouth daily at bedtime (Patient taking differently: Take 750 mg by mouth daily at bedtime) 60 tablet 0   • fluPHENAZine (PROLIXIN) 10 MG tablet Take 2 tablets (20 mg total) by mouth daily at bedtime 60 tablet 0   • glycopyrrolate (ROBINUL) 2 MG tablet daily at bedtime     • lacosamide (VIMPAT) 150 mg tablet Take 1 tablet (150 mg total) by mouth every 12 (twelve) hours 60 tablet 2   • levothyroxine 100 mcg tablet TAKE 1 TABLET BY MOUTH DAILY 90 tablet 0   • metoprolol tartrate (LOPRESSOR) 25 mg tablet TAKE HALF TABLET BY MOUTH TWICE A DAY 90 tablet 0   • midodrine (PROAMATINE) 10 MG tablet TAKE 1 TABLET BY MOUTH THREE TIMES A DAY 90 tablet 2   • senna (SENOKOT) 8 6 mg Take 1 tablet (8 6 mg total) by mouth 2 (two) times a day 60 each 5     No current facility-administered medications for this visit  No Known Allergies   Immunizations:     Immunization History   Administered Date(s) Administered   • COVID-19 PFIZER VACCINE 0 3 ML IM 04/27/2021, 05/25/2021   • DTaP 12/16/2009   • INFLUENZA 11/20/2012   • Influenza, injectable, quadrivalent, preservative free 0 5 mL 10/17/2018, 10/22/2020   • Influenza, recombinant, quadrivalent,injectable, preservative free 12/10/2019, 11/05/2021   • Pneumococcal Conjugate 13-Valent 12/10/2019   • Tdap 09/23/2022   • Tuberculin Skin Test-PPD Intradermal 12/16/2009, 10/04/2010, 08/10/2017      Health Maintenance:         Topic Date Due   • HIV Screening  Completed   • Hepatitis C Screening  Completed         Topic Date Due   • Pneumococcal Vaccine: Pediatrics (0 to 5 Years) and At-Risk Patients (6 to 59 Years) (2 - PPSV23 if available, else PCV20) 12/10/2020   • COVID-19 Vaccine (3 - Booster for Machado Peter series) 07/20/2021      Medicare Screening Tests and Risk Assessments:     Dennis Sanchez is here for his Subsequent Wellness visit  Health Risk Assessment:   Patient rates overall health as good  Patient feels that their physical health rating is same  Patient is satisfied with their life  Eyesight was rated as same  Hearing was rated as same  Patient feels that their emotional and mental health rating is same  Patients states they are never, rarely angry  Patient states they are sometimes unusually tired/fatigued  Pain experienced in the last 7 days has been none  Patient states that he has experienced weight loss or gain in last 6 months  Depression Screening:   PHQ-2 Score: 6  PHQ-9 Score: 18      Fall Risk Screening:    In the past year, patient has experienced: history of falling in past year      Home Safety:  Patient does not have trouble with stairs inside or outside of their home  Patient has working smoke alarms and has working carbon monoxide detector  Home safety hazards include: none  Nutrition:   Current diet is Regular  Medications:   Patient is currently taking over-the-counter supplements  OTC medications include: Vitamin  Patient is not able to manage medications  Activities of Daily Living (ADLs)/Instrumental Activities of Daily Living (IADLs):   Walk and transfer into and out of bed and chair?: Yes  Dress and groom yourself?: Yes    Bathe or shower yourself?: Yes    Feed yourself? Yes  Do your laundry/housekeeping?: No  Manage your money, pay your bills and track your expenses?: No  Make your own meals?: Yes    Do your own shopping?: No    Previous Hospitalizations:   Any hospitalizations or ED visits within the last 12 months?: Yes    How many hospitalizations have you had in the last year?: 1-2    Advance Care Planning:   Living will: No    Durable POA for healthcare: No    Advanced directive: No      PREVENTIVE SCREENINGS      Cardiovascular Screening:    General: Screening Not Indicated and History Lipid Disorder      Diabetes Screening:     General: Screening Current      Prostate Cancer Screening:    General: Screening Not Indicated      Abdominal Aortic Aneurysm (AAA) Screening:    Risk factors include: tobacco use        Lung Cancer Screening:     General: Screening Not Indicated      Hepatitis C Screening:    General: Screening Current    Screening, Brief Intervention, and Referral to Treatment (SBIRT)    Screening  Typical number of drinks in a day: 0  Typical number of drinks in a week: 0  Interpretation: Low risk drinking behavior      AUDIT-C Screenin) How often did you have a drink containing alcohol in the past year? never  2) How many drinks did you have on a typical day when you were drinking in the past year? 0  3) How often did you have 6 or more drinks on one occasion in the past year? never    AUDIT-C Score: 0  Interpretation: Score 0-3 (male): Negative screen for alcohol misuse    Single Item Drug Screening:  How often have you used an illegal drug (including marijuana) or a prescription medication for non-medical reasons in the past year? never    Single Item Drug Screen Score: 0  Interpretation: Negative screen for possible drug use disorder    No results found  Physical Exam:     /72 (BP Location: Left arm, Patient Position: Sitting, Cuff Size: Standard)   Pulse 88   Temp (!) 97 3 °F (36 3 °C) (Temporal)   Ht 5' 11" (1 803 m)   Wt 67 6 kg (149 lb)   SpO2 97%   BMI 20 78 kg/m²     Physical Exam  Constitutional:       General: He is not in acute distress  Appearance: He is not ill-appearing  HENT:      Head: Normocephalic and atraumatic  Mouth/Throat:      Mouth: Mucous membranes are moist    Eyes:      General:         Right eye: No discharge  Left eye: No discharge  Extraocular Movements: Extraocular movements intact  Pupils: Pupils are equal, round, and reactive to light  Cardiovascular:      Rate and Rhythm: Normal rate  Pulses: Normal pulses  Pulmonary:      Effort: Pulmonary effort is normal  No respiratory distress  Breath sounds: No wheezing  Abdominal:      General: Bowel sounds are normal  There is no distension  Palpations: Abdomen is soft  Tenderness: There is no abdominal tenderness  There is no guarding  Musculoskeletal:      Right lower leg: No edema  Left lower leg: No edema  Neurological:      General: No focal deficit present  Mental Status: He is alert  Psychiatric:         Mood and Affect: Affect is flat            Madai Domínguez MD

## 2023-03-18 PROBLEM — R74.8 LOW SERUM HDL: Status: RESOLVED | Noted: 2019-08-31 | Resolved: 2023-03-18

## 2023-03-18 PROBLEM — R22.9 SUBCUTANEOUS MASS: Status: RESOLVED | Noted: 2021-08-14 | Resolved: 2023-03-18

## 2023-03-18 PROBLEM — Z23 FLU VACCINE NEED: Status: RESOLVED | Noted: 2021-11-07 | Resolved: 2023-03-18

## 2023-03-18 PROBLEM — R79.89 ELEVATED SERUM CREATININE: Status: RESOLVED | Noted: 2021-09-17 | Resolved: 2023-03-18

## 2023-03-18 PROBLEM — R94.5 ABNORMAL LIVER FUNCTION: Status: RESOLVED | Noted: 2019-08-31 | Resolved: 2023-03-18

## 2023-03-18 PROBLEM — Z11.4 ENCOUNTER FOR SCREENING FOR HIV: Status: RESOLVED | Noted: 2019-12-10 | Resolved: 2023-03-18

## 2023-03-18 PROBLEM — R73.9 ELEVATED BLOOD SUGAR: Status: RESOLVED | Noted: 2019-05-28 | Resolved: 2023-03-18

## 2023-03-18 PROBLEM — Z71.85 IMMUNIZATION COUNSELING: Status: RESOLVED | Noted: 2021-11-25 | Resolved: 2023-03-18

## 2023-03-18 PROBLEM — E78.00 PURE HYPERCHOLESTEROLEMIA: Status: RESOLVED | Noted: 2019-05-28 | Resolved: 2023-03-18

## 2023-03-18 PROBLEM — R73.09 BLOOD SUGAR INCREASED: Status: RESOLVED | Noted: 2022-10-01 | Resolved: 2023-03-18

## 2023-03-18 PROBLEM — N18.31 STAGE 3A CHRONIC KIDNEY DISEASE (HCC): Status: RESOLVED | Noted: 2021-09-17 | Resolved: 2023-03-18

## 2023-03-18 PROBLEM — R93.1 ABNORMAL ECHOCARDIOGRAM: Status: RESOLVED | Noted: 2019-08-31 | Resolved: 2023-03-18

## 2023-03-18 PROBLEM — R11.0 NAUSEA: Status: RESOLVED | Noted: 2021-11-07 | Resolved: 2023-03-18

## 2023-03-18 PROBLEM — E78.1 PURE HYPERGLYCERIDEMIA: Status: RESOLVED | Noted: 2019-08-31 | Resolved: 2023-03-18

## 2023-03-18 PROBLEM — N28.9 ABNORMAL RENAL FUNCTION: Status: RESOLVED | Noted: 2021-09-17 | Resolved: 2023-03-18

## 2023-03-18 PROBLEM — R89.8 EOSINOPHILS INCREASED: Status: RESOLVED | Noted: 2019-05-28 | Resolved: 2023-03-18

## 2023-03-18 PROBLEM — J98.6 ELEVATED DIAPHRAGM: Status: RESOLVED | Noted: 2022-10-01 | Resolved: 2023-03-18

## 2023-03-18 PROBLEM — R68.81 EARLY SATIETY: Status: RESOLVED | Noted: 2022-10-01 | Resolved: 2023-03-18

## 2023-03-18 PROBLEM — R00.0 SINUS TACHYCARDIA: Status: RESOLVED | Noted: 2018-12-07 | Resolved: 2023-03-18

## 2023-03-18 PROBLEM — N18.31 CHRONIC RENAL IMPAIRMENT, STAGE 3A (HCC): Status: RESOLVED | Noted: 2021-11-07 | Resolved: 2023-03-18

## 2023-03-18 PROBLEM — R73.9 BLOOD SUGAR INCREASED: Status: RESOLVED | Noted: 2022-10-01 | Resolved: 2023-03-18

## 2023-03-18 PROBLEM — R10.13 EPIGASTRIC PAIN: Status: RESOLVED | Noted: 2021-11-07 | Resolved: 2023-03-18

## 2023-03-18 PROBLEM — F17.200 SMOKER: Status: RESOLVED | Noted: 2019-12-10 | Resolved: 2023-03-18

## 2023-03-18 PROBLEM — F11.20 CONTINUOUS OPIOID DEPENDENCE (HCC): Status: RESOLVED | Noted: 2021-11-24 | Resolved: 2023-03-18

## 2023-03-18 NOTE — ASSESSMENT & PLAN NOTE
Lab Results   Component Value Date    EGFR 65 09/23/2022    EGFR 64 01/06/2022    EGFR 59 10/28/2021    CREATININE 1 36 (H) 09/23/2022    CREATININE 1 38 (H) 01/06/2022    CREATININE 1 49 (H) 10/28/2021     - Most recent CMP from January 2023 reviewed; GFR 80 and Creatinine 1 18  - Keep well hydrated; avoid nephrotoxic agents such as NSAIDs and IV contrast; avoid hypotension   - Will continue to monitor

## 2023-03-18 NOTE — ASSESSMENT & PLAN NOTE
Discussed tobacco cessation  Discussed the effects of smoking on cardiovascular system, skin and lungs    Patient verbalized understanding but is not ready to quit at this time  - PCV 20 vaccination declined

## 2023-03-18 NOTE — ASSESSMENT & PLAN NOTE
- Likely secondary to Clozaril  - Patient was started on Metoprolol 12 5mg BID by Cardiology with good response   - Continue current regimen

## 2023-03-18 NOTE — ASSESSMENT & PLAN NOTE
- This could be secondary to underlying depression  Paitent is also on Wellbutrin which causes decreased appetite  Most recent CBC, CMP and TSH reviewed and within normal limits  Will also order CXR given smoking history, ESR and CRP to assess for inflammatory disorder and stool hemoccult

## 2023-03-18 NOTE — ASSESSMENT & PLAN NOTE
Depression Screening Follow-up Plan: Patient's depression screening was positive with a PHQ-2 score of 6  Their PHQ-9 score was 18  Continue regular follow-up with their psychologist/therapist/psychiatrist who is managing their mental health condition(s)

## 2023-06-05 ENCOUNTER — APPOINTMENT (OUTPATIENT)
Dept: LAB | Facility: CLINIC | Age: 38
End: 2023-06-05
Payer: MEDICARE

## 2023-06-05 DIAGNOSIS — F20.0 SCHIZOPHRENIA, PARANOID (HCC): ICD-10-CM

## 2023-06-05 DIAGNOSIS — E03.9 HYPOTHYROIDISM, UNSPECIFIED TYPE: ICD-10-CM

## 2023-06-05 DIAGNOSIS — Z79.899 ENCOUNTER FOR LONG-TERM (CURRENT) USE OF OTHER MEDICATIONS: ICD-10-CM

## 2023-06-05 LAB
BASOPHILS # BLD AUTO: 0.04 THOUSANDS/ÂΜL (ref 0–0.1)
BASOPHILS NFR BLD AUTO: 1 % (ref 0–1)
EOSINOPHIL # BLD AUTO: 0.51 THOUSAND/ÂΜL (ref 0–0.61)
EOSINOPHIL NFR BLD AUTO: 8 % (ref 0–6)
ERYTHROCYTE [DISTWIDTH] IN BLOOD BY AUTOMATED COUNT: 12.4 % (ref 11.6–15.1)
HCT VFR BLD AUTO: 44.1 % (ref 36.5–49.3)
HGB BLD-MCNC: 13.9 G/DL (ref 12–17)
IMM GRANULOCYTES # BLD AUTO: 0.01 THOUSAND/UL (ref 0–0.2)
IMM GRANULOCYTES NFR BLD AUTO: 0 % (ref 0–2)
LYMPHOCYTES # BLD AUTO: 1.64 THOUSANDS/ÂΜL (ref 0.6–4.47)
LYMPHOCYTES NFR BLD AUTO: 25 % (ref 14–44)
MCH RBC QN AUTO: 28.5 PG (ref 26.8–34.3)
MCHC RBC AUTO-ENTMCNC: 31.5 G/DL (ref 31.4–37.4)
MCV RBC AUTO: 91 FL (ref 82–98)
MONOCYTES # BLD AUTO: 0.41 THOUSAND/ÂΜL (ref 0.17–1.22)
MONOCYTES NFR BLD AUTO: 6 % (ref 4–12)
NEUTROPHILS # BLD AUTO: 4.06 THOUSANDS/ÂΜL (ref 1.85–7.62)
NEUTS SEG NFR BLD AUTO: 60 % (ref 43–75)
NRBC BLD AUTO-RTO: 0 /100 WBCS
PLATELET # BLD AUTO: 237 THOUSANDS/UL (ref 149–390)
PMV BLD AUTO: 10.2 FL (ref 8.9–12.7)
RBC # BLD AUTO: 4.87 MILLION/UL (ref 3.88–5.62)
WBC # BLD AUTO: 6.67 THOUSAND/UL (ref 4.31–10.16)

## 2023-06-05 PROCEDURE — 85025 COMPLETE CBC W/AUTO DIFF WBC: CPT

## 2023-06-05 PROCEDURE — 80159 DRUG ASSAY CLOZAPINE: CPT

## 2023-06-05 PROCEDURE — 36415 COLL VENOUS BLD VENIPUNCTURE: CPT

## 2023-06-07 LAB
CLOZAPINE SERPL-MCNC: 292 NG/ML (ref 350–600)
CLOZAPINE+NOR SERPL-MCNC: 389 NG/ML
NORCLOZAPINE SERPL-MCNC: 97 NG/ML

## 2023-06-19 DIAGNOSIS — E03.9 ACQUIRED HYPOTHYROIDISM: ICD-10-CM

## 2023-06-19 DIAGNOSIS — R00.0 SINUS TACHYCARDIA: ICD-10-CM

## 2023-06-19 DIAGNOSIS — I95.1 ORTHOSTATIC HYPOTENSION: ICD-10-CM

## 2023-06-20 RX ORDER — LEVOTHYROXINE SODIUM 0.1 MG/1
TABLET ORAL
Qty: 90 TABLET | Refills: 0 | Status: SHIPPED | OUTPATIENT
Start: 2023-06-20

## 2023-06-20 RX ORDER — MIDODRINE HYDROCHLORIDE 10 MG/1
TABLET ORAL
Qty: 90 TABLET | Refills: 0 | Status: SHIPPED | OUTPATIENT
Start: 2023-06-20

## 2023-06-30 ENCOUNTER — OFFICE VISIT (OUTPATIENT)
Dept: URGENT CARE | Facility: CLINIC | Age: 38
End: 2023-06-30
Payer: MEDICARE

## 2023-06-30 VITALS
SYSTOLIC BLOOD PRESSURE: 110 MMHG | TEMPERATURE: 96 F | OXYGEN SATURATION: 97 % | HEART RATE: 104 BPM | DIASTOLIC BLOOD PRESSURE: 80 MMHG | RESPIRATION RATE: 20 BRPM

## 2023-06-30 DIAGNOSIS — L55.1 SUNBURN OF SECOND DEGREE: Primary | ICD-10-CM

## 2023-06-30 DIAGNOSIS — L03.116 CELLULITIS OF LEFT LOWER LEG: ICD-10-CM

## 2023-06-30 PROCEDURE — 99212 OFFICE O/P EST SF 10 MIN: CPT | Performed by: PHYSICIAN ASSISTANT

## 2023-06-30 PROCEDURE — G0463 HOSPITAL OUTPT CLINIC VISIT: HCPCS | Performed by: PHYSICIAN ASSISTANT

## 2023-06-30 RX ORDER — CEPHALEXIN 500 MG/1
500 CAPSULE ORAL EVERY 8 HOURS SCHEDULED
Qty: 15 CAPSULE | Refills: 0 | Status: SHIPPED | OUTPATIENT
Start: 2023-06-30 | End: 2023-07-05

## 2023-06-30 RX ORDER — DIAZEPAM 5 MG/1
TABLET ORAL
COMMUNITY
Start: 2023-06-06

## 2023-06-30 NOTE — PROGRESS NOTES
[facility]    NAME: Shira Jacob is a 34 y o  male  : 1985    MRN: [id number]  DATE: 2023  TIME: 1:33 PM    Assessment and Plan   Sunburn of second degree [L55 1]  1  Sunburn of second degree  cephalexin (KEFLEX) 500 mg capsule      2  Cellulitis of left lower leg  cephalexin (KEFLEX) 500 mg capsule        Recommend not popping blister  If blister pops, may use small amount of topical antibiotic ointment on base otherwise discontinue topical antibiotic to legs at this time  Patient Instructions     Patient Instructions   Photos taken by mom on smart phone to document current status of sunburn on legs  Continue to elevate legs while seated  Pump ankles periodically to help with return circulation  May do cool compresses over skin for comfort as needed or use topical sunburn cream or aloe for topical comfort as needed  Continue ibuprofen 2 to 3 tablets 3-4 times a day as needed  Clean skin daily with plain soap and water  Stop topical antibiotic ointment  May pat dry and cover with clean dressings if lesions are draining  Avoid sun exposure at this time  Contact primary care provider offices soon as possible to arrange follow-up for approximately 3 to 5 days for reevaluation  Chief Complaint     Chief Complaint   Patient presents with   • Sunburn     Mom reports a sunburn to the lower extremities that happened   B/L shins bright red with blister noted on left ankle  History of Present Illness   Shira Jacob presents to the clinic c/o  39-year-old male with psychosocial disabilities brought in by mom and sister for sunburn lower legs ankles  Initial sunburn occurred on  when family was down in [place] May  Patient may have fallen asleep and his legs were out from under the shade  Mother says that over the last 2 days patient skin has become more red and tender  There is a blister that has developed over the last couple days  No fever  Chills towards evening  Mom is giving him 2-3 ibuprofen 3 times a day  Stopped Tylenol as it was not helping  Has been applying a lotion and Neosporin on skin  Does not seem to be helping  Skin is sore  No itching  Normal appetite  Patient mainly sitting in recliner with feet elevated  No history of MRSA  Review of Systems   Review of Systems   Unable to perform ROS: Psychiatric disorder (Mom and sister contribute to review of systems)   Constitutional: Positive for activity change and chills  Negative for appetite change, diaphoresis, fatigue and fever  Respiratory: Negative  Cardiovascular: Negative  Skin: Positive for color change         Current Medications     Long-Term Medications   Medication Sig Dispense Refill   • buPROPion (WELLBUTRIN XL) 300 mg 24 hr tablet Take 1 tablet (300 mg total) by mouth daily (Patient taking differently: Take 300 mg by mouth daily at bedtime) 30 tablet 0   • clozapine (CLOZARIL) 200 MG tablet Take 2 tablets (400 mg total) by mouth daily at bedtime 60 tablet 0   • diazepam (VALIUM) 5 mg tablet      • divalproex sodium (DEPAKOTE ER) 250 mg 24 hr tablet      • divalproex sodium (DEPAKOTE ER) 500 mg 24 hr tablet Take 2 tablets (1,000 mg total) by mouth daily at bedtime (Patient taking differently: Take 750 mg by mouth daily at bedtime) 60 tablet 0   • fluPHENAZine (PROLIXIN) 10 MG tablet Take 2 tablets (20 mg total) by mouth daily at bedtime 60 tablet 0   • glycopyrrolate (ROBINUL) 2 MG tablet daily at bedtime     • lacosamide (VIMPAT) 150 mg tablet TAKE 1 TABLET (150 MG TOTAL) BY MOUTH EVERY 12 (TWELVE) HOURS 60 tablet 2   • levothyroxine 100 mcg tablet TAKE ONE TABLET BY MOUTH DAILY 90 tablet 0   • metoprolol tartrate (LOPRESSOR) 25 mg tablet TAKE HALF TABLET BY MOUTH TWICE A DAY 90 tablet 0   • midodrine (PROAMATINE) 10 MG tablet TAKE ONE TABLET BY MOUTH THREE TIMES A DAY 90 tablet 0   • cloZAPine (CLOZARIL) 50 MG tablet Take 1 tablet (50 mg total) by mouth "daily at bedtime (Patient taking differently: Take 50 mg by mouth daily at bedtime Mom states 250mg at bedtime) 30 tablet 0   • senna (SENOKOT) 8 6 mg Take 1 tablet (8 6 mg total) by mouth 2 (two) times a day (Patient not taking: Reported on 6/30/2023) 60 each 5       Current Allergies     Allergies as of 06/30/2023   • (No Known Allergies)          The following portions of the patient's history were reviewed and updated as appropriate: allergies, current medications, past family history, past medical history, past social history, past surgical history and problem list   Past Medical History:   Diagnosis Date   • Chronic kidney disease     mom reports \"found on lab work but had ultrasound of kidney and \"fine\"   • Cigarette smoker    • Disease of thyroid gland     hypothyroid   • Exercise involving walking     usually daily   • Heart rate fast     Mom reports \"sometimes and pt takes Metoprolol for this\"   • Lipoma of back    • Low BP     Pt is on midodrine and Mom reports the Clozaril he takes causes low BP\"   • Memory loss    • Schizophrenia ([facility] )    • Seizures ([facility] )    • Syncope      Past Surgical History:   Procedure Laterality Date   • [place] EXC B9 LESION MRGN XCP SK TG T/A/L 0 5 CM/< Left 10/28/2021    Procedure: EXCISION WIDE LESION BACK;  Surgeon: Daniele Rojas MD;  Location: [facility];  Service: General   • TONSILLECTOMY AND ADENOIDECTOMY       Family History   Problem Relation Age of Onset   • Depression Mother    • Hypertension Father    • Kidney disease Paternal Grandfather    • Seizures Neg Hx        Objective   /80 (BP Location: Left arm, Patient Position: Sitting, Cuff Size: Standard)   Pulse 104   Temp (!) 96 °F (35 6 °C) (Tympanic)   Resp 20   SpO2 97%   No LMP for male patient  Physical Exam     Physical Exam  Vitals and nursing note reviewed  Constitutional:       General: He is not in acute distress  Appearance: He is well-developed   He is not ill-appearing, toxic-appearing " or diaphoretic  Comments: Accompanied by mom and sister   Cardiovascular:      Rate and Rhythm: Normal rate and regular rhythm  Pulmonary:      Effort: Pulmonary effort is normal    Skin:     General: Skin is warm and dry  Findings: Erythema present  Comments: Anterior lower legs, ankles and top of feet with increased redness in sun exposed areas  Mild swelling noted  Left mid lower shin area with 2 cm bulla with transudate  No pustules  Skin mildly indurated with TTP  Neurological:      Mental Status: He is alert and oriented to person, place, and time  Psychiatric:      Comments: Flat affect  Psychomotor slowness  Cooperative

## 2023-06-30 NOTE — PATIENT INSTRUCTIONS
Photos taken by mom on smart phone to document current status of sunburn on legs  Continue to elevate legs while seated  Pump ankles periodically to help with return circulation  May do cool compresses over skin for comfort as needed or use topical sunburn cream or aloe for topical comfort as needed  Continue ibuprofen 2 to 3 tablets 3-4 times a day as needed  Clean skin daily with plain soap and water  Stop topical antibiotic ointment  May pat dry and cover with clean dressings if lesions are draining  Avoid sun exposure at this time  Contact primary care provider offices soon as possible to arrange follow-up for approximately 3 to 5 days for reevaluation

## 2023-07-05 ENCOUNTER — APPOINTMENT (OUTPATIENT)
Dept: LAB | Facility: CLINIC | Age: 38
End: 2023-07-05
Payer: MEDICARE

## 2023-07-05 DIAGNOSIS — F20.0 PARANOID SCHIZOPHRENIA (HCC): ICD-10-CM

## 2023-07-05 DIAGNOSIS — Z79.899 ENCOUNTER FOR LONG-TERM (CURRENT) USE OF OTHER MEDICATIONS: ICD-10-CM

## 2023-07-05 DIAGNOSIS — E03.9 HYPOTHYROIDISM, UNSPECIFIED TYPE: ICD-10-CM

## 2023-07-05 LAB
BASOPHILS # BLD AUTO: 0.06 THOUSANDS/ÂΜL (ref 0–0.1)
BASOPHILS NFR BLD AUTO: 1 % (ref 0–1)
EOSINOPHIL # BLD AUTO: 0.93 THOUSAND/ÂΜL (ref 0–0.61)
EOSINOPHIL NFR BLD AUTO: 13 % (ref 0–6)
ERYTHROCYTE [DISTWIDTH] IN BLOOD BY AUTOMATED COUNT: 12.4 % (ref 11.6–15.1)
HCT VFR BLD AUTO: 44.8 % (ref 36.5–49.3)
HGB BLD-MCNC: 14.7 G/DL (ref 12–17)
IMM GRANULOCYTES # BLD AUTO: 0.02 THOUSAND/UL (ref 0–0.2)
IMM GRANULOCYTES NFR BLD AUTO: 0 % (ref 0–2)
LYMPHOCYTES # BLD AUTO: 2.21 THOUSANDS/ÂΜL (ref 0.6–4.47)
LYMPHOCYTES NFR BLD AUTO: 32 % (ref 14–44)
MCH RBC QN AUTO: 28.5 PG (ref 26.8–34.3)
MCHC RBC AUTO-ENTMCNC: 32.8 G/DL (ref 31.4–37.4)
MCV RBC AUTO: 87 FL (ref 82–98)
MONOCYTES # BLD AUTO: 0.42 THOUSAND/ÂΜL (ref 0.17–1.22)
MONOCYTES NFR BLD AUTO: 6 % (ref 4–12)
NEUTROPHILS # BLD AUTO: 3.3 THOUSANDS/ÂΜL (ref 1.85–7.62)
NEUTS SEG NFR BLD AUTO: 48 % (ref 43–75)
NRBC BLD AUTO-RTO: 0 /100 WBCS
PLATELET # BLD AUTO: 254 THOUSANDS/UL (ref 149–390)
PMV BLD AUTO: 9.4 FL (ref 8.9–12.7)
RBC # BLD AUTO: 5.16 MILLION/UL (ref 3.88–5.62)
WBC # BLD AUTO: 6.94 THOUSAND/UL (ref 4.31–10.16)

## 2023-07-05 PROCEDURE — 36415 COLL VENOUS BLD VENIPUNCTURE: CPT

## 2023-07-05 PROCEDURE — 85025 COMPLETE CBC W/AUTO DIFF WBC: CPT

## 2023-07-15 DIAGNOSIS — I95.1 ORTHOSTATIC HYPOTENSION: ICD-10-CM

## 2023-07-18 RX ORDER — MIDODRINE HYDROCHLORIDE 10 MG/1
TABLET ORAL
Qty: 90 TABLET | Refills: 2 | Status: SHIPPED | OUTPATIENT
Start: 2023-07-18

## 2023-08-03 DIAGNOSIS — M54.2 NECK PAIN: Primary | ICD-10-CM

## 2023-08-03 DIAGNOSIS — M25.512 ACUTE PAIN OF BOTH SHOULDERS: ICD-10-CM

## 2023-08-03 DIAGNOSIS — M25.511 ACUTE PAIN OF BOTH SHOULDERS: ICD-10-CM

## 2023-08-04 NOTE — PROGRESS NOTES
Patient ID: Juan Glover is a 34 y.o. male with schizophrenia and localization related epilepsy, who is returning to Neurology office for follow up of his seizures. Assessment/Plan:    Localization-related epilepsy [facility])  He has not had any additional seizures since his last appointment. He has been tolerating his lacosamide without any definite side effects. They do note some decreased dexterity in his hands, but this is likely unrelated to his lacosamide. He has some mild gait imbalance, but is unclear if this is new. He has not had any falls. --His dexterity in his hands has decreased per his mother. This is unlikely to be related to his medications. Will be important for him to work with physical therapy and keep track of his symptoms for his next appointment    -- I will have him continue lacosamide 150 mg twice a day. If he would have additional seizures, this dose could be increased. -- At this point he does not appear to have any side effects to his lacosamide, but I did ask them to keep better track of his balance and other issues to see if there is a pattern that we can determine at his next appointment. He will Return in about 4 months (around 12/9/2023). Subjective:    HPI  Current seizure medications:  1. Lacosamide 150 mg twice a day  Other medications as per Epic. Since his last visit, he has not had any additional seizures. They deny any clear side effects to his medications. His mother notes that she has some trouble with fine motor skills with his hands. His mother may need to cut his food at times. He does go for walks, but is not very active otherwise. He was referred to physical therapy and is planning to start his soon. Prior Seizure Medications: none for seizures. He was on Divalproex prior to seizures for mood.  Mother is not sure, but possibly was on lamotrigine in the past    His history was also obtained from his mother, who was present at today's visit. Objective:    Blood pressure 101/80, pulse 88, height 5' 11" (1.803 m), weight 64 kg (141 lb). Physical Exam    Neurological Exam      ROS:    Review of Systems    I personally reviewed the ROS that was entered by the medical assistant in a separate note. Voice recognition software was used in the generation of this note. There may be unintentional errors including grammatical errors, spelling errors, or pronoun errors.

## 2023-08-09 ENCOUNTER — OFFICE VISIT (OUTPATIENT)
Dept: NEUROLOGY | Facility: CLINIC | Age: 38
End: 2023-08-09
Payer: MEDICARE

## 2023-08-09 VITALS
WEIGHT: 141 LBS | HEIGHT: 71 IN | DIASTOLIC BLOOD PRESSURE: 80 MMHG | BODY MASS INDEX: 19.74 KG/M2 | SYSTOLIC BLOOD PRESSURE: 101 MMHG | HEART RATE: 88 BPM

## 2023-08-09 DIAGNOSIS — G40.109 LOCALIZATION-RELATED EPILEPSY (HCC): Primary | ICD-10-CM

## 2023-08-09 PROCEDURE — 99214 OFFICE O/P EST MOD 30 MIN: CPT | Performed by: PSYCHIATRY & NEUROLOGY

## 2023-08-09 RX ORDER — VENLAFAXINE HYDROCHLORIDE 37.5 MG/1
1 CAPSULE, EXTENDED RELEASE ORAL DAILY
COMMUNITY
Start: 2023-07-29

## 2023-08-09 RX ORDER — LACOSAMIDE 150 MG/1
150 TABLET ORAL EVERY 12 HOURS SCHEDULED
Qty: 60 TABLET | Refills: 5 | Status: SHIPPED | OUTPATIENT
Start: 2023-08-09

## 2023-08-09 RX ORDER — GLYCOPYRROLATE 1 MG/1
1 TABLET ORAL DAILY
COMMUNITY
Start: 2023-07-29

## 2023-08-09 NOTE — PROGRESS NOTES
Review of Systems   Constitutional: Negative for appetite change and fever. HENT: Negative. Negative for hearing loss, tinnitus, trouble swallowing and voice change. Eyes: Negative. Negative for photophobia and pain. Respiratory: Negative. Negative for shortness of breath. Cardiovascular: Negative. Negative for palpitations. Gastrointestinal: Negative. Negative for nausea and vomiting. Endocrine: Negative. Negative for cold intolerance. Genitourinary: Negative. Negative for dysuria, frequency and urgency. Musculoskeletal: Negative. Negative for myalgias and neck pain. Skin: Negative. Negative for rash. Neurological: Positive for dizziness, tremors and weakness. Negative for seizures, syncope, facial asymmetry, speech difficulty, light-headedness, numbness and headaches. Tremors and weakness in both arms and hands   Hematological: Negative. Does not bruise/bleed easily. Psychiatric/Behavioral: Positive for hallucinations. Negative for confusion and sleep disturbance.

## 2023-08-09 NOTE — ASSESSMENT & PLAN NOTE
He has not had any additional seizures since his last appointment. He has been tolerating his lacosamide without any definite side effects. They do note some decreased dexterity in his hands, but this is likely unrelated to his lacosamide. He has some mild gait imbalance, but is unclear if this is new. He has not had any falls. --His dexterity in his hands has decreased per his mother. This is unlikely to be related to his medications. Will be important for him to work with physical therapy and keep track of his symptoms for his next appointment    -- I will have him continue lacosamide 150 mg twice a day. If he would have additional seizures, this dose could be increased. -- At this point he does not appear to have any side effects to his lacosamide, but I did ask them to keep better track of his balance and other issues to see if there is a pattern that we can determine at his next appointment.

## 2023-08-09 NOTE — PATIENT INSTRUCTIONS
-- continue to take Lacosamide 150 mg twice a day. -- Keep track of his balance and strength issues. I would expect this to improve with working with physical therapy.

## 2023-09-11 DIAGNOSIS — E03.9 ACQUIRED HYPOTHYROIDISM: ICD-10-CM

## 2023-09-11 DIAGNOSIS — R00.0 SINUS TACHYCARDIA: ICD-10-CM

## 2023-09-11 RX ORDER — LEVOTHYROXINE SODIUM 0.1 MG/1
TABLET ORAL
Qty: 90 TABLET | Refills: 0 | Status: SHIPPED | OUTPATIENT
Start: 2023-09-11

## 2023-10-14 DIAGNOSIS — I95.1 ORTHOSTATIC HYPOTENSION: ICD-10-CM

## 2023-10-16 RX ORDER — MIDODRINE HYDROCHLORIDE 10 MG/1
TABLET ORAL
Qty: 90 TABLET | Refills: 1 | Status: SHIPPED | OUTPATIENT
Start: 2023-10-16

## 2023-10-26 ENCOUNTER — OFFICE VISIT (OUTPATIENT)
Dept: FAMILY MEDICINE CLINIC | Facility: CLINIC | Age: 38
End: 2023-10-26
Payer: MEDICARE

## 2023-10-26 VITALS
HEART RATE: 97 BPM | SYSTOLIC BLOOD PRESSURE: 110 MMHG | OXYGEN SATURATION: 98 % | WEIGHT: 148.8 LBS | HEIGHT: 71 IN | BODY MASS INDEX: 20.83 KG/M2 | DIASTOLIC BLOOD PRESSURE: 62 MMHG | TEMPERATURE: 97.5 F

## 2023-10-26 DIAGNOSIS — F03.90 SENILE DEMENTIA, UNCOMPLICATED (HCC): ICD-10-CM

## 2023-10-26 DIAGNOSIS — R35.0 URINARY FREQUENCY: Primary | ICD-10-CM

## 2023-10-26 PROCEDURE — 99214 OFFICE O/P EST MOD 30 MIN: CPT | Performed by: FAMILY MEDICINE

## 2023-10-26 NOTE — PATIENT INSTRUCTIONS
Urinary Urgency and Frequency   AMBULATORY CARE:   Urinary urgency and frequency  is a condition that increases how strongly or how often you need to urinate. The condition may also be called urgency-frequency syndrome. Urinary urgency means you feel such a strong need to urinate that you have trouble waiting. You may also feel discomfort in your bladder. Urinary frequency means you need to urinate many times during the day. This may also be called increased daytime frequency. You may be woken from sleep by the need to urinate. Urgency and frequency often happen together, but you may only have one. Contact your healthcare provider if:   Your urine is pink, or you notice blood in your urine. You have pain with urination. You continue to have symptoms even after you take your medicine. You have new or worsening symptoms. You have questions or concerns about your condition or care. Treatment  will depend on the type and cause of your urination problems. You may need any of the following:  Medicines  may be given to relax your bladder and decrease urination. You may also need antibiotics if your symptoms are caused by a bacterial infection. Sacral nerve stimulation  sends electrical signals to your sacral nerve through a small device implanted under your skin. Your sacral nerve controls your bladder, sphincter, and pelvic floor muscles. Botox injections  into your bladder may help relax your bladder muscle to decrease urgency and frequency. Surgery  may be done if all other treatments cannot help you control your bladder. Manage urinary urgency and frequency:   Keep a record of your urination patterns for a few days. Write down the number of times you urinate over 24 hours, the amount, and if you have urine leakage. Record how strong the urge to urinate was each time. Your healthcare provider may also want you to record the type and amount of liquids you drink.      Train your bladder. Go to the bathroom at set times, such as every 2 hours, even if you do not feel the urge to go. You can also try to hold your urine when you feel the urge to go. For example, hold your urine for 5 minutes when you feel the urge to go. As that becomes easier, hold your urine for 10 minutes. Work up to every 3 or 4 hours to help control your bladder. Limit liquids as directed. Limit liquids to decrease the amount you urinate. Ask how much liquid to drink each day and which liquids are best for you. You may need to avoid drinking liquids several hours before you go to sleep. Your healthcare provider may also recommend that you limit caffeine and alcohol. Do Kegel exercises often. Kegel exercises help strengthen your pelvic muscles and improve bladder control. These muscles help you stop urinating. Squeeze these muscles tightly for 5 seconds like you are trying to stop the flow of urine. Then relax for 5 seconds. Gradually work up to squeezing for 10 seconds. Do 3 sets of 15 repetitions a day, or as directed. Exercise regularly and maintain a healthy weight. Ask your healthcare provider how much you should weigh and about the best exercise plan for you. Extra weight puts pressure on your bladder and may make your symptoms worse. Ask your provider to help you create a safe weight loss plan if you are overweight. Follow up with your healthcare provider as directed: Your healthcare provider may refer you to a specialist to find the cause of your symptoms. Write down your questions so you remember to ask them during your visits. © Copyright Dena Joseph 2023 Information is for End User's use only and may not be sold, redistributed or otherwise used for commercial purposes. The above information is an  only. It is not intended as medical advice for individual conditions or treatments.  Talk to your doctor, nurse or pharmacist before following any medical regimen to see if it is safe and effective for you.

## 2023-10-27 NOTE — PROGRESS NOTES
Assessment/Plan:    No problem-specific Assessment & Plan notes found for this encounter. Diagnoses and all orders for this visit:    Urinary frequency  -     Urinalysis with microscopic; Future  -     Urine culture; Future    Senile dementia, uncomplicated ([address])    Other orders  -     VITAMIN D PO; Take 2,000 Units by mouth in the morning  -     Omega-3 Fatty Acids (FISH OIL PO); Take by mouth        - Patient unable to provide urine sample in the office. Orders for urine with microscopy and culture placed to rule out infection. We also discussed the importance of keeping a bladder diary, limiting liquids, avoiding bladder irritants etc.     Subjective:      Patient ID: Doe Sharp is a 34 y.o. male. HPI  Doe Sharp is a 34year old male with a past medical history of epilepsy, hypothyroidism and schizophrenia who presents today accompanied by his mother with a chief complaint of urinary frequency. Patient's mother states that he has been getting up to urinate frequently at night and he has also had a few occasions where he has wet the bed. He denies any other urinary symptoms as well as any constipation. He does admit to drinking a lot of water, caffeine and soda. The following portions of the patient's history were reviewed and updated as appropriate: allergies, current medications, past family history, past medical history, past social history, past surgical history, and problem list.    Review of Systems   Constitutional: Negative. Negative for chills and fever. HENT: Negative. Eyes: Negative. Respiratory: Negative. Cardiovascular: Negative. Gastrointestinal: Negative. Genitourinary:  Positive for enuresis and frequency. Negative for difficulty urinating, dysuria, flank pain, hematuria and urgency. Musculoskeletal: Negative. Skin: Negative. Neurological: Negative. Psychiatric/Behavioral: Negative.            Objective:      /62 (BP Location: Left arm, Patient Position: Sitting, Cuff Size: Adult)   Pulse 97   Temp 97.5 °F (36.4 °C) (Temporal)   Ht 5' 11" (1.803 m)   Wt 67.5 kg (148 lb 12.8 oz)   SpO2 98%   BMI 20.75 kg/m²          Physical Exam  Constitutional:       General: He is not in acute distress. Appearance: He is not ill-appearing. HENT:      Head: Atraumatic. Cardiovascular:      Rate and Rhythm: Normal rate. Pulmonary:      Effort: Pulmonary effort is normal. No respiratory distress. Abdominal:      General: There is no distension. Palpations: Abdomen is soft. Tenderness: There is no abdominal tenderness. There is no guarding. Neurological:      Mental Status: Mental status is at baseline.

## 2023-10-30 ENCOUNTER — LAB (OUTPATIENT)
Dept: LAB | Facility: CLINIC | Age: 38
End: 2023-10-30
Payer: MEDICARE

## 2023-10-30 DIAGNOSIS — R35.0 URINARY FREQUENCY: ICD-10-CM

## 2023-10-30 LAB
BACTERIA UR QL AUTO: ABNORMAL /HPF
BILIRUB UR QL STRIP: NEGATIVE
CLARITY UR: CLEAR
COLOR UR: ABNORMAL
GLUCOSE UR STRIP-MCNC: NEGATIVE MG/DL
HGB UR QL STRIP.AUTO: NEGATIVE
KETONES UR STRIP-MCNC: NEGATIVE MG/DL
LEUKOCYTE ESTERASE UR QL STRIP: NEGATIVE
MUCOUS THREADS UR QL AUTO: ABNORMAL
NITRITE UR QL STRIP: NEGATIVE
NON-SQ EPI CELLS URNS QL MICRO: ABNORMAL /HPF
PH UR STRIP.AUTO: 6 [PH]
PROT UR STRIP-MCNC: NEGATIVE MG/DL
RBC #/AREA URNS AUTO: ABNORMAL /HPF
SP GR UR STRIP.AUTO: 1.01 (ref 1–1.03)
UROBILINOGEN UR STRIP-ACNC: <2 MG/DL
WBC #/AREA URNS AUTO: ABNORMAL /HPF

## 2023-10-30 PROCEDURE — 81001 URINALYSIS AUTO W/SCOPE: CPT

## 2023-10-30 PROCEDURE — 87086 URINE CULTURE/COLONY COUNT: CPT

## 2023-10-31 LAB — BACTERIA UR CULT: NORMAL

## 2023-12-01 DIAGNOSIS — E03.9 ACQUIRED HYPOTHYROIDISM: ICD-10-CM

## 2023-12-01 DIAGNOSIS — R00.0 SINUS TACHYCARDIA: ICD-10-CM

## 2023-12-01 RX ORDER — LEVOTHYROXINE SODIUM 0.1 MG/1
TABLET ORAL
Qty: 90 TABLET | Refills: 1 | Status: SHIPPED | OUTPATIENT
Start: 2023-12-01

## 2023-12-12 ENCOUNTER — OFFICE VISIT (OUTPATIENT)
Dept: NEUROLOGY | Facility: CLINIC | Age: 38
End: 2023-12-12
Payer: MEDICARE

## 2023-12-12 VITALS
SYSTOLIC BLOOD PRESSURE: 110 MMHG | BODY MASS INDEX: 21.66 KG/M2 | HEART RATE: 83 BPM | DIASTOLIC BLOOD PRESSURE: 72 MMHG | HEIGHT: 71 IN | TEMPERATURE: 98.5 F | WEIGHT: 154.7 LBS

## 2023-12-12 DIAGNOSIS — G40.109 LOCALIZATION-RELATED EPILEPSY (HCC): Primary | ICD-10-CM

## 2023-12-12 DIAGNOSIS — N39.44 NOCTURNAL ENURESIS: ICD-10-CM

## 2023-12-12 PROCEDURE — 99215 OFFICE O/P EST HI 40 MIN: CPT | Performed by: NURSE PRACTITIONER

## 2023-12-12 NOTE — PROGRESS NOTES
Patient ID: Razia Lindo is a 34 y.o. male with schizophrenia and localization related epilepsy, who is returning to Neurology office for follow up of his seizures. Assessment/Plan:    Localization-related epilepsy [facility]  Patient has been free of episodes concerning for seizure since increasing lacosamide to 150 mg BID. He is also on divalproex 750 mg daily per psychiatry. 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 His neurologic exam is notable for mild intention tremor and psychomotor slowing. Continue current lacosamide dose of 150 mg BID. If there are seizures or concerns they will call the office. Follow up in 4 months or sooner if needed. Nocturnal enuresis  Does not appear to be related to seizures out of sleep. Has altered his drinking schedule with continued episodes about 2 nights per week. Given referral to urology. He will Return in about 4 months (around 4/12/2024) for Follow up with Dr Kenna Leventhal. Subjective:  Razia Lindo is a 34 y.o. male with schizophrenia and localization related epilepsy, who is returning to Neurology office for follow up of his seizures. He was last seen int he office by Dr Kenna Leventhal on 8/9/2023. At that time, there were no further seizures on lacosamide. Recommended keeping better track of his balance and other issues to determine if there was any pattern. Recommended continuing to work with PT regarding dexterity issues in his hands. Recommended follow up in 4 months. Since his last visit, there have been no recurrent seizures. Overall doing well on lacosamide 150 mg BID. No worsening of dexterity but continues to have difficulty with fine motor tasks. Mom has to help with buttoning. He may drop his pills as well. He had refused physical therapy. He is not interested in OT. He has been having some trouble holding his urine at night.  PCP did a UA and there was no infection. They started watching how much water he drinks at night. Happening every third or 4th night. No history of seizures in sleep. He sleeps through the night and notices he is wet. No prior urinary incontinence with seizures. He feels normal that morning. Not sore and does not notice that he bit his tongue hard. Does not wake up disheveled. There are pads on the bed and he is wearing a brief. He may soak through both. This started out of nowhere. This has never been an issue in the past.      He has been drinking a lot of water but does it early in the day so that he does not have to worry about it at night. May have one glass of water with his pills and a soda with meals. Current seizure medications:  - lacosamide 150 mg BID  - divalproex 750 mg HS - per psychiatry  Other medications as per Epic. Prior Seizure Medications: none for seizures. He was on Divalproex prior to seizures for mood. Mother is not sure, but possibly was on lamotrigine in the past      I reviewed prior neurology notes, most recent labs, as documented in Epic/[employer], and summarized above. Objective:    Blood pressure 110/72, pulse 83, temperature 98.5 °F (36.9 °C), temperature source Temporal, height 5' 11" (1.803 m), weight 70.2 kg (154 lb 11.2 oz). Physical Exam  No apparent distress. Appears well nourished. Mood appropriate for situation     Neurologic Exam  Mental status- alert and oriented to person, place, and time. Speech appropriate for conversation. Good attention and knowledge. Cranial Nerves- PERRL, EOMS normal, facial sensation and muscles symmetric, hearing intact bilaterally to finger rubs, tongue midline, palate rise symmetrical, shoulder shrug symmetrical.    Motor- No pronator drift. Appropriate strength. Moves all extremities freely. No tremor. Sensory-  Intact distally in all extremities to light touch. DTRs- 2+ and symmetric in all extremities. Gait- normal casual gait. Coordination- FNF intact. ROS:  Review of Systems   Constitutional:  Negative for appetite change, fatigue and fever. HENT: Negative. Negative for hearing loss, tinnitus, trouble swallowing and voice change. Eyes: Negative. Negative for photophobia, pain and visual disturbance. Respiratory: Negative. Negative for shortness of breath. Cardiovascular: Negative. Negative for palpitations. Gastrointestinal: Negative. Negative for nausea and vomiting. Endocrine: Negative. Negative for cold intolerance. Genitourinary: Negative. Negative for dysuria, frequency and urgency. Musculoskeletal:  Negative for back pain, gait problem, myalgias and neck pain. Skin: Negative. Negative for rash. Allergic/Immunologic: Negative. Neurological: Negative. Negative for dizziness, tremors, seizures, syncope, facial asymmetry, speech difficulty, weakness, light-headedness, numbness and headaches. Hematological: Negative. Does not bruise/bleed easily. Psychiatric/Behavioral: Negative. Negative for confusion, hallucinations and sleep disturbance. All other systems reviewed and are negative. ROS obtained by MA and reviewed by myself. This note may have been created using voice recognition software. There may be unintentional errors such as grammatical errors, spelling errors, or pronoun errors.

## 2023-12-12 NOTE — PROGRESS NOTES
Review of Systems   Constitutional:  Negative for appetite change, fatigue and fever. HENT: Negative. Negative for hearing loss, tinnitus, trouble swallowing and voice change. Eyes: Negative. Negative for photophobia, pain and visual disturbance. Respiratory: Negative. Negative for shortness of breath. Cardiovascular: Negative. Negative for palpitations. Gastrointestinal: Negative. Negative for nausea and vomiting. Endocrine: Negative. Negative for cold intolerance. Genitourinary: Negative. Negative for dysuria, frequency and urgency. Musculoskeletal:  Negative for back pain, gait problem, myalgias and neck pain. Skin: Negative. Negative for rash. Allergic/Immunologic: Negative. Neurological: Negative. Negative for dizziness, tremors, seizures, syncope, facial asymmetry, speech difficulty, weakness, light-headedness, numbness and headaches. Hematological: Negative. Does not bruise/bleed easily. Psychiatric/Behavioral: Negative. Negative for confusion, hallucinations and sleep disturbance. All other systems reviewed and are negative. No worsening of dexterity but continues to have difficulty with fine motor tasks. Mom has to help with buttoning. He may drop his pills as well. He had refused physical therapy. HE is not interested in OT. He hs not had any seizures since his last visit. Taking lacosamide    He has been having some trouble holding his urine at night. PCP did a UA and there was no infection. They started watching how much water he drinks at night. Happening every third or 4th night. No history of seizures in sleep. He sleeps through the night and notices he is wet. No prior urinary incontinence with seizures. He feels normal that morning. Not sore and does not notice that he bit his tongue hard. Yeung snot wake up disheveled. There are pads on the bed and he is wearing a brief. He mayt soak through both. THis started out of nowhere.  This has never been an issue in the past.     HE has been drinking a lot of water but does it early in the day so that he does not have to worry about it at night. May have one glass of water with his pills and a soda with meals.

## 2023-12-12 NOTE — PATIENT INSTRUCTIONS
- Continue current dose of lacosamide 150 mg twice per day  - Call the office with episodes concerning for seizures or concerns  - See urology  - Follow up in 4 months with Dr Robb Washington

## 2023-12-12 NOTE — ASSESSMENT & PLAN NOTE
Does not appear to be related to seizures out of sleep. Has altered his drinking schedule with continued episodes about 2 nights per week. Given referral to urology.

## 2023-12-12 NOTE — ASSESSMENT & PLAN NOTE
Patient has been free of episodes concerning for seizure since increasing lacosamide to 150 mg BID. He is also on divalproex 750 mg daily per psychiatry. 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 His neurologic exam is notable for mild intention tremor and psychomotor slowing. Continue current lacosamide dose of 150 mg BID. If there are seizures or concerns they will call the office. Follow up in 4 months or sooner if needed.

## 2023-12-19 DIAGNOSIS — I95.1 ORTHOSTATIC HYPOTENSION: ICD-10-CM

## 2023-12-19 RX ORDER — MIDODRINE HYDROCHLORIDE 10 MG/1
TABLET ORAL
Qty: 90 TABLET | Refills: 1 | Status: SHIPPED | OUTPATIENT
Start: 2023-12-19

## 2023-12-20 DIAGNOSIS — I95.1 ORTHOSTATIC HYPOTENSION: ICD-10-CM

## 2023-12-20 RX ORDER — MIDODRINE HYDROCHLORIDE 10 MG/1
10 TABLET ORAL 3 TIMES DAILY
Qty: 90 TABLET | Refills: 1 | OUTPATIENT
Start: 2023-12-20

## 2024-02-16 DIAGNOSIS — I95.1 ORTHOSTATIC HYPOTENSION: ICD-10-CM

## 2024-02-18 RX ORDER — MIDODRINE HYDROCHLORIDE 10 MG/1
TABLET ORAL
Qty: 270 TABLET | Refills: 3 | Status: SHIPPED | OUTPATIENT
Start: 2024-02-18

## 2024-02-19 DIAGNOSIS — G40.109 LOCALIZATION-RELATED EPILEPSY (HCC): ICD-10-CM

## 2024-02-19 RX ORDER — LACOSAMIDE 150 MG/1
150 TABLET ORAL EVERY 12 HOURS SCHEDULED
Qty: 60 TABLET | Refills: 4 | Status: SHIPPED | OUTPATIENT
Start: 2024-02-19

## 2024-05-02 ENCOUNTER — OFFICE VISIT (OUTPATIENT)
Dept: NEUROLOGY | Facility: CLINIC | Age: 39
End: 2024-05-02
Payer: MEDICARE

## 2024-05-02 VITALS
TEMPERATURE: 98 F | HEIGHT: 71 IN | SYSTOLIC BLOOD PRESSURE: 98 MMHG | WEIGHT: 158 LBS | DIASTOLIC BLOOD PRESSURE: 68 MMHG | BODY MASS INDEX: 22.12 KG/M2 | HEART RATE: 95 BPM

## 2024-05-02 DIAGNOSIS — G40.109 LOCALIZATION-RELATED EPILEPSY (HCC): ICD-10-CM

## 2024-05-02 PROCEDURE — G2211 COMPLEX E/M VISIT ADD ON: HCPCS | Performed by: PSYCHIATRY & NEUROLOGY

## 2024-05-02 PROCEDURE — 99213 OFFICE O/P EST LOW 20 MIN: CPT | Performed by: PSYCHIATRY & NEUROLOGY

## 2024-05-02 RX ORDER — LACOSAMIDE 150 MG/1
150 TABLET ORAL EVERY 12 HOURS SCHEDULED
Qty: 60 TABLET | Refills: 5 | Status: SHIPPED | OUTPATIENT
Start: 2024-05-02

## 2024-05-02 NOTE — PATIENT INSTRUCTIONS
-- continue to take Lacosamide 150 mg twice a day.     -- Please call our office if any problems arise.

## 2024-05-02 NOTE — PROGRESS NOTES
"Patient ID: Vaughn Madrigal is a 39 y.o. male with schizophrenia and localization related epilepsy , who is returning to Neurology office for follow up of his seizures.     Assessment/Plan:    Localization-related epilepsy (H[facility])  He continues to be seizure-free on his current medications.  He is mainly using lacosamide for his seizure control, although he is on divalproex, this is mainly for mood.  He continues to follow with psychiatry for management of his divalproex.    --Because he is overall doing well from a seizure perspective, I will not make any changes to his medications.  He will continue lacosamide 150 mg twice a day.    --We did discuss his recent lower valproate level.  Is unclear why this is lower.  I will defer management of this to his psychiatrist, but since his dose had not changed, he may benefit from simply having this rechecked          He will Return in about 6 months (around 11/2/2024).      Subjective:    HPI    Current seizure medications:  1. Lacosamide 150 mg twice a day   2. Divalproex  mg at night   Other medications as per [facility].    Since his last visit, he has not had any additional seizures. He has continued to tolerate his Lacosamide well without any side effects.     He still has issues with enuresis about one night a week. There is no clear seizure activity noted with these nights. He will were a brief overnight, which has been helpful. He was referred to see Urology, but has not seen then yet.     He continues to follow with psychiatry. His recent valproate level was a little lower than expected, but he has otherwise been doing okay.     Prior Medications: none for seizures. He was on Divalproex prior to seizures for mood. Mother is not sure, but possibly was on lamotrigine in the past     His history was also obtained from his mother, who was present at today's visit.          Objective:    Blood pressure 98/68, pulse 95, temperature 98 °F (36.7 °C), height 5' 11\" (1.803 " m), weight 71.7 kg (158 lb).    Physical Exam    Neurological Exam      ROS:    Review of Systems   Constitutional:  Negative for appetite change, fatigue and fever.   HENT: Negative.  Negative for hearing loss, tinnitus, trouble swallowing and voice change.    Eyes: Negative.  Negative for photophobia, pain and visual disturbance.   Respiratory: Negative.  Negative for shortness of breath.    Cardiovascular: Negative.  Negative for palpitations.   Gastrointestinal: Negative.  Negative for nausea and vomiting.   Endocrine: Negative.  Negative for cold intolerance.   Genitourinary: Negative.  Negative for dysuria, frequency and urgency.   Musculoskeletal:  Negative for back pain, gait problem, myalgias, neck pain and neck stiffness.   Skin: Negative.  Negative for rash.   Allergic/Immunologic: Negative.    Neurological: Negative.  Negative for dizziness, tremors, seizures, syncope, facial asymmetry, speech difficulty, weakness, light-headedness, numbness and headaches.   Hematological: Negative.  Does not bruise/bleed easily.   Psychiatric/Behavioral: Negative.  Negative for confusion, hallucinations and sleep disturbance.    All other systems reviewed and are negative.    No new issues to address

## 2024-05-03 NOTE — ASSESSMENT & PLAN NOTE
He continues to be seizure-free on his current medications.  He is mainly using lacosamide for his seizure control, although he is on divalproex, this is mainly for mood.  He continues to follow with psychiatry for management of his divalproex.    --Because he is overall doing well from a seizure perspective, I will not make any changes to his medications.  He will continue lacosamide 150 mg twice a day.    --We did discuss his recent lower valproate level.  Is unclear why this is lower.  I will defer management of this to his psychiatrist, but since his dose had not changed, he may benefit from simply having this rechecked

## 2024-05-29 DIAGNOSIS — R00.0 SINUS TACHYCARDIA: ICD-10-CM

## 2024-05-29 DIAGNOSIS — E03.9 ACQUIRED HYPOTHYROIDISM: ICD-10-CM

## 2024-05-29 DIAGNOSIS — I95.1 ORTHOSTATIC HYPOTENSION: ICD-10-CM

## 2024-05-29 NOTE — TELEPHONE ENCOUNTER
Medication: levothyroxine    Dose/Frequency: 100mcg tablet, TAKE ONE TABLET BY MOUTH DAILY     Quantity: 90      Medication: metoprolol tartrate    Dose/Frequency: 25mg tablet, TAKE HALF TABLET BY MOUTH TWICE A DAY     Quantity: 90      Medication: midodrine    Dose/Frequency: 10mg tablet, TAKE ONE TABLET BY MOUTH THREE TIMES A DAY     Quantity: 270        [place] [facility]  [address]  [place]    Office:   [x] PCP/Provider - Marialuisa Rios MD  [] Speciality/Provider -     Does the patient have enough for 3 days?   [] Yes   [x] No - Send as HP to POD      Raji, nurse/ with [facility] called because pt is completely out of meds, miscommunication that pt's mother was supposed to request refill and did not.  Next OV 6/3 but he needs these prior.

## 2024-05-30 RX ORDER — LEVOTHYROXINE SODIUM 0.1 MG/1
100 TABLET ORAL DAILY
Qty: 90 TABLET | Refills: 1 | Status: SHIPPED | OUTPATIENT
Start: 2024-05-30

## 2024-05-30 RX ORDER — MIDODRINE HYDROCHLORIDE 10 MG/1
10 TABLET ORAL 3 TIMES DAILY
Qty: 270 TABLET | Refills: 3 | Status: SHIPPED | OUTPATIENT
Start: 2024-05-30

## 2024-06-03 ENCOUNTER — OFFICE VISIT (OUTPATIENT)
Dept: FAMILY MEDICINE CLINIC | Facility: CLINIC | Age: 39
End: 2024-06-03
Payer: MEDICARE

## 2024-06-03 VITALS
DIASTOLIC BLOOD PRESSURE: 68 MMHG | BODY MASS INDEX: 23.83 KG/M2 | OXYGEN SATURATION: 98 % | HEIGHT: 71 IN | TEMPERATURE: 96.8 F | SYSTOLIC BLOOD PRESSURE: 112 MMHG | HEART RATE: 100 BPM | WEIGHT: 170.2 LBS

## 2024-06-03 DIAGNOSIS — Z00.00 MEDICARE ANNUAL WELLNESS VISIT, SUBSEQUENT: Primary | ICD-10-CM

## 2024-06-03 DIAGNOSIS — E78.2 MIXED HYPERLIPIDEMIA: ICD-10-CM

## 2024-06-03 DIAGNOSIS — E03.9 ACQUIRED HYPOTHYROIDISM: ICD-10-CM

## 2024-06-03 DIAGNOSIS — G40.109 LOCALIZATION-RELATED EPILEPSY (HCC): ICD-10-CM

## 2024-06-03 DIAGNOSIS — F20.0 CHRONIC PARANOID SCHIZOPHRENIA (HCC): ICD-10-CM

## 2024-06-03 PROBLEM — R63.4 WEIGHT LOSS: Status: RESOLVED | Noted: 2021-11-07 | Resolved: 2024-06-03

## 2024-06-03 PROBLEM — F03.90 SENILE DEMENTIA, UNCOMPLICATED (HCC): Status: RESOLVED | Noted: 2021-01-28 | Resolved: 2024-06-03

## 2024-06-03 PROCEDURE — 99214 OFFICE O/P EST MOD 30 MIN: CPT | Performed by: FAMILY MEDICINE

## 2024-06-03 PROCEDURE — G0439 PPPS, SUBSEQ VISIT: HCPCS | Performed by: FAMILY MEDICINE

## 2024-06-03 NOTE — ASSESSMENT & PLAN NOTE
- Follows with Neurology and continues to be seizure free on his current medications. Continue lacosamide 150mg twice a day

## 2024-06-03 NOTE — PATIENT INSTRUCTIONS
Medicare Preventive Visit Patient Instructions  Thank you for completing your Welcome to Medicare Visit or Medicare Annual Wellness Visit today. Your next wellness visit will be due in one year (6/4/2025).  The screening/preventive services that you may require over the next 5-10 years are detailed below. Some tests may not apply to you based off risk factors and/or age. Screening tests ordered at today's visit but not completed yet may show as past due. Also, please note that scanned in results may not display below.  Preventive Screenings:  Service Recommendations Previous Testing/Comments   Colorectal Cancer Screening  Colonoscopy    Fecal Occult Blood Test (FOBT)/Fecal Immunochemical Test (FIT)  Fecal DNA/Cologuard Test  Flexible Sigmoidoscopy Age: 45-75 years old   Colonoscopy: every 10 years (May be performed more frequently if at higher risk)  OR  FOBT/FIT: every 1 year  OR  Cologuard: every 3 years  OR  Sigmoidoscopy: every 5 years  Screening may be recommended earlier than age 45 if at higher risk for colorectal cancer. Also, an individualized decision between you and your healthcare provider will decide whether screening between the ages of 76-85 would be appropriate. Colonoscopy: Not on file  FOBT/FIT: Not on file  Cologuard: Not on file  Sigmoidoscopy: Not on file          Prostate Cancer Screening Individualized decision between patient and health care provider in men between ages of 55-69   Medicare will cover every 12 months beginning on the day after your 50th birthday PSA: No results in last 5 years     Screening Not Indicated     Hepatitis C Screening Once for adults born between 1945 and 1965  More frequently in patients at high risk for Hepatitis C Hep C Antibody: 11/18/2022    Screening Current   Diabetes Screening 1-2 times per year if you're at risk for diabetes or have pre-diabetes Fasting glucose: 128 mg/dL (4/18/2023)  A1C: 5.3 % (8/30/2023)  Screening Current   Cholesterol Screening Once  every 5 years if you don't have a lipid disorder. May order more often based on risk factors. Lipid panel: 04/17/2024  Screening Not Indicated  History Lipid Disorder      Other Preventive Screenings Covered by Medicare:  Abdominal Aortic Aneurysm (AAA) Screening: covered once if your at risk. You're considered to be at risk if you have a family history of AAA or a male between the age of 65-75 who smoking at least 100 cigarettes in your lifetime.  Lung Cancer Screening: covers low dose CT scan once per year if you meet all of the following conditions: (1) Age 55-77; (2) No signs or symptoms of lung cancer; (3) Current smoker or have quit smoking within the last 15 years; (4) You have a tobacco smoking history of at least 20 pack years (packs per day x number of years you smoked); (5) You get a written order from a healthcare provider.  Glaucoma Screening: covered annually if you're considered high risk: (1) You have diabetes OR (2) Family history of glaucoma OR (3)  aged 50 and older OR (4)  American aged 65 and older  Osteoporosis Screening: covered every 2 years if you meet one of the following conditions: (1) Have a vertebral abnormality; (2) On glucocorticoid therapy for more than 3 months; (3) Have primary hyperparathyroidism; (4) On osteoporosis medications and need to assess response to drug therapy.  HIV Screening: covered annually if you're between the age of 15-65. Also covered annually if you are younger than 15 and older than 65 with risk factors for HIV infection. For pregnant patients, it is covered up to 3 times per pregnancy.    Immunizations:  Immunization Recommendations   Influenza Vaccine Annual influenza vaccination during flu season is recommended for all persons aged >= 6 months who do not have contraindications   Pneumococcal Vaccine   * Pneumococcal conjugate vaccine = PCV13 (Prevnar 13), PCV15 (Vaxneuvance), PCV20 (Prevnar 20)  * Pneumococcal polysaccharide vaccine  = PPSV23 (Pneumovax) Adults 19-65 yo with certain risk factors or if 65+ yo  If never received any pneumonia vaccine: recommend Prevnar 20 (PCV20)  Give PCV20 if previously received 1 dose of PCV13 or PPSV23   Hepatitis B Vaccine 3 dose series if at intermediate or high risk (ex: diabetes, end stage renal disease, liver disease)   Respiratory syncytial virus (RSV) Vaccine - COVERED BY MEDICARE PART D  * RSVPreF3 (Arexvy) CDC recommends that adults 60 years of age and older may receive a single dose of RSV vaccine using shared clinical decision-making (SCDM)   Tetanus (Td) Vaccine - COST NOT COVERED BY MEDICARE PART B Following completion of primary series, a booster dose should be given every 10 years to maintain immunity against tetanus. Td may also be given as tetanus wound prophylaxis.   Tdap Vaccine - COST NOT COVERED BY MEDICARE PART B Recommended at least once for all adults. For pregnant patients, recommended with each pregnancy.   Shingles Vaccine (Shingrix) - COST NOT COVERED BY MEDICARE PART B  2 shot series recommended in those 19 years and older who have or will have weakened immune systems or those 50 years and older     Health Maintenance Due:      Topic Date Due   • HIV Screening  Completed   • Hepatitis C Screening  Completed     Immunizations Due:      Topic Date Due   • Pneumococcal Vaccine: Pediatrics (0 to 5 Years) and At-Risk Patients (6 to 64 Years) (2 of 2 - PPSV23 or PCV20) 02/04/2020   • COVID-19 Vaccine (3 - 2023-24 season) 09/01/2023   • Influenza Vaccine (Season Ended) 09/01/2024     Advance Directives   What are advance directives?  Advance directives are legal documents that state your wishes and plans for medical care. These plans are made ahead of time in case you lose your ability to make decisions for yourself. Advance directives can apply to any medical decision, such as the treatments you want, and if you want to donate organs.   What are the types of advance directives?  There  are many types of advance directives, and each state has rules about how to use them. You may choose a combination of any of the following:  Living will:  This is a written record of the treatment you want. You can also choose which treatments you do not want, which to limit, and which to stop at a certain time. This includes surgery, medicine, IV fluid, and tube feedings.   Durable power of  for healthcare (DPAHC):  This is a written record that states who you want to make healthcare choices for you when you are unable to make them for yourself. This person, called a proxy, is usually a family member or a friend. You may choose more than 1 proxy.  Do not resuscitate (DNR) order:  A DNR order is used in case your heart stops beating or you stop breathing. It is a request not to have certain forms of treatment, such as CPR. A DNR order may be included in other types of advance directives.  Medical directive:  This covers the care that you want if you are in a coma, near death, or unable to make decisions for yourself. You can list the treatments you want for each condition. Treatment may include pain medicine, surgery, blood transfusions, dialysis, IV or tube feedings, and a ventilator (breathing machine).  Values history:  This document has questions about your views, beliefs, and how you feel and think about life. This information can help others choose the care that you would choose.  Why are advance directives important?  An advance directive helps you control your care. Although spoken wishes may be used, it is better to have your wishes written down. Spoken wishes can be misunderstood, or not followed. Treatments may be given even if you do not want them. An advance directive may make it easier for your family to make difficult choices about your care.   Cigarette Smoking and Your Health   Risks to your health if you smoke:  Nicotine and other chemicals found in tobacco damage every cell in your body.  Even if you are a light smoker, you have an increased risk for cancer, heart disease, and lung disease. If you are pregnant or have diabetes, smoking increases your risk for complications.   Benefits to your health if you stop smoking:   You decrease respiratory symptoms such as coughing, wheezing, and shortness of breath.   You reduce your risk for cancers of the lung, mouth, throat, kidney, bladder, pancreas, stomach, and cervix. If you already have cancer, you increase the benefits of chemotherapy. You also reduce your risk for cancer returning or a second cancer from developing.   You reduce your risk for heart disease, blood clots, heart attack, and stroke.   You reduce your risk for lung infections, and diseases such as pneumonia, asthma, chronic bronchitis, and emphysema.  Your circulation improves. More oxygen can be delivered to your body. If you have diabetes, you lower your risk for complications, such as kidney, artery, and eye diseases. You also lower your risk for nerve damage. Nerve damage can lead to amputations, poor vision, and blindness.  You improve your body's ability to heal and to fight infections.  For more information and support to stop smoking:   Smokefree.gov  Phone: 5- 923 - 920-6554  Web Address: www.smokefree.gov     © Copyright [employer] 2018 Information is for End User's use only and may not be sold, redistributed or otherwise used for commercial purposes. All illustrations and images included in CareNotes® are the copyrighted property of [employer] Inc. or [employer]

## 2024-06-03 NOTE — PROGRESS NOTES
Ambulatory Visit  Name: Vaughn Madrigal      : 1985      MRN: [id number]  Encounter Provider: Marialuisa Rios MD  Encounter Date: 6/3/2024   Encounter department: [facility]    Assessment & Plan   {There are no diagnoses linked to this encounter. (Refresh or delete this SmartLink)}     Preventive health issues were discussed with patient, and age appropriate screening tests were ordered as noted in patient's After Visit Summary. Personalized health advice and appropriate referrals for health education or preventive services given if needed, as noted in patient's After Visit Summary.    History of Present Illness   {Disappearing Hyperlinks I Encounters * My Last Note * Since Last Visit * History :77714}  HPI   Patient Care Team:  Marialuisa Rios MD as PCP - General (Family Medicine)  MD Kasia Arredondo MD (Nephrology)    Review of Systems  Medical History Reviewed by provider this encounter:       Annual Wellness Visit Questionnaire       Health Risk Assessment:   Patient rates overall health as very good. Patient feels that their physical health rating is slightly better. Patient is satisfied with their life. Eyesight was rated as same. Hearing was rated as same. Patient feels that their emotional and mental health rating is slightly better. Pain experienced in the last 7 days has been some. Patient's pain rating has been 3/10. Patient states that he has experienced no weight loss or gain in last 6 months.     Fall Risk Screening:   In the past year, patient has experienced: no history of falling in past year      Home Safety:  Patient does not have trouble with stairs inside or outside of their home. Patient has working smoke alarms and has working carbon monoxide detector. Home safety hazards include: none.     Nutrition:   Current diet is Regular.     Medications:   Patient is currently taking over-the-counter supplements. OTC medications include: omega. Patient is not  able to manage medications.     Activities of Daily Living (ADLs)/Instrumental Activities of Daily Living (IADLs):   Walk and transfer into and out of bed and chair?: Yes  Dress and groom yourself?: Yes    Bathe or shower yourself?: Yes    Feed yourself? Yes  Do your laundry/housekeeping?: Yes  Manage your money, pay your bills and track your expenses?: No  Make your own meals?: Yes    Do your own shopping?: Yes    Previous Hospitalizations:   Any hospitalizations or ED visits within the last 12 months?: No      PREVENTIVE SCREENINGS      Cardiovascular Screening:    General: Screening Not Indicated and History Lipid Disorder      Diabetes Screening:     General: Screening Current      Prostate Cancer Screening:    General: Screening Not Indicated      Abdominal Aortic Aneurysm (AAA) Screening:    Risk factors include: tobacco use        Lung Cancer Screening:     General: Screening Not Indicated      Hepatitis C Screening:    General: Screening Current    Screening, Brief Intervention, and Referral to Treatment (SBIRT)    Screening  Typical number of drinks in a day: 0  Typical number of drinks in a week: 0  Interpretation: Low risk drinking behavior.    Social Determinants of Health     Financial Resource Strain: Low Risk  (3/9/2023)    Overall Financial Resource Strain (CARDIA)     Difficulty of Paying Living Expenses: Not hard at all   Food Insecurity: No Food Insecurity (6/3/2024)    Hunger Vital Sign     Worried About Running Out of Food in the Last Year: Never true     Ran Out of Food in the Last Year: Never true   Transportation Needs: No Transportation Needs (6/3/2024)    PRAPARE - Transportation     Lack of Transportation (Medical): No     Lack of Transportation (Non-Medical): No   Housing Stability: Unknown (6/3/2024)    Housing Stability Vital Sign     Unable to Pay for Housing in the Last Year: No     Homeless in the Last Year: No   Utilities: Not At Risk (6/3/2024)    [facility] Utilities     Threatened with  loss of utilities: No     No results found.    Objective   {Disappearing Hyperlinks   Review Vitals * Enter New Vitals * Results Review * Labs * Imaging * Cardiology * Procedures * Lung Cancer Screening :67994}  There were no vitals taken for this visit.    Physical Exam  Administrative Statements {Disappearing Hyperlinks I  Level of Service * PCMH/PCSP:75296}  {Time Spent Statement (Optional):72408}

## 2024-06-03 NOTE — PROGRESS NOTES
Ambulatory Visit  Name: Vaughn Madrigal      : 1985      MRN: [id number]  Encounter Provider: Marialuisa Rios MD  Encounter Date: 6/3/2024   Encounter department: [facility]    Assessment & Plan   1. Medicare annual wellness visit, subsequent  2. Localization-related epilepsy (HCC)  Assessment & Plan:  - Follows with Neurology and continues to be seizure free on his current medications. Continue lacosamide 150mg twice a day  3. Chronic paranoid schizophrenia (HCC)  Assessment & Plan:  - Continue management per psychiatry    4. Acquired hypothyroidism  Assessment & Plan:  - Most recent TSH reviewed and within normal limits   - Continue levothyroxine 100 mcg daily   Orders:  -     TSH, 3rd generation; Future  5. Mixed hyperlipidemia  Assessment & Plan:  - Most recent lipid panel reviewed and within normal limits       - Preventive health issues were discussed with patient, and age appropriate screening tests were ordered as noted in patient's After Visit Summary. Personalized health advice and appropriate referrals for health education or preventive services given if needed, as noted in patient's After Visit Summary.  -     History of Present Illness     Vaughn Madrigal is a 39 year old male with a past medical history of paranoid schizophrenia, hypothyroidism, tobacco abuse who presents today accompanied by his mother for a Medicare Annual Wellness Visit. Patient endorses no complaints at this time. Since he became an uncle he has now quit smoking. He states that he feels better and patient's mother noticed that his appetite has increased and he has gained weight. He continues to follow with Neurology and Psychiatry.      Patient Care Team:  Marialuisa Rios MD as PCP - General (Family Medicine)  MD Kasia Arredondo MD (Nephrology)    Review of Systems   Constitutional: Negative.    HENT: Negative.     Eyes: Negative.    Respiratory: Negative.     Cardiovascular: Negative.   "  Gastrointestinal: Negative.    Musculoskeletal: Negative.    Skin: Negative.    Neurological: Negative.    Psychiatric/Behavioral: Negative.       Medical History Reviewed by provider this encounter:       Annual Wellness Visit Questionnaire       Depression Screening:   PHQ-2 Score: 1      Social Determinants of Health     Financial Resource Strain: Low Risk  (3/9/2023)    Overall Financial Resource Strain (CARDIA)     Difficulty of Paying Living Expenses: Not hard at all   Food Insecurity: No Food Insecurity (6/3/2024)    Hunger Vital Sign     Worried About Running Out of Food in the Last Year: Never true     Ran Out of Food in the Last Year: Never true   Transportation Needs: No Transportation Needs (6/3/2024)    PRAPARE - Transportation     Lack of Transportation (Medical): No     Lack of Transportation (Non-Medical): No   Housing Stability: Unknown (6/3/2024)    Housing Stability Vital Sign     Unable to Pay for Housing in the Last Year: No     Homeless in the Last Year: No   Utilities: Not At Risk (6/3/2024)    [facility] Utilities     Threatened with loss of utilities: No     No results found.    Objective     /68 (BP Location: Left arm, Patient Position: Sitting, Cuff Size: Adult)   Pulse 100   Temp (!) 96.8 °F (36 °C) (Temporal)   Ht 5' 11\" (1.803 m)   Wt 77.2 kg (170 lb 3.2 oz)   SpO2 98%   BMI 23.74 kg/m²     Physical Exam  Constitutional:       General: He is not in acute distress.     Appearance: He is not ill-appearing.   HENT:      Head: Normocephalic and atraumatic.      Mouth/Throat:      Mouth: Mucous membranes are moist.      Pharynx: No posterior oropharyngeal erythema.   Eyes:      General:         Right eye: No discharge.         Left eye: No discharge.      Extraocular Movements: Extraocular movements intact.      Pupils: Pupils are equal, round, and reactive to light.   Cardiovascular:      Rate and Rhythm: Normal rate.   Pulmonary:      Effort: Pulmonary effort is normal. No " respiratory distress.      Breath sounds: No wheezing.   Abdominal:      General: Bowel sounds are normal.      Palpations: Abdomen is soft.      Tenderness: There is no abdominal tenderness.   Musculoskeletal:      Right lower leg: No edema.      Left lower leg: No edema.   Neurological:      General: No focal deficit present.      Mental Status: He is alert.   Psychiatric:         Mood and Affect: Mood normal.         Behavior: Behavior normal.       Administrative Statements

## 2024-07-09 ENCOUNTER — TELEPHONE (OUTPATIENT)
Dept: UROLOGY | Facility: CLINIC | Age: 39
End: 2024-07-09

## 2024-07-09 NOTE — TELEPHONE ENCOUNTER
MARIE 7/9/24 TO INFORM PATIENT OF THE CANCELLATION OF HIS UP COMING APPOINTMENT ON 7/22/24 WITH DOCTOR JAIME THE NEW DATE AND TIME IS SCHEDULED FOR 9/13/24 AT 11:40 AM WITH DELLA PLEASE CALL THE OFFICE [phone] TO CONFIRM

## 2024-09-13 ENCOUNTER — OFFICE VISIT (OUTPATIENT)
Dept: UROLOGY | Facility: CLINIC | Age: 39
End: 2024-09-13
Payer: MEDICARE

## 2024-09-13 VITALS
BODY MASS INDEX: 24.92 KG/M2 | WEIGHT: 178 LBS | SYSTOLIC BLOOD PRESSURE: 110 MMHG | HEART RATE: 92 BPM | HEIGHT: 71 IN | OXYGEN SATURATION: 96 % | DIASTOLIC BLOOD PRESSURE: 70 MMHG

## 2024-09-13 DIAGNOSIS — Z12.5 SCREENING FOR PROSTATE CANCER: ICD-10-CM

## 2024-09-13 DIAGNOSIS — N32.81 OAB (OVERACTIVE BLADDER): Primary | ICD-10-CM

## 2024-09-13 DIAGNOSIS — N39.44 NOCTURNAL ENURESIS: ICD-10-CM

## 2024-09-13 DIAGNOSIS — R35.1 NOCTURIA: ICD-10-CM

## 2024-09-13 LAB — POST-VOID RESIDUAL VOLUME, ML POC: 83 ML

## 2024-09-13 PROCEDURE — 51798 US URINE CAPACITY MEASURE: CPT

## 2024-09-13 PROCEDURE — 99204 OFFICE O/P NEW MOD 45 MIN: CPT

## 2024-09-13 RX ORDER — MIRABEGRON 25 MG/1
25 TABLET, FILM COATED, EXTENDED RELEASE ORAL DAILY
Qty: 30 TABLET | Refills: 4 | Status: SHIPPED | OUTPATIENT
Start: 2024-09-13

## 2024-09-13 NOTE — PROGRESS NOTES
Office Visit- Urology  Vaughn Madrigal 1985 MRN: [id number]      Assessment/Discussion/Plan    39 y.o. male managed by     Nocturia/urinary incontinence  - over the past 6 months  -Predominantly during the night but patient and mother do endorse some increased daytime urinary urgency.  Nocturia 2-3 X.  Nighttime urinary incontinence.  Patient does take medications that he states are sedating  -No gross hematuria or dysuria  -Only recent medication change was Vimpat but upon my review of up-to-date literature does not seem that there are reported urologic side effects  -PVR today is 83 mL  -Discussed lifestyle modification with avoidance of drinking soda before bed  -Obtain ultrasound of the kidneys and bladder with PVR for further urinary tract assessment  -Trial of Myrbetriq 25 mg.  Discussed administration and possible side effects if cost prohibitive can consider trospium 20 mg as alternative  -Follow-up in 3 months for medication check    2.  Prostate cancer screening  -Obtain baseline PSA          Chief Complaint:   Vaughn is a 39 y.o. male presenting to the office for new evaluation of nocturia        Subjective    Patient is a 39-year-old male with a history of localization-related epilepsy, orthostatic hypotension, left ventricular hypertrophy, chronic paranoid schizophrenia, tobacco abuse who presents to the office today accompanied by his mother.  Over the past 6 months there has been increase in urinary symptoms including nocturia, urinary incontinence at night, and daytime urinary urgency.  He finds his symptoms to be bothersome and affecting his quality of life due to having to wear depends.  He denies any obstructive urinary tract symptoms.  No dysuria or gross hematuria.  No previous urologic surgery.  No known family history of  malignancy.  He is on multiple psychiatric medications      AUA SYMPTOM SCORE      Flowsheet Row Most Recent Value   AUA SYMPTOM SCORE    How often have you had a  "sensation of not emptying your bladder completely after you finished urinating? 1 (P)     How often have you had to urinate again less than two hours after you finished urinating? 3 (P)     How often have you found you stopped and started again several times when you urinate? 0 (P)     How often have you found it difficult to postpone urination? 1 (P)     How often have you had a weak urinary stream? 0 (P)     How often have you had to push or strain to begin urination? 0 (P)     How many times did you most typically get up to urinate from the time you went to bed at night until the time you got up in the morning? 1 (P)     Quality of Life: If you were to spend the rest of your life with your urinary condition just the way it is now, how would you feel about that? 5 (P)     AUA SYMPTOM SCORE 6 (P)              ROS:   Review of Systems   Constitutional: Negative.  Negative for chills, fatigue and fever.   HENT: Negative.     Respiratory:  Negative for shortness of breath.    Cardiovascular:  Negative for chest pain.   Gastrointestinal: Negative.  Negative for abdominal pain.   Endocrine: Negative.    Musculoskeletal: Negative.    Skin: Negative.    Neurological: Negative.  Negative for dizziness and light-headedness.   Hematological: Negative.    Psychiatric/Behavioral: Negative.           Past Medical History  Past Medical History:   Diagnosis Date    Chronic kidney disease     mom reports \"found on lab work but had ultrasound of kidney and \"fine\"    Cigarette smoker     Disease of thyroid gland     hypothyroid    Exercise involving walking     usually daily    Heart rate fast     Mom reports \"sometimes and pt takes Metoprolol for this\"    Lipoma of back     Low BP     Pt is on midodrine and Mom reports the Clozaril he takes causes low BP\"    Memory loss     Schizophrenia (HCC)     Seizures (HCC)     Syncope        Past Surgical History  Past Surgical History:   Procedure Laterality Date    DC EXC B9 LESION MRGN XCP " SK TG T/A/L 0.5 CM/< Left 10/28/2021    Procedure: EXCISION WIDE LESION BACK;  Surgeon: Diana Hilliard MD;  Location: [facility];  Service: General    TONSILLECTOMY AND ADENOIDECTOMY         Past Family History  Family History   Problem Relation Age of Onset    Depression Mother     Hypertension Father     Kidney disease Paternal Grandfather     Seizures Neg Hx        Past Social history  Social History     Socioeconomic History    Marital status: Single     Spouse name: Not on file    Number of children: Not on file    Years of education: Not on file    Highest education level: Not on file   Occupational History    Occupation: unemployed   Tobacco Use    Smoking status: Former     Current packs/day: 0.25     Average packs/day: 0.3 packs/day for 20.0 years (5.0 ttl pk-yrs)     Types: Cigarettes    Smokeless tobacco: Never    Tobacco comments:     Per [employer]-current everyday smoker, yesterday  2100 last cigarette   Vaping Use    Vaping status: Never Used   Substance and Sexual Activity    Alcohol use: No    Drug use: No    Sexual activity: Not on file     Comment: defer   Other Topics Concern    Not on file   Social History Narrative    Lives at home with mother and cat in [facility]     Social Determinants of Health     Financial Resource Strain: Low Risk  (3/9/2023)    Overall Financial Resource Strain (CARDIA)     Difficulty of Paying Living Expenses: Not hard at all   Food Insecurity: No Food Insecurity (6/3/2024)    Hunger Vital Sign     Worried About Running Out of Food in the Last Year: Never true     Ran Out of Food in the Last Year: Never true   Transportation Needs: No Transportation Needs (6/3/2024)    PRAPARE - Transportation     Lack of Transportation (Medical): No     Lack of Transportation (Non-Medical): No   Physical Activity: Not on file   Stress: Not on file   Social Connections: Not on file   Intimate Partner Violence: Not on file   Housing Stability: Unknown (6/3/2024)    Housing  Stability Vital Sign     Unable to Pay for Housing in the Last Year: No     Number of Times Moved in the Last Year: Not on file     Homeless in the Last Year: No       Current Medications  Current Outpatient Medications   Medication Sig Dispense Refill    buPROPion (WELLBUTRIN XL) 300 mg 24 hr tablet Take 1 tablet (300 mg total) by mouth daily (Patient taking differently: Take 300 mg by mouth daily at bedtime) 30 tablet 0    clozapine (CLOZARIL) 200 MG tablet Take 2 tablets (400 mg total) by mouth daily at bedtime 60 tablet 0    cloZAPine (CLOZARIL) 50 MG tablet Take 1 tablet (50 mg total) by mouth daily at bedtime (Patient taking differently: Take 50 mg by mouth daily at bedtime Mom states 250mg at bedtime) 30 tablet 0    diazepam (VALIUM) 5 mg tablet Take 10 mg by mouth daily at bedtime      divalproex sodium (DEPAKOTE ER) 250 mg 24 hr tablet       divalproex sodium (DEPAKOTE ER) 500 mg 24 hr tablet Take 2 tablets (1,000 mg total) by mouth daily at bedtime (Patient taking differently: Take 750 mg by mouth daily at bedtime) 60 tablet 0    fluPHENAZine (PROLIXIN) 10 MG tablet Take 2 tablets (20 mg total) by mouth daily at bedtime 60 tablet 0    glycopyrrolate (ROBINUL) 1 mg tablet Take 1 tablet by mouth in the morning      glycopyrrolate (ROBINUL) 2 MG tablet daily at bedtime      lacosamide (VIMPAT) 150 mg tablet Take 1 tablet (150 mg total) by mouth every 12 (twelve) hours 60 tablet 5    levothyroxine 100 mcg tablet Take 1 tablet (100 mcg total) by mouth daily 90 tablet 1    metoprolol tartrate (LOPRESSOR) 25 mg tablet TAKE HALF TABLET BY MOUTH TWICE A DAY 90 tablet 1    midodrine (PROAMATINE) 10 MG tablet Take 1 tablet (10 mg total) by mouth 3 (three) times a day 270 tablet 3    Mirabegron ER (Myrbetriq) 25 MG TB24 Take 25 mg by mouth daily 30 tablet 4    Omega-3 Fatty Acids (FISH OIL PO) Take by mouth      senna (SENOKOT) 8.6 mg Take 1 tablet (8.6 mg total) by mouth 2 (two) times a day 60 each 5    venlafaxine  "(EFFEXOR-XR) 37.5 mg 24 hr capsule Take 1 capsule by mouth in the morning      VITAMIN D PO Take 2,000 Units by mouth in the morning       No current facility-administered medications for this visit.       Allergies  No Known Allergies    OBJECTIVE    Vitals   Vitals:    09/13/24 1141   BP: 110/70   BP Location: Right arm   Patient Position: Sitting   Cuff Size: Adult   Pulse: 92   SpO2: 96%   Weight: 80.7 kg (178 lb)   Height: 5' 11\" (1.803 m)       PVR:    Physical Exam  Constitutional:       General: He is not in acute distress.     Appearance: Normal appearance. He is normal weight. He is not ill-appearing or toxic-appearing.   HENT:      Head: Normocephalic and atraumatic.   Eyes:      Conjunctiva/sclera: Conjunctivae normal.   Cardiovascular:      Rate and Rhythm: Normal rate.   Pulmonary:      Effort: Pulmonary effort is normal. No respiratory distress.   Skin:     General: Skin is warm and dry.   Neurological:      General: No focal deficit present.      Mental Status: He is alert and oriented to person, place, and time.      Cranial Nerves: No cranial nerve deficit.   Psychiatric:         Mood and Affect: Mood normal.         Behavior: Behavior normal.         Thought Content: Thought content normal.          Labs:     Lab Results   Component Value Date    CREATININE 1.13 08/26/2024      Lab Results   Component Value Date    HGBA1C 5.3 08/26/2024     Lab Results   Component Value Date    GLUCOSE 91 06/12/2018    CALCIUM 8.9 08/26/2024     06/12/2018    K 4.1 08/26/2024    CO2 29 08/26/2024     08/26/2024    BUN 16 08/26/2024    CREATININE 1.13 08/26/2024       I have personally reviewed all pertinent lab results and reviewed with patient    Imaging       Vaughn Gardner PA-C  Date: 9/13/2024 Time: 2:55 PM  [facility]    This note was written using fluency dictation software. Please excuse any resulting minor grammatical errors.      "

## 2024-09-27 ENCOUNTER — HOSPITAL ENCOUNTER (OUTPATIENT)
Dept: ULTRASOUND IMAGING | Facility: MEDICAL CENTER | Age: 39
Discharge: HOME | End: 2024-09-27
Payer: MEDICARE

## 2024-09-27 DIAGNOSIS — N32.81 OAB (OVERACTIVE BLADDER): ICD-10-CM

## 2024-09-27 PROCEDURE — 76770 US EXAM ABDO BACK WALL COMP: CPT

## 2024-10-07 ENCOUNTER — TELEPHONE (OUTPATIENT)
Dept: UROLOGY | Facility: CLINIC | Age: 39
End: 2024-10-07

## 2024-10-07 NOTE — TELEPHONE ENCOUNTER
----- Message from Vaughn Gardner PA-C sent at 10/7/2024 10:04 AM EDT -----  Ultrasound with no concerning urologic findings

## 2024-10-28 ENCOUNTER — APPOINTMENT (OUTPATIENT)
Dept: LAB | Facility: HOSPITAL | Age: 39
End: 2024-10-28
Payer: MEDICARE

## 2024-10-28 DIAGNOSIS — F20.0 PARANOID SCHIZOPHRENIA, SUBCHRONIC CONDITION (HCC): ICD-10-CM

## 2024-10-28 DIAGNOSIS — I51.9 MYXEDEMA HEART DISEASE: ICD-10-CM

## 2024-10-28 DIAGNOSIS — E03.9 MYXEDEMA HEART DISEASE: ICD-10-CM

## 2024-10-28 DIAGNOSIS — R35.1 NOCTURIA: ICD-10-CM

## 2024-10-28 DIAGNOSIS — Z79.899 ENCOUNTER FOR LONG-TERM (CURRENT) USE OF MEDICATIONS: ICD-10-CM

## 2024-10-28 LAB
ATRIAL RATE: 89 BPM
BASOPHILS # BLD AUTO: 0.03 THOUSANDS/ΜL (ref 0–0.1)
BASOPHILS NFR BLD AUTO: 1 % (ref 0–1)
EOSINOPHIL # BLD AUTO: 0.23 THOUSAND/ΜL (ref 0–0.61)
EOSINOPHIL NFR BLD AUTO: 4 % (ref 0–6)
ERYTHROCYTE [DISTWIDTH] IN BLOOD BY AUTOMATED COUNT: 12.6 % (ref 11.6–15.1)
HCT VFR BLD AUTO: 43.5 % (ref 36.5–49.3)
HGB BLD-MCNC: 13.7 G/DL (ref 12–17)
IMM GRANULOCYTES # BLD AUTO: 0.02 THOUSAND/UL (ref 0–0.2)
IMM GRANULOCYTES NFR BLD AUTO: 0 % (ref 0–2)
LYMPHOCYTES # BLD AUTO: 1.66 THOUSANDS/ΜL (ref 0.6–4.47)
LYMPHOCYTES NFR BLD AUTO: 26 % (ref 14–44)
MCH RBC QN AUTO: 27.3 PG (ref 26.8–34.3)
MCHC RBC AUTO-ENTMCNC: 31.5 G/DL (ref 31.4–37.4)
MCV RBC AUTO: 87 FL (ref 82–98)
MONOCYTES # BLD AUTO: 0.51 THOUSAND/ΜL (ref 0.17–1.22)
MONOCYTES NFR BLD AUTO: 8 % (ref 4–12)
NEUTROPHILS # BLD AUTO: 4.03 THOUSANDS/ΜL (ref 1.85–7.62)
NEUTS SEG NFR BLD AUTO: 61 % (ref 43–75)
NRBC BLD AUTO-RTO: 0 /100 WBCS
P AXIS: 37 DEGREES
PLATELET # BLD AUTO: 226 THOUSANDS/UL (ref 149–390)
PMV BLD AUTO: 9.6 FL (ref 8.9–12.7)
PR INTERVAL: 188 MS
PSA SERPL-MCNC: 0.19 NG/ML (ref 0–4)
QRS AXIS: 60 DEGREES
QRSD INTERVAL: 114 MS
QT INTERVAL: 374 MS
QTC INTERVAL: 455 MS
RBC # BLD AUTO: 5.01 MILLION/UL (ref 3.88–5.62)
T WAVE AXIS: 64 DEGREES
VENTRICULAR RATE: 89 BPM
WBC # BLD AUTO: 6.48 THOUSAND/UL (ref 4.31–10.16)

## 2024-10-28 PROCEDURE — 84153 ASSAY OF PSA TOTAL: CPT

## 2024-10-28 PROCEDURE — 36415 COLL VENOUS BLD VENIPUNCTURE: CPT

## 2024-10-28 PROCEDURE — 85025 COMPLETE CBC W/AUTO DIFF WBC: CPT

## 2024-10-28 PROCEDURE — 93010 ELECTROCARDIOGRAM REPORT: CPT | Performed by: INTERNAL MEDICINE

## 2024-11-06 ENCOUNTER — OFFICE VISIT (OUTPATIENT)
Dept: NEUROLOGY | Facility: CLINIC | Age: 39
End: 2024-11-06
Payer: MEDICARE

## 2024-11-06 VITALS
DIASTOLIC BLOOD PRESSURE: 78 MMHG | HEIGHT: 71 IN | WEIGHT: 178 LBS | HEART RATE: 91 BPM | BODY MASS INDEX: 24.92 KG/M2 | OXYGEN SATURATION: 96 % | SYSTOLIC BLOOD PRESSURE: 101 MMHG | TEMPERATURE: 97.6 F

## 2024-11-06 DIAGNOSIS — F20.0 CHRONIC PARANOID SCHIZOPHRENIA (HCC): Chronic | ICD-10-CM

## 2024-11-06 DIAGNOSIS — G40.109 LOCALIZATION-RELATED EPILEPSY (HCC): Primary | ICD-10-CM

## 2024-11-06 DIAGNOSIS — N39.44 NOCTURNAL ENURESIS: ICD-10-CM

## 2024-11-06 PROCEDURE — 99214 OFFICE O/P EST MOD 30 MIN: CPT | Performed by: PSYCHIATRY & NEUROLOGY

## 2024-11-06 RX ORDER — LACOSAMIDE 150 MG/1
150 TABLET ORAL EVERY 12 HOURS SCHEDULED
Qty: 60 TABLET | Refills: 5 | Status: SHIPPED | OUTPATIENT
Start: 2024-11-06

## 2024-11-06 NOTE — PROGRESS NOTES
Patient ID: Vaughn Madrigal is a 39 y.o. male with schizophrenia and localization related epilepsy , who is returning to Neurology office for follow up of his seizures.     Assessment/Plan:    Nocturnal enuresis  Not likely due to seizure medications. Continues to f/w urology and Myrbetric is improving symptoms. Will continue to monitor.    Localization-related epilepsy (HCC)  He continues to be seizure-free on his current medications.  He is mainly using lacosamide for his seizure control, although he is on divalproex, this is mainly for mood.  He continues to follow with psychiatry for management of his divalproex and other psychiatric medications.    --Because he is overall doing well from a seizure perspective, no changes to medications.  Continue lacosamide 150 mg twice a day.      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 Given the complexity of his psychiatric management, medication changes would be difficult but if concerns with dexterity and gait worsen or fail to improve with his at-home exercises, can easily provide PT/OT for additional support.         He will Return in about 1 year (around 11/6/2025).      Subjective:    HPI    Current seizure medications:  1. Lacosamide 150 mg twice a day   2. Divalproex  mg at night   Other medications as per Epic.    Since his last visit, he has not had any additional seizures or auras. He has continued to tolerate his Lacosamide well without any side effects but is having trouble with tremor and dexterity for complex fine motor movements (tying shoes, putting on belt). He has been on divalproex and clozapine for complex psychiatric reasons. Discussed possibility of additional at-home exercises (they are already working on this) and can  "add PT/OT if requested (for now they are deferring and will continue to work on things at home).     He continues to have issues with enuresis about twice a week but this is improved from prior reported to be up to 8 times weekly. They are following with urology.     Prior Medications: none for seizures. He was on Divalproex prior to seizures for mood. Possibly was on lamotrigine in the past     His history was also obtained from his mother who was present at today's visit.     Objective:    Blood pressure 101/78, pulse 91, temperature 97.6 °F (36.4 °C), temperature source Temporal, height 5' 11\" (1.803 m), weight 80.7 kg (178 lb), SpO2 96%.    Physical Exam Neurological Exam  Physical Exam Vitals reviewed.   Constitutional:    Not in acute distress. Normal appearance. Not ill-appearing, toxic-appearing or diaphoretic.   HENT:   Normocephalic and atraumatic.  External ear normal b/l. Nose normal. No congestion or rhinorrhea. Mucous membranes are moist.  Oropharynx is clear.   Eyes:    No scleral icterus.  No discharge b/l.  Conjunctivae normal.   Cardiovascular: no obvious edema  Pulmonary:  No respiratory distress.   Musculoskeletal: no gross deformities  Skin:    Skin is not pale.   Psychiatric:      Affect flat  Neurologic: Mental Status: Alert and normally conversive. Attention is normal. Speech is fluent. Cranial Nerves: EOMI. No nystagmus. Facial expression full, symmetric.  Trapezius strength symmetric. No dysarthria, tongue symmetric w/o atrophy or fasciculations. Motor Exam: Muscle bulk grossly normal. Pronator drift absent b/l. Bradykinetic with borderline increased tone on right but strength intact and symmetric BUE/BLE. Sensory Exam : Light touch symmetric BUE/BLE Gait, Coordination, and Reflexes : FTN not ataxic but w/ postural/kinetic tremor b/l. Reflexes: Brachioradialis: 2+ b/l    Biceps: 2+ b/l    Patellar: 2+ b/l. Casual gait with average stance, stride length, but with stooped posture and " decreased arm swing    ROS:    Review of Systems   Constitutional:  Negative for appetite change, fatigue and fever.   HENT: Negative.  Negative for hearing loss, tinnitus, trouble swallowing and voice change.    Eyes: Negative.  Negative for photophobia, pain and visual disturbance.   Respiratory: Negative.  Negative for shortness of breath.    Cardiovascular: Negative.  Negative for palpitations.   Gastrointestinal: Negative.  Negative for nausea and vomiting.   Endocrine: Negative.  Negative for cold intolerance.   Genitourinary: Negative.  Negative for dysuria, frequency and urgency.   Musculoskeletal:  Negative for back pain, gait problem, myalgias, neck pain and neck stiffness.   Skin: Negative.  Negative for rash.   Allergic/Immunologic: Negative.    Neurological: Negative.  Negative for dizziness, tremors, seizures, syncope, facial asymmetry, speech difficulty, weakness, light-headedness, numbness and headaches.   Hematological: Negative.  Does not bruise/bleed easily.   Psychiatric/Behavioral: Negative.  Negative for confusion, hallucinations and sleep disturbance.    All other systems reviewed and are negative.      ~~~~~~~~~~~~~~~~~~~  Lee Ann Vasquez MD  Clinical Neurophysiology Fellow, PGY 5  [facility]

## 2024-11-06 NOTE — ASSESSMENT & PLAN NOTE
Not likely due to seizure medications. Continues to f/w urology and Kam is improving symptoms. Will continue to monitor.

## 2024-11-06 NOTE — ASSESSMENT & PLAN NOTE
He continues to be seizure-free on his current medications.  He is mainly using lacosamide for his seizure control, although he is on divalproex, this is mainly for mood.  He continues to follow with psychiatry for management of his divalproex and other psychiatric medications.    --Because he is overall doing well from a seizure perspective, no changes to medications.  Continue lacosamide 150 mg twice a day.

## 2024-11-11 DIAGNOSIS — E03.9 ACQUIRED HYPOTHYROIDISM: ICD-10-CM

## 2024-11-11 DIAGNOSIS — R00.0 SINUS TACHYCARDIA: ICD-10-CM

## 2024-11-12 RX ORDER — METOPROLOL TARTRATE 25 MG/1
TABLET, FILM COATED ORAL
Qty: 90 TABLET | Refills: 1 | Status: SHIPPED | OUTPATIENT
Start: 2024-11-12

## 2024-11-12 RX ORDER — LEVOTHYROXINE SODIUM 100 UG/1
100 TABLET ORAL DAILY
Qty: 90 TABLET | Refills: 1 | Status: SHIPPED | OUTPATIENT
Start: 2024-11-12

## 2024-12-31 NOTE — PROGRESS NOTES
1/2/2025      Chief Complaint   Patient presents with   • Follow-up         Assessment and Plan    39 y.o. male managed by Ap team       1. OAB (overactive bladder)  Assessment & Plan:  Nocturia/urgency   Started on Myrbetriq 25 mg  9/27 renal US- Unremarkable sonogram.   PVR- low   Patient reports not much of a difference with nocturia x 2 and incontinence about 2 times per week   Dicussed incontinence may be due to polypharmacy/deep sleep   Improvement during the day   Wishes to trial another OAB medication   Plan to start tropsium   Follow up in 3 months   If symptoms better on Myrbetriq 25 mg discussed then increasing that dose to 50 mg  Discussed lifestyle modification with avoidance of drinking soda before bed   Plan follow-up in 3 months for med check  Orders:  -     trospium chloride (SANCTURA) 20 mg tablet; Take 1 tablet (20 mg total) by mouth 2 (two) times a day        History of Present Illness  Vaughn Madrigal is a 39 y.o. male here for evaluation of urinary urgency/nocturia. History of localization-related epilepsy, orthostatic hypotension, left ventricular hypertrophy, chronic paranoid schizophrenia, tobacco abuse who presents to the office today accompanied by his mother. Over the past  months there has been increase in urinary symptoms including nocturia, urinary incontinence at night, and daytime urinary urgency. He finds his symptoms to be bothersome and affecting his quality of life due to having to wear depends. He denies any obstructive urinary tract symptoms. No dysuria or gross hematuria. No previous urologic surgery. No known family history of  malignancy. He is on multiple psychiatric medications.     Patient was started on myrbetriq. Patient reports improvement during the day but still has nocturia x 2 with incontinence where he needs to wear depends.  He reports this occurring about 2 times per week.  He states that he did not wake up to go to the bathroom every night.  The nights  "that he does wake up he is dry at that time.  He denies symptoms of urinary tract infection.    Lab Results   Component Value Date    PSA 0.190 10/28/2024         Review of Systems   Constitutional:  Negative for activity change, chills and fever.   Gastrointestinal:  Negative for abdominal distention, abdominal pain, constipation, diarrhea, nausea and vomiting.   Genitourinary:  Positive for frequency (nocturia). Negative for decreased urine volume, difficulty urinating, dysuria, flank pain, hematuria and urgency.       Vitals  Vitals:    01/02/25 1307   BP: 120/62   BP Location: Left arm   Patient Position: Sitting   Cuff Size: Standard   Pulse: 105   SpO2: 97%   Weight: 93 kg (205 lb)   Height: 5' 11\" (1.803 m)       Physical Exam  Vitals reviewed.   Constitutional:       Appearance: Normal appearance.   HENT:      Head: Normocephalic and atraumatic.   Eyes:      Conjunctiva/sclera: Conjunctivae normal.   Pulmonary:      Effort: Pulmonary effort is normal.   Abdominal:      General: Abdomen is flat. There is no distension.      Palpations: Abdomen is soft.      Tenderness: There is no abdominal tenderness.   Genitourinary:     Penis: Normal.       Testes: Normal.   Musculoskeletal:         General: Normal range of motion.      Cervical back: Normal range of motion.   Skin:     General: Skin is warm and dry.   Neurological:      General: No focal deficit present.      Mental Status: He is alert and oriented to person, place, and time.   Psychiatric:         Mood and Affect: Mood normal.         Behavior: Behavior normal.         Thought Content: Thought content normal.         Judgment: Judgment normal.           Past History  Past Medical History:   Diagnosis Date   • Chronic kidney disease     mom reports \"found on lab work but had ultrasound of kidney and \"fine\"   • Cigarette smoker    • Disease of thyroid gland     hypothyroid   • Exercise involving walking     usually daily   • Heart rate fast     Mom reports " "\"sometimes and pt takes Metoprolol for this\"   • Lipoma of back    • Low BP     Pt is on midodrine and Mom reports the Clozaril he takes causes low BP\"   • Memory loss    • Schizophrenia (HCC)    • Seizures (HCC)    • Syncope      Social History     Socioeconomic History   • Marital status: Single     Spouse name: None   • Number of children: None   • Years of education: None   • Highest education level: None   Occupational History   • Occupation: unemployed   Tobacco Use   • Smoking status: Former     Current packs/day: 0.25     Average packs/day: 0.3 packs/day for 20.0 years (5.0 ttl pk-yrs)     Types: Cigarettes   • Smokeless tobacco: Never   • Tobacco comments:     Per [employer]-current everyday smoker, yesterday  2100 last cigarette   Vaping Use   • Vaping status: Never Used   Substance and Sexual Activity   • Alcohol use: No   • Drug use: No   • Sexual activity: None     Comment: defer   Other Topics Concern   • None   Social History Narrative    Lives at home with mother and cat in [facility]     Social Drivers of Health     Financial Resource Strain: Low Risk  (3/9/2023)    Overall Financial Resource Strain (CARDIA)    • Difficulty of Paying Living Expenses: Not hard at all   Food Insecurity: No Food Insecurity (6/3/2024)    Nursing - Inadequate Food Risk Classification    • Worried About Running Out of Food in the Last Year: Never true    • Ran Out of Food in the Last Year: Never true    • Ran Out of Food in the Last Year: Not on file   Transportation Needs: No Transportation Needs (6/3/2024)    PRAPARE - Transportation    • Lack of Transportation (Medical): No    • Lack of Transportation (Non-Medical): No   Physical Activity: Not on file   Stress: Not on file   Social Connections: Not on file   Intimate Partner Violence: Not on file   Housing Stability: Unknown (6/3/2024)    Housing Stability Vital Sign    • Unable to Pay for Housing in the Last Year: No    • Number of Times Moved in the Last Year: " Not on file    • Homeless in the Last Year: No     Social History     Tobacco Use   Smoking Status Former   • Current packs/day: 0.25   • Average packs/day: 0.3 packs/day for 20.0 years (5.0 ttl pk-yrs)   • Types: Cigarettes   Smokeless Tobacco Never   Tobacco Comments    Per [employer]-current everyday smoker, yesterday  2100 last cigarette     Family History   Problem Relation Age of Onset   • Depression Mother    • Hypertension Father    • Kidney disease Paternal Grandfather    • Seizures Neg Hx        The following portions of the patient's history were reviewed and updated as appropriate: allergies, current medications, past medical history, past social history, past surgical history and problem list.    Results  No results found for this or any previous visit (from the past hour).]  Lab Results   Component Value Date    PSA 0.190 10/28/2024     Lab Results   Component Value Date    GLUCOSE 91 06/12/2018    CALCIUM 8.9 08/26/2024     06/12/2018    K 4.1 08/26/2024    CO2 29 08/26/2024     08/26/2024    BUN 16 08/26/2024    CREATININE 1.13 08/26/2024     Lab Results   Component Value Date    WBC 6.48 10/28/2024    HGB 13.7 10/28/2024    HCT 43.5 10/28/2024    MCV 87 10/28/2024     10/28/2024

## 2025-01-02 ENCOUNTER — OFFICE VISIT (OUTPATIENT)
Dept: UROLOGY | Facility: CLINIC | Age: 40
End: 2025-01-02
Payer: MEDICARE

## 2025-01-02 VITALS
BODY MASS INDEX: 28.7 KG/M2 | DIASTOLIC BLOOD PRESSURE: 62 MMHG | HEART RATE: 105 BPM | HEIGHT: 71 IN | WEIGHT: 205 LBS | OXYGEN SATURATION: 97 % | SYSTOLIC BLOOD PRESSURE: 120 MMHG

## 2025-01-02 DIAGNOSIS — N32.81 OAB (OVERACTIVE BLADDER): Primary | ICD-10-CM

## 2025-01-02 PROCEDURE — 99213 OFFICE O/P EST LOW 20 MIN: CPT

## 2025-01-02 RX ORDER — TROSPIUM CHLORIDE 20 MG/1
20 TABLET, FILM COATED ORAL 2 TIMES DAILY
Qty: 90 TABLET | Refills: 1 | Status: SHIPPED | OUTPATIENT
Start: 2025-01-02 | End: 2025-04-02

## 2025-01-02 NOTE — ASSESSMENT & PLAN NOTE
Nocturia/urgency   Started on Myrbetriq 25 mg  9/27 renal US- Unremarkable sonogram.   PVR- low   Patient reports not much of a difference with nocturia x 2 and incontinence about 2 times per week   Dicussed incontinence may be due to polypharmacy/deep sleep   Improvement during the day   Wishes to trial another OAB medication   Plan to start tropsium   Follow up in 3 months   If symptoms better on Myrbetriq 25 mg discussed then increasing that dose to 50 mg  Discussed lifestyle modification with avoidance of drinking soda before bed   Plan follow-up in 3 months for med check

## 2025-03-14 ENCOUNTER — OFFICE VISIT (OUTPATIENT)
Dept: FAMILY MEDICINE CLINIC | Facility: CLINIC | Age: 40
End: 2025-03-14
Payer: MEDICARE

## 2025-03-14 VITALS
OXYGEN SATURATION: 97 % | HEART RATE: 105 BPM | SYSTOLIC BLOOD PRESSURE: 120 MMHG | BODY MASS INDEX: 29.26 KG/M2 | HEIGHT: 71 IN | WEIGHT: 209 LBS | TEMPERATURE: 98.1 F | DIASTOLIC BLOOD PRESSURE: 72 MMHG

## 2025-03-14 DIAGNOSIS — G40.109 LOCALIZATION-RELATED EPILEPSY (HCC): ICD-10-CM

## 2025-03-14 DIAGNOSIS — F20.0 CHRONIC PARANOID SCHIZOPHRENIA (HCC): ICD-10-CM

## 2025-03-14 DIAGNOSIS — R06.09 DYSPNEA ON EXERTION: Primary | ICD-10-CM

## 2025-03-14 DIAGNOSIS — R21 RASH: ICD-10-CM

## 2025-03-14 PROCEDURE — 99214 OFFICE O/P EST MOD 30 MIN: CPT | Performed by: FAMILY MEDICINE

## 2025-03-14 PROCEDURE — G2211 COMPLEX E/M VISIT ADD ON: HCPCS | Performed by: FAMILY MEDICINE

## 2025-03-14 RX ORDER — ALBUTEROL SULFATE 90 UG/1
2 INHALANT RESPIRATORY (INHALATION) EVERY 6 HOURS PRN
Qty: 18 G | Refills: 0 | Status: SHIPPED | OUTPATIENT
Start: 2025-03-14

## 2025-03-14 RX ORDER — CLOTRIMAZOLE 1 %
CREAM (GRAM) TOPICAL 2 TIMES DAILY
Qty: 60 G | Refills: 0 | Status: SHIPPED | OUTPATIENT
Start: 2025-03-14

## 2025-03-14 NOTE — PROGRESS NOTES
Name: Vaughn Madrigal      : 1985      MRN: [id number]  Encounter Provider: Marialuisa Rios MD  Encounter Date: 3/14/2025   Encounter department: [facility]  :  Assessment & Plan  Dyspnea on exertion  Patient does have a smoking history so discussed obtaining pulmonary function testing to rule out obstructive lung disease. Will also order CXR and 12 lead EKG. Start trial of albuterol PRN  Orders:    XR chest pa and lateral; Future    Complete PFT with post bronchodilator; Future    ECG 12 lead; Future    albuterol (Ventolin HFA) 90 mcg/act inhaler; Inhale 2 puffs every 6 (six) hours as needed for wheezing    Rash  May be tinea vs psoriasis inversa. Start trial of clotrimazole BID  Orders:    clotrimazole (LOTRIMIN) 1 % cream; Apply topically 2 (two) times a day    Chronic paranoid schizophrenia (HCC)  Continue management per psychiatry        Localization-related epilepsy (HCC)  Follows  with Neurology              History of Present Illness       Vaughn Madrigal is a very pleasant 39-year-old male who presents today accompanied by his mother with a chief complaint of shortness of breath.  Patient states that he is mainly having shortness of breath on exertion and patient's mother notes that he breathes heavily as well when walking. Patient does have a history of smoking but did quit last years. He denies any chest pain or palpitations. His psychiatric medications were adjusted and as a result he has had increased appetite and gaining a lot of weight.     On another note patient also has a rash underneath both of his armpits that started a few months ago that he would like to have examined. He denies any changes to soaps, lotions or detergents.     Review of Systems   Constitutional:  Negative for fever.   HENT:  Negative for ear pain, rhinorrhea and sore throat.    Respiratory:  Positive for shortness of breath. Negative for wheezing.    Cardiovascular:  Negative for chest pain and leg  "swelling.   Gastrointestinal:  Negative for abdominal pain and vomiting.   Musculoskeletal:  Negative for neck pain.   Skin:  Negative for rash.   Neurological:  Negative for headaches.       Objective   /72 (BP Location: Left arm, Patient Position: Sitting, Cuff Size: Large)   Pulse 105   Temp 98.1 °F (36.7 °C)   Ht 5' 11\" (1.803 m)   Wt 94.8 kg (209 lb)   SpO2 97%   BMI 29.15 kg/m²      Physical Exam  Constitutional:       General: He is not in acute distress.  HENT:      Head: Normocephalic and atraumatic.   Eyes:      General:         Right eye: No discharge.         Left eye: No discharge.      Extraocular Movements: Extraocular movements intact.   Cardiovascular:      Rate and Rhythm: Normal rate.   Pulmonary:      Effort: Pulmonary effort is normal. No respiratory distress.      Breath sounds: No wheezing.   Skin:     Comments: Erythematous rash underneath both armpits   Neurological:      General: No focal deficit present.      Mental Status: He is alert.   Psychiatric:         Mood and Affect: Affect is flat.         Depression Screening Follow-up Plan: Patient's depression screening was positive with a PHQ-2 score of 5. Their PHQ-9 score was 13. Continue regular follow-up with their psychologist/therapist/psychiatrist who is managing their mental health condition(s).  "

## 2025-03-31 DIAGNOSIS — N32.81 OAB (OVERACTIVE BLADDER): ICD-10-CM

## 2025-04-01 RX ORDER — ALBUTEROL SULFATE 0.83 MG/ML
2.5 SOLUTION RESPIRATORY (INHALATION) ONCE
Status: COMPLETED | OUTPATIENT
Start: 2025-04-02 | End: 2025-04-02

## 2025-04-01 RX ORDER — TROSPIUM CHLORIDE 20 MG/1
20 TABLET, FILM COATED ORAL 2 TIMES DAILY
Qty: 90 TABLET | Refills: 1 | Status: SHIPPED | OUTPATIENT
Start: 2025-04-01

## 2025-04-01 NOTE — PROGRESS NOTES
4/2/2025      Chief Complaint   Patient presents with   • Follow-up     Med check   • OAB (overactive bladder)         Assessment and Plan    39 y.o. male managed by Ap team     1. OAB (overactive bladder)  Assessment & Plan:  Nocturia/urgency   Started on Myrbetriq 25 mg- not much improvement   Switched to tropsium   Reports better results with nighttime urination   Does have occasional incontinence epiosdes at nighttime we discussed that this could be related to his medications and sleeping 12 hours a night.  We discussed setting an alarm in the morning to urinate to help decrease incontinent episodes   9/27 renal US- Unremarkable sonogram.   PVR- low   Given patiens cardiac hx and epilepsy hx I am hesitant to start desmopressin due to the affect on electrolyte imbalance- particular sodium   Plan to continue with dietary modifications and eliminate soda  Plan to continue with the trospium  Plan to follow-up in 1 year patient aware to call if symptoms worsening      History of Present Illness  Vaughn Madrigal is a 39 y.o. male here for evaluation of  urinary urgency/nocturia. History of localization-related epilepsy, orthostatic hypotension, left ventricular hypertrophy, chronic paranoid schizophrenia, tobacco abuse who presents to the office today accompanied by his mother. Over the past  months there has been increase in urinary symptoms including nocturia, urinary incontinence at night, and daytime urinary urgency. He finds his symptoms to be bothersome and affecting his quality of life due to having to wear depends. He denies any obstructive urinary tract symptoms. No dysuria or gross hematuria. No previous urologic surgery. No known family history of  malignancy. He is on multiple psychiatric medications.     Patient was started on myrbetriq. Patient reports improvement during the day but still has nocturia x 2 with incontinence where he needs to wear depends.  He reports this occurring about 2 times per  "week.  He states that he did not wake up to go to the bathroom every night.  The nights that he does wake up he is dry at that time.  He denies symptoms of urinary tract infection.       Switched to trospium. He now reports improvement with his nocturia and incontinence.  He does report occasional accidents.  He reports sleeping about 12 hours per night sometimes he wakes up to use the bathroom.  When he does wake up he does not have episodes of bedwetting.  He continues to eliminate soda.      Lab Results   Component Value Date    PSA 0.190 10/28/2024         Review of Systems   Constitutional:  Negative for chills and fever.   HENT:  Negative for ear pain and sore throat.    Eyes:  Negative for pain and visual disturbance.   Respiratory:  Negative for cough and shortness of breath.    Cardiovascular:  Negative for chest pain and palpitations.   Gastrointestinal:  Negative for abdominal pain and vomiting.   Genitourinary:  Negative for decreased urine volume, difficulty urinating, dysuria, flank pain, frequency, hematuria and urgency.   Musculoskeletal:  Negative for arthralgias and back pain.   Skin:  Negative for color change and rash.   Neurological:  Negative for seizures and syncope.   All other systems reviewed and are negative.               Vitals  Vitals:    04/02/25 1516   BP: 126/80   BP Location: Left arm   Patient Position: Sitting   Cuff Size: Adult   Pulse: 81   SpO2: 97%   Weight: 95.7 kg (211 lb)   Height: 5' 11\" (1.803 m)       Physical Exam  Vitals reviewed.   Constitutional:       Appearance: Normal appearance.   HENT:      Head: Normocephalic and atraumatic.   Eyes:      Conjunctiva/sclera: Conjunctivae normal.   Pulmonary:      Effort: Pulmonary effort is normal.   Abdominal:      General: Abdomen is flat. There is no distension.      Palpations: Abdomen is soft.      Tenderness: There is no abdominal tenderness.   Musculoskeletal:         General: Normal range of motion.      Cervical back: " "Normal range of motion.   Skin:     General: Skin is warm and dry.   Neurological:      General: No focal deficit present.      Mental Status: He is alert and oriented to person, place, and time.   Psychiatric:         Mood and Affect: Mood normal.         Behavior: Behavior normal.         Thought Content: Thought content normal.         Judgment: Judgment normal.           Past History  Past Medical History:   Diagnosis Date   • Chronic kidney disease     mom reports \"found on lab work but had ultrasound of kidney and \"fine\"   • Cigarette smoker    • Disease of thyroid gland     hypothyroid   • Exercise involving walking     usually daily   • Heart rate fast     Mom reports \"sometimes and pt takes Metoprolol for this\"   • Lipoma of back    • Low BP     Pt is on midodrine and Mom reports the Clozaril he takes causes low BP\"   • Memory loss    • Schizophrenia (HCC)    • Seizures (HCC)    • Syncope      Social History     Socioeconomic History   • Marital status: Single     Spouse name: None   • Number of children: None   • Years of education: None   • Highest education level: None   Occupational History   • Occupation: unemployed   Tobacco Use   • Smoking status: Former     Current packs/day: 0.25     Average packs/day: 0.3 packs/day for 20.0 years (5.0 ttl pk-yrs)     Types: Cigarettes   • Smokeless tobacco: Never   • Tobacco comments:     Per [employer]-current everyday smoker, yesterday  2100 last cigarette   Vaping Use   • Vaping status: Never Used   Substance and Sexual Activity   • Alcohol use: No   • Drug use: No   • Sexual activity: None     Comment: defer   Other Topics Concern   • None   Social History Narrative    Lives at home with mother and cat in [facility]     Social Drivers of Health     Financial Resource Strain: Low Risk  (3/9/2023)    Overall Financial Resource Strain (CARDIA)    • Difficulty of Paying Living Expenses: Not hard at all   Food Insecurity: No Food Insecurity (6/3/2024)    " Nursing - Inadequate Food Risk Classification    • Worried About Running Out of Food in the Last Year: Never true    • Ran Out of Food in the Last Year: Never true    • Ran Out of Food in the Last Year: Not on file   Transportation Needs: No Transportation Needs (6/3/2024)    PRAPARE - Transportation    • Lack of Transportation (Medical): No    • Lack of Transportation (Non-Medical): No   Physical Activity: Not on file   Stress: Not on file   Social Connections: Not on file   Intimate Partner Violence: Not on file   Housing Stability: Unknown (6/3/2024)    Housing Stability Vital Sign    • Unable to Pay for Housing in the Last Year: No    • Number of Times Moved in the Last Year: Not on file    • Homeless in the Last Year: No     Social History     Tobacco Use   Smoking Status Former   • Current packs/day: 0.25   • Average packs/day: 0.3 packs/day for 20.0 years (5.0 ttl pk-yrs)   • Types: Cigarettes   Smokeless Tobacco Never   Tobacco Comments    Per [employer]-current everyday smoker, yesterday  2100 last cigarette     Family History   Problem Relation Age of Onset   • Depression Mother    • Hypertension Father    • Kidney disease Paternal Grandfather    • Seizures Neg Hx        The following portions of the patient's history were reviewed and updated as appropriate: allergies, current medications, past medical history, past social history, past surgical history and problem list.    Results  No results found for this or any previous visit (from the past hour).]  Lab Results   Component Value Date    PSA 0.190 10/28/2024     Lab Results   Component Value Date    GLUCOSE 91 06/12/2018    CALCIUM 9.1 01/22/2025     06/12/2018    K 3.8 01/22/2025    CO2 29 01/22/2025     01/22/2025    BUN 20 01/22/2025    CREATININE 1.67 (H) 01/22/2025     Lab Results   Component Value Date    WBC 6.48 10/28/2024    HGB 13.7 10/28/2024    HCT 43.5 10/28/2024    MCV 87 10/28/2024     10/28/2024

## 2025-04-02 ENCOUNTER — OFFICE VISIT (OUTPATIENT)
Dept: UROLOGY | Facility: CLINIC | Age: 40
End: 2025-04-02
Payer: MEDICARE

## 2025-04-02 ENCOUNTER — HOSPITAL ENCOUNTER (OUTPATIENT)
Dept: PULMONOLOGY | Facility: HOSPITAL | Age: 40
Discharge: HOME | End: 2025-04-02
Attending: FAMILY MEDICINE
Payer: MEDICARE

## 2025-04-02 ENCOUNTER — HOSPITAL ENCOUNTER (OUTPATIENT)
Dept: RADIOLOGY | Facility: HOSPITAL | Age: 40
Discharge: HOME | End: 2025-04-02
Payer: MEDICARE

## 2025-04-02 ENCOUNTER — OFFICE VISIT (OUTPATIENT)
Dept: LAB | Facility: HOSPITAL | Age: 40
End: 2025-04-02
Payer: MEDICARE

## 2025-04-02 VITALS
DIASTOLIC BLOOD PRESSURE: 80 MMHG | OXYGEN SATURATION: 97 % | SYSTOLIC BLOOD PRESSURE: 126 MMHG | WEIGHT: 211 LBS | BODY MASS INDEX: 29.54 KG/M2 | HEART RATE: 81 BPM | HEIGHT: 71 IN

## 2025-04-02 DIAGNOSIS — R06.09 DYSPNEA ON EXERTION: ICD-10-CM

## 2025-04-02 DIAGNOSIS — N32.81 OAB (OVERACTIVE BLADDER): Primary | ICD-10-CM

## 2025-04-02 LAB
ATRIAL RATE: 100 BPM
P AXIS: 24 DEGREES
PR INTERVAL: 160 MS
QRS AXIS: 55 DEGREES
QRSD INTERVAL: 104 MS
QT INTERVAL: 350 MS
QTC INTERVAL: 452 MS
T WAVE AXIS: 46 DEGREES
VENTRICULAR RATE: 100 BPM

## 2025-04-02 PROCEDURE — 93005 ELECTROCARDIOGRAM TRACING: CPT

## 2025-04-02 PROCEDURE — 94729 DIFFUSING CAPACITY: CPT | Performed by: INTERNAL MEDICINE

## 2025-04-02 PROCEDURE — 94060 EVALUATION OF WHEEZING: CPT

## 2025-04-02 PROCEDURE — 71046 X-RAY EXAM CHEST 2 VIEWS: CPT

## 2025-04-02 PROCEDURE — 94060 EVALUATION OF WHEEZING: CPT | Performed by: INTERNAL MEDICINE

## 2025-04-02 PROCEDURE — 99213 OFFICE O/P EST LOW 20 MIN: CPT

## 2025-04-02 PROCEDURE — 93010 ELECTROCARDIOGRAM REPORT: CPT | Performed by: INTERNAL MEDICINE

## 2025-04-02 PROCEDURE — 94729 DIFFUSING CAPACITY: CPT

## 2025-04-02 PROCEDURE — 94726 PLETHYSMOGRAPHY LUNG VOLUMES: CPT

## 2025-04-02 PROCEDURE — 94726 PLETHYSMOGRAPHY LUNG VOLUMES: CPT | Performed by: INTERNAL MEDICINE

## 2025-04-02 PROCEDURE — 94760 N-INVAS EAR/PLS OXIMETRY 1: CPT

## 2025-04-02 RX ADMIN — ALBUTEROL SULFATE 2.5 MG: 2.5 SOLUTION RESPIRATORY (INHALATION) at 14:11

## 2025-04-02 NOTE — ASSESSMENT & PLAN NOTE
Nocturia/urgency   Started on Myrbetriq 25 mg- not much improvement   Switched to tropsium   Reports better results with nighttime urination   Does have occasional incontinence epiosdes at nighttime we discussed that this could be related to his medications and sleeping 12 hours a night.  We discussed setting an alarm in the morning to urinate to help decrease incontinent episodes   9/27 renal US- Unremarkable sonogram.   PVR- low   Given patiens cardiac hx and epilepsy hx I am hesitant to start desmopressin due to the affect on electrolyte imbalance- particular sodium   Plan to continue with dietary modifications and eliminate soda  Plan to continue with the trospium  Plan to follow-up in 1 year patient aware to call if symptoms worsening

## 2025-04-05 ENCOUNTER — RESULTS FOLLOW-UP (OUTPATIENT)
Dept: FAMILY MEDICINE CLINIC | Facility: CLINIC | Age: 40
End: 2025-04-05

## 2025-04-05 DIAGNOSIS — J98.4 RESTRICTIVE LUNG DISEASE: Primary | ICD-10-CM

## 2025-04-05 DIAGNOSIS — R06.09 DYSPNEA ON EXERTION: ICD-10-CM

## 2025-04-14 DIAGNOSIS — G40.109 LOCALIZATION-RELATED EPILEPSY (HCC): ICD-10-CM

## 2025-04-14 RX ORDER — LACOSAMIDE 150 MG/1
150 TABLET ORAL 2 TIMES DAILY
Qty: 60 TABLET | Refills: 5 | Status: SHIPPED | OUTPATIENT
Start: 2025-04-14

## 2025-04-14 NOTE — TELEPHONE ENCOUNTER
Medication: Lacosamide 150mg  PDMP   03/26/2025 11/06/2024 Lacosamide (Tablet) 56.0 28 150 MG NA GHADA MACHUCA   02/26/2025 01/29/2025 diazePAM (Tablet) 28.0 28 10 MG NA JARON CHARLTON  02/26/2025 11/06/2024 Lacosamide (Tablet) 56.0 28 150 MG NA GHADA MACHUCA

## 2025-04-15 NOTE — PROGRESS NOTES
Jaime Dorsey attended and participated in 2/2 evening groups  Compliant with scheduled 2100 meds without prompting  Pt declined evening snack  Resting quietly in bed at present, arouses easily  Will continue to monitor/assess for any changes  (V5) oriented

## 2025-04-16 NOTE — TELEPHONE ENCOUNTER
----- Message from Marialuisa Rios MD sent at 4/5/2025  6:05 PM EDT -----  Pulmonary function testing suggested possible restrictive lung disease, CXR also shows significant elevation of the left hemidiaphragm where the left side of the diaphragm is higher than the right side causing atelectasis on that side. I have placed a referral to pulmonology for further evaluation

## 2025-05-01 DIAGNOSIS — R00.0 SINUS TACHYCARDIA: ICD-10-CM

## 2025-05-01 DIAGNOSIS — E03.9 ACQUIRED HYPOTHYROIDISM: ICD-10-CM

## 2025-05-02 ENCOUNTER — CONSULT (OUTPATIENT)
Dept: PULMONOLOGY | Facility: CLINIC | Age: 40
End: 2025-05-02
Payer: MEDICARE

## 2025-05-02 VITALS
OXYGEN SATURATION: 96 % | DIASTOLIC BLOOD PRESSURE: 80 MMHG | TEMPERATURE: 96.7 F | WEIGHT: 208 LBS | HEIGHT: 71 IN | BODY MASS INDEX: 29.12 KG/M2 | SYSTOLIC BLOOD PRESSURE: 114 MMHG | HEART RATE: 95 BPM

## 2025-05-02 DIAGNOSIS — R06.09 DYSPNEA ON EXERTION: ICD-10-CM

## 2025-05-02 DIAGNOSIS — R94.2 DECREASED DIFFUSION CAPACITY: ICD-10-CM

## 2025-05-02 DIAGNOSIS — F20.0 CHRONIC PARANOID SCHIZOPHRENIA (HCC): Chronic | ICD-10-CM

## 2025-05-02 DIAGNOSIS — G56.80 PHRENIC NERVE PALSY: ICD-10-CM

## 2025-05-02 DIAGNOSIS — D72.10 EOSINOPHILIA, UNSPECIFIED TYPE: ICD-10-CM

## 2025-05-02 DIAGNOSIS — J98.4 RESTRICTIVE LUNG DISEASE: Primary | ICD-10-CM

## 2025-05-02 PROCEDURE — G2211 COMPLEX E/M VISIT ADD ON: HCPCS | Performed by: INTERNAL MEDICINE

## 2025-05-02 PROCEDURE — 99204 OFFICE O/P NEW MOD 45 MIN: CPT | Performed by: INTERNAL MEDICINE

## 2025-05-02 RX ORDER — METOPROLOL TARTRATE 25 MG/1
12.5 TABLET, FILM COATED ORAL 2 TIMES DAILY
Qty: 90 TABLET | Refills: 0 | Status: SHIPPED | OUTPATIENT
Start: 2025-05-02

## 2025-05-02 RX ORDER — BUDESONIDE AND FORMOTEROL FUMARATE DIHYDRATE 160; 4.5 UG/1; UG/1
2 AEROSOL RESPIRATORY (INHALATION) 2 TIMES DAILY
Qty: 10.2 G | Refills: 5 | Status: SHIPPED | OUTPATIENT
Start: 2025-05-02

## 2025-05-02 RX ORDER — LEVOTHYROXINE SODIUM 100 UG/1
100 TABLET ORAL DAILY
Qty: 90 TABLET | Refills: 0 | Status: SHIPPED | OUTPATIENT
Start: 2025-05-02

## 2025-05-02 NOTE — ASSESSMENT & PLAN NOTE
I believe the restriction is due to severely elevated left hemidiaphragm and poor cooperation, lungs appear clear completely, diffusion capacity is decreased but doubt ILD.  No significant symptoms no chronic cough with no significant shortness of breath.  Will monitor in the future

## 2025-05-02 NOTE — PROGRESS NOTES
Consultation - Pulmonary Medicine   Vaughn Madrigal 40 y.o. male MRN: [id number]    Physician Requesting Consult: Marialuisa Rios MD    Reason for Consult: SOB, restrictive lung disease    Restrictive lung disease  I believe the restriction is due to severely elevated left hemidiaphragm and poor cooperation, lungs appear clear completely, diffusion capacity is decreased but doubt ILD.  No significant symptoms no chronic cough with no significant shortness of breath.  Will monitor in the future    Phrenic nerve palsy  No clear etiology, should not cause significant symptoms, can be seen on 2019 chest x-ray.    Decreased diffusion capacity  Not sure of etiology, does not have much symptoms, will check echocardiogram to rule out pulmonary hypertension, may consider high-resolution CT scan in the future but patient is minimally symptomatic    Eosinophilia  This could be secondary to allergies or in the setting of possible asthma as below    Dyspnea on exertion  I will treat empirically as possible asthma, I provided a spacer and will start patient on Symbicort 160/4.5 twice daily, 2 puffs each time, rinse his mouth after using.  Continue as needed albuterol.  Will see if his symptoms improve then we will continue on this regimen or de-escalate as tolerated.  He has a eosinophilia as well that can indicate some allergic asthma    Chronic paranoid schizophrenia (HCC)  Continue current psych medications      Return in about 4 months (around 9/2/2025).    Diagnoses and all orders for this visit:    Restrictive lung disease  -     Ambulatory Referral to Pulmonology    Dyspnea on exertion  -     Ambulatory Referral to Pulmonology  -     Cancel: POCT FeNO  -     budesonide-formoterol (Symbicort) 160-4.5 mcg/act inhaler; Inhale 2 puffs 2 (two) times a day Rinse mouth after use.  -     Echo complete w/ contrast if indicated; Future    Eosinophilia, unspecified type    Phrenic nerve palsy    Decreased diffusion capacity  -      Echo complete w/ contrast if indicated; Future    Chronic paranoid schizophrenia (HCC)      ______________________________________________________________________    HPI:    Vaughn Madrigal is a 40 y.o. male who presents for evaluation of restrictive lung disease and shortness of breath.    Patient has no history of pulmonary disease, he has paranoid schizophrenia and was accompanied by his mother who gives most of the history.  He also has seizure disorder on medications but last seizures were more than 3 years ago.    Mother noticed that when they do their daily walks Uriel has some mild dyspnea on exertion, walking uphill he has to stop briefly, no wheezing, no cough or sputum production, denies chest pain.  Also in the morning when he wakes up he has some heavy breathing and he will clear secretions and she noted that he has significant drooling at sleep.  No history of asthma.  Patient has GERD but currently controlled with medications and denies any dysphagia or choking with food or aspiration.  No autoimmune disease or connective tissue disease.  History of minor back surgery for a lesion, no clear history.  No history of trauma or injury.  No cardiac disease history.  Otherwise he lives with his mother, they have a dog, no exposure to birds, he smoked in the past about 10 years but quit more than a year ago.  No vaping.  He is on disability with no occupational exposure    Review of Systems:  Review of Systems   Reason unable to perform ROS: Review of system was done with mother collaboration.   Constitutional: Negative.    HENT: Negative.     Eyes: Negative.    Respiratory:  Positive for shortness of breath.    Cardiovascular: Negative.    Gastrointestinal: Negative.    Endocrine: Negative.    Genitourinary: Negative.    Musculoskeletal: Negative.    Skin: Negative.    Allergic/Immunologic: Negative.    Neurological: Negative.    Hematological: Negative.    Psychiatric/Behavioral: Negative.       Aside  from what is mentioned in the HPI, the review of systems otherwise negative.    Current Medications:    Current Outpatient Medications:     albuterol (Ventolin HFA) 90 mcg/act inhaler, Inhale 2 puffs every 6 (six) hours as needed for wheezing, Disp: 18 g, Rfl: 0    clotrimazole (LOTRIMIN) 1 % cream, Apply topically 2 (two) times a day, Disp: 60 g, Rfl: 0    clozapine (CLOZARIL) 200 MG tablet, Take 2 tablets (400 mg total) by mouth daily at bedtime, Disp: 60 tablet, Rfl: 0    cloZAPine (CLOZARIL) 50 MG tablet, Take 1 tablet (50 mg total) by mouth daily at bedtime, Disp: 30 tablet, Rfl: 0    diazepam (VALIUM) 5 mg tablet, Take 10 mg by mouth daily at bedtime, Disp: , Rfl:     divalproex sodium (DEPAKOTE ER) 250 mg 24 hr tablet, , Disp: , Rfl:     divalproex sodium (DEPAKOTE ER) 500 mg 24 hr tablet, Take 2 tablets (1,000 mg total) by mouth daily at bedtime, Disp: 60 tablet, Rfl: 0    fluPHENAZine (PROLIXIN) 10 MG tablet, Take 2 tablets (20 mg total) by mouth daily at bedtime, Disp: 60 tablet, Rfl: 0    glycopyrrolate (ROBINUL) 1 mg tablet, Take 1 tablet by mouth in the morning, Disp: , Rfl:     glycopyrrolate (ROBINUL) 2 MG tablet, daily at bedtime, Disp: , Rfl:     lacosamide (VIMPAT) 150 mg tablet, TAKE 1 TABLET (150 MG TOTAL) BY MOUTH EVERY 12 (TWELVE) HOURS, Disp: 60 tablet, Rfl: 5    levothyroxine 100 mcg tablet, TAKE 1 TABLET (100 MCG TOTAL) BY MOUTH DAILY, Disp: 90 tablet, Rfl: 0    metoprolol tartrate (LOPRESSOR) 25 mg tablet, TAKE HALF TABLET BY MOUTH TWICE A DAY, Disp: 90 tablet, Rfl: 0    midodrine (PROAMATINE) 10 MG tablet, Take 1 tablet (10 mg total) by mouth 3 (three) times a day, Disp: 270 tablet, Rfl: 3    Omega-3 Fatty Acids (FISH OIL PO), Take by mouth, Disp: , Rfl:     senna (SENOKOT) 8.6 mg, Take 1 tablet (8.6 mg total) by mouth 2 (two) times a day, Disp: 60 each, Rfl: 5    trospium chloride (SANCTURA) 20 mg tablet, TAKE 1 TABLET (20 MG TOTAL) BY MOUTH TWO (TWO) TIMES A DAY, Disp: 90 tablet, Rfl:  "1    venlafaxine (EFFEXOR-XR) 37.5 mg 24 hr capsule, Take 87.5 capsules by mouth in the morning, Disp: , Rfl:     VITAMIN D PO, Take 2,000 Units by mouth in the morning, Disp: , Rfl:     buPROPion (WELLBUTRIN XL) 300 mg 24 hr tablet, Take 1 tablet (300 mg total) by mouth daily (Patient not taking: Reported on 1/2/2025), Disp: 30 tablet, Rfl: 0    Xanomeline-Trospium Chloride (COBENFY PO), Take 100 mg by mouth 2 (two) times a day, Disp: , Rfl:     Historical Information   Past Medical History:   Diagnosis Date    Chronic kidney disease     mom reports \"found on lab work but had ultrasound of kidney and \"fine\"    Cigarette smoker     Disease of thyroid gland     hypothyroid    Exercise involving walking     usually daily    Heart rate fast     Mom reports \"sometimes and pt takes Metoprolol for this\"    Lipoma of back     Low BP     Pt is on midodrine and Mom reports the Clozaril he takes causes low BP\"    Memory loss     Schizophrenia (HCC)     Seizures (HCC)     Syncope      Past Surgical History:   Procedure Laterality Date    AR EXC B9 LESION MRGN XCP SK TG T/A/L 0.5 CM/< Left 10/28/2021    Procedure: EXCISION WIDE LESION BACK;  Surgeon: Diana Hilliard MD;  Location: [facility];  Service: General    TONSILLECTOMY AND ADENOIDECTOMY       Social History   Social History     Tobacco Use   Smoking Status Former    Current packs/day: 0.25    Average packs/day: 0.3 packs/day for 20.0 years (5.0 ttl pk-yrs)    Types: Cigarettes   Smokeless Tobacco Never   Tobacco Comments    Per [employer]-current everyday smoker, yesterday  2100 last cigarette       Occupational history:  No occupational exposure    Family History:   Family History   Problem Relation Age of Onset    Depression Mother     Hypertension Father     Kidney disease Paternal Grandfather     Seizures Neg Hx          PhysicalExamination:  Vitals:   /80 (BP Location: Left arm, Patient Position: Sitting, Cuff Size: Large)   Pulse 95   Temp (!) 96.7 °F " "(35.9 °C) (Tympanic)   Ht 5' 11\" (1.803 m)   Wt 94.3 kg (208 lb)   SpO2 96%   BMI 29.01 kg/m²     Gen:  Comfortable on room air.  No conversational dyspnea  HEENT:  Conjugate gaze.  sclerae anicteric.  Oropharynx moist  Neck: Trachea is midline. No JVD. No adenopathy  Chest: Equal breath sounds and clear to auscultation bilaterally but decreased over the left base.  No crackles or wheezing.  Cardiac: S1-S2 regular. no murmur  Abdomen:  benign  Extremities: No edema  Neuro:  Normal speech and mentation      Diagnostic Data:  Labs:  I personally reviewed the most recent laboratory data pertinent to today's visit    Lab Results   Component Value Date    WBC 6.48 10/28/2024    HGB 13.7 10/28/2024    HCT 43.5 10/28/2024    MCV 87 10/28/2024     10/28/2024     Lab Results   Component Value Date    GLUCOSE 91 06/12/2018    CALCIUM 9.1 01/22/2025     06/12/2018    K 3.8 01/22/2025    CO2 29 01/22/2025     01/22/2025    BUN 20 01/22/2025    CREATININE 1.67 (H) 01/22/2025     No results found for: \"IGE\"  Lab Results   Component Value Date    ALT 7 01/22/2025    AST 8 01/22/2025    ALKPHOS 82 01/22/2025    BILITOT 0.3 06/12/2018       On labs patient had eosinophilia ranging between 4-13%/200-936 since 2019    PFT results:  The most recent pulmonary function tests were reviewed.  2025:  Spirometry suggests possible restriction or non-specific pattern.  No post bronchodilator response.  Moderate restriction and air trapping as indicated by lung volumes  Severe decrease  corrected diffusion capacity.  Flow-volume loop consistent with restriction    Imaging:  I personally reviewed the images on the PAC system pertinent to today's visit  Chest x-ray 2025 reviewed on PACS: Clear lungs but significantly elevated left hemidiaphragm with inflated stomach bubble under    Chest x-ray from 2019 reviewed in PACS: Same as above with elevated left hemidiaphragm.  Clear lungs.    Other studies:  Cardiac stress test " 2022: Was negative but was not complete patient requested to stop due to fatigue and shortness of breath and also there was some hypotension at the beginning and during recovery.    Echocardiogram 2021: LVEF 60%, normal RV      Amy Rosas MD

## 2025-05-02 NOTE — ASSESSMENT & PLAN NOTE
Not sure of etiology, does not have much symptoms, will check echocardiogram to rule out pulmonary hypertension, may consider high-resolution CT scan in the future but patient is minimally symptomatic

## 2025-05-02 NOTE — ASSESSMENT & PLAN NOTE
I will treat empirically as possible asthma, I provided a spacer and will start patient on Symbicort 160/4.5 twice daily, 2 puffs each time, rinse his mouth after using.  Continue as needed albuterol.  Will see if his symptoms improve then we will continue on this regimen or de-escalate as tolerated.  He has a eosinophilia as well that can indicate some allergic asthma

## 2025-05-09 DIAGNOSIS — I95.1 ORTHOSTATIC HYPOTENSION: ICD-10-CM

## 2025-05-12 RX ORDER — MIDODRINE HYDROCHLORIDE 10 MG/1
10 TABLET ORAL 3 TIMES DAILY
Qty: 270 TABLET | Refills: 3 | Status: SHIPPED | OUTPATIENT
Start: 2025-05-12

## 2025-06-18 ENCOUNTER — RA CDI HCC (OUTPATIENT)
Dept: OTHER | Facility: HOSPITAL | Age: 40
End: 2025-06-18

## 2025-06-24 DIAGNOSIS — N32.81 OAB (OVERACTIVE BLADDER): ICD-10-CM

## 2025-06-25 ENCOUNTER — OFFICE VISIT (OUTPATIENT)
Dept: FAMILY MEDICINE CLINIC | Facility: CLINIC | Age: 40
End: 2025-06-25
Payer: MEDICARE

## 2025-06-25 VITALS
TEMPERATURE: 98 F | HEIGHT: 71 IN | DIASTOLIC BLOOD PRESSURE: 80 MMHG | BODY MASS INDEX: 29.9 KG/M2 | RESPIRATION RATE: 18 BRPM | WEIGHT: 213.6 LBS | OXYGEN SATURATION: 92 % | SYSTOLIC BLOOD PRESSURE: 124 MMHG | HEART RATE: 111 BPM

## 2025-06-25 DIAGNOSIS — Z00.00 MEDICARE ANNUAL WELLNESS VISIT, SUBSEQUENT: Primary | ICD-10-CM

## 2025-06-25 DIAGNOSIS — Z13.31 POSITIVE DEPRESSION SCREENING: ICD-10-CM

## 2025-06-25 DIAGNOSIS — E78.5 DYSLIPIDEMIA: ICD-10-CM

## 2025-06-25 DIAGNOSIS — E03.9 HYPOTHYROIDISM, UNSPECIFIED TYPE: ICD-10-CM

## 2025-06-25 PROCEDURE — G0439 PPPS, SUBSEQ VISIT: HCPCS | Performed by: FAMILY MEDICINE

## 2025-06-25 RX ORDER — TROSPIUM CHLORIDE 20 MG/1
20 TABLET, FILM COATED ORAL 2 TIMES DAILY
Qty: 90 TABLET | Refills: 1 | Status: SHIPPED | OUTPATIENT
Start: 2025-06-25

## 2025-06-25 NOTE — PROGRESS NOTES
Depression Screening Follow-up Plan: Patient's depression screening was positive with a PHQ-2 score of 3. Their PHQ-9 score was 13. Continue regular follow-up with their psychologist/therapist/psychiatrist who is managing their mental health condition(s).

## 2025-06-25 NOTE — PROGRESS NOTES
Name: Vaughn Madrigal      : 1985      MRN: [id number]  Encounter Provider: Marialuisa Rios MD  Encounter Date: 2025   Encounter department: [facility]  :  Assessment & Plan  Medicare annual wellness visit, subsequent  Preventive health issues were discussed with patient, and age appropriate screening tests were ordered as noted in patient's After Visit Summary. Personalized health advice and appropriate referrals for health education or preventive services given if needed, as noted in patient's After Visit Summary.  Follow-up as needed or in 1 year for annual physical       Dyslipidemia    Orders:    Lipid Panel with Direct LDL reflex; Future    Comprehensive metabolic panel; Future    Hypothyroidism, unspecified type    Orders:    TSH, 3rd generation with Free T4 reflex; Future    Positive depression screening  Depression Screening Follow-up Plan: Patient's depression screening was positive with a PHQ-2 score of 3. Their PHQ-9 score was 13. Continue regular follow-up with their psychologist/therapist/psychiatrist who is managing their mental health condition(s).               History of Present Illness   Vaughn Madrigal is a very pleasant 40-year-old male who presents today accompanied by his mother for Medicare wellness visit.  Since her last office visit patient has been seen by pulmonology given his abnormal pulmonary function testing and was started on Symbicort inhaler.  He was also advised to obtain an echocardiogram and has this scheduled in September.  He continues to follow with other specialties including psychiatry and neurology.    Patient Care Team:  Marialuisa Rios MD as PCP - General (Family Medicine)  MD Kasia Arredondo MD (Nephrology)    Review of Systems   Constitutional: Negative.    HENT: Negative.     Eyes: Negative.    Respiratory: Negative.     Cardiovascular: Negative.    Gastrointestinal: Negative.    Musculoskeletal: Negative.    Skin:  Negative.    Neurological: Negative.    Psychiatric/Behavioral: Negative.       Medical History Reviewed by provider this encounter:       Annual Wellness Visit Questionnaire       Health Risk Assessment:   Patient rates overall health as good. Patient feels that their physical health rating is slightly worse. Patient is satisfied with their life. Eyesight was rated as same. Hearing was rated as same. Patient feels that their emotional and mental health rating is same. Patients states they are never, rarely angry. Patient states they are sometimes unusually tired/fatigued. Pain experienced in the last 7 days has been none. Patient states that he has experienced weight loss or gain in last 6 months.     Depression Screening:   PHQ-2 Score: 3  PHQ-9 Score: 13      Fall Risk Screening:   In the past year, patient has experienced: history of falling in past year      Home Safety:  Patient does not have trouble with stairs inside or outside of their home. Patient has working smoke alarms and has working carbon monoxide detector. Home safety hazards include: medications that cause fatigue.     Nutrition:   Current diet is Regular.     Medications:   Patient is not currently taking any over-the-counter supplements. Patient is not able to manage medications. We use bubble packs to help.    Activities of Daily Living (ADLs)/Instrumental Activities of Daily Living (IADLs):   Walk and transfer into and out of bed and chair?: Yes  Dress and groom yourself?: Yes    Bathe or shower yourself?: Yes    Feed yourself? Yes  Do your laundry/housekeeping?: No  Manage your money, pay your bills and track your expenses?: No  Make your own meals?: Yes    Do your own shopping?: No    Previous Hospitalizations:   Any hospitalizations or ED visits within the last 12 months?: No      Advance Care Planning:   Living will: No    Durable POA for healthcare: No    Advanced directive: No      Preventive Screenings      Cardiovascular Screening:     General: Screening Not Indicated and History Lipid Disorder      Diabetes Screening:     General: Screening Current      Prostate Cancer Screening:    General: Screening Not Indicated      Abdominal Aortic Aneurysm (AAA) Screening:    Risk factors include: tobacco use        Lung Cancer Screening:     General: Screening Not Indicated      Hepatitis C Screening:    General: Screening Current    Screening, Brief Intervention, and Referral to Treatment (SBIRT)     Screening  Typical number of drinks in a day: 0  Typical number of drinks in a week: 0  Interpretation: Low risk drinking behavior.    AUDIT-C Screenin) How often did you have a drink containing alcohol in the past year? never  2) How many drinks did you have on a typical day when you were drinking in the past year? 0  3) How often did you have 6 or more drinks on one occasion in the past year? never    AUDIT-C Score: 0  Interpretation: Score 0-3 (male): Negative screen for alcohol misuse    Single Item Drug Screening:  How often have you used an illegal drug (including marijuana) or a prescription medication for non-medical reasons in the past year? never    Single Item Drug Screen Score: 0  Interpretation: Negative screen for possible drug use disorder    Social Drivers of Health     Financial Resource Strain: Low Risk  (3/9/2023)    Overall Financial Resource Strain (CARDIA)     Difficulty of Paying Living Expenses: Not hard at all   Food Insecurity: No Food Insecurity (2025)    Nursing - Inadequate Food Risk Classification     Worried About Running Out of Food in the Last Year: Never true     Ran Out of Food in the Last Year: Never true   Transportation Needs: No Transportation Needs (2025)    PRAPARE - Transportation     Lack of Transportation (Medical): No     Lack of Transportation (Non-Medical): No   Housing Stability: Low Risk  (2025)    Housing Stability Vital Sign     Unable to Pay for Housing in the Last Year: No     Number of  "Times Moved in the Last Year: 0     Homeless in the Last Year: No   Utilities: Not At Risk (6/25/2025)    [facility] Utilities     Threatened with loss of utilities: No     No results found.    Objective   /80 (BP Location: Left arm, Patient Position: Sitting, Cuff Size: Large)   Pulse (!) 111   Temp 98 °F (36.7 °C) (Tympanic)   Resp 18   Ht 5' 11\" (1.803 m)   Wt 96.9 kg (213 lb 9.6 oz)   SpO2 92%   BMI 29.79 kg/m²     Physical Exam  Constitutional:       General: He is not in acute distress.     Appearance: He is not ill-appearing.   HENT:      Head: Normocephalic and atraumatic.     Eyes:      General:         Right eye: No discharge.         Left eye: No discharge.      Extraocular Movements: Extraocular movements intact.       Cardiovascular:      Rate and Rhythm: Tachycardia present.   Pulmonary:      Effort: Pulmonary effort is normal. No respiratory distress.      Breath sounds: No wheezing.   Abdominal:      General: There is no distension.      Palpations: Abdomen is soft.      Tenderness: There is no abdominal tenderness.     Neurological:      General: No focal deficit present.      Mental Status: He is alert.     Psychiatric:         Mood and Affect: Affect is flat.         "

## 2025-06-25 NOTE — PATIENT INSTRUCTIONS
Medicare Preventive Visit Patient Instructions  Thank you for completing your Welcome to Medicare Visit or Medicare Annual Wellness Visit today. Your next wellness visit will be due in one year (6/26/2026).  The screening/preventive services that you may require over the next 5-10 years are detailed below. Some tests may not apply to you based off risk factors and/or age. Screening tests ordered at today's visit but not completed yet may show as past due. Also, please note that scanned in results may not display below.  Preventive Screenings:  Service Recommendations Previous Testing/Comments   Colorectal Cancer Screening  Colonoscopy    Fecal Occult Blood Test (FOBT)/Fecal Immunochemical Test (FIT)  Fecal DNA/Cologuard Test  Flexible Sigmoidoscopy Age: 45-75 years old   Colonoscopy: every 10 years (May be performed more frequently if at higher risk)  OR  FOBT/FIT: every 1 year  OR  Cologuard: every 3 years  OR  Sigmoidoscopy: every 5 years  Screening may be recommended earlier than age 45 if at higher risk for colorectal cancer. Also, an individualized decision between you and your healthcare provider will decide whether screening between the ages of 76-85 would be appropriate. Colonoscopy: Not on file  FOBT/FIT: Not on file  Cologuard: Not on file  Sigmoidoscopy: Not on file          Prostate Cancer Screening Individualized decision between patient and health care provider in men between ages of 55-69   Medicare will cover every 12 months beginning on the day after your 50th birthday PSA: 0.190 ng/mL     Screening Not Indicated     Hepatitis C Screening Once for adults born between 1945 and 1965  More frequently in patients at high risk for Hepatitis C Hep C Antibody: 11/18/2022    Screening Current   Diabetes Screening 1-2 times per year if you're at risk for diabetes or have pre-diabetes Fasting glucose: 128 mg/dL (4/18/2023)  A1C: 5.6 % (6/11/2025)  Screening Current   Cholesterol Screening Once every 5 years  if you don't have a lipid disorder. May order more often based on risk factors. Lipid panel: 06/11/2025  Screening Not Indicated  History Lipid Disorder      Other Preventive Screenings Covered by Medicare:  Abdominal Aortic Aneurysm (AAA) Screening: covered once if your at risk. You're considered to be at risk if you have a family history of AAA or a male between the age of 65-75 who smoking at least 100 cigarettes in your lifetime.  Lung Cancer Screening: covers low dose CT scan once per year if you meet all of the following conditions: (1) Age 55-77; (2) No signs or symptoms of lung cancer; (3) Current smoker or have quit smoking within the last 15 years; (4) You have a tobacco smoking history of at least 20 pack years (packs per day x number of years you smoked); (5) You get a written order from a healthcare provider.  Glaucoma Screening: covered annually if you're considered high risk: (1) You have diabetes OR (2) Family history of glaucoma OR (3)  aged 50 and older OR (4)  American aged 65 and older  Osteoporosis Screening: covered every 2 years if you meet one of the following conditions: (1) Have a vertebral abnormality; (2) On glucocorticoid therapy for more than 3 months; (3) Have primary hyperparathyroidism; (4) On osteoporosis medications and need to assess response to drug therapy.  HIV Screening: covered annually if you're between the age of 15-65. Also covered annually if you are younger than 15 and older than 65 with risk factors for HIV infection. For pregnant patients, it is covered up to 3 times per pregnancy.    Immunizations:  Immunization Recommendations   Influenza Vaccine Annual influenza vaccination during flu season is recommended for all persons aged >= 6 months who do not have contraindications   Pneumococcal Vaccine   * Pneumococcal conjugate vaccine = PCV13 (Prevnar 13), PCV15 (Vaxneuvance), PCV20 (Prevnar 20)  * Pneumococcal polysaccharide vaccine = PPSV23  (Pneumovax) Adults 19-65 yo with certain risk factors or if 65+ yo  If never received any pneumonia vaccine: recommend Prevnar 20 (PCV20)  Give PCV20 if previously received 1 dose of PCV13 or PPSV23   Hepatitis B Vaccine 3 dose series if at intermediate or high risk (ex: diabetes, end stage renal disease, liver disease)   Respiratory syncytial virus (RSV) Vaccine - COVERED BY MEDICARE PART D  * RSVPreF3 (Arexvy) CDC recommends that adults 60 years of age and older may receive a single dose of RSV vaccine using shared clinical decision-making (SCDM)   Tetanus (Td) Vaccine - COST NOT COVERED BY MEDICARE PART B Following completion of primary series, a booster dose should be given every 10 years to maintain immunity against tetanus. Td may also be given as tetanus wound prophylaxis.   Tdap Vaccine - COST NOT COVERED BY MEDICARE PART B Recommended at least once for all adults. For pregnant patients, recommended with each pregnancy.   Shingles Vaccine (Shingrix) - COST NOT COVERED BY MEDICARE PART B  2 shot series recommended in those 19 years and older who have or will have weakened immune systems or those 50 years and older     Health Maintenance Due:      Topic Date Due   • HIV Screening  Completed   • Hepatitis C Screening  Completed     Immunizations Due:      Topic Date Due   • HPV Vaccine (1 - Male 3-dose series) Never done   • COVID-19 Vaccine (3 - 2024-25 season) 09/01/2024   • Influenza Vaccine (Season Ended) 09/01/2025     Advance Directives   What are advance directives?  Advance directives are legal documents that state your wishes and plans for medical care. These plans are made ahead of time in case you lose your ability to make decisions for yourself. Advance directives can apply to any medical decision, such as the treatments you want, and if you want to donate organs.   What are the types of advance directives?  There are many types of advance directives, and each state has rules about how to use them.  You may choose a combination of any of the following:  Living will:  This is a written record of the treatment you want. You can also choose which treatments you do not want, which to limit, and which to stop at a certain time. This includes surgery, medicine, IV fluid, and tube feedings.   Durable power of  for healthcare (DPAHC):  This is a written record that states who you want to make healthcare choices for you when you are unable to make them for yourself. This person, called a proxy, is usually a family member or a friend. You may choose more than 1 proxy.  Do not resuscitate (DNR) order:  A DNR order is used in case your heart stops beating or you stop breathing. It is a request not to have certain forms of treatment, such as CPR. A DNR order may be included in other types of advance directives.  Medical directive:  This covers the care that you want if you are in a coma, near death, or unable to make decisions for yourself. You can list the treatments you want for each condition. Treatment may include pain medicine, surgery, blood transfusions, dialysis, IV or tube feedings, and a ventilator (breathing machine).  Values history:  This document has questions about your views, beliefs, and how you feel and think about life. This information can help others choose the care that you would choose.  Why are advance directives important?  An advance directive helps you control your care. Although spoken wishes may be used, it is better to have your wishes written down. Spoken wishes can be misunderstood, or not followed. Treatments may be given even if you do not want them. An advance directive may make it easier for your family to make difficult choices about your care.   Depression   Depression  is a medical condition that causes feelings of sadness or hopelessness that do not go away. Depression may cause you to lose interest in things you used to enjoy. These feelings may interfere with your daily  life.  Call your local emergency number (911 in the [place]) if:   You think about harming yourself or someone else.  You have done something on purpose to hurt yourself.  The following resources are available at any time to help you, if needed:   National Suicide Prevention Lifeline: 1-751.146.6571 (2-829-719-TALK)   Suicide Hotline: 1-487.786.8853 (3-800-OXFPGVI)   For a list of international numbers: https://save.org/find-help/international-resources/  Treatment for depression may include medicine to relieve depression. Medicine is often used together with therapy. Therapy is a way for you to talk about your feelings and anything that may be causing depression. Therapy can be done alone or in a group. It may also be done with family members or a significant other.  Get regular physical activity.    Create a regular sleep schedule.    Eat a variety of healthy foods.    Do not drink alcohol or use drugs.     Weight Management   Why it is important to manage your weight:  Being overweight increases your risk of health conditions such as heart disease, high blood pressure, type 2 diabetes, and certain types of cancer. It can also increase your risk for osteoarthritis, sleep apnea, and other respiratory problems. Aim for a slow, steady weight loss. Even a small amount of weight loss can lower your risk of health problems.  How to lose weight safely:  A safe and healthy way to lose weight is to eat fewer calories and get regular exercise. You can lose up about 1 pound a week by decreasing the number of calories you eat by 500 calories each day.   Healthy meal plan for weight management:  A healthy meal plan includes a variety of foods, contains fewer calories, and helps you stay healthy. A healthy meal plan includes the following:  Eat whole-grain foods more often.  A healthy meal plan should contain fiber. Fiber is the part of grains, fruits, and vegetables that is not broken down by your body. Whole-grain foods are  healthy and provide extra fiber in your diet. Some examples of whole-grain foods are whole-wheat breads and pastas, oatmeal, brown rice, and bulgur.  Eat a variety of vegetables every day.  Include dark, leafy greens such as spinach, kale, geo greens, and mustard greens. Eat yellow and orange vegetables such as carrots, sweet potatoes, and winter squash.   Eat a variety of fruits every day.  Choose fresh or canned fruit (canned in its own juice or light syrup) instead of juice. Fruit juice has very little or no fiber.  Eat low-fat dairy foods.  Drink fat-free (skim) milk or 1% milk. Eat fat-free yogurt and low-fat cottage cheese. Try low-fat cheeses such as mozzarella and other reduced-fat cheeses.  Choose meat and other protein foods that are low in fat.  Choose beans or other legumes such as split peas or lentils. Choose fish, skinless poultry (chicken or turkey), or lean cuts of red meat (beef or pork). Before you cook meat or poultry, cut off any visible fat.   Use less fat and oil.  Try baking foods instead of frying them. Add less fat, such as margarine, sour cream, regular salad dressing and mayonnaise to foods. Eat fewer high-fat foods. Some examples of high-fat foods include french fries, doughnuts, ice cream, and cakes.  Eat fewer sweets.  Limit foods and drinks that are high in sugar. This includes candy, cookies, regular soda, and sweetened drinks.  Exercise:  Exercise at least 30 minutes per day on most days of the week. Some examples of exercise include walking, biking, dancing, and swimming. You can also fit in more physical activity by taking the stairs instead of the elevator or parking farther away from stores. Ask your healthcare provider about the best exercise plan for you.    © Copyright [employer] 2018 Information is for End User's use only and may not be sold, redistributed or otherwise used for commercial purposes. All illustrations and images included in CareNotes® are the  copyrighted property of [employer] or [employer]

## 2025-07-21 DIAGNOSIS — R00.0 SINUS TACHYCARDIA: ICD-10-CM

## 2025-07-21 DIAGNOSIS — E03.9 ACQUIRED HYPOTHYROIDISM: ICD-10-CM

## 2025-07-22 RX ORDER — METOPROLOL TARTRATE 25 MG/1
12.5 TABLET, FILM COATED ORAL 2 TIMES DAILY
Qty: 30 TABLET | Refills: 5 | Status: SHIPPED | OUTPATIENT
Start: 2025-07-22

## 2025-07-22 RX ORDER — LEVOTHYROXINE SODIUM 100 UG/1
100 TABLET ORAL DAILY
Qty: 30 TABLET | Refills: 5 | Status: SHIPPED | OUTPATIENT
Start: 2025-07-22

## 2025-07-23 RX ORDER — METOPROLOL TARTRATE 25 MG/1
12.5 TABLET, FILM COATED ORAL 2 TIMES DAILY
Qty: 90 TABLET | Refills: 1 | OUTPATIENT
Start: 2025-07-23

## 2025-07-23 RX ORDER — LEVOTHYROXINE SODIUM 100 UG/1
100 TABLET ORAL DAILY
Qty: 90 TABLET | Refills: 1 | OUTPATIENT
Start: 2025-07-23

## (undated) DEVICE — INTENDED FOR TISSUE SEPARATION, AND OTHER PROCEDURES THAT REQUIRE A SHARP SURGICAL BLADE TO PUNCTURE OR CUT.: Brand: BARD-PARKER SAFETY BLADES SIZE 15, STERILE

## (undated) DEVICE — GLOVE SRG BIOGEL 6.5

## (undated) DEVICE — GLOVE SRG BIOGEL 7

## (undated) DEVICE — ADHESIVE SKIN HIGH VISCOSITY EXOFIN 1ML

## (undated) DEVICE — NEEDLE HYPO 22G X 1-1/2 IN

## (undated) DEVICE — DRAPE EQUIPMENT RF WAND

## (undated) DEVICE — 3M™ STERI-STRIP™ REINFORCED ADHESIVE SKIN CLOSURES, R1542, 1/4 IN X 1-1/2 IN (6 MM X 38 MM), 6 STRIPS/ENVELOPE: Brand: 3M™ STERI-STRIP™

## (undated) DEVICE — 2000CC GUARDIAN II: Brand: GUARDIAN

## (undated) DEVICE — GLOVE INDICATOR PI UNDERGLOVE SZ 7 BLUE

## (undated) DEVICE — GAUZE SPONGES,16 PLY: Brand: CURITY

## (undated) DEVICE — 2963 MEDIPORE SOFT CLOTH TAPE 3 IN X 10 YD 12 RLS/CS: Brand: 3M™ MEDIPORE™

## (undated) DEVICE — BETHLEHEM UNIVERSAL MINOR GEN: Brand: CARDINAL HEALTH

## (undated) DEVICE — SYRINGE 10ML LL

## (undated) DEVICE — SUT MONOCRYL 4-0 PS-2 27 IN Y426H

## (undated) DEVICE — ELECTRODE NEEDLE MOD E-Z CLEAN 2.75IN 7CM -0013M

## (undated) DEVICE — 3M™ STERI-STRIP™ REINFORCED ADHESIVE SKIN CLOSURES, R1547, 1/2 IN X 4 IN (12 MM X 100 MM), 6 STRIPS/ENVELOPE: Brand: 3M™ STERI-STRIP™

## (undated) DEVICE — DRESSING MEPILEX AG BORDER 4 X 8 IN

## (undated) DEVICE — 3M™ STERI-STRIP™ COMPOUND BENZOIN TINCTURE 40 BAGS/CARTON 4 CARTONS/CASE C1544: Brand: 3M™ STERI-STRIP™

## (undated) DEVICE — SCD SEQUENTIAL COMPRESSION COMFORT SLEEVE MEDIUM KNEE LENGTH: Brand: KENDALL SCD

## (undated) DEVICE — SUT VICRYL 3-0 SH 27 IN J416H

## (undated) DEVICE — PLUMEPEN PRO 10FT

## (undated) DEVICE — GLOVE INDICATOR PI UNDERGLOVE SZ 6.5 BLUE

## (undated) DEVICE — CHLORAPREP HI-LITE 26ML ORANGE